# Patient Record
Sex: FEMALE | Race: WHITE | Employment: FULL TIME | URBAN - METROPOLITAN AREA
[De-identification: names, ages, dates, MRNs, and addresses within clinical notes are randomized per-mention and may not be internally consistent; named-entity substitution may affect disease eponyms.]

---

## 2017-01-03 ENCOUNTER — TELEPHONE (OUTPATIENT)
Dept: ORTHOPEDIC SURGERY | Age: 57
End: 2017-01-03

## 2017-01-03 DIAGNOSIS — M17.11 PRIMARY OSTEOARTHRITIS OF RIGHT KNEE: ICD-10-CM

## 2017-01-03 DIAGNOSIS — M48.061 LUMBAR SPINAL STENOSIS: ICD-10-CM

## 2017-01-03 RX ORDER — LISINOPRIL 20 MG/1
TABLET ORAL
Qty: 60 TABLET | Refills: 2 | Status: SHIPPED | OUTPATIENT
Start: 2017-01-03 | End: 2017-01-13 | Stop reason: SDUPTHER

## 2017-01-03 RX ORDER — MELOXICAM 15 MG/1
TABLET ORAL
Qty: 30 TABLET | Refills: 2 | Status: SHIPPED | OUTPATIENT
Start: 2017-01-03 | End: 2017-01-13 | Stop reason: SDUPTHER

## 2017-01-03 RX ORDER — ZOLPIDEM TARTRATE 10 MG/1
TABLET ORAL
Qty: 30 TABLET | Refills: 2 | Status: SHIPPED | OUTPATIENT
Start: 2017-01-03 | End: 2017-04-06 | Stop reason: SDUPTHER

## 2017-01-03 RX ORDER — ATENOLOL 50 MG/1
TABLET ORAL
Qty: 30 TABLET | Refills: 2 | Status: SHIPPED | OUTPATIENT
Start: 2017-01-03 | End: 2017-01-13

## 2017-01-03 RX ORDER — HYDROCODONE BITARTRATE AND ACETAMINOPHEN 7.5; 325 MG/1; MG/1
1 TABLET ORAL EVERY 6 HOURS PRN
Qty: 120 TABLET | Refills: 0 | OUTPATIENT
Start: 2017-01-03

## 2017-01-03 RX ORDER — HYDROXYZINE PAMOATE 25 MG/1
CAPSULE ORAL
Qty: 60 CAPSULE | Refills: 2 | Status: SHIPPED | OUTPATIENT
Start: 2017-01-03 | End: 2017-04-06 | Stop reason: SDUPTHER

## 2017-01-11 ENCOUNTER — TELEPHONE (OUTPATIENT)
Dept: FAMILY MEDICINE CLINIC | Age: 57
End: 2017-01-11

## 2017-01-13 ENCOUNTER — OFFICE VISIT (OUTPATIENT)
Dept: FAMILY MEDICINE CLINIC | Age: 57
End: 2017-01-13

## 2017-01-13 VITALS
HEIGHT: 63 IN | DIASTOLIC BLOOD PRESSURE: 88 MMHG | BODY MASS INDEX: 33.84 KG/M2 | WEIGHT: 191 LBS | SYSTOLIC BLOOD PRESSURE: 138 MMHG

## 2017-01-13 DIAGNOSIS — M48.061 LUMBAR SPINAL STENOSIS: ICD-10-CM

## 2017-01-13 DIAGNOSIS — M17.11 PRIMARY OSTEOARTHRITIS OF RIGHT KNEE: ICD-10-CM

## 2017-01-13 DIAGNOSIS — I10 ESSENTIAL HYPERTENSION: Primary | ICD-10-CM

## 2017-01-13 DIAGNOSIS — R60.9 EDEMA, UNSPECIFIED TYPE: ICD-10-CM

## 2017-01-13 DIAGNOSIS — N95.2 ATROPHIC VAGINITIS: ICD-10-CM

## 2017-01-13 DIAGNOSIS — D50.8 OTHER IRON DEFICIENCY ANEMIA: ICD-10-CM

## 2017-01-13 DIAGNOSIS — R53.83 FATIGUE, UNSPECIFIED TYPE: ICD-10-CM

## 2017-01-13 LAB
A/G RATIO: 1.6 (ref 1.1–2.2)
ALBUMIN SERPL-MCNC: 3.8 G/DL (ref 3.4–5)
ALP BLD-CCNC: 72 U/L (ref 40–129)
ALT SERPL-CCNC: 6 U/L (ref 10–40)
ANION GAP SERPL CALCULATED.3IONS-SCNC: 10 MMOL/L (ref 3–16)
AST SERPL-CCNC: 11 U/L (ref 15–37)
BILIRUB SERPL-MCNC: 0.4 MG/DL (ref 0–1)
BUN BLDV-MCNC: 19 MG/DL (ref 7–20)
CALCIUM SERPL-MCNC: 8.7 MG/DL (ref 8.3–10.6)
CHLORIDE BLD-SCNC: 104 MMOL/L (ref 99–110)
CO2: 27 MMOL/L (ref 21–32)
CREAT SERPL-MCNC: 1.2 MG/DL (ref 0.6–1.1)
GFR AFRICAN AMERICAN: 56
GFR NON-AFRICAN AMERICAN: 46
GLOBULIN: 2.4 G/DL
GLUCOSE BLD-MCNC: 93 MG/DL (ref 70–99)
HCT VFR BLD CALC: 35.8 % (ref 36–48)
HEMOGLOBIN: 11.5 G/DL (ref 12–16)
IRON: 54 UG/DL (ref 37–145)
MCH RBC QN AUTO: 28.2 PG (ref 26–34)
MCHC RBC AUTO-ENTMCNC: 32.2 G/DL (ref 31–36)
MCV RBC AUTO: 87.7 FL (ref 80–100)
PDW BLD-RTO: 17.7 % (ref 12.4–15.4)
PLATELET # BLD: 256 K/UL (ref 135–450)
PMV BLD AUTO: 8.6 FL (ref 5–10.5)
POTASSIUM SERPL-SCNC: 4.7 MMOL/L (ref 3.5–5.1)
RBC # BLD: 4.08 M/UL (ref 4–5.2)
SODIUM BLD-SCNC: 141 MMOL/L (ref 136–145)
TOTAL PROTEIN: 6.2 G/DL (ref 6.4–8.2)
TSH SERPL DL<=0.05 MIU/L-ACNC: 0.36 UIU/ML (ref 0.27–4.2)
WBC # BLD: 4.9 K/UL (ref 4–11)

## 2017-01-13 PROCEDURE — 96372 THER/PROPH/DIAG INJ SC/IM: CPT | Performed by: FAMILY MEDICINE

## 2017-01-13 PROCEDURE — 36415 COLL VENOUS BLD VENIPUNCTURE: CPT | Performed by: FAMILY MEDICINE

## 2017-01-13 PROCEDURE — 99214 OFFICE O/P EST MOD 30 MIN: CPT | Performed by: FAMILY MEDICINE

## 2017-01-13 RX ORDER — PROPRANOLOL HYDROCHLORIDE 120 MG/1
120 CAPSULE, EXTENDED RELEASE ORAL DAILY
Qty: 30 CAPSULE | Refills: 3 | Status: SHIPPED | OUTPATIENT
Start: 2017-01-13 | End: 2017-05-05 | Stop reason: SDUPTHER

## 2017-01-13 RX ORDER — HYDROCODONE BITARTRATE AND ACETAMINOPHEN 7.5; 325 MG/1; MG/1
1 TABLET ORAL EVERY 6 HOURS PRN
Qty: 120 TABLET | Refills: 0 | Status: SHIPPED | OUTPATIENT
Start: 2017-01-13 | End: 2017-04-12 | Stop reason: ALTCHOICE

## 2017-01-13 RX ORDER — HYDROCODONE BITARTRATE AND ACETAMINOPHEN 7.5; 325 MG/1; MG/1
1 TABLET ORAL EVERY 6 HOURS PRN
Qty: 120 TABLET | Refills: 0 | Status: SHIPPED | OUTPATIENT
Start: 2017-01-13 | End: 2017-01-13 | Stop reason: SDUPTHER

## 2017-01-13 RX ORDER — HYDROCHLOROTHIAZIDE 25 MG/1
25 TABLET ORAL DAILY
Qty: 30 TABLET | Refills: 3 | Status: SHIPPED | OUTPATIENT
Start: 2017-01-13 | End: 2017-03-10 | Stop reason: ALTCHOICE

## 2017-01-13 ASSESSMENT — ENCOUNTER SYMPTOMS
RESPIRATORY NEGATIVE: 1
GASTROINTESTINAL NEGATIVE: 1
BACK PAIN: 1

## 2017-01-23 ENCOUNTER — TELEPHONE (OUTPATIENT)
Dept: FAMILY MEDICINE CLINIC | Age: 57
End: 2017-01-23

## 2017-02-02 DIAGNOSIS — M54.16 LUMBAR RADICULOPATHY: ICD-10-CM

## 2017-02-02 RX ORDER — LORAZEPAM 1 MG/1
TABLET ORAL
Qty: 90 TABLET | Refills: 0 | Status: SHIPPED | OUTPATIENT
Start: 2017-02-02 | End: 2017-03-02 | Stop reason: SDUPTHER

## 2017-02-02 RX ORDER — CLONIDINE HYDROCHLORIDE 0.2 MG/1
TABLET ORAL
Qty: 60 TABLET | Refills: 0 | Status: SHIPPED | OUTPATIENT
Start: 2017-02-02 | End: 2017-03-02 | Stop reason: SDUPTHER

## 2017-02-02 RX ORDER — GABAPENTIN 300 MG/1
CAPSULE ORAL
Qty: 60 CAPSULE | Refills: 0 | Status: SHIPPED | OUTPATIENT
Start: 2017-02-02 | End: 2017-03-03 | Stop reason: SDUPTHER

## 2017-03-02 RX ORDER — CLONIDINE HYDROCHLORIDE 0.2 MG/1
TABLET ORAL
Qty: 60 TABLET | Refills: 0 | Status: SHIPPED | OUTPATIENT
Start: 2017-03-02 | End: 2017-03-03

## 2017-03-02 RX ORDER — LORAZEPAM 1 MG/1
TABLET ORAL
Qty: 90 TABLET | Refills: 0 | Status: SHIPPED | OUTPATIENT
Start: 2017-03-02 | End: 2017-03-03

## 2017-03-03 DIAGNOSIS — M54.16 LUMBAR RADICULOPATHY: ICD-10-CM

## 2017-03-03 RX ORDER — GABAPENTIN 300 MG/1
CAPSULE ORAL
Qty: 60 CAPSULE | Refills: 0 | Status: SHIPPED | OUTPATIENT
Start: 2017-03-03 | End: 2017-04-06 | Stop reason: SDUPTHER

## 2017-03-03 RX ORDER — CLONIDINE HYDROCHLORIDE 0.2 MG/1
TABLET ORAL
Qty: 60 TABLET | Refills: 0 | Status: SHIPPED | OUTPATIENT
Start: 2017-03-03 | End: 2017-10-20 | Stop reason: SDUPTHER

## 2017-03-03 RX ORDER — LORAZEPAM 1 MG/1
TABLET ORAL
Qty: 90 TABLET | Refills: 0 | Status: SHIPPED | OUTPATIENT
Start: 2017-03-03 | End: 2017-05-05 | Stop reason: SDUPTHER

## 2017-03-10 ENCOUNTER — OFFICE VISIT (OUTPATIENT)
Dept: FAMILY MEDICINE CLINIC | Age: 57
End: 2017-03-10

## 2017-03-10 VITALS
WEIGHT: 178 LBS | SYSTOLIC BLOOD PRESSURE: 152 MMHG | DIASTOLIC BLOOD PRESSURE: 100 MMHG | HEIGHT: 63 IN | BODY MASS INDEX: 31.54 KG/M2

## 2017-03-10 DIAGNOSIS — R06.09 DYSPNEA ON EXERTION: ICD-10-CM

## 2017-03-10 DIAGNOSIS — R07.9 CHEST PAIN, UNSPECIFIED TYPE: Primary | ICD-10-CM

## 2017-03-10 DIAGNOSIS — E16.2 HYPOGLYCEMIA: ICD-10-CM

## 2017-03-10 DIAGNOSIS — M51.36 DDD (DEGENERATIVE DISC DISEASE), LUMBAR: ICD-10-CM

## 2017-03-10 LAB
A/G RATIO: 1.7 (ref 1.1–2.2)
ALBUMIN SERPL-MCNC: 4 G/DL (ref 3.4–5)
ALP BLD-CCNC: 74 U/L (ref 40–129)
ALT SERPL-CCNC: 8 U/L (ref 10–40)
ANION GAP SERPL CALCULATED.3IONS-SCNC: 15 MMOL/L (ref 3–16)
AST SERPL-CCNC: 13 U/L (ref 15–37)
BILIRUB SERPL-MCNC: 0.4 MG/DL (ref 0–1)
BUN BLDV-MCNC: 21 MG/DL (ref 7–20)
CALCIUM SERPL-MCNC: 9.2 MG/DL (ref 8.3–10.6)
CHLORIDE BLD-SCNC: 103 MMOL/L (ref 99–110)
CO2: 23 MMOL/L (ref 21–32)
CREAT SERPL-MCNC: 1.5 MG/DL (ref 0.6–1.1)
GFR AFRICAN AMERICAN: 43
GFR NON-AFRICAN AMERICAN: 36
GLOBULIN: 2.3 G/DL
GLUCOSE BLD-MCNC: 91 MG/DL (ref 70–99)
HCT VFR BLD CALC: 38.8 % (ref 36–48)
HEMOGLOBIN: 12.5 G/DL (ref 12–16)
IRON: 55 UG/DL (ref 37–145)
MCH RBC QN AUTO: 29.6 PG (ref 26–34)
MCHC RBC AUTO-ENTMCNC: 32.2 G/DL (ref 31–36)
MCV RBC AUTO: 92 FL (ref 80–100)
PDW BLD-RTO: 16.8 % (ref 12.4–15.4)
PLATELET # BLD: 224 K/UL (ref 135–450)
PMV BLD AUTO: 10.1 FL (ref 5–10.5)
POTASSIUM SERPL-SCNC: 4.8 MMOL/L (ref 3.5–5.1)
RBC # BLD: 4.22 M/UL (ref 4–5.2)
SODIUM BLD-SCNC: 141 MMOL/L (ref 136–145)
TOTAL PROTEIN: 6.3 G/DL (ref 6.4–8.2)
WBC # BLD: 4.5 K/UL (ref 4–11)

## 2017-03-10 PROCEDURE — 36415 COLL VENOUS BLD VENIPUNCTURE: CPT | Performed by: FAMILY MEDICINE

## 2017-03-10 PROCEDURE — 99214 OFFICE O/P EST MOD 30 MIN: CPT | Performed by: FAMILY MEDICINE

## 2017-03-10 ASSESSMENT — ENCOUNTER SYMPTOMS
RESPIRATORY NEGATIVE: 1
GASTROINTESTINAL NEGATIVE: 1

## 2017-03-11 LAB
ESTIMATED AVERAGE GLUCOSE: 105.4 MG/DL
HBA1C MFR BLD: 5.3 %

## 2017-03-15 ENCOUNTER — TELEPHONE (OUTPATIENT)
Dept: FAMILY MEDICINE CLINIC | Age: 57
End: 2017-03-15

## 2017-03-16 LAB
6-ACETYLMORPHINE: NOT DETECTED
7-AMINOCLONAZEPAM: NOT DETECTED
ALPHA-OH-ALPRAZOLAM: NOT DETECTED
ALPRAZOLAM: NOT DETECTED
AMPHETAMINE: NOT DETECTED
BARBITURATES: NOT DETECTED
BENZOYLECGONINE: NOT DETECTED
BUPRENORPHINE: NOT DETECTED
CARISOPRODOL: NOT DETECTED
CLONAZEPAM: NOT DETECTED
CODEINE: NOT DETECTED
CREATININE URINE: 275.2 MG/DL (ref 20–400)
DIAZEPAM: NOT DETECTED
DRUGS EXPECTED: NORMAL
EER PAIN MGT DRUG PANEL, HIGH RES/EMIT U: NORMAL
ETHYL GLUCURONIDE: NOT DETECTED
FENTANYL: NOT DETECTED
HYDROCODONE: NOT DETECTED
HYDROMORPHONE: NOT DETECTED
LORAZEPAM: PRESENT
MARIJUANA METABOLITE: PRESENT
MDA: NOT DETECTED
MDEA: NOT DETECTED
MDMA URINE: NOT DETECTED
MEPERIDINE: NOT DETECTED
METHADONE: NOT DETECTED
METHAMPHETAMINE: NOT DETECTED
METHYLPHENIDATE: NOT DETECTED
MIDAZOLAM: NOT DETECTED
MORPHINE: NOT DETECTED
NORBUPRENORPHINE, FREE: NOT DETECTED
NORDIAZEPAM: NOT DETECTED
NORFENTANYL: NOT DETECTED
NORHYDROCODONE, URINE: NOT DETECTED
NOROXYCODONE: NOT DETECTED
NOROXYMORPHONE, URINE: NOT DETECTED
OXAZEPAM: NOT DETECTED
OXYCODONE: NOT DETECTED
OXYMORPHONE: NOT DETECTED
PAIN MANAGEMENT DRUG PANEL: NORMAL
PAIN MANAGEMENT DRUG PANEL: NORMAL
PCP: NOT DETECTED
PHENTERMINE: NOT DETECTED
PROPOXYPHENE: NOT DETECTED
TAPENTADOL, URINE: NOT DETECTED
TAPENTADOL-O-SULFATE, URINE: NOT DETECTED
TEMAZEPAM: NOT DETECTED
TRAMADOL: NOT DETECTED
ZOLPIDEM: NOT DETECTED

## 2017-03-17 ENCOUNTER — OFFICE VISIT (OUTPATIENT)
Dept: ORTHOPEDIC SURGERY | Age: 57
End: 2017-03-17

## 2017-03-17 VITALS
BODY MASS INDEX: 31.54 KG/M2 | HEIGHT: 63 IN | DIASTOLIC BLOOD PRESSURE: 74 MMHG | WEIGHT: 178 LBS | SYSTOLIC BLOOD PRESSURE: 135 MMHG

## 2017-03-17 DIAGNOSIS — M43.16 SPONDYLOLISTHESIS OF LUMBAR REGION: ICD-10-CM

## 2017-03-17 DIAGNOSIS — M48.061 LUMBAR STENOSIS: Primary | ICD-10-CM

## 2017-03-17 DIAGNOSIS — M47.816 SPONDYLOSIS OF LUMBAR REGION WITHOUT MYELOPATHY OR RADICULOPATHY: ICD-10-CM

## 2017-03-17 PROCEDURE — 99213 OFFICE O/P EST LOW 20 MIN: CPT | Performed by: PHYSICAL MEDICINE & REHABILITATION

## 2017-03-21 ENCOUNTER — TELEPHONE (OUTPATIENT)
Dept: ORTHOPEDIC SURGERY | Age: 57
End: 2017-03-21

## 2017-03-24 ENCOUNTER — HOSPITAL ENCOUNTER (OUTPATIENT)
Dept: NON INVASIVE DIAGNOSTICS | Age: 57
Discharge: HOME | End: 2017-03-24
Attending: FAMILY MEDICINE | Admitting: FAMILY MEDICINE

## 2017-03-24 DIAGNOSIS — R07.9 CHEST PAIN: ICD-10-CM

## 2017-03-24 LAB
LV EF: 72 %
LVEF MODALITY: NORMAL

## 2017-03-24 RX ORDER — AMINOPHYLLINE DIHYDRATE 25 MG/ML
100 INJECTION, SOLUTION INTRAVENOUS ONCE
Status: COMPLETED | OUTPATIENT
Start: 2017-03-24 | End: 2017-03-24

## 2017-03-24 RX ADMIN — AMINOPHYLLINE DIHYDRATE 100 MG: 25 INJECTION, SOLUTION INTRAVENOUS at 10:24

## 2017-04-06 DIAGNOSIS — M54.16 LUMBAR RADICULOPATHY: ICD-10-CM

## 2017-04-06 DIAGNOSIS — F41.9 ANXIETY: ICD-10-CM

## 2017-04-06 RX ORDER — MELOXICAM 15 MG/1
TABLET ORAL
Qty: 30 TABLET | Refills: 0 | Status: SHIPPED | OUTPATIENT
Start: 2017-04-06 | End: 2017-04-10 | Stop reason: SDUPTHER

## 2017-04-06 RX ORDER — LISINOPRIL 20 MG/1
TABLET ORAL
Qty: 60 TABLET | Refills: 1 | Status: SHIPPED | OUTPATIENT
Start: 2017-04-06 | End: 2017-04-10 | Stop reason: ALTCHOICE

## 2017-04-06 RX ORDER — ATENOLOL 50 MG/1
TABLET ORAL
Qty: 30 TABLET | Refills: 1 | Status: SHIPPED | OUTPATIENT
Start: 2017-04-06 | End: 2017-04-10

## 2017-04-06 RX ORDER — ZOLPIDEM TARTRATE 10 MG/1
TABLET ORAL
Qty: 30 TABLET | Refills: 1 | Status: SHIPPED | OUTPATIENT
Start: 2017-04-06 | End: 2017-06-05 | Stop reason: SDUPTHER

## 2017-04-06 RX ORDER — CITALOPRAM 20 MG/1
TABLET ORAL
Qty: 30 TABLET | Refills: 2 | Status: SHIPPED | OUTPATIENT
Start: 2017-04-06 | End: 2017-07-02 | Stop reason: SDUPTHER

## 2017-04-06 RX ORDER — HYDROXYZINE HYDROCHLORIDE 25 MG/1
TABLET, FILM COATED ORAL
Qty: 60 TABLET | Refills: 1 | Status: SHIPPED | OUTPATIENT
Start: 2017-04-06 | End: 2017-06-05 | Stop reason: SDUPTHER

## 2017-04-06 RX ORDER — GABAPENTIN 300 MG/1
CAPSULE ORAL
Qty: 60 CAPSULE | Refills: 0 | Status: SHIPPED | OUTPATIENT
Start: 2017-04-06 | End: 2017-05-05 | Stop reason: SDUPTHER

## 2017-04-10 ENCOUNTER — TELEPHONE (OUTPATIENT)
Dept: FAMILY MEDICINE CLINIC | Age: 57
End: 2017-04-10

## 2017-04-10 ENCOUNTER — NURSE ONLY (OUTPATIENT)
Dept: FAMILY MEDICINE CLINIC | Age: 57
End: 2017-04-10

## 2017-04-10 DIAGNOSIS — Z79.899 DRUG THERAPY: Primary | ICD-10-CM

## 2017-04-10 RX ORDER — NIFEDIPINE 30 MG/1
30 TABLET, FILM COATED, EXTENDED RELEASE ORAL DAILY
Qty: 30 TABLET | Refills: 3 | Status: SHIPPED | OUTPATIENT
Start: 2017-04-10 | End: 2017-08-01 | Stop reason: SDUPTHER

## 2017-04-12 ENCOUNTER — HOSPITAL ENCOUNTER (OUTPATIENT)
Dept: PAIN MANAGEMENT | Age: 57
Discharge: HOME | End: 2017-04-12
Admitting: PHYSICAL MEDICINE & REHABILITATION

## 2017-04-12 VITALS
RESPIRATION RATE: 16 BRPM | OXYGEN SATURATION: 99 % | HEIGHT: 64 IN | DIASTOLIC BLOOD PRESSURE: 98 MMHG | WEIGHT: 173 LBS | HEART RATE: 56 BPM | TEMPERATURE: 97.4 F | SYSTOLIC BLOOD PRESSURE: 161 MMHG | BODY MASS INDEX: 29.53 KG/M2

## 2017-04-12 ASSESSMENT — PAIN - FUNCTIONAL ASSESSMENT
PAIN_FUNCTIONAL_ASSESSMENT: 0-10
PAIN_FUNCTIONAL_ASSESSMENT: 0-10

## 2017-04-12 ASSESSMENT — PAIN DESCRIPTION - DESCRIPTORS: DESCRIPTORS: THROBBING

## 2017-04-14 LAB
6-ACETYLMORPHINE: NOT DETECTED
7-AMINOCLONAZEPAM: NOT DETECTED
ALPHA-OH-ALPRAZOLAM: NOT DETECTED
ALPRAZOLAM: NOT DETECTED
AMPHETAMINE: NOT DETECTED
BARBITURATES: NOT DETECTED
BENZOYLECGONINE: NOT DETECTED
BUPRENORPHINE: NOT DETECTED
CARISOPRODOL: NOT DETECTED
CLONAZEPAM: NOT DETECTED
CODEINE: NOT DETECTED
CREATININE URINE: 179.9 MG/DL (ref 20–400)
DIAZEPAM: NOT DETECTED
DRUGS EXPECTED: NORMAL
EER PAIN MGT DRUG PANEL, HIGH RES/EMIT U: NORMAL
ETHYL GLUCURONIDE: NOT DETECTED
FENTANYL: NOT DETECTED
HYDROCODONE: NOT DETECTED
HYDROMORPHONE: NOT DETECTED
LORAZEPAM: PRESENT
MARIJUANA METABOLITE: NOT DETECTED
MDA: NOT DETECTED
MDEA: NOT DETECTED
MDMA URINE: NOT DETECTED
MEPERIDINE: NOT DETECTED
METHADONE: NOT DETECTED
METHAMPHETAMINE: NOT DETECTED
METHYLPHENIDATE: NOT DETECTED
MIDAZOLAM: NOT DETECTED
MORPHINE: NOT DETECTED
NORBUPRENORPHINE, FREE: NOT DETECTED
NORDIAZEPAM: NOT DETECTED
NORFENTANYL: NOT DETECTED
NORHYDROCODONE, URINE: NOT DETECTED
NOROXYCODONE: NOT DETECTED
NOROXYMORPHONE, URINE: NOT DETECTED
OXAZEPAM: NOT DETECTED
OXYCODONE: NOT DETECTED
OXYMORPHONE: NOT DETECTED
PAIN MANAGEMENT DRUG PANEL: NORMAL
PAIN MANAGEMENT DRUG PANEL: NORMAL
PCP: NOT DETECTED
PHENTERMINE: NOT DETECTED
PROPOXYPHENE: NOT DETECTED
TAPENTADOL, URINE: NOT DETECTED
TAPENTADOL-O-SULFATE, URINE: NOT DETECTED
TEMAZEPAM: NOT DETECTED
TRAMADOL: NOT DETECTED
ZOLPIDEM: NOT DETECTED

## 2017-04-24 ENCOUNTER — TELEPHONE (OUTPATIENT)
Dept: FAMILY MEDICINE CLINIC | Age: 57
End: 2017-04-24

## 2017-04-24 DIAGNOSIS — M17.11 PRIMARY OSTEOARTHRITIS OF RIGHT KNEE: ICD-10-CM

## 2017-04-24 DIAGNOSIS — M48.061 LUMBAR SPINAL STENOSIS: ICD-10-CM

## 2017-04-24 RX ORDER — HYDROCODONE BITARTRATE AND ACETAMINOPHEN 7.5; 325 MG/1; MG/1
1 TABLET ORAL EVERY 6 HOURS PRN
Qty: 120 TABLET | Refills: 0 | Status: SHIPPED | OUTPATIENT
Start: 2017-04-24 | End: 2017-05-23 | Stop reason: SDUPTHER

## 2017-05-06 RX ORDER — LORAZEPAM 1 MG/1
TABLET ORAL
Qty: 90 TABLET | Refills: 0 | Status: SHIPPED | OUTPATIENT
Start: 2017-05-06 | End: 2017-06-05 | Stop reason: SDUPTHER

## 2017-05-17 ENCOUNTER — TELEPHONE (OUTPATIENT)
Dept: ORTHOPEDIC SURGERY | Age: 57
End: 2017-05-17

## 2017-05-23 DIAGNOSIS — M48.061 LUMBAR SPINAL STENOSIS: ICD-10-CM

## 2017-05-23 DIAGNOSIS — M17.11 PRIMARY OSTEOARTHRITIS OF RIGHT KNEE: ICD-10-CM

## 2017-05-23 RX ORDER — HYDROCODONE BITARTRATE AND ACETAMINOPHEN 7.5; 325 MG/1; MG/1
1 TABLET ORAL EVERY 6 HOURS PRN
Qty: 120 TABLET | Refills: 0 | Status: SHIPPED | OUTPATIENT
Start: 2017-05-23 | End: 2017-06-22 | Stop reason: SDUPTHER

## 2017-06-05 RX ORDER — LORAZEPAM 1 MG/1
TABLET ORAL
Qty: 90 TABLET | Refills: 0 | Status: SHIPPED | OUTPATIENT
Start: 2017-06-05 | End: 2017-07-02 | Stop reason: SDUPTHER

## 2017-06-05 RX ORDER — LISINOPRIL 20 MG/1
TABLET ORAL
Qty: 60 TABLET | Refills: 0 | Status: SHIPPED | OUTPATIENT
Start: 2017-06-05 | End: 2017-07-02 | Stop reason: SDUPTHER

## 2017-06-05 RX ORDER — ZOLPIDEM TARTRATE 10 MG/1
TABLET ORAL
Qty: 30 TABLET | Refills: 0 | Status: SHIPPED | OUTPATIENT
Start: 2017-06-05 | End: 2017-07-02 | Stop reason: SDUPTHER

## 2017-06-09 RX ORDER — ATENOLOL 50 MG/1
TABLET ORAL
Qty: 30 TABLET | Refills: 0 | Status: SHIPPED | OUTPATIENT
Start: 2017-06-09 | End: 2017-07-26 | Stop reason: SINTOL

## 2017-06-22 ENCOUNTER — TELEPHONE (OUTPATIENT)
Dept: FAMILY MEDICINE CLINIC | Age: 57
End: 2017-06-22

## 2017-06-22 DIAGNOSIS — M17.11 PRIMARY OSTEOARTHRITIS OF RIGHT KNEE: ICD-10-CM

## 2017-06-22 DIAGNOSIS — M48.061 LUMBAR SPINAL STENOSIS: ICD-10-CM

## 2017-06-22 RX ORDER — HYDROCODONE BITARTRATE AND ACETAMINOPHEN 7.5; 325 MG/1; MG/1
1 TABLET ORAL EVERY 6 HOURS PRN
Qty: 120 TABLET | Refills: 0 | Status: SHIPPED | OUTPATIENT
Start: 2017-06-22 | End: 2017-07-26 | Stop reason: SDUPTHER

## 2017-07-02 DIAGNOSIS — F41.9 ANXIETY: ICD-10-CM

## 2017-07-02 RX ORDER — ZOLPIDEM TARTRATE 10 MG/1
TABLET ORAL
Qty: 30 TABLET | Refills: 0 | Status: SHIPPED | OUTPATIENT
Start: 2017-07-02 | End: 2017-08-02 | Stop reason: SDUPTHER

## 2017-07-02 RX ORDER — CITALOPRAM 20 MG/1
TABLET ORAL
Qty: 30 TABLET | Refills: 1 | Status: SHIPPED | OUTPATIENT
Start: 2017-07-02 | End: 2017-08-29 | Stop reason: SDUPTHER

## 2017-07-02 RX ORDER — LORAZEPAM 1 MG/1
TABLET ORAL
Qty: 90 TABLET | Refills: 0 | Status: SHIPPED | OUTPATIENT
Start: 2017-07-02 | End: 2017-08-01 | Stop reason: SDUPTHER

## 2017-07-02 RX ORDER — LISINOPRIL 20 MG/1
TABLET ORAL
Qty: 60 TABLET | Refills: 0 | Status: SHIPPED | OUTPATIENT
Start: 2017-07-02 | End: 2017-07-26 | Stop reason: SDUPTHER

## 2017-07-05 ENCOUNTER — TELEPHONE (OUTPATIENT)
Dept: FAMILY MEDICINE CLINIC | Age: 57
End: 2017-07-05

## 2017-07-24 ENCOUNTER — TELEPHONE (OUTPATIENT)
Dept: FAMILY MEDICINE CLINIC | Age: 57
End: 2017-07-24

## 2017-07-26 ENCOUNTER — OFFICE VISIT (OUTPATIENT)
Dept: FAMILY MEDICINE CLINIC | Age: 57
End: 2017-07-26

## 2017-07-26 VITALS
BODY MASS INDEX: 29.53 KG/M2 | WEIGHT: 173 LBS | DIASTOLIC BLOOD PRESSURE: 90 MMHG | SYSTOLIC BLOOD PRESSURE: 152 MMHG | HEIGHT: 64 IN

## 2017-07-26 DIAGNOSIS — M48.061 LUMBAR SPINAL STENOSIS: ICD-10-CM

## 2017-07-26 DIAGNOSIS — M17.11 PRIMARY OSTEOARTHRITIS OF RIGHT KNEE: ICD-10-CM

## 2017-07-26 DIAGNOSIS — I10 ESSENTIAL HYPERTENSION: ICD-10-CM

## 2017-07-26 PROCEDURE — 99214 OFFICE O/P EST MOD 30 MIN: CPT | Performed by: FAMILY MEDICINE

## 2017-07-26 RX ORDER — PROPRANOLOL HYDROCHLORIDE 160 MG/1
CAPSULE, EXTENDED RELEASE ORAL
Qty: 30 CAPSULE | Refills: 5 | Status: SHIPPED | OUTPATIENT
Start: 2017-07-26 | End: 2017-10-20 | Stop reason: SDUPTHER

## 2017-07-26 RX ORDER — HYDROCODONE BITARTRATE AND ACETAMINOPHEN 7.5; 325 MG/1; MG/1
1 TABLET ORAL EVERY 6 HOURS PRN
Qty: 120 TABLET | Refills: 0 | Status: SHIPPED | OUTPATIENT
Start: 2017-07-26 | End: 2017-07-26 | Stop reason: SDUPTHER

## 2017-07-26 RX ORDER — HYDROCODONE BITARTRATE AND ACETAMINOPHEN 7.5; 325 MG/1; MG/1
1 TABLET ORAL EVERY 6 HOURS PRN
Qty: 120 TABLET | Refills: 0 | Status: SHIPPED | OUTPATIENT
Start: 2017-07-26 | End: 2017-10-20 | Stop reason: SDUPTHER

## 2017-07-26 ASSESSMENT — ENCOUNTER SYMPTOMS
BACK PAIN: 1
ABDOMINAL PAIN: 0
BOWEL INCONTINENCE: 0
GASTROINTESTINAL NEGATIVE: 1
SHORTNESS OF BREATH: 1
ORTHOPNEA: 0
BLURRED VISION: 0

## 2017-08-01 RX ORDER — LORAZEPAM 1 MG/1
TABLET ORAL
Qty: 90 TABLET | Refills: 2 | Status: SHIPPED | OUTPATIENT
Start: 2017-08-01 | End: 2017-10-20 | Stop reason: SDUPTHER

## 2017-08-01 RX ORDER — NIFEDIPINE 30 MG/1
TABLET, FILM COATED, EXTENDED RELEASE ORAL
Qty: 30 TABLET | Refills: 2 | Status: SHIPPED | OUTPATIENT
Start: 2017-08-01 | End: 2017-10-20 | Stop reason: SDUPTHER

## 2017-08-01 RX ORDER — LISINOPRIL 20 MG/1
TABLET ORAL
Qty: 60 TABLET | Refills: 0 | Status: SHIPPED | OUTPATIENT
Start: 2017-08-01 | End: 2017-08-29 | Stop reason: SDUPTHER

## 2017-08-02 RX ORDER — ZOLPIDEM TARTRATE 10 MG/1
TABLET ORAL
Qty: 30 TABLET | Refills: 1 | Status: SHIPPED | OUTPATIENT
Start: 2017-08-02 | End: 2017-09-29 | Stop reason: SDUPTHER

## 2017-08-18 ENCOUNTER — OFFICE VISIT (OUTPATIENT)
Dept: ORTHOPEDIC SURGERY | Age: 57
End: 2017-08-18

## 2017-08-18 VITALS
WEIGHT: 173 LBS | SYSTOLIC BLOOD PRESSURE: 163 MMHG | DIASTOLIC BLOOD PRESSURE: 75 MMHG | BODY MASS INDEX: 29.53 KG/M2 | HEIGHT: 64 IN | HEART RATE: 57 BPM

## 2017-08-18 DIAGNOSIS — M47.26 OSTEOARTHRITIS OF SPINE WITH RADICULOPATHY, LUMBAR REGION: ICD-10-CM

## 2017-08-18 DIAGNOSIS — M48.061 LUMBAR STENOSIS: Primary | ICD-10-CM

## 2017-08-18 DIAGNOSIS — M43.16 SPONDYLOLISTHESIS OF LUMBAR REGION: ICD-10-CM

## 2017-08-18 PROCEDURE — 99214 OFFICE O/P EST MOD 30 MIN: CPT | Performed by: PHYSICAL MEDICINE & REHABILITATION

## 2017-08-18 RX ORDER — CYCLOBENZAPRINE HCL 5 MG
5 TABLET ORAL 2 TIMES DAILY PRN
Qty: 60 TABLET | Refills: 0 | Status: SHIPPED | OUTPATIENT
Start: 2017-08-18 | End: 2017-09-17

## 2017-08-21 ENCOUNTER — TELEPHONE (OUTPATIENT)
Dept: ORTHOPEDIC SURGERY | Age: 57
End: 2017-08-21

## 2017-08-29 DIAGNOSIS — F41.9 ANXIETY: ICD-10-CM

## 2017-08-29 RX ORDER — HYDROXYZINE HYDROCHLORIDE 25 MG/1
TABLET, FILM COATED ORAL
Qty: 60 TABLET | Refills: 1 | Status: SHIPPED | OUTPATIENT
Start: 2017-08-29 | End: 2017-10-20 | Stop reason: SDUPTHER

## 2017-08-29 RX ORDER — CITALOPRAM 20 MG/1
TABLET ORAL
Qty: 30 TABLET | Refills: 0 | Status: SHIPPED | OUTPATIENT
Start: 2017-08-29 | End: 2017-09-28 | Stop reason: SDUPTHER

## 2017-08-29 RX ORDER — LISINOPRIL 20 MG/1
TABLET ORAL
Qty: 60 TABLET | Refills: 0 | Status: SHIPPED | OUTPATIENT
Start: 2017-08-29 | End: 2017-09-28 | Stop reason: SDUPTHER

## 2017-09-06 ENCOUNTER — HOSPITAL ENCOUNTER (OUTPATIENT)
Dept: PAIN MANAGEMENT | Age: 57
Discharge: HOME | End: 2017-09-06
Attending: PHYSICAL MEDICINE & REHABILITATION | Admitting: PHYSICAL MEDICINE & REHABILITATION

## 2017-09-06 VITALS
BODY MASS INDEX: 29.88 KG/M2 | HEIGHT: 64 IN | OXYGEN SATURATION: 98 % | DIASTOLIC BLOOD PRESSURE: 71 MMHG | HEART RATE: 68 BPM | SYSTOLIC BLOOD PRESSURE: 129 MMHG | RESPIRATION RATE: 16 BRPM | WEIGHT: 175 LBS | TEMPERATURE: 98 F

## 2017-09-06 ASSESSMENT — PAIN - FUNCTIONAL ASSESSMENT
PAIN_FUNCTIONAL_ASSESSMENT: 0-10
PAIN_FUNCTIONAL_ASSESSMENT: 0-10

## 2017-09-06 ASSESSMENT — PAIN DESCRIPTION - DESCRIPTORS: DESCRIPTORS: ACHING;SHOOTING

## 2017-09-29 RX ORDER — ZOLPIDEM TARTRATE 10 MG/1
TABLET ORAL
Qty: 30 TABLET | Refills: 0 | Status: SHIPPED | OUTPATIENT
Start: 2017-09-29 | End: 2017-10-20 | Stop reason: SDUPTHER

## 2017-10-16 ENCOUNTER — TELEPHONE (OUTPATIENT)
Dept: ORTHOPEDIC SURGERY | Age: 57
End: 2017-10-16

## 2017-10-20 ENCOUNTER — OFFICE VISIT (OUTPATIENT)
Dept: FAMILY MEDICINE CLINIC | Age: 57
End: 2017-10-20

## 2017-10-20 VITALS
BODY MASS INDEX: 30.56 KG/M2 | DIASTOLIC BLOOD PRESSURE: 86 MMHG | SYSTOLIC BLOOD PRESSURE: 132 MMHG | HEIGHT: 64 IN | WEIGHT: 179 LBS

## 2017-10-20 DIAGNOSIS — I10 ESSENTIAL HYPERTENSION: Primary | ICD-10-CM

## 2017-10-20 DIAGNOSIS — M54.16 LUMBAR RADICULOPATHY: ICD-10-CM

## 2017-10-20 DIAGNOSIS — M17.11 PRIMARY OSTEOARTHRITIS OF RIGHT KNEE: ICD-10-CM

## 2017-10-20 DIAGNOSIS — E61.1 IRON DEFICIENCY: ICD-10-CM

## 2017-10-20 DIAGNOSIS — N95.2 ATROPHIC VAGINITIS: ICD-10-CM

## 2017-10-20 DIAGNOSIS — F51.01 PRIMARY INSOMNIA: ICD-10-CM

## 2017-10-20 DIAGNOSIS — F41.9 ANXIETY: ICD-10-CM

## 2017-10-20 PROCEDURE — 99214 OFFICE O/P EST MOD 30 MIN: CPT | Performed by: FAMILY MEDICINE

## 2017-10-20 PROCEDURE — 96372 THER/PROPH/DIAG INJ SC/IM: CPT | Performed by: FAMILY MEDICINE

## 2017-10-20 RX ORDER — CLONIDINE HYDROCHLORIDE 0.2 MG/1
TABLET ORAL
Qty: 60 TABLET | Refills: 5 | Status: SHIPPED | OUTPATIENT
Start: 2017-10-20 | End: 2018-04-19 | Stop reason: SDUPTHER

## 2017-10-20 RX ORDER — HYDROCODONE BITARTRATE AND ACETAMINOPHEN 7.5; 325 MG/1; MG/1
1 TABLET ORAL EVERY 6 HOURS PRN
Qty: 120 TABLET | Refills: 0 | Status: SHIPPED | OUTPATIENT
Start: 2017-10-20 | End: 2017-10-20 | Stop reason: SDUPTHER

## 2017-10-20 RX ORDER — ZOLPIDEM TARTRATE 10 MG/1
TABLET ORAL
Qty: 30 TABLET | Refills: 2 | Status: SHIPPED | OUTPATIENT
Start: 2017-10-20 | End: 2018-01-22 | Stop reason: SDUPTHER

## 2017-10-20 RX ORDER — NIFEDIPINE 30 MG/1
TABLET, FILM COATED, EXTENDED RELEASE ORAL
Qty: 30 TABLET | Refills: 5 | Status: SHIPPED | OUTPATIENT
Start: 2017-10-20 | End: 2018-04-19 | Stop reason: SDUPTHER

## 2017-10-20 RX ORDER — CITALOPRAM 20 MG/1
TABLET ORAL
Qty: 30 TABLET | Refills: 5 | Status: SHIPPED | OUTPATIENT
Start: 2017-10-20 | End: 2018-04-23 | Stop reason: SDUPTHER

## 2017-10-20 RX ORDER — PROPRANOLOL HYDROCHLORIDE 160 MG/1
CAPSULE, EXTENDED RELEASE ORAL
Qty: 30 CAPSULE | Refills: 5 | Status: SHIPPED | OUTPATIENT
Start: 2017-10-20 | End: 2018-07-14 | Stop reason: SDUPTHER

## 2017-10-20 RX ORDER — HYDROCODONE BITARTRATE AND ACETAMINOPHEN 7.5; 325 MG/1; MG/1
1 TABLET ORAL EVERY 6 HOURS PRN
Qty: 120 TABLET | Refills: 0 | Status: SHIPPED | OUTPATIENT
Start: 2017-10-20 | End: 2018-01-26 | Stop reason: SDUPTHER

## 2017-10-20 RX ORDER — GABAPENTIN 300 MG/1
CAPSULE ORAL
Qty: 90 CAPSULE | Refills: 5 | Status: SHIPPED | OUTPATIENT
Start: 2017-10-20 | End: 2018-04-19 | Stop reason: SDUPTHER

## 2017-10-20 RX ORDER — LORAZEPAM 1 MG/1
TABLET ORAL
Qty: 90 TABLET | Refills: 2 | Status: SHIPPED | OUTPATIENT
Start: 2017-10-20 | End: 2018-01-22 | Stop reason: SDUPTHER

## 2017-10-20 RX ORDER — LISINOPRIL 20 MG/1
TABLET ORAL
Qty: 60 TABLET | Refills: 5 | Status: SHIPPED | OUTPATIENT
Start: 2017-10-20 | End: 2018-04-19 | Stop reason: SDUPTHER

## 2017-10-20 RX ORDER — TIZANIDINE 4 MG/1
4 TABLET ORAL EVERY 6 HOURS PRN
Qty: 40 TABLET | Refills: 1 | Status: SHIPPED | OUTPATIENT
Start: 2017-10-20 | End: 2017-12-22 | Stop reason: SDUPTHER

## 2017-10-20 RX ORDER — HYDROXYZINE HYDROCHLORIDE 25 MG/1
TABLET, FILM COATED ORAL
Qty: 60 TABLET | Refills: 1 | Status: SHIPPED | OUTPATIENT
Start: 2017-10-20 | End: 2017-12-22 | Stop reason: SDUPTHER

## 2017-10-20 ASSESSMENT — ENCOUNTER SYMPTOMS
BACK PAIN: 1
SHORTNESS OF BREATH: 0
ABDOMINAL PAIN: 0
BLURRED VISION: 0
ORTHOPNEA: 0
BOWEL INCONTINENCE: 0

## 2017-10-20 ASSESSMENT — PATIENT HEALTH QUESTIONNAIRE - PHQ9
1. LITTLE INTEREST OR PLEASURE IN DOING THINGS: 1
SUM OF ALL RESPONSES TO PHQ QUESTIONS 1-9: 2
2. FEELING DOWN, DEPRESSED OR HOPELESS: 1
SUM OF ALL RESPONSES TO PHQ9 QUESTIONS 1 & 2: 2

## 2017-10-21 NOTE — PROGRESS NOTES
Subjective:      Patient ID: Suzi Raya is a 64 y.o. female. Back Pain   This is a chronic problem. The current episode started more than 1 year ago. The problem occurs constantly. The problem has been gradually worsening since onset. The pain is present in the lumbar spine. The pain is severe. The symptoms are aggravated by position, standing and twisting. Pertinent negatives include no abdominal pain, bladder incontinence, bowel incontinence, chest pain, dysuria, fever, headaches, leg pain, numbness, paresis, paresthesias, pelvic pain, perianal numbness, tingling, weakness or weight loss. Risk factors include lack of exercise and sedentary lifestyle. She has tried analgesics, bed rest, heat, home exercises, muscle relaxant and NSAIDs for the symptoms. The treatment provided mild relief. Hypertension   This is a chronic problem. The current episode started more than 1 year ago. The problem has been gradually improving since onset. The problem is controlled. Associated symptoms include anxiety and malaise/fatigue. Pertinent negatives include no blurred vision, chest pain, headaches, neck pain, orthopnea, palpitations, peripheral edema, PND, shortness of breath or sweats. The current treatment provides moderate improvement. There are no compliance problems. Review of Systems   Constitutional: Positive for malaise/fatigue. Negative for fever and weight loss. Eyes: Negative for blurred vision. Respiratory: Negative for shortness of breath. Cardiovascular: Negative for chest pain, palpitations, orthopnea and PND. Gastrointestinal: Negative for abdominal pain and bowel incontinence. Genitourinary: Negative for bladder incontinence, dysuria and pelvic pain. Musculoskeletal: Positive for back pain. Negative for neck pain. Neurological: Negative for tingling, weakness, numbness, headaches and paresthesias.      YOB: 1960    Date of Visit:  10/20/2017    Allergies   Allergen Reactions    Penicillins        Outpatient Prescriptions Marked as Taking for the 10/20/17 encounter (Office Visit) with Gamal Manner, DO   Medication Sig Dispense Refill    zolpidem (AMBIEN) 10 MG tablet TAKE ONE TABLET BY MOUTH EVERY NIGHT AT BEDTIME 30 tablet 2    propranolol (INDERAL LA) 160 MG extended release capsule TAKE ONE CAPSULE BY MOUTH DAILY 30 capsule 5    NIFEdipine (ADALAT CC) 30 MG extended release tablet TAKE ONE TABLET BY MOUTH DAILY 30 tablet 5    LORazepam (ATIVAN) 1 MG tablet TAKE ONE TABLET BY MOUTH EVERY 8 HOURS 90 tablet 2    hydrOXYzine (ATARAX) 25 MG tablet TAKE TWO TABLETS BY MOUTH ONCE NIGHTLY AS NEEDED FOR ITCHING 60 tablet 1    lisinopril (PRINIVIL;ZESTRIL) 20 MG tablet TAKE ONE TABLET BY MOUTH TWICE A DAY 60 tablet 5    gabapentin (NEURONTIN) 300 MG capsule One q am and 2 qhs 90 capsule 5    conjugated estrogens (PREMARIN) 0.625 MG/GM vaginal cream Place 0.5 g vaginally daily Place vaginally daily. 1 Tube 3    cloNIDine (CATAPRES) 0.2 MG tablet TAKE ONE TABLET BY MOUTH TWICE A DAY 60 tablet 5    citalopram (CELEXA) 20 MG tablet TAKE ONE TABLET BY MOUTH DAILY 30 tablet 5    tiZANidine (ZANAFLEX) 4 MG tablet Take 1 tablet by mouth every 6 hours as needed (spasm) 40 tablet 1    HYDROcodone-acetaminophen (NORCO) 7.5-325 MG per tablet Take 1 tablet by mouth every 6 hours as needed for Pain . 120 tablet 0       Vitals:    10/20/17 1305 10/20/17 1325   BP: (!) 156/90 132/86   Weight: 179 lb (81.2 kg)    Height: 5' 4\" (1.626 m)      Body mass index is 30.73 kg/m². Wt Readings from Last 3 Encounters:   10/20/17 179 lb (81.2 kg)   09/06/17 175 lb (79.4 kg)   08/18/17 173 lb (78.5 kg)     BP Readings from Last 3 Encounters:   10/20/17 132/86   09/06/17 129/71   08/18/17 (!) 163/75       Objective:   Physical Exam   Constitutional: She is oriented to person, place, and time. She appears well-developed and well-nourished. HENT:   Head: Normocephalic.    Cardiovascular: Normal

## 2017-12-22 DIAGNOSIS — F51.01 PRIMARY INSOMNIA: ICD-10-CM

## 2017-12-22 RX ORDER — TIZANIDINE 4 MG/1
TABLET ORAL
Qty: 40 TABLET | Refills: 0 | Status: SHIPPED | OUTPATIENT
Start: 2017-12-22 | End: 2018-03-01

## 2017-12-22 RX ORDER — HYDROXYZINE HYDROCHLORIDE 25 MG/1
TABLET, FILM COATED ORAL
Qty: 60 TABLET | Refills: 0 | Status: SHIPPED | OUTPATIENT
Start: 2017-12-22 | End: 2018-01-22 | Stop reason: SDUPTHER

## 2018-01-22 DIAGNOSIS — F51.01 PRIMARY INSOMNIA: ICD-10-CM

## 2018-01-22 RX ORDER — HYDROXYZINE HYDROCHLORIDE 25 MG/1
TABLET, FILM COATED ORAL
Qty: 60 TABLET | Refills: 0 | Status: SHIPPED | OUTPATIENT
Start: 2018-01-22 | End: 2018-06-15

## 2018-01-23 DIAGNOSIS — F51.01 PRIMARY INSOMNIA: ICD-10-CM

## 2018-01-23 RX ORDER — ZOLPIDEM TARTRATE 10 MG/1
TABLET ORAL
Qty: 30 TABLET | Refills: 1 | Status: SHIPPED | OUTPATIENT
Start: 2018-01-23 | End: 2018-04-19 | Stop reason: SDUPTHER

## 2018-01-23 RX ORDER — TIZANIDINE 4 MG/1
TABLET ORAL
Qty: 40 TABLET | Refills: 0 | Status: SHIPPED | OUTPATIENT
Start: 2018-01-23 | End: 2018-01-26 | Stop reason: ALTCHOICE

## 2018-01-26 ENCOUNTER — OFFICE VISIT (OUTPATIENT)
Dept: FAMILY MEDICINE CLINIC | Age: 58
End: 2018-01-26

## 2018-01-26 VITALS
WEIGHT: 179 LBS | HEIGHT: 64 IN | BODY MASS INDEX: 30.56 KG/M2 | DIASTOLIC BLOOD PRESSURE: 66 MMHG | SYSTOLIC BLOOD PRESSURE: 100 MMHG

## 2018-01-26 DIAGNOSIS — R55 SYNCOPE, UNSPECIFIED SYNCOPE TYPE: Primary | ICD-10-CM

## 2018-01-26 DIAGNOSIS — M17.11 PRIMARY OSTEOARTHRITIS OF RIGHT KNEE: ICD-10-CM

## 2018-01-26 DIAGNOSIS — M48.061 SPINAL STENOSIS OF LUMBAR REGION WITHOUT NEUROGENIC CLAUDICATION: ICD-10-CM

## 2018-01-26 LAB
A/G RATIO: 1.6 (ref 1.1–2.2)
ALBUMIN SERPL-MCNC: 3.6 G/DL (ref 3.4–5)
ALP BLD-CCNC: 80 U/L (ref 40–129)
ALT SERPL-CCNC: 7 U/L (ref 10–40)
ANION GAP SERPL CALCULATED.3IONS-SCNC: 10 MMOL/L (ref 3–16)
AST SERPL-CCNC: 11 U/L (ref 15–37)
BILIRUB SERPL-MCNC: <0.2 MG/DL (ref 0–1)
BUN BLDV-MCNC: 18 MG/DL (ref 7–20)
CALCIUM SERPL-MCNC: 8.4 MG/DL (ref 8.3–10.6)
CHLORIDE BLD-SCNC: 107 MMOL/L (ref 99–110)
CO2: 25 MMOL/L (ref 21–32)
CREAT SERPL-MCNC: 1.2 MG/DL (ref 0.6–1.1)
GFR AFRICAN AMERICAN: 56
GFR NON-AFRICAN AMERICAN: 46
GLOBULIN: 2.3 G/DL
GLUCOSE BLD-MCNC: 89 MG/DL (ref 70–99)
HBA1C MFR BLD: 5.6 %
HCT VFR BLD CALC: 29.6 % (ref 36–48)
HEMOGLOBIN: 9.4 G/DL (ref 12–16)
MCH RBC QN AUTO: 29.3 PG (ref 26–34)
MCHC RBC AUTO-ENTMCNC: 31.6 G/DL (ref 31–36)
MCV RBC AUTO: 92.7 FL (ref 80–100)
PDW BLD-RTO: 18.8 % (ref 12.4–15.4)
PLATELET # BLD: 231 K/UL (ref 135–450)
PMV BLD AUTO: 9.5 FL (ref 5–10.5)
POTASSIUM SERPL-SCNC: 4.8 MMOL/L (ref 3.5–5.1)
RBC # BLD: 3.2 M/UL (ref 4–5.2)
SODIUM BLD-SCNC: 142 MMOL/L (ref 136–145)
TOTAL PROTEIN: 5.9 G/DL (ref 6.4–8.2)
TSH SERPL DL<=0.05 MIU/L-ACNC: 0.72 UIU/ML (ref 0.27–4.2)
WBC # BLD: 5.2 K/UL (ref 4–11)

## 2018-01-26 PROCEDURE — 36415 COLL VENOUS BLD VENIPUNCTURE: CPT | Performed by: FAMILY MEDICINE

## 2018-01-26 PROCEDURE — 99214 OFFICE O/P EST MOD 30 MIN: CPT | Performed by: FAMILY MEDICINE

## 2018-01-26 PROCEDURE — 83036 HEMOGLOBIN GLYCOSYLATED A1C: CPT | Performed by: FAMILY MEDICINE

## 2018-01-26 RX ORDER — HYDROCODONE BITARTRATE AND ACETAMINOPHEN 7.5; 325 MG/1; MG/1
1 TABLET ORAL EVERY 6 HOURS PRN
Qty: 120 TABLET | Refills: 0 | Status: SHIPPED | OUTPATIENT
Start: 2018-01-26 | End: 2018-02-22 | Stop reason: SDUPTHER

## 2018-01-26 NOTE — PROGRESS NOTES
for the 1/26/18 encounter (Office Visit) with Ana Burden, DO   Medication Sig Dispense Refill    zolpidem (AMBIEN) 10 MG tablet TAKE ONE TABLET BY MOUTH EVERY NIGHT AT BEDTIME 30 tablet 1    hydrOXYzine (ATARAX) 25 MG tablet TAKE TWO TABLETS BY MOUTH ONCE NIGHTLY AS NEEDED FOR ITCHING 60 tablet 0    LORazepam (ATIVAN) 1 MG tablet TAKE ONE TABLET BY MOUTH EVERY 8 HOURS 90 tablet 1    tiZANidine (ZANAFLEX) 4 MG tablet TAKE ONE TABLET BY MOUTH EVERY 6 HOURS AS NEEDED FOR SPASM 40 tablet 0    propranolol (INDERAL LA) 160 MG extended release capsule TAKE ONE CAPSULE BY MOUTH DAILY 30 capsule 5    NIFEdipine (ADALAT CC) 30 MG extended release tablet TAKE ONE TABLET BY MOUTH DAILY 30 tablet 5    lisinopril (PRINIVIL;ZESTRIL) 20 MG tablet TAKE ONE TABLET BY MOUTH TWICE A DAY 60 tablet 5    gabapentin (NEURONTIN) 300 MG capsule One q am and 2 qhs 90 capsule 5    cloNIDine (CATAPRES) 0.2 MG tablet TAKE ONE TABLET BY MOUTH TWICE A DAY 60 tablet 5    citalopram (CELEXA) 20 MG tablet TAKE ONE TABLET BY MOUTH DAILY 30 tablet 5    HYDROcodone-acetaminophen (NORCO) 7.5-325 MG per tablet Take 1 tablet by mouth every 6 hours as needed for Pain . 120 tablet 0       Vitals:    01/26/18 1313   BP: 100/66   Weight: 179 lb (81.2 kg)   Height: 5' 4\" (1.626 m)     Body mass index is 30.73 kg/m². Wt Readings from Last 3 Encounters:   01/26/18 179 lb (81.2 kg)   10/20/17 179 lb (81.2 kg)   09/06/17 175 lb (79.4 kg)     BP Readings from Last 3 Encounters:   01/26/18 100/66   10/20/17 132/86   09/06/17 129/71       Objective:   Physical Exam   Constitutional: She is oriented to person, place, and time. She appears well-developed and well-nourished. HENT:   Head: Normocephalic. Cardiovascular: Normal rate, regular rhythm and normal heart sounds. Pulmonary/Chest: Effort normal and breath sounds normal.   Abdominal: Soft. She exhibits no distension and no mass.    Musculoskeletal:   bilat lumbar spasm  Decrease rom in all fields    dtr 1/4 bilat    Neurological: She is alert and oriented to person, place, and time. She has normal reflexes. No cranial nerve deficit. Psychiatric: She has a normal mood and affect. Her behavior is normal. Judgment and thought content normal.   Nursing note and vitals reviewed. Assessment:        Plan:      Assessment/plan;  Mary Camejo was seen today for medication check, medication refill and loss of consciousness. Diagnoses and all orders for this visit:    Syncope, unspecified syncope type  -     POCT glycosylated hemoglobin (Hb A1C)  -     CBC  -     Comprehensive Metabolic Panel  -     TSH without Reflex  bp low  Will hold her bp med and see if better  Call next week with bp readings   Primary osteoarthritis of right knee  -     HYDROcodone-acetaminophen (NORCO) 7.5-325 MG per tablet; Take 1 tablet by mouth every 6 hours as needed for Pain for up to 30 days. Spinal stenosis of lumbar region without neurogenic claudication  -     HYDROcodone-acetaminophen (NORCO) 7.5-325 MG per tablet; Take 1 tablet by mouth every 6 hours as needed for Pain for up to 30 days. Controlled Substances Monitoring:     Attestation: The Prescription Monitoring Report for this patient was reviewed today. (Ag Farrar, DO)  Documentation: Possible medication side effects, risk of tolerance and/or dependence, and alternative treatments discussed. , Potential drug abuse or diversion identified, see note documentation., Obtaining appropriate analgesic effect of treatment., No signs of potential drug abuse or diversion identified. Jaye Saldaña DO)  Medication Contracts: Existing medication contract.  (Ag Farrar DO)

## 2018-01-29 ASSESSMENT — ENCOUNTER SYMPTOMS
BACK PAIN: 0
ABDOMINAL PAIN: 0
NAUSEA: 0
BOWEL INCONTINENCE: 0

## 2018-02-09 RX ORDER — TIZANIDINE 4 MG/1
TABLET ORAL
Qty: 40 TABLET | Refills: 0 | Status: SHIPPED | OUTPATIENT
Start: 2018-02-09 | End: 2018-03-01 | Stop reason: SDUPTHER

## 2018-02-13 ENCOUNTER — OFFICE VISIT (OUTPATIENT)
Dept: ORTHOPEDIC SURGERY | Age: 58
End: 2018-02-13

## 2018-02-13 DIAGNOSIS — M47.816 SPONDYLOSIS OF LUMBAR REGION WITHOUT MYELOPATHY OR RADICULOPATHY: ICD-10-CM

## 2018-02-13 DIAGNOSIS — M43.16 SPONDYLOLISTHESIS OF LUMBAR REGION: ICD-10-CM

## 2018-02-13 DIAGNOSIS — M25.551 RIGHT HIP PAIN: ICD-10-CM

## 2018-02-13 DIAGNOSIS — S30.0XXA CONTUSION OF LOWER BACK, INITIAL ENCOUNTER: ICD-10-CM

## 2018-02-13 DIAGNOSIS — S33.5XXA LUMBAR SPRAIN, INITIAL ENCOUNTER: Primary | ICD-10-CM

## 2018-02-13 PROCEDURE — 99214 OFFICE O/P EST MOD 30 MIN: CPT | Performed by: INTERNAL MEDICINE

## 2018-02-13 NOTE — PROGRESS NOTES
Chief Complaint:   Chief Complaint   Patient presents with    Hip Pain     New R hip pain          History of Present Illness:       Patient is a 62 y.o. female presents with the above complaint. The symptoms began 1 daysago and started with an injury. The patient describes a sharp, aching pain that does not radiate. The symptoms are intermittent  and are show no change since the onset. She was walking her daughter's dog and lost her footing and fell off to her right side impacting her right buttock region. She had acute pain associated with this and mild disability. She was able to rise on her own volition  Pain has persisted \"baseline\" low back pain levels have increased. She rates her current pain levels 8/10 severity      She has an established diagnosis of lumbar spondylolisthesis/degenerative spinal stenosis and is on chronic narcotic analgesics. Although she is prescribed Norco at every 6 hours dosing she typically will medicate more frequently than this even at \"baseline\". Pain localized to the lumbosacral and upper buttock region on the right. 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 She denies any new onset lower limb symptoms. She has no known history of osteopenia or osteoporosis or vertebral compression fractures/insufficiency fractures involving the cervical spine. She denies any new onset of progressive weakness of the lower extremities. She has no prior history of pelvic trauma.   Past history significant for bariatric surgery     Past Medical History:        Past Medical History:   Diagnosis Date    Anxiety     Depression     Hypertension     Insomnia     Lumbar disc herniation     Lumbar spinal stenosis          Past Surgical History:   Procedure Laterality Date    CHOLECYSTECTOMY      FRACTURE SURGERY      r wrist

## 2018-02-22 ENCOUNTER — TELEPHONE (OUTPATIENT)
Dept: FAMILY MEDICINE CLINIC | Age: 58
End: 2018-02-22

## 2018-02-22 DIAGNOSIS — M48.061 SPINAL STENOSIS OF LUMBAR REGION WITHOUT NEUROGENIC CLAUDICATION: ICD-10-CM

## 2018-02-22 DIAGNOSIS — M17.11 PRIMARY OSTEOARTHRITIS OF RIGHT KNEE: ICD-10-CM

## 2018-02-22 RX ORDER — HYDROCODONE BITARTRATE AND ACETAMINOPHEN 7.5; 325 MG/1; MG/1
1 TABLET ORAL EVERY 6 HOURS PRN
Qty: 120 TABLET | Refills: 0 | Status: SHIPPED | OUTPATIENT
Start: 2018-02-22 | End: 2018-03-22 | Stop reason: SDUPTHER

## 2018-03-01 RX ORDER — TIZANIDINE 4 MG/1
TABLET ORAL
Qty: 40 TABLET | Refills: 0 | Status: SHIPPED | OUTPATIENT
Start: 2018-03-01 | End: 2018-04-01 | Stop reason: SDUPTHER

## 2018-03-20 DIAGNOSIS — F51.01 PRIMARY INSOMNIA: ICD-10-CM

## 2018-03-20 RX ORDER — HYDROXYZINE HYDROCHLORIDE 25 MG/1
TABLET, FILM COATED ORAL
Qty: 60 TABLET | Refills: 0 | Status: SHIPPED | OUTPATIENT
Start: 2018-03-20 | End: 2018-04-18 | Stop reason: SDUPTHER

## 2018-03-22 DIAGNOSIS — M17.11 PRIMARY OSTEOARTHRITIS OF RIGHT KNEE: ICD-10-CM

## 2018-03-22 DIAGNOSIS — M48.061 SPINAL STENOSIS OF LUMBAR REGION WITHOUT NEUROGENIC CLAUDICATION: ICD-10-CM

## 2018-03-22 RX ORDER — HYDROCODONE BITARTRATE AND ACETAMINOPHEN 7.5; 325 MG/1; MG/1
1 TABLET ORAL EVERY 6 HOURS PRN
Qty: 120 TABLET | Refills: 0 | Status: SHIPPED | OUTPATIENT
Start: 2018-03-22 | End: 2018-04-20 | Stop reason: SDUPTHER

## 2018-04-01 RX ORDER — TIZANIDINE 4 MG/1
TABLET ORAL
Qty: 40 TABLET | Refills: 0 | Status: SHIPPED | OUTPATIENT
Start: 2018-04-01 | End: 2018-04-08 | Stop reason: SDUPTHER

## 2018-04-03 RX ORDER — LORAZEPAM 1 MG/1
TABLET ORAL
Qty: 90 TABLET | Refills: 1 | Status: SHIPPED | OUTPATIENT
Start: 2018-04-03 | End: 2018-05-30 | Stop reason: SDUPTHER

## 2018-04-08 RX ORDER — TIZANIDINE 4 MG/1
TABLET ORAL
Qty: 40 TABLET | Refills: 0 | Status: SHIPPED | OUTPATIENT
Start: 2018-04-08 | End: 2018-04-26 | Stop reason: SDUPTHER

## 2018-04-18 DIAGNOSIS — F51.01 PRIMARY INSOMNIA: ICD-10-CM

## 2018-04-18 RX ORDER — HYDROXYZINE HYDROCHLORIDE 25 MG/1
TABLET, FILM COATED ORAL
Qty: 60 TABLET | Refills: 0 | Status: SHIPPED | OUTPATIENT
Start: 2018-04-18 | End: 2018-05-16 | Stop reason: SDUPTHER

## 2018-04-19 DIAGNOSIS — M54.16 LUMBAR RADICULOPATHY: ICD-10-CM

## 2018-04-19 DIAGNOSIS — I10 ESSENTIAL HYPERTENSION: ICD-10-CM

## 2018-04-19 DIAGNOSIS — F51.01 PRIMARY INSOMNIA: ICD-10-CM

## 2018-04-19 RX ORDER — GABAPENTIN 300 MG/1
CAPSULE ORAL
Qty: 90 CAPSULE | Refills: 4 | Status: SHIPPED | OUTPATIENT
Start: 2018-04-19 | End: 2018-09-16 | Stop reason: SDUPTHER

## 2018-04-19 RX ORDER — LISINOPRIL 20 MG/1
TABLET ORAL
Qty: 60 TABLET | Refills: 4 | Status: ON HOLD | OUTPATIENT
Start: 2018-04-19 | End: 2019-08-01 | Stop reason: HOSPADM

## 2018-04-19 RX ORDER — ZOLPIDEM TARTRATE 10 MG/1
TABLET ORAL
Qty: 30 TABLET | Refills: 0 | Status: SHIPPED | OUTPATIENT
Start: 2018-04-19 | End: 2018-05-16 | Stop reason: SDUPTHER

## 2018-04-19 RX ORDER — CLONIDINE HYDROCHLORIDE 0.2 MG/1
TABLET ORAL
Qty: 60 TABLET | Refills: 4 | Status: SHIPPED | OUTPATIENT
Start: 2018-04-19 | End: 2018-09-05 | Stop reason: SDUPTHER

## 2018-04-19 RX ORDER — NIFEDIPINE 30 MG/1
TABLET, FILM COATED, EXTENDED RELEASE ORAL
Qty: 30 TABLET | Refills: 4 | Status: SHIPPED | OUTPATIENT
Start: 2018-04-19 | End: 2018-09-05 | Stop reason: SDUPTHER

## 2018-04-20 ENCOUNTER — TELEPHONE (OUTPATIENT)
Dept: FAMILY MEDICINE CLINIC | Age: 58
End: 2018-04-20

## 2018-04-20 DIAGNOSIS — M48.061 SPINAL STENOSIS OF LUMBAR REGION WITHOUT NEUROGENIC CLAUDICATION: ICD-10-CM

## 2018-04-20 DIAGNOSIS — M17.11 PRIMARY OSTEOARTHRITIS OF RIGHT KNEE: ICD-10-CM

## 2018-04-20 RX ORDER — HYDROCODONE BITARTRATE AND ACETAMINOPHEN 7.5; 325 MG/1; MG/1
1 TABLET ORAL EVERY 6 HOURS PRN
Qty: 120 TABLET | Refills: 0 | Status: CANCELLED | OUTPATIENT
Start: 2018-04-20 | End: 2018-05-20

## 2018-04-20 RX ORDER — HYDROCODONE BITARTRATE AND ACETAMINOPHEN 7.5; 325 MG/1; MG/1
1 TABLET ORAL EVERY 6 HOURS PRN
Qty: 120 TABLET | Refills: 0 | Status: SHIPPED | OUTPATIENT
Start: 2018-04-20 | End: 2018-05-30 | Stop reason: SDUPTHER

## 2018-04-23 DIAGNOSIS — F41.9 ANXIETY: ICD-10-CM

## 2018-04-23 RX ORDER — CITALOPRAM 20 MG/1
TABLET ORAL
Qty: 30 TABLET | Refills: 4 | Status: SHIPPED | OUTPATIENT
Start: 2018-04-23 | End: 2018-05-30 | Stop reason: SDUPTHER

## 2018-04-26 RX ORDER — TIZANIDINE 4 MG/1
TABLET ORAL
Qty: 40 TABLET | Refills: 0 | Status: SHIPPED | OUTPATIENT
Start: 2018-04-26 | End: 2018-05-21 | Stop reason: SDUPTHER

## 2018-05-16 ENCOUNTER — OFFICE VISIT (OUTPATIENT)
Dept: ORTHOPEDIC SURGERY | Age: 58
End: 2018-05-16

## 2018-05-16 VITALS — HEIGHT: 64 IN | BODY MASS INDEX: 30.56 KG/M2 | WEIGHT: 179.01 LBS

## 2018-05-16 DIAGNOSIS — F51.01 PRIMARY INSOMNIA: ICD-10-CM

## 2018-05-16 DIAGNOSIS — M17.11 PATELLOFEMORAL ARTHRITIS OF RIGHT KNEE: ICD-10-CM

## 2018-05-16 DIAGNOSIS — M25.561 RIGHT KNEE PAIN, UNSPECIFIED CHRONICITY: Primary | ICD-10-CM

## 2018-05-16 PROCEDURE — 99214 OFFICE O/P EST MOD 30 MIN: CPT | Performed by: ORTHOPAEDIC SURGERY

## 2018-05-16 PROCEDURE — 20610 DRAIN/INJ JOINT/BURSA W/O US: CPT | Performed by: ORTHOPAEDIC SURGERY

## 2018-05-16 RX ORDER — ZOLPIDEM TARTRATE 10 MG/1
TABLET ORAL
Qty: 30 TABLET | Refills: 0 | Status: SHIPPED | OUTPATIENT
Start: 2018-05-16 | End: 2018-06-13 | Stop reason: SDUPTHER

## 2018-05-16 RX ORDER — HYDROXYZINE HYDROCHLORIDE 25 MG/1
TABLET, FILM COATED ORAL
Qty: 60 TABLET | Refills: 0 | Status: SHIPPED | OUTPATIENT
Start: 2018-05-16 | End: 2018-06-15 | Stop reason: SDUPTHER

## 2018-05-18 DIAGNOSIS — F51.01 PRIMARY INSOMNIA: ICD-10-CM

## 2018-05-18 RX ORDER — HYDROXYZINE HYDROCHLORIDE 25 MG/1
TABLET, FILM COATED ORAL
Qty: 60 TABLET | Refills: 0 | Status: SHIPPED | OUTPATIENT
Start: 2018-05-18 | End: 2018-06-15

## 2018-05-18 RX ORDER — ZOLPIDEM TARTRATE 10 MG/1
TABLET ORAL
Qty: 30 TABLET | Refills: 0 | Status: SHIPPED | OUTPATIENT
Start: 2018-05-18 | End: 2018-06-15

## 2018-05-21 ENCOUNTER — TELEPHONE (OUTPATIENT)
Dept: FAMILY MEDICINE CLINIC | Age: 58
End: 2018-05-21

## 2018-05-21 RX ORDER — TIZANIDINE 4 MG/1
TABLET ORAL
Qty: 40 TABLET | Refills: 0 | Status: SHIPPED | OUTPATIENT
Start: 2018-05-21 | End: 2018-06-14 | Stop reason: SDUPTHER

## 2018-05-30 ENCOUNTER — OFFICE VISIT (OUTPATIENT)
Dept: FAMILY MEDICINE CLINIC | Age: 58
End: 2018-05-30

## 2018-05-30 VITALS
SYSTOLIC BLOOD PRESSURE: 138 MMHG | DIASTOLIC BLOOD PRESSURE: 84 MMHG | HEIGHT: 64 IN | BODY MASS INDEX: 29.71 KG/M2 | HEART RATE: 71 BPM | WEIGHT: 174 LBS | OXYGEN SATURATION: 99 %

## 2018-05-30 DIAGNOSIS — M48.061 SPINAL STENOSIS OF LUMBAR REGION WITHOUT NEUROGENIC CLAUDICATION: ICD-10-CM

## 2018-05-30 DIAGNOSIS — Z12.39 BREAST CANCER SCREENING: ICD-10-CM

## 2018-05-30 DIAGNOSIS — F51.01 PRIMARY INSOMNIA: ICD-10-CM

## 2018-05-30 DIAGNOSIS — F41.9 ANXIETY: ICD-10-CM

## 2018-05-30 DIAGNOSIS — M17.11 PRIMARY OSTEOARTHRITIS OF RIGHT KNEE: Primary | ICD-10-CM

## 2018-05-30 DIAGNOSIS — F32.9 REACTIVE DEPRESSION: ICD-10-CM

## 2018-05-30 DIAGNOSIS — Z23 NEED FOR TDAP VACCINATION: ICD-10-CM

## 2018-05-30 PROCEDURE — 99214 OFFICE O/P EST MOD 30 MIN: CPT | Performed by: FAMILY MEDICINE

## 2018-05-30 PROCEDURE — 90715 TDAP VACCINE 7 YRS/> IM: CPT | Performed by: FAMILY MEDICINE

## 2018-05-30 PROCEDURE — 90471 IMMUNIZATION ADMIN: CPT | Performed by: FAMILY MEDICINE

## 2018-05-30 RX ORDER — HYDROCODONE BITARTRATE AND ACETAMINOPHEN 7.5; 325 MG/1; MG/1
1 TABLET ORAL EVERY 6 HOURS PRN
Qty: 120 TABLET | Refills: 0 | Status: SHIPPED | OUTPATIENT
Start: 2018-06-29 | End: 2018-05-30 | Stop reason: SDUPTHER

## 2018-05-30 RX ORDER — HYDROCODONE BITARTRATE AND ACETAMINOPHEN 7.5; 325 MG/1; MG/1
1 TABLET ORAL EVERY 6 HOURS PRN
Qty: 120 TABLET | Refills: 0 | Status: SHIPPED | OUTPATIENT
Start: 2018-05-30 | End: 2018-05-30 | Stop reason: SDUPTHER

## 2018-05-30 RX ORDER — LORAZEPAM 1 MG/1
TABLET ORAL
Qty: 90 TABLET | Refills: 2 | Status: SHIPPED | OUTPATIENT
Start: 2018-05-30 | End: 2018-06-30

## 2018-05-30 RX ORDER — HYDROCODONE BITARTRATE AND ACETAMINOPHEN 7.5; 325 MG/1; MG/1
1 TABLET ORAL EVERY 6 HOURS PRN
Qty: 120 TABLET | Refills: 0 | Status: SHIPPED | OUTPATIENT
Start: 2018-07-29 | End: 2018-10-05 | Stop reason: SDUPTHER

## 2018-05-30 RX ORDER — CITALOPRAM 40 MG/1
TABLET ORAL
Qty: 30 TABLET | Refills: 2 | Status: SHIPPED | OUTPATIENT
Start: 2018-05-30 | End: 2018-08-24 | Stop reason: SDUPTHER

## 2018-05-30 RX ORDER — ZOLPIDEM TARTRATE 10 MG/1
TABLET ORAL
Qty: 30 TABLET | Refills: 0 | Status: CANCELLED | OUTPATIENT
Start: 2018-05-30 | End: 2018-06-29

## 2018-05-30 ASSESSMENT — ENCOUNTER SYMPTOMS: RESPIRATORY NEGATIVE: 1

## 2018-06-03 LAB
6-ACETYLMORPHINE: NOT DETECTED
7-AMINOCLONAZEPAM: NOT DETECTED
ALPHA-OH-ALPRAZOLAM: NOT DETECTED
ALPRAZOLAM: NOT DETECTED
AMPHETAMINE: NOT DETECTED
BARBITURATES: NOT DETECTED
BENZOYLECGONINE: NOT DETECTED
BUPRENORPHINE: NOT DETECTED
CARISOPRODOL: NOT DETECTED
CLONAZEPAM: NOT DETECTED
CODEINE: NOT DETECTED
CREATININE URINE: 195.1 MG/DL (ref 20–400)
DIAZEPAM: NOT DETECTED
DRUGS EXPECTED: NORMAL
EER PAIN MGT DRUG PANEL, HIGH RES/EMIT U: NORMAL
ETHYL GLUCURONIDE: NOT DETECTED
FENTANYL: NOT DETECTED
HYDROCODONE: NOT DETECTED
HYDROMORPHONE: NOT DETECTED
LORAZEPAM: NOT DETECTED
MARIJUANA METABOLITE: NOT DETECTED
MDA: NOT DETECTED
MDEA: NOT DETECTED
MDMA URINE: NOT DETECTED
MEPERIDINE: NOT DETECTED
METHADONE: NOT DETECTED
METHAMPHETAMINE: NOT DETECTED
METHYLPHENIDATE: NOT DETECTED
MIDAZOLAM: NOT DETECTED
MORPHINE: NOT DETECTED
NORBUPRENORPHINE, FREE: NOT DETECTED
NORDIAZEPAM: NOT DETECTED
NORFENTANYL: NOT DETECTED
NORHYDROCODONE, URINE: NOT DETECTED
NOROXYCODONE: NOT DETECTED
NOROXYMORPHONE, URINE: NOT DETECTED
OXAZEPAM: NOT DETECTED
OXYCODONE: NOT DETECTED
OXYMORPHONE: NOT DETECTED
PAIN MANAGEMENT DRUG PANEL: NORMAL
PAIN MANAGEMENT DRUG PANEL: NORMAL
PCP: NOT DETECTED
PHENTERMINE: NOT DETECTED
PROPOXYPHENE: NOT DETECTED
TAPENTADOL, URINE: NOT DETECTED
TAPENTADOL-O-SULFATE, URINE: NOT DETECTED
TEMAZEPAM: NOT DETECTED
TRAMADOL: NOT DETECTED
ZOLPIDEM: NOT DETECTED

## 2018-06-13 DIAGNOSIS — F51.01 PRIMARY INSOMNIA: ICD-10-CM

## 2018-06-13 RX ORDER — ZOLPIDEM TARTRATE 10 MG/1
TABLET ORAL
Qty: 30 TABLET | Refills: 0 | Status: SHIPPED | OUTPATIENT
Start: 2018-06-13 | End: 2018-06-15

## 2018-06-14 RX ORDER — TIZANIDINE 4 MG/1
TABLET ORAL
Qty: 40 TABLET | Refills: 0 | Status: SHIPPED | OUTPATIENT
Start: 2018-06-14 | End: 2018-07-05 | Stop reason: SDUPTHER

## 2018-06-15 DIAGNOSIS — F51.01 PRIMARY INSOMNIA: ICD-10-CM

## 2018-06-15 RX ORDER — HYDROXYZINE HYDROCHLORIDE 25 MG/1
TABLET, FILM COATED ORAL
Qty: 60 TABLET | Refills: 0 | Status: SHIPPED | OUTPATIENT
Start: 2018-06-15 | End: 2018-08-12 | Stop reason: SDUPTHER

## 2018-06-15 RX ORDER — ZOLPIDEM TARTRATE 10 MG/1
TABLET ORAL
Qty: 30 TABLET | Refills: 0 | Status: SHIPPED | OUTPATIENT
Start: 2018-06-15 | End: 2018-07-14

## 2018-07-05 RX ORDER — TIZANIDINE 4 MG/1
TABLET ORAL
Qty: 40 TABLET | Refills: 0 | Status: SHIPPED | OUTPATIENT
Start: 2018-07-05 | End: 2018-07-22 | Stop reason: SDUPTHER

## 2018-07-14 DIAGNOSIS — F51.01 PRIMARY INSOMNIA: ICD-10-CM

## 2018-07-14 DIAGNOSIS — I10 ESSENTIAL HYPERTENSION: ICD-10-CM

## 2018-07-14 RX ORDER — ZOLPIDEM TARTRATE 10 MG/1
TABLET ORAL
Qty: 30 TABLET | Refills: 0 | Status: SHIPPED | OUTPATIENT
Start: 2018-07-14 | End: 2018-08-13

## 2018-07-15 DIAGNOSIS — F51.01 PRIMARY INSOMNIA: ICD-10-CM

## 2018-07-15 RX ORDER — PROPRANOLOL HYDROCHLORIDE 160 MG/1
CAPSULE, EXTENDED RELEASE ORAL
Qty: 30 CAPSULE | Refills: 4 | Status: SHIPPED | OUTPATIENT
Start: 2018-07-15 | End: 2018-10-05 | Stop reason: SDUPTHER

## 2018-07-15 RX ORDER — ZOLPIDEM TARTRATE 10 MG/1
TABLET ORAL
Qty: 30 TABLET | Refills: 0 | Status: SHIPPED | OUTPATIENT
Start: 2018-07-15 | End: 2018-09-08 | Stop reason: SDUPTHER

## 2018-07-15 RX ORDER — HYDROXYZINE HYDROCHLORIDE 25 MG/1
TABLET, FILM COATED ORAL
Qty: 60 TABLET | Refills: 0 | Status: SHIPPED | OUTPATIENT
Start: 2018-07-15 | End: 2018-09-08 | Stop reason: SDUPTHER

## 2018-07-22 RX ORDER — TIZANIDINE 4 MG/1
TABLET ORAL
Qty: 40 TABLET | Refills: 0 | Status: SHIPPED | OUTPATIENT
Start: 2018-07-22 | End: 2018-07-29 | Stop reason: SDUPTHER

## 2018-07-30 RX ORDER — TIZANIDINE 4 MG/1
TABLET ORAL
Qty: 40 TABLET | Refills: 0 | Status: SHIPPED | OUTPATIENT
Start: 2018-07-30 | End: 2018-08-08 | Stop reason: SDUPTHER

## 2018-08-08 RX ORDER — TIZANIDINE 4 MG/1
TABLET ORAL
Qty: 40 TABLET | Refills: 0 | Status: SHIPPED | OUTPATIENT
Start: 2018-08-08 | End: 2018-08-19 | Stop reason: SDUPTHER

## 2018-08-12 DIAGNOSIS — F51.01 PRIMARY INSOMNIA: ICD-10-CM

## 2018-08-12 RX ORDER — HYDROXYZINE HYDROCHLORIDE 25 MG/1
TABLET, FILM COATED ORAL
Qty: 60 TABLET | Refills: 0 | Status: SHIPPED | OUTPATIENT
Start: 2018-08-12 | End: 2018-09-08

## 2018-08-19 RX ORDER — TIZANIDINE 4 MG/1
TABLET ORAL
Qty: 40 TABLET | Refills: 0 | Status: SHIPPED | OUTPATIENT
Start: 2018-08-19 | End: 2018-08-27 | Stop reason: SDUPTHER

## 2018-08-24 DIAGNOSIS — F32.9 REACTIVE DEPRESSION: ICD-10-CM

## 2018-08-24 DIAGNOSIS — F41.9 ANXIETY: ICD-10-CM

## 2018-08-24 RX ORDER — CITALOPRAM 40 MG/1
TABLET ORAL
Qty: 30 TABLET | Refills: 1 | Status: SHIPPED | OUTPATIENT
Start: 2018-08-24 | End: 2018-10-02 | Stop reason: SDUPTHER

## 2018-08-27 DIAGNOSIS — F41.9 ANXIETY: Primary | ICD-10-CM

## 2018-08-27 RX ORDER — LORAZEPAM 1 MG/1
TABLET ORAL
Qty: 90 TABLET | Refills: 1 | Status: SHIPPED | OUTPATIENT
Start: 2018-08-27 | End: 2018-10-15 | Stop reason: SDUPTHER

## 2018-08-27 RX ORDER — TIZANIDINE 4 MG/1
TABLET ORAL
Qty: 40 TABLET | Refills: 0 | Status: SHIPPED | OUTPATIENT
Start: 2018-08-27 | End: 2018-10-02 | Stop reason: SDUPTHER

## 2018-09-08 DIAGNOSIS — F51.01 PRIMARY INSOMNIA: ICD-10-CM

## 2018-09-08 RX ORDER — HYDROXYZINE HYDROCHLORIDE 25 MG/1
TABLET, FILM COATED ORAL
Qty: 60 TABLET | Refills: 0 | Status: SHIPPED | OUTPATIENT
Start: 2018-09-08 | End: 2018-10-05 | Stop reason: SDUPTHER

## 2018-09-08 RX ORDER — ZOLPIDEM TARTRATE 10 MG/1
TABLET ORAL
Qty: 30 TABLET | Refills: 0 | Status: SHIPPED | OUTPATIENT
Start: 2018-09-08 | End: 2018-10-05 | Stop reason: SDUPTHER

## 2018-09-13 DIAGNOSIS — I10 ESSENTIAL HYPERTENSION: ICD-10-CM

## 2018-09-13 RX ORDER — NIFEDIPINE 30 MG/1
TABLET, EXTENDED RELEASE ORAL
Qty: 30 TABLET | Refills: 3 | Status: SHIPPED | OUTPATIENT
Start: 2018-09-13 | End: 2018-10-05 | Stop reason: SDUPTHER

## 2018-09-13 RX ORDER — CLONIDINE HYDROCHLORIDE 0.2 MG/1
TABLET ORAL
Qty: 60 TABLET | Refills: 3 | Status: SHIPPED | OUTPATIENT
Start: 2018-09-13 | End: 2018-10-05 | Stop reason: SDUPTHER

## 2018-09-16 DIAGNOSIS — M54.16 LUMBAR RADICULOPATHY: ICD-10-CM

## 2018-09-16 RX ORDER — GABAPENTIN 300 MG/1
CAPSULE ORAL
Qty: 90 CAPSULE | Refills: 3 | Status: SHIPPED | OUTPATIENT
Start: 2018-09-16 | End: 2018-10-05 | Stop reason: SDUPTHER

## 2018-10-02 DIAGNOSIS — F41.9 ANXIETY: ICD-10-CM

## 2018-10-02 DIAGNOSIS — F32.9 REACTIVE DEPRESSION: ICD-10-CM

## 2018-10-02 RX ORDER — TIZANIDINE 4 MG/1
TABLET ORAL
Qty: 40 TABLET | Refills: 0 | Status: SHIPPED | OUTPATIENT
Start: 2018-10-02 | End: 2018-10-05 | Stop reason: SDUPTHER

## 2018-10-02 RX ORDER — CITALOPRAM 40 MG/1
TABLET ORAL
Qty: 30 TABLET | Refills: 0 | Status: SHIPPED | OUTPATIENT
Start: 2018-10-02 | End: 2018-10-05 | Stop reason: SDUPTHER

## 2018-10-05 ENCOUNTER — OFFICE VISIT (OUTPATIENT)
Dept: FAMILY MEDICINE CLINIC | Age: 58
End: 2018-10-05
Payer: COMMERCIAL

## 2018-10-05 VITALS
BODY MASS INDEX: 30.56 KG/M2 | SYSTOLIC BLOOD PRESSURE: 176 MMHG | HEIGHT: 64 IN | DIASTOLIC BLOOD PRESSURE: 100 MMHG | WEIGHT: 179 LBS

## 2018-10-05 DIAGNOSIS — M48.061 SPINAL STENOSIS OF LUMBAR REGION WITHOUT NEUROGENIC CLAUDICATION: ICD-10-CM

## 2018-10-05 DIAGNOSIS — M54.16 LUMBAR RADICULOPATHY: ICD-10-CM

## 2018-10-05 DIAGNOSIS — F51.01 PRIMARY INSOMNIA: ICD-10-CM

## 2018-10-05 DIAGNOSIS — I10 ESSENTIAL HYPERTENSION: ICD-10-CM

## 2018-10-05 DIAGNOSIS — Z12.11 COLON CANCER SCREENING: ICD-10-CM

## 2018-10-05 DIAGNOSIS — F32.9 REACTIVE DEPRESSION: ICD-10-CM

## 2018-10-05 DIAGNOSIS — F41.9 ANXIETY: ICD-10-CM

## 2018-10-05 DIAGNOSIS — R55 SYNCOPE, UNSPECIFIED SYNCOPE TYPE: Primary | ICD-10-CM

## 2018-10-05 DIAGNOSIS — M17.11 PRIMARY OSTEOARTHRITIS OF RIGHT KNEE: ICD-10-CM

## 2018-10-05 LAB
A/G RATIO: 1.8 (ref 1.1–2.2)
ALBUMIN SERPL-MCNC: 4.5 G/DL (ref 3.4–5)
ALP BLD-CCNC: 99 U/L (ref 40–129)
ALT SERPL-CCNC: 6 U/L (ref 10–40)
ANION GAP SERPL CALCULATED.3IONS-SCNC: 14 MMOL/L (ref 3–16)
AST SERPL-CCNC: 12 U/L (ref 15–37)
BILIRUB SERPL-MCNC: 0.4 MG/DL (ref 0–1)
BUN BLDV-MCNC: 19 MG/DL (ref 7–20)
CALCIUM SERPL-MCNC: 8.8 MG/DL (ref 8.3–10.6)
CHLORIDE BLD-SCNC: 106 MMOL/L (ref 99–110)
CO2: 22 MMOL/L (ref 21–32)
CONTROL: NORMAL
CREAT SERPL-MCNC: 1.1 MG/DL (ref 0.6–1.1)
GFR AFRICAN AMERICAN: >60
GFR NON-AFRICAN AMERICAN: 51
GLOBULIN: 2.5 G/DL
GLUCOSE BLD-MCNC: 104 MG/DL (ref 70–99)
HCT VFR BLD CALC: 34.3 % (ref 36–48)
HEMOCCULT STL QL: NEGATIVE
HEMOGLOBIN: 10.9 G/DL (ref 12–16)
MCH RBC QN AUTO: 28.1 PG (ref 26–34)
MCHC RBC AUTO-ENTMCNC: 31.6 G/DL (ref 31–36)
MCV RBC AUTO: 88.8 FL (ref 80–100)
PDW BLD-RTO: 17.5 % (ref 12.4–15.4)
PLATELET # BLD: 259 K/UL (ref 135–450)
PMV BLD AUTO: 9.1 FL (ref 5–10.5)
POTASSIUM SERPL-SCNC: 4.4 MMOL/L (ref 3.5–5.1)
RBC # BLD: 3.87 M/UL (ref 4–5.2)
SODIUM BLD-SCNC: 142 MMOL/L (ref 136–145)
TOTAL PROTEIN: 7 G/DL (ref 6.4–8.2)
WBC # BLD: 4.8 K/UL (ref 4–11)

## 2018-10-05 PROCEDURE — 99214 OFFICE O/P EST MOD 30 MIN: CPT | Performed by: FAMILY MEDICINE

## 2018-10-05 PROCEDURE — 82274 ASSAY TEST FOR BLOOD FECAL: CPT | Performed by: FAMILY MEDICINE

## 2018-10-05 RX ORDER — NIFEDIPINE 60 MG/1
60 TABLET, EXTENDED RELEASE ORAL DAILY
Qty: 30 TABLET | Refills: 3 | Status: SHIPPED | OUTPATIENT
Start: 2018-10-05 | End: 2019-01-29 | Stop reason: SDUPTHER

## 2018-10-05 RX ORDER — ZOLPIDEM TARTRATE 10 MG/1
TABLET ORAL
Qty: 30 TABLET | Refills: 2 | Status: SHIPPED | OUTPATIENT
Start: 2018-10-05 | End: 2019-02-06 | Stop reason: SDUPTHER

## 2018-10-05 RX ORDER — GABAPENTIN 300 MG/1
CAPSULE ORAL
Qty: 90 CAPSULE | Refills: 3 | Status: SHIPPED | OUTPATIENT
Start: 2018-10-05 | End: 2019-04-12 | Stop reason: SDUPTHER

## 2018-10-05 RX ORDER — NIFEDIPINE 30 MG/1
TABLET, EXTENDED RELEASE ORAL
Qty: 30 TABLET | Refills: 0 | Status: CANCELLED | OUTPATIENT
Start: 2018-10-05

## 2018-10-05 RX ORDER — CITALOPRAM 40 MG/1
TABLET ORAL
Qty: 30 TABLET | Refills: 5 | Status: SHIPPED | OUTPATIENT
Start: 2018-10-05 | End: 2019-07-10 | Stop reason: SDUPTHER

## 2018-10-05 RX ORDER — HYDROCODONE BITARTRATE AND ACETAMINOPHEN 7.5; 325 MG/1; MG/1
1 TABLET ORAL EVERY 6 HOURS PRN
Qty: 120 TABLET | Refills: 0 | Status: SHIPPED | OUTPATIENT
Start: 2018-10-05 | End: 2018-11-02 | Stop reason: SDUPTHER

## 2018-10-05 RX ORDER — TIZANIDINE 4 MG/1
TABLET ORAL
Qty: 40 TABLET | Refills: 2 | Status: SHIPPED | OUTPATIENT
Start: 2018-10-05 | End: 2019-01-10

## 2018-10-05 RX ORDER — PROPRANOLOL HYDROCHLORIDE 160 MG/1
CAPSULE, EXTENDED RELEASE ORAL
Qty: 30 CAPSULE | Refills: 4 | Status: SHIPPED | OUTPATIENT
Start: 2018-10-05 | End: 2019-05-03 | Stop reason: SDUPTHER

## 2018-10-05 RX ORDER — CLONIDINE HYDROCHLORIDE 0.2 MG/1
TABLET ORAL
Qty: 60 TABLET | Refills: 3 | Status: SHIPPED | OUTPATIENT
Start: 2018-10-05 | End: 2019-06-02 | Stop reason: SDUPTHER

## 2018-10-05 RX ORDER — HYDROXYZINE HYDROCHLORIDE 25 MG/1
TABLET, FILM COATED ORAL
Qty: 60 TABLET | Refills: 3 | Status: SHIPPED | OUTPATIENT
Start: 2018-10-05 | End: 2019-03-04 | Stop reason: SDUPTHER

## 2018-10-05 NOTE — PROGRESS NOTES
HYDROcodone-acetaminophen (NORCO) 7.5-325 MG per tablet; Take 1 tablet by mouth every 6 hours as needed for Pain for up to 30 days. .    Colon cancer screening  -     POCT Fecal Immunochemical Test (FIT)    Other orders  -     tiZANidine (ZANAFLEX) 4 MG tablet; TAKE ONE TABLET BY MOUTH EVERY 6 HOURS AS NEEDED FOR MUSCLE SPASMS  -     Cancel: NIFEdipine (PROCARDIA XL) 30 MG extended release tablet; No Follow-up on file. Controlled Substances Monitoring:     RX Monitoring 5/30/2018   Attestation The Prescription Monitoring Report for this patient was reviewed today. Documentation Possible medication side effects, risk of tolerance/dependence & alternative treatments discussed. ;Potential drug abuse or diversion identified, see note documentation.;Obtaining appropriate analgesic effect of treatment. ;No signs of potential drug abuse or diversion identified. Medication Contracts Existing medication contract.          Meredith Alfaro, DO

## 2018-10-06 LAB
ESTIMATED AVERAGE GLUCOSE: 108.3 MG/DL
HBA1C MFR BLD: 5.4 %

## 2018-10-06 ASSESSMENT — ENCOUNTER SYMPTOMS
BACK PAIN: 1
BOWEL INCONTINENCE: 0
ABDOMINAL PAIN: 0

## 2018-10-06 ASSESSMENT — PATIENT HEALTH QUESTIONNAIRE - PHQ9
SUM OF ALL RESPONSES TO PHQ9 QUESTIONS 1 & 2: 0
SUM OF ALL RESPONSES TO PHQ QUESTIONS 1-9: 0
SUM OF ALL RESPONSES TO PHQ QUESTIONS 1-9: 0
1. LITTLE INTEREST OR PLEASURE IN DOING THINGS: 0
2. FEELING DOWN, DEPRESSED OR HOPELESS: 0

## 2018-10-15 ENCOUNTER — TELEPHONE (OUTPATIENT)
Dept: FAMILY MEDICINE CLINIC | Age: 58
End: 2018-10-15

## 2018-10-15 DIAGNOSIS — F41.9 ANXIETY: ICD-10-CM

## 2018-10-15 RX ORDER — LORAZEPAM 1 MG/1
TABLET ORAL
Qty: 90 TABLET | Refills: 0 | Status: SHIPPED | OUTPATIENT
Start: 2018-10-15 | End: 2018-11-25 | Stop reason: SDUPTHER

## 2018-10-25 ENCOUNTER — HOSPITAL ENCOUNTER (OUTPATIENT)
Dept: NON INVASIVE DIAGNOSTICS | Age: 58
Discharge: HOME | End: 2018-10-25
Payer: COMMERCIAL

## 2018-10-25 DIAGNOSIS — R55 SYNCOPE, UNSPECIFIED SYNCOPE TYPE: ICD-10-CM

## 2018-10-25 LAB
LV EF: 60 %
LVEF MODALITY: NORMAL

## 2018-10-25 PROCEDURE — 93306 TTE W/DOPPLER COMPLETE: CPT

## 2018-10-29 ENCOUNTER — HOSPITAL ENCOUNTER (OUTPATIENT)
Dept: NON INVASIVE DIAGNOSTICS | Age: 58
Discharge: HOME | End: 2018-10-29
Payer: COMMERCIAL

## 2018-10-29 DIAGNOSIS — R55 SYNCOPE, UNSPECIFIED SYNCOPE TYPE: ICD-10-CM

## 2018-10-29 PROCEDURE — 93786 AMBL BP MNTR W/SW REC ONLY: CPT

## 2018-10-29 PROCEDURE — 93788 AMBL BP MNTR W/SW A/R: CPT

## 2018-11-02 ENCOUNTER — TELEPHONE (OUTPATIENT)
Dept: FAMILY MEDICINE CLINIC | Age: 58
End: 2018-11-02

## 2018-11-02 DIAGNOSIS — M48.061 SPINAL STENOSIS OF LUMBAR REGION WITHOUT NEUROGENIC CLAUDICATION: ICD-10-CM

## 2018-11-02 DIAGNOSIS — M17.11 PRIMARY OSTEOARTHRITIS OF RIGHT KNEE: ICD-10-CM

## 2018-11-02 RX ORDER — HYDROCODONE BITARTRATE AND ACETAMINOPHEN 7.5; 325 MG/1; MG/1
1 TABLET ORAL EVERY 6 HOURS PRN
Qty: 120 TABLET | Refills: 0 | Status: SHIPPED | OUTPATIENT
Start: 2018-11-04 | End: 2018-12-04 | Stop reason: SDUPTHER

## 2018-11-05 ENCOUNTER — TELEPHONE (OUTPATIENT)
Dept: FAMILY MEDICINE CLINIC | Age: 58
End: 2018-11-05

## 2018-11-05 NOTE — TELEPHONE ENCOUNTER
Transfer to heart inst. And was given holter department # in regards to this. lvm for department to return call.    Pt states she has it done 10/29 @ 3 and returned it on 10/30 @ 3

## 2018-11-25 DIAGNOSIS — F41.9 ANXIETY: ICD-10-CM

## 2018-11-25 RX ORDER — LORAZEPAM 1 MG/1
TABLET ORAL
Qty: 90 TABLET | Refills: 0 | Status: SHIPPED | OUTPATIENT
Start: 2018-11-25 | End: 2018-12-22 | Stop reason: SDUPTHER

## 2018-12-04 ENCOUNTER — TELEPHONE (OUTPATIENT)
Dept: FAMILY MEDICINE CLINIC | Age: 58
End: 2018-12-04

## 2018-12-04 DIAGNOSIS — M17.11 PRIMARY OSTEOARTHRITIS OF RIGHT KNEE: ICD-10-CM

## 2018-12-04 DIAGNOSIS — M48.061 SPINAL STENOSIS OF LUMBAR REGION WITHOUT NEUROGENIC CLAUDICATION: ICD-10-CM

## 2018-12-04 RX ORDER — HYDROCODONE BITARTRATE AND ACETAMINOPHEN 7.5; 325 MG/1; MG/1
1 TABLET ORAL EVERY 6 HOURS PRN
Qty: 120 TABLET | Refills: 0 | Status: SHIPPED | OUTPATIENT
Start: 2018-12-04 | End: 2018-12-21 | Stop reason: SDUPTHER

## 2018-12-17 RX ORDER — TIZANIDINE 4 MG/1
TABLET ORAL
Qty: 40 TABLET | Refills: 0 | Status: SHIPPED | OUTPATIENT
Start: 2018-12-17 | End: 2018-12-24 | Stop reason: SDUPTHER

## 2018-12-21 ENCOUNTER — TELEPHONE (OUTPATIENT)
Dept: FAMILY MEDICINE CLINIC | Age: 58
End: 2018-12-21

## 2018-12-21 DIAGNOSIS — M17.11 PRIMARY OSTEOARTHRITIS OF RIGHT KNEE: ICD-10-CM

## 2018-12-21 DIAGNOSIS — M48.061 SPINAL STENOSIS OF LUMBAR REGION WITHOUT NEUROGENIC CLAUDICATION: ICD-10-CM

## 2018-12-21 RX ORDER — HYDROCODONE BITARTRATE AND ACETAMINOPHEN 7.5; 325 MG/1; MG/1
1 TABLET ORAL EVERY 6 HOURS PRN
Qty: 120 TABLET | Refills: 0 | Status: SHIPPED | OUTPATIENT
Start: 2018-12-21 | End: 2019-01-25 | Stop reason: SDUPTHER

## 2018-12-22 DIAGNOSIS — F41.9 ANXIETY: ICD-10-CM

## 2018-12-22 RX ORDER — LORAZEPAM 1 MG/1
TABLET ORAL
Qty: 90 TABLET | Refills: 0 | Status: SHIPPED | OUTPATIENT
Start: 2018-12-26 | End: 2019-01-18 | Stop reason: SDUPTHER

## 2018-12-24 RX ORDER — TIZANIDINE 4 MG/1
TABLET ORAL
Qty: 40 TABLET | Refills: 0 | Status: SHIPPED | OUTPATIENT
Start: 2018-12-24 | End: 2019-01-10 | Stop reason: SDUPTHER

## 2019-01-10 RX ORDER — TIZANIDINE 4 MG/1
TABLET ORAL
Qty: 40 TABLET | Refills: 0 | Status: SHIPPED | OUTPATIENT
Start: 2019-01-10 | End: 2019-01-18 | Stop reason: SDUPTHER

## 2019-01-18 DIAGNOSIS — F41.9 ANXIETY: ICD-10-CM

## 2019-01-18 RX ORDER — LORAZEPAM 1 MG/1
TABLET ORAL
Qty: 90 TABLET | Refills: 0 | Status: SHIPPED | OUTPATIENT
Start: 2019-01-23 | End: 2019-01-25 | Stop reason: SDUPTHER

## 2019-01-18 RX ORDER — TIZANIDINE 4 MG/1
TABLET ORAL
Qty: 40 TABLET | Refills: 0 | Status: SHIPPED | OUTPATIENT
Start: 2019-01-18 | End: 2019-02-06 | Stop reason: SDUPTHER

## 2019-01-23 ENCOUNTER — OFFICE VISIT (OUTPATIENT)
Dept: ORTHOPEDIC SURGERY | Age: 59
End: 2019-01-23
Payer: COMMERCIAL

## 2019-01-23 VITALS — HEIGHT: 64 IN | BODY MASS INDEX: 30.56 KG/M2 | WEIGHT: 179.01 LBS

## 2019-01-23 DIAGNOSIS — M17.11 PATELLOFEMORAL ARTHRITIS OF RIGHT KNEE: Primary | ICD-10-CM

## 2019-01-23 DIAGNOSIS — M17.12 PRIMARY OSTEOARTHRITIS OF LEFT KNEE: ICD-10-CM

## 2019-01-23 PROCEDURE — 20610 DRAIN/INJ JOINT/BURSA W/O US: CPT | Performed by: ORTHOPAEDIC SURGERY

## 2019-01-23 PROCEDURE — 99213 OFFICE O/P EST LOW 20 MIN: CPT | Performed by: ORTHOPAEDIC SURGERY

## 2019-01-25 ENCOUNTER — OFFICE VISIT (OUTPATIENT)
Dept: FAMILY MEDICINE CLINIC | Age: 59
End: 2019-01-25
Payer: COMMERCIAL

## 2019-01-25 ENCOUNTER — OFFICE VISIT (OUTPATIENT)
Dept: ORTHOPEDIC SURGERY | Age: 59
End: 2019-01-25
Payer: COMMERCIAL

## 2019-01-25 VITALS
BODY MASS INDEX: 29.71 KG/M2 | WEIGHT: 174 LBS | HEIGHT: 64 IN | SYSTOLIC BLOOD PRESSURE: 124 MMHG | DIASTOLIC BLOOD PRESSURE: 74 MMHG

## 2019-01-25 VITALS — BODY MASS INDEX: 30.56 KG/M2 | HEIGHT: 64 IN | WEIGHT: 179.01 LBS

## 2019-01-25 DIAGNOSIS — M17.11 PRIMARY OSTEOARTHRITIS OF RIGHT KNEE: Primary | ICD-10-CM

## 2019-01-25 DIAGNOSIS — M48.061 SPINAL STENOSIS OF LUMBAR REGION WITHOUT NEUROGENIC CLAUDICATION: ICD-10-CM

## 2019-01-25 DIAGNOSIS — M17.11 PRIMARY OSTEOARTHRITIS OF RIGHT KNEE: ICD-10-CM

## 2019-01-25 DIAGNOSIS — F41.9 ANXIETY: ICD-10-CM

## 2019-01-25 DIAGNOSIS — I51.7 LEFT ATRIAL DILATION: Primary | ICD-10-CM

## 2019-01-25 PROCEDURE — 99214 OFFICE O/P EST MOD 30 MIN: CPT | Performed by: FAMILY MEDICINE

## 2019-01-25 PROCEDURE — 20610 DRAIN/INJ JOINT/BURSA W/O US: CPT | Performed by: ORTHOPAEDIC SURGERY

## 2019-01-25 PROCEDURE — 99212 OFFICE O/P EST SF 10 MIN: CPT | Performed by: ORTHOPAEDIC SURGERY

## 2019-01-25 RX ORDER — HYDROCODONE BITARTRATE AND ACETAMINOPHEN 7.5; 325 MG/1; MG/1
1 TABLET ORAL EVERY 6 HOURS PRN
Qty: 120 TABLET | Refills: 0 | Status: SHIPPED | OUTPATIENT
Start: 2019-01-30 | End: 2019-01-25 | Stop reason: SDUPTHER

## 2019-01-25 RX ORDER — HYDROCODONE BITARTRATE AND ACETAMINOPHEN 7.5; 325 MG/1; MG/1
1 TABLET ORAL EVERY 6 HOURS PRN
Qty: 120 TABLET | Refills: 0 | Status: SHIPPED | OUTPATIENT
Start: 2019-02-01 | End: 2019-01-25 | Stop reason: SDUPTHER

## 2019-01-25 RX ORDER — HYDROCODONE BITARTRATE AND ACETAMINOPHEN 7.5; 325 MG/1; MG/1
1 TABLET ORAL EVERY 6 HOURS PRN
Qty: 120 TABLET | Refills: 0 | Status: SHIPPED | OUTPATIENT
Start: 2019-03-03 | End: 2019-04-12 | Stop reason: SDUPTHER

## 2019-01-25 RX ORDER — LORAZEPAM 1 MG/1
TABLET ORAL
Qty: 90 TABLET | Refills: 0 | Status: SHIPPED | OUTPATIENT
Start: 2019-02-20 | End: 2019-03-17 | Stop reason: SDUPTHER

## 2019-01-25 ASSESSMENT — PATIENT HEALTH QUESTIONNAIRE - PHQ9
2. FEELING DOWN, DEPRESSED OR HOPELESS: 0
1. LITTLE INTEREST OR PLEASURE IN DOING THINGS: 0
SUM OF ALL RESPONSES TO PHQ QUESTIONS 1-9: 0
SUM OF ALL RESPONSES TO PHQ QUESTIONS 1-9: 0
SUM OF ALL RESPONSES TO PHQ9 QUESTIONS 1 & 2: 0

## 2019-01-25 ASSESSMENT — ENCOUNTER SYMPTOMS: BACK PAIN: 1

## 2019-01-29 DIAGNOSIS — I10 ESSENTIAL HYPERTENSION: ICD-10-CM

## 2019-01-29 RX ORDER — NIFEDIPINE 60 MG/1
TABLET, EXTENDED RELEASE ORAL
Qty: 30 TABLET | Refills: 2 | Status: SHIPPED | OUTPATIENT
Start: 2019-01-29 | End: 2019-05-03 | Stop reason: SDUPTHER

## 2019-02-06 DIAGNOSIS — F51.01 PRIMARY INSOMNIA: ICD-10-CM

## 2019-02-06 RX ORDER — ZOLPIDEM TARTRATE 10 MG/1
TABLET ORAL
Qty: 30 TABLET | Refills: 1 | Status: SHIPPED | OUTPATIENT
Start: 2019-02-06 | End: 2019-04-05 | Stop reason: SDUPTHER

## 2019-02-06 RX ORDER — TIZANIDINE 4 MG/1
TABLET ORAL
Qty: 40 TABLET | Refills: 0 | Status: SHIPPED | OUTPATIENT
Start: 2019-02-06 | End: 2019-02-19 | Stop reason: SDUPTHER

## 2019-02-16 DIAGNOSIS — M17.11 PATELLOFEMORAL ARTHRITIS OF RIGHT KNEE: Primary | ICD-10-CM

## 2019-02-16 RX ORDER — MELOXICAM 15 MG/1
15 TABLET ORAL DAILY
Qty: 30 TABLET | Refills: 3 | Status: SHIPPED | OUTPATIENT
Start: 2019-02-16 | End: 2019-05-22

## 2019-02-19 ENCOUNTER — OFFICE VISIT (OUTPATIENT)
Dept: ORTHOPEDIC SURGERY | Age: 59
End: 2019-02-19
Payer: COMMERCIAL

## 2019-02-19 VITALS — BODY MASS INDEX: 29.7 KG/M2 | HEIGHT: 64 IN | WEIGHT: 173.94 LBS

## 2019-02-19 DIAGNOSIS — M22.42 CHONDROMALACIA OF LEFT PATELLOFEMORAL JOINT: ICD-10-CM

## 2019-02-19 DIAGNOSIS — M17.12 PRIMARY OSTEOARTHRITIS OF LEFT KNEE: Primary | ICD-10-CM

## 2019-02-19 PROCEDURE — 99213 OFFICE O/P EST LOW 20 MIN: CPT | Performed by: ORTHOPAEDIC SURGERY

## 2019-03-04 DIAGNOSIS — F51.01 PRIMARY INSOMNIA: ICD-10-CM

## 2019-03-04 RX ORDER — HYDROXYZINE HYDROCHLORIDE 25 MG/1
TABLET, FILM COATED ORAL
Qty: 60 TABLET | Refills: 2 | Status: SHIPPED | OUTPATIENT
Start: 2019-03-04 | End: 2019-06-03 | Stop reason: SDUPTHER

## 2019-03-17 DIAGNOSIS — F41.9 ANXIETY: ICD-10-CM

## 2019-03-17 RX ORDER — LORAZEPAM 1 MG/1
TABLET ORAL
Qty: 90 TABLET | Refills: 0 | Status: SHIPPED | OUTPATIENT
Start: 2019-03-17 | End: 2019-04-12 | Stop reason: SDUPTHER

## 2019-04-05 DIAGNOSIS — F51.01 PRIMARY INSOMNIA: ICD-10-CM

## 2019-04-07 RX ORDER — ZOLPIDEM TARTRATE 10 MG/1
TABLET ORAL
Qty: 30 TABLET | Refills: 0 | Status: SHIPPED | OUTPATIENT
Start: 2019-04-07 | End: 2019-04-12 | Stop reason: SDUPTHER

## 2019-04-12 ENCOUNTER — OFFICE VISIT (OUTPATIENT)
Dept: FAMILY MEDICINE CLINIC | Age: 59
End: 2019-04-12
Payer: COMMERCIAL

## 2019-04-12 VITALS — DIASTOLIC BLOOD PRESSURE: 80 MMHG | HEIGHT: 64 IN | BODY MASS INDEX: 29.86 KG/M2 | SYSTOLIC BLOOD PRESSURE: 122 MMHG

## 2019-04-12 DIAGNOSIS — M54.16 LUMBAR RADICULOPATHY: ICD-10-CM

## 2019-04-12 DIAGNOSIS — M17.11 PRIMARY OSTEOARTHRITIS OF RIGHT KNEE: ICD-10-CM

## 2019-04-12 DIAGNOSIS — M48.061 SPINAL STENOSIS OF LUMBAR REGION WITHOUT NEUROGENIC CLAUDICATION: ICD-10-CM

## 2019-04-12 DIAGNOSIS — F41.9 ANXIETY: ICD-10-CM

## 2019-04-12 DIAGNOSIS — F51.01 PRIMARY INSOMNIA: ICD-10-CM

## 2019-04-12 PROCEDURE — 99213 OFFICE O/P EST LOW 20 MIN: CPT | Performed by: FAMILY MEDICINE

## 2019-04-12 RX ORDER — ZOLPIDEM TARTRATE 10 MG/1
TABLET ORAL
Qty: 30 TABLET | Refills: 2 | Status: SHIPPED | OUTPATIENT
Start: 2019-05-08 | End: 2019-06-08

## 2019-04-12 RX ORDER — HYDROCODONE BITARTRATE AND ACETAMINOPHEN 7.5; 325 MG/1; MG/1
1 TABLET ORAL EVERY 6 HOURS PRN
Qty: 120 TABLET | Refills: 0 | Status: SHIPPED | OUTPATIENT
Start: 2019-06-12 | End: 2020-01-22 | Stop reason: SDUPTHER

## 2019-04-12 RX ORDER — GABAPENTIN 300 MG/1
CAPSULE ORAL
Qty: 90 CAPSULE | Refills: 2 | Status: SHIPPED | OUTPATIENT
Start: 2019-04-18 | End: 2019-07-30 | Stop reason: ALTCHOICE

## 2019-04-12 RX ORDER — HYDROCODONE BITARTRATE AND ACETAMINOPHEN 7.5; 325 MG/1; MG/1
1 TABLET ORAL EVERY 6 HOURS PRN
Qty: 120 TABLET | Refills: 0 | Status: SHIPPED | OUTPATIENT
Start: 2019-04-12 | End: 2019-07-05 | Stop reason: SDUPTHER

## 2019-04-12 RX ORDER — HYDROCODONE BITARTRATE AND ACETAMINOPHEN 7.5; 325 MG/1; MG/1
1 TABLET ORAL EVERY 6 HOURS PRN
Qty: 120 TABLET | Refills: 0 | Status: SHIPPED | OUTPATIENT
Start: 2019-05-12 | End: 2019-06-11

## 2019-04-12 RX ORDER — LORAZEPAM 1 MG/1
TABLET ORAL
Qty: 90 TABLET | Refills: 2 | Status: SHIPPED | OUTPATIENT
Start: 2019-04-16 | End: 2019-07-05 | Stop reason: SDUPTHER

## 2019-04-12 ASSESSMENT — ENCOUNTER SYMPTOMS
RESPIRATORY NEGATIVE: 1
GASTROINTESTINAL NEGATIVE: 1

## 2019-04-12 NOTE — PROGRESS NOTES
Subjective:      Patient ID: Naren Sy is a 62 y.o. female. HPI  Med refills  She needs all her controlled drugs refilled  Doing well  Her pain is managed well with the halle and the hydrocodone  Her anxiety and sleep are both good   Review of Systems   Constitutional: Negative. Respiratory: Negative. Cardiovascular: Negative. Gastrointestinal: Negative. Musculoskeletal: Positive for arthralgias, gait problem and myalgias. Skin: Negative. Psychiatric/Behavioral: Positive for agitation, decreased concentration and sleep disturbance. The patient is nervous/anxious. YOB: 1960    Date of Visit:  4/12/2019    Allergies   Allergen Reactions    Penicillins        Outpatient Medications Marked as Taking for the 4/12/19 encounter (Office Visit) with Liza Davies, DO   Medication Sig Dispense Refill    zolpidem (AMBIEN) 10 MG tablet TAKE ONE TABLET BY MOUTH EVERY NIGHT AT BEDTIME 30 tablet 0    LORazepam (ATIVAN) 1 MG tablet TAKE ONE TABLET BY MOUTH EVERY 8 HOURS 90 tablet 0    hydrOXYzine (ATARAX) 25 MG tablet TAKE TWO TABLETS BY MOUTH EVERY NIGHT AT BEDTIME AS NEEDED FOR ITCHING 60 tablet 2    tiZANidine (ZANAFLEX) 4 MG tablet TAKE ONE TABLET BY MOUTH EVERY 6 HOURS AS NEEDED FOR MUSCLE SPASMS 40 tablet 2    meloxicam (MOBIC) 15 MG tablet Take 1 tablet by mouth daily 30 tablet 3    NIFEdipine (PROCARDIA XL) 60 MG extended release tablet TAKE ONE TABLET BY MOUTH DAILY 30 tablet 2    HYDROcodone-acetaminophen (NORCO) 7.5-325 MG per tablet Take 1 tablet by mouth every 6 hours as needed for Pain for up to 30 days. Angie Lino Date: 3/3/19 120 tablet 0    citalopram (CELEXA) 40 MG tablet TAKE ONE TABLET BY MOUTH DAILY 30 tablet 5    cloNIDine (CATAPRES) 0.2 MG tablet TAKE ONE TABLET BY MOUTH TWICE A DAY 60 tablet 3    propranolol (INDERAL LA) 160 MG extended release capsule TAKE ONE CAPSULE BY MOUTH DAILY 30 capsule 4    NIFEdipine (PROCARDIA XL) 30 MG extended release tablet TAKE ONE TABLET BY MOUTH DAILY 30 tablet 0    lisinopril (PRINIVIL;ZESTRIL) 20 MG tablet TAKE ONE TABLET BY MOUTH TWICE A DAY 60 tablet 4       Vitals:    04/12/19 0817   BP: 122/80   Height: 5' 4\" (1.626 m)     Body mass index is 29.86 kg/m². Wt Readings from Last 3 Encounters:   02/19/19 173 lb 15.1 oz (78.9 kg)   01/25/19 174 lb (78.9 kg)   01/25/19 179 lb 0.2 oz (81.2 kg)     BP Readings from Last 3 Encounters:   04/12/19 122/80   01/25/19 124/74   10/05/18 (!) 176/100       Objective:   Physical Exam   Constitutional: She is oriented to person, place, and time. She appears well-developed and well-nourished. HENT:   Head: Normocephalic. Neck: No thyromegaly present. Cardiovascular: Normal rate, regular rhythm and normal heart sounds. Pulmonary/Chest: Effort normal and breath sounds normal.   Lymphadenopathy:     She has no cervical adenopathy. Neurological: She is alert and oriented to person, place, and time. Psychiatric: She has a normal mood and affect. Her behavior is normal. Judgment and thought content normal.   Nursing note and vitals reviewed. Assessment:      Assessment/plan;  Lilly Aldana was seen today for 3 month follow-up, medication check, medication refill and knee pain. Diagnoses and all orders for this visit:    Primary osteoarthritis of right knee  -     HYDROcodone-acetaminophen (NORCO) 7.5-325 MG per tablet; Take 1 tablet by mouth every 6 hours as needed for Pain for up to 30 days.  -     HYDROcodone-acetaminophen (NORCO) 7.5-325 MG per tablet; Take 1 tablet by mouth every 6 hours as needed for Pain for up to 30 days.  -     HYDROcodone-acetaminophen (NORCO) 7.5-325 MG per tablet; Take 1 tablet by mouth every 6 hours as needed for Pain for up to 30 days. Spinal stenosis of lumbar region without neurogenic claudication  -     HYDROcodone-acetaminophen (NORCO) 7.5-325 MG per tablet; Take 1 tablet by mouth every 6 hours as needed for Pain for up to 30 days.   - HYDROcodone-acetaminophen (NORCO) 7.5-325 MG per tablet; Take 1 tablet by mouth every 6 hours as needed for Pain for up to 30 days.  -     HYDROcodone-acetaminophen (NORCO) 7.5-325 MG per tablet; Take 1 tablet by mouth every 6 hours as needed for Pain for up to 30 days. Primary insomnia  -     zolpidem (AMBIEN) 10 MG tablet; TAKE ONE TABLET BY MOUTH EVERY NIGHT AT BEDTIME    Anxiety  -     LORazepam (ATIVAN) 1 MG tablet; One tid prn    Lumbar radiculopathy  -     gabapentin (NEURONTIN) 300 MG capsule; TAKE ONE CAPSULE BY MOUTH EVERY MORNING AND TAKE TWO CAPSULES BY MOUTH EVERY NIGHT AT BEDTIME      Controlled Substances Monitoring:     RX Monitoring 4/12/2019   Attestation The Prescription Monitoring Report for this patient was reviewed today. Chronic Pain Routine Monitoring Possible medication side effects, risk of tolerance/dependence & alternative treatments discussed. ;Potential drug abuse or diversion identified, see note documentation.;Obtaining appropriate analgesic effect of treatment. ;No signs of potential drug abuse or diversion identified: otherwise, see note documentation       Tita Landrum DO

## 2019-04-27 RX ORDER — TIZANIDINE 4 MG/1
TABLET ORAL
Qty: 40 TABLET | Refills: 1 | Status: SHIPPED | OUTPATIENT
Start: 2019-04-27 | End: 2019-05-22

## 2019-05-03 DIAGNOSIS — F51.01 PRIMARY INSOMNIA: ICD-10-CM

## 2019-05-03 DIAGNOSIS — I10 ESSENTIAL HYPERTENSION: ICD-10-CM

## 2019-05-03 RX ORDER — NIFEDIPINE 60 MG/1
TABLET, EXTENDED RELEASE ORAL
Qty: 30 TABLET | Refills: 1 | Status: SHIPPED | OUTPATIENT
Start: 2019-05-03 | End: 2019-07-05 | Stop reason: SDUPTHER

## 2019-05-03 RX ORDER — ZOLPIDEM TARTRATE 10 MG/1
TABLET ORAL
Qty: 30 TABLET | Refills: 0 | Status: SHIPPED | OUTPATIENT
Start: 2019-05-03 | End: 2019-06-03 | Stop reason: SDUPTHER

## 2019-05-03 RX ORDER — NIFEDIPINE 60 MG/1
TABLET, EXTENDED RELEASE ORAL
Qty: 30 TABLET | Refills: 1 | Status: SHIPPED | OUTPATIENT
Start: 2019-05-03 | End: 2019-10-07

## 2019-05-03 RX ORDER — PROPRANOLOL HYDROCHLORIDE 160 MG/1
CAPSULE, EXTENDED RELEASE ORAL
Qty: 30 CAPSULE | Refills: 3 | Status: ON HOLD | OUTPATIENT
Start: 2019-05-03 | End: 2019-08-01 | Stop reason: HOSPADM

## 2019-05-22 ENCOUNTER — APPOINTMENT (OUTPATIENT)
Dept: CT IMAGING | Age: 59
End: 2019-05-22
Payer: OTHER MISCELLANEOUS

## 2019-05-22 ENCOUNTER — HOSPITAL ENCOUNTER (EMERGENCY)
Age: 59
Discharge: HOME | End: 2019-05-22
Attending: EMERGENCY MEDICINE
Payer: OTHER MISCELLANEOUS

## 2019-05-22 ENCOUNTER — APPOINTMENT (OUTPATIENT)
Dept: GENERAL RADIOLOGY | Age: 59
End: 2019-05-22
Payer: OTHER MISCELLANEOUS

## 2019-05-22 ENCOUNTER — TELEPHONE (OUTPATIENT)
Dept: ORTHOPEDIC SURGERY | Age: 59
End: 2019-05-22

## 2019-05-22 VITALS
OXYGEN SATURATION: 98 % | HEIGHT: 63 IN | RESPIRATION RATE: 14 BRPM | TEMPERATURE: 99 F | DIASTOLIC BLOOD PRESSURE: 93 MMHG | WEIGHT: 172 LBS | BODY MASS INDEX: 30.48 KG/M2 | SYSTOLIC BLOOD PRESSURE: 194 MMHG | HEART RATE: 107 BPM

## 2019-05-22 DIAGNOSIS — R03.0 TRANSIENT ELEVATED BLOOD PRESSURE: ICD-10-CM
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
## 2019-05-22 DIAGNOSIS — S80.01XA CONTUSION OF RIGHT KNEE, INITIAL ENCOUNTER: ICD-10-CM

## 2019-05-22 DIAGNOSIS — S90.01XA CONTUSION OF RIGHT ANKLE, INITIAL ENCOUNTER: ICD-10-CM

## 2019-05-22 DIAGNOSIS — S70.02XA CONTUSION OF LEFT HIP, INITIAL ENCOUNTER: ICD-10-CM

## 2019-05-22 DIAGNOSIS — S80.02XA CONTUSION OF LEFT KNEE, INITIAL ENCOUNTER: ICD-10-CM

## 2019-05-22 PROCEDURE — 73560 X-RAY EXAM OF KNEE 1 OR 2: CPT

## 2019-05-22 PROCEDURE — 6370000000 HC RX 637 (ALT 250 FOR IP): Performed by: PHYSICIAN ASSISTANT

## 2019-05-22 PROCEDURE — 73610 X-RAY EXAM OF ANKLE: CPT

## 2019-05-22 PROCEDURE — 73630 X-RAY EXAM OF FOOT: CPT

## 2019-05-22 PROCEDURE — 72125 CT NECK SPINE W/O DYE: CPT

## 2019-05-22 PROCEDURE — 99284 EMERGENCY DEPT VISIT MOD MDM: CPT

## 2019-05-22 PROCEDURE — 70450 CT HEAD/BRAIN W/O DYE: CPT

## 2019-05-22 RX ORDER — METHOCARBAMOL 750 MG/1
750 TABLET, FILM COATED ORAL 3 TIMES DAILY
Qty: 15 TABLET | Refills: 0 | Status: SHIPPED | OUTPATIENT
Start: 2019-05-22 | End: 2019-05-27

## 2019-05-22 RX ORDER — NAPROXEN 250 MG/1
250 TABLET ORAL 2 TIMES DAILY WITH MEALS
Qty: 30 TABLET | Refills: 3 | Status: SHIPPED | OUTPATIENT
Start: 2019-05-22 | End: 2019-07-05 | Stop reason: ALTCHOICE

## 2019-05-22 RX ORDER — HYDROCODONE BITARTRATE AND ACETAMINOPHEN 5; 325 MG/1; MG/1
1 TABLET ORAL ONCE
Status: COMPLETED | OUTPATIENT
Start: 2019-05-22 | End: 2019-05-22

## 2019-05-22 RX ADMIN — HYDROCODONE BITARTRATE AND ACETAMINOPHEN 1 TABLET: 5; 325 TABLET ORAL at 16:22

## 2019-05-22 ASSESSMENT — ENCOUNTER SYMPTOMS
COLOR CHANGE: 0
WHEEZING: 0
DIARRHEA: 0
NAUSEA: 0
SHORTNESS OF BREATH: 0
VOMITING: 0
ABDOMINAL PAIN: 0

## 2019-05-22 ASSESSMENT — PAIN SCALES - GENERAL: PAINLEVEL_OUTOF10: 9

## 2019-05-22 ASSESSMENT — PAIN DESCRIPTION - DESCRIPTORS: DESCRIPTORS: THROBBING
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
## 2019-05-22 NOTE — ED NOTES
Reported VS to [facility], Dr Terry Green to bedside. Reported pt also drowsy.       Jaci Vick, RN  05/22/19 9210
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
## 2019-05-28 ENCOUNTER — TELEPHONE (OUTPATIENT)
Dept: FAMILY MEDICINE CLINIC | Age: 59
End: 2019-05-28
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
## 2019-06-02 DIAGNOSIS — I10 ESSENTIAL HYPERTENSION: ICD-10-CM

## 2019-06-02 RX ORDER — CLONIDINE HYDROCHLORIDE 0.2 MG/1
TABLET ORAL
Qty: 60 TABLET | Refills: 2 | Status: SHIPPED | OUTPATIENT
Start: 2019-06-02 | End: 2019-10-07

## 2019-06-03 DIAGNOSIS — I10 ESSENTIAL HYPERTENSION: ICD-10-CM

## 2019-06-03 DIAGNOSIS — F51.01 PRIMARY INSOMNIA: ICD-10-CM

## 2019-06-03 RX ORDER — ZOLPIDEM TARTRATE 10 MG/1
TABLET ORAL
Qty: 30 TABLET | Refills: 0 | Status: SHIPPED | OUTPATIENT
Start: 2019-06-03 | End: 2019-07-01 | Stop reason: SDUPTHER

## 2019-06-03 RX ORDER — CLONIDINE HYDROCHLORIDE 0.2 MG/1
TABLET ORAL
Qty: 60 TABLET | Refills: 2 | Status: SHIPPED | OUTPATIENT
Start: 2019-06-03 | End: 2019-07-05 | Stop reason: SDUPTHER

## 2019-06-03 RX ORDER — HYDROXYZINE HYDROCHLORIDE 25 MG/1
TABLET, FILM COATED ORAL
Qty: 60 TABLET | Refills: 1 | Status: SHIPPED | OUTPATIENT
Start: 2019-06-03 | End: 2019-07-05 | Stop reason: ALTCHOICE

## 2019-06-06 ENCOUNTER — OFFICE VISIT (OUTPATIENT)
Dept: ORTHOPEDIC SURGERY | Age: 59
End: 2019-06-06
Payer: COMMERCIAL

## 2019-06-06 DIAGNOSIS — M54.16 LUMBAR RADICULOPATHY: Primary | ICD-10-CM

## 2019-06-06 DIAGNOSIS — M43.16 SPONDYLOLISTHESIS OF LUMBAR REGION: ICD-10-CM

## 2019-06-06 DIAGNOSIS — M48.061 SPINAL STENOSIS OF LUMBAR REGION, UNSPECIFIED WHETHER NEUROGENIC CLAUDICATION PRESENT: ICD-10-CM

## 2019-06-06 PROCEDURE — 99214 OFFICE O/P EST MOD 30 MIN: CPT | Performed by: PHYSICIAN ASSISTANT

## 2019-06-06 NOTE — LETTER
Please schedule the following with:     Date:   2019 @ 9:30     Account: [de-identified]  Patient: Debra Gitelman    : 1960  Address:  [place] [address]    Phone (H):  [phone] (home)      ----------------------------------------------------------------------------------------------  Diagnosis:     ICD-10-CM    1. Lumbar radiculopathy M54.16    2. Spinal stenosis of lumbar region, unspecified whether neurogenic claudication present M48.061    3. Spondylolisthesis of lumbar region M43.16          Levels:L4-L5  Procedure type INTRALAMINAR  Side midline  CPT Codes 97347    ----------------------------------------------------------------------------------------------  Injection #   [id number]    Attending Physician       Jorgito Garcia.  Arpita Lin MD.      ----------------------------------------------------------------------------------------------  Injection Scheduled For:    At:    1st Insurance [employer]    Pre-Cert#    2nd Insurance NONE    Pre-Cert#    Comments or Special instructions:    · Infection control  · Tested positive for MRSA in past 12 months:  no  · Tested positive for MSSA \"staph infection\" in past 12 months: no  · Tested positive for VRE (Vancomycin Resistant Enterococci) in past 12 months:   no  · Currently on any antibiotics for an infection: no  · Anticoagulants:  · On a blood thinner:  no   · Any history of bleeding disorder: no   · Advanced Liver disease: no   · Advanced Renal disease: no   · Glaucoma: no   · Diabetes: no     Sedation:  No  -----------------------------------------------------------------------------------------------  Allergies   Allergen Reactions    Penicillins

## 2019-06-06 NOTE — PROGRESS NOTES
cloNIDine (CATAPRES) 0.2 MG tablet, TAKE ONE TABLET BY MOUTH TWICE A DAY, Disp: 60 tablet, Rfl: 2    tiZANidine (ZANAFLEX) 4 MG tablet, TAKE ONE TABLET BY MOUTH EVERY 6 HOURS AS NEEDED FOR MUSCLE SPASMS, Disp: 40 tablet, Rfl: 2    naproxen (NAPROSYN) 250 MG tablet, Take 1 tablet by mouth 2 times daily (with meals), Disp: 30 tablet, Rfl: 3    NIFEdipine (PROCARDIA XL) 60 MG extended release tablet, TAKE ONE TABLET BY MOUTH DAILY, Disp: 30 tablet, Rfl: 1    NIFEdipine (PROCARDIA XL) 60 MG extended release tablet, TAKE ONE TABLET BY MOUTH DAILY, Disp: 30 tablet, Rfl: 1    propranolol (INDERAL LA) 160 MG extended release capsule, TAKE ONE CAPSULE BY MOUTH DAILY, Disp: 30 capsule, Rfl: 3    zolpidem (AMBIEN) 10 MG tablet, TAKE ONE TABLET BY MOUTH EVERY NIGHT AT BEDTIME, Disp: 30 tablet, Rfl: 2    HYDROcodone-acetaminophen (NORCO) 7.5-325 MG per tablet, Take 1 tablet by mouth every 6 hours as needed for Pain for up to 30 days. , Disp: 120 tablet, Rfl: 0    [START ON 6/12/2019] HYDROcodone-acetaminophen (NORCO) 7.5-325 MG per tablet, Take 1 tablet by mouth every 6 hours as needed for Pain for up to 30 days. , Disp: 120 tablet, Rfl: 0    citalopram (CELEXA) 40 MG tablet, TAKE ONE TABLET BY MOUTH DAILY, Disp: 30 tablet, Rfl: 5    NIFEdipine (PROCARDIA XL) 30 MG extended release tablet, TAKE ONE TABLET BY MOUTH DAILY, Disp: 30 tablet, Rfl: 0    lisinopril (PRINIVIL;ZESTRIL) 20 MG tablet, TAKE ONE TABLET BY MOUTH TWICE A DAY, Disp: 60 tablet, Rfl: 4    gabapentin (NEURONTIN) 300 MG capsule, TAKE ONE CAPSULE BY MOUTH EVERY MORNING AND TAKE TWO CAPSULES BY MOUTH EVERY NIGHT AT BEDTIME, Disp: 90 capsule, Rfl: 2    diclofenac sodium (VOLTAREN) 1 % GEL, Apply 4 g topically 4 times daily, Disp: 5 Tube, Rfl: 0  Allergies:  Penicillins  Social History:    reports that she has quit smoking. Her smoking use included cigarettes. She smoked 0.25 packs per day.  She has never used smokeless tobacco. She reports that she drinks alcohol. She reports that she does not use drugs. Family History:   No family history on file. REVIEW OF SYSTEMS:   CONSTITUTIONAL: Denies unexplained weight loss, fevers, chills or fatigue  NEUROLOGICAL: Denies unsteady gait or progressive weakness  MUSCULOSKELETAL: Denies joint swelling or redness  GI: Denies nausea, vomiting, diarrhea   : Denies bowel or bladder issues       PHYSICAL EXAM:    Vitals: There were no vitals taken for this visit. GENERAL EXAM:  · General Apparence: Patient is adequately groomed with no evidence of malnutrition. · Psychiatric: Orientation: The patient is oriented to time, place and person. The patient's mood and affect are appropriate   · Vascular: Examination reveals no swelling and palpation reveals no tenderness in upper or lower extremities. Good capillary refill. · The lymphatic examination of the neck, axillae and groin reveals all areas to be without enlargement or induration  · Sensation is intact without deficit in the upper and lower extremities to light touch and pinprick  · Coordination of the upper and lower extremities are normal.    CERVICAL EXAMINATION:  · Inspection: Local inspection shows no step-off or bruising. Cervical alignment is normal. No instability is noted. · Palpation and Percussion: No evidence of tenderness at the midline. Paraspinal tenderness is not present. There is no paraspinal spasm. Skin:There are no rashes, ulcerations or lesions  · Range of Motion:  {Blank single:65656::\"pain-free ROM\",\"limited by 25% in all planes due to pain\",\"limited by 50% in all planes due to pain\",\"very limited due to pain\"}   · Strength: 5/5 bilateral upper extremities  · Special Tests:   Spurling's and London's are negative bilaterally. Waterman and Impingement tests are negative bilaterally. · Skin:There are no rashes, ulcerations or lesions in right & left upper extremities. · Reflexes: Bilaterally triceps, biceps and brachioradialis are 2+. Clonus absent bilaterally at the feet. No pathological reflexes are noted. · Gait & station: {Blank single:83338::\"normal, patient ambulates without assistance\",\"uses a single point cane to ambulate\",\"ambulates with a walker\",\"antalgic on the right\",\"antalgic on the left\"}  · Additional Examinations:  · RIGHT UPPER EXTREMITY:  Inspection/examination of the right upper extremity does not show any tenderness, deformity or injury. Range of motion is unremarkable and pain-free. There is no gross instability. There are no rashes, ulcerations or lesions. Strength and tone are normal. No atrophy or abnormal movements are noted. · LEFT UPPER EXTREMITY: Inspection/examination of the left upper extremity does not show any tenderness, deformity or injury. Range of motion is unremarkable and pain-free. There is no gross instability. There are no rashes, ulcerations or lesions. Strength and tone are normal. No atrophy or abnormal movements are noted. LUMBAR/SACRAL EXAMINATION:  · Inspection: Local inspection shows no step-off or bruising. Lumbar alignment is normal. No instability is noted. · Palpation:   No evidence of tenderness at the midline. Lumbar paraspinal tenderness: {Blank single:64647::\"Mild L4/5 and L5/S1 tenderness\",\"No tenderness to palpation of the lumbar paraspinals\",\"Very limited\"}  Bursal tenderness {Blank single:63399::\"Right greater trochanteric tenderness\",\"Left greater trochanteric tenderness\",\"No tenderness bilaterally\"}  There is no paraspinal spasm. · Range of Motion: {Blank single:83318::\"pain-free ROM\",\"limited by 25% in all planes due to pain\",\"limited by 50% in all planes due to pain\",\"very limited due to pain\"}  · Strength:   Strength testing is 5/5 in all muscle groups tested. · Special Tests:   Straight leg raise and crossed SLR negative. Eleazar's testing is negative bilaterally. FADIR's testing is negative bilaterally. · Skin: There are no rashes, ulcerations or lesions.   · Reflexes: discussed, Cedric Ramirez elected to pursue a course of treatment that includes the followin. Medications:  {Blank single:56273::\"No further recommendations for new medications. \",\"Continue anti-inflammatories with appropriate GI Precautions including to stop if develop dark tarry stools or GI upset and to take with food. \",\"I will prescribe a medrol dose pack to help with the inflammatory component of pain. Risks, benefits and alternatives were discussed. Recommended to stop any NSAIDs to reduce risk of GI bleed during the course of the dose pack. \",\"I will prescribe *** to help with inflammation and reduce pain. CV, GI and Nephro risks were reviewed. \",\"I will add a *** to the current regimen. Counseled on risks, benefits and alternatives and recommended not to take the medicine and drive or operate heavy machinery. \"}    2. PT:  {Blank single:88675::\"I will start the patient on a trial of PT to work on a lumbar stabilization program to focus on core strengthening, core stabilizing, lumbar stretches, hamstring flexibility, modalities as indicated for 6-8 visits over the next 4-6 weeks. \",\"I will start the patient on a trial of PT to work on a cervical stabilization program to focus on stretching, strengthening, traction and modalities as indicated. \",\"Encouraged to continue with HEP. \"}    3. Further studies:  {Blank single:63438::\"No further studies. \",\"I will obtain a Cervical MR without contrast to evaluate for soft tissue pathology or stenosis contributing to the neck pain and paresthesias. \",\"I will obtain a Lumbar MR without contrast to evaluate for soft tissue pathology or stenosis contributing to the back pain and paresthesias. \",\"Obtain EMG to evaluate for paresthesias to rule out a nerve entrapment. \"}    4. Interventional:  {Blank single:45836::\"After further imaging is obtained, interventional options will be reviewed and recommended. \",\"We discussed pursuing a *** epidural steroid injection to address the pain.  Radiologic imaging and symptoms confirm the pain etiology. Risks, benefits and alternatives of interventional options were discussed. These include and are not limited to bleeding, infection, spinal headache, nerve injury and lack of pain relief. The patient verbalized understanding and would like to proceed. The patient will be scheduled accordingly. \",\"We discussed pursuing lumbar medial branch blocks for diagnostic purposes. Based on physical exam findings of increased pain with facet loading and radiologic imaging, we will pursue lumbar medial branch blocks at the *** levels. Risks, benefits and alternatives were discussed. These include but are not limited to bleeding, infection, increased pain, lack of pain relief and nerve injury. The patient verbalized understanding and would like to proceed. If there is significant pain relief and functional improvement, the patient may be a good candidate for Radiofrequency ablation. \",\"We discussed pursuing a *** Sacroiliac joint injection as SI joint pathology is suspected as the etiology of the chronic low back/hip pain. There has been failure of non-invasive and conservative treatment including physical therapy/home exercise program, rest, NSAIDs or acetaminophen. Risks include but are not limited to bleeding, infection, nerve injury, increase in pain and lack of pain relief. The patient verbalized understanding and would like to proceed. \"}    5. Follow up:  {Blank single:65719::\"1-2 weeks\",\"2-3 weeks\",\"4-6 weeks\",\"2-3 months\",\"4-6 months\"}      More Reeves was instructed to call the office if her symptoms worsen or if new symptoms appear prior to the next scheduled visit. She is specifically instructed to contact the office between now & her scheduled appointment if she has concerns related to her condition or if she needs assistance in scheduling the above tests.  She is welcome to call for an appointment sooner if she has any additional concerns or questions. Jorgito Garcia. Arpita Lin MD, JOSSELYN, [facility]  Board Certified in [address] [place] [address] Certified and Fellowship Trained in [facility] ([facility])             This dictation was performed with a verbal recognition program [facility] ) and it was checked for errors. It is possible that there are still dictated errors within this office note. If so, please bring any errors to my attention for an addendum. All efforts were made to ensure that this office note is accurate.
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
## 2019-06-07 DIAGNOSIS — M17.11 PATELLOFEMORAL ARTHRITIS OF RIGHT KNEE: Primary | ICD-10-CM

## 2019-06-10 ENCOUNTER — TELEPHONE (OUTPATIENT)
Dept: ORTHOPEDIC SURGERY | Age: 59
End: 2019-06-10

## 2019-06-10 NOTE — TELEPHONE ENCOUNTER
DOS   06/17/2019  CPT   74893  OP SX AUTH  NPR    LEVELS   L4 - L5   PROCEDURE   INTRALAMINAR LAZARA    [address]:   VERNA

## 2019-06-12 ENCOUNTER — TELEPHONE (OUTPATIENT)
Dept: ORTHOPEDIC SURGERY | Age: 59
End: 2019-06-12

## 2019-06-12 NOTE — TELEPHONE ENCOUNTER
L/m of time change for procedure with dr Anais Stanton on mond 6/17 at f to 2:15 pm. Instructed to call back to confirm

## 2019-06-13 ENCOUNTER — TELEPHONE (OUTPATIENT)
Dept: ORTHOPEDIC SURGERY | Age: 59
End: 2019-06-13

## 2019-06-17 ENCOUNTER — APPOINTMENT (OUTPATIENT)
Dept: GENERAL RADIOLOGY | Age: 59
End: 2019-06-17
Attending: PHYSICAL MEDICINE & REHABILITATION
Payer: COMMERCIAL

## 2019-06-17 ENCOUNTER — HOSPITAL ENCOUNTER (OUTPATIENT)
Age: 59
Setting detail: OUTPATIENT SURGERY
Discharge: HOME | End: 2019-06-17
Attending: PHYSICAL MEDICINE & REHABILITATION | Admitting: PHYSICAL MEDICINE & REHABILITATION
Payer: COMMERCIAL

## 2019-06-17 VITALS
SYSTOLIC BLOOD PRESSURE: 132 MMHG | HEART RATE: 57 BPM | DIASTOLIC BLOOD PRESSURE: 67 MMHG | RESPIRATION RATE: 16 BRPM | OXYGEN SATURATION: 100 % | TEMPERATURE: 96.9 F

## 2019-06-17 PROCEDURE — 2580000003 HC RX 258: Performed by: PHYSICAL MEDICINE & REHABILITATION

## 2019-06-17 PROCEDURE — 99152 MOD SED SAME PHYS/QHP 5/>YRS: CPT | Performed by: PHYSICAL MEDICINE & REHABILITATION

## 2019-06-17 PROCEDURE — 6360000002 HC RX W HCPCS: Performed by: PHYSICAL MEDICINE & REHABILITATION

## 2019-06-17 PROCEDURE — 3610000054 HC PAIN LEVEL 3 BASE (NON-OR): Performed by: PHYSICAL MEDICINE & REHABILITATION

## 2019-06-17 PROCEDURE — 2709999900 HC NON-CHARGEABLE SUPPLY: Performed by: PHYSICAL MEDICINE & REHABILITATION

## 2019-06-17 PROCEDURE — 77003 FLUOROGUIDE FOR SPINE INJECT: CPT

## 2019-06-17 PROCEDURE — 2500000003 HC RX 250 WO HCPCS: Performed by: PHYSICAL MEDICINE & REHABILITATION

## 2019-06-17 RX ORDER — METHYLPREDNISOLONE ACETATE 80 MG/ML
INJECTION, SUSPENSION INTRA-ARTICULAR; INTRALESIONAL; INTRAMUSCULAR; SOFT TISSUE
Status: COMPLETED | OUTPATIENT
Start: 2019-06-17 | End: 2019-06-17

## 2019-06-17 RX ORDER — MIDAZOLAM HYDROCHLORIDE 1 MG/ML
INJECTION INTRAMUSCULAR; INTRAVENOUS
Status: COMPLETED | OUTPATIENT
Start: 2019-06-17 | End: 2019-06-17

## 2019-06-17 RX ORDER — LIDOCAINE HYDROCHLORIDE 10 MG/ML
INJECTION, SOLUTION INFILTRATION; PERINEURAL
Status: COMPLETED | OUTPATIENT
Start: 2019-06-17 | End: 2019-06-17

## 2019-06-17 RX ORDER — 0.9 % SODIUM CHLORIDE 0.9 %
VIAL (ML) INJECTION
Status: COMPLETED | OUTPATIENT
Start: 2019-06-17 | End: 2019-06-17

## 2019-06-17 ASSESSMENT — PAIN SCALES - GENERAL: PAINLEVEL_OUTOF10: 0

## 2019-06-17 ASSESSMENT — PAIN - FUNCTIONAL ASSESSMENT: PAIN_FUNCTIONAL_ASSESSMENT: 0-10

## 2019-06-17 NOTE — H&P
MD   cloNIDine (CATAPRES) 0.2 MG tablet TAKE ONE TABLET BY MOUTH TWICE A DAY 6/3/19   Demetrice Saldaña DO   tiZANidine (ZANAFLEX) 4 MG tablet TAKE ONE TABLET BY MOUTH EVERY 6 HOURS AS NEEDED FOR MUSCLE SPASMS 5/26/19   Demetrice Saldaña DO   naproxen (NAPROSYN) 250 MG tablet Take 1 tablet by mouth 2 times daily (with meals) 5/22/19   Kathy NurseSHERMAN   NIFEdipine (PROCARDIA XL) 60 MG extended release tablet TAKE ONE TABLET BY MOUTH DAILY 5/3/19   Demetrice Saldaña DO   gabapentin (NEURONTIN) 300 MG capsule TAKE ONE CAPSULE BY MOUTH EVERY MORNING AND TAKE TWO CAPSULES BY MOUTH EVERY NIGHT AT BEDTIME 4/18/19 5/22/19  Demetrice Saldaña DO   HYDROcodone-acetaminophen (NORCO) 7.5-325 MG per tablet Take 1 tablet by mouth every 6 hours as needed for Pain for up to 30 days. 6/12/19 7/12/19  Demetrice Saldaña DO   NIFEdipine (PROCARDIA XL) 30 MG extended release tablet TAKE ONE TABLET BY MOUTH DAILY 9/6/18   Leonides Gunderson DO     Allergies:  Penicillins  Social History:    reports that she has quit smoking. Her smoking use included cigarettes. She smoked 0.25 packs per day. She has never used smokeless tobacco. She reports that she drinks alcohol. She reports that she does not use drugs. Family History:   History reviewed. No pertinent family history. Vitals: Blood pressure (!) 163/73, pulse (!) 49, temperature 96.9 °F (36.1 °C), resp. rate 16, SpO2 98 %. PHYSICAL EXAM:including affected areas  HENT: Airway patent and reviewed  Cardiovascular: Normal rate, regular rhythm, normal heart sounds. Pulmonary/Chest: No wheezes. No rhonchi. No rales. Abdominal: Soft. Bowel sounds are normal. No distension. Extremities: Moves all extremities equally  Cervical and Lumbar Spine: Painful range of motion, no midline tenderness       Diagnosis:Lumbar radiculopathy  M54.16  M48.061  M43.16    Plan: Proceed with planned procedure      ASA CLASS:         []   I.  Normal, healthy adult           [x]   II.  Mild systemic disease            []   III. Severe systemic disease      Mallampati: Mallampati Class II - (soft palate, fauces & uvula are visible)      Sedation plan:   [x]  Local              []  Minimal                  []  General anesthesia    Patient's condition acceptable for planned procedure/sedation. Post Procedure Plan   Return to same level of care   ______________________     The risks and benefits as well as alternatives to the procedure have been discussed with the patient and or family. The patient and or next of kin understands and agrees to proceed.     Milagros Curtis M.D.

## 2019-06-17 NOTE — OP NOTE
Patient:  Emerson Vitale  YOB: 1960  Medical Record #:  [id number]   Place:   [address] [place] [address]  Date:  6/17/2019   Physician:  Stiven Harvey MD, JOSSELYN    Procedure:  Lumbar Epidural Steroid Injection - Interlaminar approach  L5-S1    Pre-Procedure Diagnosis: Lumbar DDD    Post-Procedure Diagnosis: Same    Sedation: Local with 1% Lidocaine 3 ml and 2 mg of IV Versed    EBL: None    Complications: None    Procedure Summary:    The patient was brought to the procedure suite and placed in the prone position. The skin overlying the lumbar spine was prepped and draped in the usual sterile fashion. Using fluoroscopic guidance, the L5 - S1 interlaminar space was identified. Through anesthetized skin a 20 gauge Touhy needle was advanced into the epidural space using continuous loss of resistance to saline technique. Isovue M300 was instilled and an epidurogram was noted without evidence of intrathecal or vascular spread. 10 ml of a solution containing preservative free normal saline and 80 mg of Depomedrol was instilled. The needle was removed and a band-aid applied. The patient was transferred to the post-operative area in stable condition.

## 2019-06-17 NOTE — PROGRESS NOTES
IV discontinued, catheter intact, and dressing applied. Procedural dressing dry and intact. Bilateral extremities equal in strength. Discharge instructions reviewed with patient or responsible adult, signed and copy given. All home medications have been reviewed. All questions answered and patient or responsible adult verbalized understanding.   PAIN LEVEL AT DISCHARGE ___0__

## 2019-07-05 ENCOUNTER — OFFICE VISIT (OUTPATIENT)
Dept: FAMILY MEDICINE CLINIC | Age: 59
End: 2019-07-05
Payer: COMMERCIAL

## 2019-07-05 VITALS
DIASTOLIC BLOOD PRESSURE: 68 MMHG | HEIGHT: 63 IN | BODY MASS INDEX: 30.83 KG/M2 | SYSTOLIC BLOOD PRESSURE: 126 MMHG | WEIGHT: 174 LBS

## 2019-07-05 DIAGNOSIS — M54.16 LUMBAR RADICULOPATHY: ICD-10-CM

## 2019-07-05 DIAGNOSIS — F41.9 ANXIETY: ICD-10-CM

## 2019-07-05 DIAGNOSIS — M17.11 PRIMARY OSTEOARTHRITIS OF RIGHT KNEE: ICD-10-CM

## 2019-07-05 DIAGNOSIS — M48.061 SPINAL STENOSIS OF LUMBAR REGION WITHOUT NEUROGENIC CLAUDICATION: ICD-10-CM

## 2019-07-05 PROCEDURE — 99214 OFFICE O/P EST MOD 30 MIN: CPT | Performed by: FAMILY MEDICINE

## 2019-07-05 PROCEDURE — 20610 DRAIN/INJ JOINT/BURSA W/O US: CPT | Performed by: FAMILY MEDICINE

## 2019-07-05 RX ORDER — TIZANIDINE 4 MG/1
TABLET ORAL
Qty: 40 TABLET | Refills: 2 | Status: SHIPPED | OUTPATIENT
Start: 2019-07-05 | End: 2019-08-20 | Stop reason: SDUPTHER

## 2019-07-05 RX ORDER — METHYLPREDNISOLONE ACETATE 80 MG/ML
80 INJECTION, SUSPENSION INTRA-ARTICULAR; INTRALESIONAL; INTRAMUSCULAR; SOFT TISSUE ONCE
Status: COMPLETED | OUTPATIENT
Start: 2019-07-05 | End: 2019-07-05

## 2019-07-05 RX ORDER — HYDROCODONE BITARTRATE AND ACETAMINOPHEN 7.5; 325 MG/1; MG/1
1 TABLET ORAL EVERY 6 HOURS PRN
Qty: 120 TABLET | Refills: 0 | Status: SHIPPED | OUTPATIENT
Start: 2019-07-12 | End: 2019-07-05 | Stop reason: SDUPTHER

## 2019-07-05 RX ORDER — LORAZEPAM 1 MG/1
TABLET ORAL
Status: CANCELLED | OUTPATIENT
Start: 2019-07-05

## 2019-07-05 RX ORDER — LORAZEPAM 1 MG/1
TABLET ORAL
Qty: 90 TABLET | Refills: 2 | Status: SHIPPED | OUTPATIENT
Start: 2019-07-18 | End: 2019-08-01

## 2019-07-05 RX ORDER — GABAPENTIN 300 MG/1
CAPSULE ORAL
Qty: 90 CAPSULE | Refills: 2 | Status: CANCELLED | OUTPATIENT
Start: 2019-07-05 | End: 2019-08-08

## 2019-07-05 RX ORDER — LORAZEPAM 1 MG/1
TABLET ORAL
COMMUNITY
Start: 2019-06-17 | End: 2019-07-30 | Stop reason: SDUPTHER

## 2019-07-05 RX ORDER — HYDROCODONE BITARTRATE AND ACETAMINOPHEN 7.5; 325 MG/1; MG/1
1 TABLET ORAL EVERY 6 HOURS PRN
Qty: 120 TABLET | Refills: 0 | Status: ON HOLD | OUTPATIENT
Start: 2019-09-10 | End: 2019-10-15 | Stop reason: HOSPADM

## 2019-07-05 RX ORDER — HYDROCODONE BITARTRATE AND ACETAMINOPHEN 7.5; 325 MG/1; MG/1
1 TABLET ORAL EVERY 6 HOURS PRN
Qty: 120 TABLET | Refills: 0 | Status: SHIPPED | OUTPATIENT
Start: 2019-08-11 | End: 2019-07-05 | Stop reason: SDUPTHER

## 2019-07-05 RX ADMIN — METHYLPREDNISOLONE ACETATE 80 MG: 80 INJECTION, SUSPENSION INTRA-ARTICULAR; INTRALESIONAL; INTRAMUSCULAR; SOFT TISSUE at 09:17

## 2019-07-05 ASSESSMENT — ENCOUNTER SYMPTOMS
BACK PAIN: 1
RESPIRATORY NEGATIVE: 1

## 2019-07-05 NOTE — PROGRESS NOTES
Subjective:      Patient ID: Gini Solis is a 62 y.o. female. Knee Pain    The incident occurred more than 1 week ago. There was no injury mechanism. The pain is present in the right knee. The quality of the pain is described as aching and shooting. The pain is at a severity of 9/10. The pain is severe. The pain has been fluctuating since onset. Associated symptoms include an inability to bear weight. She reports no foreign bodies present. The symptoms are aggravated by movement and palpation. She has tried acetaminophen, non-weight bearing, NSAIDs and rest for the symptoms. The treatment provided no relief. Back Pain   This is a chronic problem. The current episode started more than 1 year ago. The problem occurs constantly. The problem has been gradually worsening since onset. The pain is present in the lumbar spine. The quality of the pain is described as aching and shooting. The pain radiates to the left thigh and right thigh. The pain is at a severity of 8/10. The pain is moderate. The symptoms are aggravated by standing and twisting. Associated symptoms include weakness. She has tried heat, home exercises, analgesics, muscle relaxant and NSAIDs for the symptoms. The treatment provided mild relief. Anxiety  She needs refill on her ativan she takes prn   meds help   No side effects  Takes prn  Review of Systems   Constitutional: Positive for activity change and fatigue. Respiratory: Negative. Musculoskeletal: Positive for arthralgias, back pain, joint swelling and myalgias. Neurological: Positive for weakness. Psychiatric/Behavioral: Positive for agitation, decreased concentration and sleep disturbance. The patient is nervous/anxious.       YOB: 1960    Date of Visit:  7/5/2019    Allergies   Allergen Reactions    Penicillins        Outpatient Medications Marked as Taking for the 7/5/19 encounter (Office Visit) with Rikki Mckeon, DO   Medication Sig Dispense Refill    person, place, and time. She has normal reflexes. No cranial nerve deficit. Psychiatric: She has a normal mood and affect. Her behavior is normal. Judgment and thought content normal.   Nursing note and vitals reviewed. Assessment:      Assessment/plan;  Denise Cristina was seen today for medication check, medication refill and injections. Diagnoses and all orders for this visit:    Lumbar radiculopathy    Primary osteoarthritis of right knee  -     Discontinue: HYDROcodone-acetaminophen (NORCO) 7.5-325 MG per tablet; Take 1 tablet by mouth every 6 hours as needed for Pain for up to 30 days. -     methylPREDNISolone acetate (DEPO-MEDROL) injection 80 mg  -     Discontinue: HYDROcodone-acetaminophen (NORCO) 7.5-325 MG per tablet; Take 1 tablet by mouth every 6 hours as needed for Pain for up to 30 days.  -     HYDROcodone-acetaminophen (NORCO) 7.5-325 MG per tablet; Take 1 tablet by mouth every 6 hours as needed for Pain for up to 30 days. -     tiZANidine (ZANAFLEX) 4 MG tablet; TAKE ONE TABLET BY MOUTH EVERY 6 HOURS AS NEEDED FOR MUSCLE SPASMS  -     20610 - FL DRAIN/INJECT LARGE JOINT/BURSA    Spinal stenosis of lumbar region without neurogenic claudication  -     Discontinue: HYDROcodone-acetaminophen (NORCO) 7.5-325 MG per tablet; Take 1 tablet by mouth every 6 hours as needed for Pain for up to 30 days. -     Discontinue: HYDROcodone-acetaminophen (NORCO) 7.5-325 MG per tablet; Take 1 tablet by mouth every 6 hours as needed for Pain for up to 30 days.  -     HYDROcodone-acetaminophen (NORCO) 7.5-325 MG per tablet; Take 1 tablet by mouth every 6 hours as needed for Pain for up to 30 days. Anxiety  -     LORazepam (ATIVAN) 1 MG tablet; One tid prn      No follow-ups on file.     Ana NapaDO david

## 2019-07-10 ENCOUNTER — TELEPHONE (OUTPATIENT)
Dept: FAMILY MEDICINE CLINIC | Age: 59
End: 2019-07-10

## 2019-07-10 DIAGNOSIS — F32.9 REACTIVE DEPRESSION: ICD-10-CM

## 2019-07-10 DIAGNOSIS — F41.9 ANXIETY: ICD-10-CM

## 2019-07-10 RX ORDER — CITALOPRAM 40 MG/1
TABLET ORAL
Qty: 30 TABLET | Refills: 5 | Status: SHIPPED | OUTPATIENT
Start: 2019-07-10 | End: 2020-01-11

## 2019-07-19 VITALS — HEIGHT: 63 IN | WEIGHT: 173.94 LBS | BODY MASS INDEX: 30.82 KG/M2 | HEART RATE: 76 BPM | RESPIRATION RATE: 16 BRPM

## 2019-07-30 ENCOUNTER — APPOINTMENT (OUTPATIENT)
Dept: CT IMAGING | Age: 59
DRG: 683 | End: 2019-07-30
Payer: COMMERCIAL

## 2019-07-30 ENCOUNTER — TELEPHONE (OUTPATIENT)
Dept: FAMILY MEDICINE CLINIC | Age: 59
End: 2019-07-30

## 2019-07-30 ENCOUNTER — HOSPITAL ENCOUNTER (INPATIENT)
Age: 59
LOS: 2 days | Discharge: HOME | DRG: 683 | End: 2019-08-01
Attending: EMERGENCY MEDICINE | Admitting: INTERNAL MEDICINE
Payer: COMMERCIAL

## 2019-07-30 ENCOUNTER — APPOINTMENT (OUTPATIENT)
Dept: GENERAL RADIOLOGY | Age: 59
DRG: 683 | End: 2019-07-30
Payer: COMMERCIAL

## 2019-07-30 DIAGNOSIS — R53.1 GENERAL WEAKNESS: ICD-10-CM

## 2019-07-30 DIAGNOSIS — R00.1 SINUS BRADYCARDIA: ICD-10-CM

## 2019-07-30 DIAGNOSIS — R53.83 FATIGUE, UNSPECIFIED TYPE: ICD-10-CM

## 2019-07-30 DIAGNOSIS — R55 SYNCOPE AND COLLAPSE: Primary | ICD-10-CM

## 2019-07-30 DIAGNOSIS — N28.9 ACUTE KIDNEY INSUFFICIENCY: ICD-10-CM

## 2019-07-30 DIAGNOSIS — F51.01 PRIMARY INSOMNIA: ICD-10-CM

## 2019-07-30 DIAGNOSIS — R42 DIZZINESS: ICD-10-CM

## 2019-07-30 DIAGNOSIS — I95.9 HYPOTENSION, UNSPECIFIED HYPOTENSION TYPE: ICD-10-CM

## 2019-07-30 DIAGNOSIS — F12.90 MARIJUANA USE: ICD-10-CM

## 2019-07-30 PROBLEM — N17.9 AKI (ACUTE KIDNEY INJURY) (HCC): Status: ACTIVE | Noted: 2019-07-30

## 2019-07-30 LAB
A/G RATIO: 1.4 (ref 1.1–2.2)
ACETAMINOPHEN LEVEL: <5 UG/ML (ref 10–30)
ALBUMIN SERPL-MCNC: 3.6 G/DL (ref 3.4–5)
ALP BLD-CCNC: 120 U/L (ref 40–129)
ALT SERPL-CCNC: 7 U/L (ref 10–40)
AMMONIA: 16 UMOL/L (ref 11–51)
AMPHETAMINE SCREEN, URINE: ABNORMAL
ANION GAP SERPL CALCULATED.3IONS-SCNC: 10 MMOL/L (ref 3–16)
AST SERPL-CCNC: 8 U/L (ref 15–37)
BACTERIA: ABNORMAL /HPF
BARBITURATE SCREEN URINE: ABNORMAL
BASOPHILS ABSOLUTE: 0.1 K/UL (ref 0–0.2)
BASOPHILS RELATIVE PERCENT: 0.8 %
BENZODIAZEPINE SCREEN, URINE: ABNORMAL
BILIRUB SERPL-MCNC: <0.2 MG/DL (ref 0–1)
BILIRUBIN URINE: ABNORMAL
BLOOD, URINE: NEGATIVE
BUN BLDV-MCNC: 27 MG/DL (ref 7–20)
CALCIUM SERPL-MCNC: 8 MG/DL (ref 8.3–10.6)
CANNABINOID SCREEN URINE: POSITIVE
CHLORIDE BLD-SCNC: 110 MMOL/L (ref 99–110)
CLARITY: ABNORMAL
CO2: 20 MMOL/L (ref 21–32)
COCAINE METABOLITE SCREEN URINE: ABNORMAL
COLOR: YELLOW
CREAT SERPL-MCNC: 1.9 MG/DL (ref 0.6–1.1)
EKG ATRIAL RATE: 53 BPM
EKG DIAGNOSIS: NORMAL
EKG P AXIS: 41 DEGREES
EKG P-R INTERVAL: 160 MS
EKG Q-T INTERVAL: 502 MS
EKG QRS DURATION: 86 MS
EKG QTC CALCULATION (BAZETT): 471 MS
EKG R AXIS: 12 DEGREES
EKG T AXIS: 58 DEGREES
EKG VENTRICULAR RATE: 53 BPM
EOSINOPHILS ABSOLUTE: 0.1 K/UL (ref 0–0.6)
EOSINOPHILS RELATIVE PERCENT: 1.9 %
EPITHELIAL CELLS, UA: 10 /HPF (ref 0–5)
ETHANOL: NORMAL MG/DL (ref 0–0.08)
GFR AFRICAN AMERICAN: 33
GFR NON-AFRICAN AMERICAN: 27
GLOBULIN: 2.6 G/DL
GLUCOSE BLD-MCNC: 78 MG/DL (ref 70–99)
GLUCOSE URINE: NEGATIVE MG/DL
HCT VFR BLD CALC: 29.5 % (ref 36–48)
HEMOGLOBIN: 9.2 G/DL (ref 12–16)
HYALINE CASTS: 4 /LPF (ref 0–8)
KETONES, URINE: NEGATIVE MG/DL
LEUKOCYTE ESTERASE, URINE: ABNORMAL
LYMPHOCYTES ABSOLUTE: 2.4 K/UL (ref 1–5.1)
LYMPHOCYTES RELATIVE PERCENT: 33.4 %
Lab: ABNORMAL
MAGNESIUM: 2.4 MG/DL (ref 1.8–2.4)
MCH RBC QN AUTO: 29.1 PG (ref 26–34)
MCHC RBC AUTO-ENTMCNC: 31.3 G/DL (ref 31–36)
MCV RBC AUTO: 93 FL (ref 80–100)
METHADONE SCREEN, URINE: ABNORMAL
MICROSCOPIC EXAMINATION: YES
MONOCYTES ABSOLUTE: 0.7 K/UL (ref 0–1.3)
MONOCYTES RELATIVE PERCENT: 9.8 %
NEUTROPHILS ABSOLUTE: 3.8 K/UL (ref 1.7–7.7)
NEUTROPHILS RELATIVE PERCENT: 54.1 %
NITRITE, URINE: NEGATIVE
OPIATE SCREEN URINE: ABNORMAL
OXYCODONE URINE: ABNORMAL
PDW BLD-RTO: 16.8 % (ref 12.4–15.4)
PH UA: 5
PH UA: 5 (ref 5–8)
PHENCYCLIDINE SCREEN URINE: ABNORMAL
PLATELET # BLD: 223 K/UL (ref 135–450)
PMV BLD AUTO: 8.8 FL (ref 5–10.5)
POTASSIUM SERPL-SCNC: 4.4 MMOL/L (ref 3.5–5.1)
PRO-BNP: 835 PG/ML (ref 0–124)
PROPOXYPHENE SCREEN: ABNORMAL
PROTEIN UA: NEGATIVE MG/DL
RBC # BLD: 3.18 M/UL (ref 4–5.2)
RBC UA: 4 /HPF (ref 0–4)
SALICYLATE, SERUM: <0.3 MG/DL (ref 15–30)
SODIUM BLD-SCNC: 140 MMOL/L (ref 136–145)
SPECIFIC GRAVITY UA: 1.02 (ref 1–1.03)
TOTAL PROTEIN: 6.2 G/DL (ref 6.4–8.2)
TROPONIN: <0.01 NG/ML
TSH REFLEX: 0.57 UIU/ML (ref 0.27–4.2)
URINE REFLEX TO CULTURE: YES
URINE TYPE: ABNORMAL
UROBILINOGEN, URINE: 0.2 E.U./DL
VITAMIN D 25-HYDROXY: 13.7 NG/ML
WBC # BLD: 7.1 K/UL (ref 4–11)
WBC UA: 7 /HPF (ref 0–5)

## 2019-07-30 PROCEDURE — 80307 DRUG TEST PRSMV CHEM ANLYZR: CPT

## 2019-07-30 PROCEDURE — 94760 N-INVAS EAR/PLS OXIMETRY 1: CPT

## 2019-07-30 PROCEDURE — 71045 X-RAY EXAM CHEST 1 VIEW: CPT

## 2019-07-30 PROCEDURE — 70450 CT HEAD/BRAIN W/O DYE: CPT

## 2019-07-30 PROCEDURE — 85025 COMPLETE CBC W/AUTO DIFF WBC: CPT

## 2019-07-30 PROCEDURE — 6370000000 HC RX 637 (ALT 250 FOR IP): Performed by: INTERNAL MEDICINE

## 2019-07-30 PROCEDURE — 2580000003 HC RX 258: Performed by: PHYSICIAN ASSISTANT

## 2019-07-30 PROCEDURE — 99291 CRITICAL CARE FIRST HOUR: CPT

## 2019-07-30 PROCEDURE — 87086 URINE CULTURE/COLONY COUNT: CPT

## 2019-07-30 PROCEDURE — 93010 ELECTROCARDIOGRAM REPORT: CPT | Performed by: INTERNAL MEDICINE

## 2019-07-30 PROCEDURE — 2580000003 HC RX 258: Performed by: INTERNAL MEDICINE

## 2019-07-30 PROCEDURE — G0480 DRUG TEST DEF 1-7 CLASSES: HCPCS

## 2019-07-30 PROCEDURE — 83735 ASSAY OF MAGNESIUM: CPT

## 2019-07-30 PROCEDURE — 83935 ASSAY OF URINE OSMOLALITY: CPT

## 2019-07-30 PROCEDURE — 96360 HYDRATION IV INFUSION INIT: CPT

## 2019-07-30 PROCEDURE — 82306 VITAMIN D 25 HYDROXY: CPT

## 2019-07-30 PROCEDURE — 82140 ASSAY OF AMMONIA: CPT

## 2019-07-30 PROCEDURE — 82570 ASSAY OF URINE CREATININE: CPT

## 2019-07-30 PROCEDURE — 2060000000 HC ICU INTERMEDIATE R&B

## 2019-07-30 PROCEDURE — 96361 HYDRATE IV INFUSION ADD-ON: CPT

## 2019-07-30 PROCEDURE — 81001 URINALYSIS AUTO W/SCOPE: CPT

## 2019-07-30 PROCEDURE — 93005 ELECTROCARDIOGRAM TRACING: CPT | Performed by: EMERGENCY MEDICINE

## 2019-07-30 PROCEDURE — 36415 COLL VENOUS BLD VENIPUNCTURE: CPT

## 2019-07-30 PROCEDURE — 80053 COMPREHEN METABOLIC PANEL: CPT

## 2019-07-30 PROCEDURE — 84300 ASSAY OF URINE SODIUM: CPT

## 2019-07-30 PROCEDURE — 83880 ASSAY OF NATRIURETIC PEPTIDE: CPT

## 2019-07-30 PROCEDURE — 84484 ASSAY OF TROPONIN QUANT: CPT

## 2019-07-30 PROCEDURE — 84443 ASSAY THYROID STIM HORMONE: CPT

## 2019-07-30 RX ORDER — SODIUM CHLORIDE 9 MG/ML
INJECTION, SOLUTION INTRAVENOUS CONTINUOUS
Status: DISCONTINUED | OUTPATIENT
Start: 2019-07-30 | End: 2019-07-31

## 2019-07-30 RX ORDER — ACETAMINOPHEN 325 MG/1
650 TABLET ORAL EVERY 4 HOURS PRN
Status: DISCONTINUED | OUTPATIENT
Start: 2019-07-30 | End: 2019-08-01 | Stop reason: HOSPADM

## 2019-07-30 RX ORDER — ONDANSETRON 2 MG/ML
4 INJECTION INTRAMUSCULAR; INTRAVENOUS EVERY 6 HOURS PRN
Status: DISCONTINUED | OUTPATIENT
Start: 2019-07-30 | End: 2019-08-01 | Stop reason: HOSPADM

## 2019-07-30 RX ORDER — 0.9 % SODIUM CHLORIDE 0.9 %
1000 INTRAVENOUS SOLUTION INTRAVENOUS ONCE
Status: COMPLETED | OUTPATIENT
Start: 2019-07-30 | End: 2019-07-30

## 2019-07-30 RX ORDER — SODIUM CHLORIDE 0.9 % (FLUSH) 0.9 %
10 SYRINGE (ML) INJECTION PRN
Status: DISCONTINUED | OUTPATIENT
Start: 2019-07-30 | End: 2019-08-01 | Stop reason: HOSPADM

## 2019-07-30 RX ORDER — SODIUM CHLORIDE 0.9 % (FLUSH) 0.9 %
10 SYRINGE (ML) INJECTION EVERY 12 HOURS SCHEDULED
Status: DISCONTINUED | OUTPATIENT
Start: 2019-07-30 | End: 2019-08-01 | Stop reason: HOSPADM

## 2019-07-30 RX ORDER — HYDROCODONE BITARTRATE AND ACETAMINOPHEN 7.5; 325 MG/1; MG/1
1 TABLET ORAL EVERY 6 HOURS PRN
Status: DISCONTINUED | OUTPATIENT
Start: 2019-07-30 | End: 2019-08-01 | Stop reason: HOSPADM

## 2019-07-30 RX ORDER — CITALOPRAM 20 MG/1
40 TABLET ORAL DAILY
Status: DISCONTINUED | OUTPATIENT
Start: 2019-07-30 | End: 2019-08-01 | Stop reason: HOSPADM

## 2019-07-30 RX ORDER — FAMOTIDINE 20 MG/1
20 TABLET, FILM COATED ORAL 2 TIMES DAILY
Status: DISCONTINUED | OUTPATIENT
Start: 2019-07-30 | End: 2019-08-01 | Stop reason: HOSPADM

## 2019-07-30 RX ORDER — LORAZEPAM 1 MG/1
1 TABLET ORAL 3 TIMES DAILY PRN
Status: DISCONTINUED | OUTPATIENT
Start: 2019-07-30 | End: 2019-08-01 | Stop reason: HOSPADM

## 2019-07-30 RX ORDER — 0.9 % SODIUM CHLORIDE 0.9 %
1500 INTRAVENOUS SOLUTION INTRAVENOUS ONCE
Status: COMPLETED | OUTPATIENT
Start: 2019-07-30 | End: 2019-07-30

## 2019-07-30 RX ADMIN — SODIUM CHLORIDE 1000 ML: 9 INJECTION, SOLUTION INTRAVENOUS at 12:30

## 2019-07-30 RX ADMIN — LORAZEPAM 1 MG: 1 TABLET ORAL at 22:19

## 2019-07-30 RX ADMIN — SODIUM CHLORIDE 1500 ML: 9 INJECTION, SOLUTION INTRAVENOUS at 14:35

## 2019-07-30 RX ADMIN — SODIUM CHLORIDE: 9 INJECTION, SOLUTION INTRAVENOUS at 16:00

## 2019-07-30 RX ADMIN — FAMOTIDINE 20 MG: 20 TABLET ORAL at 20:23

## 2019-07-30 ASSESSMENT — PAIN DESCRIPTION - LOCATION: LOCATION: KNEE

## 2019-07-30 ASSESSMENT — ENCOUNTER SYMPTOMS
WHEEZING: 0
BACK PAIN: 0
COLOR CHANGE: 0
STRIDOR: 0
NAUSEA: 0
CONSTIPATION: 0
VOMITING: 0
ABDOMINAL PAIN: 0
COUGH: 0
ABDOMINAL DISTENTION: 0
DIARRHEA: 0
SHORTNESS OF BREATH: 0

## 2019-07-30 ASSESSMENT — PAIN SCALES - GENERAL
PAINLEVEL_OUTOF10: 5
PAINLEVEL_OUTOF10: 0

## 2019-07-30 ASSESSMENT — PAIN DESCRIPTION - ORIENTATION: ORIENTATION: LEFT

## 2019-07-30 NOTE — H&P
MOUTH EVERY 6 HOURS AS NEEDED FOR MUSCLE SPASMS 7/5/19  Yes Demetrice Saldaña DO   zolpidem (AMBIEN) 10 MG tablet TAKE ONE TABLET BY MOUTH EVERY NIGHT AT BEDTIME 7/1/19 7/31/19 Yes Demetrice Saldaña DO   diclofenac sodium 1 % GEL APPLY FOUR GRAMS TO THE SKIN FOUR TIMES A DAY 6/7/19  Yes Arianna Goetz MD   cloNIDine (CATAPRES) 0.2 MG tablet TAKE ONE TABLET BY MOUTH TWICE A DAY 6/2/19  Yes Demetrice Saldaña DO   NIFEdipine (PROCARDIA XL) 60 MG extended release tablet TAKE ONE TABLET BY MOUTH DAILY 5/3/19  Yes Demetrice Saldaña DO   propranolol (INDERAL LA) 160 MG extended release capsule TAKE ONE CAPSULE BY MOUTH DAILY 5/3/19  Yes Demetrice Saldaña DO   lisinopril (PRINIVIL;ZESTRIL) 20 MG tablet TAKE ONE TABLET BY MOUTH TWICE A DAY 4/19/18  Yes Demetrice Saldaña DO       Allergies:  Penicillins    Social History:      The patient currently lives by herself and uses cane for walking. TOBACCO:   reports that she has quit smoking. Her smoking use included cigarettes. She smoked 0.25 packs per day. She has never used smokeless tobacco.  ETOH:   reports that she drank alcohol. Family History:       Reviewed in detail and negative for DM, CAD, Cancer, CVA. Positive as follows:    History reviewed. No pertinent family history. REVIEW OF SYSTEMS:   Pertinent positives as noted in the HPI. All other systems reviewed and negative. PHYSICAL EXAM PERFORMED:    BP (!) 99/53   Pulse (!) 49   Temp 97.3 °F (36.3 °C)   Resp 17   Ht 5' 3\" (1.6 m)   Wt 175 lb (79.4 kg)   SpO2 98%   BMI 31.00 kg/m²     General appearance:  No apparent distress, appears stated age and cooperative. HEENT:  Normal cephalic, atraumatic without obvious deformity. Pupils equal, round, and reactive to light. Extra ocular muscles intact. Conjunctivae/corneas clear. Neck: Supple, with full range of motion. No jugular venous distention. Trachea midline. Respiratory:  Normal respiratory effort.  Clear to auscultation, bilaterally without

## 2019-07-30 NOTE — ED PROVIDER NOTES
any injury with this. She is neurologically intact despite her drowsy state. NIH scale 0. CT head shows no acute intracranial abnormality. EKG is showing sinus bradycardia. Does have acute kidney insufficiency without any significant metabolic disturbance. She is hypotensive throughout stay here. Does appear to be responding to the fluids. The bradycardia and hypotension may be causing some of her fatigue and lightheadedness and syncopal episodes. We do feel admission is warranted for further evaluation. Patient and family understand and agree with plan. My suspicion is low for postconcussive syndrome, skull or facial fracture, pneumonia, pneumothorax, intentional drug overdose, posterior stroke, cerebellar compromise, carotid dissection, sinus abscess, acute fracture, acute CVA, ICH, SAH, TIA, meningitis, encephalitis, pseudotumor cerebri, temporal arteritis, sentinel bleed from ruptured aneurysm, hypertensive urgency or emergency, subdural hematoma, epidural hematoma, ACS, PE, myocarditis, pericarditis, endocarditis, acute pulmonary edema, pleural effusion, pericardial effusion, cardiac tamponade, cardiomyopathy, CHF exacerbation, thoracic aortic dissection, esophageal rupture, other life-threatening arrhythmia, hypertensive urgency or emergency, hemothorax, pulmonary contusion, subcutaneous emphysema, flail chest, pneumo mediastinum, rib fracture, hypoglycemia, dka, sepsis, or other concerning pathology. We have addressed concerns and expectations. FINAL IMPRESSION      1. Syncope and collapse    2. Dizziness    3. Hypotension, unspecified hypotension type    4. General weakness    5. Fatigue, unspecified type    6. Acute kidney insufficiency    7. Marijuana use    8. Sinus bradycardia          DISPOSITION/PLAN   DISPOSITION Decision To Admit 07/30/2019 02:22:07 PM      PATIENT REFERREDTO:  No follow-up provider specified.     DISCHARGE MEDICATIONS:  New Prescriptions    No medications on file

## 2019-07-30 NOTE — ED NOTES
[DISCONTINUED] gabapentin (NEURONTIN) 300 MG capsule TAKE ONE CAPSULE BY MOUTH EVERY MORNING AND TAKE TWO CAPSULES BY MOUTH EVERY NIGHT AT BEDTIME 90 capsule 2

## 2019-07-30 NOTE — CONSULTS
07/30/19  1153   TROPONINI <0.01     BNP: No results for input(s): BNP in the last 72 hours. Lipids: No results for input(s): CHOL, TRIG, HDL, LDLCALC, LABVLDL in the last 72 hours. ABGs: No results for input(s): PHART, PO2ART, TIB7WGQ in the last 72 hours. INR: No results for input(s): INR in the last 72 hours. UA:  Recent Labs     07/30/19  1153   COLORU YELLOW   CLARITYU CLOUDY*   GLUCOSEU Negative   BILIRUBINUR SMALL*   KETUA Negative   SPECGRAV 1.022   BLOODU Negative   PHUR 5.0   PROTEINU Negative   UROBILINOGEN 0.2   NITRU Negative   LEUKOCYTESUR SMALL*   LABMICR YES   URINETYPE Not Specified      Urine Microscopic: Recent Labs     07/30/19  1153   BACTERIA 1+*   HYALCAST 4   WBCUA 7*   RBCUA 4   EPIU 10*     Urine Culture: No results for input(s): LABURIN in the last 72 hours. Urine Chemistry: No results for input(s): Eula Copping, PROTEINUR, NAUR in the last 72 hours. IMAGING:  CT HEAD WO CONTRAST   Final Result   No acute intracranial abnormality. XR CHEST PORTABLE   Final Result   No acute process. No intake/output data recorded. Assessment/Plan :      1.  ANALY likely ATN from hemodynamic changes   Also on ACEi. Hold for now   Give gentle fluids for today. monitor volume status   Getting ECHO   Recommend to dose adjust all medications  based on renal functions  AVOID NSAIDs  Avoid Nephrotoxins  Monitor I/O      2. Hypotension. Hold BP meds     3. Anemia. Check iron studies     4. Metabolic acidosis. 2/2 ANALY. 5. Electrolytes.  Monitor closely       D/w Dr. Calvin Anderson            Thank you for allowing us to participate in care of Ryan Serrano         Electronically signed by: Ekaterina Rogel MD, 7/30/2019, 6:36 PM      [facility] [address]  Office : [phone]  Fax :[phone]

## 2019-07-31 LAB
ANION GAP SERPL CALCULATED.3IONS-SCNC: 9 MMOL/L (ref 3–16)
BUN BLDV-MCNC: 21 MG/DL (ref 7–20)
CALCIUM SERPL-MCNC: 8.4 MG/DL (ref 8.3–10.6)
CHLORIDE BLD-SCNC: 111 MMOL/L (ref 99–110)
CO2: 21 MMOL/L (ref 21–32)
CREAT SERPL-MCNC: 1.5 MG/DL (ref 0.6–1.1)
CREATININE URINE: 47.5 MG/DL (ref 28–259)
FERRITIN: 12.2 NG/ML (ref 15–150)
GFR AFRICAN AMERICAN: 43
GFR NON-AFRICAN AMERICAN: 36
GLUCOSE BLD-MCNC: 91 MG/DL (ref 70–99)
HCT VFR BLD CALC: 32.6 % (ref 36–48)
HEMOGLOBIN: 10.4 G/DL (ref 12–16)
IRON SATURATION: 12 % (ref 15–50)
IRON: 41 UG/DL (ref 37–145)
LEFT VENTRICULAR EJECTION FRACTION HIGH VALUE: 70 %
LEFT VENTRICULAR EJECTION FRACTION MODE: NORMAL
LV EF: 65 %
LV EF: 68 %
LVEF MODALITY: NORMAL
MCH RBC QN AUTO: 29.2 PG (ref 26–34)
MCHC RBC AUTO-ENTMCNC: 31.9 G/DL (ref 31–36)
MCV RBC AUTO: 91.6 FL (ref 80–100)
OSMOLALITY URINE: 299 MOSM/KG (ref 390–1070)
PDW BLD-RTO: 16.3 % (ref 12.4–15.4)
PLATELET # BLD: 204 K/UL (ref 135–450)
PMV BLD AUTO: 8.7 FL (ref 5–10.5)
POTASSIUM REFLEX MAGNESIUM: 4.9 MMOL/L (ref 3.5–5.1)
RBC # BLD: 3.55 M/UL (ref 4–5.2)
SODIUM BLD-SCNC: 141 MMOL/L (ref 136–145)
SODIUM URINE: 75 MMOL/L
TOTAL IRON BINDING CAPACITY: 348 UG/DL (ref 260–445)
URINE CULTURE, ROUTINE: NORMAL
WBC # BLD: 5.2 K/UL (ref 4–11)

## 2019-07-31 PROCEDURE — 97165 OT EVAL LOW COMPLEX 30 MIN: CPT

## 2019-07-31 PROCEDURE — 97116 GAIT TRAINING THERAPY: CPT

## 2019-07-31 PROCEDURE — 80048 BASIC METABOLIC PNL TOTAL CA: CPT

## 2019-07-31 PROCEDURE — 97161 PT EVAL LOW COMPLEX 20 MIN: CPT

## 2019-07-31 PROCEDURE — 6370000000 HC RX 637 (ALT 250 FOR IP): Performed by: INTERNAL MEDICINE

## 2019-07-31 PROCEDURE — 6360000002 HC RX W HCPCS: Performed by: INTERNAL MEDICINE

## 2019-07-31 PROCEDURE — 2060000000 HC ICU INTERMEDIATE R&B

## 2019-07-31 PROCEDURE — 83540 ASSAY OF IRON: CPT

## 2019-07-31 PROCEDURE — 97530 THERAPEUTIC ACTIVITIES: CPT

## 2019-07-31 PROCEDURE — 82728 ASSAY OF FERRITIN: CPT

## 2019-07-31 PROCEDURE — 2580000003 HC RX 258: Performed by: INTERNAL MEDICINE

## 2019-07-31 PROCEDURE — 93306 TTE W/DOPPLER COMPLETE: CPT

## 2019-07-31 PROCEDURE — 85027 COMPLETE CBC AUTOMATED: CPT

## 2019-07-31 PROCEDURE — 83550 IRON BINDING TEST: CPT

## 2019-07-31 PROCEDURE — 36415 COLL VENOUS BLD VENIPUNCTURE: CPT

## 2019-07-31 RX ORDER — ZOLPIDEM TARTRATE 5 MG/1
5 TABLET ORAL NIGHTLY PRN
Status: DISCONTINUED | OUTPATIENT
Start: 2019-07-31 | End: 2019-08-01 | Stop reason: HOSPADM

## 2019-07-31 RX ORDER — NIFEDIPINE 30 MG/1
60 TABLET, EXTENDED RELEASE ORAL DAILY
Status: DISCONTINUED | OUTPATIENT
Start: 2019-07-31 | End: 2019-08-01 | Stop reason: HOSPADM

## 2019-07-31 RX ORDER — CLONIDINE HYDROCHLORIDE 0.1 MG/1
0.2 TABLET ORAL 2 TIMES DAILY
Status: DISCONTINUED | OUTPATIENT
Start: 2019-07-31 | End: 2019-08-01 | Stop reason: HOSPADM

## 2019-07-31 RX ADMIN — FAMOTIDINE 20 MG: 20 TABLET ORAL at 09:29

## 2019-07-31 RX ADMIN — FAMOTIDINE 20 MG: 20 TABLET ORAL at 20:07

## 2019-07-31 RX ADMIN — ZOLPIDEM TARTRATE 5 MG: 5 TABLET ORAL at 20:08

## 2019-07-31 RX ADMIN — CLONIDINE HYDROCHLORIDE 0.2 MG: 0.1 TABLET ORAL at 10:23

## 2019-07-31 RX ADMIN — Medication 10 ML: at 20:08

## 2019-07-31 RX ADMIN — ACETAMINOPHEN 650 MG: 325 TABLET, FILM COATED ORAL at 10:23

## 2019-07-31 RX ADMIN — NIFEDIPINE 60 MG: 30 TABLET, FILM COATED, EXTENDED RELEASE ORAL at 10:23

## 2019-07-31 RX ADMIN — HYDROCODONE BITARTRATE AND ACETAMINOPHEN 1 TABLET: 7.5; 325 TABLET ORAL at 23:27

## 2019-07-31 RX ADMIN — ENOXAPARIN SODIUM 40 MG: 40 INJECTION SUBCUTANEOUS at 09:29

## 2019-07-31 RX ADMIN — CITALOPRAM HYDROBROMIDE 40 MG: 20 TABLET ORAL at 09:29

## 2019-07-31 RX ADMIN — CLONIDINE HYDROCHLORIDE 0.2 MG: 0.1 TABLET ORAL at 20:08

## 2019-07-31 RX ADMIN — SODIUM CHLORIDE: 9 INJECTION, SOLUTION INTRAVENOUS at 04:50

## 2019-07-31 ASSESSMENT — PAIN DESCRIPTION - LOCATION
LOCATION: KNEE
LOCATION: BACK;KNEE
LOCATION: KNEE
LOCATION: KNEE

## 2019-07-31 ASSESSMENT — PAIN DESCRIPTION - ORIENTATION
ORIENTATION: LEFT

## 2019-07-31 ASSESSMENT — PAIN DESCRIPTION - PAIN TYPE
TYPE: CHRONIC PAIN
TYPE: CHRONIC PAIN

## 2019-07-31 ASSESSMENT — PAIN SCALES - GENERAL
PAINLEVEL_OUTOF10: 8
PAINLEVEL_OUTOF10: 0
PAINLEVEL_OUTOF10: 0
PAINLEVEL_OUTOF10: 9
PAINLEVEL_OUTOF10: 10
PAINLEVEL_OUTOF10: 7
PAINLEVEL_OUTOF10: 5

## 2019-07-31 ASSESSMENT — PAIN DESCRIPTION - DESCRIPTORS
DESCRIPTORS: CONSTANT;STABBING
DESCRIPTORS: CONSTANT;STABBING

## 2019-07-31 NOTE — FLOWSHEET NOTE
07/31/19 1304   Encounter Summary   Services provided to: Patient   Referral/Consult From: Nurse   Support System Family members   Continue Visiting   (visit, AD doc discussion w/pt 7/31 CL)   Complexity of Encounter Moderate   Length of Encounter 15 minutes   Spiritual/Denominational   Type Spiritual support   Assessment Calm; Approachable; Hopeful;Coping   Intervention Active listening;Explored feelings, thoughts, concerns; Discussed illness/injury and it's impact   Outcome Engaged in conversation;Expressed feelings/needs/concerns; Hopeful   Advance Directives (For Healthcare)   Healthcare Directive No, patient does not have an advance directive for healthcare treatment   Advance Directives Documents given;Documents explained   Electronically signed by Dakota Prince on 7/31/2019 at 1:06 PM

## 2019-07-31 NOTE — PROGRESS NOTES
Assessment complete, see doc flowsheet. Patient resting in bed, call light in reach, bed in lowest position, brake set. Patient denies any needs at this time. Patient encouraged to call if needs arise.   Vance calzada RN
Physical Therapy    Facility/Department: [address]  Initial Assessment/Discharge Summary    NAME: Blaze Murphy  : 1960  MRN: [id number]    Date of Service: 2019    Discharge Recommendations: Blaze Murphy scored a 24/24 on the AM-PAC short mobility form. At this time, no further PT is recommended upon discharge due to functioning at baseline levels. Recommend patient returns to prior setting with prior services. PT Equipment Recommendations  Equipment Needed: No    Assessment   Assessment: Pt is functioning at baseline level. No further PT services required in acute care setting. Prognosis: Excellent  Decision Making: Low Complexity  PT Education: PT Role;Plan of Care  Patient Education: d/c recommendations  Barriers to Learning: None  REQUIRES PT FOLLOW UP: No  Activity Tolerance  Activity Tolerance: Patient Tolerated treatment well       Patient Diagnosis(es): The primary encounter diagnosis was Syncope and collapse. Diagnoses of Dizziness, Hypotension, unspecified hypotension type, General weakness, Fatigue, unspecified type, Acute kidney insufficiency, Marijuana use, and Sinus bradycardia were also pertinent to this visit. has a past medical history of Anxiety, Depression, Hypertension, Insomnia, Lumbar disc herniation, and Lumbar spinal stenosis. has a past surgical history that includes Hysterectomy; Gastric bypass surgery; Cholecystectomy; fracture surgery; and lumbar nerve block (N/A, 2019). Restrictions  Restrictions/Precautions  Restrictions/Precautions: Fall Risk(High)  Required Braces or Orthoses?: No  Position Activity Restriction  Other position/activity restrictions: 62 y.o. female with history of anxiety, depression, hypertension, insomnia, chronic back pain who presents to the emergency department complaining of feeling intermittently dizzy for 3 months. She states that since this morning, she has felt very lightheaded and dizzy.   When she stands up from
injection 4 mg Q6H PRN   enoxaparin (LOVENOX) injection 40 mg Daily   0.9 % sodium chloride infusion Continuous   acetaminophen (TYLENOL) tablet 650 mg Q4H PRN   perflutren lipid microspheres (DEFINITY) injection 1.65 mg ONCE PRN   famotidine (PEPCID) tablet 20 mg BID       Review of Systems:   14 point ROS obtained but were negative except mentioned in HPI      Physical exam:     Vitals:  BP (!) 199/100   Pulse 71   Temp 98.9 °F (37.2 °C) (Oral)   Resp 16   Ht 5' 3\" (1.6 m)   Wt 174 lb 4.8 oz (79.1 kg)   SpO2 96%   BMI 30.88 kg/m²   Constitutional:  OAA X3 NAD  Skin: no rash, turgor wnl  Heent:  eomi, mmm  Neck: no bruits or jvd noted  Cardiovascular:  S1, S2 without m/r/g  Respiratory: CTA B without w/r/r  Abdomen:  +bs, soft, nt, nd  Ext: no  lower extremity edema  Psychiatric: mood and affect appropriate  Musculoskeletal:  Rom, muscular strength intact    Labs:  CBC:   Recent Labs     07/30/19  1153 07/31/19  0428   WBC 7.1 5.2   HGB 9.2* 10.4*    204     BMP:    Recent Labs     07/30/19  1153 07/31/19  0428    141   K 4.4 4.9    111*   CO2 20* 21   BUN 27* 21*   CREATININE 1.9* 1.5*   GLUCOSE 78 91     Ca/Mg/Phos:   Recent Labs     07/30/19  1153 07/31/19  0428   CALCIUM 8.0* 8.4   MG 2.40  --      Hepatic:   Recent Labs     07/30/19  1153   AST 8*   ALT 7*   BILITOT <0.2   ALKPHOS 120     Troponin:   Recent Labs     07/30/19  1153   TROPONINI <0.01     BNP: No results for input(s): BNP in the last 72 hours. Lipids: No results for input(s): CHOL, TRIG, HDL, LDLCALC, LABVLDL in the last 72 hours. ABGs: No results for input(s): PHART, PO2ART, DMK2XUV in the last 72 hours. INR: No results for input(s): INR in the last 72 hours.   UA:  Recent Labs     07/30/19  1153   COLORU YELLOW   CLARITYU CLOUDY*   GLUCOSEU Negative   BILIRUBINUR SMALL*   KETUA Negative   SPECGRAV 1.022   BLOODU Negative   PHUR 5.0   PROTEINU Negative   UROBILINOGEN 0.2   NITRU Negative   LEUKOCYTESUR SMALL*
: Yes  Mode of Transportation: Car  Occupation: Full time employment  Type of occupation: [employer]  Leisure & Hobbies: watch tv, go shopping, work  Additional Comments: 7 falls in last 6 months       Objective   Vision: Impaired  Vision Exceptions: Wears glasses for reading(contacts for distance)  Hearing: Within functional limits    Orientation  Overall Orientation Status: Within Normal Limits     Balance  Sitting Balance: Independent  Standing Balance: Independent  Standing Balance  Time: ~5-7 min  Activity: ambulating from EOB, down mckeon, up/down 3 stairs, back to room to chair  Comment: no device  Functional Mobility  Functional - Mobility Device: No device  Activity: Other(down mckeon and back to chair; 3 stairs)  Assist Level: Independent  Functional Mobility Comments: No LOB or report of dizzines. Pt steady gait  ADL  LE Dressing: Independent(socks)  Additional Comments: Pt declining need for further ADLs stating she had already completed them this morning  Tone RUE  RUE Tone: Normotonic  Tone LUE  LUE Tone: Normotonic  Coordination  Movements Are Fluid And Coordinated: Yes     Bed mobility  Supine to Sit: Modified independent  Scooting: Independent  Comment: HOB elevated slightly  Transfers  Sit to stand: Independent(from EOB)  Stand to sit: Independent(to chair)  Vision - Basic Assessment  Prior Vision: Wears contacts(and glasses for reading)  Visual History: No significant visual history  Patient Visual Report: No visual complaint reported.   Visual Field Cut: Not tested  Cognition  Overall Cognitive Status: WNL  Perception  Overall Perceptual Status: WFL     Sensation  Overall Sensation Status: WNL        LUE AROM (degrees)  LUE AROM : WFL  Left Hand AROM (degrees)  Left Hand AROM: WFL  RUE AROM (degrees)  RUE AROM : WFL  Right Hand AROM (degrees)  Right Hand AROM: WFL  LUE Strength  Gross LUE Strength: WFL  RUE Strength  Gross RUE Strength: WFL                   Plan   Plan  Times per week: None: Pt is at

## 2019-08-01 VITALS
RESPIRATION RATE: 17 BRPM | DIASTOLIC BLOOD PRESSURE: 27 MMHG | HEIGHT: 63 IN | HEART RATE: 76 BPM | BODY MASS INDEX: 30.09 KG/M2 | WEIGHT: 169.8 LBS | OXYGEN SATURATION: 95 % | TEMPERATURE: 99.3 F | SYSTOLIC BLOOD PRESSURE: 146 MMHG

## 2019-08-01 PROBLEM — I95.9 HYPOTENSION: Status: ACTIVE | Noted: 2019-08-01

## 2019-08-01 LAB
ALBUMIN SERPL-MCNC: 3.5 G/DL (ref 3.4–5)
ANION GAP SERPL CALCULATED.3IONS-SCNC: 11 MMOL/L (ref 3–16)
BASOPHILS ABSOLUTE: 0 K/UL (ref 0–0.2)
BASOPHILS RELATIVE PERCENT: 1.2 %
BUN BLDV-MCNC: 17 MG/DL (ref 7–20)
CALCIUM SERPL-MCNC: 8.9 MG/DL (ref 8.3–10.6)
CHLORIDE BLD-SCNC: 107 MMOL/L (ref 99–110)
CO2: 22 MMOL/L (ref 21–32)
CREAT SERPL-MCNC: 1.4 MG/DL (ref 0.6–1.1)
EOSINOPHILS ABSOLUTE: 0.1 K/UL (ref 0–0.6)
EOSINOPHILS RELATIVE PERCENT: 3.3 %
GFR AFRICAN AMERICAN: 47
GFR NON-AFRICAN AMERICAN: 39
GLUCOSE BLD-MCNC: 97 MG/DL (ref 70–99)
HCT VFR BLD CALC: 31.3 % (ref 36–48)
HEMOGLOBIN: 10.2 G/DL (ref 12–16)
LYMPHOCYTES ABSOLUTE: 1.2 K/UL (ref 1–5.1)
LYMPHOCYTES RELATIVE PERCENT: 32.4 %
MCH RBC QN AUTO: 29.5 PG (ref 26–34)
MCHC RBC AUTO-ENTMCNC: 32.7 G/DL (ref 31–36)
MCV RBC AUTO: 90 FL (ref 80–100)
MONOCYTES ABSOLUTE: 0.4 K/UL (ref 0–1.3)
MONOCYTES RELATIVE PERCENT: 10.7 %
NEUTROPHILS ABSOLUTE: 1.9 K/UL (ref 1.7–7.7)
NEUTROPHILS RELATIVE PERCENT: 52.4 %
PDW BLD-RTO: 16 % (ref 12.4–15.4)
PHOSPHORUS: 4.1 MG/DL (ref 2.5–4.9)
PLATELET # BLD: 199 K/UL (ref 135–450)
PMV BLD AUTO: 8.4 FL (ref 5–10.5)
POTASSIUM SERPL-SCNC: 4.4 MMOL/L (ref 3.5–5.1)
RBC # BLD: 3.48 M/UL (ref 4–5.2)
SODIUM BLD-SCNC: 140 MMOL/L (ref 136–145)
WBC # BLD: 3.6 K/UL (ref 4–11)

## 2019-08-01 PROCEDURE — 80069 RENAL FUNCTION PANEL: CPT

## 2019-08-01 PROCEDURE — 2580000003 HC RX 258: Performed by: INTERNAL MEDICINE

## 2019-08-01 PROCEDURE — 6370000000 HC RX 637 (ALT 250 FOR IP): Performed by: INTERNAL MEDICINE

## 2019-08-01 PROCEDURE — 85025 COMPLETE CBC W/AUTO DIFF WBC: CPT

## 2019-08-01 PROCEDURE — 36415 COLL VENOUS BLD VENIPUNCTURE: CPT

## 2019-08-01 PROCEDURE — 94760 N-INVAS EAR/PLS OXIMETRY 1: CPT

## 2019-08-01 RX ORDER — FERROUS SULFATE 325(65) MG
324 TABLET ORAL
Status: DISCONTINUED | OUTPATIENT
Start: 2019-08-02 | End: 2019-08-01 | Stop reason: HOSPADM

## 2019-08-01 RX ADMIN — Medication 10 ML: at 08:51

## 2019-08-01 RX ADMIN — CITALOPRAM HYDROBROMIDE 40 MG: 20 TABLET ORAL at 08:50

## 2019-08-01 RX ADMIN — NIFEDIPINE 60 MG: 30 TABLET, FILM COATED, EXTENDED RELEASE ORAL at 08:51

## 2019-08-01 RX ADMIN — HYDROCODONE BITARTRATE AND ACETAMINOPHEN 1 TABLET: 7.5; 325 TABLET ORAL at 08:51

## 2019-08-01 RX ADMIN — CLONIDINE HYDROCHLORIDE 0.2 MG: 0.1 TABLET ORAL at 08:50

## 2019-08-01 RX ADMIN — FAMOTIDINE 20 MG: 20 TABLET ORAL at 08:50

## 2019-08-01 ASSESSMENT — PAIN DESCRIPTION - ORIENTATION: ORIENTATION: LOWER

## 2019-08-01 ASSESSMENT — PAIN DESCRIPTION - DESCRIPTORS: DESCRIPTORS: ACHING

## 2019-08-01 ASSESSMENT — PAIN SCALES - GENERAL
PAINLEVEL_OUTOF10: 8
PAINLEVEL_OUTOF10: 0

## 2019-08-01 ASSESSMENT — PAIN DESCRIPTION - PAIN TYPE: TYPE: CHRONIC PAIN

## 2019-08-01 ASSESSMENT — PAIN DESCRIPTION - LOCATION: LOCATION: BACK

## 2019-08-01 NOTE — DISCHARGE SUMMARY
hemorrhage, mass effect or midline shift. No abnormal extra-axial fluid collection. The gray-white differentiation is maintained without evidence of an acute infarct. There is no evidence of hydrocephalus. ORBITS: The visualized portion of the orbits demonstrate no acute abnormality. SINUSES: The visualized paranasal sinuses and mastoid air cells demonstrate no acute abnormality. SOFT TISSUES/SKULL:  No acute abnormality of the visualized skull or soft tissues. No acute intracranial abnormality. Xr Chest Portable    Result Date: 7/30/2019  EXAMINATION: ONE XRAY VIEW OF THE CHEST 7/30/2019 11:47 am COMPARISON: 02/04/2019 HISTORY: ORDERING SYSTEM PROVIDED HISTORY: syncope TECHNOLOGIST PROVIDED HISTORY: Reason for exam:->syncope Reason for Exam: Loss of Consciousness (patient reports syncopal episode x3 today and repeatedly over the last month. feels dizzy before hand. can usually sit down before she passes out but unable today. reports fuzziness in her head) Acuity: Acute Type of Exam: Initial FINDINGS: The lungs are without acute focal process. There is no effusion or pneumothorax. The cardiomediastinal silhouette is stable. The osseous structures are stable. No acute process. Discharge Medications: [facility]   Home Medication Instructions WPF:[id number]    Printed on:08/01/19 0884   Medication Information                      citalopram (CELEXA) 40 MG tablet  TAKE ONE TABLET BY MOUTH DAILY             cloNIDine (CATAPRES) 0.2 MG tablet  TAKE ONE TABLET BY MOUTH TWICE A DAY             diclofenac sodium 1 % GEL  APPLY FOUR GRAMS TO THE SKIN FOUR TIMES A DAY             HYDROcodone-acetaminophen (NORCO) 7.5-325 MG per tablet  Take 1 tablet by mouth every 6 hours as needed for Pain for up to 30 days.              LORazepam (ATIVAN) 1 MG tablet  One tid prn             NIFEdipine (PROCARDIA XL) 60 MG extended release tablet  TAKE ONE TABLET BY MOUTH DAILY             tiZANidine (Chito Able)

## 2019-08-02 ENCOUNTER — CARE COORDINATION (OUTPATIENT)
Dept: CASE MANAGEMENT | Age: 59
End: 2019-08-02

## 2019-08-02 DIAGNOSIS — I95.9 HYPOTENSION, UNSPECIFIED HYPOTENSION TYPE: Primary | ICD-10-CM

## 2019-08-02 DIAGNOSIS — F51.01 PRIMARY INSOMNIA: ICD-10-CM

## 2019-08-03 RX ORDER — HYDROXYZINE HYDROCHLORIDE 25 MG/1
TABLET, FILM COATED ORAL
Qty: 60 TABLET | Refills: 0 | Status: SHIPPED | OUTPATIENT
Start: 2019-08-03 | End: 2019-09-02 | Stop reason: SDUPTHER

## 2019-08-03 RX ORDER — ZOLPIDEM TARTRATE 10 MG/1
TABLET ORAL
Qty: 30 TABLET | Refills: 0 | Status: SHIPPED | OUTPATIENT
Start: 2019-08-03 | End: 2019-09-02

## 2019-08-05 ENCOUNTER — TELEPHONE (OUTPATIENT)
Dept: PAIN MANAGEMENT | Age: 59
End: 2019-08-05

## 2019-08-09 ENCOUNTER — OFFICE VISIT (OUTPATIENT)
Dept: FAMILY MEDICINE CLINIC | Age: 59
End: 2019-08-09
Payer: COMMERCIAL

## 2019-08-09 VITALS
SYSTOLIC BLOOD PRESSURE: 132 MMHG | WEIGHT: 168 LBS | BODY MASS INDEX: 29.77 KG/M2 | DIASTOLIC BLOOD PRESSURE: 84 MMHG | HEIGHT: 63 IN

## 2019-08-09 DIAGNOSIS — R55 SYNCOPE, UNSPECIFIED SYNCOPE TYPE: Primary | ICD-10-CM

## 2019-08-09 DIAGNOSIS — N28.9 ACUTE RENAL INSUFFICIENCY: ICD-10-CM

## 2019-08-09 PROCEDURE — 99495 TRANSJ CARE MGMT MOD F2F 14D: CPT | Performed by: FAMILY MEDICINE

## 2019-08-09 RX ORDER — CLONIDINE HYDROCHLORIDE 0.1 MG/1
0.1 TABLET ORAL 2 TIMES DAILY
Qty: 60 TABLET | Refills: 3 | Status: SHIPPED | OUTPATIENT
Start: 2019-08-09 | End: 2019-10-07

## 2019-08-09 RX ORDER — CLONIDINE HYDROCHLORIDE 0.1 MG/1
0.1 TABLET ORAL 2 TIMES DAILY
Qty: 60 TABLET | Refills: 3 | Status: SHIPPED | OUTPATIENT
Start: 2019-08-09 | End: 2019-09-30 | Stop reason: SDUPTHER

## 2019-08-11 ENCOUNTER — CARE COORDINATION (OUTPATIENT)
Dept: CASE MANAGEMENT | Age: 59
End: 2019-08-11

## 2019-08-15 ENCOUNTER — CARE COORDINATION (OUTPATIENT)
Dept: CASE MANAGEMENT | Age: 59
End: 2019-08-15

## 2019-08-16 RX ORDER — GABAPENTIN 300 MG/1
CAPSULE ORAL
Qty: 90 CAPSULE | Refills: 1 | Status: SHIPPED | OUTPATIENT
Start: 2019-08-16 | End: 2021-03-01

## 2019-08-20 DIAGNOSIS — M17.11 PRIMARY OSTEOARTHRITIS OF RIGHT KNEE: ICD-10-CM

## 2019-08-20 RX ORDER — TIZANIDINE 4 MG/1
TABLET ORAL
Qty: 40 TABLET | Refills: 1 | Status: SHIPPED | OUTPATIENT
Start: 2019-08-20 | End: 2019-09-23 | Stop reason: SDUPTHER

## 2019-08-30 DIAGNOSIS — I10 ESSENTIAL HYPERTENSION: ICD-10-CM

## 2019-08-30 RX ORDER — PROPRANOLOL HYDROCHLORIDE 160 MG/1
CAPSULE, EXTENDED RELEASE ORAL
Qty: 30 CAPSULE | Refills: 2 | Status: SHIPPED | OUTPATIENT
Start: 2019-08-30 | End: 2019-10-07

## 2019-09-02 DIAGNOSIS — F51.01 PRIMARY INSOMNIA: ICD-10-CM

## 2019-09-02 DIAGNOSIS — I10 ESSENTIAL HYPERTENSION: ICD-10-CM

## 2019-09-02 RX ORDER — ZOLPIDEM TARTRATE 10 MG/1
TABLET ORAL
Qty: 30 TABLET | Refills: 0 | Status: SHIPPED | OUTPATIENT
Start: 2019-09-02 | End: 2019-09-30 | Stop reason: SDUPTHER

## 2019-09-02 RX ORDER — HYDROXYZINE HYDROCHLORIDE 25 MG/1
TABLET, FILM COATED ORAL
Qty: 60 TABLET | Refills: 0 | Status: SHIPPED | OUTPATIENT
Start: 2019-09-02 | End: 2019-10-07

## 2019-09-02 RX ORDER — PROPRANOLOL HYDROCHLORIDE 160 MG/1
CAPSULE, EXTENDED RELEASE ORAL
Qty: 30 CAPSULE | Refills: 2 | Status: SHIPPED | OUTPATIENT
Start: 2019-09-02 | End: 2019-09-30 | Stop reason: SDUPTHER

## 2019-09-02 RX ORDER — HYDROXYZINE HYDROCHLORIDE 25 MG/1
TABLET, FILM COATED ORAL
Qty: 60 TABLET | Refills: 0 | Status: SHIPPED | OUTPATIENT
Start: 2019-09-02 | End: 2019-09-30 | Stop reason: SDUPTHER

## 2019-09-23 ENCOUNTER — OFFICE VISIT (OUTPATIENT)
Dept: ORTHOPEDIC SURGERY | Age: 59
End: 2019-09-23
Payer: COMMERCIAL

## 2019-09-23 ENCOUNTER — TELEPHONE (OUTPATIENT)
Dept: ORTHOPEDIC SURGERY | Age: 59
End: 2019-09-23

## 2019-09-23 VITALS
HEART RATE: 77 BPM | DIASTOLIC BLOOD PRESSURE: 99 MMHG | HEIGHT: 63 IN | WEIGHT: 167.99 LBS | BODY MASS INDEX: 29.77 KG/M2 | SYSTOLIC BLOOD PRESSURE: 161 MMHG

## 2019-09-23 DIAGNOSIS — M54.16 LUMBAR RADICULOPATHY: Primary | ICD-10-CM

## 2019-09-23 DIAGNOSIS — M48.061 SPINAL STENOSIS OF LUMBAR REGION, UNSPECIFIED WHETHER NEUROGENIC CLAUDICATION PRESENT: ICD-10-CM

## 2019-09-23 DIAGNOSIS — M43.16 SPONDYLOLISTHESIS OF LUMBAR REGION: ICD-10-CM

## 2019-09-23 DIAGNOSIS — M17.11 PRIMARY OSTEOARTHRITIS OF RIGHT KNEE: ICD-10-CM

## 2019-09-23 PROCEDURE — 99213 OFFICE O/P EST LOW 20 MIN: CPT | Performed by: PHYSICIAN ASSISTANT

## 2019-09-23 RX ORDER — TIZANIDINE 4 MG/1
TABLET ORAL
Qty: 40 TABLET | Refills: 0 | Status: SHIPPED | OUTPATIENT
Start: 2019-09-23 | End: 2019-10-15 | Stop reason: SDUPTHER

## 2019-09-26 ENCOUNTER — TELEPHONE (OUTPATIENT)
Dept: FAMILY MEDICINE CLINIC | Age: 59
End: 2019-09-26

## 2019-09-30 DIAGNOSIS — I10 ESSENTIAL HYPERTENSION: ICD-10-CM

## 2019-09-30 DIAGNOSIS — F51.01 PRIMARY INSOMNIA: ICD-10-CM

## 2019-09-30 DIAGNOSIS — R55 SYNCOPE, UNSPECIFIED SYNCOPE TYPE: Primary | ICD-10-CM

## 2019-09-30 RX ORDER — CLONIDINE HYDROCHLORIDE 0.1 MG/1
0.1 TABLET ORAL 2 TIMES DAILY
Qty: 60 TABLET | Refills: 3 | Status: SHIPPED | OUTPATIENT
Start: 2019-09-30 | End: 2020-08-31

## 2019-09-30 RX ORDER — NIFEDIPINE 60 MG/1
60 TABLET, EXTENDED RELEASE ORAL DAILY
Qty: 90 TABLET | Refills: 1 | Status: SHIPPED | OUTPATIENT
Start: 2019-09-30 | End: 2020-05-01

## 2019-09-30 RX ORDER — AZITHROMYCIN 250 MG/1
250 TABLET, FILM COATED ORAL SEE ADMIN INSTRUCTIONS
Qty: 6 TABLET | Refills: 0 | Status: SHIPPED | OUTPATIENT
Start: 2019-09-30 | End: 2019-10-05

## 2019-09-30 RX ORDER — PROPRANOLOL HYDROCHLORIDE 160 MG/1
CAPSULE, EXTENDED RELEASE ORAL
Qty: 30 CAPSULE | Refills: 3 | Status: SHIPPED | OUTPATIENT
Start: 2019-09-30 | End: 2020-03-31

## 2019-09-30 RX ORDER — ZOLPIDEM TARTRATE 10 MG/1
10 TABLET ORAL NIGHTLY PRN
Qty: 30 TABLET | Refills: 0 | Status: SHIPPED | OUTPATIENT
Start: 2019-09-30 | End: 2019-11-01 | Stop reason: SDUPTHER

## 2019-09-30 RX ORDER — HYDROXYZINE HYDROCHLORIDE 25 MG/1
TABLET, FILM COATED ORAL
Qty: 60 TABLET | Refills: 3 | Status: SHIPPED | OUTPATIENT
Start: 2019-09-30 | End: 2019-12-27

## 2019-10-01 ENCOUNTER — PATIENT MESSAGE (OUTPATIENT)
Dept: FAMILY MEDICINE CLINIC | Age: 59
End: 2019-10-01

## 2019-10-01 RX ORDER — DOXYCYCLINE HYCLATE 100 MG
100 TABLET ORAL 2 TIMES DAILY
Qty: 20 TABLET | Refills: 0 | Status: ON HOLD | OUTPATIENT
Start: 2019-10-01 | End: 2019-10-15 | Stop reason: HOSPADM

## 2019-10-04 ENCOUNTER — OFFICE VISIT (OUTPATIENT)
Dept: ORTHOPEDIC SURGERY | Age: 59
End: 2019-10-04
Payer: COMMERCIAL

## 2019-10-04 VITALS
DIASTOLIC BLOOD PRESSURE: 80 MMHG | HEIGHT: 63 IN | SYSTOLIC BLOOD PRESSURE: 123 MMHG | BODY MASS INDEX: 29.77 KG/M2 | WEIGHT: 167.99 LBS | HEART RATE: 66 BPM

## 2019-10-04 DIAGNOSIS — M48.061 SPINAL STENOSIS OF LUMBAR REGION, UNSPECIFIED WHETHER NEUROGENIC CLAUDICATION PRESENT: ICD-10-CM

## 2019-10-04 DIAGNOSIS — M54.16 LUMBAR RADICULOPATHY: Primary | ICD-10-CM

## 2019-10-04 DIAGNOSIS — M43.16 SPONDYLOLISTHESIS OF LUMBAR REGION: ICD-10-CM

## 2019-10-04 PROCEDURE — 99213 OFFICE O/P EST LOW 20 MIN: CPT | Performed by: PHYSICIAN ASSISTANT

## 2019-10-07 ENCOUNTER — APPOINTMENT (OUTPATIENT)
Dept: CT IMAGING | Age: 59
DRG: 195 | End: 2019-10-07
Payer: COMMERCIAL

## 2019-10-07 ENCOUNTER — HOSPITAL ENCOUNTER (INPATIENT)
Age: 59
LOS: 8 days | Discharge: HOME | DRG: 195 | End: 2019-10-15
Attending: EMERGENCY MEDICINE | Admitting: HOSPITALIST
Payer: COMMERCIAL

## 2019-10-07 ENCOUNTER — OFFICE VISIT (OUTPATIENT)
Dept: FAMILY MEDICINE CLINIC | Age: 59
End: 2019-10-07
Payer: COMMERCIAL

## 2019-10-07 ENCOUNTER — APPOINTMENT (OUTPATIENT)
Dept: GENERAL RADIOLOGY | Age: 59
DRG: 195 | End: 2019-10-07
Payer: COMMERCIAL

## 2019-10-07 VITALS
DIASTOLIC BLOOD PRESSURE: 60 MMHG | TEMPERATURE: 97.6 F | SYSTOLIC BLOOD PRESSURE: 92 MMHG | HEART RATE: 152 BPM | RESPIRATION RATE: 16 BRPM | HEIGHT: 62 IN | BODY MASS INDEX: 30.73 KG/M2 | WEIGHT: 167 LBS | OXYGEN SATURATION: 92 %

## 2019-10-07 DIAGNOSIS — J18.9 PNEUMONIA OF BOTH LOWER LOBES DUE TO INFECTIOUS ORGANISM: Primary | ICD-10-CM

## 2019-10-07 DIAGNOSIS — J18.9 PNEUMONIA OF BOTH LUNGS DUE TO INFECTIOUS ORGANISM, UNSPECIFIED PART OF LUNG: Primary | ICD-10-CM

## 2019-10-07 LAB
A/G RATIO: 0.9 (ref 1.1–2.2)
ALBUMIN SERPL-MCNC: 3.4 G/DL (ref 3.4–5)
ALP BLD-CCNC: 106 U/L (ref 40–129)
ALT SERPL-CCNC: 9 U/L (ref 10–40)
AMPHETAMINE SCREEN, URINE: ABNORMAL
ANION GAP SERPL CALCULATED.3IONS-SCNC: 14 MMOL/L (ref 3–16)
AST SERPL-CCNC: 10 U/L (ref 15–37)
BARBITURATE SCREEN URINE: ABNORMAL
BASE EXCESS VENOUS: -3.1 MMOL/L
BASOPHILS ABSOLUTE: 0.1 K/UL (ref 0–0.2)
BASOPHILS RELATIVE PERCENT: 0.8 %
BENZODIAZEPINE SCREEN, URINE: ABNORMAL
BILIRUB SERPL-MCNC: <0.2 MG/DL (ref 0–1)
BILIRUBIN URINE: NEGATIVE
BLOOD, URINE: NEGATIVE
BUN BLDV-MCNC: 12 MG/DL (ref 7–20)
CALCIUM SERPL-MCNC: 9.3 MG/DL (ref 8.3–10.6)
CANNABINOID SCREEN URINE: POSITIVE
CARBOXYHEMOGLOBIN: 2.1 %
CHLORIDE BLD-SCNC: 105 MMOL/L (ref 99–110)
CLARITY: ABNORMAL
CO2: 22 MMOL/L (ref 21–32)
COCAINE METABOLITE SCREEN URINE: ABNORMAL
COLOR: YELLOW
CREAT SERPL-MCNC: 1.2 MG/DL (ref 0.6–1.1)
EOSINOPHILS ABSOLUTE: 0.1 K/UL (ref 0–0.6)
EOSINOPHILS RELATIVE PERCENT: 1.2 %
EPITHELIAL CELLS, UA: 1 /HPF (ref 0–5)
GFR AFRICAN AMERICAN: 56
GFR NON-AFRICAN AMERICAN: 46
GLOBULIN: 3.9 G/DL
GLUCOSE BLD-MCNC: 117 MG/DL (ref 70–99)
GLUCOSE URINE: NEGATIVE MG/DL
HCO3 VENOUS: 22 MMOL/L (ref 23–29)
HCT VFR BLD CALC: 29.1 % (ref 36–48)
HEMOGLOBIN: 9.2 G/DL (ref 12–16)
HYALINE CASTS: 5 /LPF (ref 0–8)
KETONES, URINE: NEGATIVE MG/DL
LACTIC ACID: 1.1 MMOL/L (ref 0.4–2)
LEUKOCYTE ESTERASE, URINE: NEGATIVE
LYMPHOCYTES ABSOLUTE: 1.7 K/UL (ref 1–5.1)
LYMPHOCYTES RELATIVE PERCENT: 16.6 %
Lab: ABNORMAL
MCH RBC QN AUTO: 27.5 PG (ref 26–34)
MCHC RBC AUTO-ENTMCNC: 31.7 G/DL (ref 31–36)
MCV RBC AUTO: 87 FL (ref 80–100)
METHADONE SCREEN, URINE: ABNORMAL
METHEMOGLOBIN VENOUS: 1 %
MICROSCOPIC EXAMINATION: YES
MONOCYTES ABSOLUTE: 0.9 K/UL (ref 0–1.3)
MONOCYTES RELATIVE PERCENT: 8.6 %
NEUTROPHILS ABSOLUTE: 7.3 K/UL (ref 1.7–7.7)
NEUTROPHILS RELATIVE PERCENT: 72.8 %
NITRITE, URINE: NEGATIVE
O2 CONTENT, VEN: 11 ML/DL
O2 SAT, VEN: 96 %
O2 THERAPY: ABNORMAL
OPIATE SCREEN URINE: ABNORMAL
OXYCODONE URINE: ABNORMAL
PCO2, VEN: 42.4 MMHG (ref 40–50)
PDW BLD-RTO: 15.7 % (ref 12.4–15.4)
PH UA: 5.5
PH UA: 5.5 (ref 5–8)
PH VENOUS: 7.33 (ref 7.35–7.45)
PHENCYCLIDINE SCREEN URINE: ABNORMAL
PLATELET # BLD: 507 K/UL (ref 135–450)
PMV BLD AUTO: 7.6 FL (ref 5–10.5)
PO2, VEN: 80 MMHG
POTASSIUM SERPL-SCNC: 4.1 MMOL/L (ref 3.5–5.1)
PROCALCITONIN: 0.09 NG/ML (ref 0–0.15)
PROPOXYPHENE SCREEN: ABNORMAL
PROTEIN UA: NEGATIVE MG/DL
RAPID INFLUENZA  B AGN: NEGATIVE
RAPID INFLUENZA A AGN: NEGATIVE
RBC # BLD: 3.35 M/UL (ref 4–5.2)
RBC UA: 1 /HPF (ref 0–4)
SODIUM BLD-SCNC: 141 MMOL/L (ref 136–145)
SPECIFIC GRAVITY UA: 1.01 (ref 1–1.03)
TCO2 CALC VENOUS: 23 MMOL/L
TOTAL PROTEIN: 7.3 G/DL (ref 6.4–8.2)
URINE REFLEX TO CULTURE: ABNORMAL
URINE TYPE: ABNORMAL
UROBILINOGEN, URINE: 0.2 E.U./DL
WBC # BLD: 10.1 K/UL (ref 4–11)
WBC UA: 1 /HPF (ref 0–5)

## 2019-10-07 PROCEDURE — 6360000002 HC RX W HCPCS: Performed by: HOSPITALIST

## 2019-10-07 PROCEDURE — 87804 INFLUENZA ASSAY W/OPTIC: CPT

## 2019-10-07 PROCEDURE — 2580000003 HC RX 258: Performed by: PHYSICIAN ASSISTANT

## 2019-10-07 PROCEDURE — 71046 X-RAY EXAM CHEST 2 VIEWS: CPT

## 2019-10-07 PROCEDURE — 80307 DRUG TEST PRSMV CHEM ANLYZR: CPT

## 2019-10-07 PROCEDURE — 87040 BLOOD CULTURE FOR BACTERIA: CPT

## 2019-10-07 PROCEDURE — 87641 MR-STAPH DNA AMP PROBE: CPT

## 2019-10-07 PROCEDURE — 2500000003 HC RX 250 WO HCPCS: Performed by: HOSPITALIST

## 2019-10-07 PROCEDURE — 85025 COMPLETE CBC W/AUTO DIFF WBC: CPT

## 2019-10-07 PROCEDURE — 94150 VITAL CAPACITY TEST: CPT

## 2019-10-07 PROCEDURE — 6370000000 HC RX 637 (ALT 250 FOR IP): Performed by: PHYSICIAN ASSISTANT

## 2019-10-07 PROCEDURE — 99214 OFFICE O/P EST MOD 30 MIN: CPT | Performed by: FAMILY MEDICINE

## 2019-10-07 PROCEDURE — 96365 THER/PROPH/DIAG IV INF INIT: CPT

## 2019-10-07 PROCEDURE — 94640 AIRWAY INHALATION TREATMENT: CPT

## 2019-10-07 PROCEDURE — 81001 URINALYSIS AUTO W/SCOPE: CPT

## 2019-10-07 PROCEDURE — 6370000000 HC RX 637 (ALT 250 FOR IP): Performed by: HOSPITALIST

## 2019-10-07 PROCEDURE — 87070 CULTURE OTHR SPECIMN AEROBIC: CPT

## 2019-10-07 PROCEDURE — 82803 BLOOD GASES ANY COMBINATION: CPT

## 2019-10-07 PROCEDURE — 99285 EMERGENCY DEPT VISIT HI MDM: CPT

## 2019-10-07 PROCEDURE — 36415 COLL VENOUS BLD VENIPUNCTURE: CPT

## 2019-10-07 PROCEDURE — 96361 HYDRATE IV INFUSION ADD-ON: CPT

## 2019-10-07 PROCEDURE — 83605 ASSAY OF LACTIC ACID: CPT

## 2019-10-07 PROCEDURE — 1200000000 HC SEMI PRIVATE

## 2019-10-07 PROCEDURE — 71250 CT THORAX DX C-: CPT

## 2019-10-07 PROCEDURE — 94760 N-INVAS EAR/PLS OXIMETRY 1: CPT

## 2019-10-07 PROCEDURE — 84145 PROCALCITONIN (PCT): CPT

## 2019-10-07 PROCEDURE — 96367 TX/PROPH/DG ADDL SEQ IV INF: CPT

## 2019-10-07 PROCEDURE — 6360000002 HC RX W HCPCS: Performed by: PHYSICIAN ASSISTANT

## 2019-10-07 PROCEDURE — 87205 SMEAR GRAM STAIN: CPT

## 2019-10-07 PROCEDURE — 2580000003 HC RX 258: Performed by: HOSPITALIST

## 2019-10-07 PROCEDURE — 80053 COMPREHEN METABOLIC PANEL: CPT

## 2019-10-07 RX ORDER — ERGOCALCIFEROL 1.25 MG/1
50000 CAPSULE ORAL
COMMUNITY
End: 2020-12-11

## 2019-10-07 RX ORDER — ONDANSETRON 2 MG/ML
4 INJECTION INTRAMUSCULAR; INTRAVENOUS EVERY 6 HOURS PRN
Status: DISCONTINUED | OUTPATIENT
Start: 2019-10-07 | End: 2019-10-15 | Stop reason: HOSPADM

## 2019-10-07 RX ORDER — SODIUM CHLORIDE 0.9 % (FLUSH) 0.9 %
10 SYRINGE (ML) INJECTION PRN
Status: DISCONTINUED | OUTPATIENT
Start: 2019-10-07 | End: 2019-10-15 | Stop reason: HOSPADM

## 2019-10-07 RX ORDER — PROPRANOLOL HYDROCHLORIDE 80 MG/1
160 CAPSULE, EXTENDED RELEASE ORAL DAILY
Status: DISCONTINUED | OUTPATIENT
Start: 2019-10-08 | End: 2019-10-15 | Stop reason: HOSPADM

## 2019-10-07 RX ORDER — IPRATROPIUM BROMIDE AND ALBUTEROL SULFATE 2.5; .5 MG/3ML; MG/3ML
1 SOLUTION RESPIRATORY (INHALATION) ONCE
Status: COMPLETED | OUTPATIENT
Start: 2019-10-07 | End: 2019-10-07

## 2019-10-07 RX ORDER — LEVOFLOXACIN 5 MG/ML
750 INJECTION, SOLUTION INTRAVENOUS ONCE
Status: COMPLETED | OUTPATIENT
Start: 2019-10-07 | End: 2019-10-07

## 2019-10-07 RX ORDER — SODIUM CHLORIDE 9 MG/ML
INJECTION, SOLUTION INTRAVENOUS CONTINUOUS
Status: DISCONTINUED | OUTPATIENT
Start: 2019-10-07 | End: 2019-10-11

## 2019-10-07 RX ORDER — HYDROXYZINE HYDROCHLORIDE 10 MG/1
25 TABLET, FILM COATED ORAL 3 TIMES DAILY PRN
Status: DISCONTINUED | OUTPATIENT
Start: 2019-10-07 | End: 2019-10-15 | Stop reason: HOSPADM

## 2019-10-07 RX ORDER — LEVOFLOXACIN 5 MG/ML
500 INJECTION, SOLUTION INTRAVENOUS EVERY 24 HOURS
Status: DISCONTINUED | OUTPATIENT
Start: 2019-10-08 | End: 2019-10-08

## 2019-10-07 RX ORDER — NIFEDIPINE 60 MG/1
60 TABLET, EXTENDED RELEASE ORAL DAILY
Status: DISCONTINUED | OUTPATIENT
Start: 2019-10-08 | End: 2019-10-15 | Stop reason: HOSPADM

## 2019-10-07 RX ORDER — FAMOTIDINE 20 MG/1
20 TABLET, FILM COATED ORAL 2 TIMES DAILY
Status: DISCONTINUED | OUTPATIENT
Start: 2019-10-07 | End: 2019-10-15 | Stop reason: HOSPADM

## 2019-10-07 RX ORDER — ALBUTEROL SULFATE 2.5 MG/3ML
2.5 SOLUTION RESPIRATORY (INHALATION)
Status: DISCONTINUED | OUTPATIENT
Start: 2019-10-07 | End: 2019-10-15 | Stop reason: HOSPADM

## 2019-10-07 RX ORDER — CITALOPRAM 20 MG/1
40 TABLET ORAL DAILY
Status: DISCONTINUED | OUTPATIENT
Start: 2019-10-08 | End: 2019-10-15 | Stop reason: HOSPADM

## 2019-10-07 RX ORDER — CLONIDINE HYDROCHLORIDE 0.1 MG/1
0.1 TABLET ORAL 2 TIMES DAILY
Status: DISCONTINUED | OUTPATIENT
Start: 2019-10-07 | End: 2019-10-15 | Stop reason: HOSPADM

## 2019-10-07 RX ORDER — ERGOCALCIFEROL 1.25 MG/1
50000 CAPSULE ORAL
Status: DISCONTINUED | OUTPATIENT
Start: 2019-11-01 | End: 2019-10-15 | Stop reason: HOSPADM

## 2019-10-07 RX ORDER — 0.9 % SODIUM CHLORIDE 0.9 %
1000 INTRAVENOUS SOLUTION INTRAVENOUS ONCE
Status: COMPLETED | OUTPATIENT
Start: 2019-10-07 | End: 2019-10-07

## 2019-10-07 RX ORDER — SODIUM CHLORIDE 0.9 % (FLUSH) 0.9 %
10 SYRINGE (ML) INJECTION EVERY 12 HOURS SCHEDULED
Status: DISCONTINUED | OUTPATIENT
Start: 2019-10-07 | End: 2019-10-15 | Stop reason: HOSPADM

## 2019-10-07 RX ORDER — HYDROCODONE BITARTRATE AND ACETAMINOPHEN 7.5; 325 MG/1; MG/1
1 TABLET ORAL EVERY 6 HOURS PRN
Status: DISCONTINUED | OUTPATIENT
Start: 2019-10-07 | End: 2019-10-15 | Stop reason: HOSPADM

## 2019-10-07 RX ORDER — ZOLPIDEM TARTRATE 5 MG/1
10 TABLET ORAL NIGHTLY PRN
Status: DISCONTINUED | OUTPATIENT
Start: 2019-10-07 | End: 2019-10-15 | Stop reason: HOSPADM

## 2019-10-07 RX ORDER — LISINOPRIL 20 MG/1
20 TABLET ORAL 2 TIMES DAILY
Status: DISCONTINUED | OUTPATIENT
Start: 2019-10-07 | End: 2019-10-07

## 2019-10-07 RX ADMIN — ZOLPIDEM TARTRATE 10 MG: 5 TABLET ORAL at 22:12

## 2019-10-07 RX ADMIN — METRONIDAZOLE 500 MG: 500 INJECTION, SOLUTION INTRAVENOUS at 18:36

## 2019-10-07 RX ADMIN — SODIUM CHLORIDE 1000 ML: 9 INJECTION, SOLUTION INTRAVENOUS at 13:22

## 2019-10-07 RX ADMIN — FAMOTIDINE 20 MG: 20 TABLET ORAL at 20:04

## 2019-10-07 RX ADMIN — SODIUM CHLORIDE: 9 INJECTION, SOLUTION INTRAVENOUS at 18:33

## 2019-10-07 RX ADMIN — SODIUM CHLORIDE 1000 ML: 9 INJECTION, SOLUTION INTRAVENOUS at 14:40

## 2019-10-07 RX ADMIN — VANCOMYCIN HYDROCHLORIDE 1000 MG: 1 INJECTION, POWDER, LYOPHILIZED, FOR SOLUTION INTRAVENOUS at 20:04

## 2019-10-07 RX ADMIN — CEFEPIME HYDROCHLORIDE 2 G: 2 INJECTION, POWDER, FOR SOLUTION INTRAVENOUS at 14:04

## 2019-10-07 RX ADMIN — CLONIDINE HYDROCHLORIDE 0.1 MG: 0.1 TABLET ORAL at 20:04

## 2019-10-07 RX ADMIN — IPRATROPIUM BROMIDE AND ALBUTEROL SULFATE 1 AMPULE: .5; 3 SOLUTION RESPIRATORY (INHALATION) at 13:22

## 2019-10-07 RX ADMIN — LEVOFLOXACIN 750 MG: 5 INJECTION, SOLUTION INTRAVENOUS at 14:41

## 2019-10-07 RX ADMIN — ALBUTEROL SULFATE 2.5 MG: 2.5 SOLUTION RESPIRATORY (INHALATION) at 18:39

## 2019-10-07 RX ADMIN — METRONIDAZOLE 500 MG: 500 INJECTION, SOLUTION INTRAVENOUS at 23:53

## 2019-10-07 ASSESSMENT — PAIN SCALES - GENERAL
PAINLEVEL_OUTOF10: 7
PAINLEVEL_OUTOF10: 9
PAINLEVEL_OUTOF10: 8

## 2019-10-07 ASSESSMENT — PAIN DESCRIPTION - LOCATION
LOCATION: BACK
LOCATION: BACK

## 2019-10-07 ASSESSMENT — PAIN DESCRIPTION - PAIN TYPE
TYPE: CHRONIC PAIN
TYPE: CHRONIC PAIN

## 2019-10-07 ASSESSMENT — PAIN DESCRIPTION - FREQUENCY
FREQUENCY: CONTINUOUS
FREQUENCY: CONTINUOUS

## 2019-10-07 ASSESSMENT — ENCOUNTER SYMPTOMS
NAUSEA: 1
WHEEZING: 1
COUGH: 1
SHORTNESS OF BREATH: 1

## 2019-10-07 ASSESSMENT — PAIN DESCRIPTION - ORIENTATION
ORIENTATION: MID
ORIENTATION: LOWER

## 2019-10-07 ASSESSMENT — PAIN DESCRIPTION - DESCRIPTORS
DESCRIPTORS: ACHING;CONSTANT
DESCRIPTORS: ACHING;CONSTANT

## 2019-10-07 ASSESSMENT — PAIN SCALES - WONG BAKER: WONGBAKER_NUMERICALRESPONSE: 10

## 2019-10-07 ASSESSMENT — PAIN DESCRIPTION - PROGRESSION: CLINICAL_PROGRESSION: NOT CHANGED

## 2019-10-07 ASSESSMENT — PAIN - FUNCTIONAL ASSESSMENT: PAIN_FUNCTIONAL_ASSESSMENT: PREVENTS OR INTERFERES SOME ACTIVE ACTIVITIES AND ADLS

## 2019-10-07 ASSESSMENT — PAIN DESCRIPTION - ONSET: ONSET: ON-GOING

## 2019-10-08 LAB
ANION GAP SERPL CALCULATED.3IONS-SCNC: 15 MMOL/L (ref 3–16)
BUN BLDV-MCNC: 9 MG/DL (ref 7–20)
CALCIUM SERPL-MCNC: 8.5 MG/DL (ref 8.3–10.6)
CHLORIDE BLD-SCNC: 110 MMOL/L (ref 99–110)
CO2: 19 MMOL/L (ref 21–32)
CREAT SERPL-MCNC: 1.3 MG/DL (ref 0.6–1.1)
GFR AFRICAN AMERICAN: 51
GFR NON-AFRICAN AMERICAN: 42
GLUCOSE BLD-MCNC: 102 MG/DL (ref 70–99)
HCT VFR BLD CALC: 25.8 % (ref 36–48)
HEMOGLOBIN: 8.2 G/DL (ref 12–16)
MAGNESIUM: 1.9 MG/DL (ref 1.8–2.4)
MCH RBC QN AUTO: 27.7 PG (ref 26–34)
MCHC RBC AUTO-ENTMCNC: 31.8 G/DL (ref 31–36)
MCV RBC AUTO: 86.9 FL (ref 80–100)
MRSA SCREEN RT-PCR: NORMAL
PDW BLD-RTO: 15.7 % (ref 12.4–15.4)
PLATELET # BLD: 326 K/UL (ref 135–450)
PMV BLD AUTO: 7.4 FL (ref 5–10.5)
POTASSIUM REFLEX MAGNESIUM: 3.5 MMOL/L (ref 3.5–5.1)
PROCALCITONIN: 0.07 NG/ML (ref 0–0.15)
RBC # BLD: 2.97 M/UL (ref 4–5.2)
SODIUM BLD-SCNC: 144 MMOL/L (ref 136–145)
WBC # BLD: 14.6 K/UL (ref 4–11)

## 2019-10-08 PROCEDURE — 6360000002 HC RX W HCPCS: Performed by: HOSPITALIST

## 2019-10-08 PROCEDURE — 6370000000 HC RX 637 (ALT 250 FOR IP): Performed by: HOSPITALIST

## 2019-10-08 PROCEDURE — 1200000000 HC SEMI PRIVATE

## 2019-10-08 PROCEDURE — 2580000003 HC RX 258: Performed by: INTERNAL MEDICINE

## 2019-10-08 PROCEDURE — 2580000003 HC RX 258: Performed by: HOSPITALIST

## 2019-10-08 PROCEDURE — 83735 ASSAY OF MAGNESIUM: CPT

## 2019-10-08 PROCEDURE — 99254 IP/OBS CNSLTJ NEW/EST MOD 60: CPT | Performed by: INTERNAL MEDICINE

## 2019-10-08 PROCEDURE — 94669 MECHANICAL CHEST WALL OSCILL: CPT

## 2019-10-08 PROCEDURE — 80048 BASIC METABOLIC PNL TOTAL CA: CPT

## 2019-10-08 PROCEDURE — 94761 N-INVAS EAR/PLS OXIMETRY MLT: CPT

## 2019-10-08 PROCEDURE — 36415 COLL VENOUS BLD VENIPUNCTURE: CPT

## 2019-10-08 PROCEDURE — 6370000000 HC RX 637 (ALT 250 FOR IP): Performed by: INTERNAL MEDICINE

## 2019-10-08 PROCEDURE — 99255 IP/OBS CONSLTJ NEW/EST HI 80: CPT | Performed by: INTERNAL MEDICINE

## 2019-10-08 PROCEDURE — 6360000002 HC RX W HCPCS: Performed by: INTERNAL MEDICINE

## 2019-10-08 PROCEDURE — 2500000003 HC RX 250 WO HCPCS: Performed by: HOSPITALIST

## 2019-10-08 PROCEDURE — 85027 COMPLETE CBC AUTOMATED: CPT

## 2019-10-08 PROCEDURE — 84145 PROCALCITONIN (PCT): CPT

## 2019-10-08 PROCEDURE — 94640 AIRWAY INHALATION TREATMENT: CPT

## 2019-10-08 RX ORDER — LACTOBACILLUS RHAMNOSUS GG 10B CELL
2 CAPSULE ORAL 2 TIMES DAILY WITH MEALS
Status: DISCONTINUED | OUTPATIENT
Start: 2019-10-08 | End: 2019-10-15 | Stop reason: HOSPADM

## 2019-10-08 RX ORDER — BENZONATATE 100 MG/1
100 CAPSULE ORAL 3 TIMES DAILY PRN
Status: DISCONTINUED | OUTPATIENT
Start: 2019-10-08 | End: 2019-10-15 | Stop reason: HOSPADM

## 2019-10-08 RX ORDER — GUAIFENESIN/DEXTROMETHORPHAN 100-10MG/5
10 SYRUP ORAL EVERY 4 HOURS PRN
Status: DISCONTINUED | OUTPATIENT
Start: 2019-10-08 | End: 2019-10-15 | Stop reason: HOSPADM

## 2019-10-08 RX ORDER — PREDNISONE 20 MG/1
40 TABLET ORAL DAILY
Status: COMPLETED | OUTPATIENT
Start: 2019-10-08 | End: 2019-10-12

## 2019-10-08 RX ADMIN — METRONIDAZOLE 500 MG: 500 INJECTION, SOLUTION INTRAVENOUS at 16:10

## 2019-10-08 RX ADMIN — HYDROCODONE BITARTRATE AND ACETAMINOPHEN 1 TABLET: 7.5; 325 TABLET ORAL at 09:32

## 2019-10-08 RX ADMIN — FAMOTIDINE 20 MG: 20 TABLET ORAL at 09:26

## 2019-10-08 RX ADMIN — FAMOTIDINE 20 MG: 20 TABLET ORAL at 20:31

## 2019-10-08 RX ADMIN — METRONIDAZOLE 500 MG: 500 INJECTION, SOLUTION INTRAVENOUS at 07:09

## 2019-10-08 RX ADMIN — BENZONATATE 100 MG: 100 CAPSULE ORAL at 20:31

## 2019-10-08 RX ADMIN — PROPRANOLOL HYDROCHLORIDE 160 MG: 80 CAPSULE, EXTENDED RELEASE ORAL at 09:32

## 2019-10-08 RX ADMIN — CITALOPRAM HYDROBROMIDE 40 MG: 20 TABLET ORAL at 09:26

## 2019-10-08 RX ADMIN — ZOLPIDEM TARTRATE 10 MG: 5 TABLET ORAL at 23:07

## 2019-10-08 RX ADMIN — Medication 2 CAPSULE: at 17:45

## 2019-10-08 RX ADMIN — ALBUTEROL SULFATE 2.5 MG: 2.5 SOLUTION RESPIRATORY (INHALATION) at 12:29

## 2019-10-08 RX ADMIN — GUAIFENESIN AND DEXTROMETHORPHAN 10 ML: 100; 10 SYRUP ORAL at 03:55

## 2019-10-08 RX ADMIN — ALBUTEROL SULFATE 2.5 MG: 2.5 SOLUTION RESPIRATORY (INHALATION) at 19:40

## 2019-10-08 RX ADMIN — PREDNISONE 40 MG: 20 TABLET ORAL at 14:03

## 2019-10-08 RX ADMIN — ENOXAPARIN SODIUM 40 MG: 40 INJECTION SUBCUTANEOUS at 09:26

## 2019-10-08 RX ADMIN — CEFEPIME HYDROCHLORIDE 2 G: 2 INJECTION, POWDER, FOR SOLUTION INTRAVENOUS at 15:19

## 2019-10-08 RX ADMIN — CLONIDINE HYDROCHLORIDE 0.1 MG: 0.1 TABLET ORAL at 20:31

## 2019-10-08 RX ADMIN — CLONIDINE HYDROCHLORIDE 0.1 MG: 0.1 TABLET ORAL at 09:26

## 2019-10-08 RX ADMIN — METRONIDAZOLE 500 MG: 500 INJECTION, SOLUTION INTRAVENOUS at 23:07

## 2019-10-08 RX ADMIN — ALBUTEROL SULFATE 2.5 MG: 2.5 SOLUTION RESPIRATORY (INHALATION) at 16:01

## 2019-10-08 RX ADMIN — NIFEDIPINE 60 MG: 60 TABLET, FILM COATED, EXTENDED RELEASE ORAL at 09:26

## 2019-10-08 RX ADMIN — SODIUM CHLORIDE: 9 INJECTION, SOLUTION INTRAVENOUS at 14:03

## 2019-10-08 RX ADMIN — HYDROCODONE BITARTRATE AND ACETAMINOPHEN 1 TABLET: 7.5; 325 TABLET ORAL at 17:47

## 2019-10-08 RX ADMIN — ALBUTEROL SULFATE 2.5 MG: 2.5 SOLUTION RESPIRATORY (INHALATION) at 07:42

## 2019-10-08 RX ADMIN — VANCOMYCIN HYDROCHLORIDE 1000 MG: 1 INJECTION, POWDER, LYOPHILIZED, FOR SOLUTION INTRAVENOUS at 14:00

## 2019-10-08 ASSESSMENT — PAIN DESCRIPTION - PAIN TYPE: TYPE: CHRONIC PAIN

## 2019-10-08 ASSESSMENT — PAIN SCALES - GENERAL
PAINLEVEL_OUTOF10: 6
PAINLEVEL_OUTOF10: 9
PAINLEVEL_OUTOF10: 5
PAINLEVEL_OUTOF10: 9

## 2019-10-08 ASSESSMENT — PAIN DESCRIPTION - DESCRIPTORS: DESCRIPTORS: ACHING

## 2019-10-08 ASSESSMENT — PAIN DESCRIPTION - PROGRESSION: CLINICAL_PROGRESSION: NOT CHANGED

## 2019-10-08 ASSESSMENT — PAIN - FUNCTIONAL ASSESSMENT: PAIN_FUNCTIONAL_ASSESSMENT: ACTIVITIES ARE NOT PREVENTED

## 2019-10-08 ASSESSMENT — PAIN DESCRIPTION - LOCATION: LOCATION: BACK

## 2019-10-08 ASSESSMENT — PAIN DESCRIPTION - FREQUENCY: FREQUENCY: CONTINUOUS

## 2019-10-08 ASSESSMENT — PAIN DESCRIPTION - ONSET: ONSET: ON-GOING

## 2019-10-08 ASSESSMENT — PAIN DESCRIPTION - ORIENTATION: ORIENTATION: LOWER

## 2019-10-09 PROBLEM — J98.4 CAVITARY PNEUMONIA: Status: ACTIVE | Noted: 2019-10-07

## 2019-10-09 LAB
ANION GAP SERPL CALCULATED.3IONS-SCNC: 16 MMOL/L (ref 3–16)
BUN BLDV-MCNC: 8 MG/DL (ref 7–20)
CALCIUM SERPL-MCNC: 8.9 MG/DL (ref 8.3–10.6)
CHLORIDE BLD-SCNC: 109 MMOL/L (ref 99–110)
CO2: 18 MMOL/L (ref 21–32)
CREAT SERPL-MCNC: 1 MG/DL (ref 0.6–1.1)
CULTURE, RESPIRATORY: NORMAL
GFR AFRICAN AMERICAN: >60
GFR NON-AFRICAN AMERICAN: 57
GLUCOSE BLD-MCNC: 105 MG/DL (ref 70–99)
GRAM STAIN RESULT: NORMAL
HCT VFR BLD CALC: 26.9 % (ref 36–48)
HEMOGLOBIN: 8.7 G/DL (ref 12–16)
MCH RBC QN AUTO: 27.8 PG (ref 26–34)
MCHC RBC AUTO-ENTMCNC: 32.1 G/DL (ref 31–36)
MCV RBC AUTO: 86.4 FL (ref 80–100)
PDW BLD-RTO: 15.5 % (ref 12.4–15.4)
PLATELET # BLD: 392 K/UL (ref 135–450)
PMV BLD AUTO: 7.6 FL (ref 5–10.5)
POTASSIUM SERPL-SCNC: 3.5 MMOL/L (ref 3.5–5.1)
RBC # BLD: 3.12 M/UL (ref 4–5.2)
SODIUM BLD-SCNC: 143 MMOL/L (ref 136–145)
VANCOMYCIN TROUGH: 12.5 UG/ML (ref 10–20)
WBC # BLD: 10.3 K/UL (ref 4–11)

## 2019-10-09 PROCEDURE — 2580000003 HC RX 258: Performed by: HOSPITALIST

## 2019-10-09 PROCEDURE — 85027 COMPLETE CBC AUTOMATED: CPT

## 2019-10-09 PROCEDURE — 2500000003 HC RX 250 WO HCPCS: Performed by: HOSPITALIST

## 2019-10-09 PROCEDURE — 99232 SBSQ HOSP IP/OBS MODERATE 35: CPT | Performed by: INTERNAL MEDICINE

## 2019-10-09 PROCEDURE — 99233 SBSQ HOSP IP/OBS HIGH 50: CPT | Performed by: INTERNAL MEDICINE

## 2019-10-09 PROCEDURE — 6370000000 HC RX 637 (ALT 250 FOR IP): Performed by: INTERNAL MEDICINE

## 2019-10-09 PROCEDURE — 94669 MECHANICAL CHEST WALL OSCILL: CPT

## 2019-10-09 PROCEDURE — 80048 BASIC METABOLIC PNL TOTAL CA: CPT

## 2019-10-09 PROCEDURE — 6370000000 HC RX 637 (ALT 250 FOR IP): Performed by: HOSPITALIST

## 2019-10-09 PROCEDURE — 6360000002 HC RX W HCPCS: Performed by: INTERNAL MEDICINE

## 2019-10-09 PROCEDURE — 1200000000 HC SEMI PRIVATE

## 2019-10-09 PROCEDURE — 94760 N-INVAS EAR/PLS OXIMETRY 1: CPT

## 2019-10-09 PROCEDURE — 94640 AIRWAY INHALATION TREATMENT: CPT

## 2019-10-09 PROCEDURE — 80202 ASSAY OF VANCOMYCIN: CPT

## 2019-10-09 PROCEDURE — 2580000003 HC RX 258: Performed by: INTERNAL MEDICINE

## 2019-10-09 PROCEDURE — 6360000002 HC RX W HCPCS: Performed by: HOSPITALIST

## 2019-10-09 PROCEDURE — 36415 COLL VENOUS BLD VENIPUNCTURE: CPT

## 2019-10-09 RX ADMIN — CEFEPIME HYDROCHLORIDE 2 G: 2 INJECTION, POWDER, FOR SOLUTION INTRAVENOUS at 12:17

## 2019-10-09 RX ADMIN — NIFEDIPINE 60 MG: 60 TABLET, FILM COATED, EXTENDED RELEASE ORAL at 09:16

## 2019-10-09 RX ADMIN — CITALOPRAM HYDROBROMIDE 40 MG: 20 TABLET ORAL at 09:17

## 2019-10-09 RX ADMIN — PROPRANOLOL HYDROCHLORIDE 160 MG: 80 CAPSULE, EXTENDED RELEASE ORAL at 09:20

## 2019-10-09 RX ADMIN — ALBUTEROL SULFATE 2.5 MG: 2.5 SOLUTION RESPIRATORY (INHALATION) at 16:22

## 2019-10-09 RX ADMIN — FAMOTIDINE 20 MG: 20 TABLET ORAL at 20:28

## 2019-10-09 RX ADMIN — ALBUTEROL SULFATE 2.5 MG: 2.5 SOLUTION RESPIRATORY (INHALATION) at 08:30

## 2019-10-09 RX ADMIN — METRONIDAZOLE 500 MG: 500 INJECTION, SOLUTION INTRAVENOUS at 06:42

## 2019-10-09 RX ADMIN — ALBUTEROL SULFATE 2.5 MG: 2.5 SOLUTION RESPIRATORY (INHALATION) at 19:24

## 2019-10-09 RX ADMIN — Medication 2 CAPSULE: at 09:17

## 2019-10-09 RX ADMIN — CLONIDINE HYDROCHLORIDE 0.1 MG: 0.1 TABLET ORAL at 09:16

## 2019-10-09 RX ADMIN — VANCOMYCIN HYDROCHLORIDE 1000 MG: 1 INJECTION, POWDER, LYOPHILIZED, FOR SOLUTION INTRAVENOUS at 09:16

## 2019-10-09 RX ADMIN — HYDROCODONE BITARTRATE AND ACETAMINOPHEN 1 TABLET: 7.5; 325 TABLET ORAL at 09:24

## 2019-10-09 RX ADMIN — Medication 10 ML: at 21:15

## 2019-10-09 RX ADMIN — ZOLPIDEM TARTRATE 10 MG: 5 TABLET ORAL at 21:13

## 2019-10-09 RX ADMIN — Medication 2 CAPSULE: at 18:57

## 2019-10-09 RX ADMIN — SODIUM CHLORIDE: 9 INJECTION, SOLUTION INTRAVENOUS at 12:17

## 2019-10-09 RX ADMIN — HYDROCODONE BITARTRATE AND ACETAMINOPHEN 1 TABLET: 7.5; 325 TABLET ORAL at 21:13

## 2019-10-09 RX ADMIN — PREDNISONE 40 MG: 20 TABLET ORAL at 09:17

## 2019-10-09 RX ADMIN — ALBUTEROL SULFATE 2.5 MG: 2.5 SOLUTION RESPIRATORY (INHALATION) at 12:17

## 2019-10-09 RX ADMIN — CEFEPIME HYDROCHLORIDE 2 G: 2 INJECTION, POWDER, FOR SOLUTION INTRAVENOUS at 00:49

## 2019-10-09 RX ADMIN — HYDROCODONE BITARTRATE AND ACETAMINOPHEN 1 TABLET: 7.5; 325 TABLET ORAL at 15:27

## 2019-10-09 RX ADMIN — METRONIDAZOLE 500 MG: 500 INJECTION, SOLUTION INTRAVENOUS at 15:24

## 2019-10-09 RX ADMIN — CLONIDINE HYDROCHLORIDE 0.1 MG: 0.1 TABLET ORAL at 20:28

## 2019-10-09 RX ADMIN — HYDROXYZINE HYDROCHLORIDE 25 MG: 10 TABLET, FILM COATED ORAL at 21:13

## 2019-10-09 RX ADMIN — FAMOTIDINE 20 MG: 20 TABLET ORAL at 09:17

## 2019-10-09 RX ADMIN — METRONIDAZOLE 500 MG: 500 INJECTION, SOLUTION INTRAVENOUS at 22:57

## 2019-10-09 ASSESSMENT — PAIN DESCRIPTION - LOCATION
LOCATION: BACK;LEG
LOCATION: BACK;FLANK;LEG
LOCATION: BACK
LOCATION: LEG
LOCATION: BACK;LEG

## 2019-10-09 ASSESSMENT — PAIN DESCRIPTION - DESCRIPTORS
DESCRIPTORS: SHARP;SHOOTING
DESCRIPTORS: SHOOTING;SHARP
DESCRIPTORS: ACHING;SHOOTING
DESCRIPTORS: ACHING;DISCOMFORT

## 2019-10-09 ASSESSMENT — PAIN DESCRIPTION - ORIENTATION
ORIENTATION: LEFT
ORIENTATION: LEFT
ORIENTATION: MID;LOWER;UPPER
ORIENTATION: LEFT
ORIENTATION: LEFT

## 2019-10-09 ASSESSMENT — PAIN DESCRIPTION - FREQUENCY
FREQUENCY: CONTINUOUS

## 2019-10-09 ASSESSMENT — PAIN DESCRIPTION - ONSET
ONSET: GRADUAL
ONSET: ON-GOING
ONSET: GRADUAL
ONSET: ON-GOING

## 2019-10-09 ASSESSMENT — PAIN DESCRIPTION - PROGRESSION
CLINICAL_PROGRESSION: NOT CHANGED
CLINICAL_PROGRESSION: NOT CHANGED
CLINICAL_PROGRESSION: GRADUALLY WORSENING
CLINICAL_PROGRESSION: NOT CHANGED

## 2019-10-09 ASSESSMENT — PAIN SCALES - GENERAL
PAINLEVEL_OUTOF10: 5
PAINLEVEL_OUTOF10: 10
PAINLEVEL_OUTOF10: 5
PAINLEVEL_OUTOF10: 10
PAINLEVEL_OUTOF10: 0
PAINLEVEL_OUTOF10: 7

## 2019-10-09 ASSESSMENT — PAIN DESCRIPTION - PAIN TYPE
TYPE: CHRONIC PAIN

## 2019-10-09 ASSESSMENT — PAIN - FUNCTIONAL ASSESSMENT
PAIN_FUNCTIONAL_ASSESSMENT: ACTIVITIES ARE NOT PREVENTED

## 2019-10-10 LAB
APPEARANCE BAL (LAVAGE): ABNORMAL
CLOT EVALUATION BAL: ABNORMAL
COLOR LAVAGE: ABNORMAL
CREAT SERPL-MCNC: 1.1 MG/DL (ref 0.6–1.1)
GFR AFRICAN AMERICAN: >60
GFR NON-AFRICAN AMERICAN: 51
LYMPHOCYTES, BAL: 1 % (ref 5–10)
MACROPHAGES, BAL: 9 % (ref 90–95)
NUMBER OF CELLS COUNTED BAL (LAVAGE): 200
RBC, BAL: 1765 /CUMM
SEGMENTED NEUTROPHILS, BAL: 90 % (ref 5–10)
VANCOMYCIN TROUGH: 23.7 UG/ML (ref 10–20)
VOLUME LAVAGE: 30 ML
WBC/EPI CELLS BAL: 3932 /CUMM

## 2019-10-10 PROCEDURE — 99152 MOD SED SAME PHYS/QHP 5/>YRS: CPT | Performed by: INTERNAL MEDICINE

## 2019-10-10 PROCEDURE — 87206 SMEAR FLUORESCENT/ACID STAI: CPT

## 2019-10-10 PROCEDURE — 7100000010 HC PHASE II RECOVERY - FIRST 15 MIN: Performed by: INTERNAL MEDICINE

## 2019-10-10 PROCEDURE — 2580000003 HC RX 258: Performed by: HOSPITALIST

## 2019-10-10 PROCEDURE — 87015 SPECIMEN INFECT AGNT CONCNTJ: CPT

## 2019-10-10 PROCEDURE — 6360000002 HC RX W HCPCS: Performed by: INTERNAL MEDICINE

## 2019-10-10 PROCEDURE — 94760 N-INVAS EAR/PLS OXIMETRY 1: CPT

## 2019-10-10 PROCEDURE — 88312 SPECIAL STAINS GROUP 1: CPT

## 2019-10-10 PROCEDURE — 94640 AIRWAY INHALATION TREATMENT: CPT

## 2019-10-10 PROCEDURE — 94669 MECHANICAL CHEST WALL OSCILL: CPT

## 2019-10-10 PROCEDURE — 99233 SBSQ HOSP IP/OBS HIGH 50: CPT | Performed by: INTERNAL MEDICINE

## 2019-10-10 PROCEDURE — 6370000000 HC RX 637 (ALT 250 FOR IP): Performed by: INTERNAL MEDICINE

## 2019-10-10 PROCEDURE — 2709999900 HC NON-CHARGEABLE SUPPLY: Performed by: INTERNAL MEDICINE

## 2019-10-10 PROCEDURE — 2500000003 HC RX 250 WO HCPCS: Performed by: INTERNAL MEDICINE

## 2019-10-10 PROCEDURE — 87102 FUNGUS ISOLATION CULTURE: CPT

## 2019-10-10 PROCEDURE — 2500000003 HC RX 250 WO HCPCS: Performed by: HOSPITALIST

## 2019-10-10 PROCEDURE — 1200000000 HC SEMI PRIVATE

## 2019-10-10 PROCEDURE — 87205 SMEAR GRAM STAIN: CPT

## 2019-10-10 PROCEDURE — 80202 ASSAY OF VANCOMYCIN: CPT

## 2019-10-10 PROCEDURE — 7100000011 HC PHASE II RECOVERY - ADDTL 15 MIN: Performed by: INTERNAL MEDICINE

## 2019-10-10 PROCEDURE — 87070 CULTURE OTHR SPECIMN AEROBIC: CPT

## 2019-10-10 PROCEDURE — 6370000000 HC RX 637 (ALT 250 FOR IP): Performed by: HOSPITALIST

## 2019-10-10 PROCEDURE — 31624 DX BRONCHOSCOPE/LAVAGE: CPT | Performed by: INTERNAL MEDICINE

## 2019-10-10 PROCEDURE — 6360000002 HC RX W HCPCS: Performed by: HOSPITALIST

## 2019-10-10 PROCEDURE — 2580000003 HC RX 258: Performed by: INTERNAL MEDICINE

## 2019-10-10 PROCEDURE — 0B9J8ZX DRAINAGE OF LEFT LOWER LUNG LOBE, VIA NATURAL OR ARTIFICIAL OPENING ENDOSCOPIC, DIAGNOSTIC: ICD-10-PCS | Performed by: INTERNAL MEDICINE

## 2019-10-10 PROCEDURE — 88112 CYTOPATH CELL ENHANCE TECH: CPT

## 2019-10-10 PROCEDURE — 3609010800 HC BRONCHOSCOPY ALVEOLAR LAVAGE: Performed by: INTERNAL MEDICINE

## 2019-10-10 PROCEDURE — 99232 SBSQ HOSP IP/OBS MODERATE 35: CPT | Performed by: INTERNAL MEDICINE

## 2019-10-10 PROCEDURE — 3609011500 HC BRONCHOSCOPY DIAGNOSTIC OR CELL WASH ONLY: Performed by: INTERNAL MEDICINE

## 2019-10-10 PROCEDURE — 87116 MYCOBACTERIA CULTURE: CPT

## 2019-10-10 PROCEDURE — 99153 MOD SED SAME PHYS/QHP EA: CPT | Performed by: INTERNAL MEDICINE

## 2019-10-10 PROCEDURE — 82565 ASSAY OF CREATININE: CPT

## 2019-10-10 PROCEDURE — 0B9M8ZZ DRAINAGE OF BILATERAL LUNGS, VIA NATURAL OR ARTIFICIAL OPENING ENDOSCOPIC: ICD-10-PCS | Performed by: INTERNAL MEDICINE

## 2019-10-10 PROCEDURE — 88305 TISSUE EXAM BY PATHOLOGIST: CPT

## 2019-10-10 PROCEDURE — 89051 BODY FLUID CELL COUNT: CPT

## 2019-10-10 RX ORDER — LIDOCAINE HYDROCHLORIDE 40 MG/ML
INJECTION, SOLUTION RETROBULBAR; TOPICAL
Status: COMPLETED | OUTPATIENT
Start: 2019-10-10 | End: 2019-10-10

## 2019-10-10 RX ORDER — LIDOCAINE HYDROCHLORIDE 20 MG/ML
INJECTION, SOLUTION EPIDURAL; INFILTRATION; INTRACAUDAL; PERINEURAL
Status: COMPLETED | OUTPATIENT
Start: 2019-10-10 | End: 2019-10-10

## 2019-10-10 RX ORDER — FENTANYL CITRATE 50 UG/ML
INJECTION, SOLUTION INTRAMUSCULAR; INTRAVENOUS
Status: COMPLETED | OUTPATIENT
Start: 2019-10-10 | End: 2019-10-10

## 2019-10-10 RX ORDER — MIDAZOLAM HYDROCHLORIDE 1 MG/ML
INJECTION INTRAMUSCULAR; INTRAVENOUS
Status: COMPLETED | OUTPATIENT
Start: 2019-10-10 | End: 2019-10-10

## 2019-10-10 RX ADMIN — CEFEPIME HYDROCHLORIDE 2 G: 2 INJECTION, POWDER, FOR SOLUTION INTRAVENOUS at 00:46

## 2019-10-10 RX ADMIN — BENZONATATE 100 MG: 100 CAPSULE ORAL at 20:49

## 2019-10-10 RX ADMIN — METRONIDAZOLE 500 MG: 500 INJECTION, SOLUTION INTRAVENOUS at 21:56

## 2019-10-10 RX ADMIN — SODIUM CHLORIDE: 9 INJECTION, SOLUTION INTRAVENOUS at 07:27

## 2019-10-10 RX ADMIN — CLONIDINE HYDROCHLORIDE 0.1 MG: 0.1 TABLET ORAL at 10:41

## 2019-10-10 RX ADMIN — HYDROCODONE BITARTRATE AND ACETAMINOPHEN 1 TABLET: 7.5; 325 TABLET ORAL at 20:45

## 2019-10-10 RX ADMIN — Medication 2 CAPSULE: at 10:41

## 2019-10-10 RX ADMIN — SODIUM CHLORIDE: 9 INJECTION, SOLUTION INTRAVENOUS at 05:50

## 2019-10-10 RX ADMIN — FAMOTIDINE 20 MG: 20 TABLET ORAL at 20:45

## 2019-10-10 RX ADMIN — ALBUTEROL SULFATE 2.5 MG: 2.5 SOLUTION RESPIRATORY (INHALATION) at 11:37

## 2019-10-10 RX ADMIN — ALBUTEROL SULFATE 2.5 MG: 2.5 SOLUTION RESPIRATORY (INHALATION) at 19:35

## 2019-10-10 RX ADMIN — ZOLPIDEM TARTRATE 10 MG: 5 TABLET ORAL at 20:45

## 2019-10-10 RX ADMIN — PREDNISONE 40 MG: 20 TABLET ORAL at 10:41

## 2019-10-10 RX ADMIN — VANCOMYCIN HYDROCHLORIDE 1000 MG: 1 INJECTION, POWDER, LYOPHILIZED, FOR SOLUTION INTRAVENOUS at 01:35

## 2019-10-10 RX ADMIN — HYDROCODONE BITARTRATE AND ACETAMINOPHEN 1 TABLET: 7.5; 325 TABLET ORAL at 12:15

## 2019-10-10 RX ADMIN — CITALOPRAM HYDROBROMIDE 40 MG: 20 TABLET ORAL at 10:41

## 2019-10-10 RX ADMIN — ALBUTEROL SULFATE 2.5 MG: 2.5 SOLUTION RESPIRATORY (INHALATION) at 15:51

## 2019-10-10 RX ADMIN — BENZONATATE 100 MG: 100 CAPSULE ORAL at 10:41

## 2019-10-10 RX ADMIN — PROPRANOLOL HYDROCHLORIDE 160 MG: 80 CAPSULE, EXTENDED RELEASE ORAL at 10:41

## 2019-10-10 RX ADMIN — METRONIDAZOLE 500 MG: 500 INJECTION, SOLUTION INTRAVENOUS at 06:21

## 2019-10-10 RX ADMIN — CLONIDINE HYDROCHLORIDE 0.1 MG: 0.1 TABLET ORAL at 20:45

## 2019-10-10 RX ADMIN — FAMOTIDINE 20 MG: 20 TABLET ORAL at 10:41

## 2019-10-10 RX ADMIN — NIFEDIPINE 60 MG: 60 TABLET, FILM COATED, EXTENDED RELEASE ORAL at 10:41

## 2019-10-10 RX ADMIN — METRONIDAZOLE 500 MG: 500 INJECTION, SOLUTION INTRAVENOUS at 15:03

## 2019-10-10 RX ADMIN — CEFEPIME HYDROCHLORIDE 2 G: 2 INJECTION, POWDER, FOR SOLUTION INTRAVENOUS at 12:15

## 2019-10-10 RX ADMIN — Medication 2 CAPSULE: at 16:38

## 2019-10-10 RX ADMIN — GUAIFENESIN AND DEXTROMETHORPHAN 10 ML: 100; 10 SYRUP ORAL at 20:45

## 2019-10-10 ASSESSMENT — PAIN DESCRIPTION - PROGRESSION
CLINICAL_PROGRESSION: NOT CHANGED

## 2019-10-10 ASSESSMENT — PAIN DESCRIPTION - PAIN TYPE
TYPE: CHRONIC PAIN

## 2019-10-10 ASSESSMENT — PAIN DESCRIPTION - FREQUENCY
FREQUENCY: CONTINUOUS

## 2019-10-10 ASSESSMENT — PAIN DESCRIPTION - LOCATION
LOCATION: BACK
LOCATION: HIP;LEG
LOCATION: BACK

## 2019-10-10 ASSESSMENT — PAIN DESCRIPTION - ORIENTATION
ORIENTATION: LEFT

## 2019-10-10 ASSESSMENT — PAIN DESCRIPTION - ONSET
ONSET: ON-GOING
ONSET: ON-GOING

## 2019-10-10 ASSESSMENT — PAIN DESCRIPTION - DESCRIPTORS
DESCRIPTORS: SHARP;DISCOMFORT
DESCRIPTORS: SHOOTING
DESCRIPTORS: STABBING
DESCRIPTORS: SHOOTING

## 2019-10-10 ASSESSMENT — PAIN SCALES - GENERAL
PAINLEVEL_OUTOF10: 10
PAINLEVEL_OUTOF10: 0
PAINLEVEL_OUTOF10: 7

## 2019-10-10 ASSESSMENT — PAIN - FUNCTIONAL ASSESSMENT
PAIN_FUNCTIONAL_ASSESSMENT: ACTIVITIES ARE NOT PREVENTED
PAIN_FUNCTIONAL_ASSESSMENT: 0-10

## 2019-10-11 PROCEDURE — 94760 N-INVAS EAR/PLS OXIMETRY 1: CPT

## 2019-10-11 PROCEDURE — 86612 BLASTOMYCES ANTIBODY: CPT

## 2019-10-11 PROCEDURE — 6360000002 HC RX W HCPCS: Performed by: INTERNAL MEDICINE

## 2019-10-11 PROCEDURE — 86635 COCCIDIOIDES ANTIBODY: CPT

## 2019-10-11 PROCEDURE — 2500000003 HC RX 250 WO HCPCS: Performed by: HOSPITALIST

## 2019-10-11 PROCEDURE — 94669 MECHANICAL CHEST WALL OSCILL: CPT

## 2019-10-11 PROCEDURE — 6370000000 HC RX 637 (ALT 250 FOR IP): Performed by: HOSPITALIST

## 2019-10-11 PROCEDURE — 2580000003 HC RX 258: Performed by: HOSPITALIST

## 2019-10-11 PROCEDURE — 86606 ASPERGILLUS ANTIBODY: CPT

## 2019-10-11 PROCEDURE — 6370000000 HC RX 637 (ALT 250 FOR IP): Performed by: INTERNAL MEDICINE

## 2019-10-11 PROCEDURE — 87449 NOS EACH ORGANISM AG IA: CPT

## 2019-10-11 PROCEDURE — 2580000003 HC RX 258: Performed by: INTERNAL MEDICINE

## 2019-10-11 PROCEDURE — 6360000002 HC RX W HCPCS: Performed by: HOSPITALIST

## 2019-10-11 PROCEDURE — 99232 SBSQ HOSP IP/OBS MODERATE 35: CPT | Performed by: INTERNAL MEDICINE

## 2019-10-11 PROCEDURE — 94640 AIRWAY INHALATION TREATMENT: CPT

## 2019-10-11 PROCEDURE — 87385 HISTOPLASMA CAPSUL AG IA: CPT

## 2019-10-11 PROCEDURE — 86698 HISTOPLASMA ANTIBODY: CPT

## 2019-10-11 PROCEDURE — 1200000000 HC SEMI PRIVATE

## 2019-10-11 RX ADMIN — Medication 2 CAPSULE: at 07:49

## 2019-10-11 RX ADMIN — METRONIDAZOLE 500 MG: 500 INJECTION, SOLUTION INTRAVENOUS at 05:11

## 2019-10-11 RX ADMIN — CITALOPRAM HYDROBROMIDE 40 MG: 20 TABLET ORAL at 07:50

## 2019-10-11 RX ADMIN — CLONIDINE HYDROCHLORIDE 0.1 MG: 0.1 TABLET ORAL at 20:08

## 2019-10-11 RX ADMIN — CEFEPIME HYDROCHLORIDE 2 G: 2 INJECTION, POWDER, FOR SOLUTION INTRAVENOUS at 13:16

## 2019-10-11 RX ADMIN — HYDROCODONE BITARTRATE AND ACETAMINOPHEN 1 TABLET: 7.5; 325 TABLET ORAL at 04:41

## 2019-10-11 RX ADMIN — FAMOTIDINE 20 MG: 20 TABLET ORAL at 07:50

## 2019-10-11 RX ADMIN — BENZONATATE 100 MG: 100 CAPSULE ORAL at 04:42

## 2019-10-11 RX ADMIN — HYDROXYZINE HYDROCHLORIDE 25 MG: 10 TABLET, FILM COATED ORAL at 04:42

## 2019-10-11 RX ADMIN — ALBUTEROL SULFATE 2.5 MG: 2.5 SOLUTION RESPIRATORY (INHALATION) at 11:36

## 2019-10-11 RX ADMIN — PROPRANOLOL HYDROCHLORIDE 160 MG: 80 CAPSULE, EXTENDED RELEASE ORAL at 07:53

## 2019-10-11 RX ADMIN — CEFEPIME HYDROCHLORIDE 2 G: 2 INJECTION, POWDER, FOR SOLUTION INTRAVENOUS at 00:12

## 2019-10-11 RX ADMIN — SODIUM CHLORIDE: 9 INJECTION, SOLUTION INTRAVENOUS at 01:02

## 2019-10-11 RX ADMIN — VANCOMYCIN HYDROCHLORIDE 1000 MG: 1 INJECTION, POWDER, LYOPHILIZED, FOR SOLUTION INTRAVENOUS at 01:23

## 2019-10-11 RX ADMIN — ALBUTEROL SULFATE 2.5 MG: 2.5 SOLUTION RESPIRATORY (INHALATION) at 16:13

## 2019-10-11 RX ADMIN — HYDROCODONE BITARTRATE AND ACETAMINOPHEN 1 TABLET: 7.5; 325 TABLET ORAL at 13:23

## 2019-10-11 RX ADMIN — HYDROCODONE BITARTRATE AND ACETAMINOPHEN 1 TABLET: 7.5; 325 TABLET ORAL at 20:08

## 2019-10-11 RX ADMIN — ALBUTEROL SULFATE 2.5 MG: 2.5 SOLUTION RESPIRATORY (INHALATION) at 19:53

## 2019-10-11 RX ADMIN — METRONIDAZOLE 500 MG: 500 INJECTION, SOLUTION INTRAVENOUS at 14:35

## 2019-10-11 RX ADMIN — Medication 2 CAPSULE: at 17:00

## 2019-10-11 RX ADMIN — FAMOTIDINE 20 MG: 20 TABLET ORAL at 20:08

## 2019-10-11 RX ADMIN — BENZONATATE 100 MG: 100 CAPSULE ORAL at 20:08

## 2019-10-11 RX ADMIN — METRONIDAZOLE 500 MG: 500 INJECTION, SOLUTION INTRAVENOUS at 21:56

## 2019-10-11 RX ADMIN — NIFEDIPINE 60 MG: 60 TABLET, FILM COATED, EXTENDED RELEASE ORAL at 07:50

## 2019-10-11 RX ADMIN — PREDNISONE 40 MG: 20 TABLET ORAL at 07:50

## 2019-10-11 RX ADMIN — ALBUTEROL SULFATE 2.5 MG: 2.5 SOLUTION RESPIRATORY (INHALATION) at 07:55

## 2019-10-11 RX ADMIN — ZOLPIDEM TARTRATE 10 MG: 5 TABLET ORAL at 20:08

## 2019-10-11 RX ADMIN — CLONIDINE HYDROCHLORIDE 0.1 MG: 0.1 TABLET ORAL at 07:49

## 2019-10-11 ASSESSMENT — PAIN SCALES - GENERAL
PAINLEVEL_OUTOF10: 10
PAINLEVEL_OUTOF10: 3
PAINLEVEL_OUTOF10: 6
PAINLEVEL_OUTOF10: 8
PAINLEVEL_OUTOF10: 10
PAINLEVEL_OUTOF10: 10
PAINLEVEL_OUTOF10: 8

## 2019-10-11 ASSESSMENT — PAIN - FUNCTIONAL ASSESSMENT: PAIN_FUNCTIONAL_ASSESSMENT: ACTIVITIES ARE NOT PREVENTED

## 2019-10-11 ASSESSMENT — PAIN DESCRIPTION - PAIN TYPE
TYPE: CHRONIC PAIN

## 2019-10-11 ASSESSMENT — PAIN DESCRIPTION - DESCRIPTORS
DESCRIPTORS: STABBING
DESCRIPTORS: SHARP;DISCOMFORT
DESCRIPTORS: THROBBING
DESCRIPTORS: DISCOMFORT

## 2019-10-11 ASSESSMENT — PAIN DESCRIPTION - DIRECTION: RADIATING_TOWARDS: SPINE

## 2019-10-11 ASSESSMENT — PAIN DESCRIPTION - LOCATION
LOCATION: LEG;HIP
LOCATION: BACK
LOCATION: HIP;LEG
LOCATION: BACK;HIP;LEG

## 2019-10-11 ASSESSMENT — PAIN DESCRIPTION - ONSET: ONSET: ON-GOING

## 2019-10-11 ASSESSMENT — PAIN DESCRIPTION - FREQUENCY
FREQUENCY: CONTINUOUS
FREQUENCY: INTERMITTENT
FREQUENCY: CONTINUOUS
FREQUENCY: INTERMITTENT

## 2019-10-11 ASSESSMENT — PAIN DESCRIPTION - PROGRESSION: CLINICAL_PROGRESSION: NOT CHANGED

## 2019-10-11 ASSESSMENT — PAIN DESCRIPTION - ORIENTATION
ORIENTATION: LEFT
ORIENTATION: LEFT

## 2019-10-12 LAB
ALBUMIN SERPL-MCNC: 3.6 G/DL (ref 3.4–5)
ANION GAP SERPL CALCULATED.3IONS-SCNC: 15 MMOL/L (ref 3–16)
BASOPHILS ABSOLUTE: 0.1 K/UL (ref 0–0.2)
BASOPHILS RELATIVE PERCENT: 0.4 %
BLOOD CULTURE, ROUTINE: NORMAL
BUN BLDV-MCNC: 15 MG/DL (ref 7–20)
CALCIUM SERPL-MCNC: 9 MG/DL (ref 8.3–10.6)
CHLORIDE BLD-SCNC: 105 MMOL/L (ref 99–110)
CO2: 23 MMOL/L (ref 21–32)
CREAT SERPL-MCNC: 1 MG/DL (ref 0.6–1.1)
CULTURE, BLOOD 2: NORMAL
CULTURE, RESPIRATORY: NORMAL
CULTURE, RESPIRATORY: NORMAL
EOSINOPHILS ABSOLUTE: 0 K/UL (ref 0–0.6)
EOSINOPHILS RELATIVE PERCENT: 0.3 %
GFR AFRICAN AMERICAN: >60
GFR NON-AFRICAN AMERICAN: 57
GLUCOSE BLD-MCNC: 90 MG/DL (ref 70–99)
GRAM STAIN RESULT: NORMAL
GRAM STAIN RESULT: NORMAL
HCT VFR BLD CALC: 32.3 % (ref 36–48)
HEMOGLOBIN: 10.1 G/DL (ref 12–16)
LYMPHOCYTES ABSOLUTE: 3.1 K/UL (ref 1–5.1)
LYMPHOCYTES RELATIVE PERCENT: 24.4 %
MAGNESIUM: 2.2 MG/DL (ref 1.8–2.4)
MCH RBC QN AUTO: 27.2 PG (ref 26–34)
MCHC RBC AUTO-ENTMCNC: 31.3 G/DL (ref 31–36)
MCV RBC AUTO: 86.7 FL (ref 80–100)
MONOCYTES ABSOLUTE: 1.4 K/UL (ref 0–1.3)
MONOCYTES RELATIVE PERCENT: 11.1 %
NEUTROPHILS ABSOLUTE: 8.2 K/UL (ref 1.7–7.7)
NEUTROPHILS RELATIVE PERCENT: 63.8 %
PDW BLD-RTO: 16.4 % (ref 12.4–15.4)
PHOSPHORUS: 3 MG/DL (ref 2.5–4.9)
PLATELET # BLD: 533 K/UL (ref 135–450)
PMV BLD AUTO: 7.7 FL (ref 5–10.5)
POTASSIUM SERPL-SCNC: 3.8 MMOL/L (ref 3.5–5.1)
RBC # BLD: 3.72 M/UL (ref 4–5.2)
SODIUM BLD-SCNC: 143 MMOL/L (ref 136–145)
WBC # BLD: 12.8 K/UL (ref 4–11)

## 2019-10-12 PROCEDURE — 2580000003 HC RX 258: Performed by: HOSPITALIST

## 2019-10-12 PROCEDURE — 6370000000 HC RX 637 (ALT 250 FOR IP): Performed by: INTERNAL MEDICINE

## 2019-10-12 PROCEDURE — 83735 ASSAY OF MAGNESIUM: CPT

## 2019-10-12 PROCEDURE — 6360000002 HC RX W HCPCS: Performed by: INTERNAL MEDICINE

## 2019-10-12 PROCEDURE — 2500000003 HC RX 250 WO HCPCS: Performed by: HOSPITALIST

## 2019-10-12 PROCEDURE — 80069 RENAL FUNCTION PANEL: CPT

## 2019-10-12 PROCEDURE — 6360000002 HC RX W HCPCS: Performed by: HOSPITALIST

## 2019-10-12 PROCEDURE — 2580000003 HC RX 258: Performed by: INTERNAL MEDICINE

## 2019-10-12 PROCEDURE — 94640 AIRWAY INHALATION TREATMENT: CPT

## 2019-10-12 PROCEDURE — 6370000000 HC RX 637 (ALT 250 FOR IP): Performed by: HOSPITALIST

## 2019-10-12 PROCEDURE — 1200000000 HC SEMI PRIVATE

## 2019-10-12 PROCEDURE — 36415 COLL VENOUS BLD VENIPUNCTURE: CPT

## 2019-10-12 PROCEDURE — 94760 N-INVAS EAR/PLS OXIMETRY 1: CPT

## 2019-10-12 PROCEDURE — 85025 COMPLETE CBC W/AUTO DIFF WBC: CPT

## 2019-10-12 PROCEDURE — 94669 MECHANICAL CHEST WALL OSCILL: CPT

## 2019-10-12 RX ADMIN — FAMOTIDINE 20 MG: 20 TABLET ORAL at 10:37

## 2019-10-12 RX ADMIN — CEFEPIME HYDROCHLORIDE 2 G: 2 INJECTION, POWDER, FOR SOLUTION INTRAVENOUS at 00:30

## 2019-10-12 RX ADMIN — ALBUTEROL SULFATE 2.5 MG: 2.5 SOLUTION RESPIRATORY (INHALATION) at 15:57

## 2019-10-12 RX ADMIN — ZOLPIDEM TARTRATE 10 MG: 5 TABLET ORAL at 22:23

## 2019-10-12 RX ADMIN — HYDROCODONE BITARTRATE AND ACETAMINOPHEN 1 TABLET: 7.5; 325 TABLET ORAL at 22:23

## 2019-10-12 RX ADMIN — BENZONATATE 100 MG: 100 CAPSULE ORAL at 22:23

## 2019-10-12 RX ADMIN — Medication 10 ML: at 20:27

## 2019-10-12 RX ADMIN — METRONIDAZOLE 500 MG: 500 INJECTION, SOLUTION INTRAVENOUS at 15:48

## 2019-10-12 RX ADMIN — HYDROCODONE BITARTRATE AND ACETAMINOPHEN 1 TABLET: 7.5; 325 TABLET ORAL at 05:22

## 2019-10-12 RX ADMIN — BENZONATATE 100 MG: 100 CAPSULE ORAL at 10:37

## 2019-10-12 RX ADMIN — ALBUTEROL SULFATE 2.5 MG: 2.5 SOLUTION RESPIRATORY (INHALATION) at 11:33

## 2019-10-12 RX ADMIN — METRONIDAZOLE 500 MG: 500 INJECTION, SOLUTION INTRAVENOUS at 05:19

## 2019-10-12 RX ADMIN — CEFEPIME HYDROCHLORIDE 2 G: 2 INJECTION, POWDER, FOR SOLUTION INTRAVENOUS at 12:40

## 2019-10-12 RX ADMIN — Medication 2 CAPSULE: at 10:49

## 2019-10-12 RX ADMIN — NIFEDIPINE 60 MG: 60 TABLET, FILM COATED, EXTENDED RELEASE ORAL at 10:36

## 2019-10-12 RX ADMIN — FAMOTIDINE 20 MG: 20 TABLET ORAL at 20:17

## 2019-10-12 RX ADMIN — ALBUTEROL SULFATE 2.5 MG: 2.5 SOLUTION RESPIRATORY (INHALATION) at 20:10

## 2019-10-12 RX ADMIN — ALBUTEROL SULFATE 2.5 MG: 2.5 SOLUTION RESPIRATORY (INHALATION) at 07:35

## 2019-10-12 RX ADMIN — CLONIDINE HYDROCHLORIDE 0.1 MG: 0.1 TABLET ORAL at 20:17

## 2019-10-12 RX ADMIN — HYDROCODONE BITARTRATE AND ACETAMINOPHEN 1 TABLET: 7.5; 325 TABLET ORAL at 13:30

## 2019-10-12 RX ADMIN — ENOXAPARIN SODIUM 40 MG: 40 INJECTION SUBCUTANEOUS at 10:38

## 2019-10-12 RX ADMIN — CLONIDINE HYDROCHLORIDE 0.1 MG: 0.1 TABLET ORAL at 10:37

## 2019-10-12 RX ADMIN — PREDNISONE 40 MG: 20 TABLET ORAL at 10:37

## 2019-10-12 RX ADMIN — CITALOPRAM HYDROBROMIDE 40 MG: 20 TABLET ORAL at 10:37

## 2019-10-12 RX ADMIN — METRONIDAZOLE 500 MG: 500 INJECTION, SOLUTION INTRAVENOUS at 22:15

## 2019-10-12 RX ADMIN — PROPRANOLOL HYDROCHLORIDE 160 MG: 80 CAPSULE, EXTENDED RELEASE ORAL at 10:36

## 2019-10-12 ASSESSMENT — PAIN DESCRIPTION - ORIENTATION: ORIENTATION: LEFT

## 2019-10-12 ASSESSMENT — PAIN DESCRIPTION - ONSET: ONSET: ON-GOING

## 2019-10-12 ASSESSMENT — PAIN SCALES - GENERAL
PAINLEVEL_OUTOF10: 9
PAINLEVEL_OUTOF10: 10
PAINLEVEL_OUTOF10: 10
PAINLEVEL_OUTOF10: 8
PAINLEVEL_OUTOF10: 10
PAINLEVEL_OUTOF10: 0
PAINLEVEL_OUTOF10: 6

## 2019-10-12 ASSESSMENT — PAIN DESCRIPTION - FREQUENCY: FREQUENCY: CONTINUOUS

## 2019-10-12 ASSESSMENT — PAIN - FUNCTIONAL ASSESSMENT: PAIN_FUNCTIONAL_ASSESSMENT: ACTIVITIES ARE NOT PREVENTED

## 2019-10-12 ASSESSMENT — PAIN DESCRIPTION - PAIN TYPE: TYPE: CHRONIC PAIN

## 2019-10-12 ASSESSMENT — PAIN DESCRIPTION - LOCATION: LOCATION: HIP;BACK;LEG

## 2019-10-12 ASSESSMENT — PAIN DESCRIPTION - DESCRIPTORS: DESCRIPTORS: ACHING;DISCOMFORT

## 2019-10-13 LAB
(1,3)-BETA-D-GLUCAN (FUNGITELL) INTERPRETATION: NEGATIVE
(1,3)-BETA-D-GLUCAN (FUNGITELL): <31 PG/ML
HISTOPLASMA ANTIGEN URINE INTERP: NOT DETECTED
HISTOPLASMA ANTIGEN URINE: NOT DETECTED NG/ML

## 2019-10-13 PROCEDURE — 94760 N-INVAS EAR/PLS OXIMETRY 1: CPT

## 2019-10-13 PROCEDURE — 6370000000 HC RX 637 (ALT 250 FOR IP): Performed by: HOSPITALIST

## 2019-10-13 PROCEDURE — 94669 MECHANICAL CHEST WALL OSCILL: CPT

## 2019-10-13 PROCEDURE — 2580000003 HC RX 258: Performed by: HOSPITALIST

## 2019-10-13 PROCEDURE — 2500000003 HC RX 250 WO HCPCS: Performed by: HOSPITALIST

## 2019-10-13 PROCEDURE — 6360000002 HC RX W HCPCS: Performed by: HOSPITALIST

## 2019-10-13 PROCEDURE — 94640 AIRWAY INHALATION TREATMENT: CPT

## 2019-10-13 PROCEDURE — 1200000000 HC SEMI PRIVATE

## 2019-10-13 PROCEDURE — 6360000002 HC RX W HCPCS: Performed by: INTERNAL MEDICINE

## 2019-10-13 PROCEDURE — 2580000003 HC RX 258: Performed by: INTERNAL MEDICINE

## 2019-10-13 PROCEDURE — 6370000000 HC RX 637 (ALT 250 FOR IP): Performed by: INTERNAL MEDICINE

## 2019-10-13 RX ADMIN — ALBUTEROL SULFATE 2.5 MG: 2.5 SOLUTION RESPIRATORY (INHALATION) at 20:27

## 2019-10-13 RX ADMIN — BENZONATATE 100 MG: 100 CAPSULE ORAL at 22:03

## 2019-10-13 RX ADMIN — HYDROCODONE BITARTRATE AND ACETAMINOPHEN 1 TABLET: 7.5; 325 TABLET ORAL at 15:38

## 2019-10-13 RX ADMIN — Medication 10 ML: at 22:04

## 2019-10-13 RX ADMIN — CLONIDINE HYDROCHLORIDE 0.1 MG: 0.1 TABLET ORAL at 20:43

## 2019-10-13 RX ADMIN — CEFEPIME HYDROCHLORIDE 2 G: 2 INJECTION, POWDER, FOR SOLUTION INTRAVENOUS at 01:16

## 2019-10-13 RX ADMIN — ALBUTEROL SULFATE 2.5 MG: 2.5 SOLUTION RESPIRATORY (INHALATION) at 11:58

## 2019-10-13 RX ADMIN — HYDROCODONE BITARTRATE AND ACETAMINOPHEN 1 TABLET: 7.5; 325 TABLET ORAL at 22:03

## 2019-10-13 RX ADMIN — CEFEPIME HYDROCHLORIDE 2 G: 2 INJECTION, POWDER, FOR SOLUTION INTRAVENOUS at 13:16

## 2019-10-13 RX ADMIN — ZOLPIDEM TARTRATE 10 MG: 5 TABLET ORAL at 22:03

## 2019-10-13 RX ADMIN — FAMOTIDINE 20 MG: 20 TABLET ORAL at 08:43

## 2019-10-13 RX ADMIN — METRONIDAZOLE 500 MG: 500 INJECTION, SOLUTION INTRAVENOUS at 06:07

## 2019-10-13 RX ADMIN — FAMOTIDINE 20 MG: 20 TABLET ORAL at 20:43

## 2019-10-13 RX ADMIN — Medication 2 CAPSULE: at 08:43

## 2019-10-13 RX ADMIN — CITALOPRAM HYDROBROMIDE 40 MG: 20 TABLET ORAL at 08:43

## 2019-10-13 RX ADMIN — Medication 2 CAPSULE: at 17:24

## 2019-10-13 RX ADMIN — HYDROCODONE BITARTRATE AND ACETAMINOPHEN 1 TABLET: 7.5; 325 TABLET ORAL at 06:12

## 2019-10-13 RX ADMIN — METRONIDAZOLE 500 MG: 500 INJECTION, SOLUTION INTRAVENOUS at 22:04

## 2019-10-13 RX ADMIN — CLONIDINE HYDROCHLORIDE 0.1 MG: 0.1 TABLET ORAL at 08:43

## 2019-10-13 RX ADMIN — ENOXAPARIN SODIUM 40 MG: 40 INJECTION SUBCUTANEOUS at 08:43

## 2019-10-13 RX ADMIN — METRONIDAZOLE 500 MG: 500 INJECTION, SOLUTION INTRAVENOUS at 15:31

## 2019-10-13 RX ADMIN — ALBUTEROL SULFATE 2.5 MG: 2.5 SOLUTION RESPIRATORY (INHALATION) at 07:53

## 2019-10-13 RX ADMIN — PROPRANOLOL HYDROCHLORIDE 160 MG: 80 CAPSULE, EXTENDED RELEASE ORAL at 08:49

## 2019-10-13 RX ADMIN — NIFEDIPINE 60 MG: 60 TABLET, FILM COATED, EXTENDED RELEASE ORAL at 08:43

## 2019-10-13 RX ADMIN — ALBUTEROL SULFATE 2.5 MG: 2.5 SOLUTION RESPIRATORY (INHALATION) at 16:58

## 2019-10-13 ASSESSMENT — PAIN SCALES - GENERAL
PAINLEVEL_OUTOF10: 9
PAINLEVEL_OUTOF10: 6
PAINLEVEL_OUTOF10: 4
PAINLEVEL_OUTOF10: 8
PAINLEVEL_OUTOF10: 10
PAINLEVEL_OUTOF10: 10
PAINLEVEL_OUTOF10: 0

## 2019-10-13 ASSESSMENT — PAIN DESCRIPTION - PROGRESSION: CLINICAL_PROGRESSION: NOT CHANGED

## 2019-10-13 ASSESSMENT — PAIN DESCRIPTION - LOCATION
LOCATION: BACK
LOCATION: BACK

## 2019-10-13 ASSESSMENT — PAIN DESCRIPTION - DESCRIPTORS
DESCRIPTORS: ACHING;DISCOMFORT
DESCRIPTORS: ACHING;DISCOMFORT

## 2019-10-13 ASSESSMENT — PAIN DESCRIPTION - FREQUENCY
FREQUENCY: CONTINUOUS
FREQUENCY: CONTINUOUS

## 2019-10-13 ASSESSMENT — PAIN DESCRIPTION - ONSET
ONSET: ON-GOING
ONSET: ON-GOING

## 2019-10-13 ASSESSMENT — PAIN - FUNCTIONAL ASSESSMENT: PAIN_FUNCTIONAL_ASSESSMENT: ACTIVITIES ARE NOT PREVENTED

## 2019-10-13 ASSESSMENT — PAIN DESCRIPTION - ORIENTATION: ORIENTATION: POSTERIOR;MID

## 2019-10-13 ASSESSMENT — PAIN DESCRIPTION - PAIN TYPE
TYPE: CHRONIC PAIN
TYPE: CHRONIC PAIN

## 2019-10-14 ENCOUNTER — APPOINTMENT (OUTPATIENT)
Dept: GENERAL RADIOLOGY | Age: 59
DRG: 195 | End: 2019-10-14
Payer: COMMERCIAL

## 2019-10-14 LAB
ALBUMIN SERPL-MCNC: 3.5 G/DL (ref 3.4–5)
ANION GAP SERPL CALCULATED.3IONS-SCNC: 13 MMOL/L (ref 3–16)
BUN BLDV-MCNC: 17 MG/DL (ref 7–20)
CALCIUM SERPL-MCNC: 8.5 MG/DL (ref 8.3–10.6)
CHLORIDE BLD-SCNC: 105 MMOL/L (ref 99–110)
CO2: 22 MMOL/L (ref 21–32)
CREAT SERPL-MCNC: 1 MG/DL (ref 0.6–1.1)
GFR AFRICAN AMERICAN: >60
GFR NON-AFRICAN AMERICAN: 57
GLUCOSE BLD-MCNC: 116 MG/DL (ref 70–99)
HCT VFR BLD CALC: 30.4 % (ref 36–48)
HEMOGLOBIN: 9.8 G/DL (ref 12–16)
MCH RBC QN AUTO: 27.7 PG (ref 26–34)
MCHC RBC AUTO-ENTMCNC: 32.2 G/DL (ref 31–36)
MCV RBC AUTO: 86.2 FL (ref 80–100)
PDW BLD-RTO: 16.1 % (ref 12.4–15.4)
PHOSPHORUS: 3.1 MG/DL (ref 2.5–4.9)
PLATELET # BLD: 398 K/UL (ref 135–450)
PMV BLD AUTO: 7.7 FL (ref 5–10.5)
POTASSIUM SERPL-SCNC: 3.9 MMOL/L (ref 3.5–5.1)
RBC # BLD: 3.53 M/UL (ref 4–5.2)
SODIUM BLD-SCNC: 140 MMOL/L (ref 136–145)
WBC # BLD: 8.3 K/UL (ref 4–11)

## 2019-10-14 PROCEDURE — 6370000000 HC RX 637 (ALT 250 FOR IP): Performed by: HOSPITALIST

## 2019-10-14 PROCEDURE — 6370000000 HC RX 637 (ALT 250 FOR IP): Performed by: INTERNAL MEDICINE

## 2019-10-14 PROCEDURE — 2500000003 HC RX 250 WO HCPCS: Performed by: HOSPITALIST

## 2019-10-14 PROCEDURE — 6360000002 HC RX W HCPCS: Performed by: HOSPITALIST

## 2019-10-14 PROCEDURE — 36415 COLL VENOUS BLD VENIPUNCTURE: CPT

## 2019-10-14 PROCEDURE — 6360000002 HC RX W HCPCS: Performed by: INTERNAL MEDICINE

## 2019-10-14 PROCEDURE — 85027 COMPLETE CBC AUTOMATED: CPT

## 2019-10-14 PROCEDURE — 94760 N-INVAS EAR/PLS OXIMETRY 1: CPT

## 2019-10-14 PROCEDURE — 80069 RENAL FUNCTION PANEL: CPT

## 2019-10-14 PROCEDURE — 94640 AIRWAY INHALATION TREATMENT: CPT

## 2019-10-14 PROCEDURE — 71046 X-RAY EXAM CHEST 2 VIEWS: CPT

## 2019-10-14 PROCEDURE — 2580000003 HC RX 258: Performed by: HOSPITALIST

## 2019-10-14 PROCEDURE — 2580000003 HC RX 258: Performed by: INTERNAL MEDICINE

## 2019-10-14 PROCEDURE — 99232 SBSQ HOSP IP/OBS MODERATE 35: CPT | Performed by: INTERNAL MEDICINE

## 2019-10-14 PROCEDURE — 1200000000 HC SEMI PRIVATE

## 2019-10-14 RX ORDER — METRONIDAZOLE 500 MG/1
500 TABLET ORAL EVERY 8 HOURS SCHEDULED
Status: DISCONTINUED | OUTPATIENT
Start: 2019-10-14 | End: 2019-10-15 | Stop reason: HOSPADM

## 2019-10-14 RX ORDER — LEVOFLOXACIN 750 MG/1
750 TABLET ORAL DAILY
Qty: 7 TABLET | Refills: 0 | Status: SHIPPED | OUTPATIENT
Start: 2019-10-14 | End: 2019-10-21

## 2019-10-14 RX ORDER — METRONIDAZOLE 500 MG/1
500 TABLET ORAL 3 TIMES DAILY
Qty: 21 TABLET | Refills: 0 | Status: SHIPPED | OUTPATIENT
Start: 2019-10-14 | End: 2019-10-21

## 2019-10-14 RX ADMIN — NIFEDIPINE 60 MG: 60 TABLET, FILM COATED, EXTENDED RELEASE ORAL at 09:30

## 2019-10-14 RX ADMIN — CLONIDINE HYDROCHLORIDE 0.1 MG: 0.1 TABLET ORAL at 09:30

## 2019-10-14 RX ADMIN — CEFEPIME HYDROCHLORIDE 2 G: 2 INJECTION, POWDER, FOR SOLUTION INTRAVENOUS at 02:15

## 2019-10-14 RX ADMIN — ONDANSETRON 4 MG: 2 INJECTION INTRAMUSCULAR; INTRAVENOUS at 06:07

## 2019-10-14 RX ADMIN — FAMOTIDINE 20 MG: 20 TABLET ORAL at 09:31

## 2019-10-14 RX ADMIN — ZOLPIDEM TARTRATE 10 MG: 5 TABLET ORAL at 20:29

## 2019-10-14 RX ADMIN — HYDROCODONE BITARTRATE AND ACETAMINOPHEN 1 TABLET: 7.5; 325 TABLET ORAL at 20:29

## 2019-10-14 RX ADMIN — ENOXAPARIN SODIUM 40 MG: 40 INJECTION SUBCUTANEOUS at 09:31

## 2019-10-14 RX ADMIN — ALBUTEROL SULFATE 2.5 MG: 2.5 SOLUTION RESPIRATORY (INHALATION) at 08:18

## 2019-10-14 RX ADMIN — Medication 2 CAPSULE: at 09:31

## 2019-10-14 RX ADMIN — Medication 10 ML: at 20:31

## 2019-10-14 RX ADMIN — CLONIDINE HYDROCHLORIDE 0.1 MG: 0.1 TABLET ORAL at 20:23

## 2019-10-14 RX ADMIN — METRONIDAZOLE 500 MG: 500 TABLET ORAL at 20:23

## 2019-10-14 RX ADMIN — METRONIDAZOLE 500 MG: 500 TABLET ORAL at 18:18

## 2019-10-14 RX ADMIN — HYDROCODONE BITARTRATE AND ACETAMINOPHEN 1 TABLET: 7.5; 325 TABLET ORAL at 10:56

## 2019-10-14 RX ADMIN — Medication 2 CAPSULE: at 18:17

## 2019-10-14 RX ADMIN — FAMOTIDINE 20 MG: 20 TABLET ORAL at 20:23

## 2019-10-14 RX ADMIN — PROPRANOLOL HYDROCHLORIDE 160 MG: 80 CAPSULE, EXTENDED RELEASE ORAL at 09:31

## 2019-10-14 RX ADMIN — BENZONATATE 100 MG: 100 CAPSULE ORAL at 20:30

## 2019-10-14 RX ADMIN — CITALOPRAM HYDROBROMIDE 40 MG: 20 TABLET ORAL at 09:30

## 2019-10-14 RX ADMIN — CEFEPIME HYDROCHLORIDE 2 G: 2 INJECTION, POWDER, FOR SOLUTION INTRAVENOUS at 15:04

## 2019-10-14 ASSESSMENT — PAIN DESCRIPTION - ORIENTATION
ORIENTATION: POSTERIOR
ORIENTATION: POSTERIOR
ORIENTATION: RIGHT;POSTERIOR

## 2019-10-14 ASSESSMENT — PAIN DESCRIPTION - FREQUENCY
FREQUENCY: CONTINUOUS

## 2019-10-14 ASSESSMENT — PAIN SCALES - GENERAL
PAINLEVEL_OUTOF10: 0
PAINLEVEL_OUTOF10: 6
PAINLEVEL_OUTOF10: 0
PAINLEVEL_OUTOF10: 7
PAINLEVEL_OUTOF10: 0
PAINLEVEL_OUTOF10: 10
PAINLEVEL_OUTOF10: 7
PAINLEVEL_OUTOF10: 5
PAINLEVEL_OUTOF10: 0

## 2019-10-14 ASSESSMENT — PAIN DESCRIPTION - LOCATION
LOCATION: BACK;LEG
LOCATION: BACK
LOCATION: BACK;LEG
LOCATION: BACK;LEG

## 2019-10-14 ASSESSMENT — PAIN DESCRIPTION - PAIN TYPE
TYPE: CHRONIC PAIN
TYPE: SURGICAL PAIN
TYPE: CHRONIC PAIN
TYPE: CHRONIC PAIN

## 2019-10-14 ASSESSMENT — PAIN DESCRIPTION - PROGRESSION
CLINICAL_PROGRESSION: NOT CHANGED
CLINICAL_PROGRESSION: NOT CHANGED

## 2019-10-14 ASSESSMENT — PAIN DESCRIPTION - ONSET
ONSET: ON-GOING

## 2019-10-14 ASSESSMENT — PAIN - FUNCTIONAL ASSESSMENT
PAIN_FUNCTIONAL_ASSESSMENT: ACTIVITIES ARE NOT PREVENTED
PAIN_FUNCTIONAL_ASSESSMENT: ACTIVITIES ARE NOT PREVENTED

## 2019-10-14 ASSESSMENT — PAIN DESCRIPTION - DIRECTION: RADIATING_TOWARDS: LEFT LEG

## 2019-10-14 ASSESSMENT — PAIN DESCRIPTION - DESCRIPTORS
DESCRIPTORS: ACHING
DESCRIPTORS: SHOOTING;SHARP
DESCRIPTORS: SHOOTING;SHARP
DESCRIPTORS: SHOOTING

## 2019-10-15 VITALS
SYSTOLIC BLOOD PRESSURE: 134 MMHG | DIASTOLIC BLOOD PRESSURE: 75 MMHG | BODY MASS INDEX: 30.2 KG/M2 | OXYGEN SATURATION: 97 % | TEMPERATURE: 98.3 F | HEART RATE: 60 BPM | HEIGHT: 63 IN | RESPIRATION RATE: 16 BRPM | WEIGHT: 170.42 LBS

## 2019-10-15 DIAGNOSIS — M17.11 PRIMARY OSTEOARTHRITIS OF RIGHT KNEE: ICD-10-CM

## 2019-10-15 LAB
ALBUMIN SERPL-MCNC: 3.1 G/DL (ref 3.4–5)
ANION GAP SERPL CALCULATED.3IONS-SCNC: 12 MMOL/L (ref 3–16)
ASPERGILLUS ANTIBODY ID: NORMAL
BLASTOMYCES ANTIBODY ID: NORMAL
BUN BLDV-MCNC: 15 MG/DL (ref 7–20)
CALCIUM SERPL-MCNC: 8.7 MG/DL (ref 8.3–10.6)
CHLORIDE BLD-SCNC: 101 MMOL/L (ref 99–110)
CO2: 26 MMOL/L (ref 21–32)
COCCIDIOIDES ANTIBODY ID: NORMAL
CREAT SERPL-MCNC: 0.9 MG/DL (ref 0.6–1.1)
GFR AFRICAN AMERICAN: >60
GFR NON-AFRICAN AMERICAN: >60
GLUCOSE BLD-MCNC: 102 MG/DL (ref 70–99)
HCT VFR BLD CALC: 29.5 % (ref 36–48)
HEMOGLOBIN: 9.4 G/DL (ref 12–16)
HISTOPLASMA ABS, ID: NORMAL
MAGNESIUM: 2.1 MG/DL (ref 1.8–2.4)
MCH RBC QN AUTO: 27.6 PG (ref 26–34)
MCHC RBC AUTO-ENTMCNC: 31.9 G/DL (ref 31–36)
MCV RBC AUTO: 86.7 FL (ref 80–100)
PDW BLD-RTO: 16.6 % (ref 12.4–15.4)
PHOSPHORUS: 3 MG/DL (ref 2.5–4.9)
PLATELET # BLD: 347 K/UL (ref 135–450)
PMV BLD AUTO: 7.8 FL (ref 5–10.5)
POTASSIUM SERPL-SCNC: 4.1 MMOL/L (ref 3.5–5.1)
RBC # BLD: 3.4 M/UL (ref 4–5.2)
SODIUM BLD-SCNC: 139 MMOL/L (ref 136–145)
WBC # BLD: 6.4 K/UL (ref 4–11)

## 2019-10-15 PROCEDURE — 80069 RENAL FUNCTION PANEL: CPT

## 2019-10-15 PROCEDURE — 6370000000 HC RX 637 (ALT 250 FOR IP): Performed by: INTERNAL MEDICINE

## 2019-10-15 PROCEDURE — 94669 MECHANICAL CHEST WALL OSCILL: CPT

## 2019-10-15 PROCEDURE — 36415 COLL VENOUS BLD VENIPUNCTURE: CPT

## 2019-10-15 PROCEDURE — 6360000002 HC RX W HCPCS: Performed by: INTERNAL MEDICINE

## 2019-10-15 PROCEDURE — 6360000002 HC RX W HCPCS: Performed by: HOSPITALIST

## 2019-10-15 PROCEDURE — 94760 N-INVAS EAR/PLS OXIMETRY 1: CPT

## 2019-10-15 PROCEDURE — 2580000003 HC RX 258: Performed by: HOSPITALIST

## 2019-10-15 PROCEDURE — 94640 AIRWAY INHALATION TREATMENT: CPT

## 2019-10-15 PROCEDURE — 83735 ASSAY OF MAGNESIUM: CPT

## 2019-10-15 PROCEDURE — 2580000003 HC RX 258: Performed by: INTERNAL MEDICINE

## 2019-10-15 PROCEDURE — 6370000000 HC RX 637 (ALT 250 FOR IP): Performed by: HOSPITALIST

## 2019-10-15 PROCEDURE — 85027 COMPLETE CBC AUTOMATED: CPT

## 2019-10-15 RX ORDER — LACTOBACILLUS RHAMNOSUS GG 10B CELL
2 CAPSULE ORAL 2 TIMES DAILY WITH MEALS
Qty: 28 CAPSULE | Refills: 0 | Status: SHIPPED | OUTPATIENT
Start: 2019-10-15 | End: 2019-10-22

## 2019-10-15 RX ORDER — TIZANIDINE 4 MG/1
TABLET ORAL
Qty: 40 TABLET | Refills: 0 | Status: SHIPPED | OUTPATIENT
Start: 2019-10-15 | End: 2019-11-07 | Stop reason: SDUPTHER

## 2019-10-15 RX ORDER — BENZONATATE 100 MG/1
100 CAPSULE ORAL 3 TIMES DAILY PRN
Qty: 21 CAPSULE | Refills: 0 | Status: SHIPPED | OUTPATIENT
Start: 2019-10-15 | End: 2019-10-22

## 2019-10-15 RX ADMIN — Medication 10 ML: at 08:44

## 2019-10-15 RX ADMIN — CITALOPRAM HYDROBROMIDE 40 MG: 20 TABLET ORAL at 08:42

## 2019-10-15 RX ADMIN — CEFEPIME HYDROCHLORIDE 2 G: 2 INJECTION, POWDER, FOR SOLUTION INTRAVENOUS at 01:07

## 2019-10-15 RX ADMIN — HYDROCODONE BITARTRATE AND ACETAMINOPHEN 1 TABLET: 7.5; 325 TABLET ORAL at 05:53

## 2019-10-15 RX ADMIN — PROPRANOLOL HYDROCHLORIDE 160 MG: 80 CAPSULE, EXTENDED RELEASE ORAL at 09:06

## 2019-10-15 RX ADMIN — NIFEDIPINE 60 MG: 60 TABLET, FILM COATED, EXTENDED RELEASE ORAL at 08:42

## 2019-10-15 RX ADMIN — CLONIDINE HYDROCHLORIDE 0.1 MG: 0.1 TABLET ORAL at 08:42

## 2019-10-15 RX ADMIN — Medication 2 CAPSULE: at 11:15

## 2019-10-15 RX ADMIN — METRONIDAZOLE 500 MG: 500 TABLET ORAL at 05:49

## 2019-10-15 RX ADMIN — FAMOTIDINE 20 MG: 20 TABLET ORAL at 08:42

## 2019-10-15 RX ADMIN — ENOXAPARIN SODIUM 40 MG: 40 INJECTION SUBCUTANEOUS at 08:42

## 2019-10-15 RX ADMIN — ALBUTEROL SULFATE 2.5 MG: 2.5 SOLUTION RESPIRATORY (INHALATION) at 08:04

## 2019-10-15 RX ADMIN — BENZONATATE 100 MG: 100 CAPSULE ORAL at 11:16

## 2019-10-15 ASSESSMENT — PAIN SCALES - GENERAL: PAINLEVEL_OUTOF10: 6

## 2019-10-16 ENCOUNTER — CARE COORDINATION (OUTPATIENT)
Dept: CASE MANAGEMENT | Age: 59
End: 2019-10-16

## 2019-10-17 ENCOUNTER — CARE COORDINATION (OUTPATIENT)
Dept: CASE MANAGEMENT | Age: 59
End: 2019-10-17

## 2019-10-17 ENCOUNTER — TELEPHONE (OUTPATIENT)
Dept: ORTHOPEDIC SURGERY | Age: 59
End: 2019-10-17

## 2019-10-17 DIAGNOSIS — J18.9 COMMUNITY ACQUIRED PNEUMONIA, UNSPECIFIED LATERALITY: Primary | ICD-10-CM

## 2019-10-17 DIAGNOSIS — M54.16 LUMBAR RADICULOPATHY: Primary | ICD-10-CM

## 2019-10-17 DIAGNOSIS — M43.16 SPONDYLOLISTHESIS OF LUMBAR REGION: ICD-10-CM

## 2019-10-18 ENCOUNTER — CARE COORDINATION (OUTPATIENT)
Dept: CASE MANAGEMENT | Age: 59
End: 2019-10-18

## 2019-10-21 ENCOUNTER — CARE COORDINATION (OUTPATIENT)
Dept: CASE MANAGEMENT | Age: 59
End: 2019-10-21

## 2019-10-21 ENCOUNTER — TELEPHONE (OUTPATIENT)
Dept: FAMILY MEDICINE CLINIC | Age: 59
End: 2019-10-21

## 2019-10-22 ENCOUNTER — PATIENT MESSAGE (OUTPATIENT)
Dept: FAMILY MEDICINE CLINIC | Age: 59
End: 2019-10-22

## 2019-10-22 ENCOUNTER — TELEPHONE (OUTPATIENT)
Dept: ORTHOPEDIC SURGERY | Age: 59
End: 2019-10-22

## 2019-10-22 DIAGNOSIS — M51.9 LUMBAR DISC DISEASE: Primary | ICD-10-CM

## 2019-10-22 DIAGNOSIS — F41.1 GAD (GENERALIZED ANXIETY DISORDER): Primary | ICD-10-CM

## 2019-10-22 RX ORDER — HYDROCODONE BITARTRATE AND ACETAMINOPHEN 7.5; 325 MG/1; MG/1
1 TABLET ORAL EVERY 6 HOURS PRN
Qty: 120 TABLET | Refills: 0 | Status: SHIPPED | OUTPATIENT
Start: 2019-10-22 | End: 2019-11-15 | Stop reason: SDUPTHER

## 2019-10-22 RX ORDER — LORAZEPAM 1 MG/1
TABLET ORAL
Qty: 90 TABLET | Refills: 1 | Status: SHIPPED | OUTPATIENT
Start: 2019-10-22 | End: 2019-11-15 | Stop reason: SDUPTHER

## 2019-10-25 ENCOUNTER — CARE COORDINATION (OUTPATIENT)
Dept: CASE MANAGEMENT | Age: 59
End: 2019-10-25

## 2019-10-29 ENCOUNTER — CARE COORDINATION (OUTPATIENT)
Dept: CASE MANAGEMENT | Age: 59
End: 2019-10-29

## 2019-10-30 ENCOUNTER — OFFICE VISIT (OUTPATIENT)
Dept: FAMILY MEDICINE CLINIC | Age: 59
End: 2019-10-30
Payer: COMMERCIAL

## 2019-10-30 VITALS
DIASTOLIC BLOOD PRESSURE: 78 MMHG | TEMPERATURE: 99 F | HEIGHT: 63 IN | BODY MASS INDEX: 30.11 KG/M2 | SYSTOLIC BLOOD PRESSURE: 130 MMHG | HEART RATE: 73 BPM | OXYGEN SATURATION: 95 %

## 2019-10-30 DIAGNOSIS — J18.9 PNEUMONIA OF BOTH LOWER LOBES DUE TO INFECTIOUS ORGANISM: Primary | ICD-10-CM

## 2019-10-30 PROCEDURE — 99214 OFFICE O/P EST MOD 30 MIN: CPT | Performed by: FAMILY MEDICINE

## 2019-10-30 RX ORDER — LEVOFLOXACIN 500 MG/1
500 TABLET, FILM COATED ORAL DAILY
Qty: 10 TABLET | Refills: 0 | Status: SHIPPED | OUTPATIENT
Start: 2019-10-30 | End: 2019-11-09

## 2019-10-30 RX ORDER — FLUCONAZOLE 150 MG/1
150 TABLET ORAL
Qty: 2 TABLET | Refills: 0 | Status: SHIPPED | OUTPATIENT
Start: 2019-10-30 | End: 2019-11-05

## 2019-10-30 ASSESSMENT — ENCOUNTER SYMPTOMS
CHEST TIGHTNESS: 1
DIFFICULTY BREATHING: 1
RHINORRHEA: 1
SHORTNESS OF BREATH: 1
COUGH: 1
WHEEZING: 1
HOARSE VOICE: 1
SPUTUM PRODUCTION: 1
GASTROINTESTINAL NEGATIVE: 1

## 2019-11-01 DIAGNOSIS — F51.01 PRIMARY INSOMNIA: ICD-10-CM

## 2019-11-01 RX ORDER — ZOLPIDEM TARTRATE 10 MG/1
TABLET ORAL
Qty: 30 TABLET | Refills: 0 | Status: SHIPPED | OUTPATIENT
Start: 2019-11-01 | End: 2019-11-29 | Stop reason: SDUPTHER

## 2019-11-04 ENCOUNTER — TELEPHONE (OUTPATIENT)
Dept: ORTHOPEDIC SURGERY | Age: 59
End: 2019-11-04

## 2019-11-04 ENCOUNTER — HOSPITAL ENCOUNTER (OUTPATIENT)
Age: 59
Discharge: HOME | End: 2019-11-04
Payer: COMMERCIAL

## 2019-11-04 ENCOUNTER — HOSPITAL ENCOUNTER (OUTPATIENT)
Dept: GENERAL RADIOLOGY | Age: 59
Discharge: HOME | End: 2019-11-04
Payer: COMMERCIAL

## 2019-11-04 DIAGNOSIS — J18.9 PNEUMONIA OF BOTH LOWER LOBES DUE TO INFECTIOUS ORGANISM: ICD-10-CM

## 2019-11-04 PROCEDURE — 71046 X-RAY EXAM CHEST 2 VIEWS: CPT

## 2019-11-05 ENCOUNTER — TELEPHONE (OUTPATIENT)
Dept: ORTHOPEDIC SURGERY | Age: 59
End: 2019-11-05

## 2019-11-06 ENCOUNTER — TELEPHONE (OUTPATIENT)
Dept: ORTHOPEDIC SURGERY | Age: 59
End: 2019-11-06

## 2019-11-07 DIAGNOSIS — M17.11 PRIMARY OSTEOARTHRITIS OF RIGHT KNEE: ICD-10-CM

## 2019-11-07 RX ORDER — TIZANIDINE 4 MG/1
TABLET ORAL
Qty: 40 TABLET | Refills: 0 | Status: SHIPPED | OUTPATIENT
Start: 2019-11-07 | End: 2019-11-13 | Stop reason: SDUPTHER

## 2019-11-08 ENCOUNTER — TELEPHONE (OUTPATIENT)
Dept: FAMILY MEDICINE CLINIC | Age: 59
End: 2019-11-08

## 2019-11-11 LAB
FUNGUS (MYCOLOGY) CULTURE: NORMAL
FUNGUS STAIN: NORMAL

## 2019-11-13 DIAGNOSIS — M17.11 PRIMARY OSTEOARTHRITIS OF RIGHT KNEE: ICD-10-CM

## 2019-11-13 RX ORDER — TIZANIDINE 4 MG/1
TABLET ORAL
Qty: 40 TABLET | Refills: 0 | Status: SHIPPED | OUTPATIENT
Start: 2019-11-13 | End: 2019-12-04 | Stop reason: SDUPTHER

## 2019-11-15 ENCOUNTER — OFFICE VISIT (OUTPATIENT)
Dept: FAMILY MEDICINE CLINIC | Age: 59
End: 2019-11-15
Payer: COMMERCIAL

## 2019-11-15 VITALS
WEIGHT: 170 LBS | OXYGEN SATURATION: 98 % | SYSTOLIC BLOOD PRESSURE: 134 MMHG | HEART RATE: 90 BPM | DIASTOLIC BLOOD PRESSURE: 68 MMHG | BODY MASS INDEX: 30.11 KG/M2

## 2019-11-15 DIAGNOSIS — F41.1 GAD (GENERALIZED ANXIETY DISORDER): ICD-10-CM

## 2019-11-15 DIAGNOSIS — M51.9 LUMBAR DISC DISEASE: Primary | ICD-10-CM

## 2019-11-15 PROCEDURE — 99213 OFFICE O/P EST LOW 20 MIN: CPT | Performed by: FAMILY MEDICINE

## 2019-11-16 RX ORDER — HYDROCHLOROTHIAZIDE 25 MG/1
25 TABLET ORAL EVERY MORNING
Qty: 30 TABLET | Refills: 5 | Status: SHIPPED | OUTPATIENT
Start: 2019-11-16 | End: 2020-05-15

## 2019-11-16 RX ORDER — LORAZEPAM 1 MG/1
TABLET ORAL
Qty: 90 TABLET | Refills: 2 | Status: SHIPPED | OUTPATIENT
Start: 2019-11-21 | End: 2019-12-21

## 2019-11-16 RX ORDER — HYDROCODONE BITARTRATE AND ACETAMINOPHEN 7.5; 325 MG/1; MG/1
1 TABLET ORAL EVERY 6 HOURS PRN
Qty: 120 TABLET | Refills: 0 | Status: SHIPPED | OUTPATIENT
Start: 2019-11-21 | End: 2019-12-26 | Stop reason: SDUPTHER

## 2019-11-17 ASSESSMENT — ENCOUNTER SYMPTOMS
RESPIRATORY NEGATIVE: 1
GASTROINTESTINAL NEGATIVE: 1
BACK PAIN: 1

## 2019-11-21 ENCOUNTER — OFFICE VISIT (OUTPATIENT)
Dept: PULMONOLOGY | Age: 59
End: 2019-11-21
Payer: COMMERCIAL

## 2019-11-21 VITALS
SYSTOLIC BLOOD PRESSURE: 126 MMHG | RESPIRATION RATE: 15 BRPM | OXYGEN SATURATION: 98 % | HEART RATE: 55 BPM | WEIGHT: 166 LBS | BODY MASS INDEX: 29.41 KG/M2 | HEIGHT: 63 IN | DIASTOLIC BLOOD PRESSURE: 72 MMHG | TEMPERATURE: 97.9 F

## 2019-11-21 DIAGNOSIS — R53.83 OTHER FATIGUE: ICD-10-CM

## 2019-11-21 DIAGNOSIS — J18.9 PNEUMONIA OF BOTH LUNGS DUE TO INFECTIOUS ORGANISM, UNSPECIFIED PART OF LUNG: Primary | ICD-10-CM

## 2019-11-21 PROCEDURE — 99213 OFFICE O/P EST LOW 20 MIN: CPT | Performed by: INTERNAL MEDICINE

## 2019-11-26 LAB
AFB CULTURE (MYCOBACTERIA): NORMAL
AFB SMEAR: NORMAL

## 2019-11-29 DIAGNOSIS — F51.01 PRIMARY INSOMNIA: ICD-10-CM

## 2019-11-29 RX ORDER — ZOLPIDEM TARTRATE 10 MG/1
TABLET ORAL
Qty: 30 TABLET | Refills: 0 | Status: SHIPPED | OUTPATIENT
Start: 2019-11-29 | End: 2020-01-06

## 2019-12-04 DIAGNOSIS — M17.11 PRIMARY OSTEOARTHRITIS OF RIGHT KNEE: ICD-10-CM

## 2019-12-04 RX ORDER — TIZANIDINE 4 MG/1
TABLET ORAL
Qty: 40 TABLET | Refills: 0 | Status: SHIPPED | OUTPATIENT
Start: 2019-12-04 | End: 2020-01-06

## 2019-12-13 DIAGNOSIS — M17.11 PRIMARY OSTEOARTHRITIS OF RIGHT KNEE: ICD-10-CM

## 2019-12-13 RX ORDER — TIZANIDINE 4 MG/1
TABLET ORAL
Qty: 40 TABLET | Refills: 0 | Status: SHIPPED | OUTPATIENT
Start: 2019-12-13 | End: 2019-12-27

## 2019-12-20 ENCOUNTER — TELEPHONE (OUTPATIENT)
Dept: ORTHOPEDIC SURGERY | Age: 59
End: 2019-12-20

## 2019-12-26 DIAGNOSIS — M51.9 LUMBAR DISC DISEASE: ICD-10-CM

## 2019-12-26 RX ORDER — HYDROCODONE BITARTRATE AND ACETAMINOPHEN 7.5; 325 MG/1; MG/1
1 TABLET ORAL EVERY 6 HOURS PRN
Qty: 120 TABLET | Refills: 0 | Status: SHIPPED | OUTPATIENT
Start: 2019-12-26 | End: 2019-12-27

## 2019-12-27 ENCOUNTER — OFFICE VISIT (OUTPATIENT)
Dept: ORTHOPEDIC SURGERY | Age: 59
End: 2019-12-27
Payer: COMMERCIAL

## 2019-12-27 VITALS
BODY MASS INDEX: 29.41 KG/M2 | DIASTOLIC BLOOD PRESSURE: 78 MMHG | HEART RATE: 82 BPM | WEIGHT: 166.01 LBS | SYSTOLIC BLOOD PRESSURE: 130 MMHG | HEIGHT: 63 IN

## 2019-12-27 DIAGNOSIS — M43.16 SPONDYLOLISTHESIS OF LUMBAR REGION: ICD-10-CM

## 2019-12-27 DIAGNOSIS — M48.061 SPINAL STENOSIS OF LUMBAR REGION, UNSPECIFIED WHETHER NEUROGENIC CLAUDICATION PRESENT: ICD-10-CM

## 2019-12-27 DIAGNOSIS — M54.16 LUMBAR RADICULOPATHY: Primary | ICD-10-CM

## 2019-12-27 PROCEDURE — 99214 OFFICE O/P EST MOD 30 MIN: CPT | Performed by: PHYSICAL MEDICINE & REHABILITATION

## 2020-01-06 RX ORDER — ZOLPIDEM TARTRATE 10 MG/1
TABLET ORAL
Qty: 30 TABLET | Refills: 0 | Status: SHIPPED | OUTPATIENT
Start: 2020-01-06 | End: 2020-01-22 | Stop reason: SDUPTHER

## 2020-01-06 RX ORDER — TIZANIDINE 4 MG/1
TABLET ORAL
Qty: 40 TABLET | Refills: 0 | Status: SHIPPED | OUTPATIENT
Start: 2020-01-06 | End: 2020-01-15

## 2020-01-09 ENCOUNTER — TELEPHONE (OUTPATIENT)
Dept: ORTHOPEDIC SURGERY | Age: 60
End: 2020-01-09

## 2020-01-09 NOTE — TELEPHONE ENCOUNTER
DOS   01/20/2020  CPT   10195  68883   03850.26  81230  DX   M54.16    M43.16   M48.061  OP SX AUTH  NPR    LEFT  LEVELS   L4  L5   PROCEDURE   TRANS FORAMINAL LAZARA    [facility],[address]:   [address]

## 2020-01-11 RX ORDER — CITALOPRAM 40 MG/1
TABLET ORAL
Qty: 30 TABLET | Refills: 4 | Status: SHIPPED | OUTPATIENT
Start: 2020-01-11 | End: 2020-06-11

## 2020-01-15 RX ORDER — TIZANIDINE 4 MG/1
TABLET ORAL
Qty: 40 TABLET | Refills: 0 | Status: SHIPPED | OUTPATIENT
Start: 2020-01-15 | End: 2020-01-16

## 2020-01-16 RX ORDER — TIZANIDINE 4 MG/1
TABLET ORAL
Qty: 40 TABLET | Refills: 0 | Status: SHIPPED | OUTPATIENT
Start: 2020-01-16 | End: 2020-02-03

## 2020-01-20 ENCOUNTER — APPOINTMENT (OUTPATIENT)
Dept: GENERAL RADIOLOGY | Age: 60
End: 2020-01-20
Attending: PHYSICAL MEDICINE & REHABILITATION
Payer: COMMERCIAL

## 2020-01-20 ENCOUNTER — HOSPITAL ENCOUNTER (OUTPATIENT)
Age: 60
Setting detail: OUTPATIENT SURGERY
Discharge: HOME | End: 2020-01-20
Attending: PHYSICAL MEDICINE & REHABILITATION | Admitting: PHYSICAL MEDICINE & REHABILITATION
Payer: COMMERCIAL

## 2020-01-20 VITALS
HEIGHT: 62 IN | RESPIRATION RATE: 18 BRPM | OXYGEN SATURATION: 100 % | TEMPERATURE: 97.7 F | BODY MASS INDEX: 31.28 KG/M2 | WEIGHT: 170 LBS | HEART RATE: 61 BPM | SYSTOLIC BLOOD PRESSURE: 147 MMHG | DIASTOLIC BLOOD PRESSURE: 69 MMHG

## 2020-01-20 PROCEDURE — 99152 MOD SED SAME PHYS/QHP 5/>YRS: CPT | Performed by: PHYSICAL MEDICINE & REHABILITATION

## 2020-01-20 PROCEDURE — 3610000056 HC PAIN LEVEL 4 BASE (NON-OR): Performed by: PHYSICAL MEDICINE & REHABILITATION

## 2020-01-20 PROCEDURE — 2500000003 HC RX 250 WO HCPCS: Performed by: PHYSICAL MEDICINE & REHABILITATION

## 2020-01-20 PROCEDURE — 6360000002 HC RX W HCPCS: Performed by: PHYSICAL MEDICINE & REHABILITATION

## 2020-01-20 PROCEDURE — 2709999900 HC NON-CHARGEABLE SUPPLY: Performed by: PHYSICAL MEDICINE & REHABILITATION

## 2020-01-20 PROCEDURE — 3209999900 FLUORO FOR SURGICAL PROCEDURES

## 2020-01-20 RX ORDER — BUPIVACAINE HYDROCHLORIDE 5 MG/ML
INJECTION, SOLUTION EPIDURAL; INTRACAUDAL
Status: COMPLETED | OUTPATIENT
Start: 2020-01-20 | End: 2020-01-20

## 2020-01-20 RX ORDER — BETAMETHASONE SODIUM PHOSPHATE AND BETAMETHASONE ACETATE 3; 3 MG/ML; MG/ML
INJECTION, SUSPENSION INTRA-ARTICULAR; INTRALESIONAL; INTRAMUSCULAR; SOFT TISSUE
Status: COMPLETED | OUTPATIENT
Start: 2020-01-20 | End: 2020-01-20

## 2020-01-20 RX ORDER — MIDAZOLAM HYDROCHLORIDE 1 MG/ML
INJECTION INTRAMUSCULAR; INTRAVENOUS
Status: COMPLETED | OUTPATIENT
Start: 2020-01-20 | End: 2020-01-20

## 2020-01-20 RX ORDER — LIDOCAINE HYDROCHLORIDE 10 MG/ML
INJECTION, SOLUTION INFILTRATION; PERINEURAL
Status: COMPLETED | OUTPATIENT
Start: 2020-01-20 | End: 2020-01-20

## 2020-01-20 ASSESSMENT — PAIN DESCRIPTION - DESCRIPTORS: DESCRIPTORS: STABBING

## 2020-01-20 ASSESSMENT — PAIN SCALES - GENERAL
PAINLEVEL_OUTOF10: 0
PAINLEVEL_OUTOF10: 0

## 2020-01-20 ASSESSMENT — PAIN - FUNCTIONAL ASSESSMENT: PAIN_FUNCTIONAL_ASSESSMENT: 0-10

## 2020-01-20 NOTE — H&P
DO Piotr   vitamin D (ERGOCALCIFEROL) 14577 units CAPS capsule Take 50,000 Units by mouth every 30 days   Yes Historical Provider, MD   cloNIDine (CATAPRES) 0.1 MG tablet Take 1 tablet by mouth 2 times daily 9/30/19  Yes Demetrice Saldaña DO   NIFEdipine (PROCARDIA XL) 60 MG extended release tablet Take 1 tablet by mouth daily 9/30/19  Yes Demetrice Saldaña DO   propranolol (INDERAL LA) 160 MG extended release capsule One daily 9/30/19  Yes Demetrice Saldaña DO     Allergies:  Penicillins  Social History:    reports that she has quit smoking. Her smoking use included cigarettes. She has a 3.75 pack-year smoking history. She has never used smokeless tobacco. She reports previous alcohol use. She reports previous drug use. Drug: Marijuana. Family History:   History reviewed. No pertinent family history. Vitals: Blood pressure (!) 143/70, pulse 63, temperature 97.7 °F (36.5 °C), temperature source Temporal, resp. rate 16, height 5' 2\" (1.575 m), weight 170 lb (77.1 kg), SpO2 100 %, not currently breastfeeding. PHYSICAL EXAM:including affected areas  HENT: Airway patent and reviewed  Cardiovascular: Normal rate, regular rhythm, normal heart sounds. Pulmonary/Chest: No wheezes. No rhonchi. No rales. Abdominal: Soft. Bowel sounds are normal. No distension. Extremities: Moves all extremities equally  Cervical and Lumbar Spine: Painful range of motion, no midline tenderness       Diagnosis:Lumbar radiculopathy  M54.16  M43.16  M48.061    Plan: Proceed with planned procedure      ASA CLASS:         []   I. Normal, healthy adult           [x]   II.  Mild systemic disease            []   III. Severe systemic disease      Mallampati: Mallampati Class II - (soft palate, fauces & uvula are visible)      Sedation plan:   [x]  Local              []  Minimal                  []  General anesthesia    Patient's condition acceptable for planned procedure/sedation.    Post Procedure Plan   Return to same level of care   ______________________     The risks and benefits as well as alternatives to the procedure have been discussed with the patient and or family. The patient and or next of kin understands and agrees to proceed.     Thierry Robertson M.D.

## 2020-01-22 ENCOUNTER — OFFICE VISIT (OUTPATIENT)
Dept: FAMILY MEDICINE CLINIC | Age: 60
End: 2020-01-22
Payer: COMMERCIAL

## 2020-01-22 VITALS
WEIGHT: 160 LBS | HEART RATE: 63 BPM | SYSTOLIC BLOOD PRESSURE: 130 MMHG | DIASTOLIC BLOOD PRESSURE: 82 MMHG | BODY MASS INDEX: 29.44 KG/M2 | OXYGEN SATURATION: 97 % | HEIGHT: 62 IN | TEMPERATURE: 98 F

## 2020-01-22 PROCEDURE — 99214 OFFICE O/P EST MOD 30 MIN: CPT | Performed by: FAMILY MEDICINE

## 2020-01-22 RX ORDER — ZOLPIDEM TARTRATE 10 MG/1
TABLET ORAL
Qty: 30 TABLET | Refills: 2 | Status: SHIPPED | OUTPATIENT
Start: 2020-02-05 | End: 2020-05-03

## 2020-01-22 RX ORDER — CLOTRIMAZOLE AND BETAMETHASONE DIPROPIONATE 10; .64 MG/G; MG/G
CREAM TOPICAL
Qty: 15 G | Refills: 1 | Status: SHIPPED | OUTPATIENT
Start: 2020-01-22 | End: 2020-12-05

## 2020-01-22 RX ORDER — HYDROCODONE BITARTRATE AND ACETAMINOPHEN 7.5; 325 MG/1; MG/1
1 TABLET ORAL EVERY 6 HOURS PRN
Qty: 120 TABLET | Refills: 0 | Status: SHIPPED | OUTPATIENT
Start: 2020-01-26 | End: 2020-02-19 | Stop reason: SDUPTHER

## 2020-01-23 ASSESSMENT — PATIENT HEALTH QUESTIONNAIRE - PHQ9
2. FEELING DOWN, DEPRESSED OR HOPELESS: 1
SUM OF ALL RESPONSES TO PHQ QUESTIONS 1-9: 2
1. LITTLE INTEREST OR PLEASURE IN DOING THINGS: 1
SUM OF ALL RESPONSES TO PHQ QUESTIONS 1-9: 2
SUM OF ALL RESPONSES TO PHQ9 QUESTIONS 1 & 2: 2

## 2020-01-23 ASSESSMENT — ENCOUNTER SYMPTOMS
BACK PAIN: 1
GASTROINTESTINAL NEGATIVE: 1
RESPIRATORY NEGATIVE: 1

## 2020-01-27 ENCOUNTER — TELEPHONE (OUTPATIENT)
Dept: ORTHOPEDIC SURGERY | Age: 60
End: 2020-01-27

## 2020-01-27 NOTE — TELEPHONE ENCOUNTER
L/M FOR PATIENT returned call and left info regarding symptoms she left on the vm. Requested she contact the scheduling department to reschedule her follow up appointment that was cancelled.

## 2020-02-03 RX ORDER — TIZANIDINE 4 MG/1
TABLET ORAL
Qty: 40 TABLET | Refills: 0 | Status: SHIPPED | OUTPATIENT
Start: 2020-02-03 | End: 2020-02-11

## 2020-02-11 RX ORDER — TIZANIDINE 4 MG/1
TABLET ORAL
Qty: 40 TABLET | Refills: 0 | Status: SHIPPED | OUTPATIENT
Start: 2020-02-11 | End: 2020-03-04

## 2020-02-19 ENCOUNTER — TELEPHONE (OUTPATIENT)
Dept: FAMILY MEDICINE CLINIC | Age: 60
End: 2020-02-19

## 2020-02-19 RX ORDER — HYDROCODONE BITARTRATE AND ACETAMINOPHEN 7.5; 325 MG/1; MG/1
1 TABLET ORAL EVERY 6 HOURS PRN
Qty: 120 TABLET | Refills: 0 | Status: SHIPPED | OUTPATIENT
Start: 2020-02-19 | End: 2020-03-23 | Stop reason: SDUPTHER

## 2020-03-04 RX ORDER — TIZANIDINE 4 MG/1
TABLET ORAL
Qty: 40 TABLET | Refills: 0 | Status: SHIPPED | OUTPATIENT
Start: 2020-03-04 | End: 2020-03-13

## 2020-03-13 RX ORDER — TIZANIDINE 4 MG/1
TABLET ORAL
Qty: 40 TABLET | Refills: 0 | Status: SHIPPED | OUTPATIENT
Start: 2020-03-13 | End: 2020-04-05

## 2020-03-16 RX ORDER — HYDROXYZINE HYDROCHLORIDE 25 MG/1
TABLET, FILM COATED ORAL
Qty: 60 TABLET | Refills: 0 | Status: SHIPPED | OUTPATIENT
Start: 2020-03-16 | End: 2020-04-14

## 2020-03-18 RX ORDER — LORAZEPAM 1 MG/1
TABLET ORAL
Qty: 90 TABLET | Refills: 0 | Status: SHIPPED | OUTPATIENT
Start: 2020-03-18 | End: 2020-04-15

## 2020-03-23 ENCOUNTER — PATIENT MESSAGE (OUTPATIENT)
Dept: FAMILY MEDICINE CLINIC | Age: 60
End: 2020-03-23

## 2020-03-23 ENCOUNTER — TELEPHONE (OUTPATIENT)
Dept: FAMILY MEDICINE CLINIC | Age: 60
End: 2020-03-23

## 2020-03-23 RX ORDER — HYDROCODONE BITARTRATE AND ACETAMINOPHEN 7.5; 325 MG/1; MG/1
1 TABLET ORAL EVERY 6 HOURS PRN
Qty: 120 TABLET | Refills: 0 | Status: SHIPPED | OUTPATIENT
Start: 2020-03-23 | End: 2020-04-22

## 2020-03-23 RX ORDER — HYDROCODONE BITARTRATE AND ACETAMINOPHEN 7.5; 325 MG/1; MG/1
1 TABLET ORAL EVERY 6 HOURS PRN
Qty: 120 TABLET | Refills: 0 | Status: SHIPPED | OUTPATIENT
Start: 2020-03-23 | End: 2020-04-22 | Stop reason: SDUPTHER

## 2020-03-23 NOTE — TELEPHONE ENCOUNTER
Patient requesting med refill on [place] 7.5-325 mg to go to [facility]. Last office visit 1/22/20.

## 2020-03-24 NOTE — TELEPHONE ENCOUNTER
From: Neno Barrios  To: Eddie Villasenor DO  Sent: 3/23/2020 5:49 PM EDT  Subject: Prescription Question    I was seeing if you would fill my hydrocodine for this month . Lida jones [phone].  Thanks

## 2020-03-31 RX ORDER — PROPRANOLOL HYDROCHLORIDE 160 MG/1
CAPSULE, EXTENDED RELEASE ORAL
Qty: 30 CAPSULE | Refills: 2 | Status: SHIPPED | OUTPATIENT
Start: 2020-03-31 | End: 2020-04-05

## 2020-04-05 RX ORDER — PROPRANOLOL HYDROCHLORIDE 160 MG/1
CAPSULE, EXTENDED RELEASE ORAL
Qty: 30 CAPSULE | Refills: 2 | Status: SHIPPED | OUTPATIENT
Start: 2020-04-05 | End: 2020-07-02

## 2020-04-05 RX ORDER — TIZANIDINE 4 MG/1
TABLET ORAL
Qty: 40 TABLET | Refills: 0 | Status: SHIPPED | OUTPATIENT
Start: 2020-04-05 | End: 2020-05-03

## 2020-04-15 RX ORDER — LORAZEPAM 1 MG/1
TABLET ORAL
Qty: 90 TABLET | Refills: 0 | Status: SHIPPED | OUTPATIENT
Start: 2020-04-15 | End: 2020-05-15

## 2020-04-22 ENCOUNTER — TELEMEDICINE (OUTPATIENT)
Dept: FAMILY MEDICINE CLINIC | Age: 60
End: 2020-04-22
Payer: COMMERCIAL

## 2020-04-22 PROCEDURE — 99213 OFFICE O/P EST LOW 20 MIN: CPT | Performed by: FAMILY MEDICINE

## 2020-04-22 RX ORDER — HYDROCODONE BITARTRATE AND ACETAMINOPHEN 7.5; 325 MG/1; MG/1
1 TABLET ORAL EVERY 6 HOURS PRN
Qty: 120 TABLET | Refills: 0 | Status: SHIPPED | OUTPATIENT
Start: 2020-04-22 | End: 2020-05-21 | Stop reason: SDUPTHER

## 2020-04-22 NOTE — PROGRESS NOTES
Subjective:      Patient ID: Betty Villafuerte is a 61 y.o. female. 2020    TELEHEALTH EVALUATION -- Audio/Visual (During YRAHK-31 public health emergency)    HPI:    Betty Villafuerte (:  1960) has requested an audio/video evaluation for the following concern(s):    Med refills  She is  Here for a refill on her pain medicine  Doing ok on this  Still has pain but does help  She is also doing home exercises and using heat  No problems with the medicine    Not having any side effects     Review of Systems   Constitutional: Negative. HENT: Negative. Respiratory: Negative. Cardiovascular: Negative. Gastrointestinal: Negative. Musculoskeletal: Positive for arthralgias, back pain, gait problem and myalgias. Psychiatric/Behavioral: Positive for decreased concentration. Prior to Visit Medications    Medication Sig Taking? Authorizing Provider   HYDROcodone-acetaminophen (NORCO) 7.5-325 MG per tablet Take 1 tablet by mouth every 6 hours as needed for Pain for up to 30 days. Yes Demetrice Saldaña DO   LORazepam (ATIVAN) 1 MG tablet TAKE ONE TABLET BY MOUTH THREE TIMES A DAY AS NEEDED  Demetrice Saldaña DO   hydrOXYzine (ATARAX) 25 MG tablet TAKE TWO TABLETS BY MOUTH EVERY NIGHT AT BEDTIME AS NEEDED FOR ITCHING  Demetrice Saldaña DO   tiZANidine (ZANAFLEX) 4 MG tablet TAKE ONE TABLET BY MOUTH EVERY 6 HOURS AS NEEDED FOR MUSCLE SPASMS  Demetrice Saldaña DO   propranolol (INDERAL LA) 160 MG extended release capsule TAKE ONE CAPSULE BY MOUTH DAILY  Demetrice Saldaña DO   clotrimazole-betamethasone (LOTRISONE) 1-0.05 % cream Apply topically 2 times daily.   Demetrice Saldaña DO   citalopram (CELEXA) 40 MG tablet TAKE ONE TABLET BY MOUTH DAILY  Demetrice Saldaña DO   hydrochlorothiazide (HYDRODIURIL) 25 MG tablet Take 1 tablet by mouth every morning  Demetrice Saldaña DO   vitamin D (ERGOCALCIFEROL) 86378 units CAPS capsule Take 50,000 Units by mouth every 30 days  Historical Provider, MD   cloNIDine (CATAPRES) 0.1 MG tablet Take 1 tablet by mouth 2 times daily  Demetrice Saldaña DO   NIFEdipine (PROCARDIA XL) 60 MG extended release tablet Take 1 tablet by mouth daily  Demetrice Saldaña DO       Social History     Tobacco Use    Smoking status: Former Smoker     Packs/day: 0.25     Years: 15.00     Pack years: 3.75     Types: Cigarettes    Smokeless tobacco: Never Used    Tobacco comment: encouraged to quit smoking    Substance Use Topics    Alcohol use: Not Currently     Alcohol/week: 0.0 standard drinks    Drug use: Not Currently     Types: Marijuana            PHYSICAL EXAMINATION:  [ INSTRUCTIONS:  \"[x]\" Indicates a positive item  \"[]\" Indicates a negative item  -- DELETE ALL ITEMS NOT EXAMINED]  Vital Signs: (As obtained by patient/caregiver or practitioner observation)    Blood pressure-  Heart rate-    Respiratory rate-    Temperature-  Pulse oximetry-     Constitutional: [x] Appears well-developed and well-nourished [x] No apparent distress      [] Abnormal-   Mental status  [x] Alert and awake  [x] Oriented to person/place/time [x]Able to follow commands      Eyes:  EOM    []  Normal  [] Abnormal-  Sclera  []  Normal  [] Abnormal -         Discharge []  None visible  [] Abnormal -    HENT:   [x] Normocephalic, atraumatic.   [] Abnormal   [] Mouth/Throat: Mucous membranes are moist.     External Ears [] Normal  [] Abnormal-     Neck: [] No visualized mass     Pulmonary/Chest: [x] Respiratory effort normal.  [x] No visualized signs of difficulty breathing or respiratory distress        [] Abnormal-      Musculoskeletal:   [] Normal gait with no signs of ataxia         [] Normal range of motion of neck        [] Abnormal-       Neurological:        [x] No Facial Asymmetry (Cranial nerve 7 motor function) (limited exam to video visit)          [] No gaze palsy        [] Abnormal-         Skin:        [x] No significant exanthematous lesions or discoloration noted on facial skin         [] Abnormal-            Psychiatric: [x] Normal Affect [x] No Hallucinations        [] Abnormal-     Other pertinent observable physical exam findings-     ASSESSMENT/PLAN:    Assessment/plan;  Diagnoses and all orders for this visit:    Primary osteoarthritis of right knee  -     HYDROcodone-acetaminophen (NORCO) 7.5-325 MG per tablet; Take 1 tablet by mouth every 6 hours as needed for Pain for up to 30 days. Spinal stenosis of lumbar region without neurogenic claudication  -     HYDROcodone-acetaminophen (NORCO) 7.5-325 MG per tablet; Take 1 tablet by mouth every 6 hours as needed for Pain for up to 30 days. Controlled Substance Monitoring:    Acute and Chronic Pain Monitoring:   RX Monitoring 1/23/2020   Attestation -   Periodic Controlled Substance Monitoring Possible medication side effects, risk of tolerance/dependence & alternative treatments discussed. ;No signs of potential drug abuse or diversion identified. ;Assessed functional status. Chronic Pain > 50 MEDD Obtained or confirmed \"Consent for Opioid Use\" on file. ;Re-evaluated the status of the patient's underlying condition causing pain. Chloe Benavides is a 61 y.o. female being evaluated by a Virtual Visit (video visit) encounter to address concerns as mentioned above. A caregiver was present when appropriate. Due to this being a TeleHealth encounter (During [facility]- public health emergency), evaluation of the following organ systems was limited: Vitals/Constitutional/EENT/Resp/CV/GI//MS/Neuro/Skin/Heme-Lymph-Imm. Pursuant to the emergency declaration under the [facility], [address] [place] [address] authority and the [employer] and [employer] Act, this Virtual Visit was conducted with patient's (and/or legal guardian's) consent, to reduce the patient's risk of exposure to COVID-19 and provide necessary medical care.   The patient (and/or legal guardian) has also been advised to contact this office for worsening

## 2020-04-30 ASSESSMENT — ENCOUNTER SYMPTOMS
GASTROINTESTINAL NEGATIVE: 1
BACK PAIN: 1
RESPIRATORY NEGATIVE: 1

## 2020-05-01 RX ORDER — NIFEDIPINE 60 MG/1
TABLET, EXTENDED RELEASE ORAL
Qty: 90 TABLET | Refills: 0 | Status: SHIPPED | OUTPATIENT
Start: 2020-05-01 | End: 2020-09-01 | Stop reason: SDUPTHER

## 2020-05-03 RX ORDER — ZOLPIDEM TARTRATE 10 MG/1
TABLET ORAL
Qty: 30 TABLET | Refills: 1 | Status: SHIPPED | OUTPATIENT
Start: 2020-05-03 | End: 2020-07-02

## 2020-05-03 RX ORDER — TIZANIDINE 4 MG/1
TABLET ORAL
Qty: 40 TABLET | Refills: 0 | Status: SHIPPED | OUTPATIENT
Start: 2020-05-03 | End: 2020-05-15

## 2020-05-15 RX ORDER — LORAZEPAM 1 MG/1
TABLET ORAL
Qty: 90 TABLET | Refills: 0 | Status: SHIPPED | OUTPATIENT
Start: 2020-05-15 | End: 2020-06-16

## 2020-05-15 RX ORDER — TIZANIDINE 4 MG/1
TABLET ORAL
Qty: 40 TABLET | Refills: 0 | Status: SHIPPED | OUTPATIENT
Start: 2020-05-15 | End: 2020-06-02

## 2020-05-15 RX ORDER — HYDROCHLOROTHIAZIDE 25 MG/1
TABLET ORAL
Qty: 30 TABLET | Refills: 4 | Status: SHIPPED | OUTPATIENT
Start: 2020-05-15 | End: 2020-10-12

## 2020-05-20 ENCOUNTER — PATIENT MESSAGE (OUTPATIENT)
Dept: FAMILY MEDICINE CLINIC | Age: 60
End: 2020-05-20

## 2020-05-21 RX ORDER — HYDROCODONE BITARTRATE AND ACETAMINOPHEN 7.5; 325 MG/1; MG/1
1 TABLET ORAL EVERY 6 HOURS PRN
Qty: 120 TABLET | Refills: 0 | Status: SHIPPED | OUTPATIENT
Start: 2020-05-21 | End: 2020-06-22 | Stop reason: SDUPTHER

## 2020-06-02 RX ORDER — TIZANIDINE 4 MG/1
TABLET ORAL
Qty: 40 TABLET | Refills: 0 | Status: SHIPPED | OUTPATIENT
Start: 2020-06-02 | End: 2020-06-16

## 2020-06-11 RX ORDER — CITALOPRAM 40 MG/1
TABLET ORAL
Qty: 30 TABLET | Refills: 3 | Status: SHIPPED | OUTPATIENT
Start: 2020-06-11 | End: 2020-10-11

## 2020-06-16 RX ORDER — TIZANIDINE 4 MG/1
TABLET ORAL
Qty: 40 TABLET | Refills: 0 | Status: SHIPPED | OUTPATIENT
Start: 2020-06-16 | End: 2020-07-02

## 2020-06-16 RX ORDER — LORAZEPAM 1 MG/1
TABLET ORAL
Qty: 90 TABLET | Refills: 0 | Status: SHIPPED | OUTPATIENT
Start: 2020-06-16 | End: 2020-07-14

## 2020-06-20 ENCOUNTER — PATIENT MESSAGE (OUTPATIENT)
Dept: FAMILY MEDICINE CLINIC | Age: 60
End: 2020-06-20

## 2020-06-22 RX ORDER — HYDROCODONE BITARTRATE AND ACETAMINOPHEN 7.5; 325 MG/1; MG/1
1 TABLET ORAL EVERY 6 HOURS PRN
Qty: 120 TABLET | Refills: 0 | Status: SHIPPED | OUTPATIENT
Start: 2020-06-22 | End: 2020-07-27 | Stop reason: SDUPTHER

## 2020-06-22 NOTE — TELEPHONE ENCOUNTER
From: Agustín Cabrera  To: Leonor Calderon DO  Sent: 6/20/2020 11:32 PM EDT  Subject: Prescription Question    Need a refill for Hydrocodone 7.5

## 2020-07-02 RX ORDER — ZOLPIDEM TARTRATE 10 MG/1
TABLET ORAL
Qty: 30 TABLET | Refills: 0 | Status: SHIPPED | OUTPATIENT
Start: 2020-07-02 | End: 2020-07-30

## 2020-07-02 RX ORDER — PROPRANOLOL HYDROCHLORIDE 160 MG/1
CAPSULE, EXTENDED RELEASE ORAL
Qty: 30 CAPSULE | Refills: 1 | Status: SHIPPED | OUTPATIENT
Start: 2020-07-02 | End: 2020-11-24

## 2020-07-02 RX ORDER — TIZANIDINE 4 MG/1
TABLET ORAL
Qty: 40 TABLET | Refills: 0 | Status: SHIPPED | OUTPATIENT
Start: 2020-07-02 | End: 2020-07-14

## 2020-07-27 ENCOUNTER — OFFICE VISIT (OUTPATIENT)
Dept: FAMILY MEDICINE CLINIC | Age: 60
End: 2020-07-27
Payer: COMMERCIAL

## 2020-07-27 VITALS
DIASTOLIC BLOOD PRESSURE: 88 MMHG | BODY MASS INDEX: 33.86 KG/M2 | WEIGHT: 184 LBS | SYSTOLIC BLOOD PRESSURE: 134 MMHG | HEIGHT: 62 IN

## 2020-07-27 PROCEDURE — 99214 OFFICE O/P EST MOD 30 MIN: CPT | Performed by: FAMILY MEDICINE

## 2020-07-27 PROCEDURE — 20610 DRAIN/INJ JOINT/BURSA W/O US: CPT | Performed by: FAMILY MEDICINE

## 2020-07-27 RX ORDER — HYDROCODONE BITARTRATE AND ACETAMINOPHEN 7.5; 325 MG/1; MG/1
1 TABLET ORAL EVERY 6 HOURS PRN
Qty: 120 TABLET | Refills: 0 | Status: SHIPPED | OUTPATIENT
Start: 2020-07-27 | End: 2020-08-26 | Stop reason: SDUPTHER

## 2020-07-27 RX ORDER — METHYLPREDNISOLONE ACETATE 80 MG/ML
80 INJECTION, SUSPENSION INTRA-ARTICULAR; INTRALESIONAL; INTRAMUSCULAR; SOFT TISSUE ONCE
Status: COMPLETED | OUTPATIENT
Start: 2020-07-27 | End: 2020-07-29

## 2020-07-27 ASSESSMENT — ENCOUNTER SYMPTOMS
BACK PAIN: 1
RESPIRATORY NEGATIVE: 1

## 2020-07-27 NOTE — PROGRESS NOTES
Subjective:      Patient ID: Vickie Atkins is a 61 y.o. female. Shoulder Pain    The pain is present in the right shoulder. This is a chronic problem. The current episode started more than 1 month ago. The problem occurs constantly. The problem has been gradually worsening. The pain is at a severity of 6/10. The pain is moderate. Associated symptoms include a limited range of motion and stiffness. She has tried acetaminophen, heat and rest for the symptoms. The treatment provided no relief. Back Pain   This is a chronic problem. The current episode started more than 1 year ago. The problem occurs constantly. The problem has been gradually worsening since onset. The pain is present in the lumbar spine. The quality of the pain is described as aching and shooting. The pain radiates to the right thigh. The pain is at a severity of 8/10. The pain is moderate. Associated symptoms include headaches and weakness. Skin near her eye which will not heal up   Has been present for a few months  Also has a spot in her ear which will not heal   No history of skin cancer      Review of Systems   Constitutional: Positive for activity change and fatigue. Respiratory: Negative. Cardiovascular: Negative. Musculoskeletal: Positive for arthralgias, back pain, myalgias and stiffness. Skin: Positive for wound. Neurological: Positive for weakness and headaches. YOB: 1960    Date of Visit:  7/27/2020    Allergies   Allergen Reactions    Penicillins        Outpatient Medications Marked as Taking for the 7/27/20 encounter (Office Visit) with Yen Amato, DO   Medication Sig Dispense Refill    HYDROcodone-acetaminophen (NORCO) 7.5-325 MG per tablet Take 1 tablet by mouth every 6 hours as needed for Pain for up to 30 days.  120 tablet 0    diclofenac sodium (VOLTAREN) 1 % GEL Apply 4 g topically 4 times daily 500 g 1    LORazepam (ATIVAN) 1 MG tablet TAKE ONE TABLET BY MOUTH THREE TIMES A DAY AS NEEDED 90 tablet 1    hydrOXYzine (ATARAX) 25 MG tablet TAKE TWO TABLETS BY MOUTH EVERY NIGHT AT BEDTIME AS NEEDED FOR ITCHING 60 tablet 2    tiZANidine (ZANAFLEX) 4 MG tablet TAKE ONE TABLET BY MOUTH EVERY 6 HOURS AS NEEDED FOR MUSCLE SPASMS 40 tablet 2    propranolol (INDERAL LA) 160 MG extended release capsule TAKE ONE CAPSULE BY MOUTH DAILY 30 capsule 1    zolpidem (AMBIEN) 10 MG tablet TAKE ONE TABLET BY MOUTH ONCE NIGHTLY AS NEEDED FOR SLEEP 30 tablet 0    citalopram (CELEXA) 40 MG tablet TAKE ONE TABLET BY MOUTH DAILY 30 tablet 3    hydroCHLOROthiazide (HYDRODIURIL) 25 MG tablet TAKE ONE TABLET BY MOUTH EVERY MORNING 30 tablet 4    NIFEdipine (PROCARDIA XL) 60 MG extended release tablet TAKE ONE TABLET BY MOUTH DAILY 90 tablet 0    clotrimazole-betamethasone (LOTRISONE) 1-0.05 % cream Apply topically 2 times daily. 15 g 1    vitamin D (ERGOCALCIFEROL) 61386 units CAPS capsule Take 50,000 Units by mouth every 30 days      cloNIDine (CATAPRES) 0.1 MG tablet Take 1 tablet by mouth 2 times daily 60 tablet 3       Vitals:    07/27/20 1450   BP: 134/88   Weight: 184 lb (83.5 kg)   Height: 5' 2\" (1.575 m)     Body mass index is 33.65 kg/m². Wt Readings from Last 3 Encounters:   07/27/20 184 lb (83.5 kg)   01/22/20 160 lb (72.6 kg)   01/06/20 170 lb (77.1 kg)     BP Readings from Last 3 Encounters:   07/27/20 134/88   01/22/20 130/82   01/20/20 (!) 147/69     '  Objective:   Physical Exam  Vitals signs and nursing note reviewed. Constitutional:       Appearance: She is well-developed. HENT:      Head: Normocephalic. Neck:      Thyroid: No thyromegaly. Cardiovascular:      Rate and Rhythm: Normal rate and regular rhythm. Heart sounds: Normal heart sounds. Pulmonary:      Effort: Pulmonary effort is normal.      Breath sounds: Normal breath sounds.    Musculoskeletal:      Comments: Left knee with crepitus on ext  Stable     Right shoulder with decreased rom in all fields  Under sterile conditions injected 1 cc depomedrol and 1/4 cc marcaine  Tolerated well   Lymphadenopathy:      Cervical: No cervical adenopathy. Skin:     Comments: Right side of upper bridge of nose with red area with no scabbing or vesicle      Neurological:      Mental Status: She is alert and oriented to person, place, and time. Psychiatric:         Behavior: Behavior normal.         Thought Content: Thought content normal.         Judgment: Judgment normal.         Assessment:      Assessment/plan;  Gaby Haji was seen today for 3 month follow-up, medication refill, medication check, shoulder pain and ear problem. Diagnoses and all orders for this visit:    Primary osteoarthritis of left knee  -     [facility] Olamide Hawthorne MD, Orthopedic Surgery (Sports), [facility]    Primary osteoarthritis of right knee  -     HYDROcodone-acetaminophen (NORCO) 7.5-325 MG per tablet; Take 1 tablet by mouth every 6 hours as needed for Pain for up to 30 days. Spinal stenosis of lumbar region without neurogenic claudication  -     HYDROcodone-acetaminophen (NORCO) 7.5-325 MG per tablet; Take 1 tablet by mouth every 6 hours as needed for Pain for up to 30 days. Chronic right shoulder pain  -     Corinne Oropeza MD, Orthopedic Surgery (Sports), [facility]  -     methylPREDNISolone acetate (DEPO-MEDROL) injection 80 mg  -     [id number] - FL DRAIN/INJECT LARGE JOINT/BURSA    Sun-damaged skin  -     External Referral To Dermatology    Other orders  -     diclofenac sodium (VOLTAREN) 1 % GEL; Apply 4 g topically 4 times daily      No follow-ups on file.     Guy Desai,

## 2020-07-29 RX ADMIN — METHYLPREDNISOLONE ACETATE 80 MG: 80 INJECTION, SUSPENSION INTRA-ARTICULAR; INTRALESIONAL; INTRAMUSCULAR; SOFT TISSUE at 10:23

## 2020-07-30 RX ORDER — ZOLPIDEM TARTRATE 10 MG/1
TABLET ORAL
Qty: 30 TABLET | Refills: 0 | Status: SHIPPED | OUTPATIENT
Start: 2020-08-02 | End: 2020-08-31

## 2020-08-26 ENCOUNTER — TELEPHONE (OUTPATIENT)
Dept: FAMILY MEDICINE CLINIC | Age: 60
End: 2020-08-26

## 2020-08-26 RX ORDER — HYDROCODONE BITARTRATE AND ACETAMINOPHEN 7.5; 325 MG/1; MG/1
1 TABLET ORAL EVERY 6 HOURS PRN
Qty: 120 TABLET | Refills: 0 | Status: SHIPPED | OUTPATIENT
Start: 2020-08-26 | End: 2020-09-28 | Stop reason: SDUPTHER

## 2020-08-26 NOTE — TELEPHONE ENCOUNTER
Patient requesting med refill on [place] 7.5-325 mg to go to [facility]. Last office visit 7/27/20.

## 2020-08-31 RX ORDER — ZOLPIDEM TARTRATE 10 MG/1
TABLET ORAL
Qty: 30 TABLET | Refills: 0 | Status: SHIPPED | OUTPATIENT
Start: 2020-08-31 | End: 2020-10-01

## 2020-08-31 RX ORDER — CLONIDINE HYDROCHLORIDE 0.1 MG/1
TABLET ORAL
Qty: 180 TABLET | Refills: 0 | Status: SHIPPED | OUTPATIENT
Start: 2020-08-31 | End: 2020-11-30

## 2020-09-01 RX ORDER — NIFEDIPINE 60 MG/1
TABLET, EXTENDED RELEASE ORAL
Qty: 90 TABLET | Refills: 0 | Status: SHIPPED | OUTPATIENT
Start: 2020-09-01 | End: 2020-12-21

## 2020-09-02 ENCOUNTER — TELEPHONE (OUTPATIENT)
Dept: ORTHOPEDIC SURGERY | Age: 60
End: 2020-09-02

## 2020-09-11 ENCOUNTER — TELEPHONE (OUTPATIENT)
Dept: FAMILY MEDICINE CLINIC | Age: 60
End: 2020-09-11

## 2020-09-11 RX ORDER — TIZANIDINE 4 MG/1
TABLET ORAL
Qty: 40 TABLET | Refills: 2 | Status: SHIPPED | OUTPATIENT
Start: 2020-09-11 | End: 2020-10-26

## 2020-09-11 RX ORDER — LORAZEPAM 1 MG/1
1 TABLET ORAL EVERY 8 HOURS PRN
Qty: 90 TABLET | Refills: 0 | Status: SHIPPED | OUTPATIENT
Start: 2020-09-11 | End: 2020-10-12

## 2020-09-11 NOTE — TELEPHONE ENCOUNTER
----- Message from Brandy Russell sent at 9/11/2020  3:23 PM EDT -----  Subject: Message to Provider    QUESTIONS  Information for Provider? Please refill the folowing prescriptions:   tiZANidine (ZANAFLEX) 4 MG tablet Lorazpram Please send to [facility] [address] [phone] - F [phone]  ---------------------------------------------------------------------------  --------------  Dmitry Blackduck INFO  What is the best way for the office to contact you? OK to leave message on   voicemail  Preferred Call Back Phone Number? [phone]  ---------------------------------------------------------------------------  --------------  SCRIPT ANSWERS  Relationship to Patient?  Self

## 2020-09-28 ENCOUNTER — TELEPHONE (OUTPATIENT)
Dept: FAMILY MEDICINE CLINIC | Age: 60
End: 2020-09-28

## 2020-09-28 RX ORDER — HYDROCODONE BITARTRATE AND ACETAMINOPHEN 7.5; 325 MG/1; MG/1
1 TABLET ORAL EVERY 6 HOURS PRN
Qty: 120 TABLET | Refills: 0 | Status: SHIPPED | OUTPATIENT
Start: 2020-09-28 | End: 2020-10-30 | Stop reason: SDUPTHER

## 2020-10-01 RX ORDER — ZOLPIDEM TARTRATE 10 MG/1
TABLET ORAL
Qty: 30 TABLET | Refills: 0 | Status: SHIPPED | OUTPATIENT
Start: 2020-10-01 | End: 2020-11-02

## 2020-10-11 RX ORDER — CITALOPRAM 40 MG/1
TABLET ORAL
Qty: 30 TABLET | Refills: 2 | Status: SHIPPED | OUTPATIENT
Start: 2020-10-11 | End: 2020-10-12

## 2020-10-12 RX ORDER — HYDROXYZINE HYDROCHLORIDE 25 MG/1
TABLET, FILM COATED ORAL
Qty: 60 TABLET | Refills: 1 | Status: SHIPPED | OUTPATIENT
Start: 2020-10-12 | End: 2020-12-23

## 2020-10-12 RX ORDER — HYDROCHLOROTHIAZIDE 25 MG/1
TABLET ORAL
Qty: 30 TABLET | Refills: 3 | Status: SHIPPED | OUTPATIENT
Start: 2020-10-12 | End: 2021-02-21

## 2020-10-12 RX ORDER — LORAZEPAM 1 MG/1
TABLET ORAL
Qty: 90 TABLET | Refills: 0 | Status: SHIPPED | OUTPATIENT
Start: 2020-10-12 | End: 2020-11-10

## 2020-10-12 RX ORDER — CITALOPRAM 40 MG/1
TABLET ORAL
Qty: 30 TABLET | Refills: 2 | Status: SHIPPED | OUTPATIENT
Start: 2020-10-12 | End: 2021-04-04

## 2020-10-26 RX ORDER — TIZANIDINE 4 MG/1
TABLET ORAL
Qty: 40 TABLET | Refills: 0 | Status: SHIPPED | OUTPATIENT
Start: 2020-10-26 | End: 2020-11-10

## 2020-10-27 ENCOUNTER — TELEPHONE (OUTPATIENT)
Dept: FAMILY MEDICINE CLINIC | Age: 60
End: 2020-10-27

## 2020-10-30 ENCOUNTER — TELEPHONE (OUTPATIENT)
Dept: FAMILY MEDICINE CLINIC | Age: 60
End: 2020-10-30

## 2020-10-30 RX ORDER — HYDROCODONE BITARTRATE AND ACETAMINOPHEN 7.5; 325 MG/1; MG/1
1 TABLET ORAL EVERY 6 HOURS PRN
Qty: 120 TABLET | Refills: 0 | Status: SHIPPED | OUTPATIENT
Start: 2020-10-30 | End: 2020-12-02 | Stop reason: SDUPTHER

## 2020-10-30 NOTE — TELEPHONE ENCOUNTER
Patient is calling back again for a refill on the hydrocodone 7.5-325 mg one tablet every 6 hours     Pharmacy [address]

## 2020-11-02 RX ORDER — ZOLPIDEM TARTRATE 10 MG/1
TABLET ORAL
Qty: 30 TABLET | Refills: 0 | Status: SHIPPED | OUTPATIENT
Start: 2020-11-02 | End: 2020-11-30

## 2020-11-10 RX ORDER — LORAZEPAM 1 MG/1
TABLET ORAL
Qty: 90 TABLET | Refills: 0 | Status: SHIPPED | OUTPATIENT
Start: 2020-11-10 | End: 2020-12-10

## 2020-11-24 RX ORDER — PROPRANOLOL HYDROCHLORIDE 160 MG/1
CAPSULE, EXTENDED RELEASE ORAL
Qty: 30 CAPSULE | Refills: 0 | Status: SHIPPED | OUTPATIENT
Start: 2020-11-24 | End: 2020-11-30

## 2020-11-30 RX ORDER — ZOLPIDEM TARTRATE 10 MG/1
TABLET ORAL
Qty: 30 TABLET | Refills: 0 | Status: SHIPPED | OUTPATIENT
Start: 2020-11-30 | End: 2020-12-29

## 2020-11-30 RX ORDER — PROPRANOLOL HYDROCHLORIDE 160 MG/1
CAPSULE, EXTENDED RELEASE ORAL
Qty: 30 CAPSULE | Refills: 0 | Status: SHIPPED | OUTPATIENT
Start: 2020-11-30 | End: 2021-01-22

## 2020-11-30 RX ORDER — CLONIDINE HYDROCHLORIDE 0.1 MG/1
TABLET ORAL
Qty: 180 TABLET | Refills: 0 | Status: SHIPPED | OUTPATIENT
Start: 2020-11-30 | End: 2021-02-21

## 2020-12-01 ENCOUNTER — TELEPHONE (OUTPATIENT)
Dept: FAMILY MEDICINE CLINIC | Age: 60
End: 2020-12-01

## 2020-12-01 RX ORDER — HYDROCODONE BITARTRATE AND ACETAMINOPHEN 7.5; 325 MG/1; MG/1
1 TABLET ORAL EVERY 6 HOURS PRN
Qty: 120 TABLET | Refills: 0 | Status: CANCELLED | OUTPATIENT
Start: 2020-12-01 | End: 2020-12-31

## 2020-12-02 ENCOUNTER — OFFICE VISIT (OUTPATIENT)
Dept: FAMILY MEDICINE CLINIC | Age: 60
End: 2020-12-02
Payer: COMMERCIAL

## 2020-12-02 VITALS
SYSTOLIC BLOOD PRESSURE: 134 MMHG | DIASTOLIC BLOOD PRESSURE: 84 MMHG | TEMPERATURE: 97.8 F | WEIGHT: 191 LBS | BODY MASS INDEX: 35.15 KG/M2 | HEIGHT: 62 IN

## 2020-12-02 PROCEDURE — 90686 IIV4 VACC NO PRSV 0.5 ML IM: CPT | Performed by: FAMILY MEDICINE

## 2020-12-02 PROCEDURE — 99214 OFFICE O/P EST MOD 30 MIN: CPT | Performed by: FAMILY MEDICINE

## 2020-12-02 PROCEDURE — 20610 DRAIN/INJ JOINT/BURSA W/O US: CPT | Performed by: FAMILY MEDICINE

## 2020-12-02 PROCEDURE — 90471 IMMUNIZATION ADMIN: CPT | Performed by: FAMILY MEDICINE

## 2020-12-02 RX ORDER — HYDROCODONE BITARTRATE AND ACETAMINOPHEN 7.5; 325 MG/1; MG/1
1 TABLET ORAL EVERY 6 HOURS PRN
Qty: 120 TABLET | Refills: 0 | Status: SHIPPED | OUTPATIENT
Start: 2021-01-31 | End: 2021-03-01 | Stop reason: SDUPTHER

## 2020-12-02 RX ORDER — METHYLPREDNISOLONE ACETATE 80 MG/ML
80 INJECTION, SUSPENSION INTRA-ARTICULAR; INTRALESIONAL; INTRAMUSCULAR; SOFT TISSUE ONCE
Status: COMPLETED | OUTPATIENT
Start: 2020-12-02 | End: 2020-12-02

## 2020-12-02 RX ORDER — HYDROCODONE BITARTRATE AND ACETAMINOPHEN 7.5; 325 MG/1; MG/1
1 TABLET ORAL EVERY 6 HOURS PRN
Qty: 120 TABLET | Refills: 0 | Status: SHIPPED | OUTPATIENT
Start: 2020-12-02 | End: 2020-12-02 | Stop reason: SDUPTHER

## 2020-12-02 RX ORDER — HYDROCODONE BITARTRATE AND ACETAMINOPHEN 7.5; 325 MG/1; MG/1
1 TABLET ORAL EVERY 6 HOURS PRN
Qty: 120 TABLET | Refills: 0 | Status: SHIPPED | OUTPATIENT
Start: 2021-01-01 | End: 2020-12-02 | Stop reason: SDUPTHER

## 2020-12-02 RX ADMIN — METHYLPREDNISOLONE ACETATE 80 MG: 80 INJECTION, SUSPENSION INTRA-ARTICULAR; INTRALESIONAL; INTRAMUSCULAR; SOFT TISSUE at 14:16

## 2020-12-02 ASSESSMENT — ENCOUNTER SYMPTOMS: RESPIRATORY NEGATIVE: 1

## 2020-12-02 ASSESSMENT — PATIENT HEALTH QUESTIONNAIRE - PHQ9
SUM OF ALL RESPONSES TO PHQ QUESTIONS 1-9: 0
2. FEELING DOWN, DEPRESSED OR HOPELESS: 0
SUM OF ALL RESPONSES TO PHQ QUESTIONS 1-9: 0
SUM OF ALL RESPONSES TO PHQ QUESTIONS 1-9: 0
SUM OF ALL RESPONSES TO PHQ9 QUESTIONS 1 & 2: 0
1. LITTLE INTEREST OR PLEASURE IN DOING THINGS: 0

## 2020-12-02 NOTE — PROGRESS NOTES
Subjective:      Patient ID: Morena Mckenna is a 61 y.o. female. Shoulder Pain    The pain is present in the right shoulder. This is a recurrent problem. The current episode started more than 1 year ago. The problem occurs constantly. The problem has been waxing and waning. The quality of the pain is described as aching and burning. The pain is at a severity of 7/10. The pain is moderate. Associated symptoms include a limited range of motion and stiffness. The symptoms are aggravated by activity. She has tried acetaminophen for the symptoms. The treatment provided moderate relief. Osteoarthritis of knee  Needs a refill on her pain meds   They are helping    Takes prn  She can walk and move around more when taking     She has gained 30 pounds this year  Feeling more tired and sob  She is working from home now so not moving around as much    Review of Systems   Constitutional: Positive for activity change and unexpected weight change. Respiratory: Negative. Cardiovascular: Negative. Musculoskeletal: Positive for arthralgias, joint swelling, myalgias, neck pain, neck stiffness and stiffness. Neurological: Negative. Psychiatric/Behavioral: Negative.       YOB: 1960    Date of Visit:  12/2/2020    Allergies   Allergen Reactions    Penicillins        Outpatient Medications Marked as Taking for the 12/2/20 encounter (Office Visit) with Valentin Hackett, DO   Medication Sig Dispense Refill    propranolol (INDERAL LA) 160 MG extended release capsule TAKE ONE CAPSULE BY MOUTH DAILY 30 capsule 0    zolpidem (AMBIEN) 10 MG tablet TAKE ONE TABLET BY MOUTH ONCE NIGHTLY AS NEEDED FOR SLEEP 30 tablet 0    cloNIDine (CATAPRES) 0.1 MG tablet TAKE ONE TABLET BY MOUTH TWICE A  tablet 0    tiZANidine (ZANAFLEX) 4 MG tablet TAKE ONE TABLET BY MOUTH EVERY 8 HOURS AS NEEDED 40 tablet 2    LORazepam (ATIVAN) 1 MG tablet TAKE ONE TABLET BY MOUTH EVERY 8 HOURS AS NEEDED FOR ANXIETY 90 tablet 0    HYDROcodone-acetaminophen (NORCO) 7.5-325 MG per tablet Take 1 tablet by mouth every 6 hours as needed for Pain for up to 30 days. 120 tablet 0    hydroCHLOROthiazide (HYDRODIURIL) 25 MG tablet TAKE ONE TABLET BY MOUTH EVERY MORNING 30 tablet 3    citalopram (CELEXA) 40 MG tablet TAKE ONE TABLET BY MOUTH DAILY 30 tablet 2    hydrOXYzine (ATARAX) 25 MG tablet TAKE TWO TABLETS BY MOUTH EVERY NIGHT AT BEDTIME AS NEEDED FOR ITCHING 60 tablet 1    NIFEdipine (PROCARDIA XL) 60 MG extended release tablet TAKE ONE TABLET BY MOUTH DAILY 90 tablet 0    diclofenac sodium (VOLTAREN) 1 % GEL Apply 4 g topically 4 times daily 500 g 1    clotrimazole-betamethasone (LOTRISONE) 1-0.05 % cream Apply topically 2 times daily. 15 g 1    vitamin D (ERGOCALCIFEROL) 91766 units CAPS capsule Take 50,000 Units by mouth every 30 days         Vitals:    12/02/20 1320   BP: 134/84   Temp: 97.8 °F (36.6 °C)   Weight: 191 lb (86.6 kg)   Height: 5' 2\" (1.575 m)     Body mass index is 34.93 kg/m². Wt Readings from Last 3 Encounters:   12/02/20 191 lb (86.6 kg)   07/27/20 184 lb (83.5 kg)   01/22/20 160 lb (72.6 kg)     BP Readings from Last 3 Encounters:   12/02/20 134/84   07/27/20 134/88   01/22/20 130/82       Objective:   Physical Exam  Vitals signs and nursing note reviewed. Constitutional:       Appearance: She is well-developed. HENT:      Head: Normocephalic. Neck:      Thyroid: No thyromegaly. Cardiovascular:      Rate and Rhythm: Normal rate and regular rhythm. Heart sounds: Normal heart sounds. Pulmonary:      Effort: Pulmonary effort is normal.      Breath sounds: Normal breath sounds. Musculoskeletal:      Comments: Right shoulder ant aspect under sterile conditions injected 1 cc depomedrol   Tolerated well    Lymphadenopathy:      Cervical: No cervical adenopathy. Neurological:      Mental Status: She is alert and oriented to person, place, and time.    Psychiatric:         Behavior: Behavior normal. Thought Content: Thought content normal.         Judgment: Judgment normal.         Assessment:      Assessment/plan;  Carmen Gray was seen today for 3 month follow-up, medication refill, medication check, shoulder pain and flu vaccine. Diagnoses and all orders for this visit:    Rotator cuff tendonitis, right  -     20610 - [place] DRAIN/INJECT LARGE JOINT/BURSA  -     methylPREDNISolone acetate (DEPO-MEDROL) injection 80 mg    Primary osteoarthritis of right knee  -     Discontinue: HYDROcodone-acetaminophen (NORCO) 7.5-325 MG per tablet; Take 1 tablet by mouth every 6 hours as needed for Pain for up to 30 days. -     Discontinue: HYDROcodone-acetaminophen (NORCO) 7.5-325 MG per tablet; Take 1 tablet by mouth every 6 hours as needed for Pain for up to 30 days.  -     HYDROcodone-acetaminophen (NORCO) 7.5-325 MG per tablet; Take 1 tablet by mouth every 6 hours as needed for Pain for up to 30 days. Spinal stenosis of lumbar region without neurogenic claudication  -     Discontinue: HYDROcodone-acetaminophen (NORCO) 7.5-325 MG per tablet; Take 1 tablet by mouth every 6 hours as needed for Pain for up to 30 days. -     Discontinue: HYDROcodone-acetaminophen (NORCO) 7.5-325 MG per tablet; Take 1 tablet by mouth every 6 hours as needed for Pain for up to 30 days.  -     HYDROcodone-acetaminophen (NORCO) 7.5-325 MG per tablet; Take 1 tablet by mouth every 6 hours as needed for Pain for up to 30 days. KEYLA (generalized anxiety disorder)      No follow-ups on file.     Rodrigo Tinsley DO

## 2020-12-05 ENCOUNTER — APPOINTMENT (OUTPATIENT)
Dept: GENERAL RADIOLOGY | Age: 60
End: 2020-12-05
Payer: COMMERCIAL

## 2020-12-05 ENCOUNTER — HOSPITAL ENCOUNTER (EMERGENCY)
Age: 60
Discharge: HOME | End: 2020-12-05
Payer: COMMERCIAL

## 2020-12-05 VITALS
OXYGEN SATURATION: 96 % | WEIGHT: 191 LBS | BODY MASS INDEX: 34.93 KG/M2 | HEART RATE: 56 BPM | SYSTOLIC BLOOD PRESSURE: 107 MMHG | RESPIRATION RATE: 16 BRPM | TEMPERATURE: 98 F | DIASTOLIC BLOOD PRESSURE: 57 MMHG

## 2020-12-05 PROCEDURE — 73110 X-RAY EXAM OF WRIST: CPT

## 2020-12-05 PROCEDURE — 99283 EMERGENCY DEPT VISIT LOW MDM: CPT

## 2020-12-05 PROCEDURE — 6370000000 HC RX 637 (ALT 250 FOR IP): Performed by: PHYSICIAN ASSISTANT

## 2020-12-05 PROCEDURE — 29125 APPL SHORT ARM SPLINT STATIC: CPT

## 2020-12-05 RX ORDER — IBUPROFEN 600 MG/1
600 TABLET ORAL ONCE
Status: COMPLETED | OUTPATIENT
Start: 2020-12-05 | End: 2020-12-05

## 2020-12-05 RX ADMIN — IBUPROFEN 600 MG: 600 TABLET, FILM COATED ORAL at 09:02

## 2020-12-05 ASSESSMENT — PAIN SCALES - GENERAL
PAINLEVEL_OUTOF10: 7
PAINLEVEL_OUTOF10: 7

## 2020-12-05 ASSESSMENT — PAIN DESCRIPTION - LOCATION: LOCATION: WRIST

## 2020-12-05 ASSESSMENT — PAIN DESCRIPTION - ORIENTATION: ORIENTATION: LEFT

## 2020-12-05 NOTE — ED NOTES
Bed: 06  Expected date:   Expected time:   Means of arrival: Walk In  Comments:     Cecelia Porras RN  12/05/20 7514

## 2020-12-05 NOTE — ED PROVIDER NOTES
**ADVANCED PRACTICE PROVIDER, I HAVE EVALUATED THIS PATIENT**        [address] ENCOUNTER      Pt Name: Liang Stone  FET:[id number]  [place] 1960  Date of evaluation: 12/5/2020  Provider: Clark Anderson PA-C      Chief Complaint:    Chief Complaint   Patient presents with    Wrist Injury     Slipped on ice cube on her kistchen tile. Pain and swelling to left wrist       Nursing Notes, Past Medical Hx, Past Surgical Hx, Social Hx, Allergies, and Family Hx were all reviewed and agreed with or any disagreements were addressed in the HPI.    HPI:  (Location, Duration, Timing, Severity, Quality, Assoc Sx, Context, Modifying factors)  This is a  61 y.o. female who presents here to the emergency department, the patient states that she dropped some ice on the floor, and slipped, falling back onto her butt and to her left wrist.  She admits to left wrist pain, pain level 7/10. This happened last night. Pain is worse with range of motion and movement better with rest.  She has pain and swelling, it is it is a deep aching sensation. She denies any loss of consciousness, she denies any headache she denies any severe back pain.     PastMedical/Surgical History:      Diagnosis Date    Anxiety     Depression     Hypertension     Insomnia     Lumbar disc herniation     Lumbar spinal stenosis          Procedure Laterality Date    BRONCHOSCOPY N/A 10/10/2019    BRONCHOSCOPY WITH BRONCHOALVEOLAR LAVAGE performed by Maria M Bliss DO at [address]  10/10/2019    BRONCHOSCOPY DIAGNOSTIC OR CELL [address] ONLY performed by Maria M Bliss DO at [address] [place] [address]      r wrist plate    GASTRIC BYPASS SURGERY      HYSTERECTOMY      LUMBAR NERVE BLOCK N/A 6/17/2019    MIDLINE L5-S1  INTERLAMINAR EPIDURAL STEROID INJECTION WITH FLUOROSCOPY performed by Paula Paris MD at [facility]  PAIN MANAGEMENT PROCEDURE Left 1/20/2020    LEFT L4 AND L5 TRANSFORAMINAL EPIDURAL STEROID INJECTION WITH FLUOROSCOPY (50867, 57455) performed by North Jimenez MD at [address] [facility]       Medications:  Discharge Medication List as of 12/5/2020  9:43 AM      CONTINUE these medications which have NOT CHANGED    Details   HYDROcodone-acetaminophen (NORCO) 7.5-325 MG per tablet Take 1 tablet by mouth every 6 hours as needed for Pain for up to 30 days. , Disp-120 tablet,R-0Print      propranolol (INDERAL LA) 160 MG extended release capsule TAKE ONE CAPSULE BY MOUTH DAILY, Disp-30 capsule,R-0Normal      zolpidem (AMBIEN) 10 MG tablet TAKE ONE TABLET BY MOUTH ONCE NIGHTLY AS NEEDED FOR SLEEP, Disp-30 tablet,R-0Normal      cloNIDine (CATAPRES) 0.1 MG tablet TAKE ONE TABLET BY MOUTH TWICE A DAY, Disp-180 tablet,R-0Normal      tiZANidine (ZANAFLEX) 4 MG tablet TAKE ONE TABLET BY MOUTH EVERY 8 HOURS AS NEEDED, Disp-40 tablet,R-2Normal      LORazepam (ATIVAN) 1 MG tablet TAKE ONE TABLET BY MOUTH EVERY 8 HOURS AS NEEDED FOR ANXIETY, Disp-90 tablet,R-0Normal      hydroCHLOROthiazide (HYDRODIURIL) 25 MG tablet TAKE ONE TABLET BY MOUTH EVERY MORNING, Disp-30 tablet,R-3Normal      citalopram (CELEXA) 40 MG tablet TAKE ONE TABLET BY MOUTH DAILY, Disp-30 tablet,R-2Normal      hydrOXYzine (ATARAX) 25 MG tablet TAKE TWO TABLETS BY MOUTH EVERY NIGHT AT BEDTIME AS NEEDED FOR ITCHING, Disp-60 tablet,R-1Normal      NIFEdipine (PROCARDIA XL) 60 MG extended release tablet TAKE ONE TABLET BY MOUTH DAILY, Disp-90 tablet,R-0Normal      diclofenac sodium (VOLTAREN) 1 % GEL Apply 4 g topically 4 times daily, Topical, 4 TIMES DAILY Starting Mon 7/27/2020, Disp-500 g,R-1, Normal      vitamin D (ERGOCALCIFEROL) 83020 units CAPS capsule Take 50,000 Units by mouth every 30 daysHistorical Med               Review of Systems:  Review of Systems  Positives and Pertinent negatives as per HPI.   Except as noted above in the ROS, problem specific ROS was completed and is negative. Physical Exam:  Physical Exam  Vitals signs and nursing note reviewed. Constitutional:       Appearance: She is well-developed. She is not diaphoretic. HENT:      Head: Normocephalic and atraumatic. Right Ear: External ear normal.      Left Ear: External ear normal.      Nose: Nose normal.   Eyes:      General:         Right eye: No discharge. Left eye: No discharge. Neck:      Musculoskeletal: Normal range of motion and neck supple. Cardiovascular:      Rate and Rhythm: Normal rate and regular rhythm. Heart sounds: Normal heart sounds. No murmur. No friction rub. No gallop. Pulmonary:      Effort: Pulmonary effort is normal. No respiratory distress. Breath sounds: Normal breath sounds. No stridor. No wheezing or rales. Chest:      Chest wall: No tenderness. Musculoskeletal:      Left wrist: She exhibits decreased range of motion, tenderness, bony tenderness and swelling. Arms:       Comments: Distal radial pulse intact, neurovascular exam unremarkable   Skin:     General: Skin is warm and dry. Coloration: Skin is not pale. Neurological:      Mental Status: She is alert and oriented to person, place, and time. Psychiatric:         Behavior: Behavior normal.         MEDICAL DECISION MAKING    Vitals:    Vitals:    12/05/20 0559 12/05/20 0600   BP:  (!) 107/57   Pulse: 56    Resp: 16    Temp: 98 °F (36.7 °C)    TempSrc: Temporal    SpO2: 96%    Weight: 191 lb (86.6 kg)        LABS:Labs Reviewed - No data to display     Remainder of labs reviewed and werenegative at this time or not returned at the time of this note.     RADIOLOGY:   Non-plain film images such as CT, Ultrasound and MRI are read by the radiologist. Jae Ulloa PA-C have directly visualized the radiologic plain film image(s) with the below findings:        Interpretation per the Radiologist below, if available at the time of this note:    XR WRIST LEFT (MIN 3 VIEWS) Final Result   Impacted fracture of the distal radius. Xr Wrist Left (min 3 Views)    Result Date: 12/5/2020  EXAMINATION: XRAY VIEWS OF THE LEFT WRIST 12/5/2020 6:04 am COMPARISON: None. HISTORY: ORDERING SYSTEM PROVIDED HISTORY: pain TECHNOLOGIST PROVIDED HISTORY: Reason for exam:->pain FINDINGS: Impacted fracture of the distal radius. There is no gross soft tissue swelling. Impacted fracture of the distal radius. MEDICAL DECISION MAKING / ED COURSE:      PROCEDURES:   Procedures    The patient had a fracture of distal radius. A volar OCL splint was placed by the emergency department technician, it was applied appropriately and the patient was neurovascularly intact as observed by myself. Patient was given:  Medications   ibuprofen (ADVIL;MOTRIN) tablet 600 mg (600 mg Oral Given 12/5/20 0902)       No other need or suggestion for advanced imaging. The patient tolerated their visit well. I evaluated the patient. The physician was available for consultation as needed. The patient and / or the family were informed of the results of any tests, a time was given to answer questions, a plan was proposed and they agreed with plan. CLINICAL IMPRESSION:  1.  Closed fracture of left wrist, initial encounter        DISPOSITION Decision To Discharge 12/05/2020 08:36:38 AM      PATIENT REFERRED TO:  Bertie Dakins, [address], [address]  [address]  [phone]    Call in 2 days  For a recheck in 2-7 days      DISCHARGE MEDICATIONS:  Discharge Medication List as of 12/5/2020  9:43 AM          DISCONTINUED MEDICATIONS:  Discharge Medication List as of 12/5/2020  9:43 AM      STOP taking these medications       clotrimazole-betamethasone (LOTRISONE) 1-0.05 % cream Comments:   Reason for Stopping:                      (Please note the MDM and HPI sections of this note were completed with a voice recognition program.  Efforts were made to edit the dictations but

## 2020-12-05 NOTE — ED NOTES
Pt verbalizes understanding of discharge instructions, denies need for wheelchair at this time, ambulated to exit with steady gait and belongings in tow.       Omari Barillas RN  25/94/84 [id number]

## 2020-12-09 ENCOUNTER — OFFICE VISIT (OUTPATIENT)
Dept: ORTHOPEDIC SURGERY | Age: 60
End: 2020-12-09
Payer: COMMERCIAL

## 2020-12-09 VITALS — WEIGHT: 191 LBS | HEIGHT: 62 IN | BODY MASS INDEX: 35.15 KG/M2 | TEMPERATURE: 97.5 F

## 2020-12-09 PROBLEM — S52.502A CLOSED FRACTURE OF LEFT DISTAL RADIUS: Status: ACTIVE | Noted: 2020-12-09

## 2020-12-09 PROCEDURE — 99214 OFFICE O/P EST MOD 30 MIN: CPT | Performed by: ORTHOPAEDIC SURGERY

## 2020-12-09 NOTE — PROGRESS NOTES
Otilia Batista MD  [address]. [facility], [address]  [address] [place] [address]    History of Present Illness:  Chief Complaint   Patient presents with    Wrist Injury     Injured Left wrist when she slipped on some ice on her kitchen floor, and fell; doi 12/4/20. Bethany Blue is a 61 y.o. female here for evaluation of left wrist pain. She was seen at [facility] ER on 12/5/2020 with x-rays. She injured this wrist when she slipped on an ice cube on her tile kitchen floor. This injury occurred on 12/4/2020. She denies any pain in her left elbow or shoulder. She is right-hand dominant. Current symptoms are as described below and I reviewed these findings with her today. She was seen in the [facility] emergency department and placed in a short arm volar splint. She is on long-term narcotic management from her PCP. Pain Assessment  Location of Pain: Wrist  Location Modifiers: Left  Severity of Pain: 9  Quality of Pain: Throbbing, Other (Comment)(Stabbing)  Duration of Pain: Persistent  Frequency of Pain: Constant  Date Pain First Started: 12/04/20  Aggravating Factors: Bending, Stretching, Straightening  Relieving Factors: Other (Comment)(Nothing)  Result of Injury: Yes  Work-Related Injury: No  Are there other pain locations you wish to document?: No      Review of Systems:  Relevant review of systems reviewed and can be found in the Media section of patient's chart.        Medical History:  Past Medical History:   Diagnosis Date    Anxiety     Depression     Hypertension     Insomnia     Lumbar disc herniation     Lumbar spinal stenosis         Past Surgical History:   Procedure Laterality Date    BRONCHOSCOPY N/A 10/10/2019    BRONCHOSCOPY WITH BRONCHOALVEOLAR LAVAGE performed by Silvia Boyle DO at [address]  10/10/2019    BRONCHOSCOPY DIAGNOSTIC OR CELL [address] ONLY performed by Citlaly Moss DO at [address] [place] [address] Junior      r wrist plate    GASTRIC BYPASS SURGERY      HYSTERECTOMY      LUMBAR NERVE BLOCK N/A 6/17/2019    MIDLINE L5-S1  INTERLAMINAR EPIDURAL STEROID INJECTION WITH FLUOROSCOPY performed by Sterling Childs MD at [address] Left 1/20/2020    LEFT L4 AND L5 TRANSFORAMINAL EPIDURAL STEROID INJECTION WITH FLUOROSCOPY (37746, 16560) performed by Sterling Childs MD at [address]        Medication Review:  Current Outpatient Medications   Medication Sig Dispense Refill    [START ON 1/31/2021] HYDROcodone-acetaminophen (NORCO) 7.5-325 MG per tablet Take 1 tablet by mouth every 6 hours as needed for Pain for up to 30 days. 120 tablet 0    propranolol (INDERAL LA) 160 MG extended release capsule TAKE ONE CAPSULE BY MOUTH DAILY 30 capsule 0    zolpidem (AMBIEN) 10 MG tablet TAKE ONE TABLET BY MOUTH ONCE NIGHTLY AS NEEDED FOR SLEEP 30 tablet 0    cloNIDine (CATAPRES) 0.1 MG tablet TAKE ONE TABLET BY MOUTH TWICE A  tablet 0    tiZANidine (ZANAFLEX) 4 MG tablet TAKE ONE TABLET BY MOUTH EVERY 8 HOURS AS NEEDED 40 tablet 2    hydroCHLOROthiazide (HYDRODIURIL) 25 MG tablet TAKE ONE TABLET BY MOUTH EVERY MORNING 30 tablet 3    citalopram (CELEXA) 40 MG tablet TAKE ONE TABLET BY MOUTH DAILY 30 tablet 2    hydrOXYzine (ATARAX) 25 MG tablet TAKE TWO TABLETS BY MOUTH EVERY NIGHT AT BEDTIME AS NEEDED FOR ITCHING 60 tablet 1    NIFEdipine (PROCARDIA XL) 60 MG extended release tablet TAKE ONE TABLET BY MOUTH DAILY 90 tablet 0    diclofenac sodium (VOLTAREN) 1 % GEL Apply 4 g topically 4 times daily 500 g 1    LORazepam (ATIVAN) 1 MG tablet TAKE ONE TABLET BY MOUTH EVERY 8 HOURS AS NEEDED FOR ANXIETY 90 tablet 0     No current facility-administered medications for this visit.       Allergies, social and family histories, and medications were reviewed and updated as appropriate. General Exam:  Vital Signs:  Temp 97.5 °F (36.4 °C) (Infrared)   Ht 5' 2\" (1.575 m)   Wt 191 lb (86.6 kg)   BMI 34.93 kg/m²    Constitutional: Patient is adequately groomed with no evidence of malnutrition. Obesity noted. Mental Status: The patient is oriented to time, place and person. The patient's mood and affect are appropriate. Lymphatic: The lymphatic examination bilaterally reveals all areas to be without enlargement or induration. Vascular: Examination reveals no swelling or calf tenderness. 2+ palpable pulses distally. Neurological: The patient has good coordination. There is no focal weakness or sensory deficit. Left Wrist Examination:  Inspection / Skin: Mild swelling in the wrist noted through the splint. Forearm without any gross muscle atrophy. Palpation:    moderate tenderness along the radial aspect of the wrist   mild tenderness along the ulnar aspect of the wrist   no tenderness over the forearm, including the extensor compartment   No tenderness at the epicondyles of the elbow        Range of Motion:   Not tested due to fracture of the wrist.  She does tolerate full flexion and extension of the elbow. Strength: not tested due to fracture at the wrist.  She has functional elbow flexion and extension strength at least against gravity. IMAGES    X-rays were obtained on 12/5/2020 and reviewed in office  Views: 3V; left wrist  Impression: No acute fracture, dislocation, or subluxation. Impression    Impacted fracture of the distal radius. Fracture shows dorsal angulation of approximately 20 degrees with an extra-articular distal radius fracture. Mild shortening. Otherwise normal age-related degenerative change within the wrist and hand. No lytic or blastic lesions. Impression:   Diagnosis Orders   1.  Closed fracture of distal end of left radius, unspecified fracture morphology, initial encounter          PLAN  :  We discussed treatment options today. Patent denied any elbow / shoulder pain or other injury from the fall and stated it is in the wrist only. I reviewed her x-ray findings with her as well as the indications for operative and nonoperative intervention for distal radius fracture. She understands that there is a dorsal angulation while she would heal her current fracture the malunion would likely result in continued dysfunction which could be painful. We discussed treatment options including open reduction and internal fixation of the distal radius. She is already undergone this on her contralateral side a few years ago. I reviewed with her the nature of the surgery as well as the risks, benefits, potential complications. She understands risk of surgery include but are not limited to: Infection, risk to neurovascular structures, persistent stiffness and pain, hardware complications that could require further surgery, anesthetic misadventure and other medical surgical complications it could threaten her life or limb. She will undergo her preoperative COVID-19 testing and we will plan for outpatient surgery next Monday. By signing my name below, Phil Sudhakar, attest that this documentation has been prepared under the direction and in the presence of Toshia Link MD.   Electronically Signed: Leroy Anne, 12/9/20, 1:21 PM EST. Eric Goyal MD, personally performed the services described in this documentation. All medical record entries made by the rubinaibenrique were at my direction and in my presence. I have reviewed the chart and discharge instructions (if applicable) and agree that the record reflects my personal performance and is accurate and complete. Toshia Link MD, 12/11/20, 2:29 PM EST. Documentation was done using voice recognition dragon software. Every effort was made to ensure accuracy; however, inadvertent  Unintentional computerized transcription errors may be present.

## 2020-12-09 NOTE — LETTER
[facility]  Surgery Scheduling Form:  [facility]    DEMOGRAPHICS:                                                                                                              .    Patient Name:  Guillaume Bustamante  Patient :  1960   Patient SS#:      Patient Phone:  [phone] (home)  Alt. Patient Phone:    Patient Address:  [address] [place] [address]    PCP:  Bhumi Kim,   Payor/Plan Subscr  Sex Relation Sub. Ins. ID Effective Group Num   1. 935 Roman Brown A 1960 Female  [id number] 1/1/15 104                                   [address]     DIAGNOSIS & PROCEDURE:                                                                                            .    Diagnosis:   Left distal radius [id number]  Operation:  Open reduction internal fixation Left distal radius [id number]  Location: [facility]  Provider:  Hattie Griffin.  Anthony Lees M.D.    Eva Living:                                                                                         .    Requested Date:    20  OR Time:  12:00                      Patient Arrival Time:  10:00  OR Time Required:  60 Minutes  Anesthesia:  General  Equipment:  Milk Mantra  Mini C-Arm:  Yes   Standard C-Arm:  No  Status:  Outpatient  PAT Required:  No  Comments:Allergies: Penicillins     20   BILLING INFORMATION:                                                                                                    .    Procedure:       CPT Code Modifier  Open reduction internal fixation Left distal radius                      ORTHOPAEDIC SURGERY PRE-SURGICAL [address]  1960  Date of Surgery: 20 Open reduction internal fixation Left distal radius    Penicillins  History & Physical:    [ ] By Surgeon   [X] By PCP   Referrals:  [ ] Medical/Cardiac Clearance by _____________________________  [ ] Joint Replacement Class  [ ] Physical Therapy [ ] Crutch Walking Instructions   Weight Bearing Status _____________  [ ] Occupational Therapy  Shakira ] Smoking Cessation Instructions  [ ] Other ________________________________________________    Pre Admission Testing:  [ ] Hemoglobin & Hematocrit   [ ] Comprehensive Metabolic Panel  [ ] CBC with differential            [ ] Type & Screen  [ ] CBC without differential       [ ] Type & Crossmatch _____ units  [ ] PT/INR                                   [ ] Autologous Blood _____ units  [ ] PTT                                        [ ]  EKG  [ ] Urinalysis                               Shakira ]  Other Anesthesia guidelines  [ ] Urinalysis with C & S  [ ] Basic Metabolic Panel         [ ] MRSA-nasal    Orders to be initiated upon admission to same day surgery:  Shakira ] Fasting blood sugar by fingerstick [ ] Nel Novak  [ ] PT/INR (pt takes Warafin/Coumadin)     [ ] Interscalene Right or Left  [email or web address] ] HCG _X___ Urine _____ Serum              [ ] Femoral Right or Left  [ ] Other ______________________________________________    IV Fluids and Medications:  1. Place IV catherer for IV access. The RN may use 0.1ml of 1% Lidocaine SQ      Per site for IV starts up to maximum of 0.5ml.  2. Infuse Lactated Ringers IV fluid at 50ml per hour. For diabetic or has renal             impaired patient, infuse 0.9% Normal Saline at 50ml/hr. 3. Cefazolin 1g IV if <80kg OR 2g if 80-120kg OR 3g if >120kg, given within one     hour of incision time. For those allergic to Cephalosporins, give Clindamycin     600mg IV within 1 hour of incision time.    4. Other medications: _________________________________________    Additional Orders:  [email or web address] ] Knee high anti-embolism stockings and antithrombic compression pumps (apply in Pre-op)      Physican Signature:                                                           Date: 12/9/2020 Time: 2:30 PM

## 2020-12-10 ENCOUNTER — TELEPHONE (OUTPATIENT)
Dept: ORTHOPEDIC SURGERY | Age: 60
End: 2020-12-10

## 2020-12-10 ENCOUNTER — OFFICE VISIT (OUTPATIENT)
Dept: PRIMARY CARE CLINIC | Age: 60
End: 2020-12-10
Payer: COMMERCIAL

## 2020-12-10 PROCEDURE — 99211 OFF/OP EST MAY X REQ PHY/QHP: CPT | Performed by: NURSE PRACTITIONER

## 2020-12-10 RX ORDER — LORAZEPAM 1 MG/1
TABLET ORAL
Qty: 90 TABLET | Refills: 0 | Status: SHIPPED | OUTPATIENT
Start: 2020-12-10 | End: 2021-01-12

## 2020-12-10 NOTE — PATIENT INSTRUCTIONS

## 2020-12-10 NOTE — PROGRESS NOTES
Kristin Gonzalez received a viral test for COVID-19. They were educated on isolation and quarantine as appropriate. For any symptoms, they were directed to seek care from their PCP, given contact information to establish with a doctor, directed to an urgent care or the emergency room.

## 2020-12-11 ENCOUNTER — TELEPHONE (OUTPATIENT)
Dept: ORTHOPEDIC SURGERY | Age: 60
End: 2020-12-11

## 2020-12-11 LAB — SARS-COV-2, NAA: NOT DETECTED

## 2020-12-11 NOTE — PROGRESS NOTES
Name_______________________________________Printed:____________________  Date and time of surgery___12/14/2020 @ 1200_____________________Arrival Time:___1030 [facility]_____________   1. The instructions given regarding when and if a patient needs to stop oral intake prior to surgery varies. Follow the specific instructions you were given                  ___Nothing to eat or to drink after Midnight the night before.                             ____Endoscopy patient follow your DRS instructions-generally you will be doing a part of the prep after Midnight                   __x__Carbo loading or ERAS instructions will be given to select patients-if you have been given those instructions -please do the following                           The evening before your surgery after dinner before midnight drink 40 ounces of gatorade. If you are diabetic use sugar free. The morning of surgery drink 40 ounces of water. This needs to be finished 3 hours prior to your surgery start time. 2. Take the following pills with a small sip of water on the morning of surgery____clonidine, nifedipine,propanolol, pain med prn  _______________________________________________                  Do not take blood pressure medications ending in pril or sartan the natanael prior to surgery or the morning of surgery_   3. Aspirin, Ibuprofen, Advil, Naproxen, Vitamin E and other Anti-inflammatory products and supplements should be stopped for 5 -7days before surgery or as directed by your physician. 4. Check with your Doctor regarding stopping Plavix, Coumadin,Eliquis, Lovenox,Effient,Pradaxa,Xarelto, Fragmin or other blood thinners and follow their instructions. 5. Do not smoke, and do not drink any alcoholic beverages 24 hours prior to surgery. This includes NA Beer. Refrain from the usage of any recreational drugs. 6. You may brush your teeth and gargle the morning of surgery. DO NOT SWALLOW WATER   7.  You MUST make arrangements for a responsible adult to stay on site while you are here and take you home after your surgery. You will not be allowed to leave alone or drive yourself home. It is strongly suggested someone stay with you the first 24 hrs. Your surgery will be cancelled if you do not have a ride home. 8. A parent/legal guardian must accompany a child scheduled for surgery and plan to stay at the hospital until the child is discharged. Please do not bring other children with you. 9. Please wear simple, loose fitting clothing to the hospital.  Birdie Vemra not bring valuables (money, credit cards, checkbooks, etc.) Do not wear any makeup (including no eye makeup) or nail polish on your fingers or toes. 10. DO NOT wear any jewelry or piercings on day of surgery. All body piercing jewelry must be removed. 11. If you have __x_dentures, they will be removed before going to the OR; we will provide you a container. If you wear ___contact lenses or _x_glasses, they will be removed; please bring a case for them. 12. Please see your family doctor/pediatrician for a history & physical and/or concerning medications. Bring any test results/reports from your physician's office. PCP____abbott_____________Phone___________H&P Appt. Date________             13 If you  have a Living Will and Durable Power of  for Healthcare, please bring in a copy. 15. Notify your Surgeon if you develop any illness between now and surgery  time, cough, cold, fever, sore throat, nausea, vomiting, etc.  Please notify your surgeon if you experience dizziness, shortness of breath or blurred vision between now & the time of your surgery             15. DO NOT shave your operative site 96 hours prior to surgery. For face & neck surgery, men may use an electric razor 48 hours prior to surgery. 16. Shower the night before or morning of surgery using an antibacterial soap or as you have been instructed.              17. To provide excellent care visitors will be limited to one in the room at any given time. 18.  Please bring picture ID and insurance card. 19.  Visit our web site for additional information:  [email or web address]              20.During flu season no children under the age of 15 are permitted in the hospital for the safety of all patients. 21. If you take a long acting insulin in the evening only  take half of your usual  dose the night  before your procedure              22. If you use a c-pap please bring DOS if staying overnight,             23.For your convenience [address] [facility] has a pharmacy on site to fill your prescriptions. 24. If you use oxygen and have a portable tank please bring it  with you the DOS             25. Bring a complete list of all your medications with name and dose include any supplements. 32. Other_______There is a one visitor policy at [facility] for all surgeries and endoscopies. Whether the visitor can stay or will be asked to wait in the car will depend on the current policy and if social distancing can be maintained. The policy is subject to change at any time. Please make sure the visitor has a cell phone that is on,charged and able to accept calls, as this may be the way that the staff communicates with them. Pain management is NO VISITOR policyThe patients ride is expected to remain in the car with a cell phone for communication. If the ride is leaving the hospital grounds please make sure they are back in time for pickup. Have the patient inform the staff on arrival what their rides plans are while the patient is in the facility. At the MAIN there is one visitor allowed. Please note that the visitor policy is subject to change. Patient instructed to get their COVID-19 test done as directed by their doctor (5-7 days prior to procedure)  or patient states will get on __12/10________. Patient was notified that they need to have an appointment,number to call provided. The day the COVID test is done is considered day one. Instructed to self quarantine after test until DOS. ___________________________________   *Please call pre admission testing if you any further questions   Samira Thomas [address]    [facility] [address]  [phone]   [address] [place] [address]       All above information reviewed with patient in person or by phone. Patient verbalizes understanding. All questions and concerns addressed.                                                                                                  Patient/Rep______pt______________                                                                                                                                    PRE OP INSTRUCTIONS

## 2020-12-11 NOTE — TELEPHONE ENCOUNTER
Other ABHAY FROM  PRE-ADMISSION TESTING NEED A CALLBACK [phone], HAS MANY QUESTIONS CONCERNING PATIENT.

## 2020-12-14 ENCOUNTER — ANESTHESIA (OUTPATIENT)
Dept: OPERATING ROOM | Age: 60
End: 2020-12-14
Payer: COMMERCIAL

## 2020-12-14 ENCOUNTER — HOSPITAL ENCOUNTER (OUTPATIENT)
Age: 60
Setting detail: OUTPATIENT SURGERY
Discharge: HOME | End: 2020-12-14
Attending: ORTHOPAEDIC SURGERY | Admitting: ORTHOPAEDIC SURGERY
Payer: COMMERCIAL

## 2020-12-14 ENCOUNTER — TELEPHONE (OUTPATIENT)
Dept: ORTHOPEDIC SURGERY | Age: 60
End: 2020-12-14

## 2020-12-14 ENCOUNTER — ANESTHESIA EVENT (OUTPATIENT)
Dept: OPERATING ROOM | Age: 60
End: 2020-12-14
Payer: COMMERCIAL

## 2020-12-14 VITALS
HEART RATE: 65 BPM | WEIGHT: 188 LBS | HEIGHT: 62 IN | SYSTOLIC BLOOD PRESSURE: 142 MMHG | BODY MASS INDEX: 34.6 KG/M2 | RESPIRATION RATE: 14 BRPM | TEMPERATURE: 98.3 F | DIASTOLIC BLOOD PRESSURE: 84 MMHG | OXYGEN SATURATION: 96 %

## 2020-12-14 VITALS
SYSTOLIC BLOOD PRESSURE: 174 MMHG | OXYGEN SATURATION: 100 % | DIASTOLIC BLOOD PRESSURE: 104 MMHG | RESPIRATION RATE: 13 BRPM

## 2020-12-14 LAB
ANION GAP SERPL CALCULATED.3IONS-SCNC: 10 MMOL/L (ref 3–16)
BUN BLDV-MCNC: 19 MG/DL (ref 7–20)
CALCIUM SERPL-MCNC: 9 MG/DL (ref 8.3–10.6)
CHLORIDE BLD-SCNC: 100 MMOL/L (ref 99–110)
CO2: 25 MMOL/L (ref 21–32)
CREAT SERPL-MCNC: 1.3 MG/DL (ref 0.6–1.1)
GFR AFRICAN AMERICAN: 51
GFR NON-AFRICAN AMERICAN: 42
GLUCOSE BLD-MCNC: 137 MG/DL (ref 70–99)
POTASSIUM SERPL-SCNC: 4.4 MMOL/L (ref 3.5–5.1)
SODIUM BLD-SCNC: 135 MMOL/L (ref 136–145)

## 2020-12-14 PROCEDURE — 6360000002 HC RX W HCPCS: Performed by: ANESTHESIOLOGY

## 2020-12-14 PROCEDURE — C1713 ANCHOR/SCREW BN/BN,TIS/BN: HCPCS | Performed by: ORTHOPAEDIC SURGERY

## 2020-12-14 PROCEDURE — 2709999900 HC NON-CHARGEABLE SUPPLY: Performed by: ORTHOPAEDIC SURGERY

## 2020-12-14 PROCEDURE — 2500000003 HC RX 250 WO HCPCS: Performed by: ORTHOPAEDIC SURGERY

## 2020-12-14 PROCEDURE — 7100000001 HC PACU RECOVERY - ADDTL 15 MIN: Performed by: ORTHOPAEDIC SURGERY

## 2020-12-14 PROCEDURE — 3600000014 HC SURGERY LEVEL 4 ADDTL 15MIN: Performed by: ORTHOPAEDIC SURGERY

## 2020-12-14 PROCEDURE — 2580000003 HC RX 258: Performed by: ORTHOPAEDIC SURGERY

## 2020-12-14 PROCEDURE — 80048 BASIC METABOLIC PNL TOTAL CA: CPT

## 2020-12-14 PROCEDURE — 3700000001 HC ADD 15 MINUTES (ANESTHESIA): Performed by: ORTHOPAEDIC SURGERY

## 2020-12-14 PROCEDURE — 3600000004 HC SURGERY LEVEL 4 BASE: Performed by: ORTHOPAEDIC SURGERY

## 2020-12-14 PROCEDURE — 6370000000 HC RX 637 (ALT 250 FOR IP): Performed by: ANESTHESIOLOGY

## 2020-12-14 PROCEDURE — 7100000010 HC PHASE II RECOVERY - FIRST 15 MIN: Performed by: ORTHOPAEDIC SURGERY

## 2020-12-14 PROCEDURE — 6360000002 HC RX W HCPCS: Performed by: NURSE ANESTHETIST, CERTIFIED REGISTERED

## 2020-12-14 PROCEDURE — 7100000000 HC PACU RECOVERY - FIRST 15 MIN: Performed by: ORTHOPAEDIC SURGERY

## 2020-12-14 PROCEDURE — 3700000000 HC ANESTHESIA ATTENDED CARE: Performed by: ORTHOPAEDIC SURGERY

## 2020-12-14 PROCEDURE — 25608 OPTX DST RD XART FX/EPI SEP2: CPT | Performed by: ORTHOPAEDIC SURGERY

## 2020-12-14 PROCEDURE — 36415 COLL VENOUS BLD VENIPUNCTURE: CPT

## 2020-12-14 PROCEDURE — 6360000002 HC RX W HCPCS: Performed by: ORTHOPAEDIC SURGERY

## 2020-12-14 PROCEDURE — 7100000011 HC PHASE II RECOVERY - ADDTL 15 MIN: Performed by: ORTHOPAEDIC SURGERY

## 2020-12-14 PROCEDURE — 2720000010 HC SURG SUPPLY STERILE: Performed by: ORTHOPAEDIC SURGERY

## 2020-12-14 PROCEDURE — 2500000003 HC RX 250 WO HCPCS: Performed by: NURSE ANESTHETIST, CERTIFIED REGISTERED

## 2020-12-14 DEVICE — SCREW BNE L16MM DIA2.4MM DST RAD VOLAR S STL ST VAR ANG LOK: Type: IMPLANTABLE DEVICE | Site: RADIUS | Status: FUNCTIONAL

## 2020-12-14 DEVICE — SCREW BNE L20MM DIA2.4MM DST RAD VOLAR S STL ST VAR ANG LOK: Type: IMPLANTABLE DEVICE | Site: RADIUS | Status: FUNCTIONAL

## 2020-12-14 DEVICE — SCREW BNE L14MM DIA2.4MM CORT S STL ST T8 STARDRV RECESS: Type: IMPLANTABLE DEVICE | Site: RADIUS | Status: FUNCTIONAL

## 2020-12-14 DEVICE — SCREW BNE L18MM DIA2.4MM DST RAD VOLAR S STL ST VAR ANG LOK: Type: IMPLANTABLE DEVICE | Site: RADIUS | Status: FUNCTIONAL

## 2020-12-14 DEVICE — PLATE DISTL RAD VAR LT 2.4X54MM: Type: IMPLANTABLE DEVICE | Site: RADIUS | Status: FUNCTIONAL

## 2020-12-14 RX ORDER — PROPOFOL 10 MG/ML
INJECTION, EMULSION INTRAVENOUS PRN
Status: DISCONTINUED | OUTPATIENT
Start: 2020-12-14 | End: 2020-12-14 | Stop reason: SDUPTHER

## 2020-12-14 RX ORDER — LIDOCAINE HYDROCHLORIDE 10 MG/ML
0.5 INJECTION, SOLUTION EPIDURAL; INFILTRATION; INTRACAUDAL; PERINEURAL ONCE
Status: DISCONTINUED | OUTPATIENT
Start: 2020-12-14 | End: 2020-12-14 | Stop reason: HOSPADM

## 2020-12-14 RX ORDER — ONDANSETRON 2 MG/ML
4 INJECTION INTRAMUSCULAR; INTRAVENOUS
Status: DISCONTINUED | OUTPATIENT
Start: 2020-12-14 | End: 2020-12-14 | Stop reason: HOSPADM

## 2020-12-14 RX ORDER — ONDANSETRON 2 MG/ML
INJECTION INTRAMUSCULAR; INTRAVENOUS PRN
Status: DISCONTINUED | OUTPATIENT
Start: 2020-12-14 | End: 2020-12-14 | Stop reason: SDUPTHER

## 2020-12-14 RX ORDER — HYDRALAZINE HYDROCHLORIDE 20 MG/ML
5 INJECTION INTRAMUSCULAR; INTRAVENOUS EVERY 10 MIN PRN
Status: DISCONTINUED | OUTPATIENT
Start: 2020-12-14 | End: 2020-12-14 | Stop reason: HOSPADM

## 2020-12-14 RX ORDER — LABETALOL HYDROCHLORIDE 5 MG/ML
5 INJECTION, SOLUTION INTRAVENOUS EVERY 10 MIN PRN
Status: DISCONTINUED | OUTPATIENT
Start: 2020-12-14 | End: 2020-12-14 | Stop reason: HOSPADM

## 2020-12-14 RX ORDER — SODIUM CHLORIDE, SODIUM LACTATE, POTASSIUM CHLORIDE, CALCIUM CHLORIDE 600; 310; 30; 20 MG/100ML; MG/100ML; MG/100ML; MG/100ML
INJECTION, SOLUTION INTRAVENOUS CONTINUOUS
Status: DISCONTINUED | OUTPATIENT
Start: 2020-12-14 | End: 2020-12-14 | Stop reason: HOSPADM

## 2020-12-14 RX ORDER — LIDOCAINE HYDROCHLORIDE 20 MG/ML
INJECTION, SOLUTION INFILTRATION; PERINEURAL PRN
Status: DISCONTINUED | OUTPATIENT
Start: 2020-12-14 | End: 2020-12-14 | Stop reason: SDUPTHER

## 2020-12-14 RX ORDER — OXYCODONE HYDROCHLORIDE 5 MG/1
5 TABLET ORAL EVERY 12 HOURS PRN
Qty: 10 TABLET | Refills: 0 | Status: SHIPPED | OUTPATIENT
Start: 2020-12-14 | End: 2020-12-19

## 2020-12-14 RX ORDER — OXYCODONE HYDROCHLORIDE AND ACETAMINOPHEN 5; 325 MG/1; MG/1
1 TABLET ORAL
Status: COMPLETED | OUTPATIENT
Start: 2020-12-14 | End: 2020-12-14

## 2020-12-14 RX ORDER — DEXAMETHASONE SODIUM PHOSPHATE 4 MG/ML
INJECTION, SOLUTION INTRA-ARTICULAR; INTRALESIONAL; INTRAMUSCULAR; INTRAVENOUS; SOFT TISSUE PRN
Status: DISCONTINUED | OUTPATIENT
Start: 2020-12-14 | End: 2020-12-14 | Stop reason: SDUPTHER

## 2020-12-14 RX ORDER — FENTANYL CITRATE 50 UG/ML
INJECTION, SOLUTION INTRAMUSCULAR; INTRAVENOUS PRN
Status: DISCONTINUED | OUTPATIENT
Start: 2020-12-14 | End: 2020-12-14 | Stop reason: SDUPTHER

## 2020-12-14 RX ORDER — HYDROMORPHONE HCL 110MG/55ML
0.5 PATIENT CONTROLLED ANALGESIA SYRINGE INTRAVENOUS EVERY 5 MIN PRN
Status: DISCONTINUED | OUTPATIENT
Start: 2020-12-14 | End: 2020-12-14 | Stop reason: HOSPADM

## 2020-12-14 RX ORDER — GLYCOPYRROLATE 0.2 MG/ML
INJECTION INTRAMUSCULAR; INTRAVENOUS PRN
Status: DISCONTINUED | OUTPATIENT
Start: 2020-12-14 | End: 2020-12-14 | Stop reason: SDUPTHER

## 2020-12-14 RX ORDER — ASCORBIC ACID 500 MG
500 TABLET ORAL 2 TIMES DAILY
Qty: 28 TABLET | Refills: 0 | Status: SHIPPED | OUTPATIENT
Start: 2020-12-14 | End: 2021-09-23

## 2020-12-14 RX ORDER — MEPERIDINE HYDROCHLORIDE 25 MG/ML
12.5 INJECTION INTRAMUSCULAR; INTRAVENOUS; SUBCUTANEOUS EVERY 5 MIN PRN
Status: DISCONTINUED | OUTPATIENT
Start: 2020-12-14 | End: 2020-12-14 | Stop reason: HOSPADM

## 2020-12-14 RX ORDER — ATROPINE SULFATE 0.4 MG/ML
AMPUL (ML) INJECTION PRN
Status: DISCONTINUED | OUTPATIENT
Start: 2020-12-14 | End: 2020-12-14 | Stop reason: SDUPTHER

## 2020-12-14 RX ORDER — BUPIVACAINE HYDROCHLORIDE 2.5 MG/ML
INJECTION, SOLUTION EPIDURAL; INFILTRATION; INTRACAUDAL
Status: COMPLETED | OUTPATIENT
Start: 2020-12-14 | End: 2020-12-14

## 2020-12-14 RX ADMIN — LIDOCAINE HYDROCHLORIDE 100 MG: 20 INJECTION, SOLUTION INFILTRATION; PERINEURAL at 12:48

## 2020-12-14 RX ADMIN — GLYCOPYRROLATE 0.4 MG: 0.2 INJECTION, SOLUTION INTRAMUSCULAR; INTRAVENOUS at 12:54

## 2020-12-14 RX ADMIN — DEXAMETHASONE SODIUM PHOSPHATE 8 MG: 4 INJECTION, SOLUTION INTRAMUSCULAR; INTRAVENOUS at 13:19

## 2020-12-14 RX ADMIN — HYDROMORPHONE HYDROCHLORIDE 0.5 MG: 2 INJECTION, SOLUTION INTRAMUSCULAR; INTRAVENOUS; SUBCUTANEOUS at 14:15

## 2020-12-14 RX ADMIN — PROPOFOL 200 MG: 10 INJECTION, EMULSION INTRAVENOUS at 12:48

## 2020-12-14 RX ADMIN — ATROPINE SULFATE 0.4 MG: 0.4 INJECTION, SOLUTION INTRAMUSCULAR; INTRAVENOUS; SUBCUTANEOUS at 13:05

## 2020-12-14 RX ADMIN — OXYCODONE HYDROCHLORIDE AND ACETAMINOPHEN 1 TABLET: 5; 325 TABLET ORAL at 14:30

## 2020-12-14 RX ADMIN — SODIUM CHLORIDE, POTASSIUM CHLORIDE, SODIUM LACTATE AND CALCIUM CHLORIDE: 600; 310; 30; 20 INJECTION, SOLUTION INTRAVENOUS at 11:22

## 2020-12-14 RX ADMIN — CEFAZOLIN SODIUM 2 G: 10 INJECTION, POWDER, FOR SOLUTION INTRAVENOUS at 12:41

## 2020-12-14 RX ADMIN — FENTANYL CITRATE 50 MCG: 50 INJECTION, SOLUTION INTRAMUSCULAR; INTRAVENOUS at 13:15

## 2020-12-14 RX ADMIN — FENTANYL CITRATE 50 MCG: 50 INJECTION, SOLUTION INTRAMUSCULAR; INTRAVENOUS at 12:48

## 2020-12-14 RX ADMIN — ONDANSETRON 4 MG: 2 INJECTION INTRAMUSCULAR; INTRAVENOUS at 13:19

## 2020-12-14 RX ADMIN — SODIUM CHLORIDE, POTASSIUM CHLORIDE, SODIUM LACTATE AND CALCIUM CHLORIDE: 600; 310; 30; 20 INJECTION, SOLUTION INTRAVENOUS at 12:47

## 2020-12-14 ASSESSMENT — PULMONARY FUNCTION TESTS
PIF_VALUE: 15
PIF_VALUE: 8
PIF_VALUE: 14
PIF_VALUE: 13
PIF_VALUE: 9
PIF_VALUE: 12
PIF_VALUE: 10
PIF_VALUE: 11
PIF_VALUE: 3
PIF_VALUE: 20
PIF_VALUE: 3
PIF_VALUE: 17
PIF_VALUE: 12
PIF_VALUE: 5
PIF_VALUE: 20
PIF_VALUE: 15
PIF_VALUE: 1
PIF_VALUE: 11
PIF_VALUE: 9
PIF_VALUE: 19
PIF_VALUE: 17
PIF_VALUE: 20
PIF_VALUE: 11
PIF_VALUE: 15
PIF_VALUE: 12
PIF_VALUE: 4
PIF_VALUE: 18
PIF_VALUE: 3
PIF_VALUE: 17
PIF_VALUE: 14
PIF_VALUE: 12
PIF_VALUE: 1
PIF_VALUE: 17
PIF_VALUE: 15
PIF_VALUE: 17
PIF_VALUE: 12
PIF_VALUE: 18
PIF_VALUE: 10
PIF_VALUE: 8
PIF_VALUE: 17
PIF_VALUE: 13
PIF_VALUE: 11
PIF_VALUE: 18
PIF_VALUE: 19
PIF_VALUE: 19
PIF_VALUE: 8
PIF_VALUE: 4
PIF_VALUE: 19
PIF_VALUE: 5
PIF_VALUE: 12
PIF_VALUE: 21
PIF_VALUE: 12
PIF_VALUE: 10
PIF_VALUE: 18
PIF_VALUE: 7
PIF_VALUE: 3
PIF_VALUE: 8
PIF_VALUE: 21
PIF_VALUE: 11
PIF_VALUE: 9
PIF_VALUE: 9
PIF_VALUE: 18
PIF_VALUE: 18
PIF_VALUE: 11
PIF_VALUE: 14
PIF_VALUE: 18
PIF_VALUE: 7
PIF_VALUE: 15
PIF_VALUE: 20
PIF_VALUE: 11
PIF_VALUE: 19
PIF_VALUE: 16
PIF_VALUE: 2
PIF_VALUE: 15
PIF_VALUE: 17

## 2020-12-14 ASSESSMENT — PAIN DESCRIPTION - ORIENTATION: ORIENTATION: LEFT

## 2020-12-14 ASSESSMENT — PAIN DESCRIPTION - DESCRIPTORS
DESCRIPTORS: DISCOMFORT;SHARP
DESCRIPTORS: ACHING;CONSTANT

## 2020-12-14 ASSESSMENT — PAIN SCALES - GENERAL
PAINLEVEL_OUTOF10: 10
PAINLEVEL_OUTOF10: 10

## 2020-12-14 ASSESSMENT — PAIN DESCRIPTION - PAIN TYPE: TYPE: SURGICAL PAIN

## 2020-12-14 ASSESSMENT — ENCOUNTER SYMPTOMS: SHORTNESS OF BREATH: 1

## 2020-12-14 ASSESSMENT — PAIN DESCRIPTION - ONSET: ONSET: ON-GOING

## 2020-12-14 ASSESSMENT — PAIN - FUNCTIONAL ASSESSMENT: PAIN_FUNCTIONAL_ASSESSMENT: 0-10

## 2020-12-14 ASSESSMENT — PAIN DESCRIPTION - FREQUENCY: FREQUENCY: CONTINUOUS

## 2020-12-14 ASSESSMENT — PAIN DESCRIPTION - LOCATION: LOCATION: ARM

## 2020-12-14 ASSESSMENT — PAIN DESCRIPTION - PROGRESSION: CLINICAL_PROGRESSION: NOT CHANGED

## 2020-12-14 NOTE — PROGRESS NOTES
Pt awake and alert. Pt on RA, VSS. Sister in law waiting for this RN to call her to pick the pt up. Pt with c/o pain, denies nausea, tolerating PO. Skin warm RUE, palpable pulses and able to wiggle fingers. Pt meets criteria to be discharged from phase 1.

## 2020-12-14 NOTE — ANESTHESIA POSTPROCEDURE EVALUATION
Department of Anesthesiology  Postprocedure Note    Patient: Michelle Trujillo  MRN: [id number]  YOB: 1960  Date of evaluation: 12/14/2020  Time:  2:05 PM     Procedure Summary     Date: 12/14/20 Room / Location: [address]    Anesthesia Start: 1247 Anesthesia Stop: 0698    Procedure: OPEN REDUCTION INTERNAL FIXATION LEFT DISTAL RADIUS - SYNTHES (Left Arm Lower) Diagnosis: (S52.502A  LEFT DISTAL RADIUS FRACTURE)    Surgeons: Inessa Leigh MD Responsible Provider: Annmarie Landers MD    Anesthesia Type: general ASA Status: 3          Anesthesia Type: general    Gage Phase I:      Gage Phase II:      Last vitals: Reviewed and per EMR flowsheets.        Anesthesia Post Evaluation    Patient location during evaluation: PACU  Patient participation: complete - patient participated  Level of consciousness: awake  Airway patency: patent  Nausea & Vomiting: no nausea and no vomiting  Complications: no  Cardiovascular status: blood pressure returned to baseline  Respiratory status: acceptable  Hydration status: euvolemic

## 2020-12-14 NOTE — OP NOTE
[facility]                     [address] [facility], [address] [facility]                                OPERATIVE REPORT    PATIENT NAME: BLAS LARSEN                       :        1960  MED REC NO:   [id number]                          ROOM:  ACCOUNT NO:   [de-identified]                           ADMIT DATE: 2020  PROVIDER:     Garrett Gilmore MD    DATE OF PROCEDURE:  2020    PREOPERATIVE DIAGNOSES:  1. Left intraarticular distal radius fracture, two main intraarticular  fragments. 2.  Chronic continuous opioid usage. POSTOPERATIVE DIAGNOSES:  1. Left intraarticular distal radius fracture, two main intraarticular  fragments. 2.  Chronic continuous opioid usage. OPERATION PERFORMED:  Open treatment of the left intraarticular distal  radius fracture with internal fixation of two main intraarticular  fragments. OPERATING SURGEON:  Garrett Gilmore MD    ANESTHESIA:  General laryngeal mask airway. TOURNIQUET TIME:  46 minutes at 250 mmHg. BLOOD LOSS:  Minimal.    IMPLANTS:  Synthes left variable angle distal radius locking plate,   6-hole head, 3-hole shaft. INDICATION FOR PROCEDURE:  The patient is a 59-year-old female for whom  I was asked by my partner to help take over her care. She sustained a   wrist fracture approximately a week and a half to two weeks ago. She  slipped on ice. She has history of right wrist fracture that was also  fixed many years ago. I evaluated the patient in the preoperative  holding area and discussed with her details of her injury and details  of surgery. I also discussed with her the postoperative course. The patient is on chronic narcotics and benzodiazepine usage due to her  medical history. Therefore, pain control postoperatively may be  challenging. DETAILS OF PROCEDURE:  The patient was brought back to the OR and  general anesthesia was administered by way of laryngeal mask airway.   A hand table was attached to the hospital stretcher and a non-sterile  axillary tourniquet was applied. The entire left upper extremity was  subsequently prepped and draped in the usual sterile fashion. A full  time-out was performed with all parties in agreement. The patient did  receive Ancef for preoperative IV antibiotics. The left upper extremity was exsanguinated with an Esmarch and the  tourniquet was then inflated. An extended FCR approach to the distal  radius was utilized. The incision was carried down onto the FCR sheath  which was incised and the FCR tendon was then retracted ulnarly. The  flexor pollicis longus muscle and tendon were then retracted ulnarly as  well and the pronator quadratus was elevated in a L shaped fashion and  also retracted ulnarly to protect the median nerve. There was what  appeared to be a subacute appearing fracture with scarring and early  healing already evident. The fracture was not mobile. The main  fracture line was relatively distal and there was a sagittal split with  separate radial styloid and lunate facet fragments. The fractures   had to be re-mobilized using 15 blade scalpel as well as using a freer  to mobilize the fracture fragments given the early healing already  present. Once this was accomplished, the wrist apex volar fracture  angulation was reduced with longitudinal traction and wrist flexion. The preoperative dorsal tilt measured approximately 15 degrees. Once  the reduction was obtained, the fracture was provisionally held reduced  with a single K-wire introduced thru the radial styloid and then down to  the metaphyseal region. I then selected the appropriate size variable  angle distal radius locking plate and positioned it and then  provisionally secured it with K-wires. Fluoroscopy was then utilized to  confirm adequate reduction and plate size and positioning.   Once I was happy with all the above, the screw holes were then sequentially filled. 2.4 mm cortical screws were used proximally in the shaft and 2.4 mm  locking screws were utilized in the intraarticular fracture fragments. Final fluoroscopic images were obtained and then saved. The surgical  wound was irrigated thoroughly and then infiltrated with Marcaine. The  pronator quadratus was reapproximated to its radial insertion distally  to provide plate coverage. The tourniquet was let down. Hemostasis was  obtained with direct pressure. The incision was then closed with  inverted Vicryls and 3-0 nylon. It was cleaned with wet and dries and  then dressed in the usual sterile fashion. The drapes were removed and  a well-padded volar splint was applied. The patient was extubated and  transferred back to the PACU.         Mariana Berg MD    D: 12/14/2020 14:32:47       T: 12/14/2020 15:15:53     [id number]  Job#: [id number]     Doc#: [id number]    CC:

## 2020-12-14 NOTE — PROGRESS NOTES
Discharge instructions reviewed with patient/sister in law. All home medications have been reviewed, questions answered and patient verbalized understanding. Discharge instructions signed. Pt dc'd per wheelchair. Patient discharged home with sling, two prescriptions and other belongings. Sister in law taking stable pt home.

## 2020-12-14 NOTE — PROGRESS NOTES
Pt arrived from OR to PACU bay 4. Report received from OR staff. Pt arouses easily to voice. Surgical dressing in place to left arm/wrist.  Pt on 2 L NC, NSR, VSS. Will continue to monitor.

## 2020-12-14 NOTE — H&P
cloNIDine (CATAPRES) 0.1 MG tablet, TAKE ONE TABLET BY MOUTH TWICE A DAY  tiZANidine (ZANAFLEX) 4 MG tablet, TAKE ONE TABLET BY MOUTH EVERY 8 HOURS AS NEEDED  hydroCHLOROthiazide (HYDRODIURIL) 25 MG tablet, TAKE ONE TABLET BY MOUTH EVERY MORNING  citalopram (CELEXA) 40 MG tablet, TAKE ONE TABLET BY MOUTH DAILY  hydrOXYzine (ATARAX) 25 MG tablet, TAKE TWO TABLETS BY MOUTH EVERY NIGHT AT BEDTIME AS NEEDED FOR ITCHING  NIFEdipine (PROCARDIA XL) 60 MG extended release tablet, TAKE ONE TABLET BY MOUTH DAILY  diclofenac sodium (VOLTAREN) 1 % GEL, Apply 4 g topically 4 times daily  LORazepam (ATIVAN) 1 MG tablet, TAKE ONE TABLET BY MOUTH EVERY 8 HOURS AS NEEDED FOR ANXIETY      Current Facility-Administered Medications:     lactated ringers infusion, , Intravenous, Continuous, Niles Maddox MD, Last Rate: 50 mL/hr at 12/14/20 1122, New Bag at 12/14/20 1122    lidocaine PF 1 % injection 0.5 mL, 0.5 mL, Intradermal, Once, Niles Maddox MD    ceFAZolin (ANCEF) 2 g in dextrose 5 % 100 mL IVPB, 2 g, Intravenous, On Call to OR, Niles Maddox MD    meperidine (DEMEROL) injection 12.5 mg, 12.5 mg, Intravenous, Q5 Min PRN, Annmarie Landers MD    HYDROmorphone (DILAUDID) injection 0.5 mg, 0.5 mg, Intravenous, Q5 Min PRN, Annmarie Landers MD    oxyCODONE-acetaminophen (PERCOCET) 5-325 MG per tablet 1 tablet, 1 tablet, Oral, Once PRN, Annmarie Landers MD    ondansetron [facility]) injection 4 mg, 4 mg, Intravenous, Once PRN, Annmarie Landers MD    labetalol (NORMODYNE;TRANDATE) injection 5 mg, 5 mg, Intravenous, Q10 Min PRN, Annmarie Landers MD    hydrALAZINE (APRESOLINE) injection 5 mg, 5 mg, Intravenous, Q10 Min PRN, Annmarie Landers MD    Vitals:    12/11/20 0939 12/14/20 1045   BP:  (!) 146/81   Pulse:  (!) 48   Temp:  98.1 °F (36.7 °C)   TempSrc:  Temporal   SpO2:  96%   Weight: 190 lb (86.2 kg) 188 lb (85.3 kg)   Height: 5' 2\" (1.575 m) 5' 2\" (1.575 m)       Body mass index is 34.39 kg/m².   Heart - RRR, no murmurs Lungs - CTAB, unlabored, face mask on  Left UE - splint c/d/i   SILT M/U/R/A nerve distributions; AIN/PIN/IO intact   Cap refill WNL    I have again reviewed with the patient and/or family details of the procedure, risks, benefits, treatment alternatives, and expected outcomes. An opportunity for questions was again given, and all questions were answered. The patient would like to proceed.     Cortney Perez MD  12/14/2020

## 2020-12-14 NOTE — ANESTHESIA PRE PROCEDURE
Department of Anesthesiology  Preprocedure Note       Name:  Franne Boeck   Age:  61 y.o.  :  1960                                          MRN:  [id number]         Date:  2020      Surgeon: Karen Marcum):  Ginette Delvalle MD    Procedure: Procedure(s):  OPEN REDUCTION INTERNAL FIXATION LEFT DISTAL RADIUS - SYNTHES    Medications prior to admission:   Prior to Admission medications    Medication Sig Start Date End Date Taking? Authorizing Provider   LORazepam (ATIVAN) 1 MG tablet TAKE ONE TABLET BY MOUTH EVERY 8 HOURS AS NEEDED FOR ANXIETY 12/10/20 1/9/21  Demetrice Saldaña, DO   HYDROcodone-acetaminophen (NORCO) 7.5-325 MG per tablet Take 1 tablet by mouth every 6 hours as needed for Pain for up to 30 days.  1/31/21 3/2/21  Demetrice Saldaña, DO   propranolol (INDERAL LA) 160 MG extended release capsule TAKE ONE CAPSULE BY MOUTH DAILY 20   Demetrice Saldaña, DO   zolpidem (AMBIEN) 10 MG tablet TAKE ONE TABLET BY MOUTH ONCE NIGHTLY AS NEEDED FOR SLEEP 20  Demetrice Saldaña, DO   cloNIDine (CATAPRES) 0.1 MG tablet TAKE ONE TABLET BY MOUTH TWICE A DAY 20   Demetrice Saldaña, DO   tiZANidine (ZANAFLEX) 4 MG tablet TAKE ONE TABLET BY MOUTH EVERY 8 HOURS AS NEEDED 11/10/20   Demetrice Saldaña, DO   hydroCHLOROthiazide (HYDRODIURIL) 25 MG tablet TAKE ONE TABLET BY MOUTH EVERY MORNING 10/12/20   Demetrice Saldaña, DO   citalopram (CELEXA) 40 MG tablet TAKE ONE TABLET BY MOUTH DAILY 10/12/20   Demetrice Saldaña, DO   hydrOXYzine (ATARAX) 25 MG tablet TAKE TWO TABLETS BY MOUTH EVERY NIGHT AT BEDTIME AS NEEDED FOR ITCHING 10/12/20   Demetrice Saldaña, DO   NIFEdipine (PROCARDIA XL) 60 MG extended release tablet TAKE ONE TABLET BY MOUTH DAILY 20   Demetrice Saldaña, DO   diclofenac sodium (VOLTAREN) 1 % GEL Apply 4 g topically 4 times daily 20   Demetrice Saldaña, DO       Current medications:    Current Facility-Administered Medications   Medication Dose Route Frequency Provider Last Rate Last Admin  lactated ringers infusion   Intravenous Continuous Andrew Spicer MD        lidocaine PF 1 % injection 0.5 mL  0.5 mL Intradermal Once Andrew Spicer MD        ceFAZolin (ANCEF) 2 g in dextrose 5 % 100 mL IVPB  2 g Intravenous On Call to [facility], MD           Allergies:     Allergies   Allergen Reactions    Penicillins Rash       Problem List:    Patient Active Problem List   Diagnosis Code    Hypertension I10    Lumbar disc herniation M51.26    DDD (degenerative disc disease), lumbar M51.36    Lumbar stenosis M48.061    Lumbar spinal stenosis M48.061    Patellofemoral arthritis of right knee M17.11    ANALY (acute kidney injury) ([facility] [address] N17.9    Hypotension I95.9    Multifocal pneumonia J18.9    Abnormal CT of the chest R93.89    Shortness of breath R06.02    Absolute anemia D64.9    CKD (chronic kidney disease) stage 3, GFR 30-59 ml/min N18.30    Community acquired pneumonia J18.9    Closed fracture of left distal radius S52.502A       Past Medical History:        Diagnosis Date    Anxiety     Depression     Hypertension     Insomnia     Lumbar disc herniation     Lumbar spinal stenosis        Past Surgical History:        Procedure Laterality Date    BRONCHOSCOPY N/A 10/10/2019    BRONCHOSCOPY WITH BRONCHOALVEOLAR LAVAGE performed by Jocelin Nagel DO at [address] [facility]  10/10/2019    BRONCHOSCOPY DIAGNOSTIC OR CELL [address] ONLY performed by Jocelin Nagel DO at [address] [place] [address] wrist plate    GASTRIC BYPASS SURGERY      HYSTERECTOMY      LUMBAR NERVE BLOCK N/A 6/17/2019    MIDLINE L5-S1  INTERLAMINAR EPIDURAL STEROID INJECTION WITH FLUOROSCOPY performed by Angeline Foss MD at [address] Left 1/20/2020    LEFT L4 AND L5 TRANSFORAMINAL EPIDURAL STEROID INJECTION WITH FLUOROSCOPY (96502, 97297) performed by Angeline Foss MD at [address] Social History:    Social History     Tobacco Use    Smoking status: Former Smoker     Packs/day: 0.25     Years: 15.00     Pack years: 3.75     Types: Cigarettes    Smokeless tobacco: Never Used    Tobacco comment: encouraged to quit smoking    Substance Use Topics    Alcohol use: Not Currently     Alcohol/week: 0.0 standard drinks                                Counseling given: Not Answered  Comment: encouraged to quit smoking       Vital Signs (Current):   Vitals:    12/11/20 0939   Weight: 190 lb (86.2 kg)   Height: 5' 2\" (1.575 m)                                              BP Readings from Last 3 Encounters:   12/05/20 (!) 107/57   12/02/20 134/84   07/27/20 134/88       NPO Status:                                                                                 BMI:   Wt Readings from Last 3 Encounters:   12/11/20 190 lb (86.2 kg)   12/09/20 191 lb (86.6 kg)   12/05/20 191 lb (86.6 kg)     Body mass index is 34.75 kg/m². CBC:   Lab Results   Component Value Date    WBC 6.4 10/15/2019    RBC 3.40 10/15/2019    HGB 9.4 10/15/2019    HCT 29.5 10/15/2019    MCV 86.7 10/15/2019    RDW 16.6 10/15/2019     10/15/2019       CMP:   Lab Results   Component Value Date     10/15/2019    K 4.1 10/15/2019    K 3.5 10/08/2019     10/15/2019    CO2 26 10/15/2019    BUN 15 10/15/2019    CREATININE 0.9 10/15/2019    GFRAA >60 10/15/2019    GFRAA >60 09/02/2011    AGRATIO 0.9 10/07/2019    LABGLOM >60 10/15/2019    GLUCOSE 102 10/15/2019    PROT 7.3 10/07/2019    PROT 7.2 09/02/2011    CALCIUM 8.7 10/15/2019    BILITOT <0.2 10/07/2019    ALKPHOS 106 10/07/2019    AST 10 10/07/2019    ALT 9 10/07/2019       POC Tests: No results for input(s): POCGLU, POCNA, POCK, POCCL, POCBUN, POCHEMO, POCHCT in the last 72 hours.     Coags: No results found for: PROTIME, INR, APTT    HCG (If Applicable): No results found for: PREGTESTUR, PREGSERUM, HCG, HCGQUANT ABGs: No results found for: PHART, PO2ART, TKT9VAQ, TWS1HZK, BEART, V2BJSYNL     Type & Screen (If Applicable):  No results found for: LABABO, LABRH    Drug/Infectious Status (If Applicable):  No results found for: HIV, HEPCAB    COVID-19 Screening (If Applicable):   Lab Results   Component Value Date    COVID19 NOT DETECTED 12/10/2020         Anesthesia Evaluation  Patient summary reviewed and Nursing notes reviewed  Airway: Mallampati: III        Dental:          Pulmonary:   (+) pneumonia:  shortness of breath:                             Cardiovascular:    (+) hypertension:,                   Neuro/Psych:   (+) psychiatric history:            GI/Hepatic/Renal:   (+) renal disease:,           Endo/Other:                     Abdominal:           Vascular:                                        Anesthesia Plan      general     ASA 3       Induction: intravenous.                           Jin Padilla MD   12/14/2020

## 2020-12-21 RX ORDER — NIFEDIPINE 60 MG/1
TABLET, EXTENDED RELEASE ORAL
Qty: 90 TABLET | Refills: 0 | Status: SHIPPED | OUTPATIENT
Start: 2020-12-21 | End: 2020-12-24

## 2020-12-22 RX ORDER — TIZANIDINE 4 MG/1
TABLET ORAL
Qty: 40 TABLET | Refills: 2 | Status: SHIPPED | OUTPATIENT
Start: 2020-12-22 | End: 2021-02-08

## 2020-12-23 RX ORDER — HYDROXYZINE HYDROCHLORIDE 25 MG/1
TABLET, FILM COATED ORAL
Qty: 60 TABLET | Refills: 0 | Status: SHIPPED | OUTPATIENT
Start: 2020-12-23 | End: 2021-01-22

## 2020-12-24 RX ORDER — NIFEDIPINE 60 MG/1
TABLET, EXTENDED RELEASE ORAL
Qty: 90 TABLET | Refills: 0 | Status: SHIPPED | OUTPATIENT
Start: 2020-12-24 | End: 2021-06-16

## 2020-12-29 ENCOUNTER — OFFICE VISIT (OUTPATIENT)
Dept: ORTHOPEDIC SURGERY | Age: 60
End: 2020-12-29
Payer: COMMERCIAL

## 2020-12-29 VITALS — BODY MASS INDEX: 34.6 KG/M2 | TEMPERATURE: 97.9 F | HEIGHT: 62 IN | WEIGHT: 188 LBS

## 2020-12-29 PROCEDURE — L3908 WHO COCK-UP NONMOLDE PRE OTS: HCPCS | Performed by: ORTHOPAEDIC SURGERY

## 2020-12-29 PROCEDURE — 99024 POSTOP FOLLOW-UP VISIT: CPT | Performed by: ORTHOPAEDIC SURGERY

## 2020-12-29 RX ORDER — ZOLPIDEM TARTRATE 10 MG/1
TABLET ORAL
Qty: 30 TABLET | Refills: 0 | Status: SHIPPED | OUTPATIENT
Start: 2020-12-29 | End: 2021-02-01

## 2020-12-29 NOTE — PROGRESS NOTES
ORTHOPAEDIC PROGRESS NOTE    Chief Complaint   Patient presents with    Post-Op Check     left wrist       HPI  12/29/2020  First postop  She is doing well  Denies complaints  No incisional issues  Denies N/T      12/14/2020  OPERATION PERFORMED:  Open treatment of the left intraarticular distal radius fracture with internal fixation of two main intraarticular fragments.       Past Medical History:   Diagnosis Date    Anxiety     Depression     Hypertension     Insomnia     Lumbar disc herniation     Lumbar spinal stenosis        Past Surgical History:   Procedure Laterality Date    BRONCHOSCOPY N/A 10/10/2019    BRONCHOSCOPY WITH BRONCHOALVEOLAR LAVAGE performed by Radha Borrego DO at [address] [facility]  10/10/2019    BRONCHOSCOPY DIAGNOSTIC OR CELL [address] ONLY [facility] Left 12/14/2020    OPEN REDUCTION INTERNAL FIXATION LEFT DISTAL RADIUS - SYNTHES performed by Garrett Gilmore MD at [facility] [address]      r wrist plate    GASTRIC BYPASS SURGERY      HYSTERECTOMY      LUMBAR NERVE BLOCK N/A 6/17/2019    MIDLINE L5-S1  INTERLAMINAR EPIDURAL STEROID INJECTION WITH FLUOROSCOPY performed by Iram Ovalles MD at [address] Left 1/20/2020    LEFT L4 AND L5 TRANSFORAMINAL EPIDURAL STEROID INJECTION WITH FLUOROSCOPY (64191, 37417) performed by Iram Ovalles MD at [facility] History     Socioeconomic History    Marital status: Single     Spouse name: Not on file    Number of children: Not on file    Years of education: Not on file    Highest education level: Not on file   Occupational History    Occupation:    Social Needs    Financial resource strain: Not on file    Food insecurity     Worry: Not on file     Inability: Not on file    Transportation needs     Medical: Not on file     Non-medical: Not on file   Tobacco Use  Smoking status: Former Smoker     Packs/day: 0.25     Years: 15.00     Pack years: 3.75     Types: Cigarettes    Smokeless tobacco: Never Used    Tobacco comment: encouraged to quit smoking    Substance and Sexual Activity    Alcohol use: Not Currently     Alcohol/week: 0.0 standard drinks    Drug use: Not Currently     Types: Marijuana    Sexual activity: Not on file   Lifestyle    Physical activity     Days per week: Not on file     Minutes per session: Not on file    Stress: Not on file   Relationships    Social connections     Talks on phone: Not on file     Gets together: Not on file     Attends Latter day service: Not on file     Active member of club or organization: Not on file     Attends meetings of clubs or organizations: Not on file     Relationship status: Not on file    Intimate partner violence     Fear of current or ex partner: Not on file     Emotionally abused: Not on file     Physically abused: Not on file     Forced sexual activity: Not on file   Other Topics Concern    Not on file   Social History Narrative    Not on file       Current Outpatient Medications   Medication Sig Dispense Refill    zolpidem (AMBIEN) 10 MG tablet TAKE ONE TABLET BY MOUTH ONCE NIGHTLY AS NEEDED FOR SLEEP 30 tablet 0    NIFEdipine (PROCARDIA XL) 60 MG extended release tablet TAKE ONE TABLET BY MOUTH DAILY 90 tablet 0    hydrOXYzine (ATARAX) 25 MG tablet TAKE TWO TABLETS BY MOUTH EVERY NIGHT AT BEDTIME AS NEEDED FOR ITCHING 60 tablet 0    tiZANidine (ZANAFLEX) 4 MG tablet One q 8 hours prn 40 tablet 2    LORazepam (ATIVAN) 1 MG tablet TAKE ONE TABLET BY MOUTH EVERY 8 HOURS AS NEEDED FOR ANXIETY 90 tablet 0    [START ON 1/31/2021] HYDROcodone-acetaminophen (NORCO) 7.5-325 MG per tablet Take 1 tablet by mouth every 6 hours as needed for Pain for up to 30 days.  120 tablet 0    propranolol (INDERAL LA) 160 MG extended release capsule TAKE ONE CAPSULE BY MOUTH DAILY 30 capsule 0    cloNIDine (CATAPRES) 0.1 MG tablet TAKE ONE TABLET BY MOUTH TWICE A  tablet 0    hydroCHLOROthiazide (HYDRODIURIL) 25 MG tablet TAKE ONE TABLET BY MOUTH EVERY MORNING 30 tablet 3    citalopram (CELEXA) 40 MG tablet TAKE ONE TABLET BY MOUTH DAILY 30 tablet 2    diclofenac sodium (VOLTAREN) 1 % GEL Apply 4 g topically 4 times daily 500 g 1    vitamin C (ASCORBIC ACID) 500 MG tablet Take 1 tablet by mouth 2 times daily for 14 days 28 tablet 0     No current facility-administered medications for this visit. Vitals:    12/29/20 1324   Temp: 97.9 °F (36.6 °C)   TempSrc: Infrared   Weight: 188 lb (85.3 kg)   Height: 5' 2\" (1.575 m)       Physical Exam:  Body mass index is 34.39 kg/m². Left wrist - surgical incision well healed, c/d/i   No erythema or drainage   Sutures DC'ed and steristrips applied  SILT M/U/R/A nerve distributions; AIN/PIN/IO intact  Radial pulse intact, RRR      Imaging:  Images were personally reviewed by myself and discussed with the patient  Left wrist 3 views performed today in clinic - s/p ORIF of the distal radius fracture with maintained reduction/alignment. No obvious hardware complications. Assessment & Plan:  61 y.o. female following up for   Diagnosis Orders   1. Other closed intra-articular fracture of distal end of left radius with routine healing, subsequent encounter  XR WRIST LEFT (MIN 3 VIEWS)    [employer]ays Titan Wrist Long Forearm Brace           Procedures    Val [employer]ays Titan Wrist Long Forearm Brace     Patient was prescribed a [employer]ays Titan Wrist and Forearm Brace. The left wrist will require stabilization / immobilization from this semi-rigid / rigid orthosis to improve their function. The orthosis will assist in protecting the affected area, provide functional support and facilitate healing.     The patient was educated and fit by a healthcare professional with expert knowledge and specialization in brace application while under the direct supervision of the treating physician. Verbal and written instructions for the use of and application of this item were provided. They were instructed to contact the office immediately should the brace result in increased pain, decreased sensation, increased swelling or worsening of the condition.        Left hand/wrist NWB  Brace for immobilization  Remove brace minimum TID for range of motion exercises (fingers, wrist, forearm)    Ice/elevation PRN  Continue vit C    Okay to get incision wet    FU in 1 month with repeat XRs    Alejandra Rogers

## 2021-01-12 DIAGNOSIS — F41.1 GAD (GENERALIZED ANXIETY DISORDER): ICD-10-CM

## 2021-01-12 RX ORDER — LORAZEPAM 1 MG/1
TABLET ORAL
Qty: 90 TABLET | Refills: 0 | Status: SHIPPED | OUTPATIENT
Start: 2021-01-12 | End: 2021-02-14

## 2021-01-25 DIAGNOSIS — I10 ESSENTIAL HYPERTENSION: ICD-10-CM

## 2021-01-25 RX ORDER — PROPRANOLOL HYDROCHLORIDE 160 MG/1
CAPSULE, EXTENDED RELEASE ORAL
Qty: 30 CAPSULE | Refills: 0 | Status: SHIPPED | OUTPATIENT
Start: 2021-01-25 | End: 2021-03-01 | Stop reason: SDUPTHER

## 2021-02-01 DIAGNOSIS — F51.01 PRIMARY INSOMNIA: ICD-10-CM

## 2021-02-01 RX ORDER — ZOLPIDEM TARTRATE 10 MG/1
TABLET ORAL
Qty: 30 TABLET | Refills: 0 | Status: SHIPPED | OUTPATIENT
Start: 2021-02-01 | End: 2021-03-01 | Stop reason: SDUPTHER

## 2021-02-08 DIAGNOSIS — M17.11 PRIMARY OSTEOARTHRITIS OF RIGHT KNEE: ICD-10-CM

## 2021-02-08 RX ORDER — TIZANIDINE 4 MG/1
TABLET ORAL
Qty: 40 TABLET | Refills: 1 | Status: SHIPPED | OUTPATIENT
Start: 2021-02-08 | End: 2021-03-14

## 2021-02-14 DIAGNOSIS — F41.1 GAD (GENERALIZED ANXIETY DISORDER): ICD-10-CM

## 2021-02-14 RX ORDER — LORAZEPAM 1 MG/1
TABLET ORAL
Qty: 90 TABLET | Refills: 0 | Status: SHIPPED | OUTPATIENT
Start: 2021-02-14 | End: 2021-03-17

## 2021-02-21 RX ORDER — HYDROCHLOROTHIAZIDE 25 MG/1
TABLET ORAL
Qty: 30 TABLET | Refills: 2 | Status: SHIPPED | OUTPATIENT
Start: 2021-02-21 | End: 2021-06-01

## 2021-02-21 RX ORDER — CLONIDINE HYDROCHLORIDE 0.1 MG/1
TABLET ORAL
Qty: 180 TABLET | Refills: 0 | Status: SHIPPED | OUTPATIENT
Start: 2021-02-21 | End: 2021-06-01

## 2021-03-01 ENCOUNTER — OFFICE VISIT (OUTPATIENT)
Dept: FAMILY MEDICINE CLINIC | Age: 61
End: 2021-03-01
Payer: COMMERCIAL

## 2021-03-01 VITALS
BODY MASS INDEX: 34.04 KG/M2 | SYSTOLIC BLOOD PRESSURE: 114 MMHG | TEMPERATURE: 98 F | HEIGHT: 62 IN | DIASTOLIC BLOOD PRESSURE: 72 MMHG | WEIGHT: 185 LBS

## 2021-03-01 DIAGNOSIS — I10 ESSENTIAL HYPERTENSION: ICD-10-CM

## 2021-03-01 DIAGNOSIS — F51.01 PRIMARY INSOMNIA: ICD-10-CM

## 2021-03-01 DIAGNOSIS — Z79.899 MEDICATION MANAGEMENT: ICD-10-CM

## 2021-03-01 DIAGNOSIS — M17.11 PRIMARY OSTEOARTHRITIS OF RIGHT KNEE: ICD-10-CM

## 2021-03-01 DIAGNOSIS — M48.061 SPINAL STENOSIS OF LUMBAR REGION WITHOUT NEUROGENIC CLAUDICATION: Primary | ICD-10-CM

## 2021-03-01 PROCEDURE — 99214 OFFICE O/P EST MOD 30 MIN: CPT | Performed by: FAMILY MEDICINE

## 2021-03-01 RX ORDER — GABAPENTIN 300 MG/1
CAPSULE ORAL
Qty: 90 CAPSULE | Refills: 3 | Status: SHIPPED | OUTPATIENT
Start: 2021-03-01 | End: 2021-06-03 | Stop reason: SDUPTHER

## 2021-03-01 RX ORDER — HYDROCODONE BITARTRATE AND ACETAMINOPHEN 7.5; 325 MG/1; MG/1
1 TABLET ORAL EVERY 6 HOURS PRN
Qty: 120 TABLET | Refills: 0 | Status: SHIPPED | OUTPATIENT
Start: 2021-03-01 | End: 2021-03-01 | Stop reason: SDUPTHER

## 2021-03-01 RX ORDER — HYDROCODONE BITARTRATE AND ACETAMINOPHEN 7.5; 325 MG/1; MG/1
1 TABLET ORAL EVERY 6 HOURS PRN
Qty: 120 TABLET | Refills: 0 | Status: SHIPPED | OUTPATIENT
Start: 2021-03-31 | End: 2021-03-01 | Stop reason: SDUPTHER

## 2021-03-01 RX ORDER — ZOLPIDEM TARTRATE 10 MG/1
TABLET ORAL
Qty: 30 TABLET | Refills: 2 | Status: SHIPPED | OUTPATIENT
Start: 2021-03-01 | End: 2021-03-30

## 2021-03-01 RX ORDER — PROPRANOLOL HYDROCHLORIDE 160 MG/1
CAPSULE, EXTENDED RELEASE ORAL
Qty: 30 CAPSULE | Refills: 5 | Status: ON HOLD
Start: 2021-03-01 | End: 2021-09-27 | Stop reason: HOSPADM

## 2021-03-01 RX ORDER — HYDROCODONE BITARTRATE AND ACETAMINOPHEN 7.5; 325 MG/1; MG/1
1 TABLET ORAL EVERY 6 HOURS PRN
Qty: 120 TABLET | Refills: 0 | Status: SHIPPED | OUTPATIENT
Start: 2021-04-30 | End: 2021-06-03 | Stop reason: SDUPTHER

## 2021-03-01 ASSESSMENT — PATIENT HEALTH QUESTIONNAIRE - PHQ9
SUM OF ALL RESPONSES TO PHQ9 QUESTIONS 1 & 2: 1
2. FEELING DOWN, DEPRESSED OR HOPELESS: 1
SUM OF ALL RESPONSES TO PHQ QUESTIONS 1-9: 1

## 2021-03-01 ASSESSMENT — ENCOUNTER SYMPTOMS: BACK PAIN: 1

## 2021-03-01 NOTE — LETTER
CONTROLLED SUBSTANCE MEDICATION AGREEMENT     Patient Name: Rashmi Hook  Patient YOB: 1960   I understand, that controlled substance medications may be used to help better manage my symptoms and to improve my ability to function at home, work and in social settings. However, I also understand that these medications do have risks, which have been discussed with me, including possible development of physical or psychological dependence. I understand that successful treatment requires mutual trust and honesty between me and my provider. I understand and agree that following this Medication Agreement is necessary in continuing my provider-patient relationship and the success of my treatment plan. Explanation from my Provider: Benefits and Goals of Controlled Substance Medications: There are two potential goals for your treatment: (1) decreased pain and suffering (2) improved daily life functions. There are many possible treatments for your chronic condition(s). Alternatives such as physical therapy, yoga, massage, home daily exercise, meditation, relaxation techniques, injections, chiropractic manipulations, surgery, cognitive therapy, hypnosis and many medications that are not habit-forming may be used. Use of controlled substance medications may be helpful, but they are unlikely to resolve all symptoms or restore all function.    Explanation from my Provider: Risks of Controlled Substance Medications: Opioid pain medications: These medications can lead to problems such as addiction/dependence, sedation, lightheadedness/dizziness, memory issues, falls, constipation, nausea, or vomiting. They may also impair the ability to drive or operate machinery. Additionally, these medications may lower testosterone levels, leading to loss of bone strength, stamina and sex drive. They may cause problems with breathing, sleep apnea and reduced coughing, which is especially dangerous for patients with lung disease. Overdose or dangerous interactions with alcohol and other medications may occur, leading to death. Hyperalgesia may develop, which means patients receiving opioids for the treatment of pain may become more sensitive to certain painful stimuli, and in some cases, experience pain from ordinarily non-painful stimuli. Women between the ages of 14-53 who could become pregnant should carefully weigh the risks and benefits of opioids with their physicians, as these medications increase the risk of pregnancy complications, including miscarriage,  delivery and stillbirth. It is also possible for babies to be born addicted to opioids. Opioid dependence withdrawal symptoms may include; feelings of uneasiness, increased pain, irritability, belly pain, diarrhea, sweats and goose-flesh. Benzodiazepines and non-benzodiazepine sleep medications: These medications can lead to problems such as addiction/dependence, sedation, fatigue, lightheadedness, dizziness, incoordination, falls, depression, hallucinations, and impaired judgment, memory and concentration. The ability to drive and operate machinery may also be affected. Abnormal sleep-related behaviors have been reported, including sleepwalking, driving, making telephone calls, eating, or having sex while not fully awake. These medications can suppress breathing and worsen sleep apnea, particularly when combined with alcohol or other sedating medications, potentially leading to death. Dependence withdrawal symptoms may include tremors, anxiety, hallucinations and seizures. Stimulants:  Common adverse effects include addiction/dependence, increased blood  pressure and heart rate, decreased appetite, nausea, involuntary weight loss, insomnia,                                                                                                                     Initials:_______   irritability, and headaches. These risks may increase when these medications are combined with other stimulants, such as caffeine pills or energy drinks, certain weight loss supplements and oral decongestants. Dependence withdrawal symptoms may include depressed mood, loss of interest, suicidal thoughts, anxiety, fatigue, appetite changes and agitation. Testosterone replacement therapy:  Potential side effects include increased risk of stroke and heart attack, blood clots, increased blood pressure, increased cholesterol, enlarged prostate, sleep apnea, irritability/aggression and other mood disorders, and decreased fertility. I agree and understand that I and my prescriber have the following rights and responsibilities regarding my treatment plan:     1. MY RIGHTS:  To be informed of my treatment and medication plan. To be an active participant in my health and wellbeing. 2. MY RESPONSIBILITY AND UNDERSTANDING FOR USE OF MEDICATIONS  ? I will take medications at the dose and frequency as directed. For my safety, I will not increase or change how I take my medications without the recommendation of my healthcare provider. ? I will actively participate in any program recommended by my provider which may improve function, including social, physical, psychological programs. ? I will not take my medications with alcohol or other drugs not prescribed to me. I understand that drinking alcohol with my medications increases the chances of side effects, including reduced breathing rate and could lead to personal injury when operating machinery. ? I understand that if I have a history of substance use disorders, including alcohol or other illicit drugs, that I may be at increased risk of addiction to my medications. ? I agree to notify my provider immediately if I should become pregnant so that my treatment plan can be adjusted. ? I agree and understand that I shall only receive controlled substance medications from the prescriber that signed this agreement unless there is written agreement among other prescribers of controlled substances outlining the responsibility of the medications being prescribed. ? I understand that the if the controlled medication is not helping to achieve goals, the dosage may be tapered and no longer prescribed. 3. MY RESPONSIBILITY FOR COMMUNICATION / PRESCRIPTION RENEWALS  ? I agree that all controlled substance medications that I take will be prescribed only by my provider. If another healthcare provider prescribes me medication in an emergency, I will notify my provider within seventy-two (72) hours. ? I will arrange for refills at the prescribed interval ONLY during regular office hours. I will not ask for refills earlier than agreed, after-hours, on holidays or weekends. Refills may take up to 72 hours for processing and prescriptions to reach the pharmacy. ? I will inform my other health care providers that I am taking these medications and of the existence of this [id number]. In the event of an emergency, I will provide the same information to the emergency department prescribers. ? I will keep my provider updated on the pharmacy I am using for controlled medication prescription filling. Initials:_______  4. MY RESPONSIBILITY FOR PROTECTING MEDICATIONS  ? I will protect my prescriptions and medications. I understand that lost or misplaced prescriptions will not be replaced. ? I will keep medications only for my own use and will not share them with others. I will keep all medications away from children. ? I agree that if my medications are adjusted or discontinued, I will properly dispose of any remaining medications. I understand that I will be required to dispose of any remaining controlled medications as, directed by my prescriber, prior to being provided with any prescriptions for other controlled medications. Medication drop box locations can be found at: [email or web address]    5. MY RESPONSIBILITY WITH ILLEGAL DRUGS   ? I will not use illegal or street drugs or another person's prescription medications not prescribed to me.   ? If there are identified addiction type symptoms, then referral to a program may be provided by my provider and I agree to follow through with this recommendation.   6. MY RESPONSIBILITY FOR COOPERATION WITH INVESTIGATIONS ? I understand that my provider will comply with any applicable law and may discuss my use and/or possible misuse/abuse of controlled substances and alcohol, as appropriate, with any health care provider involved in my care, pharmacist, or legal authority. ? I authorize my provider and pharmacy to cooperate fully with law enforcement agencies (as permitted by law) in the investigation of any possible misuse, sale, or other diversion of my controlled substances. ? I agree to waive any applicable privilege or right of privacy or confidentiality with respect to these authorizations. 7. PROVIDERS RIGHT TO MONITOR FOR SAFETY: PRESCRIPTION MONITORING / DRUG TESTING  ? I consent to drug/toxicology screening and will submit to a drug screen upon my providers request to assure I am only taking the prescribed drugs for my safety monitoring. I understand that a drug screen is a laboratory test in which a sample of my urine, blood or saliva is checked to see what drugs I have been taking. This may entail an observed urine specimen, which means that a nurse or other health care provider may watch me provide urine, and I will cooperate if I am asked to provide an observed specimen. ? I understand that my provider will check a copy of my State Prescription Monitoring Program () Report in order to safely prescribe medications. ? Pill Counts: I consent to pill counts when requested. I may be asked to bring all my prescribed controlled substance medications, in their original bottles, to all of my scheduled appointments. In addition, my provider may ask me to come to the practice at any time for a random pill count. 8. TERMINATION OF THIS AGREEMENT  For my safety, my prescriber has the right to stop prescribing controlled substance medications and may end this agreement. Initials:_______  ?  Conditions that may result in termination of this agreement: a. I do not show any improvement in pain, or my activity has not improved. b. I develop rapid tolerance or loss of improvement, as described in my treatment plan.  c. I develop significant side effects from the medication. d. My behavior is not consistent with the responsibilities outlined above, thereby causing safety concerns to continue prescribing controlled substance medications. e. I fail to follow the terms of this agreement. f. Other:____________________________       UNDERSTANDING THIS MEDICATION AGREEMENT:    I have read the above and have had all my questions answered. For chronic disease management, I know that my symptoms can be managed with many types of treatments. A chronic medication trial may be part of my treatment, but I must be an active participant in my care. Medication therapy is only one part of my symptom management plan. In some cases, there may be limited scientific evidence to support the chronic use of certain medications to improve symptoms and daily function. Furthermore, in certain circumstances, there may be scientific information that suggests that the use of chronic controlled substances may worsen my symptoms and increase my risk of unintentional death directly related to this medication therapy. I know that if my provider feels my risk from controlled medications is greater than my benefit, I will have my controlled substance medication(s) compassionately lowered or removed altogether. I further agree to allow this office to contact my HIPAA contact if there are concerns about my safety and use of the controlled medications. I have agreed to use the prescribed controlled substance medications to me as instructed by my provider and as stated in this Medication Agreement. My initial on each page and my signature below shows that I have read each page and I have had the opportunity to ask questions with answers provided by my provider. Patient Name (Printed): _____________________________________  Patient Signature:  ______________________   Date: _____________    Prescriber Name (Printed): ___________________________________  Prescriber Signature: _____________________  Date: _____________

## 2021-03-01 NOTE — PROGRESS NOTES
OUTPATIENT PROGRESS NOTE  Date of Service:  3/1/2021  Address: [facility]  [address] [facility],[address] [address]  Dept: [phone]  Loc: [phone]    Subjective:      Patient ID:  [id number]  Janay Ennis is a 61 y.o. female     Back Pain  This is a chronic problem. The current episode started more than 1 year ago. The problem occurs constantly. The pain is present in the lumbar spine. The quality of the pain is described as aching and burning. The pain is moderate. The symptoms are aggravated by bending and twisting. She has tried analgesics, home exercises, muscle relaxant and NSAIDs for the symptoms. The treatment provided moderate relief. Hypertension  This is a chronic problem. The current episode started more than 1 year ago. The problem is unchanged. The problem is controlled. Associated symptoms include anxiety and malaise/fatigue. Risk factors for coronary artery disease include family history and stress. There are no compliance problems. Review of Systems   Constitutional: Positive for malaise/fatigue. Musculoskeletal: Positive for back pain.      Insomnia   She needs refills on her sleeping medication  It helped   Objective:   YOB: 1960    Date of Visit:  3/1/2021       Allergies   Allergen Reactions    Penicillins Rash       Outpatient Medications Marked as Taking for the 3/1/21 encounter (Office Visit) with Juanito Rodriguez, DO   Medication Sig Dispense Refill    hydroCHLOROthiazide (HYDRODIURIL) 25 MG tablet TAKE ONE TABLET BY MOUTH EVERY MORNING 30 tablet 2    cloNIDine (CATAPRES) 0.1 MG tablet TAKE ONE TABLET BY MOUTH TWICE A  tablet 0    LORazepam (ATIVAN) 1 MG tablet TAKE ONE TABLET BY MOUTH EVERY 8 HOURS AS NEEDED FOR ANXIETY 90 tablet 0    tiZANidine (ZANAFLEX) 4 MG tablet TAKE ONE TABLET BY MOUTH EVERY 8 HOURS AS NEEDED 40 tablet 1  zolpidem (AMBIEN) 10 MG tablet TAKE ONE TABLET BY MOUTH ONCE NIGHTLY AS NEEDED FOR SLEEP 30 tablet 0    propranolol (INDERAL LA) 160 MG extended release capsule TAKE ONE CAPSULE BY MOUTH DAILY 30 capsule 0    hydrOXYzine (ATARAX) 25 MG tablet TAKE TWO TABLETS BY MOUTH ONCE NIGHTLY AT BEDTIME AS NEEDED FOR ITCHING 60 tablet 2    NIFEdipine (PROCARDIA XL) 60 MG extended release tablet TAKE ONE TABLET BY MOUTH DAILY 90 tablet 0    HYDROcodone-acetaminophen (NORCO) 7.5-325 MG per tablet Take 1 tablet by mouth every 6 hours as needed for Pain for up to 30 days. 120 tablet 0    citalopram (CELEXA) 40 MG tablet TAKE ONE TABLET BY MOUTH DAILY 30 tablet 2    diclofenac sodium (VOLTAREN) 1 % GEL Apply 4 g topically 4 times daily 500 g 1    [DISCONTINUED] gabapentin (NEURONTIN) 300 MG capsule TAKE ONE CAPSULE BY MOUTH EVERY MORNING AND TAKE TWO CAPSULES BY MOUTH EVERY NIGHT AT BEDTIME 90 capsule 1       Vitals:    03/01/21 1431   BP: 114/72   Temp: 98 °F (36.7 °C)   Weight: 185 lb (83.9 kg)   Height: 5' 2\" (1.575 m)     Body mass index is 33.84 kg/m². Wt Readings from Last 3 Encounters:   03/01/21 185 lb (83.9 kg)   12/29/20 188 lb (85.3 kg)   12/14/20 188 lb (85.3 kg)     BP Readings from Last 3 Encounters:   03/01/21 114/72   12/14/20 (!) 142/84   12/14/20 (!) 174/104       Physical Exam  Vitals signs and nursing note reviewed. Constitutional:       Appearance: She is well-developed. HENT:      Head: Normocephalic. Neck:      Thyroid: No thyromegaly. Cardiovascular:      Rate and Rhythm: Normal rate and regular rhythm. Heart sounds: Normal heart sounds. Pulmonary:      Effort: Pulmonary effort is normal.      Breath sounds: Normal breath sounds. Lymphadenopathy:      Cervical: No cervical adenopathy. Neurological:      Mental Status: She is alert and oriented to person, place, and time. Psychiatric:         Behavior: Behavior normal.         Thought Content:  Thought content normal. Judgment: Judgment normal.            Assessment/Plan         Assessment/plan;  Benoit Chamberlain was seen today for 3 month follow-up, medication check and medication refill. Diagnoses and all orders for this visit:    Spinal stenosis of lumbar region without neurogenic claudication  -     gabapentin (NEURONTIN) 300 MG capsule; TAKE ONE CAPSULE BY MOUTH EVERY MORNING AND TAKE TWO CAPSULES BY MOUTH EVERY NIGHT AT BEDTIME  -     Discontinue: HYDROcodone-acetaminophen (NORCO) 7.5-325 MG per tablet; Take 1 tablet by mouth every 6 hours as needed for Pain for up to 30 days. -     Discontinue: HYDROcodone-acetaminophen (NORCO) 7.5-325 MG per tablet; Take 1 tablet by mouth every 6 hours as needed for Pain for up to 30 days.  -     HYDROcodone-acetaminophen (NORCO) 7.5-325 MG per tablet; Take 1 tablet by mouth every 6 hours as needed for Pain for up to 30 days. Primary insomnia  -     zolpidem (AMBIEN) 10 MG tablet; One q hs prn    Essential hypertension  -     propranolol (INDERAL LA) 160 MG extended release capsule; One daily    Primary osteoarthritis of right knee  -     Discontinue: HYDROcodone-acetaminophen (NORCO) 7.5-325 MG per tablet; Take 1 tablet by mouth every 6 hours as needed for Pain for up to 30 days. -     Discontinue: HYDROcodone-acetaminophen (NORCO) 7.5-325 MG per tablet; Take 1 tablet by mouth every 6 hours as needed for Pain for up to 30 days.  -     HYDROcodone-acetaminophen (NORCO) 7.5-325 MG per tablet; Take 1 tablet by mouth every 6 hours as needed for Pain for up to 30 days.  -     Drug Panel-PM-HI Res-UR Interp-A    Medication management  -     Drug Panel-PM-HI Res-UR Interp-A      No follow-ups on file.              Sanaz Pace DO

## 2021-03-07 LAB
6-ACETYLMORPHINE: NOT DETECTED
7-AMINOCLONAZEPAM: NOT DETECTED
ALPHA-OH-ALPRAZOLAM: NOT DETECTED
ALPRAZOLAM: NOT DETECTED
AMPHETAMINE: NOT DETECTED
BARBITURATES: NOT DETECTED
BENZOYLECGONINE: NOT DETECTED
BUPRENORPHINE: NOT DETECTED
CARISOPRODOL: NOT DETECTED
CLONAZEPAM: NOT DETECTED
CODEINE: NOT DETECTED
CREATININE URINE: 254.6 MG/DL (ref 20–400)
DIAZEPAM: NOT DETECTED
DRUGS EXPECTED: NORMAL
EER PAIN MGT DRUG PANEL, HIGH RES/EMIT U: NORMAL
ETHYL GLUCURONIDE: NOT DETECTED
FENTANYL: NOT DETECTED
HYDROCODONE: NOT DETECTED
HYDROMORPHONE: NOT DETECTED
LORAZEPAM: PRESENT
MARIJUANA METABOLITE: PRESENT
MDA: NOT DETECTED
MDEA: NOT DETECTED
MDMA URINE: NOT DETECTED
MEPERIDINE: NOT DETECTED
METHADONE: NOT DETECTED
METHAMPHETAMINE: NOT DETECTED
METHYLPHENIDATE: NOT DETECTED
MIDAZOLAM: NOT DETECTED
MORPHINE: NOT DETECTED
NORBUPRENORPHINE, FREE: NOT DETECTED
NORDIAZEPAM: NOT DETECTED
NORFENTANYL: NOT DETECTED
NORHYDROCODONE, URINE: NOT DETECTED
NOROXYCODONE: NOT DETECTED
NOROXYMORPHONE, URINE: NOT DETECTED
OXAZEPAM: NOT DETECTED
OXYCODONE: NOT DETECTED
OXYMORPHONE: NOT DETECTED
PAIN MANAGEMENT DRUG PANEL: NORMAL
PAIN MANAGEMENT DRUG PANEL: NORMAL
PCP: NOT DETECTED
PHENTERMINE: NOT DETECTED
TAPENTADOL, URINE: NOT DETECTED
TAPENTADOL-O-SULFATE, URINE: NOT DETECTED
TEMAZEPAM: NOT DETECTED
TRAMADOL: NOT DETECTED
ZOLPIDEM: NOT DETECTED

## 2021-03-09 ENCOUNTER — OFFICE VISIT (OUTPATIENT)
Dept: ORTHOPEDIC SURGERY | Age: 61
End: 2021-03-09
Payer: COMMERCIAL

## 2021-03-09 VITALS — RESPIRATION RATE: 16 BRPM | WEIGHT: 185 LBS | TEMPERATURE: 97.4 F | BODY MASS INDEX: 34.04 KG/M2 | HEIGHT: 62 IN

## 2021-03-09 DIAGNOSIS — S46.011A ROTATOR CUFF STRAIN, RIGHT, INITIAL ENCOUNTER: ICD-10-CM

## 2021-03-09 DIAGNOSIS — S52.572D OTHER CLOSED INTRA-ARTICULAR FRACTURE OF DISTAL END OF LEFT RADIUS WITH ROUTINE HEALING, SUBSEQUENT ENCOUNTER: Primary | ICD-10-CM

## 2021-03-09 DIAGNOSIS — M85.80 OSTEOPENIA DETERMINED BY X-RAY: ICD-10-CM

## 2021-03-09 PROCEDURE — 99213 OFFICE O/P EST LOW 20 MIN: CPT | Performed by: ORTHOPAEDIC SURGERY

## 2021-03-09 PROCEDURE — 99024 POSTOP FOLLOW-UP VISIT: CPT | Performed by: ORTHOPAEDIC SURGERY

## 2021-03-09 RX ORDER — METHYLPREDNISOLONE 4 MG/1
TABLET ORAL
Qty: 1 KIT | Refills: 0 | Status: SHIPPED | OUTPATIENT
Start: 2021-03-09 | End: 2021-03-15

## 2021-03-09 NOTE — PROGRESS NOTES
performed by Terrie Villafuerte MD at [facility] History     Socioeconomic History    Marital status: Single     Spouse name: Not on file    Number of children: Not on file    Years of education: Not on file    Highest education level: Not on file   Occupational History    Occupation:    Social Needs    Financial resource strain: Not on file    Food insecurity     Worry: Not on file     Inability: Not on file   [employer] needs     Medical: Not on file     Non-medical: Not on file   Tobacco Use    Smoking status: Former Smoker     Packs/day: 0.25     Years: 15.00     Pack years: 3.75     Types: Cigarettes    Smokeless tobacco: Never Used    Tobacco comment: encouraged to quit smoking    Substance and Sexual Activity    Alcohol use: Not Currently     Alcohol/week: 0.0 standard drinks    Drug use: Not Currently     Types: Marijuana    Sexual activity: Not on file   Lifestyle    Physical activity     Days per week: Not on file     Minutes per session: Not on file    Stress: Not on file   Relationships    Social connections     Talks on phone: Not on file     Gets together: Not on file     Attends Yarsanism service: Not on file     Active member of club or organization: Not on file     Attends meetings of clubs or organizations: Not on file     Relationship status: Not on file    Intimate partner violence     Fear of current or ex partner: Not on file     Emotionally abused: Not on file     Physically abused: Not on file     Forced sexual activity: Not on file   Other Topics Concern    Not on file   Social History Narrative    Not on file       Current Outpatient Medications   Medication Sig Dispense Refill    methylPREDNISolone (MEDROL, FRANCESCA,) 4 MG tablet Take by mouth as directed on kit.  1 kit 0    zolpidem (AMBIEN) 10 MG tablet One q hs prn 30 tablet 2    gabapentin (NEURONTIN) 300 MG capsule TAKE ONE CAPSULE BY MOUTH EVERY MORNING AND TAKE TWO CAPSULES BY MOUTH EVERY NIGHT AT BEDTIME 90 capsule 3    propranolol (INDERAL LA) 160 MG extended release capsule One daily 30 capsule 5    [START ON 4/30/2021] HYDROcodone-acetaminophen (NORCO) 7.5-325 MG per tablet Take 1 tablet by mouth every 6 hours as needed for Pain for up to 30 days. 120 tablet 0    hydroCHLOROthiazide (HYDRODIURIL) 25 MG tablet TAKE ONE TABLET BY MOUTH EVERY MORNING 30 tablet 2    cloNIDine (CATAPRES) 0.1 MG tablet TAKE ONE TABLET BY MOUTH TWICE A  tablet 0    LORazepam (ATIVAN) 1 MG tablet TAKE ONE TABLET BY MOUTH EVERY 8 HOURS AS NEEDED FOR ANXIETY 90 tablet 0    tiZANidine (ZANAFLEX) 4 MG tablet TAKE ONE TABLET BY MOUTH EVERY 8 HOURS AS NEEDED 40 tablet 1    hydrOXYzine (ATARAX) 25 MG tablet TAKE TWO TABLETS BY MOUTH ONCE NIGHTLY AT BEDTIME AS NEEDED FOR ITCHING 60 tablet 2    NIFEdipine (PROCARDIA XL) 60 MG extended release tablet TAKE ONE TABLET BY MOUTH DAILY 90 tablet 0    vitamin C (ASCORBIC ACID) 500 MG tablet Take 1 tablet by mouth 2 times daily for 14 days 28 tablet 0    citalopram (CELEXA) 40 MG tablet TAKE ONE TABLET BY MOUTH DAILY 30 tablet 2    diclofenac sodium (VOLTAREN) 1 % GEL Apply 4 g topically 4 times daily 500 g 1     No current facility-administered medications for this visit. Vitals:    03/09/21 1448   Resp: 16   Temp: 97.4 °F (36.3 °C)   TempSrc: Infrared   Weight: 185 lb (83.9 kg)   Height: 5' 2\" (1.575 m)       Physical Exam:  Body mass index is 33.84 kg/m². Left wrist - surgical incision well healed, c/d/i   No erythema or drainage   AROM E/F and S/P symmetric and intact  Right shoulder:   No obvious deformity/swelling/ecchymosis   No atrophy seen    moderate TTP over bicipital groove only   Range of Motion:     Forward flexion:   dysrhythmia    Abduction:   dysrhythmia    External rotation with arm at side:  70    Internal rotation:  L4   Strength:    Abduction:  5-/5     External rotation:  5-/5    Special tests:    equivocal lift-off sign negative belly-press test    positive Hawkin's test    positive Speed's test  BUE SILT M/U/R/A nerve distributions; AIN/PIN/IO intact   Radial pulse intact, RRR      Imaging:  Images were personally reviewed by myself and discussed with the patient  Left wrist 3 views performed today in clinic - s/p ORIF of the distal radius fracture with maintained reduction/alignment. No obvious hardware complications. Fracture appears healed. Right shoulder 4 views performed today in clinic - glenohumeral articulation is mildly narrowed with no evidence of subchondral cystic changes or osteophytes, there are no loose bodies appreciated. Denis's line is preserved. On axillary view, the humeral head is well-centered within the glenoid. The acromioclavicular joint demonstrates no significant degenerative changes. There are cystic changes involving the tuberosities. Calcific tendonitis is absent. Bones appear demineralized. Assessment & Plan:  61 y.o. female following up for   Diagnosis Orders   1. Other closed intra-articular fracture of distal end of left radius with routine healing, subsequent encounter  XR WRIST LEFT (MIN 3 VIEWS)   2. Rotator cuff strain, right, initial encounter  XR SHOULDER RIGHT (MIN 2 VIEWS)    methylPREDNISolone (MEDROL, FRANCESCA,) 4 MG tablet    [facility]   3. Osteopenia determined by x-ray with history of fragility fracture         No orders of the defined types were placed in this encounter. Left wrist - doi  ng well  Activities as tolerated      Right shoulder -   Preserved strength and ROM, albeit, inflamed and painful. I have recommended a course of conservative treatment, including activity modification, proper lifting technique, proper sleeping position, ice, NSAIDs and/or tylenol, and a home exercise program.  This was printed out and given to the patient.   I believe the patient will benefit from formal physical therapy, therefore, a referral was placed,

## 2021-03-13 DIAGNOSIS — M17.11 PRIMARY OSTEOARTHRITIS OF RIGHT KNEE: ICD-10-CM

## 2021-03-14 RX ORDER — TIZANIDINE 4 MG/1
TABLET ORAL
Qty: 40 TABLET | Refills: 0 | Status: SHIPPED | OUTPATIENT
Start: 2021-03-14 | End: 2021-03-30

## 2021-03-17 DIAGNOSIS — F41.1 GAD (GENERALIZED ANXIETY DISORDER): ICD-10-CM

## 2021-03-17 RX ORDER — LORAZEPAM 1 MG/1
TABLET ORAL
Qty: 90 TABLET | Refills: 0 | Status: SHIPPED | OUTPATIENT
Start: 2021-03-17 | End: 2021-04-14

## 2021-03-30 DIAGNOSIS — M17.11 PRIMARY OSTEOARTHRITIS OF RIGHT KNEE: ICD-10-CM

## 2021-03-30 RX ORDER — TIZANIDINE 4 MG/1
TABLET ORAL
Qty: 40 TABLET | Refills: 0 | Status: SHIPPED | OUTPATIENT
Start: 2021-03-30 | End: 2021-04-15

## 2021-04-04 DIAGNOSIS — F32.9 REACTIVE DEPRESSION: ICD-10-CM

## 2021-04-04 DIAGNOSIS — F41.9 ANXIETY: ICD-10-CM

## 2021-04-04 RX ORDER — CITALOPRAM 40 MG/1
TABLET ORAL
Qty: 30 TABLET | Refills: 1 | Status: SHIPPED | OUTPATIENT
Start: 2021-04-04 | End: 2021-06-12

## 2021-04-14 DIAGNOSIS — F41.1 GAD (GENERALIZED ANXIETY DISORDER): ICD-10-CM

## 2021-04-14 RX ORDER — LORAZEPAM 1 MG/1
TABLET ORAL
Qty: 90 TABLET | Refills: 0 | Status: SHIPPED | OUTPATIENT
Start: 2021-04-14 | End: 2021-05-16

## 2021-04-15 DIAGNOSIS — M17.11 PRIMARY OSTEOARTHRITIS OF RIGHT KNEE: ICD-10-CM

## 2021-04-15 RX ORDER — TIZANIDINE 4 MG/1
TABLET ORAL
Qty: 40 TABLET | Refills: 0 | Status: SHIPPED | OUTPATIENT
Start: 2021-04-15 | End: 2021-04-29

## 2021-04-29 DIAGNOSIS — M17.11 PRIMARY OSTEOARTHRITIS OF RIGHT KNEE: ICD-10-CM

## 2021-04-29 RX ORDER — TIZANIDINE 4 MG/1
TABLET ORAL
Qty: 40 TABLET | Refills: 0 | Status: SHIPPED | OUTPATIENT
Start: 2021-04-29 | End: 2021-05-16

## 2021-04-30 DIAGNOSIS — F51.01 PRIMARY INSOMNIA: ICD-10-CM

## 2021-04-30 RX ORDER — HYDROXYZINE HYDROCHLORIDE 25 MG/1
TABLET, FILM COATED ORAL
Qty: 60 TABLET | Refills: 1 | Status: SHIPPED | OUTPATIENT
Start: 2021-04-30 | End: 2021-06-03 | Stop reason: SDUPTHER

## 2021-05-15 DIAGNOSIS — M17.11 PRIMARY OSTEOARTHRITIS OF RIGHT KNEE: ICD-10-CM

## 2021-05-15 DIAGNOSIS — F41.1 GAD (GENERALIZED ANXIETY DISORDER): ICD-10-CM

## 2021-05-16 RX ORDER — TIZANIDINE 4 MG/1
TABLET ORAL
Qty: 40 TABLET | Refills: 0 | Status: SHIPPED | OUTPATIENT
Start: 2021-05-16 | End: 2021-05-17

## 2021-05-16 RX ORDER — LORAZEPAM 1 MG/1
TABLET ORAL
Qty: 90 TABLET | Refills: 0 | Status: SHIPPED | OUTPATIENT
Start: 2021-05-16 | End: 2021-06-17

## 2021-05-17 DIAGNOSIS — M17.11 PRIMARY OSTEOARTHRITIS OF RIGHT KNEE: ICD-10-CM

## 2021-05-17 RX ORDER — TIZANIDINE 4 MG/1
TABLET ORAL
Qty: 40 TABLET | Refills: 0 | Status: SHIPPED | OUTPATIENT
Start: 2021-05-17 | End: 2021-06-14

## 2021-06-01 DIAGNOSIS — F51.04 CHRONIC INSOMNIA: Primary | ICD-10-CM

## 2021-06-01 RX ORDER — ZOLPIDEM TARTRATE 10 MG/1
TABLET ORAL
Qty: 30 TABLET | Refills: 1 | Status: SHIPPED | OUTPATIENT
Start: 2021-06-01 | End: 2021-08-01

## 2021-06-01 RX ORDER — CLONIDINE HYDROCHLORIDE 0.1 MG/1
TABLET ORAL
Qty: 180 TABLET | Refills: 0 | Status: SHIPPED | OUTPATIENT
Start: 2021-06-01 | End: 2021-08-31

## 2021-06-01 RX ORDER — HYDROCHLOROTHIAZIDE 25 MG/1
TABLET ORAL
Qty: 30 TABLET | Refills: 1 | Status: SHIPPED | OUTPATIENT
Start: 2021-06-01 | End: 2021-08-01

## 2021-06-03 DIAGNOSIS — F51.01 PRIMARY INSOMNIA: ICD-10-CM

## 2021-06-03 DIAGNOSIS — M17.11 PRIMARY OSTEOARTHRITIS OF RIGHT KNEE: ICD-10-CM

## 2021-06-03 DIAGNOSIS — M48.061 SPINAL STENOSIS OF LUMBAR REGION WITHOUT NEUROGENIC CLAUDICATION: ICD-10-CM

## 2021-06-03 RX ORDER — HYDROCODONE BITARTRATE AND ACETAMINOPHEN 7.5; 325 MG/1; MG/1
1 TABLET ORAL EVERY 6 HOURS PRN
Qty: 120 TABLET | Refills: 0 | Status: SHIPPED | OUTPATIENT
Start: 2021-06-03 | End: 2021-07-06 | Stop reason: SDUPTHER

## 2021-06-03 RX ORDER — GABAPENTIN 300 MG/1
CAPSULE ORAL
Qty: 90 CAPSULE | Refills: 3 | Status: SHIPPED | OUTPATIENT
Start: 2021-06-03 | End: 2022-01-31

## 2021-06-03 RX ORDER — HYDROXYZINE HYDROCHLORIDE 25 MG/1
TABLET, FILM COATED ORAL
Qty: 60 TABLET | Refills: 1 | Status: SHIPPED | OUTPATIENT
Start: 2021-06-03 | End: 2021-08-14

## 2021-06-07 ENCOUNTER — OFFICE VISIT (OUTPATIENT)
Dept: FAMILY MEDICINE CLINIC | Age: 61
End: 2021-06-07
Payer: COMMERCIAL

## 2021-06-07 VITALS
SYSTOLIC BLOOD PRESSURE: 138 MMHG | DIASTOLIC BLOOD PRESSURE: 74 MMHG | WEIGHT: 186 LBS | HEART RATE: 56 BPM | BODY MASS INDEX: 34.02 KG/M2

## 2021-06-07 DIAGNOSIS — I10 ESSENTIAL HYPERTENSION: ICD-10-CM

## 2021-06-07 DIAGNOSIS — M48.061 SPINAL STENOSIS OF LUMBAR REGION WITHOUT NEUROGENIC CLAUDICATION: Primary | ICD-10-CM

## 2021-06-07 PROCEDURE — 99213 OFFICE O/P EST LOW 20 MIN: CPT | Performed by: FAMILY MEDICINE

## 2021-06-07 SDOH — ECONOMIC STABILITY: FOOD INSECURITY: WITHIN THE PAST 12 MONTHS, THE FOOD YOU BOUGHT JUST DIDN'T LAST AND YOU DIDN'T HAVE MONEY TO GET MORE.: NEVER TRUE

## 2021-06-07 SDOH — ECONOMIC STABILITY: FOOD INSECURITY: WITHIN THE PAST 12 MONTHS, YOU WORRIED THAT YOUR FOOD WOULD RUN OUT BEFORE YOU GOT MONEY TO BUY MORE.: NEVER TRUE

## 2021-06-07 ASSESSMENT — SOCIAL DETERMINANTS OF HEALTH (SDOH): HOW HARD IS IT FOR YOU TO PAY FOR THE VERY BASICS LIKE FOOD, HOUSING, MEDICAL CARE, AND HEATING?: NOT HARD AT ALL

## 2021-06-07 ASSESSMENT — PATIENT HEALTH QUESTIONNAIRE - PHQ9
SUM OF ALL RESPONSES TO PHQ9 QUESTIONS 1 & 2: 0
SUM OF ALL RESPONSES TO PHQ QUESTIONS 1-9: 0
2. FEELING DOWN, DEPRESSED OR HOPELESS: 0
1. LITTLE INTEREST OR PLEASURE IN DOING THINGS: 0
SUM OF ALL RESPONSES TO PHQ QUESTIONS 1-9: 0
SUM OF ALL RESPONSES TO PHQ QUESTIONS 1-9: 0

## 2021-06-08 NOTE — PROGRESS NOTES
OUTPATIENT PROGRESS NOTE  Date of Service:  6/7/2021  Address: [facility] [address] [facility] [address]  Dept: [phone]  Loc: [phone]    Subjective:      Patient ID:  [id number]  Mandy Man is a 61 y.o. female     Hypertension  This is a new problem. The current episode started in the past 7 days. The problem is unchanged. The problem is controlled. Associated symptoms include anxiety and malaise/fatigue. There are no associated agents to hypertension. Past treatments include calcium channel blockers and diuretics. The current treatment provides no improvement. Review of Systems   Constitutional: Positive for malaise/fatigue. Objective:   YOB: 1960    Date of Visit:  6/7/2021       Allergies   Allergen Reactions    Penicillins Rash       Outpatient Medications Marked as Taking for the 6/7/21 encounter (Office Visit) with Ludwig Mendez, DO   Medication Sig Dispense Refill    gabapentin (NEURONTIN) 300 MG capsule TAKE ONE CAPSULE BY MOUTH EVERY MORNING AND TAKE TWO CAPSULES BY MOUTH EVERY NIGHT AT BEDTIME 90 capsule 3    hydrOXYzine (ATARAX) 25 MG tablet TAKE TWO TABLETS BY MOUTH EVERY NIGHT AT BEDTIME AS NEEDED FOR ITCHING 60 tablet 1    HYDROcodone-acetaminophen (NORCO) 7.5-325 MG per tablet Take 1 tablet by mouth every 6 hours as needed for Pain for up to 30 days.  120 tablet 0    cloNIDine (CATAPRES) 0.1 MG tablet TAKE ONE TABLET BY MOUTH TWICE A  tablet 0    hydroCHLOROthiazide (HYDRODIURIL) 25 MG tablet TAKE ONE TABLET BY MOUTH EVERY MORNING 30 tablet 1    zolpidem (AMBIEN) 10 MG tablet TAKE ONE TABLET BY MOUTH EVERY NIGHT AT BEDTIME AS NEEDED 30 tablet 1    tiZANidine (ZANAFLEX) 4 MG tablet TAKE ONE TABLET BY MOUTH EVERY 8 HOURS AS NEEDED 40 tablet 0    LORazepam (ATIVAN) 1 MG tablet TAKE ONE TABLET BY MOUTH EVERY 8 HOURS AS NEEDED FOR ANXIETY 90 tablet 0    citalopram (CELEXA) 40 MG tablet TAKE ONE TABLET BY MOUTH DAILY 30 tablet 1    propranolol (INDERAL LA) 160 MG extended release capsule One daily 30 capsule 5    NIFEdipine (PROCARDIA XL) 60 MG extended release tablet TAKE ONE TABLET BY MOUTH DAILY 90 tablet 0    diclofenac sodium (VOLTAREN) 1 % GEL Apply 4 g topically 4 times daily 500 g 1       Vitals:    06/07/21 1502   BP: (!) 166/77   Pulse: 56   Weight: 186 lb (84.4 kg)     Body mass index is 34.02 kg/m². Wt Readings from Last 3 Encounters:   06/07/21 186 lb (84.4 kg)   03/09/21 185 lb (83.9 kg)   03/01/21 185 lb (83.9 kg)     BP Readings from Last 3 Encounters:   06/07/21 (!) 166/77   03/01/21 114/72   12/14/20 (!) 142/84       Physical Exam  Vitals and nursing note reviewed. Constitutional:       Appearance: She is well-developed. HENT:      Head: Normocephalic. Neck:      Thyroid: No thyromegaly. Cardiovascular:      Rate and Rhythm: Normal rate and regular rhythm. Heart sounds: Normal heart sounds. Pulmonary:      Effort: Pulmonary effort is normal.      Breath sounds: Normal breath sounds. Lymphadenopathy:      Cervical: No cervical adenopathy. Neurological:      Mental Status: She is alert and oriented to person, place, and time. Psychiatric:         Behavior: Behavior normal.         Thought Content: Thought content normal.         Judgment: Judgment normal.            Assessment/Plan         Assessment/plan;  Lea Chapman was seen today for 1 month follow-up.     Diagnoses and all orders for this visit:    Spinal stenosis of lumbar region without neurogenic claudication  Has refills  Essential hypertension    No changes to her medication             Nicole Ramos DO

## 2021-06-12 DIAGNOSIS — F41.9 ANXIETY: ICD-10-CM

## 2021-06-12 DIAGNOSIS — F32.9 REACTIVE DEPRESSION: ICD-10-CM

## 2021-06-12 RX ORDER — CITALOPRAM 40 MG/1
TABLET ORAL
Qty: 30 TABLET | Refills: 0 | Status: SHIPPED | OUTPATIENT
Start: 2021-06-12 | End: 2021-07-11

## 2021-06-14 DIAGNOSIS — M17.11 PRIMARY OSTEOARTHRITIS OF RIGHT KNEE: ICD-10-CM

## 2021-06-14 RX ORDER — TIZANIDINE 4 MG/1
TABLET ORAL
Qty: 40 TABLET | Refills: 0 | Status: SHIPPED | OUTPATIENT
Start: 2021-06-14 | End: 2021-06-15

## 2021-06-15 DIAGNOSIS — M17.11 PRIMARY OSTEOARTHRITIS OF RIGHT KNEE: ICD-10-CM

## 2021-06-15 RX ORDER — TIZANIDINE 4 MG/1
TABLET ORAL
Qty: 40 TABLET | Refills: 0 | Status: SHIPPED | OUTPATIENT
Start: 2021-06-15 | End: 2021-06-23

## 2021-06-16 DIAGNOSIS — I10 ESSENTIAL HYPERTENSION: ICD-10-CM

## 2021-06-16 RX ORDER — NIFEDIPINE 60 MG/1
TABLET, EXTENDED RELEASE ORAL
Qty: 90 TABLET | Refills: 0 | Status: SHIPPED | OUTPATIENT
Start: 2021-06-16 | End: 2021-08-14

## 2021-06-17 DIAGNOSIS — F41.1 GAD (GENERALIZED ANXIETY DISORDER): ICD-10-CM

## 2021-06-17 RX ORDER — LORAZEPAM 1 MG/1
TABLET ORAL
Qty: 90 TABLET | Refills: 0 | Status: SHIPPED | OUTPATIENT
Start: 2021-06-17 | End: 2021-07-18

## 2021-06-23 DIAGNOSIS — M17.11 PRIMARY OSTEOARTHRITIS OF RIGHT KNEE: ICD-10-CM

## 2021-06-23 RX ORDER — TIZANIDINE 4 MG/1
TABLET ORAL
Qty: 40 TABLET | Refills: 0 | Status: SHIPPED | OUTPATIENT
Start: 2021-06-23 | End: 2021-07-28

## 2021-07-06 DIAGNOSIS — M17.11 PRIMARY OSTEOARTHRITIS OF RIGHT KNEE: ICD-10-CM

## 2021-07-06 DIAGNOSIS — M48.061 SPINAL STENOSIS OF LUMBAR REGION WITHOUT NEUROGENIC CLAUDICATION: ICD-10-CM

## 2021-07-06 RX ORDER — HYDROCODONE BITARTRATE AND ACETAMINOPHEN 7.5; 325 MG/1; MG/1
1 TABLET ORAL EVERY 6 HOURS PRN
Qty: 120 TABLET | Refills: 0 | Status: SHIPPED | OUTPATIENT
Start: 2021-07-06 | End: 2021-08-04 | Stop reason: SDUPTHER

## 2021-07-11 DIAGNOSIS — F32.9 REACTIVE DEPRESSION: ICD-10-CM

## 2021-07-11 DIAGNOSIS — F41.9 ANXIETY: ICD-10-CM

## 2021-07-11 RX ORDER — CITALOPRAM 40 MG/1
TABLET ORAL
Qty: 30 TABLET | Refills: 0 | Status: SHIPPED | OUTPATIENT
Start: 2021-07-11 | End: 2021-08-08

## 2021-07-18 DIAGNOSIS — F41.1 GAD (GENERALIZED ANXIETY DISORDER): ICD-10-CM

## 2021-07-18 RX ORDER — LORAZEPAM 1 MG/1
TABLET ORAL
Qty: 90 TABLET | Refills: 0 | Status: SHIPPED | OUTPATIENT
Start: 2021-07-18 | End: 2021-07-19

## 2021-07-19 DIAGNOSIS — F41.1 GAD (GENERALIZED ANXIETY DISORDER): ICD-10-CM

## 2021-07-19 RX ORDER — LORAZEPAM 1 MG/1
TABLET ORAL
Qty: 90 TABLET | Refills: 0 | Status: SHIPPED | OUTPATIENT
Start: 2021-07-19 | End: 2021-08-18

## 2021-07-28 DIAGNOSIS — M17.11 PRIMARY OSTEOARTHRITIS OF RIGHT KNEE: ICD-10-CM

## 2021-07-28 RX ORDER — TIZANIDINE 4 MG/1
TABLET ORAL
Qty: 40 TABLET | Refills: 0 | Status: SHIPPED | OUTPATIENT
Start: 2021-07-28 | End: 2021-08-13

## 2021-07-30 DIAGNOSIS — F51.04 CHRONIC INSOMNIA: ICD-10-CM

## 2021-08-01 RX ORDER — HYDROCHLOROTHIAZIDE 25 MG/1
TABLET ORAL
Qty: 30 TABLET | Refills: 0 | Status: SHIPPED | OUTPATIENT
Start: 2021-08-01 | End: 2021-08-31

## 2021-08-01 RX ORDER — ZOLPIDEM TARTRATE 10 MG/1
TABLET ORAL
Qty: 30 TABLET | Refills: 0 | Status: SHIPPED | OUTPATIENT
Start: 2021-08-01 | End: 2021-08-31

## 2021-08-04 DIAGNOSIS — M17.11 PRIMARY OSTEOARTHRITIS OF RIGHT KNEE: ICD-10-CM

## 2021-08-04 DIAGNOSIS — M48.061 SPINAL STENOSIS OF LUMBAR REGION WITHOUT NEUROGENIC CLAUDICATION: ICD-10-CM

## 2021-08-04 RX ORDER — HYDROCODONE BITARTRATE AND ACETAMINOPHEN 7.5; 325 MG/1; MG/1
1 TABLET ORAL EVERY 6 HOURS PRN
Qty: 120 TABLET | Refills: 0 | Status: SHIPPED | OUTPATIENT
Start: 2021-08-04 | End: 2021-09-02 | Stop reason: SDUPTHER

## 2021-08-08 DIAGNOSIS — F41.9 ANXIETY: ICD-10-CM

## 2021-08-08 DIAGNOSIS — F32.9 REACTIVE DEPRESSION: ICD-10-CM

## 2021-08-08 RX ORDER — CITALOPRAM 40 MG/1
TABLET ORAL
Qty: 30 TABLET | Refills: 0 | Status: SHIPPED | OUTPATIENT
Start: 2021-08-08 | End: 2021-09-08

## 2021-08-11 ENCOUNTER — TELEPHONE (OUTPATIENT)
Dept: FAMILY MEDICINE CLINIC | Age: 61
End: 2021-08-11

## 2021-08-11 NOTE — TELEPHONE ENCOUNTER
Pt called to ask how long does a blood blister stay around. It between her big toe and the toe next to it. It is the size of a quarter. She can not put her shoe on.  Please give pt a call [phone]

## 2021-08-13 DIAGNOSIS — I10 ESSENTIAL HYPERTENSION: ICD-10-CM

## 2021-08-13 DIAGNOSIS — M17.11 PRIMARY OSTEOARTHRITIS OF RIGHT KNEE: ICD-10-CM

## 2021-08-13 DIAGNOSIS — F51.01 PRIMARY INSOMNIA: ICD-10-CM

## 2021-08-13 RX ORDER — TIZANIDINE 4 MG/1
TABLET ORAL
Qty: 40 TABLET | Refills: 0 | Status: SHIPPED | OUTPATIENT
Start: 2021-08-13 | End: 2021-08-26

## 2021-08-14 RX ORDER — NIFEDIPINE 60 MG/1
TABLET, EXTENDED RELEASE ORAL
Qty: 90 TABLET | Refills: 0 | Status: ON HOLD
Start: 2021-08-14 | End: 2021-09-27 | Stop reason: HOSPADM

## 2021-08-14 RX ORDER — HYDROXYZINE HYDROCHLORIDE 25 MG/1
TABLET, FILM COATED ORAL
Qty: 60 TABLET | Refills: 1 | Status: SHIPPED | OUTPATIENT
Start: 2021-08-14 | End: 2021-12-09

## 2021-08-19 DIAGNOSIS — F41.1 GAD (GENERALIZED ANXIETY DISORDER): ICD-10-CM

## 2021-08-20 RX ORDER — LORAZEPAM 1 MG/1
TABLET ORAL
Qty: 90 TABLET | Refills: 1 | Status: SHIPPED | OUTPATIENT
Start: 2021-08-20 | End: 2021-09-19

## 2021-08-26 DIAGNOSIS — M17.11 PRIMARY OSTEOARTHRITIS OF RIGHT KNEE: ICD-10-CM

## 2021-08-26 RX ORDER — TIZANIDINE 4 MG/1
TABLET ORAL
Qty: 40 TABLET | Refills: 0 | Status: SHIPPED | OUTPATIENT
Start: 2021-08-26 | End: 2021-09-12

## 2021-08-31 DIAGNOSIS — F51.04 CHRONIC INSOMNIA: ICD-10-CM

## 2021-08-31 RX ORDER — HYDROCHLOROTHIAZIDE 25 MG/1
TABLET ORAL
Qty: 30 TABLET | Refills: 0 | Status: ON HOLD
Start: 2021-08-31 | End: 2021-09-27 | Stop reason: HOSPADM

## 2021-08-31 RX ORDER — ZOLPIDEM TARTRATE 10 MG/1
TABLET ORAL
Qty: 30 TABLET | Refills: 0 | Status: SHIPPED | OUTPATIENT
Start: 2021-08-31 | End: 2021-09-30

## 2021-08-31 RX ORDER — CLONIDINE HYDROCHLORIDE 0.1 MG/1
TABLET ORAL
Qty: 180 TABLET | Refills: 0 | Status: SHIPPED | OUTPATIENT
Start: 2021-08-31 | End: 2021-09-23

## 2021-09-02 DIAGNOSIS — M48.061 SPINAL STENOSIS OF LUMBAR REGION WITHOUT NEUROGENIC CLAUDICATION: ICD-10-CM

## 2021-09-02 DIAGNOSIS — M17.11 PRIMARY OSTEOARTHRITIS OF RIGHT KNEE: ICD-10-CM

## 2021-09-02 RX ORDER — HYDROCODONE BITARTRATE AND ACETAMINOPHEN 7.5; 325 MG/1; MG/1
1 TABLET ORAL EVERY 6 HOURS PRN
Qty: 120 TABLET | Refills: 0 | Status: SHIPPED | OUTPATIENT
Start: 2021-09-02 | End: 2021-10-05 | Stop reason: SDUPTHER

## 2021-09-08 DIAGNOSIS — F41.9 ANXIETY: ICD-10-CM

## 2021-09-08 DIAGNOSIS — F32.9 REACTIVE DEPRESSION: ICD-10-CM

## 2021-09-08 RX ORDER — CITALOPRAM 40 MG/1
TABLET ORAL
Qty: 30 TABLET | Refills: 0 | Status: SHIPPED | OUTPATIENT
Start: 2021-09-08 | End: 2021-10-09

## 2021-09-12 DIAGNOSIS — M17.11 PRIMARY OSTEOARTHRITIS OF RIGHT KNEE: ICD-10-CM

## 2021-09-12 RX ORDER — TIZANIDINE 4 MG/1
TABLET ORAL
Qty: 40 TABLET | Refills: 0 | Status: ON HOLD | OUTPATIENT
Start: 2021-09-12 | End: 2021-09-26

## 2021-09-22 ENCOUNTER — TELEPHONE (OUTPATIENT)
Dept: PRIMARY CARE CLINIC | Age: 61
End: 2021-09-22

## 2021-09-22 NOTE — TELEPHONE ENCOUNTER
----- Message from Nisa Duncan sent at 9/22/2021  9:57 AM EDT -----  Subject: Referral Request    QUESTIONS   Reason for referral request? patient request to see a specialist for her   right knee that was broken . and her left knee hurts reason since Monday 9/20/21 patient fell down 30 times   Has the physician seen you for this condition before? No   Preferred Specialist (if applicable)? Do you already have an appointment scheduled? No  Additional Information for Provider? patient request do not want   javi , [facility] with [facility]   ---------------------------------------------------------------------------  --------------  CALL BACK INFO  What is the best way for the office to contact you? OK to leave message on   voicemail  Preferred Call Back Phone Number?  [phone]

## 2021-09-23 ENCOUNTER — APPOINTMENT (OUTPATIENT)
Dept: ULTRASOUND IMAGING | Age: 61
DRG: 684 | End: 2021-09-23
Payer: COMMERCIAL

## 2021-09-23 ENCOUNTER — TELEPHONE (OUTPATIENT)
Dept: FAMILY MEDICINE CLINIC | Age: 61
End: 2021-09-23

## 2021-09-23 ENCOUNTER — APPOINTMENT (OUTPATIENT)
Dept: GENERAL RADIOLOGY | Age: 61
DRG: 684 | End: 2021-09-23
Payer: COMMERCIAL

## 2021-09-23 ENCOUNTER — APPOINTMENT (OUTPATIENT)
Dept: CT IMAGING | Age: 61
DRG: 684 | End: 2021-09-23
Payer: COMMERCIAL

## 2021-09-23 ENCOUNTER — HOSPITAL ENCOUNTER (INPATIENT)
Age: 61
LOS: 4 days | Discharge: HOME | DRG: 684 | End: 2021-09-27
Attending: EMERGENCY MEDICINE | Admitting: INTERNAL MEDICINE
Payer: COMMERCIAL

## 2021-09-23 DIAGNOSIS — R79.89 ELEVATED SERUM CREATININE: ICD-10-CM
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
## 2021-09-23 DIAGNOSIS — N17.9 AKI (ACUTE KIDNEY INJURY) (HCC): ICD-10-CM

## 2021-09-23 DIAGNOSIS — R55 NEAR SYNCOPE: ICD-10-CM

## 2021-09-23 LAB
A/G RATIO: 1.4 (ref 1.1–2.2)
ALBUMIN SERPL-MCNC: 4.2 G/DL (ref 3.4–5)
ALP BLD-CCNC: 123 U/L (ref 40–129)
ALT SERPL-CCNC: 8 U/L (ref 10–40)
ANION GAP SERPL CALCULATED.3IONS-SCNC: 14 MMOL/L (ref 3–16)
AST SERPL-CCNC: 12 U/L (ref 15–37)
BASOPHILS ABSOLUTE: 0 K/UL (ref 0–0.2)
BASOPHILS RELATIVE PERCENT: 0.7 %
BILIRUB SERPL-MCNC: 0.5 MG/DL (ref 0–1)
BILIRUBIN URINE: NEGATIVE
BLOOD, URINE: NEGATIVE
BUN BLDV-MCNC: 20 MG/DL (ref 7–20)
CALCIUM SERPL-MCNC: 9 MG/DL (ref 8.3–10.6)
CHLORIDE BLD-SCNC: 94 MMOL/L (ref 99–110)
CLARITY: CLEAR
CO2: 23 MMOL/L (ref 21–32)
COLOR: YELLOW
CREAT SERPL-MCNC: 2.5 MG/DL (ref 0.6–1.2)
EOSINOPHILS ABSOLUTE: 0.1 K/UL (ref 0–0.6)
EOSINOPHILS RELATIVE PERCENT: 2 %
GFR AFRICAN AMERICAN: 24
GFR NON-AFRICAN AMERICAN: 20
GLOBULIN: 3 G/DL
GLUCOSE BLD-MCNC: 109 MG/DL (ref 70–99)
GLUCOSE URINE: NEGATIVE MG/DL
HCT VFR BLD CALC: 34.4 % (ref 36–48)
HEMOGLOBIN: 10.8 G/DL (ref 12–16)
KETONES, URINE: NEGATIVE MG/DL
LEUKOCYTE ESTERASE, URINE: NEGATIVE
LYMPHOCYTES ABSOLUTE: 1.2 K/UL (ref 1–5.1)
LYMPHOCYTES RELATIVE PERCENT: 18.4 %
MCH RBC QN AUTO: 26 PG (ref 26–34)
MCHC RBC AUTO-ENTMCNC: 31.5 G/DL (ref 31–36)
MCV RBC AUTO: 82.5 FL (ref 80–100)
MICROSCOPIC EXAMINATION: NORMAL
MONOCYTES ABSOLUTE: 0.8 K/UL (ref 0–1.3)
MONOCYTES RELATIVE PERCENT: 12.8 %
NEUTROPHILS ABSOLUTE: 4.3 K/UL (ref 1.7–7.7)
NEUTROPHILS RELATIVE PERCENT: 66.1 %
NITRITE, URINE: NEGATIVE
PDW BLD-RTO: 18.1 % (ref 12.4–15.4)
PH UA: 5.5 (ref 5–8)
PLATELET # BLD: 285 K/UL (ref 135–450)
PMV BLD AUTO: 8.5 FL (ref 5–10.5)
POTASSIUM REFLEX MAGNESIUM: 4.4 MMOL/L (ref 3.5–5.1)
PROTEIN UA: NEGATIVE MG/DL
RBC # BLD: 4.17 M/UL (ref 4–5.2)
SODIUM BLD-SCNC: 131 MMOL/L (ref 136–145)
SPECIFIC GRAVITY UA: 1.01 (ref 1–1.03)
TOTAL CK: 80 U/L (ref 26–192)
TOTAL PROTEIN: 7.2 G/DL (ref 6.4–8.2)
TROPONIN: <0.01 NG/ML
URINE TYPE: NORMAL
UROBILINOGEN, URINE: 0.2 E.U./DL
WBC # BLD: 6.6 K/UL (ref 4–11)

## 2021-09-23 PROCEDURE — 73562 X-RAY EXAM OF KNEE 3: CPT

## 2021-09-23 PROCEDURE — 80053 COMPREHEN METABOLIC PANEL: CPT

## 2021-09-23 PROCEDURE — 1200000000 HC SEMI PRIVATE

## 2021-09-23 PROCEDURE — 2580000003 HC RX 258: Performed by: EMERGENCY MEDICINE

## 2021-09-23 PROCEDURE — 99284 EMERGENCY DEPT VISIT MOD MDM: CPT

## 2021-09-23 PROCEDURE — 96360 HYDRATION IV INFUSION INIT: CPT

## 2021-09-23 PROCEDURE — 71045 X-RAY EXAM CHEST 1 VIEW: CPT

## 2021-09-23 PROCEDURE — 84484 ASSAY OF TROPONIN QUANT: CPT

## 2021-09-23 PROCEDURE — 2580000003 HC RX 258: Performed by: INTERNAL MEDICINE

## 2021-09-23 PROCEDURE — 82550 ASSAY OF CK (CPK): CPT

## 2021-09-23 PROCEDURE — 6370000000 HC RX 637 (ALT 250 FOR IP): Performed by: INTERNAL MEDICINE

## 2021-09-23 PROCEDURE — 70450 CT HEAD/BRAIN W/O DYE: CPT

## 2021-09-23 PROCEDURE — 36415 COLL VENOUS BLD VENIPUNCTURE: CPT

## 2021-09-23 PROCEDURE — 85025 COMPLETE CBC W/AUTO DIFF WBC: CPT

## 2021-09-23 PROCEDURE — 81003 URINALYSIS AUTO W/O SCOPE: CPT

## 2021-09-23 PROCEDURE — 72125 CT NECK SPINE W/O DYE: CPT

## 2021-09-23 PROCEDURE — 93005 ELECTROCARDIOGRAM TRACING: CPT | Performed by: EMERGENCY MEDICINE

## 2021-09-23 PROCEDURE — 76770 US EXAM ABDO BACK WALL COMP: CPT

## 2021-09-23 RX ORDER — ACETAMINOPHEN 325 MG/1
650 TABLET ORAL EVERY 6 HOURS PRN
Status: DISCONTINUED | OUTPATIENT
Start: 2021-09-23 | End: 2021-09-27 | Stop reason: HOSPADM

## 2021-09-23 RX ORDER — POLYETHYLENE GLYCOL 3350 17 G/17G
17 POWDER, FOR SOLUTION ORAL DAILY PRN
Status: DISCONTINUED | OUTPATIENT
Start: 2021-09-23 | End: 2021-09-27 | Stop reason: HOSPADM

## 2021-09-23 RX ORDER — ONDANSETRON 4 MG/1
4 TABLET, ORALLY DISINTEGRATING ORAL EVERY 8 HOURS PRN
Status: DISCONTINUED | OUTPATIENT
Start: 2021-09-23 | End: 2021-09-27 | Stop reason: HOSPADM

## 2021-09-23 RX ORDER — ONDANSETRON 2 MG/ML
4 INJECTION INTRAMUSCULAR; INTRAVENOUS EVERY 6 HOURS PRN
Status: DISCONTINUED | OUTPATIENT
Start: 2021-09-23 | End: 2021-09-27 | Stop reason: HOSPADM

## 2021-09-23 RX ORDER — SODIUM CHLORIDE 9 MG/ML
INJECTION, SOLUTION INTRAVENOUS CONTINUOUS
Status: DISCONTINUED | OUTPATIENT
Start: 2021-09-23 | End: 2021-09-25

## 2021-09-23 RX ORDER — SODIUM CHLORIDE 0.9 % (FLUSH) 0.9 %
5-40 SYRINGE (ML) INJECTION EVERY 12 HOURS SCHEDULED
Status: DISCONTINUED | OUTPATIENT
Start: 2021-09-23 | End: 2021-09-27 | Stop reason: HOSPADM

## 2021-09-23 RX ORDER — HYDRALAZINE HYDROCHLORIDE 20 MG/ML
10 INJECTION INTRAMUSCULAR; INTRAVENOUS EVERY 6 HOURS PRN
Status: DISCONTINUED | OUTPATIENT
Start: 2021-09-23 | End: 2021-09-27 | Stop reason: HOSPADM

## 2021-09-23 RX ORDER — 0.9 % SODIUM CHLORIDE 0.9 %
1000 INTRAVENOUS SOLUTION INTRAVENOUS ONCE
Status: COMPLETED | OUTPATIENT
Start: 2021-09-23 | End: 2021-09-23

## 2021-09-23 RX ORDER — TRAMADOL HYDROCHLORIDE 50 MG/1
50 TABLET ORAL EVERY 6 HOURS PRN
Status: DISCONTINUED | OUTPATIENT
Start: 2021-09-23 | End: 2021-09-27 | Stop reason: HOSPADM

## 2021-09-23 RX ORDER — ACETAMINOPHEN 650 MG/1
650 SUPPOSITORY RECTAL EVERY 6 HOURS PRN
Status: DISCONTINUED | OUTPATIENT
Start: 2021-09-23 | End: 2021-09-27 | Stop reason: HOSPADM

## 2021-09-23 RX ORDER — SODIUM CHLORIDE 0.9 % (FLUSH) 0.9 %
5-40 SYRINGE (ML) INJECTION PRN
Status: DISCONTINUED | OUTPATIENT
Start: 2021-09-23 | End: 2021-09-27 | Stop reason: HOSPADM

## 2021-09-23 RX ORDER — SODIUM CHLORIDE 9 MG/ML
25 INJECTION, SOLUTION INTRAVENOUS PRN
Status: DISCONTINUED | OUTPATIENT
Start: 2021-09-23 | End: 2021-09-27 | Stop reason: HOSPADM

## 2021-09-23 RX ADMIN — TRAMADOL HYDROCHLORIDE 50 MG: 50 TABLET ORAL at 23:21

## 2021-09-23 RX ADMIN — SODIUM CHLORIDE 1000 ML: 9 INJECTION, SOLUTION INTRAVENOUS at 14:40

## 2021-09-23 RX ADMIN — SODIUM CHLORIDE: 9 INJECTION, SOLUTION INTRAVENOUS at 23:18

## 2021-09-23 RX ADMIN — SODIUM CHLORIDE, PRESERVATIVE FREE 10 ML: 5 INJECTION INTRAVENOUS at 23:22

## 2021-09-23 ASSESSMENT — ENCOUNTER SYMPTOMS
ABDOMINAL PAIN: 0
COUGH: 0

## 2021-09-23 ASSESSMENT — PAIN SCALES - GENERAL
PAINLEVEL_OUTOF10: 0

## 2021-09-23 ASSESSMENT — PAIN DESCRIPTION - PAIN TYPE
TYPE: ACUTE PAIN
TYPE: ACUTE PAIN

## 2021-09-23 NOTE — ED NOTES
Report given to [facility] at this time. Pt to go to floor. Transport requested.       Hoda Dodge RN  09/23/21 2982

## 2021-09-23 NOTE — TELEPHONE ENCOUNTER
Patient states she fell on both of knees and now she is in pain. Patient states they are swollen, she can't bend them or straighten them out. She is requesting xrays and a referral to Ortho. Did tell patient to call her insurance for Ortho's covered. Please return patient's call.

## 2021-09-23 NOTE — PROGRESS NOTES
Medication Reconciliation    List of medications for Kenia Hsieh is currently taking is complete. Source of Information:   Epic records  Conversation with patient at bedside     Allergies  Allergy list not thoroughly reviewed with patient at this time  Allergies listed in Epic as follows: Penicillins     Current Medications:  No current facility-administered medications for this encounter. Current Outpatient Medications:     citalopram (CELEXA) 40 MG tablet, TAKE ONE TABLET BY MOUTH DAILY (Patient taking differently: Take 40 mg by mouth daily TAKE ONE TABLET BY MOUTH DAILY), Disp: 30 tablet, Rfl: 0    HYDROcodone-acetaminophen (NORCO) 7.5-325 MG per tablet, Take 1 tablet by mouth every 6 hours as needed for Pain for up to 30 days. , Disp: 120 tablet, Rfl: 0    zolpidem (AMBIEN) 10 MG tablet, TAKE ONE TABLET BY MOUTH EVERY NIGHT AT BEDTIME AS NEEDED (Patient taking differently: Take 10 mg by mouth nightly as needed for Sleep. ), Disp: 30 tablet, Rfl: 0    hydroCHLOROthiazide (HYDRODIURIL) 25 MG tablet, TAKE ONE TABLET BY MOUTH EVERY MORNING (Patient taking differently: Take 25 mg by mouth daily ), Disp: 30 tablet, Rfl: 0    NIFEdipine (PROCARDIA XL) 60 MG extended release tablet, TAKE ONE TABLET BY MOUTH DAILY (Patient taking differently: Take 60 mg by mouth daily TAKE ONE TABLET BY MOUTH DAILY), Disp: 90 tablet, Rfl: 0    hydrOXYzine (ATARAX) 25 MG tablet, TAKE TWO TABLETS BY MOUTH EVERY NIGHT AT BEDTIME AS NEEDED FOR ITCHING (Patient taking differently: Take 50 mg by mouth nightly as needed (sleep) TAKE TWO TABLETS BY MOUTH EVERY NIGHT AT BEDTIME AS NEEDED FOR ITCHING), Disp: 60 tablet, Rfl: 1    gabapentin (NEURONTIN) 300 MG capsule, TAKE ONE CAPSULE BY MOUTH EVERY MORNING AND TAKE TWO CAPSULES BY MOUTH EVERY NIGHT AT BEDTIME (Patient taking differently: Take 300-600 mg by mouth 2 times daily.  TAKE ONE CAPSULE BY MOUTH EVERY MORNING AND TAKE TWO CAPSULES BY MOUTH EVERY NIGHT AT BEDTIME), Disp: 90 capsule, Rfl: 3    propranolol (INDERAL LA) 160 MG extended release capsule, One daily (Patient taking differently: Take 160 mg by mouth daily One daily), Disp: 30 capsule, Rfl: 5    diclofenac sodium (VOLTAREN) 1 % GEL, Apply 4 g topically 4 times daily (Patient taking differently: Apply 4 g topically 4 times daily Apply to knees), Disp: 500 g, Rfl: 1    tiZANidine (ZANAFLEX) 4 MG tablet, TAKE ONE TABLET BY MOUTH EVERY 8 HOURS AS NEEDED, Disp: 40 tablet, Rfl: 0    Notes Regarding Home Medications:   Patient  says she took her blood pressure medication prior to arrival to the emergency department  Taking hydroxyzine for sleep  States she was told to stop taking clonidine       Purnima Dorsey [facility], PharmD   9/23/2021 5:54 PM

## 2021-09-23 NOTE — TELEPHONE ENCOUNTER
Pt called said she fell Monday and broke her knee (her diagnosis) and  Has since fell 30 times.   Said she needed a referral for a knee Dr. Bhavani Pedro did not want to come in.  [phone]

## 2021-09-23 NOTE — ED TRIAGE NOTES
Pt arrives for eval of bilat knee pain after falling 2 days ago. Pt sts she has frequent falls with episodes of syncope . Pt sts she sees stars. Pt sts she probably has a concussion from frequent falls. Pt noted to be bradycardic at this time. Pt has no complaints other than knees. Pt is a/ox4, resp nonlabored and pwd.

## 2021-09-23 NOTE — ED PROVIDER NOTES
[address] [facility]      Pt Name: Sami Abbott  MRN: [id number]  [place] 1960  Date of evaluation: 9/23/2021  Provider: Williams Jurado MD    CHIEF COMPLAINT     I'm in here because I think I broke my knees  HISTORY OF PRESENT ILLNESS  (Location/Symptom, Timing/Onset,Context/Setting, Quality, Duration, Modifying Factors, Severity). Note limiting factors. Chief Complaint   Patient presents with   Erminio Vázquez     bialteral knee pain, intermittent dizziness, pt has mulitple complaints    Loss of Consciousness     pt sts frequent syncopal episodes with falls      Sami Abbott is a 61 y.o. female who presents to the emergency department secondary to concern for broken knees. She states she fell because she felt dizzy and this happens to her frequently. She states she is supposed to use a walker but doesn't all the time because it is hard to fit through the doors. She fell on Tuesday in the kitchen. She did not fall today. She states she did hit her head on Tuesday and since then she has passed out ten times and every time she has hit her head. She states she lives alone. She states she came in today because she thinks her knee is broken, she did not think that on Tuesday she thought it was just sore. She states she has not been able to get to refrigerator since Tuesday because of her knees, when I ask her how she passed out ten times if she hasn't gotten out of bed she states she has gotten out of bed to use the bathroom twice and that's when she fell. She states she let her brother into her house today by crawling to the back door to let him in. When I asked her how she crawled with the knee pain she then stated she sat on her butt and scooted. She reports she has been vaccinated for covid. She states she has neck pain frequently, nothing new, states it gets hot and then itches - no new pain since she has been falling.      She tells me she is no longer taking her clonidine. She is taking propranolol, procardia, and HCTZ currently. Past medical history noted below, significant for anxiety, depression, hypertension, insomnia, lumbar disc herniation, lumbar spinal stenosis. Quit smoking over 10 years ago. Aside from what is stated above denies any other symptoms or modifying factors. Nursing Notes reviewed. REVIEW OF SYSTEMS  (2-9 systems for level 4, 10 or more for level 5)   Review of Systems   Constitutional: Positive for appetite change (decreased since Rosetta Areola because she cannot get to the refrigerator). Negative for fever. HENT: Negative for congestion. Eyes: Positive for visual disturbance (double vision for two months). Respiratory: Negative for cough. Cardiovascular: Negative for chest pain. Gastrointestinal: Negative for abdominal pain. Genitourinary: Negative for difficulty urinating. Musculoskeletal: Positive for gait problem (chronic). Skin: Negative for rash. Neurological: Positive for dizziness and syncope (states she remembers when she passes out, she states she'll be walking and get sparkles and she knows what's coming so she sits down but  more recently has not had the sparkle she just goes down but she stays awake). Negative for headaches.        PAST MEDICAL HISTORY     Past Medical History:   Diagnosis Date    Anxiety     Depression     Hypertension     Insomnia     Lumbar disc herniation     Lumbar spinal stenosis        SURGICALHISTORY       Past Surgical History:   Procedure Laterality Date    BRONCHOSCOPY N/A 10/10/2019    BRONCHOSCOPY WITH BRONCHOALVEOLAR LAVAGE performed by Caron Koyanagi, DO at [address] [facility]  10/10/2019    BRONCHOSCOPY DIAGNOSTIC OR CELL [address] ONLY performed by Caron Koyanagi, DO at [address]  SURGERY Left 12/14/2020    OPEN REDUCTION INTERNAL FIXATION LEFT DISTAL RADIUS - SYNTHES [facility]      r wrist plate    GASTRIC BYPASS SURGERY      HYSTERECTOMY      LUMBAR NERVE BLOCK N/A 6/17/2019    MIDLINE L5-S1  INTERLAMINAR EPIDURAL STEROID INJECTION WITH FLUOROSCOPY performed by Gutierrez Rausch MD at [address] [place] [address] Left 1/20/2020    LEFT L4 AND L5 TRANSFORAMINAL EPIDURAL STEROID INJECTION WITH FLUOROSCOPY (53857, 60873) performed by Gutierrez Rausch MD at [facility]       Previous Medications    CITALOPRAM (CELEXA) 40 MG TABLET    TAKE ONE TABLET BY MOUTH DAILY    CLONIDINE (CATAPRES) 0.1 MG TABLET    TAKE ONE TABLET BY MOUTH TWICE A DAY    DICLOFENAC SODIUM (VOLTAREN) 1 % GEL    Apply 4 g topically 4 times daily    GABAPENTIN (NEURONTIN) 300 MG CAPSULE    TAKE ONE CAPSULE BY MOUTH EVERY MORNING AND TAKE TWO CAPSULES BY MOUTH EVERY NIGHT AT BEDTIME    HYDROCHLOROTHIAZIDE (HYDRODIURIL) 25 MG TABLET    TAKE ONE TABLET BY MOUTH EVERY MORNING    HYDROCODONE-ACETAMINOPHEN (NORCO) 7.5-325 MG PER TABLET    Take 1 tablet by mouth every 6 hours as needed for Pain for up to 30 days. HYDROXYZINE (ATARAX) 25 MG TABLET    TAKE TWO TABLETS BY MOUTH EVERY NIGHT AT BEDTIME AS NEEDED FOR ITCHING    NIFEDIPINE (PROCARDIA XL) 60 MG EXTENDED RELEASE TABLET    TAKE ONE TABLET BY MOUTH DAILY    PROPRANOLOL (INDERAL LA) 160 MG EXTENDED RELEASE CAPSULE    One daily    TIZANIDINE (ZANAFLEX) 4 MG TABLET    TAKE ONE TABLET BY MOUTH EVERY 8 HOURS AS NEEDED    VITAMIN C (ASCORBIC ACID) 500 MG TABLET    Take 1 tablet by mouth 2 times daily for 14 days    ZOLPIDEM (AMBIEN) 10 MG TABLET    TAKE ONE TABLET BY MOUTH EVERY NIGHT AT BEDTIME AS NEEDED      ALLERGIES     Penicillins  FAMILY HISTORY     History reviewed. No pertinent family history.   SOCIAL HISTORY       Social History     Socioeconomic History    Marital status: Single     Spouse name: None    Number of children: None    Years of education: None    Highest education level: None Occupational History    Occupation:    Tobacco Use    Smoking status: Former Smoker     Packs/day: 0.25     Years: 15.00     Pack years: 3.75     Types: Cigarettes    Smokeless tobacco: Never Used    Tobacco comment: encouraged to quit smoking    Vaping Use    Vaping Use: Former   Substance and Sexual Activity    Alcohol use: Not Currently     Alcohol/week: 0.0 standard drinks    Drug use: Not Currently     Types: Marijuana    Sexual activity: None   Other Topics Concern    None   Social History Narrative    None     Social Determinants of Health     Financial Resource Strain: Low Risk     Difficulty of Paying Living Expenses: Not hard at all   Food Insecurity: No Food Insecurity    Worried About Running Out of Food in the Last Year: Never true    Solomon of Food in the Last Year: Never true   Transportation Needs:     Lack of Transportation (Medical):  Lack of Transportation (Non-Medical):    Physical Activity:     Days of Exercise per Week:     Minutes of Exercise per Session:    Stress:     Feeling of Stress :    Social Connections:     Frequency of Communication with Friends and Family:     Frequency of Social Gatherings with Friends and Family:     Attends [employer] Services:     Active Member of Clubs or Organizations:     Attends Club or Organization Meetings:     Marital Status:    Intimate Partner Violence:     Fear of Current or Ex-Partner:     Emotionally Abused:     Physically Abused:     Sexually Abused:      SCREENINGS         PHYSICAL EXAM  (up to 7 for level 4, 8 or more for level 5)   INITIAL VITALS: BP: (!) 133/92, Temp: 97.6 °F (36.4 °C), Pulse: (!) 49, Resp: 16, SpO2: 98 %   Physical Exam  Vitals reviewed. Constitutional:       Appearance: She is not toxic-appearing or diaphoretic. HENT:      Head: Normocephalic and atraumatic.       Right Ear: External ear normal.      Left Ear: External ear normal.      Mouth/Throat:      Mouth: Mucous membranes are dry. Comments: Tongue dry  Eyes:      General: No scleral icterus. Right eye: No discharge. Left eye: No discharge. Conjunctiva/sclera: Conjunctivae normal.   Neck:      Trachea: No tracheal deviation. Cardiovascular:      Rate and Rhythm: Bradycardia present. Pulmonary:      Effort: Pulmonary effort is normal. No respiratory distress. Abdominal:      Tenderness: There is no abdominal tenderness. There is no guarding or rebound. Musculoskeletal:         General: Signs of injury (bruising bilateral knees) present. No tenderness (no significant ttp of C/T/L spine or knees). Cervical back: Normal range of motion. No tenderness. Right lower leg: No edema. Left lower leg: No edema. Skin:     General: Skin is dry. Capillary Refill: Capillary refill takes more than 3 seconds. Neurological:      General: No focal deficit present. Mental Status: She is alert and oriented to person, place, and time. DIAGNOSTIC RESULTS   EKG: interpreted by the Emergency Department Physician who either signs or Co-signs this chart in the absence of a cardiologist.  Indication: fall  Interpretation: rate bradycardic 44, rhythm sinus, axis normal. SD/QRS wnl. QTc prolonged 487. Non specfic T/ST abnormalities noted. Comparison to prior EKG from 7.30.2019 shows she was janina at that time rate 53 and otherwise no significant changes noted. RADIOLOGY:   Interpretation per Radiologist below, if available at the time of this note:  XR KNEE RIGHT (3 VIEWS)   Final Result   Left knee: Mild arthritic changes. No acute osseous abnormality. Right knee: Arthritic changes most severe in the patellofemoral aspect of the   joint without acute fracture or dislocation. XR KNEE LEFT (3 VIEWS)   Final Result   Left knee: Mild arthritic changes. No acute osseous abnormality.       Right knee: Arthritic changes most severe in the patellofemoral aspect of the   joint without acute fracture or dislocation. CT HEAD WO CONTRAST   Final Result   No acute intracranial abnormality. CT CERVICAL SPINE WO CONTRAST   Preliminary Result   1. Multilevel degenerative changes of the cervical spine with no radiographic   findings to suggest presence of acute fracture. 2. Findings suggestive of presence of multiple dental caries as described   above. XR CHEST PORTABLE   Preliminary Result   Expiratory chest with no evidence of acute cardiopulmonary disease.            LABS:  Labs Reviewed   CBC WITH AUTO DIFFERENTIAL - Abnormal; Notable for the following components:       Result Value    Hemoglobin 10.8 (*)     Hematocrit 34.4 (*)     RDW 18.1 (*)     All other components within normal limits    Narrative:     Performed at:  [facility]  [address], [place] [facility] [employer]   Phone [phone]   COMPREHENSIVE METABOLIC PANEL W/ REFLEX TO MG FOR LOW K - Abnormal; Notable for the following components:    Sodium 131 (*)     Chloride 94 (*)     Glucose 109 (*)     CREATININE 2.5 (*)     GFR Non- 20 (*)     GFR  24 (*)     ALT 8 (*)     AST 12 (*)     All other components within normal limits    Narrative:     Performed at:  [facility]  [address], [place] [employer]   Phone [phone]   TROPONIN    Narrative:     Performed at:  [facility]  [address], [place] [facility] [employer]   Phone [phone]       EMERGENCY DEPARTMENT COURSE and DIFFERENTIAL DIAGNOSIS/MDM:   Patient was given the following medications:  Orders Placed This Encounter   Medications    0.9 % sodium chloride bolus     CONSULTS:  None    INITIAL VITALS: BP: (!) 133/92, Temp: 97.6 °F (36.4 °C), Pulse: (!) 49, Resp: 16, SpO2: 98 %   RECENT VITALS:  BP: 114/77,Temp: 97.6 °F (36.4 °C), Pulse: (!) 48, Resp: 19, SpO2: 100 %     Ant Clark is a 61 y.o. female who presents to the emergency department secondary to concern for symptoms as noted in HPI. On arrival she is awake, alert, oriented with vitals that are notable for HR in 40s and otherwise HDS and wnl. On exam BLE with knees wrapped which she states her RN niece did today. Once unwrapped she does have bruising to both knees no significant ttp. Chest and pelvis both stable to AP/lateral compression. Abdomen benign, no BLE edema. A peripheral IV was placed, labs were ordered along with EKG, CT head/cervical spine, bilateral knee xray imaging. EKG sinus janina with non specific abnormalities. Chest XR unremarkable. Labs shows ANALY with serum creatinine 2.5 up from baseline 1. CT head shows no acute findings. CT of the cervical spine shows multilevel degenerative changes with no findings to suggest acute fracture. She does also have findings suggestive of multiple dental caries. X-ray of the knees shows mild arthritic changes without acute osseous abnormality in the left and on the right more severe arthritic changes again without acute fracture or dislocation. On reassessment discussed results of imaging. Discussed recommendation for admission given her significantly elevated creatinine likely secondary to dehydration as well as her multiple near syncope/syncope episodes ongoing at home. Unclear if her falls are secondary to the multiple blood pressure medications she is on causing hypotension/bradycardia. It is also unclear if she is safe to be home alone as she currently is. She was in agreement with admission. Consulted hospitalist, Dr. Yaya Lieberman, at 15:29 for admission to telemetry. CRITICAL CARE TIME   Due to the immediate potential for life-threatening deterioration due to syncope and ANALY, I spent 35 minutes providing critical care.  There was a high probability of clinically significant/life threatening deterioration in the patient's condition which required my urgent intervention. This time excludes time spent performing procedures but includes time spent on direct patient care, history retrieval, review of the chart, and discussions with patient, family, and consultant(s). FINAL IMPRESSION      1. Fall, initial encounter    2. ANALY (acute kidney injury) ([facility] [address]    3. Elevated serum creatinine    4.  Near syncope        DISPOSITION/PLAN   DISPOSITION  Admission          (Please note that portions of this note were completed with a voice recognition program. Efforts were made to edit the dictations but occasionally words are mis-transcribed.)    Audelia Moyer MD (electronically signed)  Attending Emergency Physician      Audelia Moyer MD  09/23/21 [id number]

## 2021-09-23 NOTE — PROGRESS NOTES
Pt arrived to room 4104 from ED. Pt is A&O x4. Pt bradycardic. Call light in reach. Fall precautions in place and bed alarm armed.  Electronically signed by Inessa Walsh RN on 9/23/2021 at 6:41 PM

## 2021-09-24 ENCOUNTER — APPOINTMENT (OUTPATIENT)
Dept: GENERAL RADIOLOGY | Age: 61
DRG: 684 | End: 2021-09-24
Payer: COMMERCIAL

## 2021-09-24 LAB
ANION GAP SERPL CALCULATED.3IONS-SCNC: 12 MMOL/L (ref 3–16)
BASOPHILS ABSOLUTE: 0 K/UL (ref 0–0.2)
BASOPHILS RELATIVE PERCENT: 0.9 %
BUN BLDV-MCNC: 20 MG/DL (ref 7–20)
CALCIUM SERPL-MCNC: 8.3 MG/DL (ref 8.3–10.6)
CHLORIDE BLD-SCNC: 103 MMOL/L (ref 99–110)
CO2: 24 MMOL/L (ref 21–32)
CREAT SERPL-MCNC: 2 MG/DL (ref 0.6–1.2)
EKG ATRIAL RATE: 44 BPM
EKG DIAGNOSIS: NORMAL
EKG P AXIS: 39 DEGREES
EKG P-R INTERVAL: 160 MS
EKG Q-T INTERVAL: 570 MS
EKG QRS DURATION: 94 MS
EKG QTC CALCULATION (BAZETT): 487 MS
EKG R AXIS: 28 DEGREES
EKG T AXIS: 71 DEGREES
EKG VENTRICULAR RATE: 44 BPM
EOSINOPHILS ABSOLUTE: 0.2 K/UL (ref 0–0.6)
EOSINOPHILS RELATIVE PERCENT: 3.2 %
GFR AFRICAN AMERICAN: 31
GFR NON-AFRICAN AMERICAN: 25
GLUCOSE BLD-MCNC: 68 MG/DL (ref 70–99)
HCT VFR BLD CALC: 31.8 % (ref 36–48)
HEMOGLOBIN: 10.2 G/DL (ref 12–16)
LV EF: 58 %
LVEF MODALITY: NORMAL
LYMPHOCYTES ABSOLUTE: 1.1 K/UL (ref 1–5.1)
LYMPHOCYTES RELATIVE PERCENT: 22.1 %
MCH RBC QN AUTO: 26.5 PG (ref 26–34)
MCHC RBC AUTO-ENTMCNC: 32 G/DL (ref 31–36)
MCV RBC AUTO: 82.7 FL (ref 80–100)
MONOCYTES ABSOLUTE: 0.7 K/UL (ref 0–1.3)
MONOCYTES RELATIVE PERCENT: 14.6 %
NEUTROPHILS ABSOLUTE: 2.8 K/UL (ref 1.7–7.7)
NEUTROPHILS RELATIVE PERCENT: 59.2 %
PDW BLD-RTO: 18 % (ref 12.4–15.4)
PLATELET # BLD: 217 K/UL (ref 135–450)
PMV BLD AUTO: 8.8 FL (ref 5–10.5)
POTASSIUM REFLEX MAGNESIUM: 3.6 MMOL/L (ref 3.5–5.1)
RBC # BLD: 3.84 M/UL (ref 4–5.2)
SODIUM BLD-SCNC: 139 MMOL/L (ref 136–145)
TOTAL CK: 73 U/L (ref 26–192)
TROPONIN: <0.01 NG/ML
WBC # BLD: 4.8 K/UL (ref 4–11)

## 2021-09-24 PROCEDURE — 2500000003 HC RX 250 WO HCPCS: Performed by: ORTHOPAEDIC SURGERY

## 2021-09-24 PROCEDURE — 73630 X-RAY EXAM OF FOOT: CPT

## 2021-09-24 PROCEDURE — 36415 COLL VENOUS BLD VENIPUNCTURE: CPT

## 2021-09-24 PROCEDURE — 9990000010 HC NO CHARGE VISIT

## 2021-09-24 PROCEDURE — 82550 ASSAY OF CK (CPK): CPT

## 2021-09-24 PROCEDURE — 6360000002 HC RX W HCPCS: Performed by: INTERNAL MEDICINE

## 2021-09-24 PROCEDURE — 6360000002 HC RX W HCPCS: Performed by: ORTHOPAEDIC SURGERY

## 2021-09-24 PROCEDURE — 84484 ASSAY OF TROPONIN QUANT: CPT

## 2021-09-24 PROCEDURE — 20610 DRAIN/INJ JOINT/BURSA W/O US: CPT | Performed by: ORTHOPAEDIC SURGERY

## 2021-09-24 PROCEDURE — 6370000000 HC RX 637 (ALT 250 FOR IP): Performed by: INTERNAL MEDICINE

## 2021-09-24 PROCEDURE — 1200000000 HC SEMI PRIVATE

## 2021-09-24 PROCEDURE — 93306 TTE W/DOPPLER COMPLETE: CPT

## 2021-09-24 PROCEDURE — 80048 BASIC METABOLIC PNL TOTAL CA: CPT

## 2021-09-24 PROCEDURE — 93010 ELECTROCARDIOGRAM REPORT: CPT | Performed by: INTERNAL MEDICINE

## 2021-09-24 PROCEDURE — 85025 COMPLETE CBC W/AUTO DIFF WBC: CPT

## 2021-09-24 PROCEDURE — 2580000003 HC RX 258: Performed by: INTERNAL MEDICINE

## 2021-09-24 PROCEDURE — 99222 1ST HOSP IP/OBS MODERATE 55: CPT | Performed by: ORTHOPAEDIC SURGERY

## 2021-09-24 RX ORDER — TRIAMCINOLONE ACETONIDE 40 MG/ML
40 INJECTION, SUSPENSION INTRA-ARTICULAR; INTRAMUSCULAR ONCE
Status: COMPLETED | OUTPATIENT
Start: 2021-09-24 | End: 2021-09-24

## 2021-09-24 RX ORDER — 0.9 % SODIUM CHLORIDE 0.9 %
500 INTRAVENOUS SOLUTION INTRAVENOUS ONCE
Status: COMPLETED | OUTPATIENT
Start: 2021-09-24 | End: 2021-09-24

## 2021-09-24 RX ORDER — LIDOCAINE HYDROCHLORIDE 10 MG/ML
5 INJECTION, SOLUTION EPIDURAL; INFILTRATION; INTRACAUDAL; PERINEURAL ONCE
Status: COMPLETED | OUTPATIENT
Start: 2021-09-24 | End: 2021-09-24

## 2021-09-24 RX ORDER — ZOLPIDEM TARTRATE 5 MG/1
5 TABLET ORAL NIGHTLY PRN
Status: DISCONTINUED | OUTPATIENT
Start: 2021-09-24 | End: 2021-09-27 | Stop reason: HOSPADM

## 2021-09-24 RX ADMIN — TRAMADOL HYDROCHLORIDE 50 MG: 50 TABLET ORAL at 15:04

## 2021-09-24 RX ADMIN — SODIUM CHLORIDE: 9 INJECTION, SOLUTION INTRAVENOUS at 08:50

## 2021-09-24 RX ADMIN — SODIUM CHLORIDE 500 ML: 9 INJECTION, SOLUTION INTRAVENOUS at 14:48

## 2021-09-24 RX ADMIN — TRAMADOL HYDROCHLORIDE 50 MG: 50 TABLET ORAL at 08:49

## 2021-09-24 RX ADMIN — ENOXAPARIN SODIUM 30 MG: 30 INJECTION SUBCUTANEOUS at 08:49

## 2021-09-24 RX ADMIN — HYDRALAZINE HYDROCHLORIDE 10 MG: 20 INJECTION INTRAMUSCULAR; INTRAVENOUS at 20:57

## 2021-09-24 RX ADMIN — TRIAMCINOLONE ACETONIDE 40 MG: 40 INJECTION, SUSPENSION INTRA-ARTICULAR; INTRAMUSCULAR at 07:10

## 2021-09-24 RX ADMIN — LIDOCAINE HYDROCHLORIDE 5 ML: 10 INJECTION, SOLUTION EPIDURAL; INFILTRATION; INTRACAUDAL; PERINEURAL at 07:09

## 2021-09-24 RX ADMIN — TRAMADOL HYDROCHLORIDE 50 MG: 50 TABLET ORAL at 21:12

## 2021-09-24 RX ADMIN — ZOLPIDEM TARTRATE 5 MG: 5 TABLET ORAL at 21:12

## 2021-09-24 RX ADMIN — SODIUM CHLORIDE: 9 INJECTION, SOLUTION INTRAVENOUS at 17:21

## 2021-09-24 ASSESSMENT — PAIN SCALES - GENERAL
PAINLEVEL_OUTOF10: 10
PAINLEVEL_OUTOF10: 9
PAINLEVEL_OUTOF10: 8
PAINLEVEL_OUTOF10: 3
PAINLEVEL_OUTOF10: 3
PAINLEVEL_OUTOF10: 8
PAINLEVEL_OUTOF10: 0
PAINLEVEL_OUTOF10: 5
PAINLEVEL_OUTOF10: 10

## 2021-09-24 ASSESSMENT — PAIN DESCRIPTION - PAIN TYPE
TYPE: ACUTE PAIN

## 2021-09-24 ASSESSMENT — PAIN DESCRIPTION - LOCATION
LOCATION: ABDOMEN

## 2021-09-24 NOTE — PROGRESS NOTES
Pt states she has bloody stools noted on 9/22. Pt denies BM today. Pt began shaking after standing  and pivoting to bed. Pt states this is why she has falls at home. Pt states she never looses consciousness during theses episodes. Pt oriented to room and unit at this time. Bed alarm in place and call light within reach.

## 2021-09-24 NOTE — CARE COORDINATION
INITIAL CASE MANAGEMENT ASSESSMENT    Reviewed chart, met with patient to assess possible discharge needs. Explained Case Management role/services. SW interviewed patient alone at bedside today. Living Situation: Patient resides in a single family home alone with no pets. There are 1+1 steps leading up to the front door. Prior to medical admission patient reports that she had some trouble getting in and out of the property. ADLs: Prior to medical admission patient reports that she was independent. She stated that she doesn't anticipate any needs at discharge. DME: Prior to medical admission patient reports that she used a cane and a rollater. She stated that she doesn't anticipate any needs at discharge. PT/OT Recs: Ordered. Active Services: Prior to medical admission patient reports that she had no active services. She stated that she doesn't anticipate any needs at discharge. Transportation: Prior to medical admission patient reports that she was an active . She stated that family will likely transport her home at discharge. Medications: Patient receives medications from [address] [place] [address] in [place]. At this time there are no issues with access or affordability. PCP: Nadeem Wislon -[phone] (phone) and [phone] (fax number). Patient reports that her last appointment with this provider was over three months ago. HD/PD: N/A    PLAN/COMMENTS:   1) Monitor for therapy needs. 2) Waiting on nephrology clearance. SW/CM provided contact information for patient or family to call with any questions. SW/CM will follow and assist as needed. Respectfully submitted,    MANDO Jeronimo  [facility]   [phone]    Electronically signed by MANDO Morrissey on 9/24/2021 at 5:26 PM

## 2021-09-24 NOTE — CONSULTS
[facility] Orthopedic Surgery  Consult Note         This patient is seen in consultation at the request of Dr Nissa Singh MD    Reason for Consult:  Right knee pain/osteoarthritis. CHIEF COMPLAINT:  Right knee pain/osteoarthritis. History Obtained From:  patient, electronic medical record    HISTORY OF PRESENT ILLNESS:    Ms. Jose Lowry 61 y.o.  female seen today for consultation and evaluation of right knee pain which started after multiple falls past few weeks. She is complaining of sharp pain. Pain is increase with standing and walking and decrease with rest. Pain is sharp early in the morning with first few steps, dull achy pain by the end of the day. Alleviating factors: rest. No radiation and no numbness and tingling sensation. She also c/o left foot pain after the fall. No other complaint. No h/o gout. She got admitted to [facility] on 9/23/2021 for kidney issues.     Past Medical History:        Diagnosis Date    Anxiety     Depression     Hypertension     Insomnia     Lumbar disc herniation     Lumbar spinal stenosis        Past Surgical History:        Procedure Laterality Date    ABDOMEN SURGERY      BRONCHOSCOPY N/A 10/10/2019    BRONCHOSCOPY WITH BRONCHOALVEOLAR LAVAGE performed by Phu Contreras DO at [address]  10/10/2019    BRONCHOSCOPY DIAGNOSTIC OR CELL [address] ONLY performed by Phu Contreras DO at [facility], ESOPHAGUS      FOREARM SURGERY Left 12/14/2020    OPEN REDUCTION INTERNAL FIXATION LEFT DISTAL RADIUS - SYNTHES performed by Nelsy Mendoza MD at [facility] [address]      r wrist plate    GASTRIC BYPASS SURGERY      HYSTERECTOMY      LUMBAR NERVE BLOCK N/A 6/17/2019    MIDLINE L5-S1  INTERLAMINAR EPIDURAL STEROID INJECTION WITH FLUOROSCOPY performed by Bernie Gilmore MD at [address] [place] [address] Left 1/20/2020    LEFT L4 AND L5 TRANSFORAMINAL EPIDURAL STEROID INJECTION WITH FLUOROSCOPY (96175, 70991) performed by Saul Jaquez MD at [facility]       Medications prior to admission:   Prior to Admission medications    Medication Sig Start Date End Date Taking? Authorizing Provider   tiZANidine (ZANAFLEX) 4 MG tablet TAKE ONE TABLET BY MOUTH EVERY 8 HOURS AS NEEDED  Patient taking differently: Take 4 mg by mouth every 8 hours as needed (muscle spasms) TAKE ONE TABLET BY MOUTH EVERY 8 HOURS AS NEEDED 9/12/21  Yes Demetrice Saldaña DO   citalopram (CELEXA) 40 MG tablet TAKE ONE TABLET BY MOUTH DAILY  Patient taking differently: Take 40 mg by mouth daily TAKE ONE TABLET BY MOUTH DAILY 9/8/21  Yes Demetirce Saldaña DO   HYDROcodone-acetaminophen (NORCO) 7.5-325 MG per tablet Take 1 tablet by mouth every 6 hours as needed for Pain for up to 30 days. 9/2/21 10/2/21 Yes Demetrice Saldaña DO   zolpidem (AMBIEN) 10 MG tablet TAKE ONE TABLET BY MOUTH EVERY NIGHT AT BEDTIME AS NEEDED  Patient taking differently: Take 10 mg by mouth nightly as needed for Sleep. 8/31/21 9/30/21 Yes Demetrice Saldaña DO   hydroCHLOROthiazide (HYDRODIURIL) 25 MG tablet TAKE ONE TABLET BY MOUTH EVERY MORNING  Patient taking differently: Take 25 mg by mouth daily  8/31/21  Yes Demetrice Saldaña DO   NIFEdipine (PROCARDIA XL) 60 MG extended release tablet TAKE ONE TABLET BY MOUTH DAILY  Patient taking differently: Take 60 mg by mouth daily TAKE ONE TABLET BY MOUTH DAILY 8/14/21  Yes Demetrice Saldaña DO   hydrOXYzine (ATARAX) 25 MG tablet TAKE TWO TABLETS BY MOUTH EVERY NIGHT AT BEDTIME AS NEEDED FOR ITCHING  Patient taking differently: Take 50 mg by mouth nightly as needed (sleep) TAKE TWO TABLETS BY MOUTH EVERY NIGHT AT BEDTIME AS NEEDED FOR ITCHING 8/14/21  Yes Demetrice Saldaña DO   gabapentin (NEURONTIN) 300 MG capsule TAKE ONE CAPSULE BY MOUTH EVERY MORNING AND TAKE TWO CAPSULES BY MOUTH EVERY NIGHT AT BEDTIME  Patient taking differently: Take 300-600 mg by mouth 2 times daily.  TAKE ONE CAPSULE BY MOUTH EVERY MORNING AND TAKE TWO CAPSULES BY MOUTH EVERY NIGHT AT BEDTIME 6/3/21 9/23/21 Yes Demetrice Saldaña DO   propranolol (INDERAL LA) 160 MG extended release capsule One daily  Patient taking differently: Take 160 mg by mouth daily One daily 3/1/21  Yes Demetrice Saldaña DO   diclofenac sodium (VOLTAREN) 1 % GEL Apply 4 g topically 4 times daily  Patient taking differently: Apply 4 g topically 4 times daily Apply to knees 20  Yes Demetrice Saldaña DO       Current Medications:   Current Facility-Administered Medications: sodium chloride flush 0.9 % injection 5-40 mL, 5-40 mL, IntraVENous, 2 times per day  sodium chloride flush 0.9 % injection 5-40 mL, 5-40 mL, IntraVENous, PRN  0.9 % sodium chloride infusion, 25 mL, IntraVENous, PRN  enoxaparin (LOVENOX) injection 30 mg, 30 mg, SubCUTAneous, Daily  ondansetron (ZOFRAN-ODT) disintegrating tablet 4 mg, 4 mg, Oral, Q8H PRN **OR** ondansetron (ZOFRAN) injection 4 mg, 4 mg, IntraVENous, Q6H PRN  polyethylene glycol (GLYCOLAX) packet 17 g, 17 g, Oral, Daily PRN  acetaminophen (TYLENOL) tablet 650 mg, 650 mg, Oral, Q6H PRN **OR** acetaminophen (TYLENOL) suppository 650 mg, 650 mg, Rectal, Q6H PRN  0.9 % sodium chloride infusion, , IntraVENous, Continuous  traMADol (ULTRAM) tablet 50 mg, 50 mg, Oral, Q6H PRN  hydrALAZINE (APRESOLINE) injection 10 mg, 10 mg, IntraVENous, Q6H PRN  perflutren lipid microspheres (DEFINITY) injection 1.65 mg, 1.5 mL, IntraVENous, ONCE PRN    Allergies:  Penicillins    Social History     Socioeconomic History    Marital status: Single     Spouse name: Not on file    Number of children: Not on file    Years of education: Not on file    Highest education level: Not on file   Occupational History    Occupation:    Tobacco Use    Smoking status: Former Smoker     Packs/day: 0.25     Years: 15.00     Pack years: 3.75     Types: Cigarettes     Quit date: 1981     Years since quittin.0    Smokeless tobacco: Never Used    Tobacco comment:  quit smoking    Vaping Use    Vaping Use: Former   Substance and Sexual Activity    Alcohol use: Not Currently     Alcohol/week: 0.0 standard drinks    Drug use: Not Currently     Types: Marijuana    Sexual activity: Not on file   Other Topics Concern    Not on file   Social History Narrative    Not on file     Social Determinants of Health     Financial Resource Strain: Low Risk     Difficulty of Paying Living Expenses: Not hard at all   Food Insecurity: No Food Insecurity    Worried About [address] in the Last Year: Never true    [facility] in the Last Year: Never true   Transportation Needs:     Lack of Transportation (Medical):  Lack of Transportation (Non-Medical):    Physical Activity:     Days of Exercise per Week:     Minutes of Exercise per Session:    Stress:     Feeling of Stress :    Social Connections:     Frequency of Communication with Friends and Family:     Frequency of Social Gatherings with Friends and Family:     Attends Bahai Services:     Active Member of Clubs or Organizations:     Attends Club or Organization Meetings:     Marital Status:    Intimate Partner Violence:     Fear of Current or Ex-Partner:     Emotionally Abused:     Physically Abused:     Sexually Abused:        Family History:  Family History   Problem Relation Age of Onset    High Blood Pressure Mother          REVIEW OF SYSTEMS:   CONSTITUTIONAL: Denies unexplained weight loss, fevers, chills or fatigue  NEUROLOGICAL: Denies unsteady gait or progressive weakness    PSYCHOLOGICAL: Denies anxiety, depression   SKIN: Denies skin changes, delayed healing, rash, itching   HEMATOLOGIC: Denies easy bleeding or bruising  ENDOCRINE: Denies excessive thirst, urination, heat/cold  RESPIRATORY: Denies current dyspnea, cough  CARDIOVASCULAR: Negative for chest pain at this time. EYES: Negative for photophobia and visual disturbance.    ENT:  Negative for rhinorrhea, epistaxis, sore throat, or hearing loss. GI: Denies nausea, vomiting, diarrhea   : Denies bowel or bladder issues   MUSCULOSKELETAL: Right knee pain. All other ROS reviewed in chart or with patient or family and are grossly negative. PHYSICAL EXAMINATION:  Ms. Paula Gifford is a very pleasant 61 y.o. female who seen today in no acute distress, awake, alert, and oriented. She is well nourished and groomed. Patient with normal affect. Body mass index is 35.48 kg/m². . Skin warm and dry. Resting respiratory rate is 16. Resp deep and easy. Pulse is with regular rate and rhythm    BP (!) 89/55 Comment: Rn notified  Pulse 55   Temp 97.7 °F (36.5 °C) (Oral)   Resp 16   Ht 5' (1.524 m)   Wt 181 lb 10.5 oz (82.4 kg)   SpO2 97%   BMI 35.48 kg/m²        Airway is intact  Chest: chest clear, no wheezing, rales, normal symmetric air entry, no tachypnea, retractions or cyanosis  Heart: regular rate and rhythm ; heart sounds normal   Hearing intact, pupil equal and reactive bilateral  Lymphatics; No groin or axillary enlarged lymph nodes. Neck; No swelling  Abdomen; soft, non distended. MUSCULOSKELETAL:   Examination of both knees showing full ROM, right mild crepitus, tenderness on medial joint line, stable to varus and valgus stress. She has intact sensation and good pedal pulses. She has good strength in 2 planes, and has mild tenderness on deep palpation over the medial joint line. Knee reflex 1+ bilaterally.     NEUROLOGIC:   Sensory:    Touch:                     Right Upper Extremity:  normal                   Left Upper Extremity:  normal                  Right Lower Extremity:  normal                  Left Lower Extremity:  normal            DATA:    CBC:   Lab Results   Component Value Date    WBC 6.6 09/23/2021    RBC 4.17 09/23/2021    HGB 10.8 09/23/2021    HCT 34.4 09/23/2021    MCV 82.5 09/23/2021    MCH 26.0 09/23/2021    MCHC 31.5 09/23/2021    RDW 18.1 09/23/2021     09/23/2021    MPV 8.5 09/23/2021     WBC:    Lab Results   Component Value Date    WBC 6.6 09/23/2021     PT/INR:  No results found for: PROTIME, INR  PTT:  No results found for: APTT[APTT    IMAGING: Xray 3 views of the right knee was obtained 9/23/2021. These demonstrate moderate degenerative changes with narrowing of the joint space in the medial joint space compartment, subchondral sclerosis, and marginal osteophytosis. IMPRESSION: Right knee pain/osteoarthritis. PLAN: I believe she will benefit from cortisone injection right knee, and she agreed to have. PROCEDURE:    With the patient's permission, and under sterile condition, the right knee was prepared and draped with alcohol and injected with a mixture of 1 mL Kenalog 40mg and 4 mL of 1% lidocaine through the medial parapatellar port area. The patient tolerated the procedure well. A band-aid was applied and the patient was advised to ice the knee for 15-20 minutes to relieve any injection site related pain. She reported a good improvement immediatly after the injection. We will order xray left foot. Thank you very much for the kind consultation and allowing me to participate in this patient's care. I will continue to keep you apprised of her progress.         Cj Irwin MD   9/24/2021  6:30 AM

## 2021-09-24 NOTE — PLAN OF CARE
Problem: Falls - Risk of: Bed alarm in place and call light within reach.    Goal: Will remain free from falls  Description: Will remain free from falls  Outcome: Ongoing  Goal: Absence of physical injury  Description: Absence of physical injury  Outcome: Ongoing     Problem: Pain:  Goal: Pain level will decrease  Description: Pain level will decrease  Outcome: Ongoing  Goal: Control of acute pain  Description: Control of acute pain  Outcome: Ongoing  Goal: Control of chronic pain  Description: Control of chronic pain  Outcome: Ongoing

## 2021-09-24 NOTE — PROGRESS NOTES
Physical Therapy  Attempt Note  Herbie Gutierrez  [id number]   PT orders received and chart was reviewed. Ortho consulted and ordered L foot xray. Will defer therapy evaluation until after xray complete.   Electronically signed by Bob Goncalves, PT [id number] on 9/24/2021 at 2:23 PM

## 2021-09-24 NOTE — H&P
Hospital Medicine History & Physical      PCP: Chele Tucker DO    Date of Admission: 9/23/2021    Date of Service: Pt seen/examined on 9/23/21 and Admitted to Inpatient     Chief Complaint: Knee pain s/p fall    History Of Present Illness: The patient is a 61 y.o. female who presents to [facility] with knee pain R>L. Pt states she lives alone, has been having syncope and falling spells for a few weeks now. 2 days ago she fell on her knees and has been having worsening pain both knees R>L, she thought she fractured her knees and hence presented to the ER. Found to have ARF and admitted.     Past Medical History:        Diagnosis Date    Anxiety     Depression     Hypertension     Insomnia     Lumbar disc herniation     Lumbar spinal stenosis        Past Surgical History:        Procedure Laterality Date    BRONCHOSCOPY N/A 10/10/2019    BRONCHOSCOPY WITH BRONCHOALVEOLAR LAVAGE performed by Divya Hess DO at [address] [facility]  10/10/2019    BRONCHOSCOPY DIAGNOSTIC OR CELL [facility] ONLY performed by Divya Hess DO at [address]  SURGERY Left 12/14/2020    OPEN REDUCTION INTERNAL FIXATION LEFT DISTAL RADIUS - SYNTHES performed by Delma Wilkes MD at [address] aggarwal wrist plate    GASTRIC BYPASS SURGERY      HYSTERECTOMY      LUMBAR NERVE BLOCK N/A 6/17/2019    MIDLINE L5-S1  INTERLAMINAR EPIDURAL STEROID INJECTION WITH FLUOROSCOPY performed by Koffi Alas MD at [address] [facility] Left 1/20/2020    LEFT L4 AND L5 TRANSFORAMINAL EPIDURAL STEROID INJECTION WITH FLUOROSCOPY (78631, 47523) performed by Koffi Alas MD at [address]       Medications Prior to Admission:    Prior to Admission medications    Medication Sig Start Date End Date Taking? Authorizing Provider   tiZANidine (ZANAFLEX) 4 MG tablet TAKE ONE TABLET BY MOUTH EVERY 8 HOURS AS NEEDED  Patient taking differently: Take 4 mg by mouth every 8 hours as needed (muscle spasms) TAKE ONE TABLET BY MOUTH EVERY 8 HOURS AS NEEDED 9/12/21  Yes Demetrice Saldaña DO   citalopram (CELEXA) 40 MG tablet TAKE ONE TABLET BY MOUTH DAILY  Patient taking differently: Take 40 mg by mouth daily TAKE ONE TABLET BY MOUTH DAILY 9/8/21  Yes Demetrice Saldaña DO   HYDROcodone-acetaminophen (NORCO) 7.5-325 MG per tablet Take 1 tablet by mouth every 6 hours as needed for Pain for up to 30 days. 9/2/21 10/2/21 Yes Demetrice Saldaña DO   zolpidem (AMBIEN) 10 MG tablet TAKE ONE TABLET BY MOUTH EVERY NIGHT AT BEDTIME AS NEEDED  Patient taking differently: Take 10 mg by mouth nightly as needed for Sleep. 8/31/21 9/30/21 Yes Demetrice Saldaña DO   hydroCHLOROthiazide (HYDRODIURIL) 25 MG tablet TAKE ONE TABLET BY MOUTH EVERY MORNING  Patient taking differently: Take 25 mg by mouth daily  8/31/21  Yes Demetrice Saldaña DO   NIFEdipine (PROCARDIA XL) 60 MG extended release tablet TAKE ONE TABLET BY MOUTH DAILY  Patient taking differently: Take 60 mg by mouth daily TAKE ONE TABLET BY MOUTH DAILY 8/14/21  Yes Demetrice Saldaña DO   hydrOXYzine (ATARAX) 25 MG tablet TAKE TWO TABLETS BY MOUTH EVERY NIGHT AT BEDTIME AS NEEDED FOR ITCHING  Patient taking differently: Take 50 mg by mouth nightly as needed (sleep) TAKE TWO TABLETS BY MOUTH EVERY NIGHT AT BEDTIME AS NEEDED FOR ITCHING 8/14/21  Yes Demetrice Saldaña DO   gabapentin (NEURONTIN) 300 MG capsule TAKE ONE CAPSULE BY MOUTH EVERY MORNING AND TAKE TWO CAPSULES BY MOUTH EVERY NIGHT AT BEDTIME  Patient taking differently: Take 300-600 mg by mouth 2 times daily.  TAKE ONE CAPSULE BY MOUTH EVERY MORNING AND TAKE TWO CAPSULES BY MOUTH EVERY NIGHT AT BEDTIME 6/3/21 9/23/21 Yes Demetrice Saldaña DO   propranolol (INDERAL LA) 160 MG extended release capsule One daily  Patient taking differently: Take 160 mg by mouth daily One daily 3/1/21  Yes Demetrice Saldaña DO   diclofenac sodium (VOLTAREN) 1 % GEL Apply 4 g topically 4 times daily  Patient taking differently: Apply 4 g topically 4 times daily Apply to knees 7/27/20  Yes Demetrice Saldaña DO       Allergies:  Penicillins    Social History:  The patient currently lives at home    TOBACCO:   reports that she has quit smoking. Her smoking use included cigarettes. She has a 3.75 pack-year smoking history. She has never used smokeless tobacco.  ETOH:   reports previous alcohol use. Family History:  Reviewed in detail and negative for DM, Early CAD, Cancer, CVA. Positive as follows:    History reviewed. No pertinent family history. REVIEW OF SYSTEMS:   Positive for knee pain and as noted in the HPI. All other systems reviewed and negative. PHYSICAL EXAM:    BP (!) 108/58   Pulse (!) 48   Temp 97.7 °F (36.5 °C) (Oral)   Resp 16   Ht 5' (1.524 m)   Wt 180 lb (81.6 kg)   SpO2 96%   BMI 35.15 kg/m²     General appearance: No apparent distress appears stated age and cooperative. HEENT Normal cephalic, atraumatic without obvious deformity. Pupils equal, round, and reactive to light. Extra ocular muscles intact. Conjunctivae/corneas clear. Neck: Supple, No jugular venous distention/bruits. Trachea midline without thyromegaly or adenopathy with full range of motion. Lungs: Clear to auscultation, bilaterally without Rales/Wheezes/Rhonchi with good respiratory effort. Heart: Regular rhythm, bradycardia, with Normal S1/S2   Abdomen: Soft, non-tender or non-distended without rigidity or guarding and positive bowel sounds  Extremities: No clubbing, cyanosis, or edema bilaterally. Limited ROM rt LE 2/2 knee pain  Skin: Skin color, texture, turgor normal.  No rashes or lesions. Neurologic: Alert and oriented X 3, neurovascularly intact with sensory/motor intact upper extremities/lower extremities, bilaterally.   Cranial nerves: II-XII intact, grossly non-focal.  Mental status: Alert, oriented, thought content appropriate. Capillary Refill: Acceptable  < 3 seconds  Peripheral Pulses: +3 Easily felt, not easily obliterated with pressure      CXR:  I have reviewed the CXR with the following interpretation: clear    CBC   Recent Labs     21  1237   WBC 6.6   HGB 10.8*   HCT 34.4*         RENAL  Recent Labs     21  1237   *   K 4.4   CL 94*   CO2 23   BUN 20   CREATININE 2.5*     LFT'S  Recent Labs     21  1237   AST 12*   ALT 8*   BILITOT 0.5   ALKPHOS 123     COAG  No results for input(s): INR in the last 72 hours. CARDIAC ENZYMES  Recent Labs     21  1237   TROPONINI <0.01       U/A:    Lab Results   Component Value Date    COLORU YELLOW 10/07/2019    WBCUA 1 10/07/2019    RBCUA 1 10/07/2019    BACTERIA 1+ 2019    CLARITYU CLOUDY 10/07/2019    SPECGRAV 1.011 10/07/2019    LEUKOCYTESUR Negative 10/07/2019    BLOODU Negative 10/07/2019    GLUCOSEU Negative 10/07/2019       ABG  No results found for: XAI6LHD, BEART, A1TPLXNU, PHART, THGBART, QMF4NBS, PO2ART, MPX2SCS        Active Hospital Problems    Diagnosis Date Noted    ARF (acute renal failure) ([facility] [address] [N17.9] 2021         PHYSICIANS CERTIFICATION:    I certify that Georgina Yeager is expected to be hospitalized for > than 2 midnights based on the following assessment and plan:      ASSESSMENT/PLAN:    ARF - suspect pre-renal from syncope and falls and  intake. Creat 2.5, started on IVF NS, recheck creat in am. Check renal USG in am, avoid nephrotoxic meds. Check CPK    Bradycardia - suspect 2/2 taking BB while ARF. Hold BB and any austen blocking agents. Placed on tele, monitor, check echo in am    B/l knee pain R>L - xray reviewed - arthritis, pain control with tramadol. Will consult ortho since pt with significant pain in rt knee    HTN - BP labile, hold off BB. Will cont to monitor BP    Syncope with falls - suspect 2/2 renal failure and taking her meds. Check orthostatic in am    Hold home meds until renal fn improved      DVT Prophylaxis: lovenox  Diet: ADULT DIET; Regular  Code Status: Full Code  PT/OT Eval Status: ordered    Dispo - cont care       Lavinia Ramos MD    Thank you Collette Zimmerman DO for the opportunity to be involved in this patient's care. If you have any questions or concerns please feel free to contact me at [phone].

## 2021-09-24 NOTE — ACP (ADVANCE CARE PLANNING)
Advance Care Planning     Advance Care Planning Activator (Inpatient)  Conversation Note      Date of ACP Conversation: 9/24/2021     Conversation Conducted with: Patient with Decision Making Capacity    ACP Activator: MANDO Henry    Health Care Decision Maker:     Current Designated Health Care Decision Maker:     Primary Decision Maker: Lili Medellin - [phone]    Secondary Decision Maker: Mariusz Figueroa - [phone]    Care Preferences    Ventilation: \"If you were in your present state of health and suddenly became very ill and were unable to breathe on your own, what would your preference be about the use of a ventilator (breathing machine) if it were available to you? \"      Would the patient desire the use of ventilator (breathing machine)?: no    \"If your health worsens and it becomes clear that your chance of recovery is unlikely, what would your preference be about the use of a ventilator (breathing machine) if it were available to you? \"     Would the patient desire the use of ventilator (breathing machine)?: No      Resuscitation  \"CPR works best to restart the heart when there is a sudden event, like a heart attack, in someone who is otherwise healthy. Unfortunately, CPR does not typically restart the heart for people who have serious health conditions or who are very sick. \"    \"In the event your heart stopped as a result of an underlying serious health condition, would you want attempts to be made to restart your heart (answer \"yes\" for attempt to resuscitate) or would you prefer a natural death (answer \"no\" for do not attempt to resuscitate)? \" no       [] Yes   [] No   Educated Patient / Larissa Fajardok regarding differences between Advance Directives and portable DNR orders.     Length of ACP Conversation in minutes:  2    Conversation Outcomes:  [x] ACP discussion completed  [] Existing advance directive reviewed with patient; no changes to patient's previously recorded wishes  [] New Advance Directive completed  [] Portable Do Not Rescitate prepared for Provider review and signature  [] POLST/POST/MOLST/MOST prepared for Provider review and signature      Follow-up plan:    [] Schedule follow-up conversation to continue planning  [] Referred individual to Provider for additional questions/concerns   [] Advised patient/agent/surrogate to review completed ACP document and update if needed with changes in condition, patient preferences or care setting    [x] This note routed to one or more involved healthcare providers  Mitchell Aleman DO primary care physician. Respectfully submitted,    MANDO Bryan  [facility]   [phone]    Electronically signed by MANDO Goode on 9/24/2021 at 5:30 PM

## 2021-09-25 DIAGNOSIS — M17.11 PRIMARY OSTEOARTHRITIS OF RIGHT KNEE: ICD-10-CM

## 2021-09-25 LAB
ANION GAP SERPL CALCULATED.3IONS-SCNC: 19 MMOL/L (ref 3–16)
BUN BLDV-MCNC: 19 MG/DL (ref 7–20)
CALCIUM SERPL-MCNC: 8.8 MG/DL (ref 8.3–10.6)
CHLORIDE BLD-SCNC: 103 MMOL/L (ref 99–110)
CO2: 17 MMOL/L (ref 21–32)
CREAT SERPL-MCNC: 1.5 MG/DL (ref 0.6–1.2)
GFR AFRICAN AMERICAN: 43
GFR NON-AFRICAN AMERICAN: 35
GLUCOSE BLD-MCNC: 136 MG/DL (ref 70–99)
GLUCOSE BLD-MCNC: 153 MG/DL (ref 70–99)
GLUCOSE BLD-MCNC: 88 MG/DL (ref 70–99)
GLUCOSE BLD-MCNC: 92 MG/DL (ref 70–99)
PERFORMED ON: ABNORMAL
PERFORMED ON: ABNORMAL
PERFORMED ON: NORMAL
POTASSIUM SERPL-SCNC: 4 MMOL/L (ref 3.5–5.1)
SODIUM BLD-SCNC: 139 MMOL/L (ref 136–145)
TOTAL CK: 49 U/L (ref 26–192)

## 2021-09-25 PROCEDURE — 1200000000 HC SEMI PRIVATE

## 2021-09-25 PROCEDURE — 97162 PT EVAL MOD COMPLEX 30 MIN: CPT

## 2021-09-25 PROCEDURE — 97116 GAIT TRAINING THERAPY: CPT

## 2021-09-25 PROCEDURE — 6370000000 HC RX 637 (ALT 250 FOR IP): Performed by: INTERNAL MEDICINE

## 2021-09-25 PROCEDURE — 94760 N-INVAS EAR/PLS OXIMETRY 1: CPT

## 2021-09-25 PROCEDURE — 36415 COLL VENOUS BLD VENIPUNCTURE: CPT

## 2021-09-25 PROCEDURE — 82550 ASSAY OF CK (CPK): CPT

## 2021-09-25 PROCEDURE — 80048 BASIC METABOLIC PNL TOTAL CA: CPT

## 2021-09-25 PROCEDURE — 2580000003 HC RX 258: Performed by: INTERNAL MEDICINE

## 2021-09-25 PROCEDURE — 6360000002 HC RX W HCPCS: Performed by: INTERNAL MEDICINE

## 2021-09-25 RX ORDER — FAMOTIDINE 20 MG/1
10 TABLET, FILM COATED ORAL DAILY
Status: DISCONTINUED | OUTPATIENT
Start: 2021-09-25 | End: 2021-09-27 | Stop reason: HOSPADM

## 2021-09-25 RX ORDER — FAMOTIDINE 20 MG/1
20 TABLET, FILM COATED ORAL 2 TIMES DAILY
Status: DISCONTINUED | OUTPATIENT
Start: 2021-09-25 | End: 2021-09-25 | Stop reason: DRUGHIGH

## 2021-09-25 RX ORDER — NIFEDIPINE 30 MG/1
30 TABLET, EXTENDED RELEASE ORAL DAILY
Status: DISCONTINUED | OUTPATIENT
Start: 2021-09-25 | End: 2021-09-25

## 2021-09-25 RX ORDER — NIFEDIPINE 30 MG/1
30 TABLET, EXTENDED RELEASE ORAL 2 TIMES DAILY
Status: DISCONTINUED | OUTPATIENT
Start: 2021-09-25 | End: 2021-09-26

## 2021-09-25 RX ADMIN — NIFEDIPINE 30 MG: 30 TABLET, FILM COATED, EXTENDED RELEASE ORAL at 12:20

## 2021-09-25 RX ADMIN — SODIUM CHLORIDE: 9 INJECTION, SOLUTION INTRAVENOUS at 01:57

## 2021-09-25 RX ADMIN — TRAMADOL HYDROCHLORIDE 50 MG: 50 TABLET ORAL at 09:10

## 2021-09-25 RX ADMIN — ZOLPIDEM TARTRATE 5 MG: 5 TABLET ORAL at 21:25

## 2021-09-25 RX ADMIN — MAGNESIUM HYDROXIDE/ALUMINUM HYDROXICE/SIMETHICONE: 120; 1200; 1200 SUSPENSION ORAL at 12:20

## 2021-09-25 RX ADMIN — FAMOTIDINE 10 MG: 20 TABLET, FILM COATED ORAL at 10:56

## 2021-09-25 RX ADMIN — SODIUM CHLORIDE, PRESERVATIVE FREE 10 ML: 5 INJECTION INTRAVENOUS at 20:40

## 2021-09-25 RX ADMIN — ENOXAPARIN SODIUM 30 MG: 30 INJECTION SUBCUTANEOUS at 08:58

## 2021-09-25 RX ADMIN — NIFEDIPINE 30 MG: 30 TABLET, FILM COATED, EXTENDED RELEASE ORAL at 20:40

## 2021-09-25 RX ADMIN — TRAMADOL HYDROCHLORIDE 50 MG: 50 TABLET ORAL at 20:43

## 2021-09-25 ASSESSMENT — PAIN DESCRIPTION - ORIENTATION: ORIENTATION: LEFT

## 2021-09-25 ASSESSMENT — PAIN DESCRIPTION - FREQUENCY: FREQUENCY: INTERMITTENT

## 2021-09-25 ASSESSMENT — PAIN SCALES - GENERAL
PAINLEVEL_OUTOF10: 7
PAINLEVEL_OUTOF10: 2
PAINLEVEL_OUTOF10: 0
PAINLEVEL_OUTOF10: 9
PAINLEVEL_OUTOF10: 5

## 2021-09-25 ASSESSMENT — PAIN DESCRIPTION - PAIN TYPE: TYPE: ACUTE PAIN

## 2021-09-25 ASSESSMENT — PAIN - FUNCTIONAL ASSESSMENT: PAIN_FUNCTIONAL_ASSESSMENT: ACTIVITIES ARE NOT PREVENTED

## 2021-09-25 ASSESSMENT — PAIN DESCRIPTION - DESCRIPTORS: DESCRIPTORS: ACHING;DISCOMFORT

## 2021-09-25 ASSESSMENT — PAIN DESCRIPTION - ONSET: ONSET: GRADUAL

## 2021-09-25 ASSESSMENT — PAIN DESCRIPTION - PROGRESSION: CLINICAL_PROGRESSION: GRADUALLY WORSENING

## 2021-09-25 ASSESSMENT — PAIN DESCRIPTION - LOCATION: LOCATION: CHEST

## 2021-09-25 NOTE — PLAN OF CARE
Problem: Falls - Risk of:  Goal: Will remain free from falls  Description: Will remain free from falls  Outcome: Ongoing  Goal: Absence of physical injury  Description: Absence of physical injury  Outcome: Ongoing     Problem: Pain:  Goal: Pain level will decrease  Description: Pain level will decrease  Outcome: Ongoing  Goal: Control of acute pain  Description: Control of acute pain  Outcome: Ongoing  Goal: Control of chronic pain  Description: Control of chronic pain  Outcome: Ongoing     Problem: Falls - Risk of: Bed alarm in place and call light within reach  Goal: Will remain free from falls  Description: Will remain free from falls  Outcome: Ongoing  Goal: Absence of physical injury  Description: Absence of physical injury  Outcome: Ongoing     Problem: Pain:  Goal: Pain level will decrease  Description: Pain level will decrease  Outcome: Ongoing  Goal: Control of acute pain  Description: Control of acute pain  Outcome: Ongoing  Goal: Control of chronic pain  Description: Control of chronic pain  Outcome: Ongoing

## 2021-09-25 NOTE — PROGRESS NOTES
Rolling walker, Cane, 4 wheeled walker  ADL Assistance: [address]: [address] [facility] [address] [address] Responsibilities: Yes  Ambulation Assistance: Independent (no device in the house but uses the walker when at work)  Transfer Assistance: Independent (sleeps in regular bed)  Active : Yes  Mode of Transportation: Car  Occupation: Full time employment  Type of occupation: [employer] (reports she is working from home now)  Additional Comments: the pt reports at least 10 falls in the past week  Cognition   Cognition  Overall Cognitive Status: WFL    Objective  AROM RLE (degrees)  RLE AROM: WFL  AROM LLE (degrees)  LLE AROM : WFL  Strength RLE  Strength RLE: WFL  Strength LLE  Strength LLE: WFL     Sensation  Overall Sensation Status:  (reports toes on her L foot are numbish due to bruising)  Bed mobility  Supine to Sit: Independent  Sit to Supine: Independent  Transfers  Sit to Stand: Stand by assistance  Stand to sit: Stand by assistance  Ambulation  Ambulation?: Yes  More Ambulation?: Yes  Ambulation 1  Surface: level tile  Device: No Device  Assistance: Contact guard assistance  Quality of Gait: one small LOB that required CGA to recover; slowed pace and slightly guarded; reports feelings of chest discomfort and feeling out of breath with walking (RN was notified and he reported he was aware)  Distance: 25 feet x 1  Ambulation 2  Surface - 2: level tile  Device 2: [address] [facility] 2: Stand by assistance  Quality of Gait 2: more confident and with a quicker pace; no LOB  Distance: 25 feet  Comments: encouraged the pt that she should be using her walker in the house and she con't to report that it will not fit thruout her house     Balance  Sitting - Static: Good  Sitting - Dynamic: Good;-  Standing - Static: Good;-  Standing - Dynamic: Fair  Comments: the pt had one smal LOB when ambulating w/o a device requiring CGA to recover        Plan   Plan  Times per week: 3-5x/week  Current Treatment Recommendations: Functional Mobility Training, Transfer Training, Gait Training, Stair training, Patient/Caregiver Education & Training, Safety Education & Training, Strengthening, ROM  Safety Devices  Type of devices: Bed alarm in place, Gait belt, Patient at risk for falls, Left in bed, Nurse notified      AM-PAC Score  AM-PAC Inpatient Mobility Raw Score : 21 (09/25/21 1501)  AM-PAC Inpatient T-Scale Score : 50.25 (09/25/21 1501)  Mobility Inpatient CMS 0-100% Score: 28.97 (09/25/21 1501)  Mobility Inpatient CMS G-Code Modifier : Brennen Barry (09/25/21 1501)          Goals  Short term goals  Time Frame for Short term goals: upon d/c  Short term goal 1: Transfers sit <> stand with mod I. Short term goal 2: Ambulate around the room w/o a device with Sup. Short term goal 3: Ambulate longer distances in the hallway with the walker with Sup. Short term goal 4: Negotiate one step with SBA.   Patient Goals   Patient goals : to feel better and go home       Therapy Time   Individual Concurrent Group Co-treatment   Time In 1429         Time Out 1510         Minutes 41         Timed Code Treatment Minutes: 26 Minutes       Electronically signed by Jorgito Mora, PT [id number] on 9/25/2021 at 3:11 PM

## 2021-09-25 NOTE — PROGRESS NOTES
Clinical Pharmacy Note  Renal Dose Adjustment    Etter Duane is receiving Pepcid. This medication is renally eliminated. Based on the patient's Estimated Creatinine Clearance: 28 mL/min (A) (based on SCr of 2 mg/dL (H)). and urine output, the dose has been adjusted to 10 mg daily per protocol. Pharmacy will continue to monitor and adjust dose as needed for changes in renal function.     Erin Wild [facility],9/25/2021,10:48 AM

## 2021-09-25 NOTE — PLAN OF CARE
Problem: Falls - Risk of:  Goal: Will remain free from falls  Description: Will remain free from falls  9/25/2021 1012 by Syed Gay RN  Outcome: Ongoing     Problem: Falls - Risk of:  Goal: Absence of physical injury  Description: Absence of physical injury  9/25/2021 1012 by Syed Gay RN  Outcome: Ongoing     Problem: Pain:  Goal: Pain level will decrease  Description: Pain level will decrease  9/25/2021 1012 by Syed Gay RN  Outcome: Ongoing     Problem: Pain:  Goal: Control of acute pain  Description: Control of acute pain  9/25/2021 1012 by Syed Gay RN  Outcome: Ongoing     Problem: Pain:  Goal: Control of chronic pain  Description: Control of chronic pain  9/25/2021 1012 by Syed Gay RN  Outcome: Ongoing

## 2021-09-25 NOTE — PROGRESS NOTES
Hospitalist Progress Note      PCP: Philip Valerio DO    Date of Admission: 9/23/2021     Subjective: feels knee pain better today    Medications:  Reviewed    Infusion Medications    sodium chloride      sodium chloride 100 mL/hr at 09/24/21 1721     Scheduled Medications    sodium chloride flush  5-40 mL IntraVENous 2 times per day    enoxaparin  30 mg SubCUTAneous Daily     PRN Meds: zolpidem, sodium chloride flush, sodium chloride, ondansetron **OR** ondansetron, polyethylene glycol, acetaminophen **OR** acetaminophen, traMADol, hydrALAZINE, perflutren lipid microspheres      Intake/Output Summary (Last 24 hours) at 9/25/2021 0101  Last data filed at 9/24/2021 1841  Gross per 24 hour   Intake 580 ml   Output 1800 ml   Net -1220 ml       Physical Exam Performed:    BP (!) 178/91   Pulse 66   Temp 98.7 °F (37.1 °C) (Oral)   Resp 18   Ht 5' (1.524 m)   Wt 181 lb 10.5 oz (82.4 kg)   SpO2 95%   BMI 35.48 kg/m²     General appearance: No apparent distress appears stated age and cooperative. HEENT Normal cephalic, atraumatic without obvious deformity. Pupils equal, round, and reactive to light. Extra ocular muscles intact. Conjunctivae/corneas clear. Neck: Supple, No jugular venous distention/bruits. Trachea midline without thyromegaly or adenopathy with full range of motion. Lungs: Clear to auscultation, bilaterally without Rales/Wheezes/Rhonchi with good respiratory effort. Heart: Regular rhythm, bradycardia, with Normal S1/S2   Abdomen: Soft, non-tender or non-distended without rigidity or guarding and positive bowel sounds  Extremities: No clubbing, cyanosis, or edema bilaterally. Limited ROM rt LE 2/2 knee pain  Skin: Skin color, texture, turgor normal.  No rashes or lesions. Neurologic: Alert and oriented X 3, neurovascularly intact with sensory/motor intact upper extremities/lower extremities, bilaterally.   Cranial nerves: II-XII intact, grossly non-focal.  Mental status: Alert, oriented, thought content appropriate. Capillary Refill: Acceptable  < 3 seconds  Peripheral Pulses: +3 Easily felt, not easily obliterated with pressure       Labs:   Recent Labs     09/23/21  1237 09/24/21  0536   WBC 6.6 4.8   HGB 10.8* 10.2*   HCT 34.4* 31.8*    217     Recent Labs     09/23/21  1237 09/24/21  0536   * 139   K 4.4 3.6   CL 94* 103   CO2 23 24   BUN 20 20   CREATININE 2.5* 2.0*   CALCIUM 9.0 8.3     Recent Labs     09/23/21  1237   AST 12*   ALT 8*   BILITOT 0.5   ALKPHOS 123     No results for input(s): INR in the last 72 hours. Recent Labs     09/23/21  1237 09/23/21  2043 09/24/21  0236 09/24/21  0536   CKTOTAL  --  80  --  73   TROPONINI <0.01  --  <0.01  --        Urinalysis:      Lab Results   Component Value Date    NITRU Negative 09/23/2021    WBCUA 1 10/07/2019    BACTERIA 1+ 07/30/2019    RBCUA 1 10/07/2019    BLOODU Negative 09/23/2021    SPECGRAV 1.008 09/23/2021    GLUCOSEU Negative 09/23/2021       Radiology:  XR FOOT LEFT (MIN 3 VIEWS)   Final Result   Mild soft tissue swelling. Hallux valgus deformity. Degenerative changes. No acute osseous abnormality. RECOMMENDATION:   Follow-up studies as clinically indicated. US RENAL COMPLETE   Final Result   Mild atrophy, otherwise unremarkable ultrasound of the kidneys and urinary   bladder. XR KNEE RIGHT (3 VIEWS)   Final Result   Left knee: Mild arthritic changes. No acute osseous abnormality. Right knee: Arthritic changes most severe in the patellofemoral aspect of the   joint without acute fracture or dislocation. XR KNEE LEFT (3 VIEWS)   Final Result   Left knee: Mild arthritic changes. No acute osseous abnormality. Right knee: Arthritic changes most severe in the patellofemoral aspect of the   joint without acute fracture or dislocation. CT HEAD WO CONTRAST   Final Result   No acute intracranial abnormality.          CT CERVICAL SPINE WO CONTRAST   Preliminary Result 1. Multilevel degenerative changes of the cervical spine with no radiographic   findings to suggest presence of acute fracture. 2. Findings suggestive of presence of multiple dental caries as described   above. XR CHEST PORTABLE   Preliminary Result   Expiratory chest with no evidence of acute cardiopulmonary disease. Assessment/Plan:    Active Hospital Problems    Diagnosis     Fall [W19. XXXA]     [facility] failure[facility] ([facility]) [N17.9]     Primary osteoarthritis of right knee [M17.11]          ARF - suspect pre-renal from syncope and falls and  intake. Creat 2.5-->2.0, started on IVF NS, recheck creat in am. Check renal USG reviewed, avoid nephrotoxic meds. Check CPK normal     Bradycardia - suspect 2/2 taking BB while ARF. Hold BB and any austen blocking agents. Placed on tele, monitor, check echo in am     B/l knee pain R>L - xray reviewed - arthritis, pain control with tramadol. Consulted ortho since pt with significant pain in rt knee, s/p cortisone injection rt knee. Check xray left ankle     HTN - BP labile, hold off BB. Will cont to monitor BP     Syncope with falls - suspect 2/2 renal failure and taking her meds. Check orthostatic in am     Hold home meds until renal fn improved        DVT Prophylaxis: lovenox  Diet: ADULT DIET;  Regular  Code Status: Full Code    PT/OT Eval Status: ordered    Obinna Reilly MD

## 2021-09-26 ENCOUNTER — APPOINTMENT (OUTPATIENT)
Dept: CT IMAGING | Age: 61
DRG: 684 | End: 2021-09-26
Payer: COMMERCIAL

## 2021-09-26 LAB
ANION GAP SERPL CALCULATED.3IONS-SCNC: 12 MMOL/L (ref 3–16)
BUN BLDV-MCNC: 23 MG/DL (ref 7–20)
CALCIUM SERPL-MCNC: 9 MG/DL (ref 8.3–10.6)
CHLORIDE BLD-SCNC: 106 MMOL/L (ref 99–110)
CO2: 22 MMOL/L (ref 21–32)
CREAT SERPL-MCNC: 1.3 MG/DL (ref 0.6–1.2)
GFR AFRICAN AMERICAN: 50
GFR NON-AFRICAN AMERICAN: 42
GLUCOSE BLD-MCNC: 101 MG/DL (ref 70–99)
GLUCOSE BLD-MCNC: 109 MG/DL (ref 70–99)
GLUCOSE BLD-MCNC: 90 MG/DL (ref 70–99)
GLUCOSE BLD-MCNC: 94 MG/DL (ref 70–99)
GLUCOSE BLD-MCNC: 96 MG/DL (ref 70–99)
PERFORMED ON: ABNORMAL
PERFORMED ON: ABNORMAL
PERFORMED ON: NORMAL
PERFORMED ON: NORMAL
POTASSIUM SERPL-SCNC: 4.2 MMOL/L (ref 3.5–5.1)
SODIUM BLD-SCNC: 140 MMOL/L (ref 136–145)

## 2021-09-26 PROCEDURE — 36415 COLL VENOUS BLD VENIPUNCTURE: CPT

## 2021-09-26 PROCEDURE — 71250 CT THORAX DX C-: CPT

## 2021-09-26 PROCEDURE — 6360000002 HC RX W HCPCS: Performed by: INTERNAL MEDICINE

## 2021-09-26 PROCEDURE — 94760 N-INVAS EAR/PLS OXIMETRY 1: CPT

## 2021-09-26 PROCEDURE — 1200000000 HC SEMI PRIVATE

## 2021-09-26 PROCEDURE — 80048 BASIC METABOLIC PNL TOTAL CA: CPT

## 2021-09-26 PROCEDURE — 6370000000 HC RX 637 (ALT 250 FOR IP): Performed by: INTERNAL MEDICINE

## 2021-09-26 PROCEDURE — 2580000003 HC RX 258: Performed by: INTERNAL MEDICINE

## 2021-09-26 PROCEDURE — 93005 ELECTROCARDIOGRAM TRACING: CPT | Performed by: INTERNAL MEDICINE

## 2021-09-26 RX ORDER — NIFEDIPINE 60 MG/1
60 TABLET, EXTENDED RELEASE ORAL 2 TIMES DAILY
Status: DISCONTINUED | OUTPATIENT
Start: 2021-09-26 | End: 2021-09-27 | Stop reason: HOSPADM

## 2021-09-26 RX ORDER — TIZANIDINE 4 MG/1
TABLET ORAL
Qty: 40 TABLET | Refills: 0 | Status: SHIPPED | OUTPATIENT
Start: 2021-09-26 | End: 2021-10-13

## 2021-09-26 RX ORDER — CLONIDINE HYDROCHLORIDE 0.1 MG/1
TABLET ORAL
Qty: 180 TABLET | Refills: 0 | Status: SHIPPED | OUTPATIENT
Start: 2021-09-26

## 2021-09-26 RX ADMIN — ENOXAPARIN SODIUM 30 MG: 30 INJECTION SUBCUTANEOUS at 08:05

## 2021-09-26 RX ADMIN — SODIUM CHLORIDE, PRESERVATIVE FREE 10 ML: 5 INJECTION INTRAVENOUS at 08:05

## 2021-09-26 RX ADMIN — ACETAMINOPHEN 650 MG: 325 TABLET ORAL at 08:05

## 2021-09-26 RX ADMIN — NIFEDIPINE 30 MG: 30 TABLET, FILM COATED, EXTENDED RELEASE ORAL at 08:05

## 2021-09-26 RX ADMIN — HYDRALAZINE HYDROCHLORIDE 10 MG: 20 INJECTION INTRAMUSCULAR; INTRAVENOUS at 08:05

## 2021-09-26 RX ADMIN — NIFEDIPINE 60 MG: 60 TABLET, EXTENDED RELEASE ORAL at 20:08

## 2021-09-26 RX ADMIN — ZOLPIDEM TARTRATE 5 MG: 5 TABLET ORAL at 21:58

## 2021-09-26 RX ADMIN — TRAMADOL HYDROCHLORIDE 50 MG: 50 TABLET ORAL at 02:43

## 2021-09-26 RX ADMIN — SODIUM CHLORIDE, PRESERVATIVE FREE 10 ML: 5 INJECTION INTRAVENOUS at 20:09

## 2021-09-26 RX ADMIN — ACETAMINOPHEN 650 MG: 325 TABLET ORAL at 20:36

## 2021-09-26 RX ADMIN — TRAMADOL HYDROCHLORIDE 50 MG: 50 TABLET ORAL at 14:13

## 2021-09-26 RX ADMIN — FAMOTIDINE 10 MG: 20 TABLET, FILM COATED ORAL at 08:05

## 2021-09-26 RX ADMIN — TRAMADOL HYDROCHLORIDE 50 MG: 50 TABLET ORAL at 20:36

## 2021-09-26 ASSESSMENT — PAIN SCALES - GENERAL
PAINLEVEL_OUTOF10: 10
PAINLEVEL_OUTOF10: 9
PAINLEVEL_OUTOF10: 6
PAINLEVEL_OUTOF10: 3
PAINLEVEL_OUTOF10: 0
PAINLEVEL_OUTOF10: 1
PAINLEVEL_OUTOF10: 4
PAINLEVEL_OUTOF10: 5
PAINLEVEL_OUTOF10: 0

## 2021-09-26 ASSESSMENT — PAIN DESCRIPTION - LOCATION
LOCATION: HEAD;CHEST
LOCATION: CHEST;BREAST
LOCATION: HEAD
LOCATION: HEAD

## 2021-09-26 ASSESSMENT — PAIN - FUNCTIONAL ASSESSMENT
PAIN_FUNCTIONAL_ASSESSMENT: ACTIVITIES ARE NOT PREVENTED

## 2021-09-26 ASSESSMENT — PAIN DESCRIPTION - PROGRESSION
CLINICAL_PROGRESSION: NOT CHANGED

## 2021-09-26 ASSESSMENT — PAIN DESCRIPTION - DESCRIPTORS
DESCRIPTORS: ACHING;DISCOMFORT;HEADACHE
DESCRIPTORS: ACHING;DISCOMFORT

## 2021-09-26 ASSESSMENT — PAIN DESCRIPTION - ORIENTATION
ORIENTATION: MID;LEFT
ORIENTATION: MID
ORIENTATION: MID
ORIENTATION: LEFT

## 2021-09-26 ASSESSMENT — PAIN DESCRIPTION - PAIN TYPE
TYPE: ACUTE PAIN

## 2021-09-26 ASSESSMENT — PAIN DESCRIPTION - ONSET
ONSET: ON-GOING

## 2021-09-26 ASSESSMENT — PAIN DESCRIPTION - FREQUENCY
FREQUENCY: CONTINUOUS

## 2021-09-26 NOTE — PLAN OF CARE
Problem: Falls - Risk of:  Goal: Will remain free from falls  Description: Will remain free from falls  9/26/2021 1024 by Thomas Disla RN  Outcome: Ongoing     Problem: Falls - Risk of:  Goal: Absence of physical injury  Description: Absence of physical injury  9/26/2021 1024 by Thomas Disla RN  Outcome: Ongoing     Problem: Pain:  Goal: Pain level will decrease  Description: Pain level will decrease  9/26/2021 1024 by Thomas Disla RN  Outcome: Ongoing     Problem: Pain:  Goal: Control of acute pain  Description: Control of acute pain  9/26/2021 1024 by Thomas Disla RN  Outcome: Ongoing     Problem: Pain:  Goal: Control of chronic pain  Description: Control of chronic pain  Outcome: Ongoing

## 2021-09-26 NOTE — PROGRESS NOTES
Hospitalist Progress Note      PCP: Vivek Ordonez DO    Date of Admission: 9/23/2021    Subjective: pain fairly controlled, c/o left sided chest pain    Medications:  Reviewed    Infusion Medications    sodium chloride       Scheduled Medications    [START ON 9/27/2021] enoxaparin  40 mg SubCUTAneous Daily    NIFEdipine  60 mg Oral BID    famotidine  10 mg Oral Daily    sodium chloride flush  5-40 mL IntraVENous 2 times per day     PRN Meds: zolpidem, sodium chloride flush, sodium chloride, ondansetron **OR** ondansetron, polyethylene glycol, acetaminophen **OR** acetaminophen, traMADol, hydrALAZINE, perflutren lipid microspheres      Intake/Output Summary (Last 24 hours) at 9/26/2021 1147  Last data filed at 9/26/2021 1023  Gross per 24 hour   Intake 1428.02 ml   Output 800 ml   Net 628.02 ml       Physical Exam Performed:    BP (!) 171/98   Pulse 63   Temp 98.4 °F (36.9 °C) (Oral)   Resp 18   Ht 5' (1.524 m)   Wt 180 lb 12.4 oz (82 kg)   SpO2 96%   BMI 35.31 kg/m²     General appearance: No apparent distress appears stated age and cooperative. HEENT Normal cephalic, atraumatic without obvious deformity.  Pupils equal, round, and reactive to light.  Extra ocular muscles intact.  Conjunctivae/corneas clear. Neck: Supple, No jugular venous distention/bruits.  Trachea midline without thyromegaly or adenopathy with full range of motion. Lungs: Clear to auscultation, bilaterally without Rales/Wheezes/Rhonchi with good respiratory effort. Heart: Regular rhythm, bradycardia, with Normal S1/S2   Abdomen: Soft, non-tender or non-distended without rigidity or guarding and positive bowel sounds  Extremities: No clubbing, cyanosis, or edema bilaterally. Limited ROM rt LE 2/2 knee pain  Skin: Skin color, texture, turgor normal.  No rashes or lesions.   Neurologic: Alert and oriented X 3, neurovascularly intact with sensory/motor intact upper extremities/lower extremities, bilaterally.  Cranial nerves: II-XII intact, grossly non-focal.  Mental status: Alert, oriented, thought content appropriate. Capillary Refill: Acceptable  < 3 seconds  Peripheral Pulses: +3 Easily felt, not easily obliterated with pressure       Labs:   Recent Labs     09/23/21  1237 09/24/21  0536   WBC 6.6 4.8   HGB 10.8* 10.2*   HCT 34.4* 31.8*    217     Recent Labs     09/24/21  0536 09/25/21  0525 09/26/21  0616    139 140   K 3.6 4.0 4.2    103 106   CO2 24 17* 22   BUN 20 19 23*   CREATININE 2.0* 1.5* 1.3*   CALCIUM 8.3 8.8 9.0     Recent Labs     09/23/21  1237   AST 12*   ALT 8*   BILITOT 0.5   ALKPHOS 123     No results for input(s): INR in the last 72 hours. Recent Labs     09/23/21  1237 09/23/21  2043 09/24/21  0236 09/24/21  0536 09/25/21  0525   CKTOTAL  --  80  --  73 49   TROPONINI <0.01  --  <0.01  --   --        Urinalysis:      Lab Results   Component Value Date    NITRU Negative 09/23/2021    WBCUA 1 10/07/2019    BACTERIA 1+ 07/30/2019    RBCUA 1 10/07/2019    BLOODU Negative 09/23/2021    SPECGRAV 1.008 09/23/2021    GLUCOSEU Negative 09/23/2021       Radiology:  XR FOOT LEFT (MIN 3 VIEWS)   Final Result   Mild soft tissue swelling. Hallux valgus deformity. Degenerative changes. No acute osseous abnormality. RECOMMENDATION:   Follow-up studies as clinically indicated. US RENAL COMPLETE   Final Result   Mild atrophy, otherwise unremarkable ultrasound of the kidneys and urinary   bladder. XR KNEE RIGHT (3 VIEWS)   Final Result   Left knee: Mild arthritic changes. No acute osseous abnormality. Right knee: Arthritic changes most severe in the patellofemoral aspect of the   joint without acute fracture or dislocation. XR KNEE LEFT (3 VIEWS)   Final Result   Left knee: Mild arthritic changes. No acute osseous abnormality. Right knee: Arthritic changes most severe in the patellofemoral aspect of the   joint without acute fracture or dislocation.          CT HEAD WO CONTRAST   Final Result   No acute intracranial abnormality. CT CERVICAL SPINE WO CONTRAST   Preliminary Result   1. Multilevel degenerative changes of the cervical spine with no radiographic   findings to suggest presence of acute fracture. 2. Findings suggestive of presence of multiple dental caries as described   above. XR CHEST PORTABLE   Preliminary Result   Expiratory chest with no evidence of acute cardiopulmonary disease. Assessment/Plan:    Active Hospital Problems    Diagnosis     Fall [W19. XXXA]     [facility]) [N17.9]     Primary osteoarthritis of right knee [M17.11]          ARF - suspect pre-renal from syncope and falls and  intake. Creat 2.5-->2.0-->1.5, started on IVF NS, recheck creat in am. Check renal USG reviewed, avoid nephrotoxic meds. Check CPK normal     Bradycardia - suspect 2/2 taking BB while ARF. Hold BB and any austen blocking agents. Placed on tele, monitor, check echo in am     B/l knee pain R>L - xray reviewed - arthritis, pain control with tramadol. Consulted ortho since pt with significant pain in rt knee, s/p cortisone injection rt knee. Check xray left ankle with no fracture     HTN - BP creeping up, hold off BB. Start nifedipine     Syncope with falls - suspect 2/2 renal failure and taking her meds. Check orthostatic neg     Hold home meds until renal fn improved        DVT Prophylaxis: lovenox  Diet: ADULT DIET;  Regular  Code Status: Full Code    PT/OT Eval Status: ordered    Gian Rosas MD

## 2021-09-26 NOTE — PLAN OF CARE
Problem: Falls - Risk of:  Goal: Will remain free from falls  Description: Will remain free from falls  9/25/2021 2246 by Toyin Atkins RN  Outcome: Ongoing  9/25/2021 1012 by Janay Perez RN  Outcome: Ongoing     Problem: Falls - Risk of:  Goal: Absence of physical injury  Description: Absence of physical injury  9/25/2021 2246 by Toyin Atkins RN  Outcome: Ongoing  9/25/2021 1012 by Janay Perez RN  Outcome: Ongoing    Patient uses call light appropriately to express needs. Bed to lowest position with door open and call light in reach. All fall precautions implemented at this time. Bed alarm on for safety. Siderails up x2. Non skid footwear in place. Seen at intervals to ensure safety. All needs attended. Problem: Pain:  Goal: Pain level will decrease  Description: Pain level will decrease  9/25/2021 2246 by Toyin Atkins RN  Outcome: Ongoing  9/25/2021 1012 by Janay Perez RN  Outcome: Ongoing     Problem: Pain:  Goal: Control of acute pain  Description: Control of acute pain  9/25/2021 2246 by Toyin Atkins RN  Outcome: Ongoing  9/25/2021 1012 by Janay Perez RN  Outcome: Ongoing    Pain/discomfort being managed with PRN analgesics per MD orders. Pt able to express presence and absence of pain and rate pain appropriately using numerical scale. Non-pharmacologic comfort measures implemented. Rest and comfort measures implemented.

## 2021-09-26 NOTE — PROGRESS NOTES
Hospitalist Progress Note      PCP: Collette Zimmerman DO    Date of Admission: 9/23/2021    Subjective: cont to have left sided CP    Medications:  Reviewed    Infusion Medications    sodium chloride       Scheduled Medications    [START ON 9/27/2021] enoxaparin  40 mg SubCUTAneous Daily    NIFEdipine  60 mg Oral BID    famotidine  10 mg Oral Daily    sodium chloride flush  5-40 mL IntraVENous 2 times per day     PRN Meds: zolpidem, sodium chloride flush, sodium chloride, ondansetron **OR** ondansetron, polyethylene glycol, acetaminophen **OR** acetaminophen, traMADol, hydrALAZINE, perflutren lipid microspheres      Intake/Output Summary (Last 24 hours) at 9/26/2021 1654  Last data filed at 9/26/2021 1351  Gross per 24 hour   Intake 1578.02 ml   Output 800 ml   Net 778.02 ml       Physical Exam Performed:    BP (!) 165/78   Pulse 66   Temp 98.4 °F (36.9 °C) (Oral)   Resp 15   Ht 5' (1.524 m)   Wt 180 lb 12.4 oz (82 kg)   SpO2 96%   BMI 35.31 kg/m²        General appearance: No apparent distress appears stated age and cooperative. HEENT Normal cephalic, atraumatic without obvious deformity.  Pupils equal, round, and reactive to light.  Extra ocular muscles intact.  Conjunctivae/corneas clear. Neck: Supple, No jugular venous distention/bruits.  Trachea midline without thyromegaly or adenopathy with full range of motion. Lungs: Clear to auscultation, bilaterally without Rales/Wheezes/Rhonchi with good respiratory effort. Heart: Regular rhythm, bradycardia, with Normal S1/S2   Abdomen: Soft, non-tender or non-distended without rigidity or guarding and positive bowel sounds  Extremities: No clubbing, cyanosis, or edema bilaterally. Limited ROM rt LE 2/2 knee pain  Skin: Skin color, texture, turgor normal.  No rashes or lesions.   Neurologic: Alert and oriented X 3, neurovascularly intact with sensory/motor intact upper extremities/lower extremities, bilaterally.  Cranial nerves: II-XII intact, grossly non-focal.  Mental status: Alert, oriented, thought content appropriate. Capillary Refill: Acceptable  < 3 seconds  Peripheral Pulses: +3 Easily felt, not easily obliterated with pressure       Labs:   Recent Labs     09/24/21  0536   WBC 4.8   HGB 10.2*   HCT 31.8*        Recent Labs     09/24/21  0536 09/25/21  0525 09/26/21  0616    139 140   K 3.6 4.0 4.2    103 106   CO2 24 17* 22   BUN 20 19 23*   CREATININE 2.0* 1.5* 1.3*   CALCIUM 8.3 8.8 9.0     No results for input(s): AST, ALT, BILIDIR, BILITOT, ALKPHOS in the last 72 hours. No results for input(s): INR in the last 72 hours. Recent Labs     09/23/21 2043 09/24/21  0236 09/24/21  0536 09/25/21  0525   CKTOTAL 80  --  73 49   TROPONINI  --  <0.01  --   --        Urinalysis:      Lab Results   Component Value Date    NITRU Negative 09/23/2021    WBCUA 1 10/07/2019    BACTERIA 1+ 07/30/2019    RBCUA 1 10/07/2019    BLOODU Negative 09/23/2021    SPECGRAV 1.008 09/23/2021    GLUCOSEU Negative 09/23/2021       Radiology:  CT CHEST WO CONTRAST   Final Result   Mild cardiomegaly was noted primarily left atrial and left ventricular. No hilar, mediastinal or lung parenchymal mass. No hilar, mediastinal or axillary lymphadenopathy. No parenchymal infiltration pleural effusion. Postoperative changes were noted near the GE junction. A small fixed hiatal   hernia was noted. XR FOOT LEFT (MIN 3 VIEWS)   Final Result   Mild soft tissue swelling. Hallux valgus deformity. Degenerative changes. No acute osseous abnormality. RECOMMENDATION:   Follow-up studies as clinically indicated. US RENAL COMPLETE   Final Result   Mild atrophy, otherwise unremarkable ultrasound of the kidneys and urinary   bladder. XR KNEE RIGHT (3 VIEWS)   Final Result   Left knee: Mild arthritic changes. No acute osseous abnormality.       Right knee: Arthritic changes most severe in the patellofemoral aspect of the   joint without acute fracture or dislocation. XR KNEE LEFT (3 VIEWS)   Final Result   Left knee: Mild arthritic changes. No acute osseous abnormality. Right knee: Arthritic changes most severe in the patellofemoral aspect of the   joint without acute fracture or dislocation. CT HEAD WO CONTRAST   Final Result   No acute intracranial abnormality. CT CERVICAL SPINE WO CONTRAST   Preliminary Result   1. Multilevel degenerative changes of the cervical spine with no radiographic   findings to suggest presence of acute fracture. 2. Findings suggestive of presence of multiple dental caries as described   above. XR CHEST PORTABLE   Preliminary Result   Expiratory chest with no evidence of acute cardiopulmonary disease. Assessment/Plan:    Active Hospital Problems    Diagnosis     Fall [W19. XXXA]     [facility] failure) ([facility]) [N17.9]     Primary osteoarthritis of right knee [M17.11]          ARF - suspect pre-renal from syncope and falls and  intake. Creat 2.5-->2.0-->1.5-->1.3, started on IVF NS-->dc IVF, recheck creat in am. Check renal USG reviewed, avoid nephrotoxic meds. Check CPK normal     Bradycardia - suspect 2/2 taking BB while ARF. Hold BB and any austen blocking agents. Placed on tele, monitor, check echo with left atrial and ventr enlargement - HR improved    CP - left sided, worse with deep inspiration. Will check CT chest, if neg will check stress test in am     B/l knee pain R>L - xray reviewed - arthritis, pain control with tramadol. Consulted ortho since pt with significant pain in rt knee, s/p cortisone injection rt knee. Check xray left ankle with no fracture     HTN - BP creeping up, hold off BB. Started nifedipine-->BID. Avoid HCTZ/propranolol     Syncope with falls - suspect 2/2 renal failure and taking her meds.  Check orthostatic neg     Hold home meds until renal fn improved        DVT Prophylaxis: lovenox  Diet: ADULT DIET; Regular  Diet NPO  Code Status: Full Code    PT/OT Eval Status: ordered    Fede Amado MD

## 2021-09-27 VITALS
SYSTOLIC BLOOD PRESSURE: 170 MMHG | OXYGEN SATURATION: 97 % | HEIGHT: 60 IN | DIASTOLIC BLOOD PRESSURE: 93 MMHG | WEIGHT: 180.78 LBS | TEMPERATURE: 98.2 F | HEART RATE: 84 BPM | BODY MASS INDEX: 35.49 KG/M2 | RESPIRATION RATE: 18 BRPM

## 2021-09-27 LAB
ANION GAP SERPL CALCULATED.3IONS-SCNC: 16 MMOL/L (ref 3–16)
BUN BLDV-MCNC: 15 MG/DL (ref 7–20)
CALCIUM SERPL-MCNC: 9.3 MG/DL (ref 8.3–10.6)
CHLORIDE BLD-SCNC: 103 MMOL/L (ref 99–110)
CO2: 20 MMOL/L (ref 21–32)
CREAT SERPL-MCNC: 1.2 MG/DL (ref 0.6–1.2)
EKG ATRIAL RATE: 66 BPM
EKG DIAGNOSIS: NORMAL
EKG P AXIS: 11 DEGREES
EKG P-R INTERVAL: 146 MS
EKG Q-T INTERVAL: 452 MS
EKG QRS DURATION: 84 MS
EKG QTC CALCULATION (BAZETT): 473 MS
EKG R AXIS: 40 DEGREES
EKG T AXIS: -9 DEGREES
EKG VENTRICULAR RATE: 66 BPM
GFR AFRICAN AMERICAN: 55
GFR NON-AFRICAN AMERICAN: 46
GLUCOSE BLD-MCNC: 124 MG/DL (ref 70–99)
GLUCOSE BLD-MCNC: 127 MG/DL (ref 70–99)
GLUCOSE BLD-MCNC: 89 MG/DL (ref 70–99)
LV EF: 78 %
LVEF MODALITY: NORMAL
PERFORMED ON: ABNORMAL
PERFORMED ON: ABNORMAL
POTASSIUM SERPL-SCNC: 4.1 MMOL/L (ref 3.5–5.1)
SODIUM BLD-SCNC: 139 MMOL/L (ref 136–145)

## 2021-09-27 PROCEDURE — 93010 ELECTROCARDIOGRAM REPORT: CPT | Performed by: INTERNAL MEDICINE

## 2021-09-27 PROCEDURE — 94761 N-INVAS EAR/PLS OXIMETRY MLT: CPT

## 2021-09-27 PROCEDURE — 6370000000 HC RX 637 (ALT 250 FOR IP): Performed by: INTERNAL MEDICINE

## 2021-09-27 PROCEDURE — 2580000003 HC RX 258: Performed by: INTERNAL MEDICINE

## 2021-09-27 PROCEDURE — 93017 CV STRESS TEST TRACING ONLY: CPT

## 2021-09-27 PROCEDURE — 6360000002 HC RX W HCPCS: Performed by: INTERNAL MEDICINE

## 2021-09-27 PROCEDURE — 78452 HT MUSCLE IMAGE SPECT MULT: CPT

## 2021-09-27 PROCEDURE — A9502 TC99M TETROFOSMIN: HCPCS | Performed by: INTERNAL MEDICINE

## 2021-09-27 PROCEDURE — 3430000000 HC RX DIAGNOSTIC RADIOPHARMACEUTICAL: Performed by: INTERNAL MEDICINE

## 2021-09-27 PROCEDURE — 80048 BASIC METABOLIC PNL TOTAL CA: CPT

## 2021-09-27 PROCEDURE — 36415 COLL VENOUS BLD VENIPUNCTURE: CPT

## 2021-09-27 PROCEDURE — 97116 GAIT TRAINING THERAPY: CPT

## 2021-09-27 RX ORDER — OXYMETAZOLINE HYDROCHLORIDE 0.05 G/100ML
2 SPRAY NASAL 2 TIMES DAILY
Qty: 12 ML | Refills: 0 | Status: SHIPPED | OUTPATIENT
Start: 2021-09-27 | End: 2021-10-27

## 2021-09-27 RX ORDER — PANTOPRAZOLE SODIUM 40 MG/1
40 TABLET, DELAYED RELEASE ORAL
Qty: 30 TABLET | Refills: 0 | Status: SHIPPED | OUTPATIENT
Start: 2021-09-27

## 2021-09-27 RX ORDER — LEVOFLOXACIN 500 MG/1
500 TABLET, FILM COATED ORAL DAILY
Qty: 10 TABLET | Refills: 0 | Status: SHIPPED | OUTPATIENT
Start: 2021-09-27 | End: 2021-10-07

## 2021-09-27 RX ORDER — PSEUDOEPHEDRINE HYDROCHLORIDE 30 MG/1
30 TABLET ORAL EVERY 6 HOURS PRN
Qty: 20 TABLET | Refills: 0 | Status: SHIPPED | OUTPATIENT
Start: 2021-09-27 | End: 2021-10-02

## 2021-09-27 RX ORDER — NIFEDIPINE 60 MG/1
60 TABLET, FILM COATED, EXTENDED RELEASE ORAL 2 TIMES DAILY
Qty: 30 TABLET | Refills: 3 | Status: SHIPPED | OUTPATIENT
Start: 2021-09-27

## 2021-09-27 RX ADMIN — ONDANSETRON 4 MG: 2 INJECTION INTRAMUSCULAR; INTRAVENOUS at 01:55

## 2021-09-27 RX ADMIN — TETROFOSMIN 10 MILLICURIE: 1.38 INJECTION, POWDER, LYOPHILIZED, FOR SOLUTION INTRAVENOUS at 06:59

## 2021-09-27 RX ADMIN — NIFEDIPINE 60 MG: 60 TABLET, EXTENDED RELEASE ORAL at 11:04

## 2021-09-27 RX ADMIN — TRAMADOL HYDROCHLORIDE 50 MG: 50 TABLET ORAL at 11:10

## 2021-09-27 RX ADMIN — REGADENOSON 0.4 MG: 0.08 INJECTION, SOLUTION INTRAVENOUS at 08:37

## 2021-09-27 RX ADMIN — SODIUM CHLORIDE, PRESERVATIVE FREE 10 ML: 5 INJECTION INTRAVENOUS at 11:06

## 2021-09-27 RX ADMIN — ENOXAPARIN SODIUM 40 MG: 40 INJECTION SUBCUTANEOUS at 11:04

## 2021-09-27 RX ADMIN — FAMOTIDINE 10 MG: 20 TABLET, FILM COATED ORAL at 11:04

## 2021-09-27 RX ADMIN — ACETAMINOPHEN 650 MG: 325 TABLET ORAL at 02:40

## 2021-09-27 RX ADMIN — TETROFOSMIN 30 MILLICURIE: 1.38 INJECTION, POWDER, LYOPHILIZED, FOR SOLUTION INTRAVENOUS at 08:27

## 2021-09-27 ASSESSMENT — PAIN DESCRIPTION - ONSET
ONSET: ON-GOING
ONSET: GRADUAL

## 2021-09-27 ASSESSMENT — PAIN DESCRIPTION - PROGRESSION
CLINICAL_PROGRESSION: GRADUALLY WORSENING
CLINICAL_PROGRESSION: NOT CHANGED

## 2021-09-27 ASSESSMENT — PAIN DESCRIPTION - PAIN TYPE
TYPE: ACUTE PAIN
TYPE: ACUTE PAIN

## 2021-09-27 ASSESSMENT — PAIN SCALES - GENERAL
PAINLEVEL_OUTOF10: 0
PAINLEVEL_OUTOF10: 3
PAINLEVEL_OUTOF10: 9
PAINLEVEL_OUTOF10: 10
PAINLEVEL_OUTOF10: 0

## 2021-09-27 ASSESSMENT — PAIN DESCRIPTION - DESCRIPTORS
DESCRIPTORS: HEADACHE
DESCRIPTORS: HEADACHE

## 2021-09-27 ASSESSMENT — PAIN DESCRIPTION - LOCATION
LOCATION: HEAD
LOCATION: HEAD

## 2021-09-27 ASSESSMENT — PAIN DESCRIPTION - FREQUENCY
FREQUENCY: INTERMITTENT
FREQUENCY: INTERMITTENT

## 2021-09-27 ASSESSMENT — PAIN DESCRIPTION - ORIENTATION: ORIENTATION: MID

## 2021-09-27 NOTE — PROGRESS NOTES
Removed IV and tele, pt tolerated well. Reviewed all discharge instructions with pt. Pt voiced understanding of all. Pt given wheelchair, brother will take pt to discharge.

## 2021-09-27 NOTE — CARE COORDINATION
SW called patient's room to attempt to review the discharge plan, however, there was no answer. SW will visit bedside momentarily. Respectfully submitted,    MANDO Bryan  [facility]   [phone]    Electronically signed by MANDO Bhat on 9/27/2021 at 11:42 AM

## 2021-09-27 NOTE — PROGRESS NOTES
Physical Therapy  Facility/Department: [facility]  Daily Treatment Note/D/c Summary  NAME: Alec Calderon  : 1960  MRN: [id number]    Date of Service: 2021  Kristin Vásquez scored a 24/24 on the AM-PAC short mobility form. Current research shows that an AM-PAC score of 18 or greater is typically associated with a discharge to the patient's home setting. Based on the patient's AM-PAC score and their current functional mobility, No further therapy recommended. Please see assessment section for further patient specific details. If patient discharges prior to next session this note will serve as a discharge summary. Please see below for the latest assessment towards goals. Discharge Recommendations:  Home with assist PRN   PT Equipment Recommendations  Equipment Needed: No    Assessment   Assessment: Pt. has progressed with functional mobility, no longer needs the use of a FWW.  Amb. 400' without an AD without LOB or noticable unsteadiness. Will d/c from PT with no further needs  Prognosis: Good  PT Education: PT Role;General Safety  Patient Education: Verbalized understandign  Barriers to Learning: none  REQUIRES PT FOLLOW UP: No  Activity Tolerance  Activity Tolerance: Patient Tolerated treatment well     Patient Diagnosis(es): The primary encounter diagnosis was Fall, initial encounter. Diagnoses of ANALY (acute kidney injury) ([facility] [address] Elevated serum creatinine, and Near syncope were also pertinent to this visit. has a past medical history of Anxiety, Depression, Hypertension, Insomnia, Lumbar disc herniation, and Lumbar spinal stenosis. has a past surgical history that includes Hysterectomy; Gastric bypass surgery; Cholecystectomy; fracture surgery; lumbar nerve block (N/A, 2019); bronchoscopy (N/A, 10/10/2019); bronchoscopy (10/10/2019); Pain management procedure (Left, 2020); Forearm surgery (Left, 2020);  Dilatation, esophagus; and Abdomen longer distances in the hallway with the walker with Sup. .- Goal met 9/27  Short term goal 4: Negotiate one step with SBA. Patient Goals   Patient goals : to feel better and go home  Met 3/4 goals, did not attempt steps but do not anticipate a problem.   Plan    Plan  Times per week: 3-5x/week  Current Treatment Recommendations: Functional Mobility Training, Transfer Training, Gait Training, Stair training, Patient/Caregiver Education & Training, Safety Education & Training, Strengthening, ROM  Safety Devices  Type of devices: Call light within reach (UAL)  Restraints  Initially in place: No     Therapy Time   Individual Concurrent Group Co-treatment   Time In 1500         Time Out 1515         Minutes 15         Timed Code Treatment Minutes: [address] [facility] [address] [place], [place], [id number]

## 2021-09-27 NOTE — PLAN OF CARE
Problem: Falls - Risk of:  Goal: Will remain free from falls  Description: Will remain free from falls  9/27/2021 0918 by Brodie Senior RN  Outcome: Ongoing  9/26/2021 2259 by Marilyn Farr RN  Outcome: Ongoing  Goal: Absence of physical injury  Description: Absence of physical injury  9/27/2021 0918 by Brodie Senior RN  Outcome: Ongoing  9/26/2021 2259 by Marilyn Farr RN  Outcome: Ongoing     Problem: Pain:  Goal: Pain level will decrease  Description: Pain level will decrease  9/27/2021 0918 by Brodie Senior RN  Outcome: Ongoing  9/26/2021 2259 by Marilyn aFrr RN  Outcome: Ongoing  Goal: Control of acute pain  Description: Control of acute pain  9/27/2021 0918 by Brodie Senior RN  Outcome: Ongoing  9/26/2021 2259 by Marilyn Farr RN  Outcome: Ongoing  Goal: Control of chronic pain  Description: Control of chronic pain  Outcome: Ongoing     Problem: Skin Integrity:  Goal: Will show no infection signs and symptoms  Description: Will show no infection signs and symptoms  Outcome: Ongoing  Goal: Absence of new skin breakdown  Description: Absence of new skin breakdown  Outcome: Ongoing

## 2021-09-27 NOTE — PLAN OF CARE
Problem: Falls - Risk of:  Goal: Will remain free from falls  Description: Will remain free from falls  9/26/2021 2259 by Ted Vigil RN  Outcome: Ongoing  9/26/2021 1024 by Meenu Leon RN  Outcome: Ongoing     Problem: Falls - Risk of:  Goal: Absence of physical injury  Description: Absence of physical injury  9/26/2021 2259 by Ted Vigil RN  Outcome: Ongoing  9/26/2021 1024 by Meenu Leon RN  Outcome: Ongoing     Patient uses call light appropriately to express needs. Bed to lowest position with door open and call light in reach. All fall precautions implemented at this time. Bed alarm on for safety. Siderails up x2. Non skid footwear in place. Seen at intervals to ensure safety. All needs attended. Problem: Pain:  Goal: Pain level will decrease  Description: Pain level will decrease  9/26/2021 2259 by Ted Vigil RN  Outcome: Ongoing  9/26/2021 1024 by Meenu Leon RN  Outcome: Ongoing     Problem: Pain:  Goal: Control of acute pain  Description: Control of acute pain  9/26/2021 2259 by Ted Vigil RN  Outcome: Ongoing  9/26/2021 1024 by Meenu Leon RN  Outcome: Ongoing    Pain/discomfort being managed with PRN analgesics per MD orders. Pt able to express presence and absence of pain and rate pain appropriately using numerical scale. Non-pharmacologic comfort measures implemented. Rest and comfort promoted.

## 2021-09-27 NOTE — PROGRESS NOTES
Occupational Therapy    OT order received and chart review. Pt independent with ADLs/moblity. Pt with discharge order and plans to return home with family support. No OT is warranted at this time.     Electronically signed by Neto Daniels OT on 9/27/2021 at 3:40 PM

## 2021-09-28 ENCOUNTER — CARE COORDINATION (OUTPATIENT)
Dept: CASE MANAGEMENT | Age: 61
End: 2021-09-28

## 2021-09-28 NOTE — CARE COORDINATION
Pa[facility] Transitions Initial Follow Up Call    Call within 2 business days of discharge: Yes    Patient: Carter Abel Patient : 1960   MRN: [id number]  Reason for Admission: s/p fall  Discharge Date: 21 RARS: Readmission Risk Score: 11      Last Discharge [facility]       Complaint Diagnosis Description Type Department Provider    21 Fall; Loss of Consciousness Fall, initial encounter . .. ED to Hosp-Admission (Discharged) (ADMITTED) Bashir Bullock MD; Sherine Arellano ... Spoke with: no one    Facility: [facility]    Initial attempt at [employer] discharge phone call. Unable to reach patient. Left message. Contact info provided. Requested return call to CTN. Follow Up  No future appointments.     Regine Hernandes RN

## 2021-09-30 ENCOUNTER — CARE COORDINATION (OUTPATIENT)
Dept: CASE MANAGEMENT | Age: 61
End: 2021-09-30

## 2021-09-30 NOTE — CARE COORDINATION
Care Transitions Outreach Attempt    Call within 2 business days of discharge: Yes   Attempted to reach patient for transitions of care follow up. Unable to reach patient. Left HIPPA Compliant VM. Resolving per 2nd unsuccessful attempt and no returned call. Juan Miguel Roblero LPN, [facility]: [phone]      Patient: Sharon Garrett Patient : 1960 MRN: [id number]    Last Discharge [facility]       Complaint Diagnosis Description Type Department Provider    21 Fall; Loss of Consciousness Fall, initial encounter . .. ED to Hosp-Admission (Discharged) (ADMITTED) Solomon Hidalgo MD; Ric Shipley ... Was this an external facility discharge? No Discharge Facility:     Noted following upcoming appointments from discharge chart review:   [facility] follow up appointment(s): No future appointments.   Non-[facility] follow up appointment(s):

## 2021-10-02 DIAGNOSIS — F51.04 CHRONIC INSOMNIA: Primary | ICD-10-CM

## 2021-10-02 RX ORDER — ZOLPIDEM TARTRATE 10 MG/1
TABLET ORAL
Qty: 30 TABLET | Refills: 1 | Status: SHIPPED | OUTPATIENT
Start: 2021-10-02 | End: 2021-11-01

## 2021-10-04 ENCOUNTER — TELEPHONE (OUTPATIENT)
Dept: FAMILY MEDICINE CLINIC | Age: 61
End: 2021-10-04

## 2021-10-04 DIAGNOSIS — M17.11 PRIMARY OSTEOARTHRITIS OF RIGHT KNEE: ICD-10-CM

## 2021-10-04 DIAGNOSIS — M48.061 SPINAL STENOSIS OF LUMBAR REGION WITHOUT NEUROGENIC CLAUDICATION: ICD-10-CM

## 2021-10-04 NOTE — TELEPHONE ENCOUNTER
----- Message from Daric Fraser sent at 10/4/2021  9:51 AM EDT -----  Subject: Hospital Follow Up    QUESTIONS  What hospital was the Patient Discharged from? [facility]  Date of Discharge? 2021-09-27  Discharge Location? Home  Reason for hospitalization as patient stated? Fall she took, keep passing   out  What question does the patient have, if applicable? Patient needs some   medication refills and wants to get her medication changed from what the   hosiptal put her on.   ---------------------------------------------------------------------------  --------------  CALL BACK INFO  What is the best way for the office to contact you? OK to leave message on   voicemail  Preferred Call Back Phone Number? [phone]  ---------------------------------------------------------------------------  --------------  SCRIPT ANSWERS  Relationship to Patient? Self  (Patient requests to see provider urgently. )? No  (Has the patient been discharged from the hospital within 2 business days   AND does not have a Telephone Encounter  Follow Up From [address] [place] [address]   documented in [address]?)? No  Do you have any questions for your primary care provider that need to be   answered prior to your appointment? (Use RN Triage if question pertains to   anything on the red flag list)? Yes  (Patient needs follow up visit after hospital discharge) Book first   available appointment within 7 days OF DISCHARGE, if no appt, proceed to   book the next available time slot within 14 days OF DISCHARGE AND Send   Message to Provider. [facility] Follow Up appointment cannot be booked   beyond 14 Days and should result in a Message to Provider. ?  Yes

## 2021-10-04 NOTE — TELEPHONE ENCOUNTER
Left voice message informing pt that we can get her on the schedule for 10/11, to call back to schedule

## 2021-10-04 NOTE — TELEPHONE ENCOUNTER
Scheduled hospital follow up on 10/11/21 per Ena. Patient also requesting med refill. Last office visit 6/7/21.

## 2021-10-05 RX ORDER — HYDROCODONE BITARTRATE AND ACETAMINOPHEN 7.5; 325 MG/1; MG/1
1 TABLET ORAL EVERY 6 HOURS PRN
Qty: 120 TABLET | Refills: 0 | Status: SHIPPED | OUTPATIENT
Start: 2021-10-05 | End: 2021-10-26 | Stop reason: SDUPTHER

## 2021-10-09 DIAGNOSIS — F32.9 REACTIVE DEPRESSION: ICD-10-CM

## 2021-10-09 DIAGNOSIS — F41.9 ANXIETY: ICD-10-CM

## 2021-10-09 RX ORDER — CITALOPRAM 40 MG/1
40 TABLET ORAL DAILY
Qty: 90 TABLET | Refills: 2 | Status: SHIPPED | OUTPATIENT
Start: 2021-10-09 | End: 2022-07-10

## 2021-10-13 DIAGNOSIS — M17.11 PRIMARY OSTEOARTHRITIS OF RIGHT KNEE: ICD-10-CM

## 2021-10-13 RX ORDER — TIZANIDINE 4 MG/1
TABLET ORAL
Qty: 40 TABLET | Refills: 0 | Status: SHIPPED | OUTPATIENT
Start: 2021-10-13 | End: 2021-10-26

## 2021-10-20 NOTE — DISCHARGE SUMMARY
Hospital Medicine Discharge Summary    Patient ID: Kenia Hsieh      Patient's PCP: Orlin Andrew DO    Admit Date: 2021     Discharge Date: 2021      Admitting Physician: Nelsy Carmona MD     Discharge Physician: Nelsy Carmona MD     Discharge Diagnoses: Active Hospital Problems    Diagnosis     Fall [W19. XXXA]     ARF (acute renal failure) ([facility]) [N17.9]     Primary osteoarthritis of right knee [M17.11]        The patient was seen and examined on day of discharge and this discharge summary is in conjunction with any daily progress note from day of discharge. Hospital Course: The patient is a 61 y.o. female who presents to [facility] with knee pain R>L. Pt states she lives alone, has been having syncope and falling spells for a few weeks now. 2 days ago she fell on her knees and has been having worsening pain both knees R>L, she thought she fractured her knees and hence presented to the ER. Found to have ARF and admitted. ARF - suspect pre-renal from syncope and falls and  intake. Creat 2.5-->2.0-->1.5-->1.3, started on IVF NS-->dc IVF, recheck creat in am. Check renal USG reviewed, avoid nephrotoxic meds. Check CPK normal     Bradycardia - suspect 2/2 taking BB while ARF. Hold BB and any austen blocking agents. Placed on tele, monitor, check echo with left atrial and ventr enlargement - HR improved     CP - left sided, worse with deep inspiration. Will check CT chest, if neg will check stress test in am     B/l knee pain R>L - xray reviewed - arthritis, pain control with tramadol. Consulted ortho since pt with significant pain in rt knee, s/p cortisone injection rt knee. Check xray left ankle with no fracture     HTN - BP creeping up, hold off BB. Started nifedipine-->BID. Avoid HCTZ/propranolol     Syncope with falls - suspect 2/2 renal failure and taking her meds.  Check orthostatic neg     Hold home meds until renal fn improved       Physical Exam Performed:     BP (!) 170/93   Pulse 84   Temp 98.2 °F (36.8 °C) (Oral)   Resp 18   Ht 5' (1.524 m)   Wt 180 lb 12.4 oz (82 kg)   SpO2 97%   BMI 35.31 kg/m²     General appearance: No apparent distress appears stated age and cooperative. HEENT Normal cephalic, atraumatic without obvious deformity.  Pupils equal, round, and reactive to light.  Extra ocular muscles intact.  Conjunctivae/corneas clear. Neck: Supple, No jugular venous distention/bruits.  Trachea midline without thyromegaly or adenopathy with full range of motion. Lungs: Clear to auscultation, bilaterally without Rales/Wheezes/Rhonchi with good respiratory effort. Heart: Regular rhythm, bradycardia, with Normal S1/S2   Abdomen: Soft, non-tender or non-distended without rigidity or guarding and positive bowel sounds  Extremities: No clubbing, cyanosis, or edema bilaterally. Limited ROM rt LE 2/2 knee pain  Skin: Skin color, texture, turgor normal.  No rashes or lesions. Neurologic: Alert and oriented X 3, neurovascularly intact with sensory/motor intact upper extremities/lower extremities, bilaterally.  Cranial nerves: II-XII intact, grossly non-focal.  Mental status: Alert, oriented, thought content appropriate. Capillary Refill: Acceptable  < 3 seconds  Peripheral Pulses: +3 Easily felt, not easily obliterated with pressure       Labs:  For convenience and continuity at follow-up the following most recent labs are provided:      CBC:    Lab Results   Component Value Date    WBC 4.8 09/24/2021    HGB 10.2 09/24/2021    HCT 31.8 09/24/2021     09/24/2021       Renal:    Lab Results   Component Value Date     09/27/2021    K 4.1 09/27/2021    K 3.6 09/24/2021     09/27/2021    CO2 20 09/27/2021    BUN 15 09/27/2021    CREATININE 1.2 09/27/2021    CALCIUM 9.3 09/27/2021    PHOS 3.0 10/15/2019         Significant Diagnostic Studies    Radiology:   NM MYOCARDIAL SPECT REST EXERCISE OR RX   Final Result      CT CHEST WO CONTRAST regular diet      Discharge Medications:     Discharge Medication List as of 9/27/2021  3:07 PM           Details   pantoprazole (PROTONIX) 40 MG tablet Take 1 tablet by mouth every morning (before breakfast), Disp-30 tablet, R-0Normal      oxymetazoline (12 HOUR NASAL SPRAY) 0.05 % nasal spray 2 sprays by Nasal route 2 times daily, Disp-12 mL, R-0Normal      pseudoephedrine (DECONGESTANT) 30 MG tablet Take 1 tablet by mouth every 6 hours as needed for Congestion, Disp-20 tablet, R-0Normal      levoFLOXacin (LEVAQUIN) 500 MG tablet Take 1 tablet by mouth daily for 10 days, Disp-10 tablet, R-0Normal              Details   NIFEdipine (ADALAT CC) 60 MG extended release tablet Take 1 tablet by mouth 2 times daily, Disp-30 tablet, R-3Normal              Details   cloNIDine (CATAPRES) 0.1 MG tablet TAKE ONE TABLET BY MOUTH TWICE A DAY, Disp-180 tablet, R-0Normal      tiZANidine (ZANAFLEX) 4 MG tablet TAKE ONE TABLET BY MOUTH EVERY 8 HOURS AS NEEDED, Disp-40 tablet, R-0Normal      citalopram (CELEXA) 40 MG tablet TAKE ONE TABLET BY MOUTH DAILY, Disp-30 tablet, R-0Normal      HYDROcodone-acetaminophen (NORCO) 7.5-325 MG per tablet Take 1 tablet by mouth every 6 hours as needed for Pain for up to 30 days. , Disp-120 tablet, R-0Normal      zolpidem (AMBIEN) 10 MG tablet TAKE ONE TABLET BY MOUTH EVERY NIGHT AT BEDTIME AS NEEDED, Disp-30 tablet, R-0Normal      hydrOXYzine (ATARAX) 25 MG tablet TAKE TWO TABLETS BY MOUTH EVERY NIGHT AT BEDTIME AS NEEDED FOR ITCHING, Disp-60 tablet, R-1Normal      gabapentin (NEURONTIN) 300 MG capsule TAKE ONE CAPSULE BY MOUTH EVERY MORNING AND TAKE TWO CAPSULES BY MOUTH EVERY NIGHT AT BEDTIME, Disp-90 capsule, R-3Normal      diclofenac sodium (VOLTAREN) 1 % GEL Apply 4 g topically 4 times daily, Topical, 4 TIMES DAILY Starting Mon 7/27/2020, Disp-500 g,R-1, Normal             Time Spent on discharge is more than 30 minutes in the examination, evaluation, counseling and review of medications and discharge plan. Signed:    Teresa Daniel MD   10/19/2021      Thank you [facility] for the opportunity to be involved in this patient's care. If you have any questions or concerns please feel free to contact me at [phone].

## 2021-10-26 ENCOUNTER — VIRTUAL VISIT (OUTPATIENT)
Dept: FAMILY MEDICINE CLINIC | Age: 61
End: 2021-10-26
Payer: COMMERCIAL

## 2021-10-26 DIAGNOSIS — M17.11 PRIMARY OSTEOARTHRITIS OF RIGHT KNEE: ICD-10-CM

## 2021-10-26 DIAGNOSIS — R55 SYNCOPE, UNSPECIFIED SYNCOPE TYPE: Primary | ICD-10-CM

## 2021-10-26 DIAGNOSIS — M48.061 SPINAL STENOSIS OF LUMBAR REGION WITHOUT NEUROGENIC CLAUDICATION: ICD-10-CM

## 2021-10-26 DIAGNOSIS — Q21.12 PFO (PATENT FORAMEN OVALE): ICD-10-CM

## 2021-10-26 PROCEDURE — 99214 OFFICE O/P EST MOD 30 MIN: CPT | Performed by: FAMILY MEDICINE

## 2021-10-26 RX ORDER — TIZANIDINE 4 MG/1
TABLET ORAL
Qty: 40 TABLET | Refills: 0 | Status: SHIPPED | OUTPATIENT
Start: 2021-10-26 | End: 2021-10-26 | Stop reason: SDUPTHER

## 2021-10-26 RX ORDER — HYDROCODONE BITARTRATE AND ACETAMINOPHEN 7.5; 325 MG/1; MG/1
1 TABLET ORAL EVERY 6 HOURS PRN
Qty: 120 TABLET | Refills: 0 | Status: SHIPPED | OUTPATIENT
Start: 2021-11-04 | End: 2021-12-03 | Stop reason: SDUPTHER

## 2021-10-26 RX ORDER — TIZANIDINE 4 MG/1
TABLET ORAL
Qty: 40 TABLET | Refills: 0 | Status: SHIPPED | OUTPATIENT
Start: 2021-10-26 | End: 2021-11-23

## 2021-10-26 RX ORDER — PROPRANOLOL HYDROCHLORIDE 80 MG/1
80 CAPSULE, EXTENDED RELEASE ORAL DAILY
Qty: 30 CAPSULE | Refills: 2 | Status: SHIPPED | OUTPATIENT
Start: 2021-10-26 | End: 2022-01-25

## 2021-10-26 ASSESSMENT — ENCOUNTER SYMPTOMS
BACK PAIN: 1
RESPIRATORY NEGATIVE: 1

## 2021-10-26 NOTE — PROGRESS NOTES
Natalia Han (:  1960) is a 61 y.o. female,Established patient, here for evaluation of the following chief complaint(s): Loss of Consciousness and Back Pain         ASSESSMENT/PLAN:  1. Syncope, unspecified syncope type  -     ECHO Complete With Bubble Study; Future  Pt continues to have syncope off the beta blocker    Wants to restart due to the tremor  Not able to write   On nifedipine bid   2. PFO (patent foramen ovale)  -     ECHO Complete With Bubble Study; Future  Echo in hospital indicates this may be an issue  3. Primary osteoarthritis of right knee  -     tiZANidine (ZANAFLEX) 4 MG tablet; One q 8 hours prn, Disp-40 tablet, R-0Normal  -     HYDROcodone-acetaminophen (NORCO) 7.5-325 MG per tablet; Take 1 tablet by mouth every 6 hours as needed for Pain for up to 30 days. , Disp-120 tablet, R-0Normal  4. Spinal stenosis of lumbar region without neurogenic claudication  -     HYDROcodone-acetaminophen (NORCO) 7.5-325 MG per tablet; Take 1 tablet by mouth every 6 hours as needed for Pain for up to 30 days. , Disp-120 tablet, R-0Normal  Controlled Substance Monitoring:    Acute and Chronic Pain Monitoring:   RX Monitoring 10/26/2021   Attestation -   Periodic Controlled Substance Monitoring Possible medication side effects, risk of tolerance/dependence & alternative treatments discussed. ;No signs of potential drug abuse or diversion identified. ;Assessed functional status. Chronic Pain > 50 MEDD Re-evaluated the status of the patient's underlying condition causing pain.;Obtained or confirmed \"Consent for Opioid Use\" on file. SUBJECTIVE/OBJECTIVE:  Loss of Consciousness  This is a recurrent problem. The current episode started more than 1 year ago. The problem occurs intermittently. The problem has been waxing and waning. She lost consciousness for a period of less than 1 minute. The symptoms are aggravated by standing. Associated symptoms include back pain and weakness.  Associated symptoms comments: Feels it coming on   Feels funny inside  More likely to occur in the evening   . Treatments tried: fluids in hospital   has happened 7 times since released from hospital. Her past medical history is significant for HTN. had echo in hospital which showed septal hypertrophy   Left atrium dilated  Possible pfo     Back and knee pain  Needs her pain meds refilled    Only takes prn but she is in a great deal of pain  The medicine does help and no side effects     Review of Systems   Constitutional: Positive for activity change and fatigue. Respiratory: Negative. Cardiovascular: Positive for syncope. Musculoskeletal: Positive for arthralgias, back pain, gait problem and myalgias. Neurological: Positive for syncope and weakness. No flowsheet data found. Physical Exam  Constitutional:       General: She is not in acute distress. Appearance: She is well-developed. HENT:      Head: Normocephalic. Neurological:      Mental Status: She is alert and oriented to person, place, and time. Psychiatric:         Behavior: Behavior normal.         Thought Content: Thought content normal.         Judgment: Judgment normal.         On this date 10/26/2021 I have spent 25 minutes reviewing previous notes, test results and face to face (virtual) with the patient discussing the diagnosis and importance of compliance with the treatment plan as well as documenting on the day of the visit. Kylee Gardner was evaluated through a synchronous (real-time) audio-video encounter. The patient (or guardian if applicable) is aware that this is a billable service. Verbal consent to proceed has been obtained within the past 12 months. The visit was conducted pursuant to the emergency declaration under the [address] [place] [address], [address] [place] [address] and the [employer] General Act.   Patient identification was verified, and a caregiver was present when appropriate. The patient was located in a state where the provider was credentialed to provide care. An electronic signature was used to authenticate this note.     --Stephanie Jain, DO

## 2021-11-17 DIAGNOSIS — F41.9 ANXIETY: Primary | ICD-10-CM

## 2021-11-17 RX ORDER — LORAZEPAM 1 MG/1
TABLET ORAL
Qty: 90 TABLET | Refills: 0 | Status: SHIPPED | OUTPATIENT
Start: 2021-11-17 | End: 2021-12-16

## 2021-11-17 RX ORDER — HYDROCHLOROTHIAZIDE 25 MG/1
TABLET ORAL
Qty: 30 TABLET | Refills: 2 | Status: SHIPPED | OUTPATIENT
Start: 2021-11-17 | End: 2022-02-22

## 2021-11-23 DIAGNOSIS — M17.11 PRIMARY OSTEOARTHRITIS OF RIGHT KNEE: ICD-10-CM

## 2021-11-23 RX ORDER — TIZANIDINE 4 MG/1
TABLET ORAL
Qty: 40 TABLET | Refills: 0 | Status: SHIPPED | OUTPATIENT
Start: 2021-11-23 | End: 2021-12-09

## 2021-12-03 DIAGNOSIS — M48.061 SPINAL STENOSIS OF LUMBAR REGION WITHOUT NEUROGENIC CLAUDICATION: ICD-10-CM

## 2021-12-03 DIAGNOSIS — M17.11 PRIMARY OSTEOARTHRITIS OF RIGHT KNEE: ICD-10-CM

## 2021-12-03 RX ORDER — HYDROCODONE BITARTRATE AND ACETAMINOPHEN 7.5; 325 MG/1; MG/1
1 TABLET ORAL EVERY 6 HOURS PRN
Qty: 120 TABLET | Refills: 0 | Status: SHIPPED | OUTPATIENT
Start: 2021-12-03 | End: 2022-01-03 | Stop reason: SDUPTHER

## 2021-12-04 DIAGNOSIS — F51.04 CHRONIC INSOMNIA: Primary | ICD-10-CM

## 2021-12-04 RX ORDER — ZOLPIDEM TARTRATE 10 MG/1
TABLET ORAL
Qty: 30 TABLET | Refills: 0 | Status: SHIPPED | OUTPATIENT
Start: 2021-12-04 | End: 2021-12-30

## 2021-12-09 DIAGNOSIS — F51.01 PRIMARY INSOMNIA: ICD-10-CM

## 2021-12-09 DIAGNOSIS — M17.11 PRIMARY OSTEOARTHRITIS OF RIGHT KNEE: ICD-10-CM

## 2021-12-09 RX ORDER — HYDROXYZINE HYDROCHLORIDE 25 MG/1
TABLET, FILM COATED ORAL
Qty: 60 TABLET | Refills: 1 | Status: SHIPPED | OUTPATIENT
Start: 2021-12-09 | End: 2022-01-31

## 2021-12-09 RX ORDER — TIZANIDINE 4 MG/1
TABLET ORAL
Qty: 40 TABLET | Refills: 0 | Status: SHIPPED | OUTPATIENT
Start: 2021-12-09 | End: 2021-12-21

## 2021-12-16 DIAGNOSIS — F41.9 ANXIETY: ICD-10-CM

## 2021-12-16 RX ORDER — LORAZEPAM 1 MG/1
TABLET ORAL
Qty: 90 TABLET | Refills: 0 | Status: SHIPPED | OUTPATIENT
Start: 2021-12-16 | End: 2022-01-15

## 2021-12-21 DIAGNOSIS — M17.11 PRIMARY OSTEOARTHRITIS OF RIGHT KNEE: ICD-10-CM

## 2021-12-21 RX ORDER — TIZANIDINE 4 MG/1
TABLET ORAL
Qty: 40 TABLET | Refills: 0 | Status: SHIPPED | OUTPATIENT
Start: 2021-12-21 | End: 2021-12-31

## 2021-12-30 DIAGNOSIS — F51.04 CHRONIC INSOMNIA: ICD-10-CM

## 2021-12-30 RX ORDER — ZOLPIDEM TARTRATE 10 MG/1
TABLET ORAL
Qty: 30 TABLET | Refills: 1 | Status: SHIPPED | OUTPATIENT
Start: 2021-12-30 | End: 2022-01-29

## 2021-12-31 DIAGNOSIS — M17.11 PRIMARY OSTEOARTHRITIS OF RIGHT KNEE: ICD-10-CM

## 2021-12-31 RX ORDER — TIZANIDINE 4 MG/1
TABLET ORAL
Qty: 40 TABLET | Refills: 0 | Status: SHIPPED | OUTPATIENT
Start: 2021-12-31 | End: 2022-01-11

## 2022-01-03 DIAGNOSIS — M48.061 SPINAL STENOSIS OF LUMBAR REGION WITHOUT NEUROGENIC CLAUDICATION: ICD-10-CM

## 2022-01-03 DIAGNOSIS — M17.11 PRIMARY OSTEOARTHRITIS OF RIGHT KNEE: ICD-10-CM

## 2022-01-03 RX ORDER — HYDROCODONE BITARTRATE AND ACETAMINOPHEN 7.5; 325 MG/1; MG/1
1 TABLET ORAL EVERY 6 HOURS PRN
Qty: 120 TABLET | Refills: 0 | Status: SHIPPED | OUTPATIENT
Start: 2022-01-03 | End: 2022-01-31 | Stop reason: SDUPTHER

## 2022-01-11 DIAGNOSIS — M17.11 PRIMARY OSTEOARTHRITIS OF RIGHT KNEE: ICD-10-CM

## 2022-01-11 RX ORDER — TIZANIDINE 4 MG/1
TABLET ORAL
Qty: 40 TABLET | Refills: 0 | Status: SHIPPED | OUTPATIENT
Start: 2022-01-11 | End: 2022-01-25

## 2022-01-18 DIAGNOSIS — F41.1 GAD (GENERALIZED ANXIETY DISORDER): Primary | ICD-10-CM

## 2022-01-18 RX ORDER — LORAZEPAM 1 MG/1
TABLET ORAL
Qty: 90 TABLET | Refills: 0 | Status: SHIPPED | OUTPATIENT
Start: 2022-01-18 | End: 2022-02-17

## 2022-01-25 DIAGNOSIS — M17.11 PRIMARY OSTEOARTHRITIS OF RIGHT KNEE: ICD-10-CM

## 2022-01-25 RX ORDER — TIZANIDINE 4 MG/1
TABLET ORAL
Qty: 40 TABLET | Refills: 0 | Status: SHIPPED | OUTPATIENT
Start: 2022-01-25 | End: 2022-02-10

## 2022-01-25 RX ORDER — PROPRANOLOL HYDROCHLORIDE 80 MG/1
CAPSULE, EXTENDED RELEASE ORAL
Qty: 30 CAPSULE | Refills: 2 | Status: SHIPPED | OUTPATIENT
Start: 2022-01-25 | End: 2022-04-28

## 2022-01-31 DIAGNOSIS — M48.061 SPINAL STENOSIS OF LUMBAR REGION WITHOUT NEUROGENIC CLAUDICATION: ICD-10-CM

## 2022-01-31 DIAGNOSIS — F51.01 PRIMARY INSOMNIA: ICD-10-CM

## 2022-01-31 DIAGNOSIS — M17.11 PRIMARY OSTEOARTHRITIS OF RIGHT KNEE: ICD-10-CM

## 2022-01-31 RX ORDER — GABAPENTIN 300 MG/1
CAPSULE ORAL
Qty: 90 CAPSULE | Refills: 3 | Status: SHIPPED | OUTPATIENT
Start: 2022-01-31 | End: 2022-03-04 | Stop reason: SDUPTHER

## 2022-01-31 RX ORDER — HYDROXYZINE HYDROCHLORIDE 25 MG/1
TABLET, FILM COATED ORAL
Qty: 60 TABLET | Refills: 1 | Status: SHIPPED | OUTPATIENT
Start: 2022-01-31 | End: 2022-03-04 | Stop reason: SDUPTHER

## 2022-01-31 RX ORDER — HYDROCODONE BITARTRATE AND ACETAMINOPHEN 7.5; 325 MG/1; MG/1
1 TABLET ORAL EVERY 6 HOURS PRN
Qty: 120 TABLET | Refills: 0 | Status: SHIPPED | OUTPATIENT
Start: 2022-01-31 | End: 2022-03-04 | Stop reason: SDUPTHER

## 2022-02-10 DIAGNOSIS — M17.11 PRIMARY OSTEOARTHRITIS OF RIGHT KNEE: ICD-10-CM

## 2022-02-10 RX ORDER — TIZANIDINE 4 MG/1
TABLET ORAL
Qty: 40 TABLET | Refills: 0 | Status: SHIPPED | OUTPATIENT
Start: 2022-02-10 | End: 2022-02-25 | Stop reason: SDUPTHER

## 2022-02-22 DIAGNOSIS — F51.04 CHRONIC INSOMNIA: Primary | ICD-10-CM

## 2022-02-22 DIAGNOSIS — F41.9 ANXIETY: ICD-10-CM

## 2022-02-22 RX ORDER — ZOLPIDEM TARTRATE 10 MG/1
TABLET ORAL
Qty: 30 TABLET | Refills: 0 | Status: SHIPPED | OUTPATIENT
Start: 2022-02-22 | End: 2022-03-24

## 2022-02-22 RX ORDER — LORAZEPAM 1 MG/1
TABLET ORAL
Qty: 90 TABLET | Refills: 0 | Status: SHIPPED | OUTPATIENT
Start: 2022-02-22 | End: 2022-03-22

## 2022-02-22 RX ORDER — HYDROCHLOROTHIAZIDE 25 MG/1
TABLET ORAL
Qty: 30 TABLET | Refills: 2 | Status: SHIPPED | OUTPATIENT
Start: 2022-02-22 | End: 2022-03-04 | Stop reason: ALTCHOICE

## 2022-02-25 DIAGNOSIS — M17.11 PRIMARY OSTEOARTHRITIS OF RIGHT KNEE: ICD-10-CM

## 2022-02-25 RX ORDER — TIZANIDINE 4 MG/1
TABLET ORAL
Qty: 40 TABLET | Refills: 0 | Status: SHIPPED | OUTPATIENT
Start: 2022-02-25 | End: 2022-03-04 | Stop reason: SDUPTHER

## 2022-03-04 ENCOUNTER — OFFICE VISIT (OUTPATIENT)
Dept: FAMILY MEDICINE CLINIC | Age: 62
End: 2022-03-04
Payer: COMMERCIAL

## 2022-03-04 VITALS
WEIGHT: 180 LBS | BODY MASS INDEX: 35.34 KG/M2 | DIASTOLIC BLOOD PRESSURE: 94 MMHG | HEIGHT: 60 IN | SYSTOLIC BLOOD PRESSURE: 160 MMHG

## 2022-03-04 DIAGNOSIS — M48.061 SPINAL STENOSIS OF LUMBAR REGION WITHOUT NEUROGENIC CLAUDICATION: ICD-10-CM

## 2022-03-04 DIAGNOSIS — M17.11 PRIMARY OSTEOARTHRITIS OF RIGHT KNEE: ICD-10-CM

## 2022-03-04 DIAGNOSIS — L29.9 GENERALIZED PRURITUS: Primary | ICD-10-CM

## 2022-03-04 DIAGNOSIS — F51.01 PRIMARY INSOMNIA: ICD-10-CM

## 2022-03-04 PROCEDURE — 99214 OFFICE O/P EST MOD 30 MIN: CPT | Performed by: FAMILY MEDICINE

## 2022-03-04 RX ORDER — TIZANIDINE 4 MG/1
TABLET ORAL
Qty: 40 TABLET | Refills: 1 | Status: SHIPPED | OUTPATIENT
Start: 2022-03-04 | End: 2022-03-14

## 2022-03-04 RX ORDER — HYDROXYZINE HYDROCHLORIDE 25 MG/1
TABLET, FILM COATED ORAL
Qty: 60 TABLET | Refills: 1 | Status: SHIPPED | OUTPATIENT
Start: 2022-03-04 | End: 2022-09-16

## 2022-03-04 RX ORDER — HYDROCODONE BITARTRATE AND ACETAMINOPHEN 7.5; 325 MG/1; MG/1
1 TABLET ORAL EVERY 6 HOURS PRN
Qty: 120 TABLET | Refills: 0 | Status: SHIPPED | OUTPATIENT
Start: 2022-03-04 | End: 2022-04-01 | Stop reason: SDUPTHER

## 2022-03-04 RX ORDER — GABAPENTIN 300 MG/1
CAPSULE ORAL
Qty: 90 CAPSULE | Refills: 3 | Status: SHIPPED | OUTPATIENT
Start: 2022-03-04 | End: 2022-04-05

## 2022-03-04 NOTE — PROGRESS NOTES
OUTPATIENT PROGRESS NOTE  Date of Service:  3/4/2022  Address: [address] [address]  Dept: [phone]  Loc: [phone]    Subjective:      Patient ID:  [id number]  Otoniel Jay is a 64 y.o. female     Back Pain  This is a chronic problem. The current episode started more than 1 year ago. The problem occurs constantly. The problem has been gradually worsening since onset. The pain is present in the lumbar spine. The pain is moderate. The symptoms are aggravated by bending, position, standing and twisting. Risk factors include lack of exercise. She has tried analgesics, home exercises, muscle relaxant and NSAIDs for the symptoms. The treatment provided moderate relief. Skin itching  She itches on and off all day  No rash   Not sure if anxiety related  Taking the atarax    Anxiety and insomnia  Medication is helping  Sleeping ok   Needs refill of ambien     Review of Systems   Constitutional: Positive for activity change and fatigue. Musculoskeletal: Positive for arthralgias, back pain, gait problem and myalgias. Psychiatric/Behavioral: Positive for agitation and sleep disturbance. The patient is nervous/anxious. Objective:   YOB: 1960    Date of Visit:  3/4/2022       Allergies   Allergen Reactions    Penicillins Rash       Outpatient Medications Marked as Taking for the 3/4/22 encounter (Office Visit) with Veto Mantilla, DO   Medication Sig Dispense Refill    tiZANidine (ZANAFLEX) 4 MG tablet One q 8 prn 40 tablet 0    LORazepam (ATIVAN) 1 MG tablet TAKE ONE TABLET BY MOUTH EVERY 8 HOURS AS NEEDED FOR ANXIETY 90 tablet 0    zolpidem (AMBIEN) 10 MG tablet TAKE ONE TABLET BY MOUTH EVERY NIGHT AT BEDTIME AS NEEDED 30 tablet 0    HYDROcodone-acetaminophen (NORCO) 7.5-325 MG per tablet Take 1 tablet by mouth every 6 hours as needed for Pain for up to 30 days.  120 tablet 0    Assessment/plan;  Jennifer Cedillo was seen today for 3 month follow-up, medication refill, medication check, injections and pruritis. Diagnoses and all orders for this visit:    Generalized pruritus  -     hydrOXYzine (ATARAX) 25 MG tablet; TAKE TWO TABLETS BY MOUTH AT BEDTIME AS NEEDED FOR ITCHING    Primary osteoarthritis of right knee  -     tiZANidine (ZANAFLEX) 4 MG tablet; One q 8 prn  -     HYDROcodone-acetaminophen (NORCO) 7.5-325 MG per tablet; Take 1 tablet by mouth every 6 hours as needed for Pain for up to 30 days. Spinal stenosis of lumbar region without neurogenic claudication  -     gabapentin (NEURONTIN) 300 MG capsule; One tid  -     HYDROcodone-acetaminophen (NORCO) 7.5-325 MG per tablet; Take 1 tablet by mouth every 6 hours as needed for Pain for up to 30 days.     Primary insomnia                   Maya Moscoso DO

## 2022-03-05 ASSESSMENT — ENCOUNTER SYMPTOMS: BACK PAIN: 1

## 2022-03-14 DIAGNOSIS — M17.11 PRIMARY OSTEOARTHRITIS OF RIGHT KNEE: ICD-10-CM

## 2022-03-14 RX ORDER — TIZANIDINE 4 MG/1
TABLET ORAL
Qty: 40 TABLET | Refills: 0 | Status: SHIPPED | OUTPATIENT
Start: 2022-03-14 | End: 2022-05-03 | Stop reason: SDUPTHER

## 2022-03-14 NOTE — TELEPHONE ENCOUNTER
Last office visit 3/4/2022     Last written     Next office visit scheduled Visit date not found    Requested Prescriptions     Pending Prescriptions Disp Refills    tiZANidine (ZANAFLEX) 4 MG tablet [Pharmacy Med Name: tiZANidine HCL 4MG TABLET] 40 tablet 1     Sig: TAKE ONE TABLET BY MOUTH EVERY 8 HOURS AS NEEDED

## 2022-03-22 DIAGNOSIS — F41.9 ANXIETY: ICD-10-CM

## 2022-03-22 RX ORDER — LORAZEPAM 1 MG/1
TABLET ORAL
Qty: 90 TABLET | Refills: 0 | Status: SHIPPED | OUTPATIENT
Start: 2022-03-22 | End: 2022-04-20

## 2022-03-22 NOTE — TELEPHONE ENCOUNTER
Last office visit 3/4/2022     Last written     Next office visit scheduled Visit date not found    Requested Prescriptions     Pending Prescriptions Disp Refills    LORazepam (ATIVAN) 1 MG tablet [Pharmacy Med Name: LORazepam 1 MG TABLET] 90 tablet      Sig: TAKE ONE TABLET BY MOUTH EVERY 8 HOURS AS NEEDED FOR ANXIETY

## 2022-03-26 RX ORDER — NIFEDIPINE 60 MG/1
TABLET, EXTENDED RELEASE ORAL
Qty: 90 TABLET | Refills: 1 | Status: SHIPPED | OUTPATIENT
Start: 2022-03-26 | End: 2022-09-22

## 2022-03-28 ENCOUNTER — OFFICE VISIT (OUTPATIENT)
Dept: FAMILY MEDICINE CLINIC | Age: 62
End: 2022-03-28
Payer: COMMERCIAL

## 2022-03-28 VITALS
SYSTOLIC BLOOD PRESSURE: 132 MMHG | DIASTOLIC BLOOD PRESSURE: 92 MMHG | BODY MASS INDEX: 34.36 KG/M2 | WEIGHT: 175 LBS | TEMPERATURE: 98 F | HEIGHT: 60 IN

## 2022-03-28 DIAGNOSIS — M17.11 PRIMARY OSTEOARTHRITIS OF RIGHT KNEE: Primary | ICD-10-CM

## 2022-03-28 PROCEDURE — 99213 OFFICE O/P EST LOW 20 MIN: CPT | Performed by: FAMILY MEDICINE

## 2022-03-28 PROCEDURE — 20610 DRAIN/INJ JOINT/BURSA W/O US: CPT | Performed by: FAMILY MEDICINE

## 2022-03-28 RX ADMIN — METHYLPREDNISOLONE ACETATE 80 MG: 80 INJECTION, SUSPENSION INTRA-ARTICULAR; INTRALESIONAL; INTRAMUSCULAR; SOFT TISSUE at 09:20

## 2022-03-28 ASSESSMENT — ENCOUNTER SYMPTOMS
GASTROINTESTINAL NEGATIVE: 1
RESPIRATORY NEGATIVE: 1

## 2022-03-28 NOTE — PROGRESS NOTES
Marcial Freed (:  1960) is a 64 y.o. female,Established patient, here for evaluation of the following chief complaint(s):  Knee Pain (requesting a cortisone shot in right knee )         ASSESSMENT/PLAN:  1. Primary osteoarthritis of right knee  -     [address] Jennie AGUERO MD, Orthopedic Surgery, [address] DRAIN/INJECT LARGE JOINT/BURSA    Tolerated shot well  See ortho if not helping         Subjective   SUBJECTIVE/OBJECTIVE:  Knee Pain   There was no injury mechanism. The pain is present in the right knee. The quality of the pain is described as aching, shooting and stabbing. The pain is at a severity of 9/10. The pain is severe. The pain has been fluctuating since onset. Associated symptoms include an inability to bear weight. She reports no foreign bodies present. The symptoms are aggravated by movement, palpation and weight bearing. She has tried acetaminophen and NSAIDs for the symptoms. The treatment provided no relief. Review of Systems   Constitutional: Negative. HENT: Negative. Respiratory: Negative. Cardiovascular: Negative. Gastrointestinal: Negative. Musculoskeletal: Positive for arthralgias, gait problem and joint swelling. Psychiatric/Behavioral: Positive for decreased concentration. Objective   Physical Exam  Constitutional:       General: She is not in acute distress. Appearance: She is well-developed. HENT:      Head: Normocephalic. Musculoskeletal:         General: Tenderness (right knee with crepitus on flex/ext  tenderness medial to patella  under sterile conditions injected 1 cc depomedrol  tolerated well) present. No swelling. Neurological:      Mental Status: She is alert and oriented to person, place, and time. Psychiatric:         Behavior: Behavior normal.         Thought Content:  Thought content normal.         Judgment: Judgment normal.              An electronic signature was used to authenticate this note.    --Jolie Perera, DO

## 2022-03-29 RX ORDER — METHYLPREDNISOLONE ACETATE 80 MG/ML
80 INJECTION, SUSPENSION INTRA-ARTICULAR; INTRALESIONAL; INTRAMUSCULAR; SOFT TISSUE ONCE
Status: SHIPPED | OUTPATIENT
Start: 2022-03-29

## 2022-03-29 RX ORDER — METHYLPREDNISOLONE ACETATE 80 MG/ML
80 INJECTION, SUSPENSION INTRA-ARTICULAR; INTRALESIONAL; INTRAMUSCULAR; SOFT TISSUE ONCE
Status: COMPLETED | OUTPATIENT
Start: 2022-03-29 | End: 2022-03-28

## 2022-03-31 ENCOUNTER — OFFICE VISIT (OUTPATIENT)
Dept: ORTHOPEDIC SURGERY | Age: 62
End: 2022-03-31
Payer: COMMERCIAL

## 2022-03-31 VITALS — HEIGHT: 60 IN | WEIGHT: 175 LBS | BODY MASS INDEX: 34.36 KG/M2

## 2022-03-31 DIAGNOSIS — M17.11 PRIMARY OSTEOARTHRITIS OF RIGHT KNEE: Primary | ICD-10-CM

## 2022-03-31 PROCEDURE — 99213 OFFICE O/P EST LOW 20 MIN: CPT | Performed by: PHYSICIAN ASSISTANT

## 2022-04-01 DIAGNOSIS — M48.061 SPINAL STENOSIS OF LUMBAR REGION WITHOUT NEUROGENIC CLAUDICATION: ICD-10-CM

## 2022-04-01 DIAGNOSIS — M17.11 PRIMARY OSTEOARTHRITIS OF RIGHT KNEE: ICD-10-CM

## 2022-04-01 RX ORDER — HYDROCODONE BITARTRATE AND ACETAMINOPHEN 7.5; 325 MG/1; MG/1
1 TABLET ORAL EVERY 6 HOURS PRN
Qty: 120 TABLET | Refills: 0 | Status: SHIPPED | OUTPATIENT
Start: 2022-04-01 | End: 2022-04-29 | Stop reason: SDUPTHER

## 2022-04-02 DIAGNOSIS — F51.04 CHRONIC INSOMNIA: Primary | ICD-10-CM

## 2022-04-03 RX ORDER — ZOLPIDEM TARTRATE 10 MG/1
TABLET ORAL
Qty: 30 TABLET | Refills: 1 | Status: SHIPPED | OUTPATIENT
Start: 2022-04-03 | End: 2022-05-03

## 2022-04-04 NOTE — PROGRESS NOTES
This dictation was done with Dragon dictation and may contain mechanical errors related to translation. I have today reviewed with Chen Machado the clinically relevant, past medical history, medications, allergies, family history, social history, and Review Of Systems form the patients most recent history form & I have documented any details relevant to today's presenting complaints in my history below. Ms. Alfonso Aldana self-reported past medical history, medications, allergies, family history, social history, and Review Of Systems form has been scanned into the chart under the \"Media\" tab. Subjective:  Chen Machado is a 64 y.o. who is here complaining of pain in both of her knees. She does remember specific injury but she has had progressive worsening of right knee pain and some swelling mainly in the anterior aspect. She had initial care by Dr. Nai Frank which including conservative measures up to the point where this week she was given a cortisone injection which she felt did not provide significant relief. She also tried some massage and bought a brace from pharmacy which did not help with her symptoms. She is done treatments with a heating pad and using ice packs and has not had a lot of success with that either. The patient states that she has a 9 out of 10 pain and comes here for evaluation and treatment referred.       Patient Active Problem List   Diagnosis    Hypertension    Lumbar disc herniation    DDD (degenerative disc disease), lumbar    Lumbar stenosis    Lumbar spinal stenosis    Primary osteoarthritis of right knee    ANALY (acute kidney injury) ([address]    Hypotension    Multifocal pneumonia    Abnormal CT of the chest    Shortness of breath    Absolute anemia    CKD (chronic kidney disease) stage 3, GFR 30-59 ml/min ([address]    Community acquired pneumonia    Closed fracture of distal end of left radius, initial encounter    ARF (acute renal failure) ([address]           Current Outpatient Medications on File Prior to Visit   Medication Sig Dispense Refill    NIFEdipine (PROCARDIA XL) 60 MG extended release tablet TAKE ONE TABLET BY MOUTH DAILY 90 tablet 1    LORazepam (ATIVAN) 1 MG tablet TAKE ONE TABLET BY MOUTH EVERY 8 HOURS AS NEEDED FOR ANXIETY 90 tablet 0    tiZANidine (ZANAFLEX) 4 MG tablet TAKE ONE TABLET BY MOUTH EVERY 8 HOURS AS NEEDED 40 tablet 0    gabapentin (NEURONTIN) 300 MG capsule One tid 90 capsule 3    hydrOXYzine (ATARAX) 25 MG tablet TAKE TWO TABLETS BY MOUTH AT BEDTIME AS NEEDED FOR ITCHING 60 tablet 1    propranolol (INDERAL LA) 80 MG extended release capsule TAKE ONE CAPSULE BY MOUTH DAILY 30 capsule 2    citalopram (CELEXA) 40 MG tablet Take 1 tablet by mouth daily TAKE ONE TABLET BY MOUTH DAILY 90 tablet 2    NIFEdipine (ADALAT CC) 60 MG extended release tablet Take 1 tablet by mouth 2 times daily 30 tablet 3    pantoprazole (PROTONIX) 40 MG tablet Take 1 tablet by mouth every morning (before breakfast) 30 tablet 0    cloNIDine (CATAPRES) 0.1 MG tablet TAKE ONE TABLET BY MOUTH TWICE A  tablet 0    diclofenac sodium (VOLTAREN) 1 % GEL Apply 4 g topically 4 times daily (Patient taking differently: Apply 4 g topically 4 times daily Apply to knees) 500 g 1     Current Facility-Administered Medications on File Prior to Visit   Medication Dose Route Frequency Provider Last Rate Last Admin    methylPREDNISolone acetate (DEPO-MEDROL) injection 80 mg  80 mg IntraMUSCular Once Demetrice Saldaña DO             Objective:   Height 5' (1.524 m), weight 175 lb (79.4 kg), not currently breastfeeding. Upon examination this is a very pleasant 27-year-old female who is alert and oriented x3 she has good symmetric motion to the hips with a negative straight leg raise at 40 degrees bilaterally. The right knee has genu valgus alignment with a lot of crepitus during flexion extension through the patellofemoral joint.   There is some muscle diminishing tone in the VMO but she has 0 to 138 degrees of motion. She is got no significant varus or valgus laxity but she does have medial joint line tenderness in the absence of a minute Queen. Neuro exam grossly intact both lower extremities. Intact sensation to light touch. Motor exam 4+ to 5/5 in all major motor groups. Negative London's sign. Skin is warm, dry and intact with out erythema or significant increased temperature around the knee joint(s). There are no cutaneous lesions or lymphadenopathy present. X-RAYS:  X-rays taken the office today show bone-on-bone osteoarthritis through the patellofemoral joint with lateralization of the patella no obvious fractures were seen osteopenia is present. Also there is advanced degenerative changes in the lateral compartment with loss of joint space and subchondral sclerosis and osteophyte formation. Assessment:  Right knee moderate to advanced osteoarthritis    Plan:  During today's visit, there was approximately 30 minutes of face-to-face discussion in regards to the patient's current condition and treatment options. More than 50 % of the time was counseling and coordination of care as indicated above. We talked about short and long-term expectations and ultimately I think a total knee replacement may be in her best interest.  The recent cortisone shot was not very successful.   I think trying hyaluronic acid at this point may provide some relief and possibly postpone the need for a total knee replacement there is complete disclosure the risk benefits of the rationale treatment and we will look to get this approved preauthorized if possible      PROCEDURE NOTE:   X-rays      They will schedule a follow up in 1 to 2 weeks

## 2022-04-20 DIAGNOSIS — F41.9 ANXIETY: ICD-10-CM

## 2022-04-20 RX ORDER — LORAZEPAM 1 MG/1
TABLET ORAL
Qty: 90 TABLET | Refills: 0 | Status: SHIPPED | OUTPATIENT
Start: 2022-04-20 | End: 2022-05-20

## 2022-04-28 RX ORDER — PROPRANOLOL HYDROCHLORIDE 80 MG/1
CAPSULE, EXTENDED RELEASE ORAL
Qty: 30 CAPSULE | Refills: 2 | Status: SHIPPED | OUTPATIENT
Start: 2022-04-28 | End: 2022-07-24

## 2022-04-29 DIAGNOSIS — M17.11 PRIMARY OSTEOARTHRITIS OF RIGHT KNEE: ICD-10-CM

## 2022-04-29 DIAGNOSIS — M48.061 SPINAL STENOSIS OF LUMBAR REGION WITHOUT NEUROGENIC CLAUDICATION: ICD-10-CM

## 2022-04-29 RX ORDER — HYDROCODONE BITARTRATE AND ACETAMINOPHEN 7.5; 325 MG/1; MG/1
1 TABLET ORAL EVERY 6 HOURS PRN
Qty: 120 TABLET | Refills: 0 | Status: SHIPPED | OUTPATIENT
Start: 2022-04-29 | End: 2022-05-27 | Stop reason: SDUPTHER

## 2022-05-03 DIAGNOSIS — M17.11 PRIMARY OSTEOARTHRITIS OF RIGHT KNEE: ICD-10-CM

## 2022-05-03 RX ORDER — TIZANIDINE 4 MG/1
TABLET ORAL
Qty: 40 TABLET | Refills: 0 | Status: SHIPPED | OUTPATIENT
Start: 2022-05-03 | End: 2022-05-20

## 2022-05-12 ENCOUNTER — TELEPHONE (OUTPATIENT)
Dept: ORTHOPEDIC SURGERY | Age: 62
End: 2022-05-12

## 2022-05-12 NOTE — TELEPHONE ENCOUNTER
Surgery and/or Procedure Scheduling     Contact Name: Shirley Sender  Surgical/Procedure Request: PATIENT IS REQ GEL INJECTIONS IN R KNEE.  [address] [place], [place] [address]way [facility]   Patient Contact Number: [phone]

## 2022-05-19 ENCOUNTER — OFFICE VISIT (OUTPATIENT)
Dept: ORTHOPEDIC SURGERY | Age: 62
End: 2022-05-19
Payer: COMMERCIAL

## 2022-05-19 VITALS — HEIGHT: 60 IN | WEIGHT: 184 LBS | BODY MASS INDEX: 36.12 KG/M2

## 2022-05-19 DIAGNOSIS — M17.11 PRIMARY OSTEOARTHRITIS OF RIGHT KNEE: Primary | ICD-10-CM

## 2022-05-19 PROCEDURE — 20610 DRAIN/INJ JOINT/BURSA W/O US: CPT | Performed by: PHYSICIAN ASSISTANT

## 2022-05-20 DIAGNOSIS — F41.9 ANXIETY: ICD-10-CM

## 2022-05-20 DIAGNOSIS — M17.11 PRIMARY OSTEOARTHRITIS OF RIGHT KNEE: ICD-10-CM

## 2022-05-20 RX ORDER — TIZANIDINE 4 MG/1
TABLET ORAL
Qty: 40 TABLET | Refills: 0 | Status: SHIPPED | OUTPATIENT
Start: 2022-05-20 | End: 2022-06-07

## 2022-05-20 RX ORDER — LORAZEPAM 1 MG/1
TABLET ORAL
Qty: 90 TABLET | Refills: 1 | Status: SHIPPED | OUTPATIENT
Start: 2022-05-20 | End: 2022-07-20

## 2022-05-23 NOTE — PROGRESS NOTES
This dictation was done with [employer] dictation and may contain mechanical errors related to translation. Height 5' (1.524 m), weight 184 lb (83.5 kg), not currently breastfeeding. This is a very pleasant 60-year-old female who has ongoing pain from osteoarthritis in her right knee. She rates it about an 8 out of 10. At today's visit she has some crepitus during flexion extension through the patellofemoral joint she has some medial joint line tenderness but negative for Mariangel negative for Lachman. She is got good distal pulses and good dorsiflexion and plantarflexion strength. She is got good symmetric motion to the hips and negative straight leg raise. My impression is right knee osteoarthritis. She has been preauthorized and we were going to do a injection of hyaluronic acid today. In the appropriate sterile fashion her right knee was injected with theGel-Syn injection and she tolerated well.   Talked about postinjection care and she will follow-up with us in 1 week

## 2022-05-25 ENCOUNTER — OFFICE VISIT (OUTPATIENT)
Dept: ORTHOPEDIC SURGERY | Age: 62
End: 2022-05-25
Payer: COMMERCIAL

## 2022-05-25 DIAGNOSIS — M17.11 PRIMARY OSTEOARTHRITIS OF RIGHT KNEE: Primary | ICD-10-CM

## 2022-05-25 PROCEDURE — 20610 DRAIN/INJ JOINT/BURSA W/O US: CPT | Performed by: PHYSICIAN ASSISTANT

## 2022-05-26 NOTE — PROGRESS NOTES
This dictation was done with gokit dictation and may contain mechanical errors related to translation. This is a pleasant 28-year-old female who is here for her second in a series of 3 Gel-syn for the osteoarthritis in her right knee. All in all she has a little bit of edema she has a little bit of crepitus but has good motion and feels like she has had some improvement with the first shot. My impression is right knee osteoarthritis. With her consent she was injected with the secondGel-Syn injection into the right knee articular space.   This was the prepackaged amount she tolerated well we will see her back in 1 week

## 2022-05-27 DIAGNOSIS — M17.11 PRIMARY OSTEOARTHRITIS OF RIGHT KNEE: ICD-10-CM

## 2022-05-27 DIAGNOSIS — M48.061 SPINAL STENOSIS OF LUMBAR REGION WITHOUT NEUROGENIC CLAUDICATION: ICD-10-CM

## 2022-05-27 RX ORDER — HYDROCODONE BITARTRATE AND ACETAMINOPHEN 7.5; 325 MG/1; MG/1
1 TABLET ORAL EVERY 6 HOURS PRN
Qty: 120 TABLET | Refills: 0 | Status: SHIPPED | OUTPATIENT
Start: 2022-05-27 | End: 2022-06-26

## 2022-06-02 ENCOUNTER — OFFICE VISIT (OUTPATIENT)
Dept: ORTHOPEDIC SURGERY | Age: 62
End: 2022-06-02
Payer: COMMERCIAL

## 2022-06-02 VITALS — HEIGHT: 60 IN | BODY MASS INDEX: 36.12 KG/M2 | WEIGHT: 184 LBS

## 2022-06-02 DIAGNOSIS — M17.11 PRIMARY OSTEOARTHRITIS OF RIGHT KNEE: Primary | ICD-10-CM

## 2022-06-02 PROCEDURE — 99999 PR OFFICE/OUTPT VISIT,PROCEDURE ONLY: CPT | Performed by: PHYSICIAN ASSISTANT

## 2022-06-02 PROCEDURE — 20610 DRAIN/INJ JOINT/BURSA W/O US: CPT | Performed by: PHYSICIAN ASSISTANT

## 2022-06-04 DIAGNOSIS — F51.04 CHRONIC INSOMNIA: Primary | ICD-10-CM

## 2022-06-05 RX ORDER — ZOLPIDEM TARTRATE 10 MG/1
TABLET ORAL
Qty: 30 TABLET | Refills: 0 | Status: SHIPPED | OUTPATIENT
Start: 2022-06-05 | End: 2022-06-27 | Stop reason: SDUPTHER

## 2022-06-05 NOTE — PROGRESS NOTES
This dictation was done with [employer] dictation and may contain mechanical errors related to translation. Height 5' (1.524 m), weight 184 lb (83.5 kg), not currently breastfeeding. This is a very pleasant 54-year-old female who is here for her third in a series of 3 injections of hyaluronic acid for her right knee osteoarthritis. Looking at her knee today she still has a little bit of swelling has good range of motion with a little bit of crepitus during flexion extension through the patellofemoral joint no varus or valgus laxity decent quad tone good distal pulses. My impression is right knee osteoarthritis    At this point we talked about short and long-term expectations she was given the injection of the Gel-Syn prepackaged dose into the right knee intra-articular space under sterile conditions.     She will tentatively make an appointment to follow-up in 4 to 6 weeks further treatment will based on how she responds to this

## 2022-06-07 DIAGNOSIS — M17.11 PRIMARY OSTEOARTHRITIS OF RIGHT KNEE: ICD-10-CM

## 2022-06-07 RX ORDER — TIZANIDINE 4 MG/1
TABLET ORAL
Qty: 40 TABLET | Refills: 0 | Status: SHIPPED | OUTPATIENT
Start: 2022-06-07 | End: 2022-06-22

## 2022-06-22 DIAGNOSIS — M17.11 PRIMARY OSTEOARTHRITIS OF RIGHT KNEE: ICD-10-CM

## 2022-06-22 RX ORDER — TIZANIDINE 4 MG/1
TABLET ORAL
Qty: 40 TABLET | Refills: 0 | Status: SHIPPED | OUTPATIENT
Start: 2022-06-22 | End: 2022-07-23 | Stop reason: SDUPTHER

## 2022-06-22 NOTE — TELEPHONE ENCOUNTER
Last office visit 3/28/2022     Last written     Next office visit scheduled Visit date not found    Requested Prescriptions     Pending Prescriptions Disp Refills    tiZANidine (ZANAFLEX) 4 MG tablet [Pharmacy Med Name: tiZANidine HCL 4MG TABLET] 40 tablet 0     Sig: TAKE ONE TABLET BY MOUTH EVERY 8 HOURS AS NEEDED

## 2022-06-27 DIAGNOSIS — F51.04 CHRONIC INSOMNIA: ICD-10-CM

## 2022-06-27 DIAGNOSIS — M48.061 SPINAL STENOSIS OF LUMBAR REGION WITHOUT NEUROGENIC CLAUDICATION: Primary | ICD-10-CM

## 2022-06-27 RX ORDER — ZOLPIDEM TARTRATE 10 MG/1
TABLET ORAL
Qty: 30 TABLET | Refills: 0 | Status: SHIPPED | OUTPATIENT
Start: 2022-07-05 | End: 2022-08-05

## 2022-06-27 RX ORDER — HYDROCODONE BITARTRATE AND ACETAMINOPHEN 7.5; 325 MG/1; MG/1
1 TABLET ORAL EVERY 6 HOURS PRN
Qty: 120 TABLET | Refills: 0 | Status: SHIPPED | OUTPATIENT
Start: 2022-06-27 | End: 2022-07-27 | Stop reason: SDUPTHER

## 2022-06-27 NOTE — TELEPHONE ENCOUNTER
Patient requesting a refill of her hydrocodone (did not know strength) to [facility] 3/28/22  No upcoming appt scheduled

## 2022-07-09 DIAGNOSIS — F32.9 REACTIVE DEPRESSION: ICD-10-CM

## 2022-07-09 DIAGNOSIS — F41.9 ANXIETY: ICD-10-CM

## 2022-07-10 RX ORDER — CITALOPRAM 40 MG/1
TABLET ORAL
Qty: 90 TABLET | Refills: 2 | Status: SHIPPED | OUTPATIENT
Start: 2022-07-10

## 2022-07-11 ENCOUNTER — TELEPHONE (OUTPATIENT)
Dept: FAMILY MEDICINE CLINIC | Age: 62
End: 2022-07-11

## 2022-07-11 NOTE — TELEPHONE ENCOUNTER
Patient called stating severe headache, nasal congestion, nasal drainage, stomach upset, tiredness and weakness since 7/4.  Patient states she has taken 2 home COVID test and both were negative

## 2022-07-12 ENCOUNTER — NURSE ONLY (OUTPATIENT)
Dept: FAMILY MEDICINE CLINIC | Age: 62
End: 2022-07-12

## 2022-07-12 DIAGNOSIS — Z20.822 COVID-19 RULED OUT: Primary | ICD-10-CM

## 2022-07-12 LAB — SARS-COV-2, PCR: NOT DETECTED

## 2022-07-12 NOTE — PROGRESS NOTES
Pt schedul;ed on lab schedule for covid test. Pt in garage in [place]. Pt swabed without issue,     Pt also handed me FMLA paperwork to be completed by dr Roshan Rowe. Pt advised that she would talk to dr Jojo Nguyen about it tomorrow on [facility]V.

## 2022-07-13 ENCOUNTER — TELEMEDICINE (OUTPATIENT)
Dept: FAMILY MEDICINE CLINIC | Age: 62
End: 2022-07-13
Payer: COMMERCIAL

## 2022-07-13 ENCOUNTER — TELEPHONE (OUTPATIENT)
Dept: FAMILY MEDICINE CLINIC | Age: 62
End: 2022-07-13

## 2022-07-13 DIAGNOSIS — J40 BRONCHITIS: Primary | ICD-10-CM

## 2022-07-13 PROCEDURE — 99213 OFFICE O/P EST LOW 20 MIN: CPT | Performed by: FAMILY MEDICINE

## 2022-07-13 RX ORDER — METHYLPREDNISOLONE 4 MG/1
TABLET ORAL
Qty: 1 KIT | Refills: 0 | Status: SHIPPED | OUTPATIENT
Start: 2022-07-13 | End: 2022-07-19

## 2022-07-13 RX ORDER — LEVOFLOXACIN 500 MG/1
500 TABLET, FILM COATED ORAL DAILY
Qty: 10 TABLET | Refills: 0 | Status: SHIPPED | OUTPATIENT
Start: 2022-07-13 | End: 2022-07-23 | Stop reason: SDUPTHER

## 2022-07-13 RX ORDER — DEXTROMETHORPHAN HYDROBROMIDE AND PROMETHAZINE HYDROCHLORIDE 15; 6.25 MG/5ML; MG/5ML
5 SYRUP ORAL 4 TIMES DAILY PRN
Qty: 180 ML | Refills: 0 | Status: SHIPPED | OUTPATIENT
Start: 2022-07-13 | End: 2022-07-23

## 2022-07-13 ASSESSMENT — ENCOUNTER SYMPTOMS
COUGH: 1
WHEEZING: 1
SHORTNESS OF BREATH: 1

## 2022-07-13 NOTE — PROGRESS NOTES
Selvin Villalba (:  1960) is a Established patient, here for evaluation of the following:    Assessment & Plan   Below is the assessment and plan developed based on review of pertinent history, physical exam, labs, studies, and medications. 1. Bronchitis  -     levoFLOXacin (LEVAQUIN) 500 MG tablet; Take 1 tablet by mouth daily for 10 days, Disp-10 tablet, R-0Normal  -     methylPREDNISolone (MEDROL DOSEPACK) 4 MG tablet; Take by mouth., Disp-1 kit, R-0Normal  -     promethazine-dextromethorphan (PROMETHAZINE-DM) 6.25-15 MG/5ML syrup; Take 5 mLs by mouth 4 times daily as needed for Cough, Disp-180 mL, R-0Normal  urged pt to go to ER   Pt refuses   She will call her daughter to  prescriptions  She says she will go to er if worsening symptoms        Subjective   Cough  This is a new problem. The current episode started in the past 7 days. The problem has been gradually worsening. The problem occurs every few minutes. The cough is productive of sputum. Associated symptoms include headaches, myalgias, nasal congestion, postnasal drip, shortness of breath and wheezing. Exacerbated by: any activity. She has tried rest for the symptoms. The treatment provided no relief. Her past medical history is significant for pneumonia. Review of Systems   HENT: Positive for postnasal drip. Respiratory: Positive for cough, shortness of breath and wheezing. Musculoskeletal: Positive for myalgias. Neurological: Positive for headaches. Objective   Patient-Reported Vitals  No data recorded     Physical Exam  Constitutional:       Appearance: She is ill-appearing. HENT:      Head: Normocephalic. Pulmonary:      Comments: Some dyspnea with conversation  Skin:     Findings: Rash present. Neurological:      General: No focal deficit present. Mental Status: She is alert. Selvin Villalba, was evaluated through a synchronous (real-time) audio-video encounter.  The patient (or guardian if applicable) is aware that this is a billable service, which includes applicable co-pays. This Virtual Visit was conducted with patient's (and/or legal guardian's) consent. The visit was conducted pursuant to the emergency declaration under the [facility], [address] [place] [address] authority and the [employer] General Act. Patient identification was verified, and a caregiver was present when appropriate. The patient was located at Home: [address] [place] [address].    Provider was located at [facility] ([place] [address]): [address] [facility],  [address],

## 2022-07-13 NOTE — TELEPHONE ENCOUNTER
Called patients daughter Teddy Singh per Dr. Vidhi Mcgregor request. Informed her that Dr. Harvinder Bianchi saw patient for a virtual visit and that she encouraged patient to go to the ER but patients denied. Wanted to inform daughter to check in. Claudia verbalized understanding and thanked MA for phone call. Advised her to call us if patient needs anything or any questions or concerns.

## 2022-07-17 RX ORDER — HYDROCHLOROTHIAZIDE 25 MG/1
TABLET ORAL
Qty: 30 TABLET | Refills: 2 | Status: SHIPPED | OUTPATIENT
Start: 2022-07-17

## 2022-07-19 ENCOUNTER — TELEPHONE (OUTPATIENT)
Dept: FAMILY MEDICINE CLINIC | Age: 62
End: 2022-07-19

## 2022-07-20 ENCOUNTER — TELEMEDICINE (OUTPATIENT)
Dept: FAMILY MEDICINE CLINIC | Age: 62
End: 2022-07-20
Payer: COMMERCIAL

## 2022-07-20 DIAGNOSIS — F41.9 ANXIETY: ICD-10-CM

## 2022-07-20 DIAGNOSIS — M25.50 POLYARTHRALGIA: ICD-10-CM

## 2022-07-20 DIAGNOSIS — J18.9 PNEUMONIA DUE TO INFECTIOUS ORGANISM, UNSPECIFIED LATERALITY, UNSPECIFIED PART OF LUNG: Primary | ICD-10-CM

## 2022-07-20 DIAGNOSIS — M17.11 PRIMARY OSTEOARTHRITIS OF RIGHT KNEE: ICD-10-CM

## 2022-07-20 DIAGNOSIS — M48.061 SPINAL STENOSIS OF LUMBAR REGION WITHOUT NEUROGENIC CLAUDICATION: ICD-10-CM

## 2022-07-20 PROCEDURE — 99214 OFFICE O/P EST MOD 30 MIN: CPT | Performed by: FAMILY MEDICINE

## 2022-07-20 RX ORDER — LORAZEPAM 1 MG/1
TABLET ORAL
Qty: 90 TABLET | Refills: 0 | Status: SHIPPED | OUTPATIENT
Start: 2022-07-20 | End: 2022-08-19

## 2022-07-20 ASSESSMENT — ENCOUNTER SYMPTOMS
COUGH: 1
BACK PAIN: 1
SHORTNESS OF BREATH: 1

## 2022-07-20 NOTE — PROGRESS NOTES
Shante Granados (:  1960) is a Established patient, here for evaluation of the following:    Assessment & Plan   Below is the assessment and plan developed based on review of pertinent history, physical exam, labs, studies, and medications. 1. Pneumonia due to infectious organism, unspecified laterality, unspecified part of lung  Doing better on the antibiotic  Note to return to work  Call if new or increasing symptoms   2. Spinal stenosis of lumbar region without neurogenic claudication  Fmla paperwork to be off if flares and unable to go to work  Keep current meds the same  3. Primary osteoarthritis of right knee  Continue to follow up with ortho prn  Fmla forms completed   4. Polyarthralgia    No follow-ups on file. Subjective   HPI  Review of Systems   Constitutional:  Positive for activity change and fatigue. Respiratory:  Positive for cough and shortness of breath. Cardiovascular: Negative. Musculoskeletal:  Positive for arthralgias, back pain, gait problem, joint swelling and myalgias. Skin: Negative. Neurological:  Positive for weakness. Objective   Patient-Reported Vitals  No data recorded     Physical Exam  Constitutional:       General: She is not in acute distress. Appearance: She is well-developed. HENT:      Head: Normocephalic. Neurological:      Mental Status: She is alert and oriented to person, place, and time. Psychiatric:         Behavior: Behavior normal.         Thought Content: Thought content normal.         Judgment: Judgment normal.            On this date 2022 I have spent 25 minutes reviewing previous notes, test results and face to face (virtual) with the patient discussing the diagnosis and importance of compliance with the treatment plan as well as documenting on the day of the visit. Shante Granados, was evaluated through a synchronous (real-time) audio-video encounter.  The patient (or guardian if applicable) is aware that this is a billable service, which includes applicable co-pays. This Virtual Visit was conducted with patient's (and/or legal guardian's) consent. The visit was conducted pursuant to the emergency declaration under the [address] [place] [address], [address] [place] [address] authority and the [employer] and [employer] General Act. Patient identification was verified, and a caregiver was present when appropriate. The patient was located at Home: [address] [place] [address].    Provider was located at [facility] ([address]): [address] [facility],  [address],

## 2022-07-20 NOTE — LETTER
[address] [facility]  [facility] [address]. [address] [facility],First Floor [address]  Phone: [phone]  Fax: [phone]    Ranulfo Campbell DO        July 20, 2022     Patient: Alec Calderon   YOB: 1960   Date of Visit: 7/20/2022       To Whom It May Concern:    Ronald Carrington has a medical excuse from work due to Upper Respiratory Infection and Pneumonia beginning 7/5/22and through 7/18/22 and return 7/19/22. If you have any questions or concerns, please don't hesitate to call.     Sincerely,        Ranulfo Campbell DO

## 2022-07-23 DIAGNOSIS — M17.11 PRIMARY OSTEOARTHRITIS OF RIGHT KNEE: ICD-10-CM

## 2022-07-23 DIAGNOSIS — J40 BRONCHITIS: ICD-10-CM

## 2022-07-23 RX ORDER — TIZANIDINE 4 MG/1
TABLET ORAL
Qty: 40 TABLET | Refills: 0 | Status: SHIPPED | OUTPATIENT
Start: 2022-07-23 | End: 2022-08-11

## 2022-07-23 RX ORDER — LEVOFLOXACIN 500 MG/1
500 TABLET, FILM COATED ORAL DAILY
Qty: 10 TABLET | Refills: 0 | Status: SHIPPED | OUTPATIENT
Start: 2022-07-23 | End: 2022-08-02

## 2022-07-24 RX ORDER — PROPRANOLOL HYDROCHLORIDE 80 MG/1
CAPSULE, EXTENDED RELEASE ORAL
Qty: 30 CAPSULE | Refills: 2 | Status: SHIPPED | OUTPATIENT
Start: 2022-07-24 | End: 2022-10-21

## 2022-07-27 DIAGNOSIS — M48.061 SPINAL STENOSIS OF LUMBAR REGION WITHOUT NEUROGENIC CLAUDICATION: ICD-10-CM

## 2022-07-27 RX ORDER — HYDROCODONE BITARTRATE AND ACETAMINOPHEN 7.5; 325 MG/1; MG/1
1 TABLET ORAL EVERY 6 HOURS PRN
Qty: 120 TABLET | Refills: 0 | Status: SHIPPED | OUTPATIENT
Start: 2022-07-27 | End: 2022-08-24 | Stop reason: SDUPTHER

## 2022-07-27 NOTE — TELEPHONE ENCOUNTER
Last office visit 7/20/2022     Last written     Next office visit scheduled Visit date not found    Requested Prescriptions     Pending Prescriptions Disp Refills    HYDROcodone-acetaminophen (NORCO) 7.5-325 MG per tablet 120 tablet 0     Sig: Take 1 tablet by mouth every 6 hours as needed for Pain for up to 30 days.  Intended supply: 30 days

## 2022-08-01 ENCOUNTER — TELEPHONE (OUTPATIENT)
Dept: FAMILY MEDICINE CLINIC | Age: 62
End: 2022-08-01

## 2022-08-01 NOTE — TELEPHONE ENCOUNTER
Spoke to pt, she stated she didn't go back to work on the 19th because she was still feeling the same way.

## 2022-08-01 NOTE — TELEPHONE ENCOUNTER
Pt called to ask Dr Zuleyka Matamoros for a work note. She has been off almost the whole month of July . She needs a extended note from 7/19/22 to 8/1/22 uri and pneumonia. She is going back tomorrow on 8/2/22.  Please give pt a call [phone]

## 2022-08-05 DIAGNOSIS — F51.04 CHRONIC INSOMNIA: ICD-10-CM

## 2022-08-05 RX ORDER — ZOLPIDEM TARTRATE 10 MG/1
TABLET ORAL
Qty: 30 TABLET | Refills: 0 | Status: SHIPPED | OUTPATIENT
Start: 2022-08-05 | End: 2022-08-26

## 2022-08-11 DIAGNOSIS — M17.11 PRIMARY OSTEOARTHRITIS OF RIGHT KNEE: ICD-10-CM

## 2022-08-11 RX ORDER — TIZANIDINE 4 MG/1
TABLET ORAL
Qty: 40 TABLET | Refills: 0 | Status: SHIPPED | OUTPATIENT
Start: 2022-08-11 | End: 2022-08-26

## 2022-08-19 DIAGNOSIS — F41.9 ANXIETY: ICD-10-CM

## 2022-08-19 RX ORDER — LORAZEPAM 1 MG/1
TABLET ORAL
Qty: 90 TABLET | Refills: 0 | Status: SHIPPED | OUTPATIENT
Start: 2022-08-19 | End: 2022-09-16

## 2022-08-24 DIAGNOSIS — M48.061 SPINAL STENOSIS OF LUMBAR REGION WITHOUT NEUROGENIC CLAUDICATION: ICD-10-CM

## 2022-08-24 RX ORDER — HYDROCODONE BITARTRATE AND ACETAMINOPHEN 7.5; 325 MG/1; MG/1
1 TABLET ORAL EVERY 6 HOURS PRN
Qty: 120 TABLET | Refills: 0 | Status: SHIPPED | OUTPATIENT
Start: 2022-08-24 | End: 2022-09-23 | Stop reason: SDUPTHER

## 2022-08-26 DIAGNOSIS — F51.04 CHRONIC INSOMNIA: ICD-10-CM

## 2022-08-26 DIAGNOSIS — M17.11 PRIMARY OSTEOARTHRITIS OF RIGHT KNEE: ICD-10-CM

## 2022-08-26 RX ORDER — ZOLPIDEM TARTRATE 10 MG/1
TABLET ORAL
Qty: 30 TABLET | Refills: 1 | Status: SHIPPED | OUTPATIENT
Start: 2022-08-26 | End: 2022-09-26

## 2022-08-26 RX ORDER — TIZANIDINE 4 MG/1
TABLET ORAL
Qty: 40 TABLET | Refills: 0 | Status: SHIPPED | OUTPATIENT
Start: 2022-08-26 | End: 2022-09-09

## 2022-09-09 DIAGNOSIS — M17.11 PRIMARY OSTEOARTHRITIS OF RIGHT KNEE: ICD-10-CM

## 2022-09-09 RX ORDER — TIZANIDINE 4 MG/1
TABLET ORAL
Qty: 40 TABLET | Refills: 0 | Status: SHIPPED | OUTPATIENT
Start: 2022-09-09 | End: 2022-09-29

## 2022-09-16 DIAGNOSIS — F41.9 ANXIETY: ICD-10-CM

## 2022-09-16 DIAGNOSIS — L29.9 GENERALIZED PRURITUS: ICD-10-CM

## 2022-09-16 RX ORDER — HYDROXYZINE HYDROCHLORIDE 25 MG/1
TABLET, FILM COATED ORAL
Qty: 60 TABLET | Refills: 1 | Status: SHIPPED | OUTPATIENT
Start: 2022-09-16

## 2022-09-16 RX ORDER — LORAZEPAM 1 MG/1
TABLET ORAL
Qty: 90 TABLET | Refills: 0 | Status: SHIPPED | OUTPATIENT
Start: 2022-09-16 | End: 2022-10-17

## 2022-09-22 DIAGNOSIS — M48.061 SPINAL STENOSIS OF LUMBAR REGION WITHOUT NEUROGENIC CLAUDICATION: ICD-10-CM

## 2022-09-22 RX ORDER — NIFEDIPINE 60 MG/1
TABLET, EXTENDED RELEASE ORAL
Qty: 90 TABLET | Refills: 1 | Status: SHIPPED | OUTPATIENT
Start: 2022-09-22

## 2022-09-23 RX ORDER — HYDROCODONE BITARTRATE AND ACETAMINOPHEN 7.5; 325 MG/1; MG/1
1 TABLET ORAL EVERY 6 HOURS PRN
Qty: 120 TABLET | Refills: 0 | Status: SHIPPED | OUTPATIENT
Start: 2022-09-23 | End: 2022-10-18 | Stop reason: SDUPTHER

## 2022-09-29 DIAGNOSIS — M17.11 PRIMARY OSTEOARTHRITIS OF RIGHT KNEE: ICD-10-CM

## 2022-09-29 RX ORDER — TIZANIDINE 4 MG/1
TABLET ORAL
Qty: 40 TABLET | Refills: 0 | Status: SHIPPED | OUTPATIENT
Start: 2022-09-29 | End: 2022-10-14 | Stop reason: SDUPTHER

## 2022-10-14 DIAGNOSIS — M17.11 PRIMARY OSTEOARTHRITIS OF RIGHT KNEE: ICD-10-CM

## 2022-10-14 RX ORDER — TIZANIDINE 4 MG/1
TABLET ORAL
Qty: 40 TABLET | Refills: 0 | Status: SHIPPED | OUTPATIENT
Start: 2022-10-14 | End: 2022-10-18

## 2022-10-16 DIAGNOSIS — F41.9 ANXIETY: ICD-10-CM

## 2022-10-17 RX ORDER — LORAZEPAM 1 MG/1
TABLET ORAL
Qty: 90 TABLET | Refills: 0 | Status: SHIPPED | OUTPATIENT
Start: 2022-10-17 | End: 2022-11-16

## 2022-10-18 DIAGNOSIS — M48.061 SPINAL STENOSIS OF LUMBAR REGION WITHOUT NEUROGENIC CLAUDICATION: ICD-10-CM

## 2022-10-18 DIAGNOSIS — M17.11 PRIMARY OSTEOARTHRITIS OF RIGHT KNEE: ICD-10-CM

## 2022-10-18 RX ORDER — TIZANIDINE 4 MG/1
TABLET ORAL
Qty: 40 TABLET | Refills: 0 | Status: SHIPPED | OUTPATIENT
Start: 2022-10-18 | End: 2022-10-31 | Stop reason: SDUPTHER

## 2022-10-18 RX ORDER — HYDROCODONE BITARTRATE AND ACETAMINOPHEN 7.5; 325 MG/1; MG/1
1 TABLET ORAL EVERY 6 HOURS PRN
Qty: 120 TABLET | Refills: 0 | Status: SHIPPED | OUTPATIENT
Start: 2022-10-23 | End: 2022-10-19 | Stop reason: SDUPTHER

## 2022-10-18 NOTE — TELEPHONE ENCOUNTER
Last office visit 7/20/2022     Last written      Next office visit scheduled 10/18/2022    Requested Prescriptions     Pending Prescriptions Disp Refills    tiZANidine (Eulalio Krabbe) 4 MG tablet [Pharmacy Med Name: tiZANidine HCL 4MG TABLET] 40 tablet 0     Sig: TAKE ONE TABLET BY MOUTH DAILY

## 2022-10-19 DIAGNOSIS — M48.061 SPINAL STENOSIS OF LUMBAR REGION WITHOUT NEUROGENIC CLAUDICATION: ICD-10-CM

## 2022-10-19 RX ORDER — HYDROCODONE BITARTRATE AND ACETAMINOPHEN 7.5; 325 MG/1; MG/1
1 TABLET ORAL EVERY 6 HOURS PRN
Qty: 120 TABLET | Refills: 0 | Status: SHIPPED | OUTPATIENT
Start: 2022-10-23 | End: 2022-10-21 | Stop reason: SDUPTHER

## 2022-10-19 NOTE — TELEPHONE ENCOUNTER
----- Message from Nayeli Flynn sent at 10/19/2022 10:38 AM EDT -----  Subject: Refill Request    QUESTIONS  Name of Medication? HYDROcodone-acetaminophen (NORCO) 7.5-325 MG per   tablet  Patient-reported dosage and instructions? 7.5-325MG AS NEEDED  How many days do you have left? 4  Preferred Pharmacy? [facility] [id number]  Pharmacy phone number (if available)? [phone]  ---------------------------------------------------------------------------  --------------  Razia ESCALANTE  What is the best way for the office to contact you? OK to leave message on   voicemail  Preferred Call Back Phone Number? [phone]  ---------------------------------------------------------------------------  --------------  SCRIPT ANSWERS  Relationship to Patient?  Self

## 2022-10-21 DIAGNOSIS — M48.061 SPINAL STENOSIS OF LUMBAR REGION WITHOUT NEUROGENIC CLAUDICATION: ICD-10-CM

## 2022-10-21 RX ORDER — HYDROCODONE BITARTRATE AND ACETAMINOPHEN 7.5; 325 MG/1; MG/1
1 TABLET ORAL EVERY 6 HOURS PRN
Qty: 120 TABLET | Refills: 0 | Status: SHIPPED | OUTPATIENT
Start: 2022-10-23 | End: 2022-11-22

## 2022-10-21 RX ORDER — PROPRANOLOL HYDROCHLORIDE 80 MG/1
CAPSULE, EXTENDED RELEASE ORAL
Qty: 30 CAPSULE | Refills: 2 | Status: SHIPPED | OUTPATIENT
Start: 2022-10-21

## 2022-10-31 DIAGNOSIS — M17.11 PRIMARY OSTEOARTHRITIS OF RIGHT KNEE: ICD-10-CM

## 2022-10-31 DIAGNOSIS — F51.04 CHRONIC INSOMNIA: Primary | ICD-10-CM

## 2022-10-31 RX ORDER — TIZANIDINE 4 MG/1
TABLET ORAL
Qty: 40 TABLET | Refills: 0 | Status: SHIPPED | OUTPATIENT
Start: 2022-10-31

## 2022-10-31 RX ORDER — ZOLPIDEM TARTRATE 10 MG/1
TABLET ORAL
Qty: 30 TABLET | Refills: 0 | Status: SHIPPED | OUTPATIENT
Start: 2022-10-31 | End: 2022-11-30

## 2022-10-31 NOTE — TELEPHONE ENCOUNTER
Last office visit 7/20/2022     Last written      Next office visit scheduled Visit date not found    Requested Prescriptions     Pending Prescriptions Disp Refills    zolpidem (AMBIEN) 10 MG tablet [Pharmacy Med Name: ZOLPIDEM TARTRATE 10 MG TABLET] 30 tablet      Sig: TAKE ONE TABLET BY MOUTH EVERY NIGHT AT BEDTIME AS NEEDED

## 2022-11-01 ENCOUNTER — TELEPHONE (OUTPATIENT)
Dept: ORTHOPEDIC SURGERY | Age: 62
End: 2022-11-01

## 2022-11-01 NOTE — TELEPHONE ENCOUNTER
Submitted PA for Zolpidem Tartrate 10MG tablets  Via CMM Key: [id number] STATUS: APPROVED 10/02/22 - 11/01/23; Approval letter attached. If this requires a response please respond to the pool ( [facility] [address]). Thank you please advise patient.

## 2022-11-23 ENCOUNTER — OFFICE VISIT (OUTPATIENT)
Dept: FAMILY MEDICINE CLINIC | Age: 62
End: 2022-11-23
Payer: COMMERCIAL

## 2022-11-23 VITALS
DIASTOLIC BLOOD PRESSURE: 78 MMHG | SYSTOLIC BLOOD PRESSURE: 126 MMHG | WEIGHT: 183.8 LBS | HEIGHT: 60 IN | BODY MASS INDEX: 36.08 KG/M2

## 2022-11-23 DIAGNOSIS — F41.9 ANXIETY: ICD-10-CM

## 2022-11-23 DIAGNOSIS — M17.11 PRIMARY OSTEOARTHRITIS OF RIGHT KNEE: Primary | ICD-10-CM

## 2022-11-23 DIAGNOSIS — M48.061 SPINAL STENOSIS OF LUMBAR REGION WITHOUT NEUROGENIC CLAUDICATION: ICD-10-CM

## 2022-11-23 PROCEDURE — 20610 DRAIN/INJ JOINT/BURSA W/O US: CPT | Performed by: FAMILY MEDICINE

## 2022-11-23 PROCEDURE — 90471 IMMUNIZATION ADMIN: CPT | Performed by: FAMILY MEDICINE

## 2022-11-23 PROCEDURE — 3078F DIAST BP <80 MM HG: CPT | Performed by: FAMILY MEDICINE

## 2022-11-23 PROCEDURE — 3074F SYST BP LT 130 MM HG: CPT | Performed by: FAMILY MEDICINE

## 2022-11-23 PROCEDURE — 90674 CCIIV4 VAC NO PRSV 0.5 ML IM: CPT | Performed by: FAMILY MEDICINE

## 2022-11-23 PROCEDURE — 99214 OFFICE O/P EST MOD 30 MIN: CPT | Performed by: FAMILY MEDICINE

## 2022-11-23 RX ORDER — METHYLPREDNISOLONE ACETATE 80 MG/ML
80 INJECTION, SUSPENSION INTRA-ARTICULAR; INTRALESIONAL; INTRAMUSCULAR; SOFT TISSUE ONCE
Status: COMPLETED | OUTPATIENT
Start: 2022-11-23 | End: 2022-11-23

## 2022-11-23 RX ADMIN — METHYLPREDNISOLONE ACETATE 80 MG: 80 INJECTION, SUSPENSION INTRA-ARTICULAR; INTRALESIONAL; INTRAMUSCULAR; SOFT TISSUE at 15:20

## 2022-11-23 ASSESSMENT — PATIENT HEALTH QUESTIONNAIRE - PHQ9
SUM OF ALL RESPONSES TO PHQ QUESTIONS 1-9: 0
SUM OF ALL RESPONSES TO PHQ QUESTIONS 1-9: 0
2. FEELING DOWN, DEPRESSED OR HOPELESS: 0
1. LITTLE INTEREST OR PLEASURE IN DOING THINGS: 0
SUM OF ALL RESPONSES TO PHQ9 QUESTIONS 1 & 2: 0
SUM OF ALL RESPONSES TO PHQ QUESTIONS 1-9: 0
SUM OF ALL RESPONSES TO PHQ QUESTIONS 1-9: 0

## 2022-11-23 NOTE — PROGRESS NOTES
MG extended release tablet TAKE ONE TABLET BY MOUTH DAILY 90 tablet 1    hydrOXYzine HCl (ATARAX) 25 MG tablet TAKE TWO TABLETS BY MOUTH AT BEDTIME AS NEEDED FOR ITCHING 60 tablet 1    hydroCHLOROthiazide (HYDRODIURIL) 25 MG tablet TAKE ONE TABLET BY MOUTH EVERY MORNING 30 tablet 2    citalopram (CELEXA) 40 MG tablet TAKE ONE TABLET BY MOUTH DAILY 90 tablet 2       Vitals:    11/23/22 1449   BP: 126/78   Weight: 183 lb 12.8 oz (83.4 kg)   Height: 5' (1.524 m)     Body mass index is 35.9 kg/m². Wt Readings from Last 3 Encounters:   11/23/22 183 lb 12.8 oz (83.4 kg)   06/02/22 184 lb (83.5 kg)   05/19/22 184 lb (83.5 kg)     BP Readings from Last 3 Encounters:   11/23/22 126/78   03/28/22 (!) 132/92   03/04/22 (!) 160/94       Physical Exam  Constitutional:       Appearance: Normal appearance. HENT:      Head: Normocephalic. Cardiovascular:      Rate and Rhythm: Normal rate and regular rhythm. Pulses: Normal pulses. Heart sounds: Normal heart sounds. Musculoskeletal:      Comments: Right knee with crepitus on full ext  Stable  Under sterile conditions injected 1 cc depomederol medial over tibial tuberosity  Tolerated well   Skin:     General: Skin is warm and dry. Findings: No rash. Neurological:      General: No focal deficit present. Mental Status: She is alert. Psychiatric:         Mood and Affect: Mood normal.         Behavior: Behavior normal.         Thought Content: Thought content normal.          Assessment/Plan       Assessment/plan;  Celia Boyerlarry was seen today for medication refill. Diagnoses and all orders for this visit:    Primary osteoarthritis of right knee  -     methylPREDNISolone acetate (DEPO-MEDROL) injection 80 mg    Spinal stenosis of lumbar region without neurogenic claudication  -     gabapentin (NEURONTIN) 300 MG capsule; One tid  -     HYDROcodone-acetaminophen (NORCO) 7.5-325 MG per tablet;  Take 1 tablet by mouth every 6 hours as needed for Pain for up to 30 days. Intended supply: 30 days    Anxiety  -     LORazepam (ATIVAN) 1 MG tablet; TAKE ONE TABLET BY MOUTH EVERY 8 HOURS AS NEEDED FOR ANXIETY    Other orders  -     Influenza, FLUCELVAX, (age 10 mo+), IM, PF, 0.5 mL      No follow-ups on file.                   Teodora Moritz, DO

## 2022-11-25 DIAGNOSIS — F41.1 GAD (GENERALIZED ANXIETY DISORDER): Primary | ICD-10-CM

## 2022-11-25 RX ORDER — HYDROCODONE BITARTRATE AND ACETAMINOPHEN 7.5; 325 MG/1; MG/1
1 TABLET ORAL EVERY 6 HOURS PRN
Qty: 120 TABLET | Refills: 0 | Status: SHIPPED | OUTPATIENT
Start: 2022-11-25 | End: 2022-12-25

## 2022-11-25 RX ORDER — LORAZEPAM 1 MG/1
TABLET ORAL
Qty: 90 TABLET | Refills: 1 | Status: SHIPPED | OUTPATIENT
Start: 2022-11-25 | End: 2022-12-25

## 2022-11-25 RX ORDER — GABAPENTIN 300 MG/1
CAPSULE ORAL
Qty: 90 CAPSULE | Refills: 3 | Status: SHIPPED | OUTPATIENT
Start: 2022-11-25 | End: 2022-12-25

## 2022-11-25 RX ORDER — LORAZEPAM 1 MG/1
TABLET ORAL
Qty: 90 TABLET | Refills: 2 | Status: SHIPPED | OUTPATIENT
Start: 2022-11-25 | End: 2022-12-23

## 2022-11-25 NOTE — TELEPHONE ENCOUNTER
Last office visit 11/23/2022     Last written      Next office visit scheduled Visit date not found    Requested Prescriptions     Pending Prescriptions Disp Refills    LORazepam (ATIVAN) 1 MG tablet [Pharmacy Med Name: LORazepam 1 MG TABLET] 90 tablet      Sig: TAKE ONE TABLET BY MOUTH EVERY 8 HOURS AS NEEDED FOR ANXIETY

## 2022-11-26 ASSESSMENT — ENCOUNTER SYMPTOMS
RESPIRATORY NEGATIVE: 1
BACK PAIN: 1
GASTROINTESTINAL NEGATIVE: 1

## 2022-12-05 DIAGNOSIS — F51.04 CHRONIC INSOMNIA: Primary | ICD-10-CM

## 2022-12-05 RX ORDER — ZOLPIDEM TARTRATE 10 MG/1
TABLET ORAL
Qty: 30 TABLET | Refills: 1 | Status: SHIPPED | OUTPATIENT
Start: 2022-12-05 | End: 2023-01-04

## 2022-12-14 ENCOUNTER — COMMUNITY OUTREACH (OUTPATIENT)
Dept: FAMILY MEDICINE CLINIC | Age: 62
End: 2022-12-14

## 2022-12-22 DIAGNOSIS — M48.061 SPINAL STENOSIS OF LUMBAR REGION WITHOUT NEUROGENIC CLAUDICATION: ICD-10-CM

## 2022-12-22 RX ORDER — HYDROCODONE BITARTRATE AND ACETAMINOPHEN 7.5; 325 MG/1; MG/1
1 TABLET ORAL EVERY 6 HOURS PRN
Qty: 120 TABLET | Refills: 0 | Status: SHIPPED | OUTPATIENT
Start: 2022-12-22 | End: 2023-01-21

## 2022-12-22 NOTE — TELEPHONE ENCOUNTER
Last OV: 11/23/2022    No future appts     HYDROcodone-acetaminophen (NORCO) 7.5-325 MG per tablet       Sig: Take 1 tablet by mouth every 6 hours as needed for Pain for up to 30 days.  Intended supply: 30 days   Disp:  120 tablet    Refills:  0   Start: 12/22/2022 - 1/21/2023   Earliest Fill Date: 12/22/2022   Class: Normal   For: Spinal stenosis of lumbar region without neurogenic claudication   To pharmacy: Reduce doses taken as pain becomes manageable   Last ordered: 3 weeks ago by Gino Eisenberg, DO      To be filled at: [facility] [id number] Nabila Colvin, [address] [phone] [facility] [phone]

## 2023-01-11 ENCOUNTER — TELEPHONE (OUTPATIENT)
Dept: FAMILY MEDICINE CLINIC | Age: 63
End: 2023-01-11

## 2023-01-11 NOTE — TELEPHONE ENCOUNTER
----- Message from Ha Landers sent at 1/11/2023  2:02 PM EST -----  Subject: Message to Provider    QUESTIONS  Information for Provider? Patient express she will be dropping off her   FMLA paperwork to be filled out (because she lost a page from the past)  ---------------------------------------------------------------------------  --------------  Fabian CRUM  [phone]; OK to leave message on voicemail  ---------------------------------------------------------------------------  --------------  SCRIPT ANSWERS  Relationship to Patient?  Self

## 2023-01-12 DIAGNOSIS — M17.11 PRIMARY OSTEOARTHRITIS OF RIGHT KNEE: ICD-10-CM

## 2023-01-12 RX ORDER — NIFEDIPINE 60 MG/1
TABLET, EXTENDED RELEASE ORAL
Qty: 90 TABLET | Refills: 1 | Status: SHIPPED | OUTPATIENT
Start: 2023-01-12

## 2023-01-12 RX ORDER — TIZANIDINE 4 MG/1
TABLET ORAL
Qty: 40 TABLET | Refills: 0 | Status: SHIPPED | OUTPATIENT
Start: 2023-01-12 | End: 2023-01-13

## 2023-01-12 NOTE — TELEPHONE ENCOUNTER
Last office visit 11/23/2022     Last written      Next office visit scheduled Visit date not found    Requested Prescriptions     Pending Prescriptions Disp Refills    tiZANidine (Maddy Rack) 4 MG tablet [Pharmacy Med Name: tiZANidine HCL 4MG TABLET] 40 tablet 0     Sig: TAKE ONE TABLET BY MOUTH DAILY    NIFEdipine (PROCARDIA XL) 60 MG extended release tablet [Pharmacy Med Name: NIFEdipine ER 60 MG TAB, 24 HR] 90 tablet 1     Sig: TAKE ONE TABLET BY MOUTH DAILY

## 2023-01-13 DIAGNOSIS — M17.11 PRIMARY OSTEOARTHRITIS OF RIGHT KNEE: ICD-10-CM

## 2023-01-13 RX ORDER — TIZANIDINE 4 MG/1
TABLET ORAL
Qty: 40 TABLET | Refills: 0 | Status: SHIPPED | OUTPATIENT
Start: 2023-01-13

## 2023-01-21 RX ORDER — PROPRANOLOL HYDROCHLORIDE 80 MG/1
CAPSULE, EXTENDED RELEASE ORAL
Qty: 30 CAPSULE | Refills: 2 | Status: SHIPPED | OUTPATIENT
Start: 2023-01-21

## 2023-01-23 DIAGNOSIS — M48.061 SPINAL STENOSIS OF LUMBAR REGION WITHOUT NEUROGENIC CLAUDICATION: ICD-10-CM

## 2023-01-23 DIAGNOSIS — F41.1 GAD (GENERALIZED ANXIETY DISORDER): ICD-10-CM

## 2023-01-23 RX ORDER — HYDROCODONE BITARTRATE AND ACETAMINOPHEN 7.5; 325 MG/1; MG/1
1 TABLET ORAL EVERY 6 HOURS PRN
Qty: 120 TABLET | Refills: 0 | Status: SHIPPED | OUTPATIENT
Start: 2023-01-23 | End: 2023-02-22

## 2023-01-23 RX ORDER — LORAZEPAM 1 MG/1
TABLET ORAL
Qty: 90 TABLET | Refills: 0 | Status: SHIPPED | OUTPATIENT
Start: 2023-01-26 | End: 2023-02-25

## 2023-01-24 DIAGNOSIS — L29.9 GENERALIZED PRURITUS: ICD-10-CM

## 2023-01-24 RX ORDER — HYDROXYZINE HYDROCHLORIDE 25 MG/1
TABLET, FILM COATED ORAL
Qty: 60 TABLET | Refills: 1 | Status: SHIPPED | OUTPATIENT
Start: 2023-01-24

## 2023-02-02 DIAGNOSIS — F51.04 CHRONIC INSOMNIA: Primary | ICD-10-CM

## 2023-02-02 RX ORDER — ZOLPIDEM TARTRATE 10 MG/1
TABLET ORAL
Qty: 30 TABLET | Refills: 0 | Status: SHIPPED | OUTPATIENT
Start: 2023-02-02 | End: 2023-03-04

## 2023-02-14 ENCOUNTER — TELEPHONE (OUTPATIENT)
Dept: FAMILY MEDICINE CLINIC | Age: 63
End: 2023-02-14

## 2023-02-14 DIAGNOSIS — M17.11 PRIMARY OSTEOARTHRITIS OF RIGHT KNEE: ICD-10-CM

## 2023-02-14 RX ORDER — TIZANIDINE 4 MG/1
TABLET ORAL
Qty: 90 TABLET | Refills: 1 | Status: SHIPPED | OUTPATIENT
Start: 2023-02-14

## 2023-02-14 NOTE — TELEPHONE ENCOUNTER
Pt need a new rx for Tizanidine 4 mg with new direction and quantity. She has been taking them 3 a day instead of one a day per Dr Gwyn Mitchell. Please give pt a call when complete [phone].  Kvng Hernández [phone]

## 2023-02-24 DIAGNOSIS — M48.061 SPINAL STENOSIS OF LUMBAR REGION WITHOUT NEUROGENIC CLAUDICATION: ICD-10-CM

## 2023-02-24 DIAGNOSIS — F41.1 GAD (GENERALIZED ANXIETY DISORDER): ICD-10-CM

## 2023-02-24 RX ORDER — LORAZEPAM 1 MG/1
TABLET ORAL
Qty: 90 TABLET | Refills: 0 | OUTPATIENT
Start: 2023-02-24 | End: 2023-03-26

## 2023-02-24 RX ORDER — HYDROCODONE BITARTRATE AND ACETAMINOPHEN 7.5; 325 MG/1; MG/1
1 TABLET ORAL EVERY 6 HOURS PRN
Qty: 120 TABLET | Refills: 0 | OUTPATIENT
Start: 2023-02-24 | End: 2023-03-26

## 2023-02-24 RX ORDER — GABAPENTIN 300 MG/1
CAPSULE ORAL
Qty: 90 CAPSULE | Refills: 3 | OUTPATIENT
Start: 2023-02-24 | End: 2023-03-26

## 2023-02-24 NOTE — TELEPHONE ENCOUNTER
Patient calling for medication refills. Advised she is over the 3 month requirement. Patient does not have future appt scheduled.

## 2023-02-24 NOTE — TELEPHONE ENCOUNTER
Pt called back and scheduled an appt on 3/6/23. She wants to know if she can get her refills or enough to hold her until her appt. She need Gabapentin 300 mg, Lorazepam 1 mg and Hydrocodone 7.5-325 mg.  Please give pt a call [phone]

## 2023-02-24 NOTE — TELEPHONE ENCOUNTER
Left message on voicemail advising pt refills are not authorized until she is seen for appt and to call to schedule.

## 2023-03-03 DIAGNOSIS — F51.04 CHRONIC INSOMNIA: ICD-10-CM

## 2023-03-03 RX ORDER — ZOLPIDEM TARTRATE 10 MG/1
TABLET ORAL
Qty: 30 TABLET | Refills: 0 | Status: CANCELLED | OUTPATIENT
Start: 2023-03-03 | End: 2023-04-02

## 2023-03-05 DIAGNOSIS — F51.04 CHRONIC INSOMNIA: Primary | ICD-10-CM

## 2023-03-06 ENCOUNTER — OFFICE VISIT (OUTPATIENT)
Dept: FAMILY MEDICINE CLINIC | Age: 63
End: 2023-03-06
Payer: COMMERCIAL

## 2023-03-06 VITALS
BODY MASS INDEX: 33.57 KG/M2 | DIASTOLIC BLOOD PRESSURE: 88 MMHG | HEIGHT: 60 IN | WEIGHT: 171 LBS | SYSTOLIC BLOOD PRESSURE: 138 MMHG

## 2023-03-06 DIAGNOSIS — F51.04 CHRONIC INSOMNIA: ICD-10-CM

## 2023-03-06 DIAGNOSIS — M17.11 PRIMARY OSTEOARTHRITIS OF RIGHT KNEE: ICD-10-CM

## 2023-03-06 DIAGNOSIS — L29.9 GENERALIZED PRURITUS: ICD-10-CM

## 2023-03-06 DIAGNOSIS — I10 PRIMARY HYPERTENSION: ICD-10-CM

## 2023-03-06 DIAGNOSIS — F41.9 ANXIETY: ICD-10-CM

## 2023-03-06 DIAGNOSIS — M48.061 SPINAL STENOSIS OF LUMBAR REGION WITHOUT NEUROGENIC CLAUDICATION: ICD-10-CM

## 2023-03-06 DIAGNOSIS — L20.84 INTRINSIC ECZEMA: ICD-10-CM

## 2023-03-06 DIAGNOSIS — M25.50 POLYARTHRALGIA: Primary | ICD-10-CM

## 2023-03-06 DIAGNOSIS — F32.9 REACTIVE DEPRESSION: ICD-10-CM

## 2023-03-06 PROCEDURE — 3075F SYST BP GE 130 - 139MM HG: CPT | Performed by: FAMILY MEDICINE

## 2023-03-06 PROCEDURE — 3079F DIAST BP 80-89 MM HG: CPT | Performed by: FAMILY MEDICINE

## 2023-03-06 PROCEDURE — 99214 OFFICE O/P EST MOD 30 MIN: CPT | Performed by: FAMILY MEDICINE

## 2023-03-06 RX ORDER — CITALOPRAM 40 MG/1
TABLET ORAL
Qty: 90 TABLET | Refills: 2 | Status: SHIPPED | OUTPATIENT
Start: 2023-03-06

## 2023-03-06 RX ORDER — HYDROXYZINE HYDROCHLORIDE 25 MG/1
TABLET, FILM COATED ORAL
Qty: 60 TABLET | Refills: 1 | Status: SHIPPED | OUTPATIENT
Start: 2023-03-06

## 2023-03-06 RX ORDER — TIZANIDINE 4 MG/1
TABLET ORAL
Qty: 90 TABLET | Refills: 1 | Status: SHIPPED | OUTPATIENT
Start: 2023-03-06

## 2023-03-06 RX ORDER — GABAPENTIN 300 MG/1
CAPSULE ORAL
Qty: 90 CAPSULE | Refills: 3 | Status: SHIPPED | OUTPATIENT
Start: 2023-03-06 | End: 2023-06-15

## 2023-03-06 RX ORDER — NIFEDIPINE 60 MG/1
TABLET, EXTENDED RELEASE ORAL
Qty: 90 TABLET | Refills: 1 | Status: SHIPPED | OUTPATIENT
Start: 2023-03-06

## 2023-03-06 RX ORDER — HYDROCODONE BITARTRATE AND ACETAMINOPHEN 7.5; 325 MG/1; MG/1
1 TABLET ORAL EVERY 6 HOURS PRN
Qty: 120 TABLET | Refills: 0 | Status: SHIPPED | OUTPATIENT
Start: 2023-03-06 | End: 2023-04-05

## 2023-03-06 RX ORDER — HYDROCHLOROTHIAZIDE 25 MG/1
TABLET ORAL
Qty: 90 TABLET | Refills: 2 | Status: SHIPPED | OUTPATIENT
Start: 2023-03-06

## 2023-03-06 RX ORDER — ZOLPIDEM TARTRATE 10 MG/1
TABLET ORAL
Qty: 30 TABLET | Refills: 2 | Status: SHIPPED | OUTPATIENT
Start: 2023-03-06 | End: 2023-04-05

## 2023-03-06 RX ORDER — ZOLPIDEM TARTRATE 10 MG/1
TABLET ORAL
Qty: 30 TABLET | Refills: 0 | Status: SHIPPED | OUTPATIENT
Start: 2023-03-06 | End: 2023-03-06 | Stop reason: SDUPTHER

## 2023-03-06 RX ORDER — PROPRANOLOL HYDROCHLORIDE 80 MG/1
CAPSULE, EXTENDED RELEASE ORAL
Qty: 90 CAPSULE | Refills: 2 | Status: SHIPPED | OUTPATIENT
Start: 2023-03-06

## 2023-03-06 RX ORDER — AMMONIUM LACTATE 12 G/100G
CREAM TOPICAL
Qty: 385 G | Refills: 1 | Status: SHIPPED | OUTPATIENT
Start: 2023-03-06 | End: 2023-04-05

## 2023-03-06 ASSESSMENT — PATIENT HEALTH QUESTIONNAIRE - PHQ9
SUM OF ALL RESPONSES TO PHQ QUESTIONS 1-9: 0
1. LITTLE INTEREST OR PLEASURE IN DOING THINGS: 0
SUM OF ALL RESPONSES TO PHQ QUESTIONS 1-9: 0
SUM OF ALL RESPONSES TO PHQ9 QUESTIONS 1 & 2: 0
2. FEELING DOWN, DEPRESSED OR HOPELESS: 0

## 2023-03-06 ASSESSMENT — ENCOUNTER SYMPTOMS
RESPIRATORY NEGATIVE: 1
GASTROINTESTINAL NEGATIVE: 1

## 2023-03-06 NOTE — PROGRESS NOTES
Nichelle Laura (:  1960) is a 58 y.o. female,Established patient, here for evaluation of the following chief complaint(s):  3 Month Follow-Up, Medication Refill, Medication Check, and Rash (Comes and goes across back of neck)         ASSESSMENT/PLAN:  1. Polyarthralgia  2. Chronic insomnia  -     zolpidem (AMBIEN) 10 MG tablet; TAKE ONE TABLET BY MOUTH EVERY NIGHT AT BEDTIME AS NEEDED, Disp-30 tablet, R-2Normal  3. Anxiety  -     citalopram (CELEXA) 40 MG tablet; TAKE ONE TABLET BY MOUTH DAILY, Disp-90 tablet, R-2Normal  4. Reactive depression  -     citalopram (CELEXA) 40 MG tablet; TAKE ONE TABLET BY MOUTH DAILY, Disp-90 tablet, R-2Normal  5. Generalized pruritus  -     hydrOXYzine HCl (ATARAX) 25 MG tablet; TAKE TWO TABLETS BY MOUTH EVERY NIGHT AT BEDTIME AS NEEDED FOR ITCHING, Disp-60 tablet, R-1Normal  6. Primary osteoarthritis of right knee  -     tiZANidine (ZANAFLEX) 4 MG tablet; One tid prn muscle spasm, Disp-90 tablet, R-1Normal  7. Spinal stenosis of lumbar region without neurogenic claudication  -     HYDROcodone-acetaminophen (NORCO) 7.5-325 MG per tablet; Take 1 tablet by mouth every 6 hours as needed for Pain for up to 30 days. Intended supply: 30 days, Disp-120 tablet, R-0Normal  -     gabapentin (NEURONTIN) 300 MG capsule; One tid, Disp-90 capsule, R-3Normal  8. Primary hypertension  -     propranolol (INDERAL LA) 80 MG extended release capsule; TAKE ONE CAPSULE BY MOUTH DAILY, Disp-90 capsule, R-2Normal  -     NIFEdipine (PROCARDIA XL) 60 MG extended release tablet; TAKE ONE TABLET BY MOUTH DAILY, Disp-90 tablet, R-1Normal  -     hydroCHLOROthiazide (HYDRODIURIL) 25 MG tablet; TAKE ONE TABLET BY MOUTH EVERY MORNING, Disp-90 tablet, R-2Normal  9. Intrinsic eczema  -     ammonium lactate (LAC-HYDRIN) 12 % cream; Apply topically as needed. , Disp-385 g, R-1, Normal      No follow-ups on file.          Subjective   SUBJECTIVE/OBJECTIVE:  Rash  Has itching upper back and shoulder  Comes and goes  No rash to see  She is taking atarax for itching but does not help  Putting on moisturizer    Chronic low back pain  Needs her pain meds refilled  The medicine takes the edge off but still hurts  Also taking the gabapentin    Chronic anxiety and insomnia  Needs her meds refilled  Sleeping ok  Takes prn  No side effects      Review of Systems   Constitutional: Negative.    Respiratory: Negative.     Cardiovascular: Negative.    Gastrointestinal: Negative.    Skin:  Positive for rash.   Neurological: Negative.    Psychiatric/Behavioral:  Positive for agitation and sleep disturbance. The patient is nervous/anxious.         Objective   Physical Exam  Constitutional:       General: She is not in acute distress.     Appearance: She is well-developed.   HENT:      Head: Normocephalic.   Neurological:      Mental Status: She is alert and oriented to person, place, and time.   Psychiatric:         Behavior: Behavior normal.         Thought Content: Thought content normal.         Judgment: Judgment normal.                An electronic signature was used to authenticate this note.    --CANDICE DELONG, DO

## 2023-03-07 DIAGNOSIS — F41.1 GAD (GENERALIZED ANXIETY DISORDER): ICD-10-CM

## 2023-03-07 RX ORDER — LORAZEPAM 1 MG/1
TABLET ORAL
Qty: 90 TABLET | Refills: 0 | Status: SHIPPED | OUTPATIENT
Start: 2023-03-07 | End: 2023-04-06

## 2023-03-14 DIAGNOSIS — L20.84 INTRINSIC ECZEMA: ICD-10-CM

## 2023-03-14 RX ORDER — AMMONIUM LACTATE 12 G/100G
CREAM TOPICAL
Qty: 385 G | Refills: 1 | Status: SHIPPED | OUTPATIENT
Start: 2023-03-14 | End: 2023-04-13

## 2023-04-05 DIAGNOSIS — M48.061 SPINAL STENOSIS OF LUMBAR REGION WITHOUT NEUROGENIC CLAUDICATION: ICD-10-CM

## 2023-04-05 DIAGNOSIS — F41.1 GAD (GENERALIZED ANXIETY DISORDER): ICD-10-CM

## 2023-04-05 DIAGNOSIS — F51.04 CHRONIC INSOMNIA: ICD-10-CM

## 2023-04-05 RX ORDER — ZOLPIDEM TARTRATE 10 MG/1
TABLET ORAL
Qty: 30 TABLET | Refills: 2 | Status: SHIPPED | OUTPATIENT
Start: 2023-04-05 | End: 2023-05-05

## 2023-04-05 RX ORDER — HYDROCODONE BITARTRATE AND ACETAMINOPHEN 7.5; 325 MG/1; MG/1
1 TABLET ORAL EVERY 6 HOURS PRN
Qty: 120 TABLET | Refills: 0 | Status: SHIPPED | OUTPATIENT
Start: 2023-04-05 | End: 2023-05-05

## 2023-04-05 RX ORDER — LORAZEPAM 1 MG/1
TABLET ORAL
Qty: 90 TABLET | Refills: 0 | Status: SHIPPED | OUTPATIENT
Start: 2023-04-05 | End: 2023-05-05

## 2023-05-04 ENCOUNTER — TELEPHONE (OUTPATIENT)
Dept: FAMILY MEDICINE CLINIC | Age: 63
End: 2023-05-04

## 2023-05-04 DIAGNOSIS — M48.061 SPINAL STENOSIS OF LUMBAR REGION WITHOUT NEUROGENIC CLAUDICATION: ICD-10-CM

## 2023-05-04 DIAGNOSIS — F41.1 GAD (GENERALIZED ANXIETY DISORDER): ICD-10-CM

## 2023-05-04 DIAGNOSIS — F51.04 CHRONIC INSOMNIA: ICD-10-CM

## 2023-05-04 RX ORDER — LORAZEPAM 1 MG/1
TABLET ORAL
Qty: 90 TABLET | Refills: 0 | Status: SHIPPED | OUTPATIENT
Start: 2023-05-04 | End: 2023-06-03

## 2023-05-04 RX ORDER — ZOLPIDEM TARTRATE 10 MG/1
TABLET ORAL
Qty: 30 TABLET | Refills: 2 | Status: SHIPPED | OUTPATIENT
Start: 2023-05-04 | End: 2023-06-03

## 2023-05-04 RX ORDER — HYDROCODONE BITARTRATE AND ACETAMINOPHEN 7.5; 325 MG/1; MG/1
1 TABLET ORAL EVERY 6 HOURS PRN
Qty: 120 TABLET | Refills: 0 | Status: SHIPPED | OUTPATIENT
Start: 2023-05-04 | End: 2023-06-03

## 2023-05-23 ENCOUNTER — NURSE ONLY (OUTPATIENT)
Dept: FAMILY MEDICINE CLINIC | Age: 63
End: 2023-05-23
Payer: COMMERCIAL

## 2023-05-23 ENCOUNTER — E-VISIT (OUTPATIENT)
Dept: FAMILY MEDICINE CLINIC | Age: 63
End: 2023-05-23

## 2023-05-23 DIAGNOSIS — J02.9 ACUTE PHARYNGITIS, UNSPECIFIED ETIOLOGY: Primary | ICD-10-CM

## 2023-05-23 DIAGNOSIS — J02.0 STREP THROAT: Primary | ICD-10-CM

## 2023-05-23 LAB — S PYO AG THROAT QL: POSITIVE

## 2023-05-23 PROCEDURE — 99422 OL DIG E/M SVC 11-20 MIN: CPT | Performed by: FAMILY MEDICINE

## 2023-05-23 PROCEDURE — 87880 STREP A ASSAY W/OPTIC: CPT | Performed by: FAMILY MEDICINE

## 2023-05-23 RX ORDER — AZITHROMYCIN 250 MG/1
250 TABLET, FILM COATED ORAL SEE ADMIN INSTRUCTIONS
Qty: 6 TABLET | Refills: 0 | Status: SHIPPED | OUTPATIENT
Start: 2023-05-23 | End: 2023-05-28

## 2023-05-23 ASSESSMENT — LIFESTYLE VARIABLES: SMOKING_STATUS: NO, I'VE NEVER SMOKED

## 2023-05-23 NOTE — PROGRESS NOTES
Armando Barros (1960) initiated an asynchronous digital communication through [employer]. HPI: per patient questionnaire     Exam: not applicable    Diagnoses and all orders for this visit:  Diagnoses and all orders for this visit:    Strep throat          Time: EV2 - 11-20 minutes were spent on the digital evaluation and management of this patient.      Jasbir Gonzalez DO

## 2023-05-23 NOTE — PROGRESS NOTES
Pt in for strep test, exposure to strep from grandchildren. S/T since Sunday. Pt instructed on how to submit e-visit.

## 2023-05-30 SDOH — ECONOMIC STABILITY: HOUSING INSECURITY
IN THE LAST 12 MONTHS, WAS THERE A TIME WHEN YOU DID NOT HAVE A STEADY PLACE TO SLEEP OR SLEPT IN A SHELTER (INCLUDING NOW)?: NO

## 2023-05-30 SDOH — ECONOMIC STABILITY: FOOD INSECURITY: WITHIN THE PAST 12 MONTHS, THE FOOD YOU BOUGHT JUST DIDN'T LAST AND YOU DIDN'T HAVE MONEY TO GET MORE.: NEVER TRUE

## 2023-05-30 SDOH — ECONOMIC STABILITY: TRANSPORTATION INSECURITY
IN THE PAST 12 MONTHS, HAS LACK OF TRANSPORTATION KEPT YOU FROM MEETINGS, WORK, OR FROM GETTING THINGS NEEDED FOR DAILY LIVING?: NO

## 2023-05-30 SDOH — ECONOMIC STABILITY: INCOME INSECURITY: HOW HARD IS IT FOR YOU TO PAY FOR THE VERY BASICS LIKE FOOD, HOUSING, MEDICAL CARE, AND HEATING?: SOMEWHAT HARD

## 2023-05-30 SDOH — ECONOMIC STABILITY: FOOD INSECURITY: WITHIN THE PAST 12 MONTHS, YOU WORRIED THAT YOUR FOOD WOULD RUN OUT BEFORE YOU GOT MONEY TO BUY MORE.: NEVER TRUE

## 2023-05-31 ENCOUNTER — TELEPHONE (OUTPATIENT)
Dept: FAMILY MEDICINE CLINIC | Age: 63
End: 2023-05-31

## 2023-05-31 NOTE — TELEPHONE ENCOUNTER
Pt called to ask Dr Gavin Clark about getting a second round of antibiotics for her throat. She still has white bumps and her throat is still sore. Please give pt a call [phone].  [address] [phone]

## 2023-05-31 NOTE — TELEPHONE ENCOUNTER
LVM for pt to call the office, need to know when pt started the Zithromax, prescribed 5/23/23. Zithromax continues to work for additional 5 days after completing course.

## 2023-06-02 ENCOUNTER — OFFICE VISIT (OUTPATIENT)
Dept: FAMILY MEDICINE CLINIC | Age: 63
End: 2023-06-02
Payer: COMMERCIAL

## 2023-06-02 VITALS — BODY MASS INDEX: 33.4 KG/M2 | DIASTOLIC BLOOD PRESSURE: 80 MMHG | SYSTOLIC BLOOD PRESSURE: 160 MMHG | WEIGHT: 171 LBS

## 2023-06-02 DIAGNOSIS — F51.04 CHRONIC INSOMNIA: ICD-10-CM

## 2023-06-02 DIAGNOSIS — F41.1 GAD (GENERALIZED ANXIETY DISORDER): Primary | ICD-10-CM

## 2023-06-02 DIAGNOSIS — M48.061 SPINAL STENOSIS OF LUMBAR REGION WITHOUT NEUROGENIC CLAUDICATION: ICD-10-CM

## 2023-06-02 PROBLEM — F11.10 OPIOID ABUSE, UNCOMPLICATED (HCC): Status: ACTIVE | Noted: 2023-06-02

## 2023-06-02 PROCEDURE — 3074F SYST BP LT 130 MM HG: CPT | Performed by: FAMILY MEDICINE

## 2023-06-02 PROCEDURE — 99213 OFFICE O/P EST LOW 20 MIN: CPT | Performed by: FAMILY MEDICINE

## 2023-06-02 PROCEDURE — 3078F DIAST BP <80 MM HG: CPT | Performed by: FAMILY MEDICINE

## 2023-06-02 RX ORDER — ZOLPIDEM TARTRATE 10 MG/1
TABLET ORAL
Qty: 30 TABLET | Refills: 2 | Status: SHIPPED | OUTPATIENT
Start: 2023-06-02 | End: 2023-07-01 | Stop reason: SDUPTHER

## 2023-06-02 RX ORDER — LORAZEPAM 1 MG/1
TABLET ORAL
Qty: 90 TABLET | Refills: 0 | Status: SHIPPED | OUTPATIENT
Start: 2023-06-02 | End: 2023-07-01 | Stop reason: SDUPTHER

## 2023-06-02 RX ORDER — DOXYCYCLINE HYCLATE 100 MG
100 TABLET ORAL 2 TIMES DAILY
Qty: 14 TABLET | Refills: 0 | Status: SHIPPED | OUTPATIENT
Start: 2023-06-02 | End: 2023-06-09

## 2023-06-02 RX ORDER — HYDROCODONE BITARTRATE AND ACETAMINOPHEN 7.5; 325 MG/1; MG/1
1 TABLET ORAL EVERY 6 HOURS PRN
Qty: 120 TABLET | Refills: 0 | Status: SHIPPED | OUTPATIENT
Start: 2023-06-02 | End: 2023-07-01 | Stop reason: SDUPTHER

## 2023-06-27 ASSESSMENT — ENCOUNTER SYMPTOMS
RESPIRATORY NEGATIVE: 1
GASTROINTESTINAL NEGATIVE: 1

## 2023-07-01 DIAGNOSIS — M48.061 SPINAL STENOSIS OF LUMBAR REGION WITHOUT NEUROGENIC CLAUDICATION: ICD-10-CM

## 2023-07-01 DIAGNOSIS — L29.9 GENERALIZED PRURITUS: ICD-10-CM

## 2023-07-01 DIAGNOSIS — F41.1 GAD (GENERALIZED ANXIETY DISORDER): ICD-10-CM

## 2023-07-01 DIAGNOSIS — F51.04 CHRONIC INSOMNIA: ICD-10-CM

## 2023-07-02 RX ORDER — GABAPENTIN 300 MG/1
CAPSULE ORAL
Qty: 90 CAPSULE | Refills: 3 | Status: SHIPPED | OUTPATIENT
Start: 2023-07-02 | End: 2023-10-10

## 2023-07-02 RX ORDER — ZOLPIDEM TARTRATE 10 MG/1
TABLET ORAL
Qty: 30 TABLET | Refills: 2 | Status: SHIPPED | OUTPATIENT
Start: 2023-07-02 | End: 2023-07-31

## 2023-07-02 RX ORDER — HYDROCODONE BITARTRATE AND ACETAMINOPHEN 7.5; 325 MG/1; MG/1
1 TABLET ORAL EVERY 6 HOURS PRN
Qty: 120 TABLET | Refills: 0 | Status: SHIPPED | OUTPATIENT
Start: 2023-07-02 | End: 2023-08-01

## 2023-07-02 RX ORDER — HYDROXYZINE HYDROCHLORIDE 25 MG/1
TABLET, FILM COATED ORAL
Qty: 60 TABLET | Refills: 1 | Status: SHIPPED | OUTPATIENT
Start: 2023-07-02

## 2023-07-02 RX ORDER — LORAZEPAM 1 MG/1
TABLET ORAL
Qty: 90 TABLET | Refills: 0 | Status: SHIPPED | OUTPATIENT
Start: 2023-07-02 | End: 2023-07-31

## 2023-07-13 DIAGNOSIS — M17.11 PRIMARY OSTEOARTHRITIS OF RIGHT KNEE: ICD-10-CM

## 2023-07-13 RX ORDER — TIZANIDINE 4 MG/1
TABLET ORAL
Qty: 90 TABLET | Refills: 1 | OUTPATIENT
Start: 2023-07-13

## 2023-07-13 NOTE — TELEPHONE ENCOUNTER
Last office visit 6/2/2023     Last written     Next office visit scheduled 9/1/2023    Requested Prescriptions     Pending Prescriptions Disp Refills    tiZANidine (ZANAFLEX) 4 MG tablet 90 tablet 1     Sig: One tid prn muscle spasm

## 2023-07-31 DIAGNOSIS — L29.9 GENERALIZED PRURITUS: ICD-10-CM

## 2023-07-31 DIAGNOSIS — F51.04 CHRONIC INSOMNIA: ICD-10-CM

## 2023-07-31 DIAGNOSIS — F41.1 GAD (GENERALIZED ANXIETY DISORDER): ICD-10-CM

## 2023-07-31 DIAGNOSIS — M48.061 SPINAL STENOSIS OF LUMBAR REGION WITHOUT NEUROGENIC CLAUDICATION: ICD-10-CM

## 2023-07-31 RX ORDER — HYDROCODONE BITARTRATE AND ACETAMINOPHEN 7.5; 325 MG/1; MG/1
1 TABLET ORAL EVERY 6 HOURS PRN
Qty: 120 TABLET | Refills: 0 | Status: SHIPPED | OUTPATIENT
Start: 2023-07-31 | End: 2023-08-30

## 2023-07-31 RX ORDER — ZOLPIDEM TARTRATE 10 MG/1
TABLET ORAL
Qty: 30 TABLET | Refills: 2 | Status: SHIPPED | OUTPATIENT
Start: 2023-07-31 | End: 2023-08-29

## 2023-07-31 RX ORDER — HYDROXYZINE HYDROCHLORIDE 25 MG/1
TABLET, FILM COATED ORAL
Qty: 60 TABLET | Refills: 1 | Status: SHIPPED | OUTPATIENT
Start: 2023-07-31

## 2023-07-31 RX ORDER — LORAZEPAM 1 MG/1
TABLET ORAL
Qty: 90 TABLET | Refills: 0 | Status: SHIPPED | OUTPATIENT
Start: 2023-07-31 | End: 2023-08-29

## 2023-07-31 NOTE — TELEPHONE ENCOUNTER
Last office visit 6/2/2023     Last written      Next office visit scheduled 9/1/2023    Requested Prescriptions     Pending Prescriptions Disp Refills    hydrOXYzine HCl (ATARAX) 25 MG tablet 60 tablet 1     Sig: TAKE TWO TABLETS BY MOUTH EVERY NIGHT AT BEDTIME AS NEEDED FOR ITCHING    LORazepam (ATIVAN) 1 MG tablet 90 tablet 0     Sig: TAKE ONE TABLET BY MOUTH EVERY 8 HOURS AS NEEDED FOR ANXIETY    HYDROcodone-acetaminophen (NORCO) 7.5-325 MG per tablet 120 tablet 0     Sig: Take 1 tablet by mouth every 6 hours as needed for Pain for up to 30 days.  Intended supply: 30 days    zolpidem (AMBIEN) 10 MG tablet 30 tablet 2     Sig: TAKE ONE TABLET BY MOUTH EVERY NIGHT AT BEDTIME AS NEEDED

## 2023-08-05 ENCOUNTER — APPOINTMENT (OUTPATIENT)
Dept: GENERAL RADIOLOGY | Age: 63
End: 2023-08-05
Payer: COMMERCIAL

## 2023-08-05 ENCOUNTER — HOSPITAL ENCOUNTER (EMERGENCY)
Age: 63
Discharge: HOME | End: 2023-08-05
Payer: COMMERCIAL

## 2023-08-05 VITALS
BODY MASS INDEX: 34.76 KG/M2 | HEART RATE: 81 BPM | SYSTOLIC BLOOD PRESSURE: 209 MMHG | RESPIRATION RATE: 14 BRPM | DIASTOLIC BLOOD PRESSURE: 93 MMHG | OXYGEN SATURATION: 92 % | TEMPERATURE: 99.4 F | WEIGHT: 178 LBS
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
## 2023-08-05 DIAGNOSIS — I10 ASYMPTOMATIC HYPERTENSION: ICD-10-CM

## 2023-08-05 DIAGNOSIS — S49.92XA INJURY OF LEFT SHOULDER, INITIAL ENCOUNTER: ICD-10-CM

## 2023-08-05 PROCEDURE — 99283 EMERGENCY DEPT VISIT LOW MDM: CPT

## 2023-08-05 PROCEDURE — 6370000000 HC RX 637 (ALT 250 FOR IP): Performed by: PHYSICIAN ASSISTANT

## 2023-08-05 PROCEDURE — 73080 X-RAY EXAM OF ELBOW: CPT

## 2023-08-05 PROCEDURE — 73030 X-RAY EXAM OF SHOULDER: CPT

## 2023-08-05 RX ORDER — HYDROCODONE BITARTRATE AND ACETAMINOPHEN 5; 325 MG/1; MG/1
1 TABLET ORAL ONCE
Status: COMPLETED | OUTPATIENT
Start: 2023-08-05 | End: 2023-08-05

## 2023-08-05 RX ORDER — ONDANSETRON 4 MG/1
4 TABLET, ORALLY DISINTEGRATING ORAL ONCE
Status: COMPLETED | OUTPATIENT
Start: 2023-08-05 | End: 2023-08-05

## 2023-08-05 RX ADMIN — HYDROCODONE BITARTRATE AND ACETAMINOPHEN 1 TABLET: 5; 325 TABLET ORAL at 13:45

## 2023-08-05 RX ADMIN — ONDANSETRON 4 MG: 4 TABLET, ORALLY DISINTEGRATING ORAL at 13:45

## 2023-08-05 ASSESSMENT — PAIN SCALES - GENERAL: PAINLEVEL_OUTOF10: 9

## 2023-08-05 ASSESSMENT — ENCOUNTER SYMPTOMS
VOMITING: 0
BACK PAIN: 0
PHOTOPHOBIA: 0
ABDOMINAL PAIN: 0
COLOR CHANGE: 0
CHEST TIGHTNESS: 0
COUGH: 0
RESPIRATORY NEGATIVE: 1
DIARRHEA: 0
SHORTNESS OF BREATH: 0
CONSTIPATION: 0
NAUSEA: 0

## 2023-08-05 ASSESSMENT — PAIN - FUNCTIONAL ASSESSMENT
PAIN_FUNCTIONAL_ASSESSMENT: 0-10
PAIN_FUNCTIONAL_ASSESSMENT: PREVENTS OR INTERFERES SOME ACTIVE ACTIVITIES AND ADLS

## 2023-08-05 ASSESSMENT — PAIN DESCRIPTION - DESCRIPTORS: DESCRIPTORS: ACHING

## 2023-08-05 ASSESSMENT — PAIN DESCRIPTION - ORIENTATION: ORIENTATION: LEFT

## 2023-08-05 ASSESSMENT — PAIN DESCRIPTION - LOCATION: LOCATION: SHOULDER

## 2023-08-05 ASSESSMENT — PAIN DESCRIPTION - PAIN TYPE: TYPE: ACUTE PAIN

## 2023-08-12 DIAGNOSIS — M17.11 PRIMARY OSTEOARTHRITIS OF RIGHT KNEE: ICD-10-CM

## 2023-08-13 RX ORDER — TIZANIDINE 4 MG/1
TABLET ORAL
Qty: 90 TABLET | Refills: 1 | Status: SHIPPED | OUTPATIENT
Start: 2023-08-13

## 2023-08-29 DIAGNOSIS — M48.061 SPINAL STENOSIS OF LUMBAR REGION WITHOUT NEUROGENIC CLAUDICATION: ICD-10-CM

## 2023-08-29 DIAGNOSIS — F41.1 GAD (GENERALIZED ANXIETY DISORDER): ICD-10-CM

## 2023-08-29 DIAGNOSIS — L29.9 GENERALIZED PRURITUS: ICD-10-CM

## 2023-08-29 DIAGNOSIS — F51.04 CHRONIC INSOMNIA: ICD-10-CM

## 2023-08-29 RX ORDER — HYDROCODONE BITARTRATE AND ACETAMINOPHEN 7.5; 325 MG/1; MG/1
1 TABLET ORAL EVERY 6 HOURS PRN
Qty: 120 TABLET | Refills: 0 | Status: SHIPPED | OUTPATIENT
Start: 2023-08-29 | End: 2023-09-28

## 2023-08-29 RX ORDER — ZOLPIDEM TARTRATE 10 MG/1
TABLET ORAL
Qty: 30 TABLET | Refills: 0 | Status: SHIPPED | OUTPATIENT
Start: 2023-08-29 | End: 2023-09-27

## 2023-08-29 RX ORDER — LORAZEPAM 1 MG/1
TABLET ORAL
Qty: 90 TABLET | Refills: 0 | Status: SHIPPED | OUTPATIENT
Start: 2023-08-29 | End: 2023-09-27

## 2023-08-29 RX ORDER — GABAPENTIN 300 MG/1
CAPSULE ORAL
Qty: 90 CAPSULE | Refills: 3 | Status: SHIPPED | OUTPATIENT
Start: 2023-08-29 | End: 2023-12-07

## 2023-08-29 RX ORDER — HYDROXYZINE HYDROCHLORIDE 25 MG/1
TABLET, FILM COATED ORAL
Qty: 60 TABLET | Refills: 1 | Status: SHIPPED | OUTPATIENT
Start: 2023-08-29

## 2023-08-30 DIAGNOSIS — I10 PRIMARY HYPERTENSION: ICD-10-CM

## 2023-08-30 RX ORDER — NIFEDIPINE 60 MG/1
TABLET, EXTENDED RELEASE ORAL
Qty: 90 TABLET | Refills: 1 | Status: SHIPPED | OUTPATIENT
Start: 2023-08-30

## 2023-09-01 ENCOUNTER — OFFICE VISIT (OUTPATIENT)
Dept: FAMILY MEDICINE CLINIC | Age: 63
End: 2023-09-01
Payer: COMMERCIAL

## 2023-09-01 VITALS
SYSTOLIC BLOOD PRESSURE: 130 MMHG | DIASTOLIC BLOOD PRESSURE: 88 MMHG | HEIGHT: 60 IN | BODY MASS INDEX: 34.36 KG/M2 | WEIGHT: 175 LBS

## 2023-09-01 DIAGNOSIS — M48.061 SPINAL STENOSIS OF LUMBAR REGION WITHOUT NEUROGENIC CLAUDICATION: Primary | ICD-10-CM

## 2023-09-01 DIAGNOSIS — I10 PRIMARY HYPERTENSION: ICD-10-CM

## 2023-09-01 PROCEDURE — 90471 IMMUNIZATION ADMIN: CPT | Performed by: FAMILY MEDICINE

## 2023-09-01 PROCEDURE — 3078F DIAST BP <80 MM HG: CPT | Performed by: FAMILY MEDICINE

## 2023-09-01 PROCEDURE — 90694 VACC AIIV4 NO PRSRV 0.5ML IM: CPT | Performed by: FAMILY MEDICINE

## 2023-09-01 PROCEDURE — 99213 OFFICE O/P EST LOW 20 MIN: CPT | Performed by: FAMILY MEDICINE

## 2023-09-01 PROCEDURE — 3074F SYST BP LT 130 MM HG: CPT | Performed by: FAMILY MEDICINE

## 2023-09-01 NOTE — PROGRESS NOTES
Subjective:      Patient ID: Dre Pimentel is a 58 y.o. female. HPI  Med checkup   She is following up about her pain medicine  She does not need a refill  Chronic pain in her back  Takes regularly  No side effects   Review of Systems   Constitutional: Negative. HENT: Negative. Respiratory: Negative. Cardiovascular: Negative. Gastrointestinal: Negative. Neurological: Negative. Psychiatric/Behavioral:  Positive for decreased concentration. YOB: 1960    Date of Visit:  9/1/2023    Allergies   Allergen Reactions    Penicillins Rash       Outpatient Medications Marked as Taking for the 9/1/23 encounter (Office Visit) with Nancy Overall, DO   Medication Sig Dispense Refill    NIFEdipine (PROCARDIA XL) 60 MG extended release tablet TAKE ONE TABLET BY MOUTH DAILY 90 tablet 1    gabapentin (NEURONTIN) 300 MG capsule One tid 90 capsule 3    hydrOXYzine HCl (ATARAX) 25 MG tablet TAKE TWO TABLETS BY MOUTH EVERY NIGHT AT BEDTIME AS NEEDED FOR ITCHING 60 tablet 1    LORazepam (ATIVAN) 1 MG tablet TAKE ONE TABLET BY MOUTH EVERY 8 HOURS AS NEEDED FOR ANXIETY 90 tablet 0    HYDROcodone-acetaminophen (NORCO) 7.5-325 MG per tablet Take 1 tablet by mouth every 6 hours as needed for Pain for up to 30 days.  Intended supply: 30 days 120 tablet 0    zolpidem (AMBIEN) 10 MG tablet TAKE ONE TABLET BY MOUTH EVERY NIGHT AT BEDTIME AS NEEDED 30 tablet 0    tiZANidine (ZANAFLEX) 4 MG tablet One tid prn muscle spasm 90 tablet 1    propranolol (INDERAL LA) 80 MG extended release capsule TAKE ONE CAPSULE BY MOUTH DAILY 90 capsule 2    hydroCHLOROthiazide (HYDRODIURIL) 25 MG tablet TAKE ONE TABLET BY MOUTH EVERY MORNING 90 tablet 2    citalopram (CELEXA) 40 MG tablet TAKE ONE TABLET BY MOUTH DAILY 90 tablet 2    NIFEdipine (ADALAT CC) 60 MG extended release tablet Take 1 tablet by mouth 2 times daily 30 tablet 3    pantoprazole (PROTONIX) 40 MG tablet Take 1 tablet by mouth every morning (before breakfast)

## 2023-09-04 ASSESSMENT — ENCOUNTER SYMPTOMS
RESPIRATORY NEGATIVE: 1
GASTROINTESTINAL NEGATIVE: 1

## 2023-09-11 DIAGNOSIS — M17.11 PRIMARY OSTEOARTHRITIS OF RIGHT KNEE: ICD-10-CM

## 2023-09-11 RX ORDER — TIZANIDINE 4 MG/1
TABLET ORAL
Qty: 90 TABLET | Refills: 1 | Status: SHIPPED | OUTPATIENT
Start: 2023-09-11

## 2023-09-27 DIAGNOSIS — F41.9 ANXIETY: ICD-10-CM

## 2023-09-27 DIAGNOSIS — L29.9 GENERALIZED PRURITUS: ICD-10-CM

## 2023-09-27 DIAGNOSIS — F51.04 CHRONIC INSOMNIA: ICD-10-CM

## 2023-09-27 DIAGNOSIS — F32.9 REACTIVE DEPRESSION: ICD-10-CM

## 2023-09-27 DIAGNOSIS — M48.061 SPINAL STENOSIS OF LUMBAR REGION WITHOUT NEUROGENIC CLAUDICATION: ICD-10-CM

## 2023-09-27 DIAGNOSIS — F41.1 GAD (GENERALIZED ANXIETY DISORDER): ICD-10-CM

## 2023-09-27 DIAGNOSIS — I10 PRIMARY HYPERTENSION: ICD-10-CM

## 2023-09-27 RX ORDER — HYDROCODONE BITARTRATE AND ACETAMINOPHEN 7.5; 325 MG/1; MG/1
1 TABLET ORAL EVERY 6 HOURS PRN
Qty: 120 TABLET | Refills: 0 | Status: SHIPPED | OUTPATIENT
Start: 2023-09-27 | End: 2023-09-29 | Stop reason: SDUPTHER

## 2023-09-27 RX ORDER — CITALOPRAM 40 MG/1
TABLET ORAL
Qty: 90 TABLET | Refills: 2 | Status: SHIPPED | OUTPATIENT
Start: 2023-09-27

## 2023-09-27 RX ORDER — HYDROXYZINE HYDROCHLORIDE 25 MG/1
TABLET, FILM COATED ORAL
Qty: 60 TABLET | Refills: 1 | Status: SHIPPED | OUTPATIENT
Start: 2023-09-27

## 2023-09-27 RX ORDER — ZOLPIDEM TARTRATE 10 MG/1
TABLET ORAL
Qty: 30 TABLET | Refills: 0 | Status: SHIPPED | OUTPATIENT
Start: 2023-09-27 | End: 2023-10-26

## 2023-09-27 RX ORDER — LORAZEPAM 1 MG/1
TABLET ORAL
Qty: 90 TABLET | Refills: 0 | Status: SHIPPED | OUTPATIENT
Start: 2023-09-27 | End: 2023-10-26

## 2023-09-27 RX ORDER — HYDROCHLOROTHIAZIDE 25 MG/1
TABLET ORAL
Qty: 90 TABLET | Refills: 2 | Status: SHIPPED | OUTPATIENT
Start: 2023-09-27

## 2023-09-27 NOTE — TELEPHONE ENCOUNTER
Last office visit 9/1/2023       Next office visit scheduled Visit date not found    Requested Prescriptions     Pending Prescriptions Disp Refills    hydroCHLOROthiazide (HYDRODIURIL) 25 MG tablet 90 tablet 2     Sig: TAKE ONE TABLET BY MOUTH EVERY MORNING    citalopram (CELEXA) 40 MG tablet 90 tablet 2     Sig: TAKE ONE TABLET BY MOUTH DAILY    hydrOXYzine HCl (ATARAX) 25 MG tablet 60 tablet 1     Sig: TAKE TWO TABLETS BY MOUTH EVERY NIGHT AT BEDTIME AS NEEDED FOR ITCHING    LORazepam (ATIVAN) 1 MG tablet 90 tablet 0     Sig: TAKE ONE TABLET BY MOUTH EVERY 8 HOURS AS NEEDED FOR ANXIETY    HYDROcodone-acetaminophen (NORCO) 7.5-325 MG per tablet 120 tablet 0     Sig: Take 1 tablet by mouth every 6 hours as needed for Pain for up to 30 days.  Intended supply: 30 days    zolpidem (AMBIEN) 10 MG tablet 30 tablet 0     Sig: TAKE ONE TABLET BY MOUTH EVERY NIGHT AT BEDTIME AS NEEDED

## 2023-09-29 DIAGNOSIS — M48.061 SPINAL STENOSIS OF LUMBAR REGION WITHOUT NEUROGENIC CLAUDICATION: ICD-10-CM

## 2023-09-29 RX ORDER — HYDROCODONE BITARTRATE AND ACETAMINOPHEN 7.5; 325 MG/1; MG/1
1 TABLET ORAL EVERY 6 HOURS PRN
Qty: 120 TABLET | Refills: 0 | Status: SHIPPED | OUTPATIENT
Start: 2023-09-29 | End: 2023-10-01 | Stop reason: SDUPTHER

## 2023-09-30 DIAGNOSIS — M48.061 SPINAL STENOSIS OF LUMBAR REGION WITHOUT NEUROGENIC CLAUDICATION: ICD-10-CM

## 2023-09-30 RX ORDER — HYDROCODONE BITARTRATE AND ACETAMINOPHEN 7.5; 325 MG/1; MG/1
1 TABLET ORAL EVERY 6 HOURS PRN
Qty: 120 TABLET | Refills: 0 | Status: CANCELLED | OUTPATIENT
Start: 2023-09-30 | End: 2023-10-30

## 2023-10-01 DIAGNOSIS — M48.061 SPINAL STENOSIS OF LUMBAR REGION WITHOUT NEUROGENIC CLAUDICATION: ICD-10-CM

## 2023-10-01 RX ORDER — HYDROCODONE BITARTRATE AND ACETAMINOPHEN 7.5; 325 MG/1; MG/1
1 TABLET ORAL EVERY 6 HOURS PRN
Qty: 120 TABLET | Refills: 0 | Status: SHIPPED | OUTPATIENT
Start: 2023-10-01 | End: 2023-10-31

## 2023-10-11 DIAGNOSIS — M17.11 PRIMARY OSTEOARTHRITIS OF RIGHT KNEE: ICD-10-CM

## 2023-10-11 RX ORDER — TIZANIDINE 4 MG/1
TABLET ORAL
Qty: 90 TABLET | Refills: 1 | Status: SHIPPED | OUTPATIENT
Start: 2023-10-11

## 2023-10-11 NOTE — TELEPHONE ENCOUNTER
Last office visit 9/1/2023     Last written      Next office visit scheduled 12/20/2023    Requested Prescriptions     Pending Prescriptions Disp Refills    tiZANidine (ZANAFLEX) 4 MG tablet 90 tablet 1     Sig: One tid prn muscle spasm

## 2023-10-25 DIAGNOSIS — F51.04 CHRONIC INSOMNIA: ICD-10-CM

## 2023-10-25 DIAGNOSIS — M48.061 SPINAL STENOSIS OF LUMBAR REGION WITHOUT NEUROGENIC CLAUDICATION: ICD-10-CM

## 2023-10-25 DIAGNOSIS — F41.1 GAD (GENERALIZED ANXIETY DISORDER): ICD-10-CM

## 2023-10-25 DIAGNOSIS — L29.9 GENERALIZED PRURITUS: ICD-10-CM

## 2023-10-25 RX ORDER — HYDROCODONE BITARTRATE AND ACETAMINOPHEN 7.5; 325 MG/1; MG/1
1 TABLET ORAL EVERY 6 HOURS PRN
Qty: 120 TABLET | Refills: 0 | Status: SHIPPED | OUTPATIENT
Start: 2023-10-25 | End: 2023-11-24

## 2023-10-25 RX ORDER — HYDROXYZINE HYDROCHLORIDE 25 MG/1
TABLET, FILM COATED ORAL
Qty: 60 TABLET | Refills: 1 | Status: SHIPPED | OUTPATIENT
Start: 2023-10-25

## 2023-10-25 RX ORDER — ZOLPIDEM TARTRATE 10 MG/1
TABLET ORAL
Qty: 30 TABLET | Refills: 0 | Status: SHIPPED | OUTPATIENT
Start: 2023-10-25 | End: 2023-11-23

## 2023-10-25 RX ORDER — LORAZEPAM 1 MG/1
TABLET ORAL
Qty: 90 TABLET | Refills: 0 | Status: SHIPPED | OUTPATIENT
Start: 2023-10-25 | End: 2023-11-23

## 2023-10-25 RX ORDER — GABAPENTIN 300 MG/1
CAPSULE ORAL
Qty: 90 CAPSULE | Refills: 3 | Status: SHIPPED | OUTPATIENT
Start: 2023-10-25 | End: 2024-02-02

## 2023-11-10 DIAGNOSIS — M17.11 PRIMARY OSTEOARTHRITIS OF RIGHT KNEE: ICD-10-CM

## 2023-11-11 RX ORDER — TIZANIDINE 4 MG/1
TABLET ORAL
Qty: 90 TABLET | Refills: 1 | Status: SHIPPED | OUTPATIENT
Start: 2023-11-11

## 2023-11-24 ENCOUNTER — PATIENT MESSAGE (OUTPATIENT)
Dept: FAMILY MEDICINE CLINIC | Age: 63
End: 2023-11-24

## 2023-11-24 DIAGNOSIS — F41.1 GAD (GENERALIZED ANXIETY DISORDER): ICD-10-CM

## 2023-11-24 DIAGNOSIS — M48.061 SPINAL STENOSIS OF LUMBAR REGION WITHOUT NEUROGENIC CLAUDICATION: ICD-10-CM

## 2023-11-24 DIAGNOSIS — L29.9 GENERALIZED PRURITUS: ICD-10-CM

## 2023-11-24 DIAGNOSIS — F51.04 CHRONIC INSOMNIA: ICD-10-CM

## 2023-11-25 RX ORDER — HYDROXYZINE HYDROCHLORIDE 25 MG/1
TABLET, FILM COATED ORAL
Qty: 60 TABLET | Refills: 1 | Status: SHIPPED | OUTPATIENT
Start: 2023-11-25

## 2023-11-25 RX ORDER — HYDROCODONE BITARTRATE AND ACETAMINOPHEN 7.5; 325 MG/1; MG/1
1 TABLET ORAL EVERY 6 HOURS PRN
Qty: 120 TABLET | Refills: 0 | Status: SHIPPED | OUTPATIENT
Start: 2023-11-25 | End: 2023-12-25

## 2023-11-25 RX ORDER — GABAPENTIN 300 MG/1
CAPSULE ORAL
Qty: 90 CAPSULE | Refills: 3 | Status: SHIPPED | OUTPATIENT
Start: 2023-11-25 | End: 2024-03-03

## 2023-11-27 DIAGNOSIS — F41.1 GAD (GENERALIZED ANXIETY DISORDER): ICD-10-CM

## 2023-11-27 DIAGNOSIS — F51.04 CHRONIC INSOMNIA: ICD-10-CM

## 2023-11-27 RX ORDER — LORAZEPAM 1 MG/1
TABLET ORAL
Qty: 90 TABLET | Refills: 0 | Status: SHIPPED | OUTPATIENT
Start: 2023-11-27 | End: 2023-12-26

## 2023-11-27 RX ORDER — LORAZEPAM 1 MG/1
TABLET ORAL
Qty: 90 TABLET | Refills: 0 | Status: SHIPPED | OUTPATIENT
Start: 2023-11-27 | End: 2023-11-27 | Stop reason: SDUPTHER

## 2023-11-27 RX ORDER — ZOLPIDEM TARTRATE 10 MG/1
TABLET ORAL
Qty: 30 TABLET | Refills: 0 | Status: SHIPPED | OUTPATIENT
Start: 2023-11-27 | End: 2023-11-27 | Stop reason: SDUPTHER

## 2023-11-27 RX ORDER — ZOLPIDEM TARTRATE 10 MG/1
TABLET ORAL
Qty: 30 TABLET | Refills: 0 | Status: SHIPPED | OUTPATIENT
Start: 2023-11-27 | End: 2023-12-26

## 2023-11-27 NOTE — TELEPHONE ENCOUNTER
From: Denise León  Sent: 11/26/2023 7:32 AM EST  [facility] [facility]  Subject: Medicine refill     Not sure iif you seen my message about lorazepam and my Zolpidem are not on my refill list anymore. I need these refilled. Thanks.

## 2023-11-27 NOTE — TELEPHONE ENCOUNTER
Last office visit 9/1/2023       Next office visit scheduled 12/20/2023    Requested Prescriptions     Pending Prescriptions Disp Refills    LORazepam (ATIVAN) 1 MG tablet 90 tablet 0     Sig: TAKE ONE TABLET BY MOUTH EVERY 8 HOURS AS NEEDED FOR ANXIETY    zolpidem (AMBIEN) 10 MG tablet 30 tablet 0     Sig: TAKE ONE TABLET BY MOUTH EVERY NIGHT AT BEDTIME AS NEEDED

## 2023-12-20 PROBLEM — F11.10 OPIOID ABUSE, UNCOMPLICATED (HCC): Status: RESOLVED | Noted: 2023-06-02 | Resolved: 2023-12-20

## 2024-01-23 DIAGNOSIS — F51.04 CHRONIC INSOMNIA: ICD-10-CM

## 2024-01-23 DIAGNOSIS — F41.1 GAD (GENERALIZED ANXIETY DISORDER): ICD-10-CM

## 2024-01-23 RX ORDER — ZOLPIDEM TARTRATE 10 MG/1
TABLET ORAL
Qty: 30 TABLET | Refills: 2 | Status: SHIPPED | OUTPATIENT
Start: 2024-01-23 | End: 2024-01-24 | Stop reason: SDUPTHER

## 2024-01-23 RX ORDER — LORAZEPAM 1 MG/1
TABLET ORAL
Qty: 90 TABLET | Refills: 2 | Status: SHIPPED | OUTPATIENT
Start: 2024-01-23 | End: 2024-01-24 | Stop reason: SDUPTHER

## 2024-01-23 NOTE — TELEPHONE ENCOUNTER
Last office visit 12/20/2023     Next office visit scheduled 3/20/2024    Requested Prescriptions     Pending Prescriptions Disp Refills    LORazepam (ATIVAN) 1 MG tablet 90 tablet 2     Sig: TAKE ONE TABLET BY MOUTH EVERY 8 HOURS AS NEEDED FOR ANXIETY    zolpidem (AMBIEN) 10 MG tablet 30 tablet 2     Sig: TAKE ONE TABLET BY MOUTH EVERY NIGHT AT BEDTIME AS NEEDED

## 2024-01-24 DIAGNOSIS — I10 PRIMARY HYPERTENSION: ICD-10-CM

## 2024-01-24 DIAGNOSIS — F51.04 CHRONIC INSOMNIA: ICD-10-CM

## 2024-01-24 DIAGNOSIS — M48.061 SPINAL STENOSIS OF LUMBAR REGION WITHOUT NEUROGENIC CLAUDICATION: ICD-10-CM

## 2024-01-24 DIAGNOSIS — F41.1 GAD (GENERALIZED ANXIETY DISORDER): ICD-10-CM

## 2024-01-24 RX ORDER — HYDROCODONE BITARTRATE AND ACETAMINOPHEN 7.5; 325 MG/1; MG/1
1 TABLET ORAL EVERY 6 HOURS PRN
Qty: 120 TABLET | Refills: 0 | Status: SHIPPED | OUTPATIENT
Start: 2024-01-24 | End: 2024-02-23

## 2024-01-24 RX ORDER — LORAZEPAM 1 MG/1
TABLET ORAL
Qty: 90 TABLET | Refills: 2 | Status: SHIPPED | OUTPATIENT
Start: 2024-01-24 | End: 2024-02-16

## 2024-01-24 RX ORDER — HYDROCHLOROTHIAZIDE 25 MG/1
TABLET ORAL
Qty: 90 TABLET | Refills: 2 | Status: SHIPPED | OUTPATIENT
Start: 2024-01-24

## 2024-01-24 RX ORDER — ZOLPIDEM TARTRATE 10 MG/1
TABLET ORAL
Qty: 30 TABLET | Refills: 2 | Status: SHIPPED | OUTPATIENT
Start: 2024-01-24 | End: 2024-02-16

## 2024-01-24 RX ORDER — GABAPENTIN 300 MG/1
CAPSULE ORAL
Qty: 90 CAPSULE | Refills: 3 | Status: SHIPPED | OUTPATIENT
Start: 2024-01-24 | End: 2024-05-02

## 2024-01-24 NOTE — TELEPHONE ENCOUNTER
Last office visit 12/20/2023       Next office visit scheduled 3/20/2024    Requested Prescriptions     Pending Prescriptions Disp Refills    gabapentin (NEURONTIN) 300 MG capsule 90 capsule 3     Sig: One tid    HYDROcodone-acetaminophen (NORCO) 7.5-325 MG per tablet 120 tablet 0     Sig: Take 1 tablet by mouth every 6 hours as needed for Pain for up to 30 days. Intended supply: 30 days    hydroCHLOROthiazide (HYDRODIURIL) 25 MG tablet 90 tablet 2     Sig: TAKE ONE TABLET BY MOUTH EVERY MORNING    LORazepam (ATIVAN) 1 MG tablet 90 tablet 2     Sig: TAKE ONE TABLET BY MOUTH EVERY 8 HOURS AS NEEDED FOR ANXIETY    zolpidem (AMBIEN) 10 MG tablet 30 tablet 2     Sig: TAKE ONE TABLET BY MOUTH EVERY NIGHT AT BEDTIME AS NEEDED

## 2024-02-13 DIAGNOSIS — M17.11 PRIMARY OSTEOARTHRITIS OF RIGHT KNEE: ICD-10-CM

## 2024-02-13 RX ORDER — TIZANIDINE 4 MG/1
TABLET ORAL
Qty: 90 TABLET | Refills: 1 | Status: SHIPPED | OUTPATIENT
Start: 2024-02-13

## 2024-02-22 DIAGNOSIS — M48.061 SPINAL STENOSIS OF LUMBAR REGION WITHOUT NEUROGENIC CLAUDICATION: ICD-10-CM

## 2024-02-22 DIAGNOSIS — F41.1 GAD (GENERALIZED ANXIETY DISORDER): ICD-10-CM

## 2024-02-22 DIAGNOSIS — L29.9 GENERALIZED PRURITUS: ICD-10-CM

## 2024-02-22 RX ORDER — LORAZEPAM 1 MG/1
TABLET ORAL
Qty: 90 TABLET | Refills: 2 | Status: SHIPPED | OUTPATIENT
Start: 2024-02-22 | End: 2024-03-16

## 2024-02-22 RX ORDER — GABAPENTIN 300 MG/1
CAPSULE ORAL
Qty: 90 CAPSULE | Refills: 3 | Status: SHIPPED | OUTPATIENT
Start: 2024-02-22 | End: 2024-05-31

## 2024-02-22 RX ORDER — HYDROXYZINE HYDROCHLORIDE 25 MG/1
TABLET, FILM COATED ORAL
Qty: 60 TABLET | Refills: 1 | Status: SHIPPED | OUTPATIENT
Start: 2024-02-22

## 2024-02-22 RX ORDER — HYDROCODONE BITARTRATE AND ACETAMINOPHEN 7.5; 325 MG/1; MG/1
1 TABLET ORAL EVERY 6 HOURS PRN
Qty: 120 TABLET | Refills: 0 | Status: SHIPPED | OUTPATIENT
Start: 2024-02-22 | End: 2024-03-23

## 2024-02-22 NOTE — TELEPHONE ENCOUNTER
Last office visit 12/20/2023     Next office visit scheduled 3/20/2024    Requested Prescriptions     Pending Prescriptions Disp Refills    LORazepam (ATIVAN) 1 MG tablet 90 tablet 2     Sig: TAKE ONE TABLET BY MOUTH EVERY 8 HOURS AS NEEDED FOR ANXIETY

## 2024-03-01 DIAGNOSIS — F51.04 CHRONIC INSOMNIA: ICD-10-CM

## 2024-03-01 RX ORDER — ZOLPIDEM TARTRATE 10 MG/1
TABLET ORAL
Qty: 30 TABLET | Refills: 0 | Status: SHIPPED | OUTPATIENT
Start: 2024-03-01 | End: 2024-03-24

## 2024-03-01 NOTE — TELEPHONE ENCOUNTER
Last office visit 12/20/2023     Next office visit scheduled 3/20/2024    Requested Prescriptions     Pending Prescriptions Disp Refills    zolpidem (AMBIEN) 10 MG tablet 30 tablet 2     Sig: TAKE ONE TABLET BY MOUTH EVERY NIGHT AT BEDTIME AS NEEDED

## 2024-03-20 ENCOUNTER — OFFICE VISIT (OUTPATIENT)
Dept: FAMILY MEDICINE CLINIC | Age: 64
End: 2024-03-20
Payer: COMMERCIAL

## 2024-03-20 VITALS
BODY MASS INDEX: 35.93 KG/M2 | WEIGHT: 183 LBS | HEIGHT: 60 IN | DIASTOLIC BLOOD PRESSURE: 82 MMHG | SYSTOLIC BLOOD PRESSURE: 134 MMHG

## 2024-03-20 DIAGNOSIS — L29.9 GENERALIZED PRURITUS: ICD-10-CM

## 2024-03-20 DIAGNOSIS — F41.1 GAD (GENERALIZED ANXIETY DISORDER): ICD-10-CM

## 2024-03-20 DIAGNOSIS — E66.01 SEVERE OBESITY (BMI 35.0-39.9) WITH COMORBIDITY (HCC): ICD-10-CM

## 2024-03-20 DIAGNOSIS — I10 PRIMARY HYPERTENSION: ICD-10-CM

## 2024-03-20 DIAGNOSIS — M48.061 SPINAL STENOSIS OF LUMBAR REGION WITHOUT NEUROGENIC CLAUDICATION: ICD-10-CM

## 2024-03-20 PROCEDURE — 3079F DIAST BP 80-89 MM HG: CPT | Performed by: FAMILY MEDICINE

## 2024-03-20 PROCEDURE — 99214 OFFICE O/P EST MOD 30 MIN: CPT | Performed by: FAMILY MEDICINE

## 2024-03-20 PROCEDURE — 3075F SYST BP GE 130 - 139MM HG: CPT | Performed by: FAMILY MEDICINE

## 2024-03-20 RX ORDER — GABAPENTIN 300 MG/1
CAPSULE ORAL
Qty: 90 CAPSULE | Refills: 3 | Status: SHIPPED | OUTPATIENT
Start: 2024-03-20 | End: 2024-06-27

## 2024-03-20 RX ORDER — LORAZEPAM 1 MG/1
TABLET ORAL
Qty: 90 TABLET | Refills: 2 | Status: SHIPPED | OUTPATIENT
Start: 2024-03-20 | End: 2024-04-12

## 2024-03-20 RX ORDER — NIFEDIPINE 60 MG/1
60 TABLET, EXTENDED RELEASE ORAL DAILY
Qty: 90 TABLET | Refills: 1 | Status: SHIPPED | OUTPATIENT
Start: 2024-03-20

## 2024-03-20 RX ORDER — DOXEPIN 6 MG/1
1 TABLET, FILM COATED ORAL
Qty: 30 TABLET | Refills: 1 | Status: SHIPPED | OUTPATIENT
Start: 2024-03-20

## 2024-03-20 RX ORDER — HYDROXYZINE HYDROCHLORIDE 25 MG/1
TABLET, FILM COATED ORAL
Qty: 60 TABLET | Refills: 1 | Status: CANCELLED | OUTPATIENT
Start: 2024-03-20

## 2024-03-20 RX ORDER — HYDROCODONE BITARTRATE AND ACETAMINOPHEN 7.5; 325 MG/1; MG/1
1 TABLET ORAL EVERY 6 HOURS PRN
Qty: 120 TABLET | Refills: 0 | Status: SHIPPED | OUTPATIENT
Start: 2024-03-20 | End: 2024-04-19

## 2024-03-20 ASSESSMENT — PATIENT HEALTH QUESTIONNAIRE - PHQ9
7. TROUBLE CONCENTRATING ON THINGS, SUCH AS READING THE NEWSPAPER OR WATCHING TELEVISION: NOT AT ALL
SUM OF ALL RESPONSES TO PHQ QUESTIONS 1-9: 0
4. FEELING TIRED OR HAVING LITTLE ENERGY: NOT AT ALL
SUM OF ALL RESPONSES TO PHQ QUESTIONS 1-9: 0
2. FEELING DOWN, DEPRESSED OR HOPELESS: NOT AT ALL
SUM OF ALL RESPONSES TO PHQ QUESTIONS 1-9: 0
3. TROUBLE FALLING OR STAYING ASLEEP: NOT AT ALL
9. THOUGHTS THAT YOU WOULD BE BETTER OFF DEAD, OR OF HURTING YOURSELF: NOT AT ALL
5. POOR APPETITE OR OVEREATING: NOT AT ALL
6. FEELING BAD ABOUT YOURSELF - OR THAT YOU ARE A FAILURE OR HAVE LET YOURSELF OR YOUR FAMILY DOWN: NOT AT ALL
8. MOVING OR SPEAKING SO SLOWLY THAT OTHER PEOPLE COULD HAVE NOTICED. OR THE OPPOSITE, BEING SO FIGETY OR RESTLESS THAT YOU HAVE BEEN MOVING AROUND A LOT MORE THAN USUAL: NOT AT ALL
SUM OF ALL RESPONSES TO PHQ QUESTIONS 1-9: 0
SUM OF ALL RESPONSES TO PHQ9 QUESTIONS 1 & 2: 0
10. IF YOU CHECKED OFF ANY PROBLEMS, HOW DIFFICULT HAVE THESE PROBLEMS MADE IT FOR YOU TO DO YOUR WORK, TAKE CARE OF THINGS AT HOME, OR GET ALONG WITH OTHER PEOPLE: NOT DIFFICULT AT ALL
1. LITTLE INTEREST OR PLEASURE IN DOING THINGS: NOT AT ALL

## 2024-03-20 ASSESSMENT — ENCOUNTER SYMPTOMS
RESPIRATORY NEGATIVE: 1
GASTROINTESTINAL NEGATIVE: 1

## 2024-03-20 NOTE — PROGRESS NOTES
OUTPATIENT PROGRESS NOTE  Date of Service:  3/20/2024  Address: [facility]  [facility]  [address]  [address]  [place] [address]  Dept: [phone]  Loc: [phone]    Subjective:      Patient ID:  [id number]  Shahida Vásquez is a 63 y.o. female     Hypertension  This is a chronic problem. The current episode started more than 1 year ago. The problem is unchanged. The problem is controlled. Associated symptoms include anxiety and malaise/fatigue. There are no associated agents to hypertension. Risk factors for coronary artery disease include obesity and post-menopausal state.       Chronic low back pain  She needs her pain meds refilled  This does help   No side effects    Itching at night  The atarax is not helping   Review of Systems   Constitutional:  Positive for activity change, fatigue and malaise/fatigue.   Respiratory: Negative.     Cardiovascular: Negative.    Gastrointestinal: Negative.    Skin: Negative.    Neurological: Negative.        Objective:   YOB: 1960    Date of Visit:  3/20/2024       Allergies   Allergen Reactions    Penicillins Rash       Outpatient Medications Marked as Taking for the 3/20/24 encounter (Office Visit) with Demetrice Saldaña,    Medication Sig Dispense Refill    zolpidem (AMBIEN) 10 MG tablet TAKE ONE TABLET BY MOUTH EVERY NIGHT AT BEDTIME AS NEEDED 30 tablet 0    hydrOXYzine HCl (ATARAX) 25 MG tablet TAKE TWO TABLETS BY MOUTH EVERY NIGHT AT BEDTIME AS NEEDED FOR ITCHING 60 tablet 1    gabapentin (NEURONTIN) 300 MG capsule One tid 90 capsule 3    HYDROcodone-acetaminophen (NORCO) 7.5-325 MG per tablet Take 1 tablet by mouth every 6 hours as needed for Pain for up to 30 days. Intended supply: 30 days 120 tablet 0    tiZANidine (ZANAFLEX) 4 MG tablet One tid prn muscle spasm 90 tablet 1    hydroCHLOROthiazide (HYDRODIURIL) 25 MG tablet TAKE ONE TABLET BY MOUTH EVERY MORNING 90 tablet 2    citalopram (CELEXA)

## 2024-04-21 ENCOUNTER — PATIENT MESSAGE (OUTPATIENT)
Dept: FAMILY MEDICINE CLINIC | Age: 64
End: 2024-04-21

## 2024-04-21 DIAGNOSIS — M48.061 SPINAL STENOSIS OF LUMBAR REGION WITHOUT NEUROGENIC CLAUDICATION: ICD-10-CM

## 2024-04-21 DIAGNOSIS — F41.1 GAD (GENERALIZED ANXIETY DISORDER): ICD-10-CM

## 2024-04-21 DIAGNOSIS — L29.9 GENERALIZED PRURITUS: ICD-10-CM

## 2024-04-21 DIAGNOSIS — F51.04 CHRONIC INSOMNIA: ICD-10-CM

## 2024-04-21 RX ORDER — HYDROXYZINE HYDROCHLORIDE 25 MG/1
TABLET, FILM COATED ORAL
Qty: 60 TABLET | Refills: 1 | Status: SHIPPED | OUTPATIENT
Start: 2024-04-21

## 2024-04-21 RX ORDER — GABAPENTIN 300 MG/1
CAPSULE ORAL
Qty: 90 CAPSULE | Refills: 3 | Status: SHIPPED | OUTPATIENT
Start: 2024-04-21 | End: 2024-07-29

## 2024-04-22 RX ORDER — HYDROCODONE BITARTRATE AND ACETAMINOPHEN 7.5; 325 MG/1; MG/1
1 TABLET ORAL EVERY 6 HOURS PRN
Qty: 120 TABLET | Refills: 0 | Status: SHIPPED | OUTPATIENT
Start: 2024-04-22 | End: 2024-05-22

## 2024-04-22 RX ORDER — LORAZEPAM 1 MG/1
TABLET ORAL
Qty: 90 TABLET | Refills: 2 | Status: SHIPPED | OUTPATIENT
Start: 2024-04-22 | End: 2024-05-15

## 2024-04-22 RX ORDER — ZOLPIDEM TARTRATE 10 MG/1
TABLET ORAL
Qty: 30 TABLET | Refills: 0 | Status: SHIPPED | OUTPATIENT
Start: 2024-04-22 | End: 2024-05-15

## 2024-04-22 NOTE — TELEPHONE ENCOUNTER
Last office visit 3/20/2024     Next office visit scheduled 6/19/2024    Requested Prescriptions     Pending Prescriptions Disp Refills    LORazepam (ATIVAN) 1 MG tablet 90 tablet 2     Sig: TAKE ONE TABLET BY MOUTH EVERY 8 HOURS AS NEEDED FOR ANXIETY    HYDROcodone-acetaminophen (NORCO) 7.5-325 MG per tablet 120 tablet 0     Sig: Take 1 tablet by mouth every 6 hours as needed for Pain for up to 30 days. Intended supply: 30 days    zolpidem (AMBIEN) 10 MG tablet 30 tablet 0     Sig: TAKE ONE TABLET BY MOUTH EVERY NIGHT AT BEDTIME AS NEEDED

## 2024-05-01 DIAGNOSIS — M17.11 PRIMARY OSTEOARTHRITIS OF RIGHT KNEE: ICD-10-CM

## 2024-05-01 RX ORDER — TIZANIDINE 4 MG/1
TABLET ORAL
Qty: 90 TABLET | Refills: 1 | Status: SHIPPED | OUTPATIENT
Start: 2024-05-01

## 2024-05-01 NOTE — TELEPHONE ENCOUNTER
Last office visit 3/20/2024       Next office visit scheduled 6/19/2024    Requested Prescriptions     Pending Prescriptions Disp Refills    tiZANidine (ZANAFLEX) 4 MG tablet 90 tablet 1     Sig: One tid prn muscle spasm

## 2024-05-17 DIAGNOSIS — I10 PRIMARY HYPERTENSION: ICD-10-CM

## 2024-05-17 DIAGNOSIS — L29.9 GENERALIZED PRURITUS: ICD-10-CM

## 2024-05-17 DIAGNOSIS — M48.061 SPINAL STENOSIS OF LUMBAR REGION WITHOUT NEUROGENIC CLAUDICATION: ICD-10-CM

## 2024-05-17 RX ORDER — HYDROXYZINE HYDROCHLORIDE 25 MG/1
TABLET, FILM COATED ORAL
Qty: 60 TABLET | Refills: 1 | Status: SHIPPED | OUTPATIENT
Start: 2024-05-17

## 2024-05-17 RX ORDER — GABAPENTIN 300 MG/1
CAPSULE ORAL
Qty: 90 CAPSULE | Refills: 3 | Status: SHIPPED | OUTPATIENT
Start: 2024-05-17 | End: 2024-08-24

## 2024-05-17 RX ORDER — DOXEPIN 6 MG/1
1 TABLET, FILM COATED ORAL
Qty: 30 TABLET | Refills: 1 | Status: SHIPPED | OUTPATIENT
Start: 2024-05-17

## 2024-05-17 RX ORDER — HYDROCHLOROTHIAZIDE 25 MG/1
TABLET ORAL
Qty: 90 TABLET | Refills: 2 | Status: SHIPPED | OUTPATIENT
Start: 2024-05-17

## 2024-05-17 RX ORDER — HYDROCODONE BITARTRATE AND ACETAMINOPHEN 7.5; 325 MG/1; MG/1
1 TABLET ORAL EVERY 6 HOURS PRN
Qty: 120 TABLET | Refills: 0 | Status: SHIPPED | OUTPATIENT
Start: 2024-05-17 | End: 2024-06-16

## 2024-05-17 NOTE — TELEPHONE ENCOUNTER
Last office visit 3/20/2024     Last written      Next office visit scheduled 6/19/2024    Requested Prescriptions     Pending Prescriptions Disp Refills    hydroCHLOROthiazide (HYDRODIURIL) 25 MG tablet 90 tablet 2     Sig: TAKE ONE TABLET BY MOUTH EVERY MORNING    Doxepin HCl 6 MG TABS 30 tablet 1     Sig: Take 1 tablet by mouth nightly as needed (itching)    hydrOXYzine HCl (ATARAX) 25 MG tablet 60 tablet 1     Sig: TAKE TWO TABLETS BY MOUTH EVERY NIGHT AT BEDTIME AS NEEDED FOR ITCHING    gabapentin (NEURONTIN) 300 MG capsule 90 capsule 3     Sig: One tid    HYDROcodone-acetaminophen (NORCO) 7.5-325 MG per tablet 120 tablet 0     Sig: Take 1 tablet by mouth every 6 hours as needed for Pain for up to 30 days. Intended supply: 30 days

## 2024-05-20 DIAGNOSIS — F41.1 GAD (GENERALIZED ANXIETY DISORDER): ICD-10-CM

## 2024-05-20 RX ORDER — LORAZEPAM 1 MG/1
TABLET ORAL
Qty: 90 TABLET | Refills: 2 | Status: SHIPPED | OUTPATIENT
Start: 2024-05-20 | End: 2024-06-12

## 2024-05-20 NOTE — TELEPHONE ENCOUNTER
Last office visit 3/20/2024     Next office visit scheduled 6/19/2024    Requested Prescriptions     Pending Prescriptions Disp Refills    LORazepam (ATIVAN) 1 MG tablet 90 tablet 2     Sig: TAKE ONE TABLET BY MOUTH EVERY 8 HOURS AS NEEDED FOR ANXIETY

## 2024-05-29 DIAGNOSIS — F51.04 CHRONIC INSOMNIA: ICD-10-CM

## 2024-05-29 RX ORDER — ZOLPIDEM TARTRATE 10 MG/1
TABLET ORAL
Qty: 30 TABLET | Refills: 0 | Status: SHIPPED | OUTPATIENT
Start: 2024-05-29 | End: 2024-06-21

## 2024-05-29 NOTE — TELEPHONE ENCOUNTER
Last office visit 3/20/2024     Next office visit scheduled 6/19/2024    Requested Prescriptions     Pending Prescriptions Disp Refills    zolpidem (AMBIEN) 10 MG tablet 30 tablet 0     Sig: TAKE ONE TABLET BY MOUTH EVERY NIGHT AT BEDTIME AS NEEDED

## 2024-05-31 DIAGNOSIS — M17.11 PRIMARY OSTEOARTHRITIS OF RIGHT KNEE: ICD-10-CM

## 2024-05-31 DIAGNOSIS — L29.9 GENERALIZED PRURITUS: ICD-10-CM

## 2024-05-31 RX ORDER — TIZANIDINE 4 MG/1
TABLET ORAL
Qty: 90 TABLET | Refills: 1 | Status: SHIPPED | OUTPATIENT
Start: 2024-05-31

## 2024-05-31 RX ORDER — HYDROXYZINE HYDROCHLORIDE 25 MG/1
TABLET, FILM COATED ORAL
Qty: 60 TABLET | Refills: 1 | Status: SHIPPED | OUTPATIENT
Start: 2024-05-31

## 2024-06-19 ENCOUNTER — OFFICE VISIT (OUTPATIENT)
Dept: FAMILY MEDICINE CLINIC | Age: 64
End: 2024-06-19
Payer: COMMERCIAL

## 2024-06-19 VITALS — DIASTOLIC BLOOD PRESSURE: 78 MMHG | SYSTOLIC BLOOD PRESSURE: 138 MMHG

## 2024-06-19 DIAGNOSIS — F41.9 ANXIETY: ICD-10-CM

## 2024-06-19 DIAGNOSIS — R73.9 HYPERGLYCEMIA: ICD-10-CM

## 2024-06-19 DIAGNOSIS — Z12.31 ENCOUNTER FOR SCREENING MAMMOGRAM FOR MALIGNANT NEOPLASM OF BREAST: ICD-10-CM

## 2024-06-19 DIAGNOSIS — R25.2 MUSCLE CRAMPS: ICD-10-CM

## 2024-06-19 DIAGNOSIS — L29.9 GENERALIZED PRURITUS: ICD-10-CM

## 2024-06-19 DIAGNOSIS — F32.9 REACTIVE DEPRESSION: ICD-10-CM

## 2024-06-19 DIAGNOSIS — M48.061 SPINAL STENOSIS OF LUMBAR REGION WITHOUT NEUROGENIC CLAUDICATION: ICD-10-CM

## 2024-06-19 DIAGNOSIS — I10 PRIMARY HYPERTENSION: Primary | ICD-10-CM

## 2024-06-19 DIAGNOSIS — Z12.11 COLON CANCER SCREENING: ICD-10-CM

## 2024-06-19 DIAGNOSIS — F51.04 CHRONIC INSOMNIA: ICD-10-CM

## 2024-06-19 DIAGNOSIS — M17.11 PRIMARY OSTEOARTHRITIS OF RIGHT KNEE: ICD-10-CM

## 2024-06-19 DIAGNOSIS — F41.1 GAD (GENERALIZED ANXIETY DISORDER): ICD-10-CM

## 2024-06-19 LAB
25(OH)D3 SERPL-MCNC: 6.3 NG/ML
ALBUMIN SERPL-MCNC: 4.2 G/DL (ref 3.4–5)
ALBUMIN/GLOB SERPL: 1.9 {RATIO} (ref 1.1–2.2)
ALP SERPL-CCNC: 136 U/L (ref 40–129)
ALT SERPL-CCNC: 7 U/L (ref 10–40)
ANION GAP SERPL CALCULATED.3IONS-SCNC: 13 MMOL/L (ref 3–16)
AST SERPL-CCNC: 12 U/L (ref 15–37)
BILIRUB SERPL-MCNC: <0.2 MG/DL (ref 0–1)
BUN SERPL-MCNC: 25 MG/DL (ref 7–20)
CALCIUM SERPL-MCNC: 7.8 MG/DL (ref 8.3–10.6)
CHLORIDE SERPL-SCNC: 100 MMOL/L (ref 99–110)
CO2 SERPL-SCNC: 23 MMOL/L (ref 21–32)
CREAT SERPL-MCNC: 1.3 MG/DL (ref 0.6–1.2)
DEPRECATED RDW RBC AUTO: 18.2 % (ref 12.4–15.4)
GFR SERPLBLD CREATININE-BSD FMLA CKD-EPI: 46 ML/MIN/{1.73_M2}
GLUCOSE SERPL-MCNC: 98 MG/DL (ref 70–99)
HCT VFR BLD AUTO: 32.2 % (ref 36–48)
HGB BLD-MCNC: 10.2 G/DL (ref 12–16)
MAGNESIUM SERPL-MCNC: 2.4 MG/DL (ref 1.8–2.4)
MCH RBC QN AUTO: 25.4 PG (ref 26–34)
MCHC RBC AUTO-ENTMCNC: 31.6 G/DL (ref 31–36)
MCV RBC AUTO: 80.5 FL (ref 80–100)
PLATELET # BLD AUTO: 269 K/UL (ref 135–450)
PMV BLD AUTO: 9.3 FL (ref 5–10.5)
POTASSIUM SERPL-SCNC: 4.6 MMOL/L (ref 3.5–5.1)
PROT SERPL-MCNC: 6.4 G/DL (ref 6.4–8.2)
RBC # BLD AUTO: 4 M/UL (ref 4–5.2)
SODIUM SERPL-SCNC: 136 MMOL/L (ref 136–145)
TSH SERPL DL<=0.005 MIU/L-ACNC: 0.85 UIU/ML (ref 0.27–4.2)
VIT B12 SERPL-MCNC: 175 PG/ML (ref 211–911)
WBC # BLD AUTO: 5.6 K/UL (ref 4–11)

## 2024-06-19 PROCEDURE — G2211 COMPLEX E/M VISIT ADD ON: HCPCS | Performed by: FAMILY MEDICINE

## 2024-06-19 PROCEDURE — 3075F SYST BP GE 130 - 139MM HG: CPT | Performed by: FAMILY MEDICINE

## 2024-06-19 PROCEDURE — 99214 OFFICE O/P EST MOD 30 MIN: CPT | Performed by: FAMILY MEDICINE

## 2024-06-19 PROCEDURE — 3078F DIAST BP <80 MM HG: CPT | Performed by: FAMILY MEDICINE

## 2024-06-19 RX ORDER — CITALOPRAM 40 MG/1
TABLET ORAL
Qty: 90 TABLET | Refills: 2 | Status: SHIPPED | OUTPATIENT
Start: 2024-06-19

## 2024-06-19 RX ORDER — GABAPENTIN 300 MG/1
CAPSULE ORAL
Qty: 90 CAPSULE | Refills: 3 | Status: SHIPPED | OUTPATIENT
Start: 2024-06-19 | End: 2024-09-26

## 2024-06-19 RX ORDER — ZOLPIDEM TARTRATE 10 MG/1
TABLET ORAL
Qty: 30 TABLET | Refills: 0 | Status: SHIPPED | OUTPATIENT
Start: 2024-06-19 | End: 2024-07-12

## 2024-06-19 RX ORDER — HYDROXYZINE HYDROCHLORIDE 25 MG/1
TABLET, FILM COATED ORAL
Qty: 60 TABLET | Refills: 1 | Status: SHIPPED | OUTPATIENT
Start: 2024-06-19

## 2024-06-19 RX ORDER — HYDROCODONE BITARTRATE AND ACETAMINOPHEN 7.5; 325 MG/1; MG/1
1 TABLET ORAL EVERY 6 HOURS PRN
Qty: 120 TABLET | Refills: 0 | Status: SHIPPED | OUTPATIENT
Start: 2024-06-19 | End: 2024-07-19

## 2024-06-19 RX ORDER — TIZANIDINE 4 MG/1
TABLET ORAL
Qty: 90 TABLET | Refills: 1 | Status: SHIPPED | OUTPATIENT
Start: 2024-06-19

## 2024-06-19 RX ORDER — LORAZEPAM 1 MG/1
TABLET ORAL
Qty: 90 TABLET | Refills: 2 | Status: SHIPPED | OUTPATIENT
Start: 2024-06-19 | End: 2024-07-12

## 2024-06-19 RX ORDER — HYDROCHLOROTHIAZIDE 25 MG/1
TABLET ORAL
Qty: 90 TABLET | Refills: 2 | Status: SHIPPED | OUTPATIENT
Start: 2024-06-19

## 2024-06-19 SDOH — ECONOMIC STABILITY: FOOD INSECURITY: WITHIN THE PAST 12 MONTHS, THE FOOD YOU BOUGHT JUST DIDN'T LAST AND YOU DIDN'T HAVE MONEY TO GET MORE.: NEVER TRUE

## 2024-06-19 SDOH — ECONOMIC STABILITY: FOOD INSECURITY: WITHIN THE PAST 12 MONTHS, YOU WORRIED THAT YOUR FOOD WOULD RUN OUT BEFORE YOU GOT MONEY TO BUY MORE.: NEVER TRUE

## 2024-06-19 SDOH — ECONOMIC STABILITY: INCOME INSECURITY: HOW HARD IS IT FOR YOU TO PAY FOR THE VERY BASICS LIKE FOOD, HOUSING, MEDICAL CARE, AND HEATING?: NOT HARD AT ALL

## 2024-06-19 ASSESSMENT — ENCOUNTER SYMPTOMS
RESPIRATORY NEGATIVE: 1
BACK PAIN: 1
GASTROINTESTINAL NEGATIVE: 1

## 2024-06-19 NOTE — PROGRESS NOTES
Shahida Vásquez (:  1960) is a 63 y.o. female,Established patient, here for evaluation of the following chief complaint(s):  Follow-up (generalized pruritus)      Assessment & Plan   1. Primary hypertension  -     hydroCHLOROthiazide (HYDRODIURIL) 25 MG tablet; TAKE ONE TABLET BY MOUTH EVERY MORNING, Disp-90 tablet, R-2Normal  2. Anxiety  -     citalopram (CELEXA) 40 MG tablet; TAKE ONE TABLET BY MOUTH DAILY, Disp-90 tablet, R-2Normal  3. Reactive depression  -     citalopram (CELEXA) 40 MG tablet; TAKE ONE TABLET BY MOUTH DAILY, Disp-90 tablet, R-2Normal  4. Spinal stenosis of lumbar region without neurogenic claudication  -     gabapentin (NEURONTIN) 300 MG capsule; One tid, Disp-90 capsule, R-3Normal  -     HYDROcodone-acetaminophen (NORCO) 7.5-325 MG per tablet; Take 1 tablet by mouth every 6 hours as needed for Pain for up to 30 days. Intended supply: 30 days, Disp-120 tablet, R-0Normal  5. Generalized pruritus  -     hydrOXYzine HCl (ATARAX) 25 MG tablet; TAKE TWO TABLETS BY MOUTH EVERY NIGHT AT BEDTIME AS NEEDED FOR ITCHING, Disp-60 tablet, R-1Normal  6. Primary osteoarthritis of right knee  -     tiZANidine (ZANAFLEX) 4 MG tablet; One tid prn muscle spasm, Disp-90 tablet, R-1Normal  7. KEYLA (generalized anxiety disorder)  -     LORazepam (ATIVAN) 1 MG tablet; TAKE ONE TABLET BY MOUTH EVERY 8 HOURS AS NEEDED FOR ANXIETY, Disp-90 tablet, R-2Normal  8. Chronic insomnia  -     zolpidem (AMBIEN) 10 MG tablet; TAKE ONE TABLET BY MOUTH EVERY NIGHT AT BEDTIME AS NEEDED, Disp-30 tablet, R-0Normal  9. Encounter for screening mammogram for malignant neoplasm of breast  -     REN DIGITAL SCREEN W OR WO CAD BILATERAL; Future  10. Muscle cramps  -     CBC  -     Comprehensive Metabolic Panel  -     TSH  -     Vitamin B12  -     Vitamin D 25 Hydroxy  -     Magnesium  11. Hyperglycemia  -     Hemoglobin A1C  12. Colon cancer screening  -     Cologuard (Fecal DNA Colorectal Cancer Screening)      No follow-ups on

## 2024-06-20 ENCOUNTER — TELEPHONE (OUTPATIENT)
Dept: FAMILY MEDICINE CLINIC | Age: 64
End: 2024-06-20

## 2024-06-20 LAB
EST. AVERAGE GLUCOSE BLD GHB EST-MCNC: 114 MG/DL
HBA1C MFR BLD: 5.6 %

## 2024-06-20 RX ORDER — ERGOCALCIFEROL 1.25 MG/1
50000 CAPSULE ORAL WEEKLY
Qty: 12 CAPSULE | Refills: 1 | Status: SHIPPED | OUTPATIENT
Start: 2024-06-20

## 2024-06-20 NOTE — TELEPHONE ENCOUNTER
SUBMITTED PA FOR Zolpidem Tartrate 10MG tablets  VIA [facility] Key: [id number] STATUS PENDING.      FOLLOW UP DONE DAILY: IF NO RESPONSE IN 3 DAYS WE WILL REFAX FOR STATUS CHECK. IF ANOTHER 3 DAYS GOES BY WITH NO RESPONSE WILL CALL INSURANCE FOR STATUS.

## 2024-06-21 NOTE — TELEPHONE ENCOUNTER
The medication is APPROVED. LETTER AVAILABLE IN MEDIA  APPROVED 05/21/2024 - 06/20/2025    If this requires a response please respond to the pool ( P MHCX [facility] MEDICATION PRE-AUTH).      Thank you please advise patient.

## 2024-07-19 DIAGNOSIS — M48.061 SPINAL STENOSIS OF LUMBAR REGION WITHOUT NEUROGENIC CLAUDICATION: ICD-10-CM

## 2024-07-19 DIAGNOSIS — I10 PRIMARY HYPERTENSION: ICD-10-CM

## 2024-07-19 DIAGNOSIS — F41.1 GAD (GENERALIZED ANXIETY DISORDER): ICD-10-CM

## 2024-07-19 DIAGNOSIS — F51.04 CHRONIC INSOMNIA: ICD-10-CM

## 2024-07-19 RX ORDER — ZOLPIDEM TARTRATE 10 MG/1
TABLET ORAL
Qty: 30 TABLET | Refills: 0 | Status: SHIPPED | OUTPATIENT
Start: 2024-07-19 | End: 2024-08-11

## 2024-07-19 RX ORDER — HYDROCODONE BITARTRATE AND ACETAMINOPHEN 7.5; 325 MG/1; MG/1
1 TABLET ORAL EVERY 6 HOURS PRN
Qty: 120 TABLET | Refills: 0 | Status: SHIPPED | OUTPATIENT
Start: 2024-07-19 | End: 2024-08-18

## 2024-07-19 RX ORDER — HYDROCHLOROTHIAZIDE 25 MG/1
TABLET ORAL
Qty: 90 TABLET | Refills: 2 | Status: SHIPPED | OUTPATIENT
Start: 2024-07-19

## 2024-07-19 RX ORDER — LORAZEPAM 1 MG/1
TABLET ORAL
Qty: 90 TABLET | Refills: 2 | Status: SHIPPED | OUTPATIENT
Start: 2024-07-19 | End: 2024-08-11

## 2024-07-19 RX ORDER — ERGOCALCIFEROL 1.25 MG/1
50000 CAPSULE ORAL WEEKLY
Qty: 12 CAPSULE | Refills: 1 | Status: SHIPPED | OUTPATIENT
Start: 2024-07-19

## 2024-07-19 NOTE — TELEPHONE ENCOUNTER
Patient called requesting a refill of lorazepam 1mg and ambien 10mg to be sent to [facility] on [address]

## 2024-07-19 NOTE — TELEPHONE ENCOUNTER
Last office visit 6/19/2024       Next office visit scheduled 9/18/2024    Requested Prescriptions     Pending Prescriptions Disp Refills    LORazepam (ATIVAN) 1 MG tablet 90 tablet 2     Sig: TAKE ONE TABLET BY MOUTH EVERY 8 HOURS AS NEEDED FOR ANXIETY    zolpidem (AMBIEN) 10 MG tablet 30 tablet 0     Sig: TAKE ONE TABLET BY MOUTH EVERY NIGHT AT BEDTIME AS NEEDED

## 2024-07-28 DIAGNOSIS — M17.11 PRIMARY OSTEOARTHRITIS OF RIGHT KNEE: ICD-10-CM

## 2024-07-28 RX ORDER — ERGOCALCIFEROL 1.25 MG/1
50000 CAPSULE ORAL WEEKLY
Qty: 12 CAPSULE | Refills: 1 | Status: SHIPPED | OUTPATIENT
Start: 2024-07-28

## 2024-07-28 RX ORDER — TIZANIDINE 4 MG/1
TABLET ORAL
Qty: 90 TABLET | Refills: 1 | Status: SHIPPED | OUTPATIENT
Start: 2024-07-28

## 2024-08-18 DIAGNOSIS — M48.061 SPINAL STENOSIS OF LUMBAR REGION WITHOUT NEUROGENIC CLAUDICATION: ICD-10-CM

## 2024-08-18 DIAGNOSIS — L29.9 GENERALIZED PRURITUS: ICD-10-CM

## 2024-08-18 RX ORDER — GABAPENTIN 300 MG/1
CAPSULE ORAL
Qty: 90 CAPSULE | Refills: 3 | Status: SHIPPED | OUTPATIENT
Start: 2024-08-18 | End: 2024-11-25

## 2024-08-18 RX ORDER — HYDROXYZINE HYDROCHLORIDE 25 MG/1
TABLET, FILM COATED ORAL
Qty: 60 TABLET | Refills: 1 | Status: SHIPPED | OUTPATIENT
Start: 2024-08-18

## 2024-08-18 RX ORDER — HYDROCODONE BITARTRATE AND ACETAMINOPHEN 7.5; 325 MG/1; MG/1
1 TABLET ORAL EVERY 6 HOURS PRN
Qty: 120 TABLET | Refills: 0 | Status: SHIPPED | OUTPATIENT
Start: 2024-08-18 | End: 2024-09-17

## 2024-08-19 DIAGNOSIS — F41.1 GAD (GENERALIZED ANXIETY DISORDER): ICD-10-CM

## 2024-08-19 DIAGNOSIS — F51.04 CHRONIC INSOMNIA: ICD-10-CM

## 2024-08-19 RX ORDER — ZOLPIDEM TARTRATE 10 MG/1
TABLET ORAL
Qty: 30 TABLET | Refills: 0 | Status: SHIPPED | OUTPATIENT
Start: 2024-08-19 | End: 2024-09-11

## 2024-08-19 RX ORDER — LORAZEPAM 1 MG/1
TABLET ORAL
Qty: 90 TABLET | Refills: 2 | Status: SHIPPED | OUTPATIENT
Start: 2024-08-19 | End: 2024-09-11

## 2024-08-27 ENCOUNTER — HOSPITAL ENCOUNTER (OUTPATIENT)
Dept: MAMMOGRAPHY | Age: 64
Discharge: HOME | End: 2024-09-01

## 2024-08-27 DIAGNOSIS — Z12.31 VISIT FOR SCREENING MAMMOGRAM: ICD-10-CM

## 2024-08-30 DIAGNOSIS — M17.11 PRIMARY OSTEOARTHRITIS OF RIGHT KNEE: ICD-10-CM

## 2024-09-16 DIAGNOSIS — M17.11 PRIMARY OSTEOARTHRITIS OF RIGHT KNEE: ICD-10-CM

## 2024-09-16 DIAGNOSIS — L29.9 GENERALIZED PRURITUS: ICD-10-CM

## 2024-09-16 DIAGNOSIS — M48.061 SPINAL STENOSIS OF LUMBAR REGION WITHOUT NEUROGENIC CLAUDICATION: ICD-10-CM

## 2024-09-16 DIAGNOSIS — F41.1 GAD (GENERALIZED ANXIETY DISORDER): ICD-10-CM

## 2024-09-16 DIAGNOSIS — F51.04 CHRONIC INSOMNIA: ICD-10-CM

## 2024-09-16 DIAGNOSIS — I10 PRIMARY HYPERTENSION: ICD-10-CM

## 2024-09-16 RX ORDER — HYDROCHLOROTHIAZIDE 25 MG/1
TABLET ORAL
Qty: 90 TABLET | Refills: 2 | Status: SHIPPED | OUTPATIENT
Start: 2024-09-16

## 2024-09-16 RX ORDER — GABAPENTIN 300 MG/1
CAPSULE ORAL
Qty: 90 CAPSULE | Refills: 3 | Status: SHIPPED | OUTPATIENT
Start: 2024-09-16 | End: 2024-12-24

## 2024-09-16 RX ORDER — HYDROXYZINE HYDROCHLORIDE 25 MG/1
TABLET, FILM COATED ORAL
Qty: 60 TABLET | Refills: 1 | Status: SHIPPED | OUTPATIENT
Start: 2024-09-16

## 2024-09-16 RX ORDER — HYDROCODONE BITARTRATE AND ACETAMINOPHEN 7.5; 325 MG/1; MG/1
1 TABLET ORAL EVERY 6 HOURS PRN
Qty: 120 TABLET | Refills: 0 | Status: SHIPPED | OUTPATIENT
Start: 2024-09-16 | End: 2024-10-16

## 2024-09-16 RX ORDER — ZOLPIDEM TARTRATE 10 MG/1
TABLET ORAL
Qty: 30 TABLET | Refills: 0 | Status: SHIPPED | OUTPATIENT
Start: 2024-09-16 | End: 2024-10-09

## 2024-09-16 RX ORDER — LORAZEPAM 1 MG/1
TABLET ORAL
Qty: 90 TABLET | Refills: 2 | Status: SHIPPED | OUTPATIENT
Start: 2024-09-16 | End: 2024-10-09

## 2024-09-18 ENCOUNTER — TELEPHONE (OUTPATIENT)
Dept: FAMILY MEDICINE CLINIC | Age: 64
End: 2024-09-18

## 2024-09-19 ENCOUNTER — TELEPHONE (OUTPATIENT)
Dept: FAMILY MEDICINE CLINIC | Age: 64
End: 2024-09-19

## 2024-09-19 NOTE — TELEPHONE ENCOUNTER
Pt fell a week ago and hit her head. She is not sure if she has conclusion. She has a headache, some dizziness and  vomiting. Please give pt a call [phone]

## 2024-09-24 DIAGNOSIS — M17.11 PRIMARY OSTEOARTHRITIS OF RIGHT KNEE: ICD-10-CM

## 2024-10-03 ENCOUNTER — OFFICE VISIT (OUTPATIENT)
Dept: FAMILY MEDICINE CLINIC | Age: 64
End: 2024-10-03

## 2024-10-03 VITALS
SYSTOLIC BLOOD PRESSURE: 132 MMHG | BODY MASS INDEX: 35.93 KG/M2 | WEIGHT: 183 LBS | DIASTOLIC BLOOD PRESSURE: 78 MMHG | HEIGHT: 60 IN

## 2024-10-03 DIAGNOSIS — I10 PRIMARY HYPERTENSION: ICD-10-CM

## 2024-10-03 DIAGNOSIS — F41.1 GAD (GENERALIZED ANXIETY DISORDER): ICD-10-CM

## 2024-10-03 DIAGNOSIS — M48.061 SPINAL STENOSIS OF LUMBAR REGION WITHOUT NEUROGENIC CLAUDICATION: Primary | ICD-10-CM

## 2024-10-03 ASSESSMENT — ENCOUNTER SYMPTOMS
RESPIRATORY NEGATIVE: 1
GASTROINTESTINAL NEGATIVE: 1

## 2024-10-03 NOTE — PROGRESS NOTES
OUTPATIENT PROGRESS NOTE  Date of Service:  10/3/2024  Address: [facility]  [facility]  [address]  [address]  [place] [address]  Dept: [phone]  Loc: [phone]    Subjective:      Patient ID:  [id number]  Shahida Vásquez is a 63 y.o. female     Hypertension  This is a chronic problem. The current episode started more than 1 year ago. The problem is unchanged. The problem is controlled. Associated symptoms include malaise/fatigue. (Has had some lightheadedness   passed out and hit her head on bathroom sink) Past treatments include diuretics and calcium channel blockers. The current treatment provides significant improvement.   She does not want to do any more testing  had work up for syncope in past     Anxiety  She is taking her meds  Working well  Celexa and taking ativan prn  Needs refills    Chronic low back pain   Taking her pain meds regularly   No problems with meds  No refills needed   Review of Systems   Constitutional:  Positive for malaise/fatigue.   HENT: Negative.     Respiratory: Negative.     Cardiovascular: Negative.    Gastrointestinal: Negative.    Musculoskeletal:  Positive for arthralgias and myalgias.   Neurological: Negative.    Psychiatric/Behavioral:  Positive for agitation, decreased concentration and sleep disturbance. The patient is nervous/anxious.        Objective:   YOB: 1960    Date of Visit:  10/3/2024       Allergies   Allergen Reactions    Penicillins Rash       Outpatient Medications Marked as Taking for the 10/3/24 encounter (Office Visit) with Demetrice Saldaña,    Medication Sig Dispense Refill    tiZANidine (ZANAFLEX) 4 MG tablet One tid prn muscle spasm 90 tablet 1    hydroCHLOROthiazide (HYDRODIURIL) 25 MG tablet TAKE ONE TABLET BY MOUTH EVERY MORNING 90 tablet 2    gabapentin (NEURONTIN) 300 MG capsule One tid 90 capsule 3    hydrOXYzine HCl (ATARAX) 25 MG tablet TAKE TWO TABLETS BY MOUTH EVERY NIGHT

## 2024-10-14 DIAGNOSIS — F41.1 GAD (GENERALIZED ANXIETY DISORDER): ICD-10-CM

## 2024-10-14 DIAGNOSIS — F51.04 CHRONIC INSOMNIA: ICD-10-CM

## 2024-10-14 RX ORDER — LORAZEPAM 1 MG/1
TABLET ORAL
Qty: 90 TABLET | Refills: 2 | Status: SHIPPED | OUTPATIENT
Start: 2024-10-14 | End: 2024-11-06

## 2024-10-14 RX ORDER — ZOLPIDEM TARTRATE 10 MG/1
TABLET ORAL
Qty: 30 TABLET | Refills: 0 | Status: SHIPPED | OUTPATIENT
Start: 2024-10-14 | End: 2024-11-06

## 2024-10-14 NOTE — TELEPHONE ENCOUNTER
Last office visit 10/3/2024       Next office visit scheduled 1/3/2025    Requested Prescriptions     Pending Prescriptions Disp Refills    LORazepam (ATIVAN) 1 MG tablet 90 tablet 2     Sig: TAKE ONE TABLET BY MOUTH EVERY 8 HOURS AS NEEDED FOR ANXIETY    zolpidem (AMBIEN) 10 MG tablet 30 tablet 0     Sig: TAKE ONE TABLET BY MOUTH EVERY NIGHT AT BEDTIME AS NEEDED

## 2024-10-15 DIAGNOSIS — M48.061 SPINAL STENOSIS OF LUMBAR REGION WITHOUT NEUROGENIC CLAUDICATION: ICD-10-CM

## 2024-10-15 RX ORDER — HYDROCODONE BITARTRATE AND ACETAMINOPHEN 7.5; 325 MG/1; MG/1
1 TABLET ORAL EVERY 6 HOURS PRN
Qty: 120 TABLET | Refills: 0 | Status: SHIPPED | OUTPATIENT
Start: 2024-10-15 | End: 2024-11-14

## 2024-10-16 ENCOUNTER — PATIENT MESSAGE (OUTPATIENT)
Dept: FAMILY MEDICINE CLINIC | Age: 64
End: 2024-10-16

## 2024-10-16 RX ORDER — CYPROHEPTADINE HYDROCHLORIDE 4 MG/1
4 TABLET ORAL 3 TIMES DAILY PRN
Qty: 30 TABLET | Refills: 1 | Status: SHIPPED | OUTPATIENT
Start: 2024-10-16

## 2024-10-23 DIAGNOSIS — M17.11 PRIMARY OSTEOARTHRITIS OF RIGHT KNEE: ICD-10-CM

## 2024-11-05 ENCOUNTER — E-VISIT (OUTPATIENT)
Dept: FAMILY MEDICINE CLINIC | Age: 64
End: 2024-11-05
Payer: COMMERCIAL

## 2024-11-05 DIAGNOSIS — J01.90 ACUTE BACTERIAL SINUSITIS: Primary | ICD-10-CM

## 2024-11-05 DIAGNOSIS — B96.89 ACUTE BACTERIAL SINUSITIS: Primary | ICD-10-CM

## 2024-11-05 PROCEDURE — 99422 OL DIG E/M SVC 11-20 MIN: CPT | Performed by: FAMILY MEDICINE

## 2024-11-05 RX ORDER — DOXYCYCLINE HYCLATE 100 MG
100 TABLET ORAL 2 TIMES DAILY
Qty: 20 TABLET | Refills: 0 | Status: SHIPPED | OUTPATIENT
Start: 2024-11-05 | End: 2024-11-15

## 2024-11-05 ASSESSMENT — LIFESTYLE VARIABLES: SMOKING_STATUS: NO, I'VE NEVER SMOKED

## 2024-11-06 NOTE — PROGRESS NOTES
Shahida Vásquez (1960) initiated an asynchronous digital communication through [employer].    HPI: per patient questionnaire     Exam: not applicable    Diagnoses and all orders for this visit:  Diagnoses and all orders for this visit:    Acute bacterial sinusitis  -     doxycycline hyclate (VIBRA-TABS) 100 MG tablet; Take 1 tablet by mouth 2 times daily for 10 days          Time: EV2 - 11-20 minutes were spent on the digital evaluation and management of this patient.     CANDICE DELONG, DO

## 2024-11-11 DIAGNOSIS — M17.11 PRIMARY OSTEOARTHRITIS OF RIGHT KNEE: ICD-10-CM

## 2024-11-11 DIAGNOSIS — M48.061 SPINAL STENOSIS OF LUMBAR REGION WITHOUT NEUROGENIC CLAUDICATION: ICD-10-CM

## 2024-11-11 RX ORDER — GABAPENTIN 300 MG/1
CAPSULE ORAL
Qty: 90 CAPSULE | Refills: 3 | Status: SHIPPED | OUTPATIENT
Start: 2024-11-11 | End: 2025-02-18

## 2024-11-11 RX ORDER — HYDROCODONE BITARTRATE AND ACETAMINOPHEN 7.5; 325 MG/1; MG/1
1 TABLET ORAL EVERY 6 HOURS PRN
Qty: 120 TABLET | Refills: 0 | Status: SHIPPED | OUTPATIENT
Start: 2024-11-11 | End: 2024-12-11

## 2024-11-11 NOTE — TELEPHONE ENCOUNTER
Last office visit 11/5/2024     Last written      Next office visit scheduled 1/3/2025    Requested Prescriptions     Pending Prescriptions Disp Refills    gabapentin (NEURONTIN) 300 MG capsule 90 capsule 3     Sig: One tid    HYDROcodone-acetaminophen (NORCO) 7.5-325 MG per tablet 120 tablet 0     Sig: Take 1 tablet by mouth every 6 hours as needed for Pain for up to 30 days. Intended supply: 30 days    tiZANidine (ZANAFLEX) 4 MG tablet 90 tablet 1     Sig: One tid prn muscle spasm

## 2024-11-12 DIAGNOSIS — F41.1 GAD (GENERALIZED ANXIETY DISORDER): ICD-10-CM

## 2024-11-12 DIAGNOSIS — F51.04 CHRONIC INSOMNIA: ICD-10-CM

## 2024-11-12 RX ORDER — LORAZEPAM 1 MG/1
TABLET ORAL
Qty: 90 TABLET | Refills: 2 | Status: SHIPPED | OUTPATIENT
Start: 2024-11-12 | End: 2024-12-05

## 2024-11-12 RX ORDER — ZOLPIDEM TARTRATE 10 MG/1
TABLET ORAL
Qty: 30 TABLET | Refills: 0 | Status: SHIPPED | OUTPATIENT
Start: 2024-11-12 | End: 2024-12-05

## 2024-11-12 NOTE — TELEPHONE ENCOUNTER
Last office visit 11/5/2024       Next office visit scheduled 1/3/2025    Requested Prescriptions     Pending Prescriptions Disp Refills    LORazepam (ATIVAN) 1 MG tablet 90 tablet 2     Sig: TAKE ONE TABLET BY MOUTH EVERY 8 HOURS AS NEEDED FOR ANXIETY    zolpidem (AMBIEN) 10 MG tablet 30 tablet 0     Sig: TAKE ONE TABLET BY MOUTH EVERY NIGHT AT BEDTIME AS NEEDED

## 2024-11-19 DIAGNOSIS — M17.11 PRIMARY OSTEOARTHRITIS OF RIGHT KNEE: ICD-10-CM

## 2024-11-19 NOTE — TELEPHONE ENCOUNTER
Patient called to have prescription transferred to [facility] as [facility] is having issues with the .     Rx pended

## 2024-12-04 ENCOUNTER — OFFICE VISIT (OUTPATIENT)
Dept: FAMILY MEDICINE CLINIC | Age: 64
End: 2024-12-04

## 2024-12-04 ENCOUNTER — TELEPHONE (OUTPATIENT)
Dept: FAMILY MEDICINE CLINIC | Age: 64
End: 2024-12-04

## 2024-12-04 VITALS — BODY MASS INDEX: 34.36 KG/M2 | HEIGHT: 60 IN | WEIGHT: 175 LBS

## 2024-12-04 DIAGNOSIS — S00.93XS TRAUMATIC HEMATOMA OF HEAD, SEQUELA: Primary | ICD-10-CM

## 2024-12-04 DIAGNOSIS — G44.311 INTRACTABLE ACUTE POST-TRAUMATIC HEADACHE: ICD-10-CM

## 2024-12-04 ASSESSMENT — ENCOUNTER SYMPTOMS
RESPIRATORY NEGATIVE: 1
GASTROINTESTINAL NEGATIVE: 1

## 2024-12-04 NOTE — TELEPHONE ENCOUNTER
Patient called stating she needs a letter for her employer from Dr Saldaña stating that she has intermittent episodes of passing out and may need to call out of work. Patient states if gets up does not feel well or if she does pass out she does not like to drive so therefore has to call out of work. Patient states Dr Saldaña is aware and has been running test to find cause

## 2024-12-04 NOTE — TELEPHONE ENCOUNTER
Spoke with patient and clarified- she just needs a letter. She states she has used up her [facility] paperwork.

## 2024-12-04 NOTE — PROGRESS NOTES
Shahida Váqsuez (:  1960) is a 63 y.o. female,Established patient, here for evaluation of the following chief complaint(s):  Headache and Other (Lump on head/)         Assessment & Plan  Traumatic hematoma of head, sequela   To ER if increasing symptoms, changes in symptoms, double vision or nausea/vomiting    Orders:    CT HEAD WO CONTRAST; Future    Intractable acute post-traumatic headache   To ER if increasing symptoms    Orders:    CT HEAD WO CONTRAST; Future      No follow-ups on file.       Subjective   Headache    Headache  Pt hurt her head 10/24  she has been fine but in last few days started with pain on the left side of her forehead   has a headache  No new injury  No vision changes   No nausea      Review of Systems   Constitutional: Negative.    HENT: Negative.     Respiratory: Negative.     Cardiovascular: Negative.    Gastrointestinal: Negative.    Neurological:  Positive for headaches.   Psychiatric/Behavioral:  Positive for decreased concentration.           Objective   Physical Exam  Vitals and nursing note reviewed.   Constitutional:       Appearance: She is well-developed.   HENT:      Head: Normocephalic.      Comments: Left forehead with tenderness to touch   small hematoma  Ela  eomi   Neck:      Thyroid: No thyromegaly.   Cardiovascular:      Rate and Rhythm: Normal rate and regular rhythm.      Heart sounds: Normal heart sounds.   Pulmonary:      Effort: Pulmonary effort is normal.      Breath sounds: Normal breath sounds.   Lymphadenopathy:      Cervical: No cervical adenopathy.   Neurological:      Mental Status: She is alert and oriented to person, place, and time.   Psychiatric:         Behavior: Behavior normal.         Thought Content: Thought content normal.         Judgment: Judgment normal.                  An electronic signature was used to authenticate this note.    --CANDICE DELONG DO

## 2024-12-09 ENCOUNTER — TELEPHONE (OUTPATIENT)
Dept: FAMILY MEDICINE CLINIC | Age: 64
End: 2024-12-09

## 2024-12-09 NOTE — TELEPHONE ENCOUNTER
Pt called and sent over forms for [employer] Accommodation Request Form to be filled out for her employer. She needs Part III -A and B filled out. 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 If any questions give pt a call [phone]. Forms are on MA desk.

## 2024-12-11 ENCOUNTER — HOSPITAL ENCOUNTER (OUTPATIENT)
Dept: CT IMAGING | Age: 64
Discharge: HOME | End: 2024-12-11
Payer: COMMERCIAL

## 2024-12-11 ENCOUNTER — TELEPHONE (OUTPATIENT)
Dept: FAMILY MEDICINE CLINIC | Age: 64
End: 2024-12-11

## 2024-12-11 DIAGNOSIS — G44.311 INTRACTABLE ACUTE POST-TRAUMATIC HEADACHE: ICD-10-CM

## 2024-12-11 DIAGNOSIS — F41.1 GAD (GENERALIZED ANXIETY DISORDER): ICD-10-CM

## 2024-12-11 DIAGNOSIS — I10 PRIMARY HYPERTENSION: ICD-10-CM

## 2024-12-11 DIAGNOSIS — M48.061 SPINAL STENOSIS OF LUMBAR REGION WITHOUT NEUROGENIC CLAUDICATION: ICD-10-CM

## 2024-12-11 DIAGNOSIS — F51.04 CHRONIC INSOMNIA: ICD-10-CM

## 2024-12-11 DIAGNOSIS — L29.9 GENERALIZED PRURITUS: ICD-10-CM

## 2024-12-11 DIAGNOSIS — S00.93XS TRAUMATIC HEMATOMA OF HEAD, SEQUELA: ICD-10-CM

## 2024-12-11 PROCEDURE — 70450 CT HEAD/BRAIN W/O DYE: CPT

## 2024-12-11 RX ORDER — HYDROXYZINE HYDROCHLORIDE 25 MG/1
TABLET, FILM COATED ORAL
Qty: 60 TABLET | Refills: 1 | Status: SHIPPED | OUTPATIENT
Start: 2024-12-11

## 2024-12-11 RX ORDER — HYDROCODONE BITARTRATE AND ACETAMINOPHEN 7.5; 325 MG/1; MG/1
1 TABLET ORAL EVERY 6 HOURS PRN
Qty: 120 TABLET | Refills: 0 | Status: SHIPPED | OUTPATIENT
Start: 2024-12-11 | End: 2025-01-10

## 2024-12-11 RX ORDER — PROPRANOLOL HYDROCHLORIDE 80 MG/1
CAPSULE, EXTENDED RELEASE ORAL
Qty: 90 CAPSULE | Refills: 2 | Status: SHIPPED | OUTPATIENT
Start: 2024-12-11

## 2024-12-11 RX ORDER — LORAZEPAM 1 MG/1
TABLET ORAL
Qty: 90 TABLET | Refills: 2 | Status: SHIPPED | OUTPATIENT
Start: 2024-12-11 | End: 2025-01-03

## 2024-12-11 RX ORDER — ERGOCALCIFEROL 1.25 MG/1
50000 CAPSULE, LIQUID FILLED ORAL WEEKLY
Qty: 12 CAPSULE | Refills: 1 | Status: SHIPPED | OUTPATIENT
Start: 2024-12-11

## 2024-12-11 RX ORDER — ZOLPIDEM TARTRATE 10 MG/1
TABLET ORAL
Qty: 30 TABLET | Refills: 0 | Status: SHIPPED | OUTPATIENT
Start: 2024-12-11 | End: 2025-01-03

## 2024-12-11 RX ORDER — GABAPENTIN 300 MG/1
CAPSULE ORAL
Qty: 90 CAPSULE | Refills: 3 | Status: SHIPPED | OUTPATIENT
Start: 2024-12-11 | End: 2025-03-20

## 2024-12-11 NOTE — TELEPHONE ENCOUNTER
Last office visit 12/4/2024      Next office visit scheduled 1/3/2025    Requested Prescriptions     Pending Prescriptions Disp Refills    propranolol (INDERAL LA) 80 MG extended release capsule 90 capsule 2     Sig: TAKE ONE CAPSULE BY MOUTH DAILY    vitamin D (ERGOCALCIFEROL) 1.25 MG (80272 UT) CAPS capsule 12 capsule 1     Sig: Take 1 capsule by mouth once a week    hydrOXYzine HCl (ATARAX) 25 MG tablet 60 tablet 1     Sig: TAKE TWO TABLETS BY MOUTH EVERY NIGHT AT BEDTIME AS NEEDED FOR ITCHING    gabapentin (NEURONTIN) 300 MG capsule 90 capsule 3     Sig: One tid    HYDROcodone-acetaminophen (NORCO) 7.5-325 MG per tablet 120 tablet 0     Sig: Take 1 tablet by mouth every 6 hours as needed for Pain for up to 30 days. Intended supply: 30 days

## 2024-12-11 NOTE — TELEPHONE ENCOUNTER
Pt called to get refills for     Zolpidem 10 mg  Lorazepam 1 mg     Please send to [facility] [phone]     If any questions give pt a call [phone]     Last ov 12/4/24  Future appt 1-3-25

## 2024-12-12 NOTE — TELEPHONE ENCOUNTER
Spoke with patient she states she needs accommodations for work due to ongoing episodes of passing out. She states when she wakes up and feels \"funny\" she is scared to drive her car. She would like to work from home majority of the week but would go in on days she felt ok. She also wants to see if they can accommodate her for \"RA parking\" she states this is for employees with handicap placards.

## 2024-12-20 DIAGNOSIS — M17.11 PRIMARY OSTEOARTHRITIS OF RIGHT KNEE: ICD-10-CM

## 2024-12-20 NOTE — TELEPHONE ENCOUNTER
Last office visit 12/4/2024     Next office visit scheduled 1/3/2025    Requested Prescriptions     Pending Prescriptions Disp Refills    tiZANidine (ZANAFLEX) 4 MG tablet 90 tablet 0     Sig: One tid prn muscle spasm

## 2024-12-30 ENCOUNTER — HOSPITAL ENCOUNTER (INPATIENT)
Age: 64
LOS: 21 days | Discharge: NURSING, REHAB OR LONG-TERM CARE | DRG: 215 | End: 2025-01-20
Attending: EMERGENCY MEDICINE | Admitting: INTERNAL MEDICINE
Payer: COMMERCIAL

## 2024-12-30 ENCOUNTER — APPOINTMENT (OUTPATIENT)
Dept: GENERAL RADIOLOGY | Age: 64
DRG: 215 | End: 2024-12-30
Payer: COMMERCIAL

## 2024-12-30 DIAGNOSIS — R07.9 CHEST PAIN: ICD-10-CM

## 2024-12-30 DIAGNOSIS — I21.19 ACUTE ST ELEVATION MYOCARDIAL INFARCTION (STEMI) INVOLVING OTHER CORONARY ARTERY OF INFERIOR WALL (HCC): Primary | ICD-10-CM

## 2024-12-30 DIAGNOSIS — F39 MOOD DISORDER (HCC): ICD-10-CM

## 2024-12-30 DIAGNOSIS — I24.9 ACUTE CORONARY SYNDROME (HCC): ICD-10-CM

## 2024-12-30 DIAGNOSIS — T14.8XXA HEMATOMA: ICD-10-CM

## 2024-12-30 DIAGNOSIS — I21.11 ST ELEVATION MYOCARDIAL INFARCTION INVOLVING RIGHT CORONARY ARTERY (HCC): ICD-10-CM

## 2024-12-30 DIAGNOSIS — J96.00 ACUTE RESPIRATORY FAILURE, UNSPECIFIED WHETHER WITH HYPOXIA OR HYPERCAPNIA: ICD-10-CM

## 2024-12-30 DIAGNOSIS — D62 ABLA (ACUTE BLOOD LOSS ANEMIA): ICD-10-CM

## 2024-12-30 DIAGNOSIS — I21.19 ST ELEVATION MYOCARDIAL INFARCTION (STEMI) INVOLVING OTHER CORONARY ARTERY OF INFERIOR WALL (HCC): ICD-10-CM

## 2024-12-30 DIAGNOSIS — R06.02 SHORTNESS OF BREATH: ICD-10-CM

## 2024-12-30 DIAGNOSIS — I21.3 STEMI (ST ELEVATION MYOCARDIAL INFARCTION) (HCC): ICD-10-CM

## 2024-12-30 DIAGNOSIS — J96.01 ACUTE RESPIRATORY FAILURE WITH HYPOXIA: ICD-10-CM

## 2024-12-30 DIAGNOSIS — R57.0 CARDIOGENIC SHOCK: ICD-10-CM

## 2024-12-30 DIAGNOSIS — R07.9 CHEST PAIN, UNSPECIFIED TYPE: ICD-10-CM

## 2024-12-30 DIAGNOSIS — N17.9 AKI (ACUTE KIDNEY INJURY) (HCC): ICD-10-CM

## 2024-12-30 DIAGNOSIS — I42.9 CARDIOMYOPATHY, UNSPECIFIED TYPE (HCC): ICD-10-CM

## 2024-12-30 LAB
ANION GAP SERPL CALCULATED.3IONS-SCNC: 15 MMOL/L (ref 3–16)
ANION GAP SERPL CALCULATED.3IONS-SCNC: 19 MMOL/L (ref 3–16)
ANISOCYTOSIS BLD QL SMEAR: ABNORMAL
APTT BLD: 26.3 SEC (ref 22.1–36.4)
BASE EXCESS MIXED: -10
BASE EXCESS MIXED: -9
BASOPHILS # BLD: 0 K/UL (ref 0–0.2)
BASOPHILS NFR BLD: 0 %
BUN SERPL-MCNC: 39 MG/DL (ref 7–20)
BUN SERPL-MCNC: 40 MG/DL (ref 7–20)
CA-I BLD-SCNC: 1.13 MMOL/L (ref 1.12–1.32)
CALCIUM SERPL-MCNC: 7.6 MG/DL (ref 8.3–10.6)
CALCIUM SERPL-MCNC: 8.5 MG/DL (ref 8.3–10.6)
CHLORIDE SERPL-SCNC: 103 MMOL/L (ref 99–110)
CHLORIDE SERPL-SCNC: 99 MMOL/L (ref 99–110)
CO2 SERPL-SCNC: 15 MMOL/L (ref 21–32)
CO2 SERPL-SCNC: 16 MMOL/L (ref 21–32)
CREAT SERPL-MCNC: 2.2 MG/DL (ref 0.6–1.2)
CREAT SERPL-MCNC: 2.5 MG/DL (ref 0.6–1.2)
DEPRECATED RDW RBC AUTO: 16.9 % (ref 12.4–15.4)
DEPRECATED RDW RBC AUTO: 17.4 % (ref 12.4–15.4)
EOSINOPHIL # BLD: 0.1 K/UL (ref 0–0.6)
EOSINOPHIL NFR BLD: 1 %
GFR SERPLBLD CREATININE-BSD FMLA CKD-EPI: 21 ML/MIN/{1.73_M2}
GFR SERPLBLD CREATININE-BSD FMLA CKD-EPI: 24 ML/MIN/{1.73_M2}
GLUCOSE BLD-MCNC: 132 MG/DL (ref 70–99)
GLUCOSE SERPL-MCNC: 104 MG/DL (ref 70–99)
GLUCOSE SERPL-MCNC: 132 MG/DL (ref 70–99)
HCO3, MIXED: 17.4 MMOL/L
HCO3, MIXED: 18.3 MMOL/L
HCT VFR BLD AUTO: 25 % (ref 36–48)
HCT VFR BLD AUTO: 27.3 % (ref 36–48)
HCT VFR BLD AUTO: 32.1 % (ref 36–48)
HEMOCCULT STL QL: NORMAL
HGB BLD CALC-MCNC: 8.5 GM/DL (ref 12–16)
HGB BLD-MCNC: 10.3 G/DL (ref 12–16)
HGB BLD-MCNC: 8.5 G/DL (ref 12–16)
INR PPP: 1.09 (ref 0.85–1.15)
LACTATE BLD-SCNC: 0.73 MMOL/L (ref 0.4–2)
LYMPHOCYTES # BLD: 2.5 K/UL (ref 1–5.1)
LYMPHOCYTES NFR BLD: 19 %
MCH RBC QN AUTO: 27.2 PG (ref 26–34)
MCH RBC QN AUTO: 27.7 PG (ref 26–34)
MCHC RBC AUTO-ENTMCNC: 31 G/DL (ref 31–36)
MCHC RBC AUTO-ENTMCNC: 32 G/DL (ref 31–36)
MCV RBC AUTO: 84.8 FL (ref 80–100)
MCV RBC AUTO: 89.4 FL (ref 80–100)
MONOCYTES # BLD: 0.9 K/UL (ref 0–1.3)
MONOCYTES NFR BLD: 7 %
NEUTROPHILS # BLD: 9.6 K/UL (ref 1.7–7.7)
NEUTROPHILS NFR BLD: 71 %
NEUTS BAND NFR BLD MANUAL: 2 % (ref 0–7)
O2 SAT, MIXED: 78 %
O2 SAT, MIXED: 79 %
PCO2 MIXED: 39.4 MM HG
PCO2 MIXED: 40.6 MM HG
PERFORMED ON: ABNORMAL
PERFORMED ON: ABNORMAL
PH, MIXED: 7.25 (ref 7.35–7.45)
PH, MIXED: 7.26 (ref 7.35–7.45)
PLATELET # BLD AUTO: 223 K/UL (ref 135–450)
PLATELET # BLD AUTO: 333 K/UL (ref 135–450)
PLATELET BLD QL SMEAR: ADEQUATE
PMV BLD AUTO: 10 FL (ref 5–10.5)
PMV BLD AUTO: 9.5 FL (ref 5–10.5)
PO2 MIXED: 49 MM HG
PO2 MIXED: 49 MM HG
POC ACT LR: 151 SEC
POC ACT LR: 206 SEC
POC ACT LR: 269 SEC
POC ACT LR: 295 SEC
POC SAMPLE TYPE: ABNORMAL
POC SAMPLE TYPE: ABNORMAL
POTASSIUM BLD-SCNC: 3.6 MMOL/L (ref 3.5–5.1)
POTASSIUM SERPL-SCNC: 3.6 MMOL/L (ref 3.5–5.1)
POTASSIUM SERPL-SCNC: 4.2 MMOL/L (ref 3.5–5.1)
PROTHROMBIN TIME: 14.3 SEC (ref 11.9–14.9)
RBC # BLD AUTO: 3.06 M/UL (ref 4–5.2)
RBC # BLD AUTO: 3.78 M/UL (ref 4–5.2)
REASON FOR REJECTION: NORMAL
REJECTED TEST: NORMAL
SLIDE REVIEW: ABNORMAL
SODIUM BLD-SCNC: 134 MMOL/L (ref 136–145)
SODIUM SERPL-SCNC: 133 MMOL/L (ref 136–145)
SODIUM SERPL-SCNC: 134 MMOL/L (ref 136–145)
TCO2 CALC MIXED: 19 MMOL/L
TCO2 CALC MIXED: 20 MMOL/L
TROPONIN, HIGH SENSITIVITY: ABNORMAL NG/L (ref 0–14)
WBC # BLD AUTO: 10.2 K/UL (ref 4–11)
WBC # BLD AUTO: 13.2 K/UL (ref 4–11)

## 2024-12-30 PROCEDURE — C1887 CATHETER, GUIDING: HCPCS | Performed by: STUDENT IN AN ORGANIZED HEALTH CARE EDUCATION/TRAINING PROGRAM

## 2024-12-30 PROCEDURE — 82947 ASSAY GLUCOSE BLOOD QUANT: CPT

## 2024-12-30 PROCEDURE — 6360000002 HC RX W HCPCS: Performed by: STUDENT IN AN ORGANIZED HEALTH CARE EDUCATION/TRAINING PROGRAM

## 2024-12-30 PROCEDURE — 2500000003 HC RX 250 WO HCPCS: Performed by: STUDENT IN AN ORGANIZED HEALTH CARE EDUCATION/TRAINING PROGRAM

## 2024-12-30 PROCEDURE — 33995 INSJ PERQ VAD R HRT VENOUS: CPT | Performed by: STUDENT IN AN ORGANIZED HEALTH CARE EDUCATION/TRAINING PROGRAM

## 2024-12-30 PROCEDURE — 94761 N-INVAS EAR/PLS OXIMETRY MLT: CPT

## 2024-12-30 PROCEDURE — 93005 ELECTROCARDIOGRAM TRACING: CPT | Performed by: EMERGENCY MEDICINE

## 2024-12-30 PROCEDURE — 84484 ASSAY OF TROPONIN QUANT: CPT

## 2024-12-30 PROCEDURE — 84295 ASSAY OF SERUM SODIUM: CPT

## 2024-12-30 PROCEDURE — 93460 R&L HRT ART/VENTRICLE ANGIO: CPT | Performed by: STUDENT IN AN ORGANIZED HEALTH CARE EDUCATION/TRAINING PROGRAM

## 2024-12-30 PROCEDURE — 71045 X-RAY EXAM CHEST 1 VIEW: CPT

## 2024-12-30 PROCEDURE — 85347 COAGULATION TIME ACTIVATED: CPT

## 2024-12-30 PROCEDURE — 99153 MOD SED SAME PHYS/QHP EA: CPT | Performed by: STUDENT IN AN ORGANIZED HEALTH CARE EDUCATION/TRAINING PROGRAM

## 2024-12-30 PROCEDURE — 85730 THROMBOPLASTIN TIME PARTIAL: CPT

## 2024-12-30 PROCEDURE — 2709999900 HC NON-CHARGEABLE SUPPLY: Performed by: STUDENT IN AN ORGANIZED HEALTH CARE EDUCATION/TRAINING PROGRAM

## 2024-12-30 PROCEDURE — 85610 PROTHROMBIN TIME: CPT

## 2024-12-30 PROCEDURE — 33990 INSJ PERQ VAD L HRT ARTERIAL: CPT | Performed by: STUDENT IN AN ORGANIZED HEALTH CARE EDUCATION/TRAINING PROGRAM

## 2024-12-30 PROCEDURE — 6360000004 HC RX CONTRAST MEDICATION: Performed by: STUDENT IN AN ORGANIZED HEALTH CARE EDUCATION/TRAINING PROGRAM

## 2024-12-30 PROCEDURE — 96361 HYDRATE IV INFUSION ADD-ON: CPT

## 2024-12-30 PROCEDURE — 6370000000 HC RX 637 (ALT 250 FOR IP): Performed by: NURSE PRACTITIONER

## 2024-12-30 PROCEDURE — 2100000000 HC CCU R&B

## 2024-12-30 PROCEDURE — 36556 INSERT NON-TUNNEL CV CATH: CPT | Performed by: STUDENT IN AN ORGANIZED HEALTH CARE EDUCATION/TRAINING PROGRAM

## 2024-12-30 PROCEDURE — 4A023N8 MEASUREMENT OF CARDIAC SAMPLING AND PRESSURE, BILATERAL, PERCUTANEOUS APPROACH: ICD-10-PCS | Performed by: STUDENT IN AN ORGANIZED HEALTH CARE EDUCATION/TRAINING PROGRAM

## 2024-12-30 PROCEDURE — 80048 BASIC METABOLIC PNL TOTAL CA: CPT

## 2024-12-30 PROCEDURE — 2580000003 HC RX 258: Performed by: STUDENT IN AN ORGANIZED HEALTH CARE EDUCATION/TRAINING PROGRAM

## 2024-12-30 PROCEDURE — 2580000003 HC RX 258: Performed by: HOSPITALIST

## 2024-12-30 PROCEDURE — 84132 ASSAY OF SERUM POTASSIUM: CPT

## 2024-12-30 PROCEDURE — 99291 CRITICAL CARE FIRST HOUR: CPT | Performed by: STUDENT IN AN ORGANIZED HEALTH CARE EDUCATION/TRAINING PROGRAM

## 2024-12-30 PROCEDURE — 94002 VENT MGMT INPAT INIT DAY: CPT

## 2024-12-30 PROCEDURE — C1889 IMPLANT/INSERT DEVICE, NOC: HCPCS | Performed by: STUDENT IN AN ORGANIZED HEALTH CARE EDUCATION/TRAINING PROGRAM

## 2024-12-30 PROCEDURE — C1760 CLOSURE DEV, VASC: HCPCS | Performed by: STUDENT IN AN ORGANIZED HEALTH CARE EDUCATION/TRAINING PROGRAM

## 2024-12-30 PROCEDURE — 6360000002 HC RX W HCPCS: Performed by: EMERGENCY MEDICINE

## 2024-12-30 PROCEDURE — 82330 ASSAY OF CALCIUM: CPT

## 2024-12-30 PROCEDURE — 2580000003 HC RX 258: Performed by: EMERGENCY MEDICINE

## 2024-12-30 PROCEDURE — 82270 OCCULT BLOOD FECES: CPT

## 2024-12-30 PROCEDURE — 2580000003 HC RX 258

## 2024-12-30 PROCEDURE — 99152 MOD SED SAME PHYS/QHP 5/>YRS: CPT | Performed by: STUDENT IN AN ORGANIZED HEALTH CARE EDUCATION/TRAINING PROGRAM

## 2024-12-30 PROCEDURE — 96374 THER/PROPH/DIAG INJ IV PUSH: CPT

## 2024-12-30 PROCEDURE — 36415 COLL VENOUS BLD VENIPUNCTURE: CPT

## 2024-12-30 PROCEDURE — 99285 EMERGENCY DEPT VISIT HI MDM: CPT

## 2024-12-30 PROCEDURE — 85014 HEMATOCRIT: CPT

## 2024-12-30 PROCEDURE — C1751 CATH, INF, PER/CENT/MIDLINE: HCPCS | Performed by: STUDENT IN AN ORGANIZED HEALTH CARE EDUCATION/TRAINING PROGRAM

## 2024-12-30 PROCEDURE — 2500000003 HC RX 250 WO HCPCS: Performed by: HOSPITALIST

## 2024-12-30 PROCEDURE — 85027 COMPLETE CBC AUTOMATED: CPT

## 2024-12-30 PROCEDURE — 2700000000 HC OXYGEN THERAPY PER DAY

## 2024-12-30 PROCEDURE — 85025 COMPLETE CBC W/AUTO DIFF WBC: CPT

## 2024-12-30 PROCEDURE — 6360000002 HC RX W HCPCS: Performed by: INTERNAL MEDICINE

## 2024-12-30 PROCEDURE — 94003 VENT MGMT INPAT SUBQ DAY: CPT

## 2024-12-30 PROCEDURE — C1769 GUIDE WIRE: HCPCS | Performed by: STUDENT IN AN ORGANIZED HEALTH CARE EDUCATION/TRAINING PROGRAM

## 2024-12-30 PROCEDURE — 82803 BLOOD GASES ANY COMBINATION: CPT

## 2024-12-30 PROCEDURE — 83605 ASSAY OF LACTIC ACID: CPT

## 2024-12-30 PROCEDURE — C1894 INTRO/SHEATH, NON-LASER: HCPCS | Performed by: STUDENT IN AN ORGANIZED HEALTH CARE EDUCATION/TRAINING PROGRAM

## 2024-12-30 DEVICE — KIT CATHETER PUMP INSERTION INTRACARDIAC IMPELLA CP: Type: IMPLANTABLE DEVICE | Status: FUNCTIONAL

## 2024-12-30 DEVICE — RP FLEX W SMART ASSIST SET, US
Type: IMPLANTABLE DEVICE | Status: FUNCTIONAL
Brand: IMPELLA

## 2024-12-30 RX ORDER — SODIUM CHLORIDE 9 MG/ML
INJECTION, SOLUTION INTRAVENOUS PRN
Status: DISCONTINUED | OUTPATIENT
Start: 2024-12-30 | End: 2024-12-30

## 2024-12-30 RX ORDER — CLOPIDOGREL BISULFATE 75 MG/1
75 TABLET ORAL DAILY
Status: DISCONTINUED | OUTPATIENT
Start: 2024-12-31 | End: 2025-01-20 | Stop reason: HOSPADM

## 2024-12-30 RX ORDER — MIDAZOLAM HYDROCHLORIDE 1 MG/ML
INJECTION, SOLUTION INTRAMUSCULAR; INTRAVENOUS PRN
Status: DISCONTINUED | OUTPATIENT
Start: 2024-12-30 | End: 2024-12-30 | Stop reason: HOSPADM

## 2024-12-30 RX ORDER — MIDAZOLAM HYDROCHLORIDE 1 MG/ML
1-10 INJECTION, SOLUTION INTRAVENOUS CONTINUOUS
Status: DISCONTINUED | OUTPATIENT
Start: 2024-12-30 | End: 2024-12-30

## 2024-12-30 RX ORDER — PHENYLEPHRINE HCL IN 0.9% NACL 1 MG/10 ML
SYRINGE (ML) INTRAVENOUS PRN
Status: DISCONTINUED | OUTPATIENT
Start: 2024-12-30 | End: 2024-12-30 | Stop reason: HOSPADM

## 2024-12-30 RX ORDER — FENTANYL CITRATE-0.9 % NACL/PF 10 MCG/ML
PLASTIC BAG, INJECTION (ML) INTRAVENOUS CONTINUOUS PRN
Status: COMPLETED | OUTPATIENT
Start: 2024-12-30 | End: 2024-12-30

## 2024-12-30 RX ORDER — PROPOFOL 10 MG/ML
5-50 INJECTION, EMULSION INTRAVENOUS CONTINUOUS
Status: DISCONTINUED | OUTPATIENT
Start: 2024-12-30 | End: 2024-12-30 | Stop reason: SDUPTHER

## 2024-12-30 RX ORDER — SODIUM CHLORIDE 9 MG/ML
INJECTION, SOLUTION INTRAVENOUS PRN
Status: DISCONTINUED | OUTPATIENT
Start: 2024-12-30 | End: 2025-01-20 | Stop reason: HOSPADM

## 2024-12-30 RX ORDER — 0.9 % SODIUM CHLORIDE 0.9 %
1000 INTRAVENOUS SOLUTION INTRAVENOUS ONCE
Status: COMPLETED | OUTPATIENT
Start: 2024-12-30 | End: 2024-12-30

## 2024-12-30 RX ORDER — NOREPINEPHRINE BITARTRATE 0.06 MG/ML
INJECTION, SOLUTION INTRAVENOUS CONTINUOUS PRN
Status: DISCONTINUED | OUTPATIENT
Start: 2024-12-30 | End: 2024-12-30 | Stop reason: HOSPADM

## 2024-12-30 RX ORDER — ONDANSETRON 4 MG/1
4 TABLET, ORALLY DISINTEGRATING ORAL EVERY 8 HOURS PRN
Status: DISCONTINUED | OUTPATIENT
Start: 2024-12-30 | End: 2025-01-20 | Stop reason: HOSPADM

## 2024-12-30 RX ORDER — SODIUM CHLORIDE 0.9 % (FLUSH) 0.9 %
5-40 SYRINGE (ML) INJECTION PRN
Status: DISCONTINUED | OUTPATIENT
Start: 2024-12-30 | End: 2024-12-30

## 2024-12-30 RX ORDER — ACETAMINOPHEN 325 MG/1
650 TABLET ORAL EVERY 6 HOURS PRN
Status: DISCONTINUED | OUTPATIENT
Start: 2024-12-30 | End: 2024-12-30

## 2024-12-30 RX ORDER — ETOMIDATE 2 MG/ML
INJECTION INTRAVENOUS PRN
Status: DISCONTINUED | OUTPATIENT
Start: 2024-12-30 | End: 2024-12-30 | Stop reason: HOSPADM

## 2024-12-30 RX ORDER — ASPIRIN 81 MG/1
81 TABLET, CHEWABLE ORAL DAILY
Status: DISCONTINUED | OUTPATIENT
Start: 2024-12-31 | End: 2025-01-16

## 2024-12-30 RX ORDER — SODIUM CHLORIDE 9 MG/ML
INJECTION, SOLUTION INTRAVENOUS CONTINUOUS PRN
Status: COMPLETED | OUTPATIENT
Start: 2024-12-30 | End: 2024-12-30

## 2024-12-30 RX ORDER — CLOPIDOGREL 300 MG/1
600 TABLET, FILM COATED ORAL ONCE
Status: DISCONTINUED | OUTPATIENT
Start: 2024-12-30 | End: 2024-12-31

## 2024-12-30 RX ORDER — POLYETHYLENE GLYCOL 3350 17 G/17G
17 POWDER, FOR SOLUTION ORAL DAILY PRN
Status: DISCONTINUED | OUTPATIENT
Start: 2024-12-30 | End: 2025-01-20 | Stop reason: HOSPADM

## 2024-12-30 RX ORDER — ASPIRIN 81 MG/1
324 TABLET, CHEWABLE ORAL ONCE
Status: COMPLETED | OUTPATIENT
Start: 2024-12-30 | End: 2024-12-30

## 2024-12-30 RX ORDER — MIDAZOLAM HYDROCHLORIDE 1 MG/ML
1-10 INJECTION, SOLUTION INTRAVENOUS CONTINUOUS
Status: DISCONTINUED | OUTPATIENT
Start: 2024-12-30 | End: 2025-01-10

## 2024-12-30 RX ORDER — SODIUM CHLORIDE 0.9 % (FLUSH) 0.9 %
5-40 SYRINGE (ML) INJECTION EVERY 12 HOURS SCHEDULED
Status: DISCONTINUED | OUTPATIENT
Start: 2024-12-30 | End: 2024-12-30

## 2024-12-30 RX ORDER — HEPARIN SODIUM 1000 [USP'U]/ML
INJECTION, SOLUTION INTRAVENOUS; SUBCUTANEOUS PRN
Status: DISCONTINUED | OUTPATIENT
Start: 2024-12-30 | End: 2024-12-30 | Stop reason: HOSPADM

## 2024-12-30 RX ORDER — SODIUM CHLORIDE 9 MG/ML
INJECTION, SOLUTION INTRAVENOUS CONTINUOUS
Status: ACTIVE | OUTPATIENT
Start: 2024-12-30 | End: 2024-12-31

## 2024-12-30 RX ORDER — HEPARIN SODIUM 10000 [USP'U]/100ML
5-30 INJECTION, SOLUTION INTRAVENOUS CONTINUOUS
Status: DISCONTINUED | OUTPATIENT
Start: 2024-12-30 | End: 2025-01-09

## 2024-12-30 RX ORDER — ACETAMINOPHEN 650 MG/1
650 SUPPOSITORY RECTAL EVERY 6 HOURS PRN
Status: DISCONTINUED | OUTPATIENT
Start: 2024-12-30 | End: 2025-01-20 | Stop reason: HOSPADM

## 2024-12-30 RX ORDER — NOREPINEPHRINE BITARTRATE 0.06 MG/ML
1-100 INJECTION, SOLUTION INTRAVENOUS CONTINUOUS
Status: DISCONTINUED | OUTPATIENT
Start: 2024-12-30 | End: 2025-01-10

## 2024-12-30 RX ORDER — SODIUM CHLORIDE 0.9 % (FLUSH) 0.9 %
5-40 SYRINGE (ML) INJECTION EVERY 12 HOURS SCHEDULED
Status: DISCONTINUED | OUTPATIENT
Start: 2024-12-30 | End: 2025-01-02

## 2024-12-30 RX ORDER — SODIUM CHLORIDE 9 MG/ML
INJECTION, SOLUTION INTRAVENOUS PRN
Status: DISCONTINUED | OUTPATIENT
Start: 2024-12-30 | End: 2025-01-02

## 2024-12-30 RX ORDER — ATROPINE SULFATE 0.1 MG/ML
1 INJECTION INTRAVENOUS ONCE
Status: COMPLETED | OUTPATIENT
Start: 2024-12-30 | End: 2024-12-30

## 2024-12-30 RX ORDER — ONDANSETRON 2 MG/ML
4 INJECTION INTRAMUSCULAR; INTRAVENOUS EVERY 6 HOURS PRN
Status: DISCONTINUED | OUTPATIENT
Start: 2024-12-30 | End: 2025-01-20 | Stop reason: HOSPADM

## 2024-12-30 RX ORDER — LIDOCAINE HYDROCHLORIDE 10 MG/ML
INJECTION, SOLUTION INFILTRATION; PERINEURAL PRN
Status: DISCONTINUED | OUTPATIENT
Start: 2024-12-30 | End: 2024-12-30 | Stop reason: HOSPADM

## 2024-12-30 RX ORDER — ACETAMINOPHEN 325 MG/1
650 TABLET ORAL EVERY 4 HOURS PRN
Status: DISCONTINUED | OUTPATIENT
Start: 2024-12-30 | End: 2025-01-13

## 2024-12-30 RX ORDER — SODIUM CHLORIDE 0.9 % (FLUSH) 0.9 %
5-40 SYRINGE (ML) INJECTION PRN
Status: DISCONTINUED | OUTPATIENT
Start: 2024-12-30 | End: 2025-01-20 | Stop reason: HOSPADM

## 2024-12-30 RX ORDER — SUCCINYLCHOLINE/SOD CL,ISO/PF 200MG/10ML
SYRINGE (ML) INTRAVENOUS PRN
Status: DISCONTINUED | OUTPATIENT
Start: 2024-12-30 | End: 2024-12-30 | Stop reason: HOSPADM

## 2024-12-30 RX ORDER — FENTANYL CITRATE-0.9 % NACL/PF 10 MCG/ML
25-200 PLASTIC BAG, INJECTION (ML) INTRAVENOUS CONTINUOUS
Status: DISCONTINUED | OUTPATIENT
Start: 2024-12-30 | End: 2024-12-30 | Stop reason: SDUPTHER

## 2024-12-30 RX ORDER — PROPOFOL 10 MG/ML
5-50 INJECTION, EMULSION INTRAVENOUS CONTINUOUS
Status: DISCONTINUED | OUTPATIENT
Start: 2024-12-30 | End: 2025-01-10

## 2024-12-30 RX ORDER — PROPOFOL 10 MG/ML
INJECTION, EMULSION INTRAVENOUS CONTINUOUS PRN
Status: COMPLETED | OUTPATIENT
Start: 2024-12-30 | End: 2024-12-30

## 2024-12-30 RX ORDER — HEPARIN SODIUM 1000 [USP'U]/ML
4000 INJECTION, SOLUTION INTRAVENOUS; SUBCUTANEOUS ONCE
Status: DISCONTINUED | OUTPATIENT
Start: 2024-12-30 | End: 2024-12-30

## 2024-12-30 RX ORDER — MIDAZOLAM HYDROCHLORIDE 1 MG/ML
INJECTION, SOLUTION INTRAVENOUS CONTINUOUS PRN
Status: COMPLETED | OUTPATIENT
Start: 2024-12-30 | End: 2024-12-30

## 2024-12-30 RX ORDER — SODIUM CHLORIDE 0.9 % (FLUSH) 0.9 %
5-40 SYRINGE (ML) INJECTION PRN
Status: DISCONTINUED | OUTPATIENT
Start: 2024-12-30 | End: 2025-01-02

## 2024-12-30 RX ORDER — DEXMEDETOMIDINE HYDROCHLORIDE 4 UG/ML
INJECTION INTRAVENOUS CONTINUOUS PRN
Status: DISCONTINUED | OUTPATIENT
Start: 2024-12-30 | End: 2024-12-30 | Stop reason: HOSPADM

## 2024-12-30 RX ORDER — SODIUM CHLORIDE 9 MG/ML
INJECTION, SOLUTION INTRAVENOUS
Status: COMPLETED
Start: 2024-12-30 | End: 2024-12-30

## 2024-12-30 RX ORDER — SODIUM CHLORIDE 0.9 % (FLUSH) 0.9 %
5-40 SYRINGE (ML) INJECTION EVERY 12 HOURS SCHEDULED
Status: DISCONTINUED | OUTPATIENT
Start: 2024-12-30 | End: 2025-01-20 | Stop reason: HOSPADM

## 2024-12-30 RX ORDER — IOPAMIDOL 755 MG/ML
INJECTION, SOLUTION INTRAVASCULAR PRN
Status: DISCONTINUED | OUTPATIENT
Start: 2024-12-30 | End: 2024-12-30 | Stop reason: HOSPADM

## 2024-12-30 RX ADMIN — SODIUM CHLORIDE, PRESERVATIVE FREE 10 ML: 5 INJECTION INTRAVENOUS at 22:00

## 2024-12-30 RX ADMIN — SODIUM CHLORIDE, PRESERVATIVE FREE 10 ML: 5 INJECTION INTRAVENOUS at 22:11

## 2024-12-30 RX ADMIN — SODIUM BICARBONATE: 84 INJECTION, SOLUTION INTRAVENOUS at 21:45

## 2024-12-30 RX ADMIN — SODIUM CHLORIDE: 9 INJECTION, SOLUTION INTRAVENOUS at 21:53

## 2024-12-30 RX ADMIN — MIDAZOLAM IN SODIUM CHLORIDE 5 MG/HR: 1 INJECTION INTRAVENOUS at 21:35

## 2024-12-30 RX ADMIN — HEPARIN SODIUM 500 UNITS/HR: 10000 INJECTION, SOLUTION INTRAVENOUS at 23:17

## 2024-12-30 RX ADMIN — PROPOFOL 50 MCG/KG/MIN: 10 INJECTION, EMULSION INTRAVENOUS at 23:54

## 2024-12-30 RX ADMIN — ATROPINE SULFATE 1 MG: 0.1 INJECTION INTRAVENOUS at 16:14

## 2024-12-30 RX ADMIN — Medication 200 MCG/HR: at 21:48

## 2024-12-30 RX ADMIN — SODIUM CHLORIDE: 9 INJECTION, SOLUTION INTRAVENOUS at 21:52

## 2024-12-30 RX ADMIN — PROPOFOL 50 MCG/KG/MIN: 10 INJECTION, EMULSION INTRAVENOUS at 21:43

## 2024-12-30 RX ADMIN — SODIUM BICARBONATE: 84 INJECTION, SOLUTION INTRAVENOUS at 21:43

## 2024-12-30 RX ADMIN — Medication 5 MCG/MIN: at 21:49

## 2024-12-30 RX ADMIN — SODIUM CHLORIDE 1000 ML: 9 INJECTION, SOLUTION INTRAVENOUS at 16:15

## 2024-12-30 RX ADMIN — ASPIRIN 81 MG 324 MG: 81 TABLET ORAL at 16:15

## 2024-12-30 RX ADMIN — SODIUM CHLORIDE: 9 INJECTION, SOLUTION INTRAVENOUS at 23:56

## 2024-12-30 RX ADMIN — SODIUM CHLORIDE: 9 INJECTION, SOLUTION INTRAVENOUS at 21:32

## 2024-12-30 ASSESSMENT — PAIN DESCRIPTION - LOCATION: LOCATION: CHEST

## 2024-12-30 ASSESSMENT — PAIN SCALES - GENERAL
PAINLEVEL_OUTOF10: 6
PAINLEVEL_OUTOF10: 0

## 2024-12-30 ASSESSMENT — PULMONARY FUNCTION TESTS
PIF_VALUE: 23
PIF_VALUE: 32
PIF_VALUE: 26
PIF_VALUE: 23

## 2024-12-30 NOTE — PRE SEDATION
Brief Pre-Op Note/Sedation Assessment      Shahida Vásquez  1960  [id number]      Planned Procedure: Cardiac Catheterization Procedure  Post Procedure Plan: Return to same level of care  Consent: I have discussed with the patient and/or the patient representative the indication, alternatives, and the possible risks and/or complications of the planned procedure and the anesthesia methods. The patient and/or patient representative appear to understand and agree to proceed.        Chief Complaint:   STEMI      Indications for Cath Procedure:  Presentation:  ACS > 24 hrs, Worsening Angina, and Suspected CAD  2.  Anginal Classification within 2 weeks:  CCS IV - Inability to perform any activity without angina or angina at rest, i.e., severe limitation  3.  Angina Symptoms Assessment:  Typical Chest Pain  4.  Heart Failure Class within last 2 weeks:  No symptoms  5.  Cardiovascular Instability:  No    Prior Ischemic Workup/Eval:  Pre-Procedural Medications: Yes: Beta Blockers and Ca Channel Blockers  2.   Stress Test Completed?  No    Does Patient need surgery?  Cath Valve Surgery:  No    Pre-Procedure Medical History:  Vital Signs:  BP (!) 70/51   Pulse (!) 49   Temp 97.6 °F (36.4 °C) (Oral)   Resp 16   Ht 1.524 m (5')   Wt 74.4 kg (164 lb)   SpO2 95%   BMI 32.03 kg/m²     Allergies:    Allergies   Allergen Reactions    Penicillins Rash     Medications:    Current Facility-Administered Medications   Medication Dose Route Frequency Provider Last Rate Last Admin    lidocaine 1 % injection    PRN Ang Enriquez MD   5 mL at 12/30/24 1634    phenylephrine (JACKY-SYNEPHRINE) 1 MG/10ML prefilled syringe (Push Dose)    PRN Ang Enriquez MD   200 mcg at 12/30/24 1710    norepinephrine (LEVOPHED) 16 mg in sodium chloride 0.9 % 250 mL infusion    Continuous PRN Ang Enriquez MD   Stopped at 12/30/24 1803    heparin (porcine) injection   IntraVENous PRN Ang Enriquez MD   6,000 Units at 12/30/24 1653

## 2024-12-30 NOTE — ED PROVIDER NOTES
I personally saw the patient and made/approved the management plan and take responsibility for the patient management.    For further details of Shahida Vásquez's emergency department encounter, please see the advanced practice provider's documentation.    I, Richy Lam MD, am the primary physician provider of record.    CHIEF COMPLAINT  Chief Complaint   Patient presents with    Fatigue     Pt arrives from home by herself. Pt C/O CP and fatigue since Thursday. Pt states that the CP started to radiate down her bilateral arms. Pt rates her pain a 6 out of 10.      Briefly, Shahida Vásquez is a 64 y.o. female  who presents to the ED complaining of onset Thursday of chest discomfort with shortness of breath malaise fatigue and even a syncopal event.  The pain does radiate down the arms periodically.  The patient's initial complaint was actually fatigue and diarrhea and as such this did cause a brief delay in obtaining her EKG.  When this was obtained, it was immediately identified as an inferior STEMI and alert was activated.    FOCUSED PHYSICAL EXAMINATION  BP (!) 70/51   Pulse (!) 49   Temp 97.6 °F (36.4 °C) (Oral)   Resp 16   Ht 1.524 m (5')   Wt 74.4 kg (164 lb)   SpO2 95%   BMI 32.03 kg/m²    Focused physical examination notable for mild acute distress, well-appearing, well-nourished, normal speech and mentation without obvious facial droop, no obvious rash.  No obvious cranial nerve deficits on my initial exam.  Pale appearing.  Rectal exam per CHARLIE.  Abdomen is soft nontender nondistended.  Bradycardic regular rhythm clear to auscultation bilaterally.      EKG performed in ED:  The 12 lead EKG was interpreted by me independent of a cardiologist as follows:  Rate: bradycardia with a rate of 49  Rhythm: junctional  Axis: normal  Intervals: normal WV, narrow QRS, normal QTc  ST segments: ST elevations inferiorly with anterolateral ST depressions  T waves: no abnormal inversions  Non-specific T wave

## 2024-12-30 NOTE — ED NOTES
Patient arrived from home by self, waiting outside on bench  approached this RN arriving for work, patient states can you get wheelchair for me? This RN helped pt into ER, pt able to get checked in then placed in lobby, then was able to ambulate to bathroom by self, where she has missed several calls for her name because she is having active diarrhea. Pt requesting something to sit in bathroom with here at this time. Staff unable to sit with pt, will however help as best as we can.

## 2024-12-30 NOTE — ED PROVIDER NOTES
[facility]  EMERGENCY DEPARTMENT ENCOUNTER        Pt Name: Shahida Vásquez  MRN: [id number]  Birthdate 1960  Date of evaluation: 12/30/2024  Provider: TRESSA Bar - CNP  PCP: Demetrice Saldaña DO  Note Started: 4:18 PM EST 12/30/24       I have seen and evaluated this patient with my supervising physician Richy Lam MD.      CHIEF COMPLAINT       Chief Complaint   Patient presents with    Fatigue     Pt arrives from home by herself. Pt C/O CP and fatigue since Thursday. Pt states that the CP started to radiate down her bilateral arms. Pt rates her pain a 6 out of 10.        HISTORY OF PRESENT ILLNESS: 1 or more Elements     History from : Patient    Limitations to history : None    Shahida Vásquez is a 64 y.o. female who presents to the emergency department with complaint of diarrhea and fatigue since Thursday.  The patient did have an extended time in the bathroom prior to triage as she reported that she needed to have a bowel movement.    Patient was then placed in bay 2 for triage, reported chest pain and fatigue since Thursday.  Chest pain did radiate down bilateral arms.  Rated her pain as 6 of 10.    She is pale and appears unwell.  EKG was completed and was given to Dr. Lam, STEMI alert was immediately called.  Patient was moved to room 2 via stretcher    Nursing Notes were all reviewed and agreed with or any disagreements were addressed in the HPI.    REVIEW OF SYSTEMS :      Review of Systems   Constitutional:  Positive for fatigue. Negative for activity change, chills and fever.   Respiratory:  Positive for shortness of breath. Negative for chest tightness.    Cardiovascular:  Positive for chest pain.   Gastrointestinal:  Positive for diarrhea. Negative for abdominal pain, nausea and vomiting.   Genitourinary:  Negative for dysuria.   All other systems reviewed and are negative.      Positives and Pertinent negatives as per HPI.     SURGICAL HISTORY     Past Surgical History:

## 2024-12-31 ENCOUNTER — APPOINTMENT (OUTPATIENT)
Age: 64
DRG: 215 | End: 2024-12-31
Attending: STUDENT IN AN ORGANIZED HEALTH CARE EDUCATION/TRAINING PROGRAM
Payer: COMMERCIAL

## 2024-12-31 ENCOUNTER — APPOINTMENT (OUTPATIENT)
Dept: GENERAL RADIOLOGY | Age: 64
DRG: 215 | End: 2024-12-31
Payer: COMMERCIAL

## 2024-12-31 DIAGNOSIS — I21.3 ST ELEVATION MYOCARDIAL INFARCTION (STEMI), UNSPECIFIED ARTERY (HCC): Primary | ICD-10-CM

## 2024-12-31 PROBLEM — Z79.891 CHRONIC PRESCRIPTION OPIATE USE: Status: ACTIVE | Noted: 2024-12-31

## 2024-12-31 PROBLEM — I21.9 ACUTE MYOCARDIAL INFARCTION (HCC): Status: ACTIVE | Noted: 2024-12-30

## 2024-12-31 PROBLEM — I24.9 ACUTE CORONARY SYNDROME (HCC): Status: ACTIVE | Noted: 2024-12-31

## 2024-12-31 PROBLEM — F39 MOOD DISORDER (HCC): Status: ACTIVE | Noted: 2024-12-31

## 2024-12-31 PROBLEM — D62 ABLA (ACUTE BLOOD LOSS ANEMIA): Status: ACTIVE | Noted: 2024-12-31

## 2024-12-31 PROBLEM — J96.00 ACUTE RESPIRATORY FAILURE: Status: ACTIVE | Noted: 2024-12-31

## 2024-12-31 PROBLEM — I21.11 ST ELEVATION MYOCARDIAL INFARCTION INVOLVING RIGHT CORONARY ARTERY (HCC): Status: ACTIVE | Noted: 2024-12-30

## 2024-12-31 PROBLEM — R57.0 CARDIOGENIC SHOCK: Status: ACTIVE | Noted: 2024-12-31

## 2024-12-31 LAB
ABO + RH BLD: NORMAL
ALBUMIN SERPL-MCNC: 3.1 G/DL (ref 3.4–5)
ALP SERPL-CCNC: 131 U/L (ref 40–129)
ALT SERPL-CCNC: 23 U/L (ref 10–40)
ANION GAP SERPL CALCULATED.3IONS-SCNC: 10 MMOL/L (ref 3–16)
ANION GAP SERPL CALCULATED.3IONS-SCNC: 13 MMOL/L (ref 3–16)
ANION GAP SERPL CALCULATED.3IONS-SCNC: 16 MMOL/L (ref 3–16)
APTT BLD: 99.6 SEC (ref 22.1–36.4)
AST SERPL-CCNC: 104 U/L (ref 15–37)
BACTERIA URNS QL MICRO: ABNORMAL /HPF
BASE EXCESS BLDA CALC-SCNC: -4 MMOL/L (ref -3–3)
BASE EXCESS BLDA CALC-SCNC: -5 MMOL/L (ref -3–3)
BASE EXCESS BLDA CALC-SCNC: -7 MMOL/L (ref -3–3)
BASE EXCESS MIXED: -4
BASE EXCESS MIXED: -4
BASE EXCESS MIXED: -5
BASE EXCESS MIXED: -7
BASOPHILS # BLD: 0.1 K/UL (ref 0–0.2)
BASOPHILS NFR BLD: 0.5 %
BILIRUB DIRECT SERPL-MCNC: 0.3 MG/DL (ref 0–0.3)
BILIRUB INDIRECT SERPL-MCNC: 0.2 MG/DL (ref 0–1)
BILIRUB SERPL-MCNC: 0.5 MG/DL (ref 0–1)
BILIRUB UR QL STRIP.AUTO: NEGATIVE
BLD GP AB SCN SERPL QL: NORMAL
BUN SERPL-MCNC: 28 MG/DL (ref 7–20)
BUN SERPL-MCNC: 33 MG/DL (ref 7–20)
BUN SERPL-MCNC: 37 MG/DL (ref 7–20)
CA-I BLD-SCNC: 1.11 MMOL/L (ref 1.12–1.32)
CA-I BLD-SCNC: 1.13 MMOL/L (ref 1.12–1.32)
CA-I BLD-SCNC: 1.13 MMOL/L (ref 1.12–1.32)
CA-I BLD-SCNC: 1.14 MMOL/L (ref 1.12–1.32)
CALCIUM SERPL-MCNC: 7.7 MG/DL (ref 8.3–10.6)
CALCIUM SERPL-MCNC: 7.8 MG/DL (ref 8.3–10.6)
CALCIUM SERPL-MCNC: 7.8 MG/DL (ref 8.3–10.6)
CHLORIDE SERPL-SCNC: 104 MMOL/L (ref 99–110)
CHLORIDE SERPL-SCNC: 106 MMOL/L (ref 99–110)
CHLORIDE SERPL-SCNC: 107 MMOL/L (ref 99–110)
CHOLEST SERPL-MCNC: 127 MG/DL (ref 0–199)
CLARITY UR: CLEAR
CO2 BLDA-SCNC: 20 MMOL/L
CO2 BLDA-SCNC: 20 MMOL/L
CO2 BLDA-SCNC: 21 MMOL/L
CO2 BLDA-SCNC: 22 MMOL/L
CO2 BLDA-SCNC: 23 MMOL/L
CO2 SERPL-SCNC: 16 MMOL/L (ref 21–32)
CO2 SERPL-SCNC: 17 MMOL/L (ref 21–32)
CO2 SERPL-SCNC: 20 MMOL/L (ref 21–32)
COLOR UR: YELLOW
CREAT SERPL-MCNC: 1.5 MG/DL (ref 0.6–1.2)
CREAT SERPL-MCNC: 1.7 MG/DL (ref 0.6–1.2)
CREAT SERPL-MCNC: 2 MG/DL (ref 0.6–1.2)
DEPRECATED RDW RBC AUTO: 16 % (ref 12.4–15.4)
DEPRECATED RDW RBC AUTO: 17.3 % (ref 12.4–15.4)
ECHO BSA: 1.77 M2
ECHO BSA: 1.82 M2
ECHO IVC PROX: 2 CM
ECHO LV EDV A2C: 60 ML
ECHO LV EDV A4C: 69 ML
ECHO LV EDV INDEX A4C: 39 ML/M2
ECHO LV EDV NDEX A2C: 34 ML/M2
ECHO LV EJECTION FRACTION A2C: 28 %
ECHO LV EJECTION FRACTION A4C: 19 %
ECHO LV EJECTION FRACTION BIPLANE: 22 % (ref 55–100)
ECHO LV ESV A2C: 43 ML
ECHO LV ESV A4C: 56 ML
ECHO LV ESV INDEX A2C: 24 ML/M2
ECHO LV ESV INDEX A4C: 32 ML/M2
ECHO LV FRACTIONAL SHORTENING: 24 % (ref 28–44)
ECHO LV INTERNAL DIMENSION DIASTOLE INDEX: 2.16 CM/M2
ECHO LV INTERNAL DIMENSION DIASTOLIC: 3.8 CM (ref 3.9–5.3)
ECHO LV INTERNAL DIMENSION SYSTOLIC INDEX: 1.65 CM/M2
ECHO LV INTERNAL DIMENSION SYSTOLIC: 2.9 CM
ECHO LV IVSD: 1.1 CM (ref 0.6–0.9)
ECHO LV MASS 2D: 134.7 G (ref 67–162)
ECHO LV MASS INDEX 2D: 76.5 G/M2 (ref 43–95)
ECHO LV POSTERIOR WALL DIASTOLIC: 1.1 CM (ref 0.6–0.9)
ECHO LV RELATIVE WALL THICKNESS RATIO: 0.58
ECHO LVOT AREA: 2.3 CM2
ECHO LVOT DIAM: 1.7 CM
ECHO RV BASAL DIMENSION: 4.1 CM
ECHO RV FRACTIONAL AREA CHANGE: 27 %
ECHO RV FREE WALL PEAK S': 7 CM/S
ECHO RV LONGITUDINAL DIMENSION: 7.5 CM
ECHO RV MID DIMENSION: 2.6 CM
ECHO RV TAPSE: 1.4 CM (ref 1.7–?)
EKG DIAGNOSIS: NORMAL
EKG Q-T INTERVAL: 494 MS
EKG QRS DURATION: 96 MS
EKG QTC CALCULATION (BAZETT): 446 MS
EKG R AXIS: 33 DEGREES
EKG T AXIS: 24 DEGREES
EKG VENTRICULAR RATE: 49 BPM
EOSINOPHIL # BLD: 0 K/UL (ref 0–0.6)
EOSINOPHIL NFR BLD: 0.3 %
EPI CELLS #/AREA URNS AUTO: 6 /HPF (ref 0–5)
GFR SERPLBLD CREATININE-BSD FMLA CKD-EPI: 27 ML/MIN/{1.73_M2}
GFR SERPLBLD CREATININE-BSD FMLA CKD-EPI: 33 ML/MIN/{1.73_M2}
GFR SERPLBLD CREATININE-BSD FMLA CKD-EPI: 39 ML/MIN/{1.73_M2}
GLUCOSE BLD-MCNC: 101 MG/DL (ref 70–99)
GLUCOSE BLD-MCNC: 106 MG/DL (ref 70–99)
GLUCOSE BLD-MCNC: 107 MG/DL (ref 70–99)
GLUCOSE BLD-MCNC: 115 MG/DL (ref 70–99)
GLUCOSE BLD-MCNC: 120 MG/DL (ref 70–99)
GLUCOSE BLD-MCNC: 99 MG/DL (ref 70–99)
GLUCOSE SERPL-MCNC: 103 MG/DL (ref 70–99)
GLUCOSE SERPL-MCNC: 114 MG/DL (ref 70–99)
GLUCOSE SERPL-MCNC: 117 MG/DL (ref 70–99)
GLUCOSE UR STRIP.AUTO-MCNC: NEGATIVE MG/DL
HCO3 BLDA-SCNC: 18.8 MMOL/L (ref 21–29)
HCO3 BLDA-SCNC: 19.1 MMOL/L (ref 21–29)
HCO3 BLDA-SCNC: 20.2 MMOL/L (ref 21–29)
HCO3 BLDA-SCNC: 20.7 MMOL/L (ref 21–29)
HCO3 BLDA-SCNC: 21.5 MMOL/L (ref 21–29)
HCO3, MIXED: 19.7 MMOL/L
HCO3, MIXED: 21 MMOL/L
HCO3, MIXED: 21.5 MMOL/L
HCO3, MIXED: 22 MMOL/L
HCT VFR BLD AUTO: 21 % (ref 36–48)
HCT VFR BLD AUTO: 23 % (ref 36–48)
HCT VFR BLD AUTO: 23.9 % (ref 36–48)
HCT VFR BLD AUTO: 24 % (ref 36–48)
HCT VFR BLD AUTO: 25 % (ref 36–48)
HCT VFR BLD AUTO: 26 % (ref 36–48)
HCT VFR BLD AUTO: 26.1 % (ref 36–48)
HCT VFR BLD AUTO: 26.2 % (ref 36–48)
HDLC SERPL-MCNC: 27 MG/DL (ref 40–60)
HGB BLD CALC-MCNC: 7.3 GM/DL (ref 12–16)
HGB BLD CALC-MCNC: 7.8 GM/DL (ref 12–16)
HGB BLD CALC-MCNC: 8.3 GM/DL (ref 12–16)
HGB BLD CALC-MCNC: 8.6 GM/DL (ref 12–16)
HGB BLD CALC-MCNC: 8.7 GM/DL (ref 12–16)
HGB BLD-MCNC: 8 G/DL (ref 12–16)
HGB BLD-MCNC: 8.6 G/DL (ref 12–16)
HGB BLD-MCNC: 8.6 G/DL (ref 12–16)
HGB UR QL STRIP.AUTO: ABNORMAL
HYALINE CASTS #/AREA URNS AUTO: 2 /LPF (ref 0–8)
INHALED O2 FLOW RATE: 40 L/MIN
INR PPP: 1.19 (ref 0.85–1.15)
KETONES UR STRIP.AUTO-MCNC: ABNORMAL MG/DL
LACTATE BLD-SCNC: 0.49 MMOL/L (ref 0.4–2)
LACTATE BLD-SCNC: 0.61 MMOL/L (ref 0.4–2)
LACTATE BLD-SCNC: 0.68 MMOL/L (ref 0.4–2)
LACTATE BLD-SCNC: <0.3 MMOL/L (ref 0.4–2)
LDLC SERPL CALC-MCNC: 65 MG/DL
LEUKOCYTE ESTERASE UR QL STRIP.AUTO: ABNORMAL
LYMPHOCYTES # BLD: 1.1 K/UL (ref 1–5.1)
LYMPHOCYTES NFR BLD: 10.9 %
MAGNESIUM SERPL-MCNC: 1.83 MG/DL (ref 1.8–2.4)
MAGNESIUM SERPL-MCNC: 1.85 MG/DL (ref 1.8–2.4)
MCH RBC QN AUTO: 27.6 PG (ref 26–34)
MCH RBC QN AUTO: 28.1 PG (ref 26–34)
MCHC RBC AUTO-ENTMCNC: 32.9 G/DL (ref 31–36)
MCHC RBC AUTO-ENTMCNC: 32.9 G/DL (ref 31–36)
MCV RBC AUTO: 84.1 FL (ref 80–100)
MCV RBC AUTO: 85.4 FL (ref 80–100)
MONOCYTES # BLD: 1.3 K/UL (ref 0–1.3)
MONOCYTES NFR BLD: 12.6 %
NEUTROPHILS # BLD: 7.9 K/UL (ref 1.7–7.7)
NEUTROPHILS NFR BLD: 75.7 %
NITRITE UR QL STRIP.AUTO: NEGATIVE
O2 SAT, MIXED: 56 %
O2 SAT, MIXED: 58 %
O2 SAT, MIXED: 62 %
O2 SAT, MIXED: 74 %
PCO2 BLDA: 27.1 MM HG (ref 35–45)
PCO2 BLDA: 35.4 MM HG (ref 35–45)
PCO2 BLDA: 36.7 MM HG (ref 35–45)
PCO2 BLDA: 36.9 MM HG (ref 35–45)
PCO2 BLDA: 38 MM HG (ref 35–45)
PCO2 MIXED: 37.7 MM HG
PCO2 MIXED: 39.9 MM HG
PCO2 MIXED: 41.7 MM HG
PCO2 MIXED: 41.8 MM HG
PERFORMED ON: ABNORMAL
PERFORMED ON: NORMAL
PH BLDA: 7.32 [PH] (ref 7.35–7.45)
PH BLDA: 7.36 [PH] (ref 7.35–7.45)
PH BLDA: 7.45 [PH] (ref 7.35–7.45)
PH UR STRIP.AUTO: 5.5 [PH] (ref 5–8)
PH, MIXED: 7.28 (ref 7.35–7.45)
PH, MIXED: 7.31 (ref 7.35–7.45)
PH, MIXED: 7.35 (ref 7.35–7.45)
PH, MIXED: 7.37 (ref 7.35–7.45)
PHOSPHATE SERPL-MCNC: 4.6 MG/DL (ref 2.5–4.9)
PLATELET # BLD AUTO: 171 K/UL (ref 135–450)
PLATELET # BLD AUTO: 250 K/UL (ref 135–450)
PMV BLD AUTO: 8.9 FL (ref 5–10.5)
PMV BLD AUTO: 9.1 FL (ref 5–10.5)
PO2 BLDA: 150.2 MM HG (ref 75–108)
PO2 BLDA: 151.6 MM HG (ref 75–108)
PO2 BLDA: 155.8 MM HG (ref 75–108)
PO2 BLDA: 240.8 MM HG (ref 75–108)
PO2 BLDA: 408 MM HG (ref 75–108)
PO2 MIXED: 31 MM HG
PO2 MIXED: 32 MM HG
PO2 MIXED: 34 MM HG
PO2 MIXED: 44 MM HG
POC ACT LR: 126 SEC
POC ACT LR: 131 SEC
POC ACT LR: 135 SEC
POC ACT LR: 141 SEC
POC ACT LR: 142 SEC
POC ACT LR: 144 SEC
POC ACT LR: 146 SEC
POC ACT LR: 155 SEC
POC ACT LR: 155 SEC
POC ACT LR: 159 SEC
POC ACT LR: 161 SEC
POC ACT LR: 168 SEC
POC ACT LR: 171 SEC
POC ACT LR: 171 SEC
POC ACT LR: 178 SEC
POC ACT LR: 184 SEC
POC ACT LR: 192 SEC
POC ACT LR: 199 SEC
POC ACT LR: 208 SEC
POC ACT LR: 253 SEC
POC SAMPLE TYPE: ABNORMAL
POC SAMPLE TYPE: NORMAL
POTASSIUM BLD-SCNC: 3 MMOL/L (ref 3.5–5.1)
POTASSIUM BLD-SCNC: 3.1 MMOL/L (ref 3.5–5.1)
POTASSIUM BLD-SCNC: 3.2 MMOL/L (ref 3.5–5.1)
POTASSIUM BLD-SCNC: 3.3 MMOL/L (ref 3.5–5.1)
POTASSIUM BLD-SCNC: 3.3 MMOL/L (ref 3.5–5.1)
POTASSIUM SERPL-SCNC: 3.4 MMOL/L (ref 3.5–5.1)
POTASSIUM SERPL-SCNC: 3.4 MMOL/L (ref 3.5–5.1)
POTASSIUM SERPL-SCNC: 3.9 MMOL/L (ref 3.5–5.1)
POTASSIUM SERPL-SCNC: 3.9 MMOL/L (ref 3.5–5.1)
PROT SERPL-MCNC: 5.9 G/DL (ref 6.4–8.2)
PROT UR STRIP.AUTO-MCNC: ABNORMAL MG/DL
PROTHROMBIN TIME: 15.3 SEC (ref 11.9–14.9)
RBC # BLD AUTO: 3.07 M/UL (ref 4–5.2)
RBC # BLD AUTO: 3.11 M/UL (ref 4–5.2)
RBC CLUMPS #/AREA URNS AUTO: 34 /HPF (ref 0–4)
SAO2 % BLDA: 100 % (ref 93–100)
SAO2 % BLDA: 99 % (ref 93–100)
SAO2 % BLDA: 99 % (ref 93–100)
SODIUM BLD-SCNC: 139 MMOL/L (ref 136–145)
SODIUM BLD-SCNC: 140 MMOL/L (ref 136–145)
SODIUM BLD-SCNC: 140 MMOL/L (ref 136–145)
SODIUM SERPL-SCNC: 136 MMOL/L (ref 136–145)
SODIUM SERPL-SCNC: 136 MMOL/L (ref 136–145)
SODIUM SERPL-SCNC: 137 MMOL/L (ref 136–145)
SP GR UR STRIP.AUTO: 1.02 (ref 1–1.03)
TCO2 CALC MIXED: 21 MMOL/L
TCO2 CALC MIXED: 22 MMOL/L
TCO2 CALC MIXED: 23 MMOL/L
TCO2 CALC MIXED: 23 MMOL/L
TRIGL SERPL-MCNC: 174 MG/DL (ref 0–150)
UA DIPSTICK W REFLEX MICRO PNL UR: YES
URN SPEC COLLECT METH UR: ABNORMAL
UROBILINOGEN UR STRIP-ACNC: 1 E.U./DL
VLDLC SERPL CALC-MCNC: 35 MG/DL
WBC # BLD AUTO: 10.4 K/UL (ref 4–11)
WBC # BLD AUTO: 8 K/UL (ref 4–11)
WBC #/AREA URNS AUTO: 3 /HPF (ref 0–5)

## 2024-12-31 PROCEDURE — 84132 ASSAY OF SERUM POTASSIUM: CPT

## 2024-12-31 PROCEDURE — 2500000003 HC RX 250 WO HCPCS: Performed by: HOSPITALIST

## 2024-12-31 PROCEDURE — 6360000002 HC RX W HCPCS: Performed by: INTERNAL MEDICINE

## 2024-12-31 PROCEDURE — 94003 VENT MGMT INPAT SUBQ DAY: CPT

## 2024-12-31 PROCEDURE — 99291 CRITICAL CARE FIRST HOUR: CPT | Performed by: INTERNAL MEDICINE

## 2024-12-31 PROCEDURE — 85610 PROTHROMBIN TIME: CPT

## 2024-12-31 PROCEDURE — 36430 TRANSFUSION BLD/BLD COMPNT: CPT

## 2024-12-31 PROCEDURE — 6360000002 HC RX W HCPCS: Performed by: STUDENT IN AN ORGANIZED HEALTH CARE EDUCATION/TRAINING PROGRAM

## 2024-12-31 PROCEDURE — 80061 LIPID PANEL: CPT

## 2024-12-31 PROCEDURE — 2580000003 HC RX 258: Performed by: INTERNAL MEDICINE

## 2024-12-31 PROCEDURE — 2500000003 HC RX 250 WO HCPCS: Performed by: STUDENT IN AN ORGANIZED HEALTH CARE EDUCATION/TRAINING PROGRAM

## 2024-12-31 PROCEDURE — 6370000000 HC RX 637 (ALT 250 FOR IP): Performed by: STUDENT IN AN ORGANIZED HEALTH CARE EDUCATION/TRAINING PROGRAM

## 2024-12-31 PROCEDURE — 93005 ELECTROCARDIOGRAM TRACING: CPT | Performed by: STUDENT IN AN ORGANIZED HEALTH CARE EDUCATION/TRAINING PROGRAM

## 2024-12-31 PROCEDURE — 93321 DOPPLER ECHO F-UP/LMTD STD: CPT | Performed by: STUDENT IN AN ORGANIZED HEALTH CARE EDUCATION/TRAINING PROGRAM

## 2024-12-31 PROCEDURE — 86923 COMPATIBILITY TEST ELECTRIC: CPT

## 2024-12-31 PROCEDURE — 2700000000 HC OXYGEN THERAPY PER DAY

## 2024-12-31 PROCEDURE — 93308 TTE F-UP OR LMTD: CPT | Performed by: STUDENT IN AN ORGANIZED HEALTH CARE EDUCATION/TRAINING PROGRAM

## 2024-12-31 PROCEDURE — 83735 ASSAY OF MAGNESIUM: CPT

## 2024-12-31 PROCEDURE — 94761 N-INVAS EAR/PLS OXIMETRY MLT: CPT

## 2024-12-31 PROCEDURE — 93321 DOPPLER ECHO F-UP/LMTD STD: CPT

## 2024-12-31 PROCEDURE — 80076 HEPATIC FUNCTION PANEL: CPT

## 2024-12-31 PROCEDURE — 85018 HEMOGLOBIN: CPT

## 2024-12-31 PROCEDURE — 82803 BLOOD GASES ANY COMBINATION: CPT

## 2024-12-31 PROCEDURE — P9036 PLATELET PHERESIS IRRADIATED: HCPCS

## 2024-12-31 PROCEDURE — 2580000003 HC RX 258: Performed by: STUDENT IN AN ORGANIZED HEALTH CARE EDUCATION/TRAINING PROGRAM

## 2024-12-31 PROCEDURE — 81001 URINALYSIS AUTO W/SCOPE: CPT

## 2024-12-31 PROCEDURE — 86901 BLOOD TYPING SEROLOGIC RH(D): CPT

## 2024-12-31 PROCEDURE — P9016 RBC LEUKOCYTES REDUCED: HCPCS

## 2024-12-31 PROCEDURE — 37799 UNLISTED PX VASCULAR SURGERY: CPT

## 2024-12-31 PROCEDURE — 93010 ELECTROCARDIOGRAM REPORT: CPT | Performed by: INTERNAL MEDICINE

## 2024-12-31 PROCEDURE — 2100000000 HC CCU R&B

## 2024-12-31 PROCEDURE — 6360000002 HC RX W HCPCS

## 2024-12-31 PROCEDURE — 74018 RADEX ABDOMEN 1 VIEW: CPT

## 2024-12-31 PROCEDURE — 83605 ASSAY OF LACTIC ACID: CPT

## 2024-12-31 PROCEDURE — 84100 ASSAY OF PHOSPHORUS: CPT

## 2024-12-31 PROCEDURE — 85014 HEMATOCRIT: CPT

## 2024-12-31 PROCEDURE — 86850 RBC ANTIBODY SCREEN: CPT

## 2024-12-31 PROCEDURE — 82330 ASSAY OF CALCIUM: CPT

## 2024-12-31 PROCEDURE — 2500000003 HC RX 250 WO HCPCS: Performed by: INTERNAL MEDICINE

## 2024-12-31 PROCEDURE — 85730 THROMBOPLASTIN TIME PARTIAL: CPT

## 2024-12-31 PROCEDURE — 80048 BASIC METABOLIC PNL TOTAL CA: CPT

## 2024-12-31 PROCEDURE — 93325 DOPPLER ECHO COLOR FLOW MAPG: CPT | Performed by: STUDENT IN AN ORGANIZED HEALTH CARE EDUCATION/TRAINING PROGRAM

## 2024-12-31 PROCEDURE — 85025 COMPLETE CBC W/AUTO DIFF WBC: CPT

## 2024-12-31 PROCEDURE — 85027 COMPLETE CBC AUTOMATED: CPT

## 2024-12-31 PROCEDURE — 84295 ASSAY OF SERUM SODIUM: CPT

## 2024-12-31 PROCEDURE — 85347 COAGULATION TIME ACTIVATED: CPT

## 2024-12-31 PROCEDURE — 99291 CRITICAL CARE FIRST HOUR: CPT | Performed by: STUDENT IN AN ORGANIZED HEALTH CARE EDUCATION/TRAINING PROGRAM

## 2024-12-31 PROCEDURE — P9045 ALBUMIN (HUMAN), 5%, 250 ML: HCPCS | Performed by: INTERNAL MEDICINE

## 2024-12-31 PROCEDURE — 82947 ASSAY GLUCOSE BLOOD QUANT: CPT

## 2024-12-31 PROCEDURE — 2500000003 HC RX 250 WO HCPCS

## 2024-12-31 PROCEDURE — 86900 BLOOD TYPING SEROLOGIC ABO: CPT

## 2024-12-31 RX ORDER — HEPARIN SODIUM (PORCINE) LOCK FLUSH IV SOLN 100 UNIT/ML 100 UNIT/ML
1.3 SOLUTION INTRAVENOUS PRN
Status: DISCONTINUED | OUTPATIENT
Start: 2024-12-31 | End: 2024-12-31

## 2024-12-31 RX ORDER — CLOPIDOGREL 300 MG/1
600 TABLET, FILM COATED ORAL ONCE
Status: COMPLETED | OUTPATIENT
Start: 2024-12-31 | End: 2024-12-31

## 2024-12-31 RX ORDER — HEPARIN SODIUM 1000 [USP'U]/ML
1300 INJECTION, SOLUTION INTRAVENOUS; SUBCUTANEOUS PRN
Status: DISCONTINUED | OUTPATIENT
Start: 2024-12-31 | End: 2025-01-13

## 2024-12-31 RX ORDER — POTASSIUM CHLORIDE 29.8 MG/ML
20 INJECTION INTRAVENOUS PRN
Status: DISCONTINUED | OUTPATIENT
Start: 2024-12-31 | End: 2024-12-31

## 2024-12-31 RX ORDER — PROPRANOLOL HYDROCHLORIDE 80 MG/1
80 CAPSULE, EXTENDED RELEASE ORAL DAILY
Status: DISCONTINUED | OUTPATIENT
Start: 2024-12-31 | End: 2025-01-14

## 2024-12-31 RX ORDER — HYDROCHLOROTHIAZIDE 25 MG/1
25 TABLET ORAL DAILY
Status: DISCONTINUED | OUTPATIENT
Start: 2024-12-31 | End: 2025-01-04

## 2024-12-31 RX ORDER — HEPARIN SODIUM 1000 [USP'U]/ML
60 INJECTION, SOLUTION INTRAVENOUS; SUBCUTANEOUS PRN
Status: DISCONTINUED | OUTPATIENT
Start: 2024-12-31 | End: 2024-12-31

## 2024-12-31 RX ORDER — PANTOPRAZOLE SODIUM 40 MG/10ML
40 INJECTION, POWDER, LYOPHILIZED, FOR SOLUTION INTRAVENOUS DAILY
Status: DISCONTINUED | OUTPATIENT
Start: 2024-12-31 | End: 2025-01-08

## 2024-12-31 RX ORDER — HEPARIN SODIUM 1000 [USP'U]/ML
60 INJECTION, SOLUTION INTRAVENOUS; SUBCUTANEOUS ONCE
Status: DISCONTINUED | OUTPATIENT
Start: 2024-12-31 | End: 2024-12-31

## 2024-12-31 RX ORDER — NIFEDIPINE 60 MG/1
60 TABLET, EXTENDED RELEASE ORAL DAILY
Status: DISCONTINUED | OUTPATIENT
Start: 2024-12-31 | End: 2024-12-31

## 2024-12-31 RX ORDER — ALBUMIN HUMAN 50 G/1000ML
12.5 SOLUTION INTRAVENOUS ONCE
Status: COMPLETED | OUTPATIENT
Start: 2024-12-31 | End: 2024-12-31

## 2024-12-31 RX ORDER — HEPARIN SODIUM 1000 [USP'U]/ML
4700 INJECTION, SOLUTION INTRAVENOUS; SUBCUTANEOUS ONCE
Status: COMPLETED | OUTPATIENT
Start: 2024-12-31 | End: 2024-12-31

## 2024-12-31 RX ORDER — HYDROXYZINE HYDROCHLORIDE 25 MG/1
25 TABLET, FILM COATED ORAL NIGHTLY PRN
Status: DISCONTINUED | OUTPATIENT
Start: 2024-12-31 | End: 2025-01-20 | Stop reason: HOSPADM

## 2024-12-31 RX ORDER — HEPARIN SODIUM 1000 [USP'U]/ML
1200 INJECTION, SOLUTION INTRAVENOUS; SUBCUTANEOUS PRN
Status: DISCONTINUED | OUTPATIENT
Start: 2024-12-31 | End: 2025-01-13

## 2024-12-31 RX ORDER — POTASSIUM CHLORIDE 29.8 MG/ML
20 INJECTION INTRAVENOUS ONCE
Status: COMPLETED | OUTPATIENT
Start: 2024-12-31 | End: 2024-12-31

## 2024-12-31 RX ORDER — HEPARIN SODIUM 1000 [USP'U]/ML
INJECTION, SOLUTION INTRAVENOUS; SUBCUTANEOUS
Status: COMPLETED
Start: 2024-12-31 | End: 2024-12-31

## 2024-12-31 RX ORDER — POTASSIUM CHLORIDE 29.8 MG/ML
INJECTION INTRAVENOUS
Status: COMPLETED
Start: 2024-12-31 | End: 2024-12-31

## 2024-12-31 RX ORDER — LORAZEPAM 1 MG/1
1 TABLET ORAL EVERY 8 HOURS PRN
Status: DISCONTINUED | OUTPATIENT
Start: 2024-12-31 | End: 2025-01-20 | Stop reason: HOSPADM

## 2024-12-31 RX ORDER — HEPARIN SODIUM 1000 [USP'U]/ML
30 INJECTION, SOLUTION INTRAVENOUS; SUBCUTANEOUS PRN
Status: DISCONTINUED | OUTPATIENT
Start: 2024-12-31 | End: 2025-01-09

## 2024-12-31 RX ORDER — SODIUM CHLORIDE 9 MG/ML
INJECTION, SOLUTION INTRAVENOUS CONTINUOUS
Status: ACTIVE | OUTPATIENT
Start: 2024-12-31 | End: 2024-12-31

## 2024-12-31 RX ORDER — HEPARIN SODIUM 1000 [USP'U]/ML
5000 INJECTION, SOLUTION INTRAVENOUS; SUBCUTANEOUS ONCE
Status: COMPLETED | OUTPATIENT
Start: 2024-12-31 | End: 2024-12-31

## 2024-12-31 RX ORDER — GABAPENTIN 300 MG/1
300 CAPSULE ORAL 3 TIMES DAILY
Status: DISCONTINUED | OUTPATIENT
Start: 2024-12-31 | End: 2025-01-18

## 2024-12-31 RX ORDER — HEPARIN SODIUM 1000 [USP'U]/ML
100 INJECTION, SOLUTION INTRAVENOUS; SUBCUTANEOUS PRN
Status: DISCONTINUED | OUTPATIENT
Start: 2024-12-31 | End: 2024-12-31

## 2024-12-31 RX ORDER — SODIUM CHLORIDE 9 MG/ML
INJECTION, SOLUTION INTRAVENOUS PRN
Status: DISCONTINUED | OUTPATIENT
Start: 2024-12-31 | End: 2025-01-03

## 2024-12-31 RX ORDER — ALBUMIN (HUMAN) 12.5 G/50ML
250 SOLUTION INTRAVENOUS ONCE
Status: DISCONTINUED | OUTPATIENT
Start: 2024-12-31 | End: 2024-12-31

## 2024-12-31 RX ORDER — CITALOPRAM HYDROBROMIDE 20 MG/1
40 TABLET ORAL DAILY
Status: DISCONTINUED | OUTPATIENT
Start: 2024-12-31 | End: 2025-01-20 | Stop reason: HOSPADM

## 2024-12-31 RX ADMIN — POTASSIUM CHLORIDE 20 MEQ: 29.8 INJECTION, SOLUTION INTRAVENOUS at 12:38

## 2024-12-31 RX ADMIN — Medication 50 MEQ: at 05:45

## 2024-12-31 RX ADMIN — POTASSIUM CHLORIDE 20 MEQ: 29.8 INJECTION, SOLUTION INTRAVENOUS at 11:31

## 2024-12-31 RX ADMIN — PROPOFOL 50 MCG/KG/MIN: 10 INJECTION, EMULSION INTRAVENOUS at 13:34

## 2024-12-31 RX ADMIN — Medication 200 MCG/HR: at 04:12

## 2024-12-31 RX ADMIN — HEPARIN SODIUM 100 UNITS: 1000 INJECTION INTRAVENOUS; SUBCUTANEOUS at 02:01

## 2024-12-31 RX ADMIN — SODIUM BICARBONATE 50 MEQ: 84 INJECTION INTRAVENOUS at 05:45

## 2024-12-31 RX ADMIN — ASPIRIN 81 MG: 81 TABLET, CHEWABLE ORAL at 10:44

## 2024-12-31 RX ADMIN — HEPARIN SODIUM 4700 UNITS: 1000 INJECTION INTRAVENOUS; SUBCUTANEOUS at 18:25

## 2024-12-31 RX ADMIN — HEPARIN SODIUM 1350 UNITS/HR: 10000 INJECTION, SOLUTION INTRAVENOUS at 23:04

## 2024-12-31 RX ADMIN — SODIUM CHLORIDE, PRESERVATIVE FREE 10 ML: 5 INJECTION INTRAVENOUS at 21:00

## 2024-12-31 RX ADMIN — PROPOFOL 50 MCG/KG/MIN: 10 INJECTION, EMULSION INTRAVENOUS at 21:55

## 2024-12-31 RX ADMIN — CITALOPRAM HYDROBROMIDE 40 MG: 20 TABLET ORAL at 12:00

## 2024-12-31 RX ADMIN — Medication 200 MCG/HR: at 21:09

## 2024-12-31 RX ADMIN — SODIUM CHLORIDE, PRESERVATIVE FREE 10 ML: 5 INJECTION INTRAVENOUS at 09:07

## 2024-12-31 RX ADMIN — PROPOFOL 50 MCG/KG/MIN: 10 INJECTION, EMULSION INTRAVENOUS at 06:12

## 2024-12-31 RX ADMIN — HEPARIN SODIUM: 5000 INJECTION INTRAVENOUS; SUBCUTANEOUS at 15:39

## 2024-12-31 RX ADMIN — SODIUM CHLORIDE: 9 INJECTION, SOLUTION INTRAVENOUS at 20:46

## 2024-12-31 RX ADMIN — SODIUM BICARBONATE: 84 INJECTION, SOLUTION INTRAVENOUS at 15:35

## 2024-12-31 RX ADMIN — Medication 200 MCG/HR: at 15:33

## 2024-12-31 RX ADMIN — HEPARIN SODIUM 5000 UNITS: 1000 INJECTION INTRAVENOUS; SUBCUTANEOUS at 01:21

## 2024-12-31 RX ADMIN — HEPARIN SODIUM 100 UNITS: 1000 INJECTION INTRAVENOUS; SUBCUTANEOUS at 01:54

## 2024-12-31 RX ADMIN — PANTOPRAZOLE SODIUM 40 MG: 40 INJECTION, POWDER, FOR SOLUTION INTRAVENOUS at 08:55

## 2024-12-31 RX ADMIN — SODIUM CHLORIDE, PRESERVATIVE FREE 10 ML: 5 INJECTION INTRAVENOUS at 15:44

## 2024-12-31 RX ADMIN — POTASSIUM CHLORIDE 20 MEQ: 29.8 INJECTION, SOLUTION INTRAVENOUS at 19:35

## 2024-12-31 RX ADMIN — CLOPIDOGREL BISULFATE 600 MG: 300 TABLET, FILM COATED ORAL at 08:55

## 2024-12-31 RX ADMIN — ALBUMIN (HUMAN) 12.5 G: 12.5 INJECTION, SOLUTION INTRAVENOUS at 21:38

## 2024-12-31 RX ADMIN — PROPOFOL 50 MCG/KG/MIN: 10 INJECTION, EMULSION INTRAVENOUS at 09:01

## 2024-12-31 RX ADMIN — MIDAZOLAM IN SODIUM CHLORIDE 5 MG/HR: 1 INJECTION INTRAVENOUS at 09:04

## 2024-12-31 RX ADMIN — HEPARIN SODIUM 5000 UNITS: 1000 INJECTION, SOLUTION INTRAVENOUS; SUBCUTANEOUS at 01:21

## 2024-12-31 RX ADMIN — HEPARIN SODIUM 4700 UNITS: 1000 INJECTION INTRAVENOUS; SUBCUTANEOUS at 11:43

## 2024-12-31 RX ADMIN — POTASSIUM CHLORIDE 20 MEQ: 29.8 INJECTION INTRAVENOUS at 19:35

## 2024-12-31 RX ADMIN — Medication 200 MCG/HR: at 09:39

## 2024-12-31 RX ADMIN — SODIUM BICARBONATE: 84 INJECTION, SOLUTION INTRAVENOUS at 15:48

## 2024-12-31 ASSESSMENT — PULMONARY FUNCTION TESTS
PIF_VALUE: 25
PIF_VALUE: 24
PIF_VALUE: 20
PIF_VALUE: 26
PIF_VALUE: 25
PIF_VALUE: 25
PIF_VALUE: 23
PIF_VALUE: 26
PIF_VALUE: 26
PIF_VALUE: 24
PIF_VALUE: 33
PIF_VALUE: 28
PIF_VALUE: 18
PIF_VALUE: 26
PIF_VALUE: 28
PIF_VALUE: 26
PIF_VALUE: 19
PIF_VALUE: 21
PIF_VALUE: 26
PIF_VALUE: 18
PIF_VALUE: 24
PIF_VALUE: 19
PIF_VALUE: 29
PIF_VALUE: 25
PIF_VALUE: 22
PIF_VALUE: 19
PIF_VALUE: 19
PIF_VALUE: 24
PIF_VALUE: 19
PIF_VALUE: 20
PIF_VALUE: 26
PIF_VALUE: 26
PIF_VALUE: 20
PIF_VALUE: 28
PIF_VALUE: 28

## 2024-12-31 ASSESSMENT — PAIN SCALES - GENERAL
PAINLEVEL_OUTOF10: 0

## 2024-12-31 NOTE — PROCEDURES
PROCEDURE NOTE  Date: 12/30/2024   Name: Shahida Vásquez  YOB: 1960    Intubation    Date/Time: 12/30/2024 10:11 PM    Performed by: Ang Truong MD  Authorized by: Ang Truong MD  Consent: Verbal consent obtained.  Consent given by: patient  Patient identity confirmed: arm band  Indications: airway protection and respiratory failure  Intubation method: fiberoptic oral  Patient status: sedated  Preoxygenation: BVM  Sedatives: etomidate  Paralytic: succinylcholine  Laryngoscope size: Mac 4  Tube size: 7.5 mm  Tube type: cuffed  Number of attempts: 1  Cricoid pressure: no  Cords visualized: yes  Post-procedure assessment: CO2 detector  Breath sounds: absent over the epigastrium  Cuff inflated: yes  ETT to lip: 23 cm  Tube secured with: ETT bullard  Chest x-ray interpreted by me.  Chest x-ray findings: endotracheal tube in appropriate position  Patient tolerance: patient tolerated the procedure well with no immediate complications  Comments: Intubated in the  cath lab

## 2024-12-31 NOTE — FLOWSHEET NOTE
Reviewed trends in ACT with Impella rep, last one being 178. Recommends changing purge fluid to heparin solution.   OK with Dr. Enriquez  Pharmacy will change

## 2024-12-31 NOTE — BRIEF OP NOTE
Brief Postoperative Note      Patient: Shahida Vásquez  YOB: 1960  MRN: [id number]    Date of Procedure: 12/30/2024    Pre-Op Diagnosis Codes:      * STEMI (ST elevation myocardial infarction) ([facility]) [I21.3]    Post-Op Diagnosis: Same       Procedure(s):  Left heart cath / coronary angiography  Right heart cath  Ventricular assist device (VAD) insertion    Surgeon(s):  Ang Enriquez MD Mahida, Jeovany JORDAN MD    Estimated Blood Loss (mL): less than 50   Complications: None    Specimens:   * No specimens in log *    Implants:  Implant Name Type Inv. Item Serial No.  Lot No. LRB No. Used Action   CATHETER VENTRICULAR ASST DEV SMARTASSIST MICROAXIAL BLD  - [id number] Ventricular assist devices CATHETER VENTRICULAR ASST DEV SMARTASSIST MICROAXIAL BLD   [employer] [id number] N/A 1 Implanted   KIT CATHETER PUMP INSERTION INTRACARDIAC IMPELLA CP - [id number] Ventricular assist devices KIT CATHETER PUMP INSERTION INTRACARDIAC IMPELLA CP  [employer]  N/A 1 Implanted       Findings:  - LHC performed by Dr. Enriquez with culprit RCA lesion, delayed presentation forming collaterals from the left system  - RHC performed by Dr. Enriquez with evidence of profound RV systolic dysfunction  - Impella CP placed L CFA  - Impella RP placed R CFV  - Antegrade perfusion sheath L SFA  - Retrograde feeding sheath for external bypass in R CFA  - Vascath inserted in L CFV  - TD swan exchanged from 8 Fr [employer] VIP swan    Electronically signed by Jeovany Martinez MD on 12/30/2024 at 8:12 PM

## 2024-12-31 NOTE — CONSENT
Informed Consent for Blood Component Transfusion Note    I have discussed with the daughter the rationale for blood component transfusion; its benefits in treating or preventing fatigue, organ damage, or death; and its risk which includes mild transfusion reactions, rare risk of blood borne infection, or more serious but rare reactions. I have discussed the alternatives to transfusion, including the risk and consequences of not receiving transfusion. The daughter had an opportunity to ask questions and had agreed to proceed with transfusion of blood components.    Electronically signed by Peña Alanis DO on 12/31/24 at 11:05 AM EST

## 2025-01-01 ENCOUNTER — APPOINTMENT (OUTPATIENT)
Dept: GENERAL RADIOLOGY | Age: 65
DRG: 215 | End: 2025-01-01
Payer: COMMERCIAL

## 2025-01-01 LAB
ANION GAP SERPL CALCULATED.3IONS-SCNC: 9 MMOL/L (ref 3–16)
ANION GAP SERPL CALCULATED.3IONS-SCNC: 9 MMOL/L (ref 3–16)
BASE EXCESS BLDA CALC-SCNC: -3 MMOL/L (ref -3–3)
BASE EXCESS BLDA CALC-SCNC: -7 MMOL/L (ref -3–3)
BASE EXCESS MIXED: -3
BASE EXCESS MIXED: -4
BASE EXCESS MIXED: -4
BASE EXCESS MIXED: -5
BASOPHILS # BLD: 0 K/UL (ref 0–0.2)
BASOPHILS NFR BLD: 0.5 %
BLOOD BANK DISPENSE STATUS: NORMAL
BLOOD BANK PRODUCT CODE: NORMAL
BPU ID: NORMAL
BUN SERPL-MCNC: 20 MG/DL (ref 7–20)
BUN SERPL-MCNC: 23 MG/DL (ref 7–20)
CALCIUM SERPL-MCNC: 8 MG/DL (ref 8.3–10.6)
CALCIUM SERPL-MCNC: 8.2 MG/DL (ref 8.3–10.6)
CHLORIDE SERPL-SCNC: 104 MMOL/L (ref 99–110)
CHLORIDE SERPL-SCNC: 107 MMOL/L (ref 99–110)
CO2 BLDA-SCNC: 20 MMOL/L
CO2 BLDA-SCNC: 23 MMOL/L
CO2 SERPL-SCNC: 20 MMOL/L (ref 21–32)
CO2 SERPL-SCNC: 20 MMOL/L (ref 21–32)
CREAT SERPL-MCNC: 1.3 MG/DL (ref 0.6–1.2)
CREAT SERPL-MCNC: 1.4 MG/DL (ref 0.6–1.2)
DEPRECATED RDW RBC AUTO: 16 % (ref 12.4–15.4)
DESCRIPTION BLOOD BANK: NORMAL
EKG ATRIAL RATE: 312 BPM
EKG DIAGNOSIS: NORMAL
EKG Q-T INTERVAL: 480 MS
EKG QRS DURATION: 156 MS
EKG QTC CALCULATION (BAZETT): 467 MS
EKG R AXIS: 64 DEGREES
EKG T AXIS: 126 DEGREES
EKG VENTRICULAR RATE: 57 BPM
EOSINOPHIL # BLD: 0 K/UL (ref 0–0.6)
EOSINOPHIL NFR BLD: 0.6 %
GFR SERPLBLD CREATININE-BSD FMLA CKD-EPI: 42 ML/MIN/{1.73_M2}
GFR SERPLBLD CREATININE-BSD FMLA CKD-EPI: 46 ML/MIN/{1.73_M2}
GLUCOSE BLD-MCNC: 108 MG/DL (ref 70–99)
GLUCOSE BLD-MCNC: 112 MG/DL (ref 70–99)
GLUCOSE SERPL-MCNC: 111 MG/DL (ref 70–99)
GLUCOSE SERPL-MCNC: 113 MG/DL (ref 70–99)
HCO3 BLDA-SCNC: 18.5 MMOL/L (ref 21–29)
HCO3 BLDA-SCNC: 22.1 MMOL/L (ref 21–29)
HCO3, MIXED: 20.2 MMOL/L
HCO3, MIXED: 21 MMOL/L
HCO3, MIXED: 21.8 MMOL/L
HCO3, MIXED: 22 MMOL/L
HCT VFR BLD AUTO: 21 % (ref 36–48)
HCT VFR BLD AUTO: 22 % (ref 36–48)
HCT VFR BLD AUTO: 23 % (ref 36–48)
HCT VFR BLD AUTO: 24.3 % (ref 36–48)
HCT VFR BLD AUTO: 25.9 % (ref 36–48)
HGB BLD CALC-MCNC: 7.1 GM/DL (ref 12–16)
HGB BLD CALC-MCNC: 7.5 GM/DL (ref 12–16)
HGB BLD-MCNC: 7.5 G/DL (ref 12–16)
HGB BLD-MCNC: 8 G/DL (ref 12–16)
HGB BLD-MCNC: 8.6 G/DL (ref 12–16)
LDH SERPL L TO P-CCNC: 633 U/L (ref 100–190)
LYMPHOCYTES # BLD: 0.8 K/UL (ref 1–5.1)
LYMPHOCYTES NFR BLD: 10.3 %
MCH RBC QN AUTO: 27.8 PG (ref 26–34)
MCHC RBC AUTO-ENTMCNC: 33.1 G/DL (ref 31–36)
MCV RBC AUTO: 84 FL (ref 80–100)
MONOCYTES # BLD: 1.2 K/UL (ref 0–1.3)
MONOCYTES NFR BLD: 15.5 %
NEUTROPHILS # BLD: 5.7 K/UL (ref 1.7–7.7)
NEUTROPHILS NFR BLD: 73.1 %
O2 SAT, MIXED: 47 %
O2 SAT, MIXED: 49 %
O2 SAT, MIXED: 56 %
O2 SAT, MIXED: 56 %
PCO2 BLDA: 32 MM HG (ref 35–45)
PCO2 BLDA: 37.2 MM HG (ref 35–45)
PCO2 MIXED: 33.3 MM HG
PCO2 MIXED: 35.9 MM HG
PCO2 MIXED: 37.2 MM HG
PCO2 MIXED: 42.6 MM HG
PERFORMED ON: ABNORMAL
PERFORMED ON: NORMAL
PERFORMED ON: NORMAL
PH BLDA: 7.37 [PH] (ref 7.35–7.45)
PH BLDA: 7.38 [PH] (ref 7.35–7.45)
PH, MIXED: 7.32 (ref 7.35–7.45)
PH, MIXED: 7.38 (ref 7.35–7.45)
PH, MIXED: 7.38 (ref 7.35–7.45)
PH, MIXED: 7.39 (ref 7.35–7.45)
PLATELET # BLD AUTO: 134 K/UL (ref 135–450)
PMV BLD AUTO: 8.8 FL (ref 5–10.5)
PO2 BLDA: 150.5 MM HG (ref 75–108)
PO2 BLDA: 150.8 MM HG (ref 75–108)
PO2 MIXED: 26 MM HG
PO2 MIXED: 29 MM HG
PO2 MIXED: 29 MM HG
PO2 MIXED: 30 MM HG
POC ACT LR: 118 SEC
POC ACT LR: 163 SEC
POC ACT LR: 167 SEC
POC ACT LR: 170 SEC
POC ACT LR: 171 SEC
POC ACT LR: 172 SEC
POC ACT LR: 178 SEC
POC ACT LR: 179 SEC
POC ACT LR: 180 SEC
POC ACT LR: 181 SEC
POC ACT LR: 181 SEC
POC ACT LR: 184 SEC
POC ACT LR: 189 SEC
POC ACT LR: 190 SEC
POC ACT LR: 196 SEC
POC ACT LR: 199 SEC
POC ACT LR: 205 SEC
POC ACT LR: 210 SEC
POC ACT LR: 210 SEC
POC ACT LR: 219 SEC
POC ACT LR: 226 SEC
POC SAMPLE TYPE: ABNORMAL
POC SAMPLE TYPE: NORMAL
POC SAMPLE TYPE: NORMAL
POTASSIUM BLD-SCNC: 3.4 MMOL/L (ref 3.5–5.1)
POTASSIUM SERPL-SCNC: 3.7 MMOL/L (ref 3.5–5.1)
POTASSIUM SERPL-SCNC: 3.8 MMOL/L (ref 3.5–5.1)
RBC # BLD AUTO: 2.89 M/UL (ref 4–5.2)
SAO2 % BLDA: 99 % (ref 93–100)
SAO2 % BLDA: 99 % (ref 93–100)
SODIUM SERPL-SCNC: 133 MMOL/L (ref 136–145)
SODIUM SERPL-SCNC: 136 MMOL/L (ref 136–145)
TCO2 CALC MIXED: 21 MMOL/L
TCO2 CALC MIXED: 22 MMOL/L
TCO2 CALC MIXED: 23 MMOL/L
TCO2 CALC MIXED: 23 MMOL/L
WBC # BLD AUTO: 7.8 K/UL (ref 4–11)

## 2025-01-01 PROCEDURE — 6360000002 HC RX W HCPCS: Performed by: INTERNAL MEDICINE

## 2025-01-01 PROCEDURE — 2500000003 HC RX 250 WO HCPCS: Performed by: STUDENT IN AN ORGANIZED HEALTH CARE EDUCATION/TRAINING PROGRAM

## 2025-01-01 PROCEDURE — 85347 COAGULATION TIME ACTIVATED: CPT

## 2025-01-01 PROCEDURE — 36430 TRANSFUSION BLD/BLD COMPNT: CPT

## 2025-01-01 PROCEDURE — 82947 ASSAY GLUCOSE BLOOD QUANT: CPT

## 2025-01-01 PROCEDURE — 85018 HEMOGLOBIN: CPT

## 2025-01-01 PROCEDURE — 2700000000 HC OXYGEN THERAPY PER DAY

## 2025-01-01 PROCEDURE — 87150 DNA/RNA AMPLIFIED PROBE: CPT

## 2025-01-01 PROCEDURE — 80048 BASIC METABOLIC PNL TOTAL CA: CPT

## 2025-01-01 PROCEDURE — 6370000000 HC RX 637 (ALT 250 FOR IP): Performed by: STUDENT IN AN ORGANIZED HEALTH CARE EDUCATION/TRAINING PROGRAM

## 2025-01-01 PROCEDURE — 5A1955Z RESPIRATORY VENTILATION, GREATER THAN 96 CONSECUTIVE HOURS: ICD-10-PCS | Performed by: INTERNAL MEDICINE

## 2025-01-01 PROCEDURE — 2100000000 HC CCU R&B

## 2025-01-01 PROCEDURE — 85025 COMPLETE CBC W/AUTO DIFF WBC: CPT

## 2025-01-01 PROCEDURE — 83051 HEMOGLOBIN PLASMA: CPT

## 2025-01-01 PROCEDURE — 2580000003 HC RX 258: Performed by: STUDENT IN AN ORGANIZED HEALTH CARE EDUCATION/TRAINING PROGRAM

## 2025-01-01 PROCEDURE — 93010 ELECTROCARDIOGRAM REPORT: CPT | Performed by: INTERNAL MEDICINE

## 2025-01-01 PROCEDURE — 87040 BLOOD CULTURE FOR BACTERIA: CPT

## 2025-01-01 PROCEDURE — 2500000003 HC RX 250 WO HCPCS: Performed by: HOSPITALIST

## 2025-01-01 PROCEDURE — 71045 X-RAY EXAM CHEST 1 VIEW: CPT

## 2025-01-01 PROCEDURE — 85014 HEMATOCRIT: CPT

## 2025-01-01 PROCEDURE — 99291 CRITICAL CARE FIRST HOUR: CPT | Performed by: INTERNAL MEDICINE

## 2025-01-01 PROCEDURE — 94003 VENT MGMT INPAT SUBQ DAY: CPT

## 2025-01-01 PROCEDURE — 82803 BLOOD GASES ANY COMBINATION: CPT

## 2025-01-01 PROCEDURE — 37799 UNLISTED PX VASCULAR SURGERY: CPT

## 2025-01-01 PROCEDURE — 6360000002 HC RX W HCPCS: Performed by: STUDENT IN AN ORGANIZED HEALTH CARE EDUCATION/TRAINING PROGRAM

## 2025-01-01 PROCEDURE — 99291 CRITICAL CARE FIRST HOUR: CPT | Performed by: STUDENT IN AN ORGANIZED HEALTH CARE EDUCATION/TRAINING PROGRAM

## 2025-01-01 PROCEDURE — 94761 N-INVAS EAR/PLS OXIMETRY MLT: CPT

## 2025-01-01 PROCEDURE — 84132 ASSAY OF SERUM POTASSIUM: CPT

## 2025-01-01 PROCEDURE — 0BH17EZ INSERTION OF ENDOTRACHEAL AIRWAY INTO TRACHEA, VIA NATURAL OR ARTIFICIAL OPENING: ICD-10-PCS | Performed by: INTERNAL MEDICINE

## 2025-01-01 PROCEDURE — 83615 LACTATE (LD) (LDH) ENZYME: CPT

## 2025-01-01 RX ORDER — POTASSIUM CHLORIDE 29.8 MG/ML
20 INJECTION INTRAVENOUS ONCE
Status: COMPLETED | OUTPATIENT
Start: 2025-01-01 | End: 2025-01-01

## 2025-01-01 RX ADMIN — CITALOPRAM HYDROBROMIDE 40 MG: 20 TABLET ORAL at 09:18

## 2025-01-01 RX ADMIN — PROPOFOL 40 MCG/KG/MIN: 10 INJECTION, EMULSION INTRAVENOUS at 08:27

## 2025-01-01 RX ADMIN — PANTOPRAZOLE SODIUM 40 MG: 40 INJECTION, POWDER, FOR SOLUTION INTRAVENOUS at 09:18

## 2025-01-01 RX ADMIN — Medication 150 MCG/HR: at 11:25

## 2025-01-01 RX ADMIN — HEPARIN SODIUM: 5000 INJECTION INTRAVENOUS; SUBCUTANEOUS at 14:19

## 2025-01-01 RX ADMIN — Medication 150 MCG/HR: at 03:38

## 2025-01-01 RX ADMIN — SODIUM CHLORIDE, PRESERVATIVE FREE 10 ML: 5 INJECTION INTRAVENOUS at 20:35

## 2025-01-01 RX ADMIN — SODIUM CHLORIDE, PRESERVATIVE FREE 10 ML: 5 INJECTION INTRAVENOUS at 09:19

## 2025-01-01 RX ADMIN — SODIUM BICARBONATE: 84 INJECTION, SOLUTION INTRAVENOUS at 14:19

## 2025-01-01 RX ADMIN — ASPIRIN 81 MG: 81 TABLET, CHEWABLE ORAL at 09:18

## 2025-01-01 RX ADMIN — SODIUM CHLORIDE, PRESERVATIVE FREE 10 ML: 5 INJECTION INTRAVENOUS at 20:36

## 2025-01-01 RX ADMIN — PROPOFOL 35 MCG/KG/MIN: 10 INJECTION, EMULSION INTRAVENOUS at 04:32

## 2025-01-01 RX ADMIN — POTASSIUM CHLORIDE 20 MEQ: 29.8 INJECTION, SOLUTION INTRAVENOUS at 02:48

## 2025-01-01 RX ADMIN — PROPOFOL 40 MCG/KG/MIN: 10 INJECTION, EMULSION INTRAVENOUS at 14:27

## 2025-01-01 RX ADMIN — PROPOFOL 40 MCG/KG/MIN: 10 INJECTION, EMULSION INTRAVENOUS at 18:44

## 2025-01-01 RX ADMIN — PROPOFOL 40 MCG/KG/MIN: 10 INJECTION, EMULSION INTRAVENOUS at 23:53

## 2025-01-01 RX ADMIN — ACETAMINOPHEN 650 MG: 325 TABLET ORAL at 02:58

## 2025-01-01 RX ADMIN — HEPARIN SODIUM 1400 UNITS/HR: 10000 INJECTION, SOLUTION INTRAVENOUS at 21:01

## 2025-01-01 RX ADMIN — SODIUM CHLORIDE, PRESERVATIVE FREE 10 ML: 5 INJECTION INTRAVENOUS at 09:18

## 2025-01-01 RX ADMIN — MIDAZOLAM IN SODIUM CHLORIDE 5 MG/HR: 1 INJECTION INTRAVENOUS at 04:55

## 2025-01-01 RX ADMIN — Medication 150 MCG/HR: at 18:46

## 2025-01-01 RX ADMIN — CLOPIDOGREL BISULFATE 75 MG: 75 TABLET ORAL at 09:18

## 2025-01-01 RX ADMIN — ACETAMINOPHEN 650 MG: 325 TABLET ORAL at 09:17

## 2025-01-01 ASSESSMENT — PAIN SCALES - GENERAL
PAINLEVEL_OUTOF10: 0

## 2025-01-01 ASSESSMENT — PULMONARY FUNCTION TESTS
PIF_VALUE: 30
PIF_VALUE: 31
PIF_VALUE: 29
PIF_VALUE: 27
PIF_VALUE: 37
PIF_VALUE: 30
PIF_VALUE: 28
PIF_VALUE: 29
PIF_VALUE: 28
PIF_VALUE: 24
PIF_VALUE: 25
PIF_VALUE: 29
PIF_VALUE: 30
PIF_VALUE: 30
PIF_VALUE: 27
PIF_VALUE: 32
PIF_VALUE: 29
PIF_VALUE: 30
PIF_VALUE: 29
PIF_VALUE: 29
PIF_VALUE: 32
PIF_VALUE: 32
PIF_VALUE: 33
PIF_VALUE: 27
PIF_VALUE: 25
PIF_VALUE: 29
PIF_VALUE: 26
PIF_VALUE: 29
PIF_VALUE: 28
PIF_VALUE: 29
PIF_VALUE: 28
PIF_VALUE: 29
PIF_VALUE: 24
PIF_VALUE: 23
PIF_VALUE: 30
PIF_VALUE: 32
PIF_VALUE: 26
PIF_VALUE: 29
PIF_VALUE: 29
PIF_VALUE: 30
PIF_VALUE: 31
PIF_VALUE: 26
PIF_VALUE: 40

## 2025-01-02 ENCOUNTER — APPOINTMENT (OUTPATIENT)
Dept: GENERAL RADIOLOGY | Age: 65
DRG: 215 | End: 2025-01-02
Payer: COMMERCIAL

## 2025-01-02 ENCOUNTER — APPOINTMENT (OUTPATIENT)
Age: 65
DRG: 215 | End: 2025-01-02
Attending: STUDENT IN AN ORGANIZED HEALTH CARE EDUCATION/TRAINING PROGRAM
Payer: COMMERCIAL

## 2025-01-02 LAB
ALBUMIN SERPL-MCNC: 1.8 G/DL (ref 3.4–5)
ALBUMIN SERPL-MCNC: 2.3 G/DL (ref 3.4–5)
ALBUMIN/GLOB SERPL: 1 {RATIO} (ref 1.1–2.2)
ALP SERPL-CCNC: 105 U/L (ref 40–129)
ALP SERPL-CCNC: 135 U/L (ref 40–129)
ALT SERPL-CCNC: 15 U/L (ref 10–40)
ALT SERPL-CCNC: 17 U/L (ref 10–40)
ANION GAP SERPL CALCULATED.3IONS-SCNC: 11 MMOL/L (ref 3–16)
ANION GAP SERPL CALCULATED.3IONS-SCNC: 8 MMOL/L (ref 3–16)
ANISOCYTOSIS BLD QL SMEAR: ABNORMAL
AST SERPL-CCNC: 33 U/L (ref 15–37)
AST SERPL-CCNC: 71 U/L (ref 15–37)
BASE EXCESS BLDA CALC-SCNC: -14 MMOL/L (ref -3–3)
BASE EXCESS BLDA CALC-SCNC: -5 MMOL/L (ref -3–3)
BASE EXCESS BLDA CALC-SCNC: -6 MMOL/L (ref -3–3)
BASE EXCESS MIXED: -3
BASE EXCESS MIXED: -4
BASOPHILS # BLD: 0 K/UL (ref 0–0.2)
BASOPHILS # BLD: 0 K/UL (ref 0–0.2)
BASOPHILS NFR BLD: 0 %
BASOPHILS NFR BLD: 0.6 %
BILIRUB DIRECT SERPL-MCNC: 0.7 MG/DL (ref 0–0.3)
BILIRUB INDIRECT SERPL-MCNC: 0.3 MG/DL (ref 0–1)
BILIRUB SERPL-MCNC: 1 MG/DL (ref 0–1)
BILIRUB SERPL-MCNC: 1 MG/DL (ref 0–1)
BLOOD BANK DISPENSE STATUS: NORMAL
BLOOD BANK DISPENSE STATUS: NORMAL
BLOOD BANK PRODUCT CODE: NORMAL
BLOOD BANK PRODUCT CODE: NORMAL
BPU ID: NORMAL
BPU ID: NORMAL
BUN SERPL-MCNC: 18 MG/DL (ref 7–20)
BUN SERPL-MCNC: 19 MG/DL (ref 7–20)
CA-I BLD-SCNC: 1.09 MMOL/L (ref 1.12–1.32)
CALCIUM SERPL-MCNC: 6.8 MG/DL (ref 8.3–10.6)
CALCIUM SERPL-MCNC: 7.9 MG/DL (ref 8.3–10.6)
CHLORIDE SERPL-SCNC: 105 MMOL/L (ref 99–110)
CHLORIDE SERPL-SCNC: 109 MMOL/L (ref 99–110)
CO2 BLDA-SCNC: 16 MMOL/L
CO2 BLDA-SCNC: 22 MMOL/L
CO2 BLDA-SCNC: 22 MMOL/L
CO2 SERPL-SCNC: 15 MMOL/L (ref 21–32)
CO2 SERPL-SCNC: 21 MMOL/L (ref 21–32)
CREAT SERPL-MCNC: 1.2 MG/DL (ref 0.6–1.2)
CREAT SERPL-MCNC: 1.4 MG/DL (ref 0.6–1.2)
DEPRECATED RDW RBC AUTO: 15.4 % (ref 12.4–15.4)
DEPRECATED RDW RBC AUTO: 15.6 % (ref 12.4–15.4)
DEPRECATED RDW RBC AUTO: 15.8 % (ref 12.4–15.4)
DESCRIPTION BLOOD BANK: NORMAL
DESCRIPTION BLOOD BANK: NORMAL
ECHO BSA: 1.79 M2
ECHO BSA: 1.85 M2
ECHO LV EDV A2C: 93 ML
ECHO LV EDV A4C: 94 ML
ECHO LV EDV INDEX A4C: 53 ML/M2
ECHO LV EDV NDEX A2C: 52 ML/M2
ECHO LV EJECTION FRACTION A2C: 46 %
ECHO LV EJECTION FRACTION A4C: 39 %
ECHO LV EJECTION FRACTION BIPLANE: 42 % (ref 55–100)
ECHO LV ESV A2C: 50 ML
ECHO LV ESV A4C: 58 ML
ECHO LV ESV INDEX A2C: 28 ML/M2
ECHO LV ESV INDEX A4C: 33 ML/M2
ECHO RV TAPSE: 1.8 CM (ref 1.7–?)
EOSINOPHIL # BLD: 0 K/UL (ref 0–0.6)
EOSINOPHIL # BLD: 0.1 K/UL (ref 0–0.6)
EOSINOPHIL NFR BLD: 0 %
EOSINOPHIL NFR BLD: 1.6 %
GFR SERPLBLD CREATININE-BSD FMLA CKD-EPI: 42 ML/MIN/{1.73_M2}
GFR SERPLBLD CREATININE-BSD FMLA CKD-EPI: 51 ML/MIN/{1.73_M2}
GLUCOSE BLD-MCNC: 154 MG/DL (ref 70–99)
GLUCOSE BLD-MCNC: 200 MG/DL (ref 70–99)
GLUCOSE SERPL-MCNC: 103 MG/DL (ref 70–99)
GLUCOSE SERPL-MCNC: 194 MG/DL (ref 70–99)
HCO3 BLDA-SCNC: 14.4 MMOL/L (ref 21–29)
HCO3 BLDA-SCNC: 20.4 MMOL/L (ref 21–29)
HCO3 BLDA-SCNC: 20.6 MMOL/L (ref 21–29)
HCO3, MIXED: 21.2 MMOL/L
HCO3, MIXED: 24.4 MMOL/L
HCT VFR BLD AUTO: 21.9 % (ref 36–48)
HCT VFR BLD AUTO: 23.6 % (ref 36–48)
HCT VFR BLD AUTO: 33 % (ref 36–48)
HCT VFR BLD AUTO: 34.7 % (ref 36–48)
HCT VFR BLD AUTO: 36.8 % (ref 36–48)
HGB BLD CALC-MCNC: 11.4 GM/DL (ref 12–16)
HGB BLD-MCNC: 11.7 G/DL (ref 12–16)
HGB BLD-MCNC: 12 G/DL (ref 12–16)
HGB BLD-MCNC: 7.1 G/DL (ref 12–16)
HGB BLD-MCNC: 8 G/DL (ref 12–16)
LACTATE BLD-SCNC: 3.65 MMOL/L (ref 0.4–2)
LDH SERPL L TO P-CCNC: 541 U/L (ref 100–190)
LYMPHOCYTES # BLD: 0.8 K/UL (ref 1–5.1)
LYMPHOCYTES # BLD: 1.9 K/UL (ref 1–5.1)
LYMPHOCYTES NFR BLD: 11 %
LYMPHOCYTES NFR BLD: 11.2 %
MAGNESIUM SERPL-MCNC: 1.62 MG/DL (ref 1.8–2.4)
MCH RBC QN AUTO: 27.4 PG (ref 26–34)
MCH RBC QN AUTO: 27.6 PG (ref 26–34)
MCH RBC QN AUTO: 28.1 PG (ref 26–34)
MCHC RBC AUTO-ENTMCNC: 32.8 G/DL (ref 31–36)
MCHC RBC AUTO-ENTMCNC: 33.7 G/DL (ref 31–36)
MCHC RBC AUTO-ENTMCNC: 33.9 G/DL (ref 31–36)
MCV RBC AUTO: 81.4 FL (ref 80–100)
MCV RBC AUTO: 83 FL (ref 80–100)
MCV RBC AUTO: 84.1 FL (ref 80–100)
MONOCYTES # BLD: 0.2 K/UL (ref 0–1.3)
MONOCYTES # BLD: 0.7 K/UL (ref 0–1.3)
MONOCYTES NFR BLD: 1 %
MONOCYTES NFR BLD: 9.3 %
MYELOCYTES NFR BLD MANUAL: 1 %
NEUTROPHILS # BLD: 15.3 K/UL (ref 1.7–7.7)
NEUTROPHILS # BLD: 5.8 K/UL (ref 1.7–7.7)
NEUTROPHILS NFR BLD: 77.3 %
NEUTROPHILS NFR BLD: 86 %
NEUTS BAND NFR BLD MANUAL: 1 % (ref 0–7)
O2 SAT, MIXED: 34 %
O2 SAT, MIXED: 65 %
PCO2 BLDA: 38.1 MM HG (ref 35–45)
PCO2 BLDA: 38.2 MM HG (ref 35–45)
PCO2 BLDA: 41.6 MM HG (ref 35–45)
PCO2 MIXED: 34.8 MM HG
PCO2 MIXED: 54.2 MM HG
PERFORMED ON: ABNORMAL
PERFORMED ON: NORMAL
PH BLDA: 7.18 [PH] (ref 7.35–7.45)
PH BLDA: 7.3 [PH] (ref 7.35–7.45)
PH BLDA: 7.34 [PH] (ref 7.35–7.45)
PH, MIXED: 7.26 (ref 7.35–7.45)
PH, MIXED: 7.39 (ref 7.35–7.45)
PLATELET # BLD AUTO: 102 K/UL (ref 135–450)
PLATELET # BLD AUTO: 45 K/UL (ref 135–450)
PLATELET # BLD AUTO: 66 K/UL (ref 135–450)
PLATELET BLD QL SMEAR: ABNORMAL
PMV BLD AUTO: 8.8 FL (ref 5–10.5)
PMV BLD AUTO: 9.1 FL (ref 5–10.5)
PMV BLD AUTO: 9.1 FL (ref 5–10.5)
PO2 BLDA: 100 MM HG (ref 75–108)
PO2 BLDA: 165.9 MM HG (ref 75–108)
PO2 BLDA: 285.2 MM HG (ref 75–108)
PO2 MIXED: 24 MM HG
PO2 MIXED: 34 MM HG
POC ACT LR: 161 SEC
POC ACT LR: 165 SEC
POC ACT LR: 166 SEC
POC ACT LR: 184 SEC
POC ACT LR: 188 SEC
POC ACT LR: 189 SEC
POC ACT LR: 189 SEC
POC ACT LR: 190 SEC
POC ACT LR: 193 SEC
POC ACT LR: 194 SEC
POC ACT LR: 196 SEC
POC ACT LR: 248 SEC
POC ACT LR: 267 SEC
POC ACT LR: 380 SEC
POC SAMPLE TYPE: ABNORMAL
POC SAMPLE TYPE: NORMAL
POLYCHROMASIA BLD QL SMEAR: ABNORMAL
POTASSIUM BLD-SCNC: 4.4 MMOL/L (ref 3.5–5.1)
POTASSIUM SERPL-SCNC: 3.6 MMOL/L (ref 3.5–5.1)
POTASSIUM SERPL-SCNC: 4.4 MMOL/L (ref 3.5–5.1)
PROT SERPL-MCNC: 3.6 G/DL (ref 6.4–8.2)
PROT SERPL-MCNC: 5 G/DL (ref 6.4–8.2)
RBC # BLD AUTO: 2.85 M/UL (ref 4–5.2)
RBC # BLD AUTO: 4.26 M/UL (ref 4–5.2)
RBC # BLD AUTO: 4.37 M/UL (ref 4–5.2)
SAO2 % BLDA: 100 % (ref 93–100)
SAO2 % BLDA: 97 % (ref 93–100)
SAO2 % BLDA: 99 % (ref 93–100)
SLIDE REVIEW: ABNORMAL
SODIUM BLD-SCNC: 137 MMOL/L (ref 136–145)
SODIUM SERPL-SCNC: 134 MMOL/L (ref 136–145)
SODIUM SERPL-SCNC: 135 MMOL/L (ref 136–145)
TCO2 CALC MIXED: 22 MMOL/L
TCO2 CALC MIXED: 26 MMOL/L
WBC # BLD AUTO: 17.4 K/UL (ref 4–11)
WBC # BLD AUTO: 20 K/UL (ref 4–11)
WBC # BLD AUTO: 7.5 K/UL (ref 4–11)

## 2025-01-02 PROCEDURE — 82947 ASSAY GLUCOSE BLOOD QUANT: CPT

## 2025-01-02 PROCEDURE — C1751 CATH, INF, PER/CENT/MIDLINE: HCPCS | Performed by: STUDENT IN AN ORGANIZED HEALTH CARE EDUCATION/TRAINING PROGRAM

## 2025-01-02 PROCEDURE — 6360000002 HC RX W HCPCS: Performed by: INTERNAL MEDICINE

## 2025-01-02 PROCEDURE — 85018 HEMOGLOBIN: CPT

## 2025-01-02 PROCEDURE — 02HA3RZ INSERTION OF SHORT-TERM EXTERNAL HEART ASSIST SYSTEM INTO HEART, PERCUTANEOUS APPROACH: ICD-10-PCS | Performed by: STUDENT IN AN ORGANIZED HEALTH CARE EDUCATION/TRAINING PROGRAM

## 2025-01-02 PROCEDURE — 90945 DIALYSIS ONE EVALUATION: CPT

## 2025-01-02 PROCEDURE — 83615 LACTATE (LD) (LDH) ENZYME: CPT

## 2025-01-02 PROCEDURE — 85014 HEMATOCRIT: CPT

## 2025-01-02 PROCEDURE — 94761 N-INVAS EAR/PLS OXIMETRY MLT: CPT

## 2025-01-02 PROCEDURE — 2580000003 HC RX 258: Performed by: INTERNAL MEDICINE

## 2025-01-02 PROCEDURE — 2720000010 HC SURG SUPPLY STERILE: Performed by: STUDENT IN AN ORGANIZED HEALTH CARE EDUCATION/TRAINING PROGRAM

## 2025-01-02 PROCEDURE — 84295 ASSAY OF SERUM SODIUM: CPT

## 2025-01-02 PROCEDURE — 6360000002 HC RX W HCPCS: Performed by: STUDENT IN AN ORGANIZED HEALTH CARE EDUCATION/TRAINING PROGRAM

## 2025-01-02 PROCEDURE — 93325 DOPPLER ECHO COLOR FLOW MAPG: CPT | Performed by: INTERNAL MEDICINE

## 2025-01-02 PROCEDURE — 2500000003 HC RX 250 WO HCPCS: Performed by: STUDENT IN AN ORGANIZED HEALTH CARE EDUCATION/TRAINING PROGRAM

## 2025-01-02 PROCEDURE — 6360000004 HC RX CONTRAST MEDICATION: Performed by: STUDENT IN AN ORGANIZED HEALTH CARE EDUCATION/TRAINING PROGRAM

## 2025-01-02 PROCEDURE — 93308 TTE F-UP OR LMTD: CPT

## 2025-01-02 PROCEDURE — 71045 X-RAY EXAM CHEST 1 VIEW: CPT

## 2025-01-02 PROCEDURE — 93454 CORONARY ARTERY ANGIO S&I: CPT | Performed by: STUDENT IN AN ORGANIZED HEALTH CARE EDUCATION/TRAINING PROGRAM

## 2025-01-02 PROCEDURE — 99291 CRITICAL CARE FIRST HOUR: CPT | Performed by: STUDENT IN AN ORGANIZED HEALTH CARE EDUCATION/TRAINING PROGRAM

## 2025-01-02 PROCEDURE — 92978 ENDOLUMINL IVUS OCT C 1ST: CPT | Performed by: STUDENT IN AN ORGANIZED HEALTH CARE EDUCATION/TRAINING PROGRAM

## 2025-01-02 PROCEDURE — C1874 STENT, COATED/COV W/DEL SYS: HCPCS | Performed by: STUDENT IN AN ORGANIZED HEALTH CARE EDUCATION/TRAINING PROGRAM

## 2025-01-02 PROCEDURE — 36415 COLL VENOUS BLD VENIPUNCTURE: CPT

## 2025-01-02 PROCEDURE — 93308 TTE F-UP OR LMTD: CPT | Performed by: INTERNAL MEDICINE

## 2025-01-02 PROCEDURE — 85027 COMPLETE CBC AUTOMATED: CPT

## 2025-01-02 PROCEDURE — 82330 ASSAY OF CALCIUM: CPT

## 2025-01-02 PROCEDURE — 2500000003 HC RX 250 WO HCPCS: Performed by: HOSPITALIST

## 2025-01-02 PROCEDURE — 33967 INSERT I-AORT PERCUT DEVICE: CPT | Performed by: STUDENT IN AN ORGANIZED HEALTH CARE EDUCATION/TRAINING PROGRAM

## 2025-01-02 PROCEDURE — 75710 ARTERY X-RAYS ARM/LEG: CPT | Performed by: STUDENT IN AN ORGANIZED HEALTH CARE EDUCATION/TRAINING PROGRAM

## 2025-01-02 PROCEDURE — 99152 MOD SED SAME PHYS/QHP 5/>YRS: CPT | Performed by: STUDENT IN AN ORGANIZED HEALTH CARE EDUCATION/TRAINING PROGRAM

## 2025-01-02 PROCEDURE — 84132 ASSAY OF SERUM POTASSIUM: CPT

## 2025-01-02 PROCEDURE — C1725 CATH, TRANSLUMIN NON-LASER: HCPCS | Performed by: STUDENT IN AN ORGANIZED HEALTH CARE EDUCATION/TRAINING PROGRAM

## 2025-01-02 PROCEDURE — 5A0221D ASSISTANCE WITH CARDIAC OUTPUT USING IMPELLER PUMP, CONTINUOUS: ICD-10-PCS | Performed by: STUDENT IN AN ORGANIZED HEALTH CARE EDUCATION/TRAINING PROGRAM

## 2025-01-02 PROCEDURE — 33992 RMVL PERQ LEFT HEART VAD: CPT | Performed by: STUDENT IN AN ORGANIZED HEALTH CARE EDUCATION/TRAINING PROGRAM

## 2025-01-02 PROCEDURE — C1753 CATH, INTRAVAS ULTRASOUND: HCPCS | Performed by: STUDENT IN AN ORGANIZED HEALTH CARE EDUCATION/TRAINING PROGRAM

## 2025-01-02 PROCEDURE — 83735 ASSAY OF MAGNESIUM: CPT

## 2025-01-02 PROCEDURE — 83051 HEMOGLOBIN PLASMA: CPT

## 2025-01-02 PROCEDURE — 027036Z DILATION OF CORONARY ARTERY, ONE ARTERY WITH THREE DRUG-ELUTING INTRALUMINAL DEVICES, PERCUTANEOUS APPROACH: ICD-10-PCS | Performed by: STUDENT IN AN ORGANIZED HEALTH CARE EDUCATION/TRAINING PROGRAM

## 2025-01-02 PROCEDURE — 85025 COMPLETE CBC W/AUTO DIFF WBC: CPT

## 2025-01-02 PROCEDURE — 99153 MOD SED SAME PHYS/QHP EA: CPT | Performed by: STUDENT IN AN ORGANIZED HEALTH CARE EDUCATION/TRAINING PROGRAM

## 2025-01-02 PROCEDURE — 92928 PRQ TCAT PLMT NTRAC ST 1 LES: CPT | Performed by: STUDENT IN AN ORGANIZED HEALTH CARE EDUCATION/TRAINING PROGRAM

## 2025-01-02 PROCEDURE — C1769 GUIDE WIRE: HCPCS | Performed by: STUDENT IN AN ORGANIZED HEALTH CARE EDUCATION/TRAINING PROGRAM

## 2025-01-02 PROCEDURE — 6370000000 HC RX 637 (ALT 250 FOR IP): Performed by: STUDENT IN AN ORGANIZED HEALTH CARE EDUCATION/TRAINING PROGRAM

## 2025-01-02 PROCEDURE — C1894 INTRO/SHEATH, NON-LASER: HCPCS | Performed by: STUDENT IN AN ORGANIZED HEALTH CARE EDUCATION/TRAINING PROGRAM

## 2025-01-02 PROCEDURE — 2580000003 HC RX 258: Performed by: STUDENT IN AN ORGANIZED HEALTH CARE EDUCATION/TRAINING PROGRAM

## 2025-01-02 PROCEDURE — 2709999900 HC NON-CHARGEABLE SUPPLY: Performed by: STUDENT IN AN ORGANIZED HEALTH CARE EDUCATION/TRAINING PROGRAM

## 2025-01-02 PROCEDURE — 99291 CRITICAL CARE FIRST HOUR: CPT | Performed by: INTERNAL MEDICINE

## 2025-01-02 PROCEDURE — 37224 HC FEM POP TERRITORY PLASTY: CPT | Performed by: STUDENT IN AN ORGANIZED HEALTH CARE EDUCATION/TRAINING PROGRAM

## 2025-01-02 PROCEDURE — 85347 COAGULATION TIME ACTIVATED: CPT

## 2025-01-02 PROCEDURE — 2100000000 HC CCU R&B

## 2025-01-02 PROCEDURE — C1887 CATHETER, GUIDING: HCPCS | Performed by: STUDENT IN AN ORGANIZED HEALTH CARE EDUCATION/TRAINING PROGRAM

## 2025-01-02 PROCEDURE — 82803 BLOOD GASES ANY COMBINATION: CPT

## 2025-01-02 PROCEDURE — 94003 VENT MGMT INPAT SUBQ DAY: CPT

## 2025-01-02 PROCEDURE — 80053 COMPREHEN METABOLIC PANEL: CPT

## 2025-01-02 PROCEDURE — C1760 CLOSURE DEV, VASC: HCPCS | Performed by: STUDENT IN AN ORGANIZED HEALTH CARE EDUCATION/TRAINING PROGRAM

## 2025-01-02 PROCEDURE — 2500000003 HC RX 250 WO HCPCS: Performed by: INTERNAL MEDICINE

## 2025-01-02 PROCEDURE — 2700000000 HC OXYGEN THERAPY PER DAY

## 2025-01-02 PROCEDURE — 83605 ASSAY OF LACTIC ACID: CPT

## 2025-01-02 DEVICE — XIENCE SIERRA™ EVEROLIMUS ELUTING CORONARY STENT SYSTEM 4.00 MM X 28 MM / RAPID-EXCHANGE
Type: IMPLANTABLE DEVICE | Status: FUNCTIONAL
Brand: XIENCE SIERRA™

## 2025-01-02 DEVICE — XIENCE SIERRA™ EVEROLIMUS ELUTING CORONARY STENT SYSTEM 3.50 MM X 38 MM / RAPID-EXCHANGE
Type: IMPLANTABLE DEVICE | Status: FUNCTIONAL
Brand: XIENCE SIERRA™

## 2025-01-02 RX ORDER — HEPARIN SODIUM 1000 [USP'U]/ML
INJECTION, SOLUTION INTRAVENOUS; SUBCUTANEOUS PRN
Status: DISCONTINUED | OUTPATIENT
Start: 2025-01-02 | End: 2025-01-02 | Stop reason: HOSPADM

## 2025-01-02 RX ORDER — SODIUM CHLORIDE 0.9 % (FLUSH) 0.9 %
5-40 SYRINGE (ML) INJECTION EVERY 12 HOURS SCHEDULED
Status: DISCONTINUED | OUTPATIENT
Start: 2025-01-02 | End: 2025-01-13

## 2025-01-02 RX ORDER — SODIUM CHLORIDE 0.9 % (FLUSH) 0.9 %
5-40 SYRINGE (ML) INJECTION PRN
Status: DISCONTINUED | OUTPATIENT
Start: 2025-01-02 | End: 2025-01-03 | Stop reason: HOSPADM

## 2025-01-02 RX ORDER — ATROPINE SULFATE 0.1 MG/ML
INJECTION INTRAVENOUS PRN
Status: DISCONTINUED | OUTPATIENT
Start: 2025-01-02 | End: 2025-01-02 | Stop reason: HOSPADM

## 2025-01-02 RX ORDER — SODIUM CHLORIDE 9 MG/ML
INJECTION, SOLUTION INTRAVENOUS CONTINUOUS
Status: DISCONTINUED | OUTPATIENT
Start: 2025-01-02 | End: 2025-01-02

## 2025-01-02 RX ORDER — SODIUM CHLORIDE 9 MG/ML
INJECTION, SOLUTION INTRAVENOUS PRN
Status: DISCONTINUED | OUTPATIENT
Start: 2025-01-02 | End: 2025-01-03

## 2025-01-02 RX ORDER — ACETAMINOPHEN 325 MG/1
650 TABLET ORAL EVERY 4 HOURS PRN
Status: DISCONTINUED | OUTPATIENT
Start: 2025-01-02 | End: 2025-01-20 | Stop reason: HOSPADM

## 2025-01-02 RX ORDER — NITROGLYCERIN 20 MG/100ML
INJECTION INTRAVENOUS CONTINUOUS PRN
Status: COMPLETED | OUTPATIENT
Start: 2025-01-02 | End: 2025-01-02

## 2025-01-02 RX ORDER — PHENYLEPHRINE HCL IN 0.9% NACL 1 MG/10 ML
SYRINGE (ML) INTRAVENOUS PRN
Status: DISCONTINUED | OUTPATIENT
Start: 2025-01-02 | End: 2025-01-02 | Stop reason: HOSPADM

## 2025-01-02 RX ORDER — DOBUTAMINE HYDROCHLORIDE 200 MG/100ML
2.5-1 INJECTION INTRAVENOUS CONTINUOUS
Status: DISCONTINUED | OUTPATIENT
Start: 2025-01-02 | End: 2025-01-10

## 2025-01-02 RX ORDER — DOBUTAMINE HYDROCHLORIDE 200 MG/100ML
INJECTION INTRAVENOUS CONTINUOUS PRN
Status: COMPLETED | OUTPATIENT
Start: 2025-01-02 | End: 2025-01-02

## 2025-01-02 RX ORDER — PROTAMINE SULFATE 10 MG/ML
INJECTION, SOLUTION INTRAVENOUS PRN
Status: DISCONTINUED | OUTPATIENT
Start: 2025-01-02 | End: 2025-01-02 | Stop reason: HOSPADM

## 2025-01-02 RX ORDER — MAGNESIUM SULFATE 1 G/100ML
1000 INJECTION INTRAVENOUS ONCE
Status: COMPLETED | OUTPATIENT
Start: 2025-01-02 | End: 2025-01-02

## 2025-01-02 RX ORDER — IOPAMIDOL 755 MG/ML
INJECTION, SOLUTION INTRAVASCULAR PRN
Status: DISCONTINUED | OUTPATIENT
Start: 2025-01-02 | End: 2025-01-02 | Stop reason: HOSPADM

## 2025-01-02 RX ORDER — SODIUM CHLORIDE 0.9 % (FLUSH) 0.9 %
5-40 SYRINGE (ML) INJECTION EVERY 12 HOURS SCHEDULED
Status: DISCONTINUED | OUTPATIENT
Start: 2025-01-02 | End: 2025-01-03 | Stop reason: HOSPADM

## 2025-01-02 RX ORDER — SODIUM CHLORIDE 9 MG/ML
INJECTION, SOLUTION INTRAVENOUS PRN
Status: DISCONTINUED | OUTPATIENT
Start: 2025-01-02 | End: 2025-01-20 | Stop reason: HOSPADM

## 2025-01-02 RX ORDER — SODIUM CHLORIDE 0.9 % (FLUSH) 0.9 %
5-40 SYRINGE (ML) INJECTION PRN
Status: DISCONTINUED | OUTPATIENT
Start: 2025-01-02 | End: 2025-01-20 | Stop reason: HOSPADM

## 2025-01-02 RX ORDER — NOREPINEPHRINE BITARTRATE 0.06 MG/ML
INJECTION, SOLUTION INTRAVENOUS CONTINUOUS PRN
Status: DISCONTINUED | OUTPATIENT
Start: 2025-01-02 | End: 2025-01-02 | Stop reason: HOSPADM

## 2025-01-02 RX ORDER — ASCORBIC ACID 500 MG
500 TABLET ORAL DAILY
Status: DISPENSED | OUTPATIENT
Start: 2025-01-02 | End: 2025-01-05

## 2025-01-02 RX ORDER — SODIUM CHLORIDE 9 MG/ML
INJECTION, SOLUTION INTRAVENOUS PRN
Status: DISCONTINUED | OUTPATIENT
Start: 2025-01-02 | End: 2025-01-02

## 2025-01-02 RX ORDER — AMIODARONE HYDROCHLORIDE 150 MG/3ML
INJECTION, SOLUTION INTRAVENOUS PRN
Status: DISCONTINUED | OUTPATIENT
Start: 2025-01-02 | End: 2025-01-02 | Stop reason: HOSPADM

## 2025-01-02 RX ADMIN — SODIUM CHLORIDE, PRESERVATIVE FREE 10 ML: 5 INJECTION INTRAVENOUS at 08:17

## 2025-01-02 RX ADMIN — CLOPIDOGREL BISULFATE 75 MG: 75 TABLET ORAL at 08:16

## 2025-01-02 RX ADMIN — HEPARIN SODIUM 1450 UNITS/HR: 10000 INJECTION, SOLUTION INTRAVENOUS at 19:24

## 2025-01-02 RX ADMIN — Medication 150 MCG/HR: at 02:08

## 2025-01-02 RX ADMIN — PROPOFOL 40 MCG/KG/MIN: 10 INJECTION, EMULSION INTRAVENOUS at 23:51

## 2025-01-02 RX ADMIN — Medication 150 MCG/HR: at 08:15

## 2025-01-02 RX ADMIN — MAGNESIUM SULFATE 1000 MG: 1 INJECTION INTRAVENOUS at 21:45

## 2025-01-02 RX ADMIN — SODIUM BICARBONATE: 84 INJECTION, SOLUTION INTRAVENOUS at 20:54

## 2025-01-02 RX ADMIN — SODIUM CHLORIDE, PRESERVATIVE FREE 10 ML: 5 INJECTION INTRAVENOUS at 08:16

## 2025-01-02 RX ADMIN — SODIUM BICARBONATE 50 MEQ: 84 INJECTION INTRAVENOUS at 19:11

## 2025-01-02 RX ADMIN — SODIUM BICARBONATE 50 MEQ: 84 INJECTION INTRAVENOUS at 19:12

## 2025-01-02 RX ADMIN — Medication 150 MCG/HR: at 17:00

## 2025-01-02 RX ADMIN — ASPIRIN 81 MG: 81 TABLET, CHEWABLE ORAL at 08:16

## 2025-01-02 RX ADMIN — DOBUTAMINE HYDROCHLORIDE 5 MCG/KG/MIN: 200 INJECTION INTRAVENOUS at 21:02

## 2025-01-02 RX ADMIN — CALCIUM GLUCONATE 3000 MG: 98 INJECTION, SOLUTION INTRAVENOUS at 20:32

## 2025-01-02 RX ADMIN — MIDAZOLAM IN SODIUM CHLORIDE 5 MG/HR: 1 INJECTION INTRAVENOUS at 02:15

## 2025-01-02 RX ADMIN — HEPARIN SODIUM: 5000 INJECTION INTRAVENOUS; SUBCUTANEOUS at 19:47

## 2025-01-02 RX ADMIN — PROPOFOL 40 MCG/KG/MIN: 10 INJECTION, EMULSION INTRAVENOUS at 04:51

## 2025-01-02 RX ADMIN — PROPOFOL 40 MCG/KG/MIN: 10 INJECTION, EMULSION INTRAVENOUS at 10:45

## 2025-01-02 RX ADMIN — CITALOPRAM HYDROBROMIDE 40 MG: 20 TABLET ORAL at 08:16

## 2025-01-02 RX ADMIN — PANTOPRAZOLE SODIUM 40 MG: 40 INJECTION, POWDER, FOR SOLUTION INTRAVENOUS at 08:16

## 2025-01-02 RX ADMIN — Medication 150 MCG/HR: at 23:30

## 2025-01-02 RX ADMIN — ACETAMINOPHEN 650 MG: 325 TABLET ORAL at 09:39

## 2025-01-02 RX ADMIN — SODIUM CHLORIDE: 9 INJECTION, SOLUTION INTRAVENOUS at 12:07

## 2025-01-02 ASSESSMENT — PAIN SCALES - GENERAL
PAINLEVEL_OUTOF10: 0

## 2025-01-02 ASSESSMENT — PULMONARY FUNCTION TESTS
PIF_VALUE: 27
PIF_VALUE: 28
PIF_VALUE: 28
PIF_VALUE: 24
PIF_VALUE: 29
PIF_VALUE: 27
PIF_VALUE: 28
PIF_VALUE: 23
PIF_VALUE: 30
PIF_VALUE: 23
PIF_VALUE: 23
PIF_VALUE: 25
PIF_VALUE: 24
PIF_VALUE: 29
PIF_VALUE: 27
PIF_VALUE: 26

## 2025-01-02 NOTE — SEDATION DOCUMENTATION
Brief Pre-Op Note/Sedation Assessment      Shahida Vásquez  1960  [id number]  12:41 PM    Planned Procedure: Cardiac Catheterization Procedure  Post Procedure Plan: Return to same level of care  Consent: I have discussed with the patient and/or the patient representative the indication, alternatives, and the possible risks and/or complications of the planned procedure and the anesthesia methods. The patient and/or patient representative appear to understand and agree to proceed.        Chief Complaint:   STEMI      Indications for Cath Procedure:  Presentation:  ACS > 24 hrs  2.  Anginal Classification within 2 weeks:  CCS III - Symptoms with everyday living activities, i.e., moderate limitation  3.  Angina Symptoms Assessment:  Typical Chest Pain  4.  Heart Failure Class within last 2 weeks:  Yes:  Heart Failure Type: Systolic Severity:  Class IV - Symptoms of HF at rest  5.  Cardiovascular Instability:  Yes:  Acute CHF symptoms and Cardiogenic shock    Prior Ischemic Workup/Eval:  Pre-Procedural Medications: Yes: Aspirin  2.   Stress Test Completed?  No    Does Patient need surgery?  Cath Valve Surgery:  No    Pre-Procedure Medical History:  Vital Signs:  BP (!) 89/61   Pulse 65   Temp 99.2 °F (37.3 °C) (Esophageal)   Resp 22   Ht 1.549 m (5' 1\")   Wt 79.4 kg (175 lb)   SpO2 100%   BMI 33.07 kg/m²     Allergies:    Allergies   Allergen Reactions    Penicillins Rash     Medications:    Current Facility-Administered Medications   Medication Dose Route Frequency Provider Last Rate Last Admin    sodium chloride flush 0.9 % injection 5-40 mL  5-40 mL IntraVENous 2 times per day Ang Enriquez MD        sodium chloride flush 0.9 % injection 5-40 mL  5-40 mL IntraVENous PRN Ang Enriquez MD        ascorbic acid (VITAMIN C) tablet 500 mg  500 mg Oral Daily Eliceo Evangelista MD        0.9 % sodium chloride infusion   IntraVENous Continuous Eliceo Evangelista MD 70 mL/hr at 01/02/25 1215 Rate Verify at

## 2025-01-03 ENCOUNTER — APPOINTMENT (OUTPATIENT)
Dept: GENERAL RADIOLOGY | Age: 65
DRG: 215 | End: 2025-01-03
Payer: COMMERCIAL

## 2025-01-03 LAB
ABO + RH BLD: NORMAL
ALBUMIN SERPL-MCNC: 1.7 G/DL (ref 3.4–5)
ALBUMIN/GLOB SERPL: 0.9 {RATIO} (ref 1.1–2.2)
ALP SERPL-CCNC: 122 U/L (ref 40–129)
ALT SERPL-CCNC: 27 U/L (ref 10–40)
ANION GAP SERPL CALCULATED.3IONS-SCNC: 10 MMOL/L (ref 3–16)
ANION GAP SERPL CALCULATED.3IONS-SCNC: 10 MMOL/L (ref 3–16)
ANION GAP SERPL CALCULATED.3IONS-SCNC: 11 MMOL/L (ref 3–16)
ANISOCYTOSIS BLD QL SMEAR: ABNORMAL
AST SERPL-CCNC: 136 U/L (ref 15–37)
BASE EXCESS BLDA CALC-SCNC: -3 MMOL/L (ref -3–3)
BASE EXCESS BLDA CALC-SCNC: -4 MMOL/L (ref -3–3)
BASE EXCESS MIXED: -1
BASE EXCESS MIXED: -4
BASE EXCESS MIXED: -5
BASOPHILS # BLD: 0 K/UL (ref 0–0.2)
BASOPHILS NFR BLD: 0 %
BILIRUB SERPL-MCNC: 1.6 MG/DL (ref 0–1)
BLD GP AB SCN SERPL QL: NORMAL
BLOOD BANK DISPENSE STATUS: NORMAL
BLOOD BANK PRODUCT CODE: NORMAL
BPU ID: NORMAL
BUN SERPL-MCNC: 23 MG/DL (ref 7–20)
BUN SERPL-MCNC: 27 MG/DL (ref 7–20)
BUN SERPL-MCNC: 27 MG/DL (ref 7–20)
CALCIUM SERPL-MCNC: 7.1 MG/DL (ref 8.3–10.6)
CALCIUM SERPL-MCNC: 7.5 MG/DL (ref 8.3–10.6)
CALCIUM SERPL-MCNC: 7.6 MG/DL (ref 8.3–10.6)
CHLORIDE SERPL-SCNC: 100 MMOL/L (ref 99–110)
CHLORIDE SERPL-SCNC: 103 MMOL/L (ref 99–110)
CHLORIDE SERPL-SCNC: 107 MMOL/L (ref 99–110)
CO2 BLDA-SCNC: 22 MMOL/L
CO2 BLDA-SCNC: 23 MMOL/L
CO2 SERPL-SCNC: 18 MMOL/L (ref 21–32)
CO2 SERPL-SCNC: 19 MMOL/L (ref 21–32)
CO2 SERPL-SCNC: 22 MMOL/L (ref 21–32)
CREAT SERPL-MCNC: 1.7 MG/DL (ref 0.6–1.2)
CREAT SERPL-MCNC: 1.9 MG/DL (ref 0.6–1.2)
CREAT SERPL-MCNC: 2.1 MG/DL (ref 0.6–1.2)
DEPRECATED RDW RBC AUTO: 15.5 % (ref 12.4–15.4)
DEPRECATED RDW RBC AUTO: 16.1 % (ref 12.4–15.4)
DESCRIPTION BLOOD BANK: NORMAL
ECHO BSA: 1.79 M2
ECHO BSA: 1.85 M2
ECHO LV EDV A4C: 67 ML
ECHO LV EDV INDEX A4C: 38 ML/M2
ECHO LV EF PHYSICIAN: 25 %
ECHO LV EJECTION FRACTION A4C: 35 %
ECHO LV EJECTION FRACTION BIPLANE: 35 % (ref 55–100)
ECHO LV ESV A4C: 44 ML
ECHO LV ESV INDEX A4C: 25 ML/M2
EOSINOPHIL # BLD: 0 K/UL (ref 0–0.6)
EOSINOPHIL NFR BLD: 0 %
GFR SERPLBLD CREATININE-BSD FMLA CKD-EPI: 26 ML/MIN/{1.73_M2}
GFR SERPLBLD CREATININE-BSD FMLA CKD-EPI: 29 ML/MIN/{1.73_M2}
GFR SERPLBLD CREATININE-BSD FMLA CKD-EPI: 33 ML/MIN/{1.73_M2}
GLUCOSE BLD-MCNC: 174 MG/DL (ref 70–99)
GLUCOSE BLD-MCNC: 177 MG/DL (ref 70–99)
GLUCOSE SERPL-MCNC: 165 MG/DL (ref 70–99)
GLUCOSE SERPL-MCNC: 166 MG/DL (ref 70–99)
GLUCOSE SERPL-MCNC: 170 MG/DL (ref 70–99)
HCO3 BLDA-SCNC: 21.3 MMOL/L (ref 21–29)
HCO3 BLDA-SCNC: 21.7 MMOL/L (ref 21–29)
HCO3, MIXED: 21.1 MMOL/L
HCO3, MIXED: 21.7 MMOL/L
HCO3, MIXED: 24 MMOL/L
HCT VFR BLD AUTO: 22 % (ref 36–48)
HCT VFR BLD AUTO: 26 % (ref 36–48)
HCT VFR BLD AUTO: 28.6 % (ref 36–48)
HCT VFR BLD AUTO: 30.2 % (ref 36–48)
HCT VFR BLD AUTO: 34.4 % (ref 36–48)
HGB BLD CALC-MCNC: 7.4 GM/DL (ref 12–16)
HGB BLD CALC-MCNC: 8.8 GM/DL (ref 12–16)
HGB BLD-MCNC: 10.3 G/DL (ref 12–16)
HGB BLD-MCNC: 11.8 G/DL (ref 12–16)
HGB BLD-MCNC: 9.6 G/DL (ref 12–16)
HGB FREE PLAS-MCNC: 4.2 MG/DL (ref 0–9.7)
HGB FREE PLAS-MCNC: 4.7 MG/DL (ref 0–9.7)
LACTATE BLD-SCNC: 2.79 MMOL/L (ref 0.4–2)
LDH SERPL L TO P-CCNC: 1286 U/L (ref 100–190)
LYMPHOCYTES # BLD: 1.2 K/UL (ref 1–5.1)
LYMPHOCYTES NFR BLD: 6 %
MAGNESIUM SERPL-MCNC: 1.55 MG/DL (ref 1.8–2.4)
MAGNESIUM SERPL-MCNC: 1.9 MG/DL (ref 1.8–2.4)
MCH RBC QN AUTO: 27.6 PG (ref 26–34)
MCH RBC QN AUTO: 27.7 PG (ref 26–34)
MCHC RBC AUTO-ENTMCNC: 33.6 G/DL (ref 31–36)
MCHC RBC AUTO-ENTMCNC: 34.3 G/DL (ref 31–36)
MCV RBC AUTO: 81 FL (ref 80–100)
MCV RBC AUTO: 82 FL (ref 80–100)
MONOCYTES # BLD: 1.8 K/UL (ref 0–1.3)
MONOCYTES NFR BLD: 9 %
NEUTROPHILS # BLD: 17.2 K/UL (ref 1.7–7.7)
NEUTROPHILS NFR BLD: 84 %
NEUTS BAND NFR BLD MANUAL: 1 % (ref 0–7)
O2 SAT, MIXED: 56 %
O2 SAT, MIXED: 57 %
O2 SAT, MIXED: 66 %
PCO2 BLDA: 34.8 MM HG (ref 35–45)
PCO2 BLDA: 36.1 MM HG (ref 35–45)
PCO2 MIXED: 38.5 MM HG
PCO2 MIXED: 39.1 MM HG
PCO2 MIXED: 41.5 MM HG
PERFORMED ON: ABNORMAL
PERFORMED ON: NORMAL
PH BLDA: 7.38 [PH] (ref 7.35–7.45)
PH BLDA: 7.4 [PH] (ref 7.35–7.45)
PH, MIXED: 7.35 (ref 7.35–7.45)
PH, MIXED: 7.35 (ref 7.35–7.45)
PH, MIXED: 7.37 (ref 7.35–7.45)
PHOSPHATE SERPL-MCNC: 5.1 MG/DL (ref 2.5–4.9)
PLATELET # BLD AUTO: 143 K/UL (ref 135–450)
PLATELET # BLD AUTO: 43 K/UL (ref 135–450)
PLATELET BLD QL SMEAR: ADEQUATE
PMV BLD AUTO: 7.9 FL (ref 5–10.5)
PMV BLD AUTO: 9.3 FL (ref 5–10.5)
PO2 BLDA: 92.4 MM HG (ref 75–108)
PO2 BLDA: 97.9 MM HG (ref 75–108)
PO2 MIXED: 31 MM HG
PO2 MIXED: 31 MM HG
PO2 MIXED: 36 MM HG
POC ACT LR: 178 SEC
POC ACT LR: 185 SEC
POC ACT LR: 187 SEC
POC ACT LR: 189 SEC
POC ACT LR: 191 SEC
POC ACT LR: 195 SEC
POC ACT LR: 196 SEC
POC ACT LR: 210 SEC
POC ACT LR: 221 SEC
POC ACT LR: 221 SEC
POC ACT LR: 231 SEC
POC ACT LR: 235 SEC
POC ACT LR: 242 SEC
POC ACT LR: 86 SEC
POC SAMPLE TYPE: ABNORMAL
POC SAMPLE TYPE: NORMAL
POLYCHROMASIA BLD QL SMEAR: ABNORMAL
POTASSIUM SERPL-SCNC: 4.2 MMOL/L (ref 3.5–5.1)
POTASSIUM SERPL-SCNC: 4.5 MMOL/L (ref 3.5–5.1)
POTASSIUM SERPL-SCNC: 4.5 MMOL/L (ref 3.5–5.1)
PROT SERPL-MCNC: 3.6 G/DL (ref 6.4–8.2)
RBC # BLD AUTO: 3.48 M/UL (ref 4–5.2)
RBC # BLD AUTO: 4.24 M/UL (ref 4–5.2)
REPORT: NORMAL
SAO2 % BLDA: 97 % (ref 93–100)
SAO2 % BLDA: 98 % (ref 93–100)
SLIDE REVIEW: ABNORMAL
SODIUM SERPL-SCNC: 131 MMOL/L (ref 136–145)
SODIUM SERPL-SCNC: 133 MMOL/L (ref 136–145)
SODIUM SERPL-SCNC: 136 MMOL/L (ref 136–145)
TCO2 CALC MIXED: 22 MMOL/L
TCO2 CALC MIXED: 23 MMOL/L
TCO2 CALC MIXED: 25 MMOL/L
WBC # BLD AUTO: 17.6 K/UL (ref 4–11)
WBC # BLD AUTO: 20.2 K/UL (ref 4–11)

## 2025-01-03 PROCEDURE — 85014 HEMATOCRIT: CPT

## 2025-01-03 PROCEDURE — 6370000000 HC RX 637 (ALT 250 FOR IP): Performed by: STUDENT IN AN ORGANIZED HEALTH CARE EDUCATION/TRAINING PROGRAM

## 2025-01-03 PROCEDURE — 82803 BLOOD GASES ANY COMBINATION: CPT

## 2025-01-03 PROCEDURE — 2500000003 HC RX 250 WO HCPCS: Performed by: INTERNAL MEDICINE

## 2025-01-03 PROCEDURE — 2500000003 HC RX 250 WO HCPCS: Performed by: STUDENT IN AN ORGANIZED HEALTH CARE EDUCATION/TRAINING PROGRAM

## 2025-01-03 PROCEDURE — 2100000000 HC CCU R&B

## 2025-01-03 PROCEDURE — 93325 DOPPLER ECHO COLOR FLOW MAPG: CPT | Performed by: STUDENT IN AN ORGANIZED HEALTH CARE EDUCATION/TRAINING PROGRAM

## 2025-01-03 PROCEDURE — 83051 HEMOGLOBIN PLASMA: CPT

## 2025-01-03 PROCEDURE — 36415 COLL VENOUS BLD VENIPUNCTURE: CPT

## 2025-01-03 PROCEDURE — 2580000003 HC RX 258: Performed by: STUDENT IN AN ORGANIZED HEALTH CARE EDUCATION/TRAINING PROGRAM

## 2025-01-03 PROCEDURE — 6360000002 HC RX W HCPCS: Performed by: STUDENT IN AN ORGANIZED HEALTH CARE EDUCATION/TRAINING PROGRAM

## 2025-01-03 PROCEDURE — 71045 X-RAY EXAM CHEST 1 VIEW: CPT

## 2025-01-03 PROCEDURE — 94003 VENT MGMT INPAT SUBQ DAY: CPT

## 2025-01-03 PROCEDURE — P9016 RBC LEUKOCYTES REDUCED: HCPCS

## 2025-01-03 PROCEDURE — 80048 BASIC METABOLIC PNL TOTAL CA: CPT

## 2025-01-03 PROCEDURE — 36556 INSERT NON-TUNNEL CV CATH: CPT | Performed by: STUDENT IN AN ORGANIZED HEALTH CARE EDUCATION/TRAINING PROGRAM

## 2025-01-03 PROCEDURE — 85025 COMPLETE CBC W/AUTO DIFF WBC: CPT

## 2025-01-03 PROCEDURE — 84100 ASSAY OF PHOSPHORUS: CPT

## 2025-01-03 PROCEDURE — 2709999900 HC NON-CHARGEABLE SUPPLY: Performed by: STUDENT IN AN ORGANIZED HEALTH CARE EDUCATION/TRAINING PROGRAM

## 2025-01-03 PROCEDURE — 36430 TRANSFUSION BLD/BLD COMPNT: CPT

## 2025-01-03 PROCEDURE — 6360000002 HC RX W HCPCS: Performed by: INTERNAL MEDICINE

## 2025-01-03 PROCEDURE — C1894 INTRO/SHEATH, NON-LASER: HCPCS | Performed by: STUDENT IN AN ORGANIZED HEALTH CARE EDUCATION/TRAINING PROGRAM

## 2025-01-03 PROCEDURE — 93308 TTE F-UP OR LMTD: CPT | Performed by: STUDENT IN AN ORGANIZED HEALTH CARE EDUCATION/TRAINING PROGRAM

## 2025-01-03 PROCEDURE — 2580000003 HC RX 258: Performed by: INTERNAL MEDICINE

## 2025-01-03 PROCEDURE — 83615 LACTATE (LD) (LDH) ENZYME: CPT

## 2025-01-03 PROCEDURE — 90945 DIALYSIS ONE EVALUATION: CPT

## 2025-01-03 PROCEDURE — 86923 COMPATIBILITY TEST ELECTRIC: CPT

## 2025-01-03 PROCEDURE — 6370000000 HC RX 637 (ALT 250 FOR IP): Performed by: INTERNAL MEDICINE

## 2025-01-03 PROCEDURE — 05HN33Z INSERTION OF INFUSION DEVICE INTO LEFT INTERNAL JUGULAR VEIN, PERCUTANEOUS APPROACH: ICD-10-PCS | Performed by: INTERNAL MEDICINE

## 2025-01-03 PROCEDURE — 86850 RBC ANTIBODY SCREEN: CPT

## 2025-01-03 PROCEDURE — 85018 HEMOGLOBIN: CPT

## 2025-01-03 PROCEDURE — 99291 CRITICAL CARE FIRST HOUR: CPT | Performed by: INTERNAL MEDICINE

## 2025-01-03 PROCEDURE — 83605 ASSAY OF LACTIC ACID: CPT

## 2025-01-03 PROCEDURE — 85027 COMPLETE CBC AUTOMATED: CPT

## 2025-01-03 PROCEDURE — C1752 CATH,HEMODIALYSIS,SHORT-TERM: HCPCS | Performed by: STUDENT IN AN ORGANIZED HEALTH CARE EDUCATION/TRAINING PROGRAM

## 2025-01-03 PROCEDURE — 86900 BLOOD TYPING SEROLOGIC ABO: CPT

## 2025-01-03 PROCEDURE — 85347 COAGULATION TIME ACTIVATED: CPT

## 2025-01-03 PROCEDURE — 99291 CRITICAL CARE FIRST HOUR: CPT | Performed by: STUDENT IN AN ORGANIZED HEALTH CARE EDUCATION/TRAINING PROGRAM

## 2025-01-03 PROCEDURE — 83735 ASSAY OF MAGNESIUM: CPT

## 2025-01-03 PROCEDURE — 82947 ASSAY GLUCOSE BLOOD QUANT: CPT

## 2025-01-03 PROCEDURE — 86022 PLATELET ANTIBODIES: CPT

## 2025-01-03 PROCEDURE — 86901 BLOOD TYPING SEROLOGIC RH(D): CPT

## 2025-01-03 RX ORDER — LIDOCAINE HYDROCHLORIDE 10 MG/ML
INJECTION, SOLUTION INFILTRATION; PERINEURAL PRN
Status: DISCONTINUED | OUTPATIENT
Start: 2025-01-03 | End: 2025-01-03 | Stop reason: HOSPADM

## 2025-01-03 RX ORDER — PHENYLEPHRINE HCL IN 0.9% NACL 1 MG/10 ML
SYRINGE (ML) INTRAVENOUS PRN
Status: DISCONTINUED | OUTPATIENT
Start: 2025-01-03 | End: 2025-01-03 | Stop reason: HOSPADM

## 2025-01-03 RX ORDER — PROPOFOL 10 MG/ML
INJECTION, EMULSION INTRAVENOUS CONTINUOUS PRN
Status: COMPLETED | OUTPATIENT
Start: 2025-01-03 | End: 2025-01-03

## 2025-01-03 RX ORDER — SODIUM CHLORIDE 0.9 % (FLUSH) 0.9 %
1.1-1.9 SYRINGE (ML) INJECTION PRN
Status: DISCONTINUED | OUTPATIENT
Start: 2025-01-03 | End: 2025-01-13

## 2025-01-03 RX ORDER — MAGNESIUM SULFATE 1 G/100ML
1000 INJECTION INTRAVENOUS ONCE
Status: COMPLETED | OUTPATIENT
Start: 2025-01-03 | End: 2025-01-03

## 2025-01-03 RX ORDER — SODIUM CHLORIDE 0.9 % (FLUSH) 0.9 %
1.1-1.9 SYRINGE (ML) INJECTION PRN
Status: DISCONTINUED | OUTPATIENT
Start: 2025-01-03 | End: 2025-01-04

## 2025-01-03 RX ADMIN — AMIODARONE HYDROCHLORIDE 0.5 MG/MIN: 1.8 INJECTION, SOLUTION INTRAVENOUS at 08:53

## 2025-01-03 RX ADMIN — Medication: at 15:39

## 2025-01-03 RX ADMIN — CALCIUM GLUCONATE 3000 MG: 98 INJECTION, SOLUTION INTRAVENOUS at 14:46

## 2025-01-03 RX ADMIN — WATER 2000 MG: 1 INJECTION INTRAMUSCULAR; INTRAVENOUS; SUBCUTANEOUS at 15:05

## 2025-01-03 RX ADMIN — Medication: at 21:18

## 2025-01-03 RX ADMIN — PANTOPRAZOLE SODIUM 40 MG: 40 INJECTION, POWDER, FOR SOLUTION INTRAVENOUS at 09:25

## 2025-01-03 RX ADMIN — Medication 50 MCG/HR: at 17:27

## 2025-01-03 RX ADMIN — DOBUTAMINE HYDROCHLORIDE 5 MCG/KG/MIN: 200 INJECTION INTRAVENOUS at 15:21

## 2025-01-03 RX ADMIN — AMIODARONE HYDROCHLORIDE 0.5 MG/MIN: 1.8 INJECTION, SOLUTION INTRAVENOUS at 21:22

## 2025-01-03 RX ADMIN — OXYCODONE HYDROCHLORIDE AND ACETAMINOPHEN 500 MG: 500 TABLET ORAL at 09:25

## 2025-01-03 RX ADMIN — Medication: at 15:41

## 2025-01-03 RX ADMIN — PROPOFOL 25 MCG/KG/MIN: 10 INJECTION, EMULSION INTRAVENOUS at 05:41

## 2025-01-03 RX ADMIN — MAGNESIUM SULFATE HEPTAHYDRATE 1000 MG: 1 INJECTION, SOLUTION INTRAVENOUS at 22:20

## 2025-01-03 RX ADMIN — SODIUM BICARBONATE: 84 INJECTION, SOLUTION INTRAVENOUS at 11:32

## 2025-01-03 RX ADMIN — CLOPIDOGREL BISULFATE 75 MG: 75 TABLET ORAL at 09:25

## 2025-01-03 RX ADMIN — HEPARIN SODIUM 5000 UNITS: 5000 INJECTION INTRAVENOUS; SUBCUTANEOUS at 15:38

## 2025-01-03 RX ADMIN — HEPARIN SODIUM 5000 UNITS: 5000 INJECTION INTRAVENOUS; SUBCUTANEOUS at 15:39

## 2025-01-03 RX ADMIN — SODIUM CHLORIDE, PRESERVATIVE FREE 10 ML: 5 INJECTION INTRAVENOUS at 09:25

## 2025-01-03 RX ADMIN — CITALOPRAM HYDROBROMIDE 40 MG: 20 TABLET ORAL at 09:25

## 2025-01-03 RX ADMIN — ASPIRIN 81 MG: 81 TABLET, CHEWABLE ORAL at 09:25

## 2025-01-03 RX ADMIN — Medication: at 21:15

## 2025-01-03 ASSESSMENT — PULMONARY FUNCTION TESTS
PIF_VALUE: 24
PIF_VALUE: 24
PIF_VALUE: 22
PIF_VALUE: 21
PIF_VALUE: 23
PIF_VALUE: 24
PIF_VALUE: 19
PIF_VALUE: 20
PIF_VALUE: 25
PIF_VALUE: 20
PIF_VALUE: 21
PIF_VALUE: 22
PIF_VALUE: 21
PIF_VALUE: 22
PIF_VALUE: 24
PIF_VALUE: 19
PIF_VALUE: 20
PIF_VALUE: 23
PIF_VALUE: 21
PIF_VALUE: 24
PIF_VALUE: 21
PIF_VALUE: 20
PIF_VALUE: 21
PIF_VALUE: 20
PIF_VALUE: 22

## 2025-01-03 ASSESSMENT — PAIN SCALES - GENERAL
PAINLEVEL_OUTOF10: 0

## 2025-01-03 NOTE — SEDATION DOCUMENTATION
Brief Pre-Op Note/Sedation Assessment      Shahida Vásquez  1960  [id number]  1:31 PM    Planned Procedure: Placement of temporary hemodialysis catheter  Post Procedure Plan: Return to same level of care  Consent: I have discussed with the patient and/or the patient representative the indication, alternatives, and the possible risks and/or complications of the planned procedure and the anesthesia methods. The patient and/or patient representative appear to understand and agree to proceed.        Chief Complaint:   STEMI  Shock  ATN    Indications for Cath Procedure:  Presentation:  ACS > 24 hrs  2.  Anginal Classification within 2 weeks:  CCS III - Symptoms with everyday living activities, i.e., moderate limitation  3.  Angina Symptoms Assessment:  Typical Chest Pain  4.  Heart Failure Class within last 2 weeks:  Yes:  Heart Failure Type: Systolic Severity:  Class IV - Symptoms of HF at rest  5.  Cardiovascular Instability:  Yes:  Acute CHF symptoms and Cardiogenic shock    Prior Ischemic Workup/Eval:  Pre-Procedural Medications: Yes: Aspirin  2.   Stress Test Completed?  No    Does Patient need surgery?  Cath Valve Surgery:  No    Pre-Procedure Medical History:  Vital Signs:  BP (!) 122/53   Pulse 82   Temp 98.4 °F (36.9 °C) (Core)   Resp 24   Ht 1.549 m (5' 1\")   Wt 84.6 kg (186 lb 8.2 oz)   SpO2 99%   BMI 35.24 kg/m²     Allergies:    Allergies   Allergen Reactions    Penicillins Rash     Medications:    Current Facility-Administered Medications   Medication Dose Route Frequency Provider Last Rate Last Admin    prismaSATE BGK 4/2.5 dialysis solution   Dialysis Continuous Eliceo Evangelista MD        prismaTE BGK 4/2.5 dialysis solution   Dialysis Continuous Eliceo Evangelista MD prismaSATE BGK 4/2.5 dialysis solution   Dialysis Continuous Eliceo Evangelista MD        sodium chloride flush 0.9 % injection 1.1-1.9 mL  1.1-1.9 mL IntraCATHeter PRN Eliceo Evangelista MD        And

## 2025-01-04 ENCOUNTER — APPOINTMENT (OUTPATIENT)
Dept: GENERAL RADIOLOGY | Age: 65
DRG: 215 | End: 2025-01-04
Payer: COMMERCIAL

## 2025-01-04 LAB
ALBUMIN SERPL-MCNC: 2 G/DL (ref 3.4–5)
ALBUMIN SERPL-MCNC: 2.1 G/DL (ref 3.4–5)
ALP SERPL-CCNC: 128 U/L (ref 40–129)
ALT SERPL-CCNC: 21 U/L (ref 10–40)
ANION GAP SERPL CALCULATED.3IONS-SCNC: 10 MMOL/L (ref 3–16)
APTT BLD: 114.2 SEC (ref 22.1–36.4)
AST SERPL-CCNC: 51 U/L (ref 15–37)
BASE EXCESS BLDA CALC-SCNC: 0 MMOL/L (ref -3–3)
BASE EXCESS MIXED: 1
BASE EXCESS MIXED: 2
BASE EXCESS MIXED: 2
BASE EXCESS MIXED: 3
BASE EXCESS MIXED: 4
BILIRUB DIRECT SERPL-MCNC: 0.3 MG/DL (ref 0–0.3)
BILIRUB INDIRECT SERPL-MCNC: 0.1 MG/DL (ref 0–1)
BILIRUB SERPL-MCNC: 0.4 MG/DL (ref 0–1)
BLOOD BANK DISPENSE STATUS: NORMAL
BLOOD BANK PRODUCT CODE: NORMAL
BPU ID: NORMAL
BUN SERPL-MCNC: 22 MG/DL (ref 7–20)
BUN SERPL-MCNC: 23 MG/DL (ref 7–20)
BUN SERPL-MCNC: 24 MG/DL (ref 7–20)
CA-I BLD-SCNC: 0.99 MMOL/L (ref 1.12–1.32)
CA-I BLD-SCNC: 1 MMOL/L (ref 1.12–1.32)
CA-I BLD-SCNC: 1.14 MMOL/L (ref 1.12–1.32)
CALCIUM SERPL-MCNC: 7.5 MG/DL (ref 8.3–10.6)
CALCIUM SERPL-MCNC: 7.6 MG/DL (ref 8.3–10.6)
CALCIUM SERPL-MCNC: 7.7 MG/DL (ref 8.3–10.6)
CHLORIDE SERPL-SCNC: 100 MMOL/L (ref 99–110)
CHLORIDE SERPL-SCNC: 100 MMOL/L (ref 99–110)
CHLORIDE SERPL-SCNC: 99 MMOL/L (ref 99–110)
CO2 BLDA-SCNC: 25 MMOL/L
CO2 SERPL-SCNC: 23 MMOL/L (ref 21–32)
CO2 SERPL-SCNC: 24 MMOL/L (ref 21–32)
CO2 SERPL-SCNC: 25 MMOL/L (ref 21–32)
CREAT SERPL-MCNC: 1.6 MG/DL (ref 0.6–1.2)
CREAT SERPL-MCNC: 1.6 MG/DL (ref 0.6–1.2)
CREAT SERPL-MCNC: 1.8 MG/DL (ref 0.6–1.2)
DEPRECATED RDW RBC AUTO: 15.8 % (ref 12.4–15.4)
DESCRIPTION BLOOD BANK: NORMAL
FIBRINOGEN PPP-MCNC: 706 MG/DL (ref 227–534)
GFR SERPLBLD CREATININE-BSD FMLA CKD-EPI: 31 ML/MIN/{1.73_M2}
GFR SERPLBLD CREATININE-BSD FMLA CKD-EPI: 36 ML/MIN/{1.73_M2}
GFR SERPLBLD CREATININE-BSD FMLA CKD-EPI: 36 ML/MIN/{1.73_M2}
GLUCOSE BLD-MCNC: 140 MG/DL (ref 70–99)
GLUCOSE BLD-MCNC: 153 MG/DL (ref 70–99)
GLUCOSE BLD-MCNC: 155 MG/DL (ref 70–99)
GLUCOSE SERPL-MCNC: 143 MG/DL (ref 70–99)
GLUCOSE SERPL-MCNC: 153 MG/DL (ref 70–99)
GLUCOSE SERPL-MCNC: 161 MG/DL (ref 70–99)
HCO3 BLDA-SCNC: 23.8 MMOL/L (ref 21–29)
HCO3, MIXED: 26.1 MMOL/L
HCO3, MIXED: 26.7 MMOL/L
HCO3, MIXED: 27.7 MMOL/L
HCO3, MIXED: 28.5 MMOL/L
HCO3, MIXED: 29.3 MMOL/L
HCT VFR BLD AUTO: 29.4 % (ref 36–48)
HEPARIN INDUCED PLATELET ANTIBODY: NEGATIVE
HGB BLD-MCNC: 10.1 G/DL (ref 12–16)
HGB FREE PLAS-MCNC: 27.2 MG/DL (ref 0–9.7)
INR PPP: 1.02 (ref 0.85–1.15)
LACTATE BLD-SCNC: 1.67 MMOL/L (ref 0.4–2)
MAGNESIUM SERPL-MCNC: 2.14 MG/DL (ref 1.8–2.4)
MAGNESIUM SERPL-MCNC: 2.36 MG/DL (ref 1.8–2.4)
MCH RBC QN AUTO: 28.1 PG (ref 26–34)
MCHC RBC AUTO-ENTMCNC: 34.4 G/DL (ref 31–36)
MCV RBC AUTO: 81.5 FL (ref 80–100)
O2 SAT, MIXED: 58 %
O2 SAT, MIXED: 60 %
O2 SAT, MIXED: 61 %
O2 SAT, MIXED: 63 %
O2 SAT, MIXED: 65 %
ORGANISM: ABNORMAL
ORGANISM: ABNORMAL
PCO2 BLDA: 34.5 MM HG (ref 35–45)
PCO2 MIXED: 41.3 MM HG
PCO2 MIXED: 43.1 MM HG
PCO2 MIXED: 48.7 MM HG
PCO2 MIXED: 48.9 MM HG
PCO2 MIXED: 49.6 MM HG
PERFORMED ON: ABNORMAL
PERFORMED ON: NORMAL
PERFORMED ON: NORMAL
PH BLDA: 7.45 [PH] (ref 7.35–7.45)
PH BLDV: 7.47 [PH] (ref 7.35–7.45)
PH BLDV: 7.52 [PH] (ref 7.35–7.45)
PH, MIXED: 7.36 (ref 7.35–7.45)
PH, MIXED: 7.37 (ref 7.35–7.45)
PH, MIXED: 7.38 (ref 7.35–7.45)
PH, MIXED: 7.39 (ref 7.35–7.45)
PH, MIXED: 7.42 (ref 7.35–7.45)
PHOSPHATE SERPL-MCNC: 3.3 MG/DL (ref 2.5–4.9)
PHOSPHATE SERPL-MCNC: 3.4 MG/DL (ref 2.5–4.9)
PLATELET # BLD AUTO: 92 K/UL (ref 135–450)
PMV BLD AUTO: 9.1 FL (ref 5–10.5)
PO2 BLDA: 95.6 MM HG (ref 75–108)
PO2 MIXED: 30 MM HG
PO2 MIXED: 32 MM HG
PO2 MIXED: 33 MM HG
PO2 MIXED: 34 MM HG
PO2 MIXED: 35 MM HG
POC ACT LR: 153 SEC
POC ACT LR: 159 SEC
POC ACT LR: 167 SEC
POC ACT LR: 168 SEC
POC ACT LR: 169 SEC
POC ACT LR: 171 SEC
POC ACT LR: 171 SEC
POC ACT LR: 172 SEC
POC ACT LR: 172 SEC
POC ACT LR: 173 SEC
POC ACT LR: 173 SEC
POC ACT LR: 174 SEC
POC ACT LR: 176 SEC
POC ACT LR: 177 SEC
POC ACT LR: 181 SEC
POC ACT LR: 181 SEC
POC ACT LR: 185 SEC
POC ACT LR: 185 SEC
POC ACT LR: 188 SEC
POC ACT LR: 197 SEC
POC ACT LR: 198 SEC
POC ACT LR: 202 SEC
POC ACT LR: 203 SEC
POC ACT LR: 205 SEC
POC SAMPLE TYPE: ABNORMAL
POC SAMPLE TYPE: NORMAL
POC SAMPLE TYPE: NORMAL
POTASSIUM SERPL-SCNC: 4.3 MMOL/L (ref 3.5–5.1)
POTASSIUM SERPL-SCNC: 4.3 MMOL/L (ref 3.5–5.1)
POTASSIUM SERPL-SCNC: 4.4 MMOL/L (ref 3.5–5.1)
PROT SERPL-MCNC: 4.7 G/DL (ref 6.4–8.2)
PROTHROMBIN TIME: 13.6 SEC (ref 11.9–14.9)
RBC # BLD AUTO: 3.61 M/UL (ref 4–5.2)
REPORT: NORMAL
RESP PATH DNA+RNA PNL L RESP NAA+NON-PRB: ABNORMAL
SAO2 % BLDA: 98 % (ref 93–100)
SODIUM SERPL-SCNC: 133 MMOL/L (ref 136–145)
SODIUM SERPL-SCNC: 134 MMOL/L (ref 136–145)
SODIUM SERPL-SCNC: 134 MMOL/L (ref 136–145)
TCO2 CALC MIXED: 27 MMOL/L
TCO2 CALC MIXED: 28 MMOL/L
TCO2 CALC MIXED: 29 MMOL/L
TCO2 CALC MIXED: 30 MMOL/L
TCO2 CALC MIXED: 31 MMOL/L
TRIGL SERPL-MCNC: 152 MG/DL (ref 0–150)
WBC # BLD AUTO: 24.2 K/UL (ref 4–11)

## 2025-01-04 PROCEDURE — 82947 ASSAY GLUCOSE BLOOD QUANT: CPT

## 2025-01-04 PROCEDURE — 2700000000 HC OXYGEN THERAPY PER DAY

## 2025-01-04 PROCEDURE — 99291 CRITICAL CARE FIRST HOUR: CPT | Performed by: INTERNAL MEDICINE

## 2025-01-04 PROCEDURE — 87205 SMEAR GRAM STAIN: CPT

## 2025-01-04 PROCEDURE — 6360000002 HC RX W HCPCS: Performed by: STUDENT IN AN ORGANIZED HEALTH CARE EDUCATION/TRAINING PROGRAM

## 2025-01-04 PROCEDURE — 94761 N-INVAS EAR/PLS OXIMETRY MLT: CPT

## 2025-01-04 PROCEDURE — 82803 BLOOD GASES ANY COMBINATION: CPT

## 2025-01-04 PROCEDURE — 2500000003 HC RX 250 WO HCPCS: Performed by: HOSPITALIST

## 2025-01-04 PROCEDURE — 83735 ASSAY OF MAGNESIUM: CPT

## 2025-01-04 PROCEDURE — 2580000003 HC RX 258: Performed by: STUDENT IN AN ORGANIZED HEALTH CARE EDUCATION/TRAINING PROGRAM

## 2025-01-04 PROCEDURE — 6360000002 HC RX W HCPCS: Performed by: INTERNAL MEDICINE

## 2025-01-04 PROCEDURE — 2500000003 HC RX 250 WO HCPCS: Performed by: INTERNAL MEDICINE

## 2025-01-04 PROCEDURE — 85610 PROTHROMBIN TIME: CPT

## 2025-01-04 PROCEDURE — 0202U NFCT DS 22 TRGT SARS-COV-2: CPT

## 2025-01-04 PROCEDURE — 80069 RENAL FUNCTION PANEL: CPT

## 2025-01-04 PROCEDURE — 84478 ASSAY OF TRIGLYCERIDES: CPT

## 2025-01-04 PROCEDURE — 36415 COLL VENOUS BLD VENIPUNCTURE: CPT

## 2025-01-04 PROCEDURE — 80076 HEPATIC FUNCTION PANEL: CPT

## 2025-01-04 PROCEDURE — 71045 X-RAY EXAM CHEST 1 VIEW: CPT

## 2025-01-04 PROCEDURE — 6370000000 HC RX 637 (ALT 250 FOR IP): Performed by: INTERNAL MEDICINE

## 2025-01-04 PROCEDURE — 84100 ASSAY OF PHOSPHORUS: CPT

## 2025-01-04 PROCEDURE — 85730 THROMBOPLASTIN TIME PARTIAL: CPT

## 2025-01-04 PROCEDURE — 6370000000 HC RX 637 (ALT 250 FOR IP): Performed by: STUDENT IN AN ORGANIZED HEALTH CARE EDUCATION/TRAINING PROGRAM

## 2025-01-04 PROCEDURE — 85027 COMPLETE CBC AUTOMATED: CPT

## 2025-01-04 PROCEDURE — 85384 FIBRINOGEN ACTIVITY: CPT

## 2025-01-04 PROCEDURE — 87070 CULTURE OTHR SPECIMN AEROBIC: CPT

## 2025-01-04 PROCEDURE — 87633 RESP VIRUS 12-25 TARGETS: CPT

## 2025-01-04 PROCEDURE — 2500000003 HC RX 250 WO HCPCS: Performed by: STUDENT IN AN ORGANIZED HEALTH CARE EDUCATION/TRAINING PROGRAM

## 2025-01-04 PROCEDURE — 2580000003 HC RX 258: Performed by: INTERNAL MEDICINE

## 2025-01-04 PROCEDURE — 85347 COAGULATION TIME ACTIVATED: CPT

## 2025-01-04 PROCEDURE — 90945 DIALYSIS ONE EVALUATION: CPT

## 2025-01-04 PROCEDURE — 82330 ASSAY OF CALCIUM: CPT

## 2025-01-04 PROCEDURE — 83605 ASSAY OF LACTIC ACID: CPT

## 2025-01-04 PROCEDURE — 2100000000 HC CCU R&B

## 2025-01-04 RX ORDER — POTASSIUM CHLORIDE 29.8 MG/ML
20 INJECTION INTRAVENOUS PRN
Status: DISCONTINUED | OUTPATIENT
Start: 2025-01-04 | End: 2025-01-13

## 2025-01-04 RX ORDER — CALCIUM GLUCONATE 20 MG/ML
2000 INJECTION, SOLUTION INTRAVENOUS PRN
Status: DISCONTINUED | OUTPATIENT
Start: 2025-01-04 | End: 2025-01-13

## 2025-01-04 RX ORDER — CALCIUM GLUCONATE 20 MG/ML
1000 INJECTION, SOLUTION INTRAVENOUS PRN
Status: DISCONTINUED | OUTPATIENT
Start: 2025-01-04 | End: 2025-01-13

## 2025-01-04 RX ORDER — SODIUM CHLORIDE 9 MG/ML
INJECTION, SOLUTION INTRAVENOUS
Status: DISPENSED
Start: 2025-01-04 | End: 2025-01-04

## 2025-01-04 RX ORDER — MAGNESIUM SULFATE 1 G/100ML
1000 INJECTION INTRAVENOUS PRN
Status: DISCONTINUED | OUTPATIENT
Start: 2025-01-04 | End: 2025-01-20 | Stop reason: HOSPADM

## 2025-01-04 RX ADMIN — PROPOFOL 5 MCG/KG/MIN: 10 INJECTION, EMULSION INTRAVENOUS at 02:56

## 2025-01-04 RX ADMIN — Medication: at 23:44

## 2025-01-04 RX ADMIN — PHENYLEPHRINE HYDROCHLORIDE 70 MCG/MIN: 50 INJECTION INTRAVENOUS at 00:48

## 2025-01-04 RX ADMIN — SODIUM CHLORIDE, PRESERVATIVE FREE 10 ML: 5 INJECTION INTRAVENOUS at 08:59

## 2025-01-04 RX ADMIN — Medication: at 02:16

## 2025-01-04 RX ADMIN — Medication: at 07:33

## 2025-01-04 RX ADMIN — HEPARIN SODIUM 850 UNITS/HR: 10000 INJECTION, SOLUTION INTRAVENOUS at 00:43

## 2025-01-04 RX ADMIN — WATER 2000 MG: 1 INJECTION INTRAMUSCULAR; INTRAVENOUS; SUBCUTANEOUS at 12:52

## 2025-01-04 RX ADMIN — CITALOPRAM HYDROBROMIDE 40 MG: 20 TABLET ORAL at 08:59

## 2025-01-04 RX ADMIN — DOBUTAMINE HYDROCHLORIDE 5 MCG/KG/MIN: 200 INJECTION INTRAVENOUS at 15:53

## 2025-01-04 RX ADMIN — CALCIUM GLUCONATE 1000 MG: 20 INJECTION, SOLUTION INTRAVENOUS at 20:32

## 2025-01-04 RX ADMIN — PANTOPRAZOLE SODIUM 40 MG: 40 INJECTION, POWDER, FOR SOLUTION INTRAVENOUS at 08:59

## 2025-01-04 RX ADMIN — SODIUM CHLORIDE: 9 INJECTION, SOLUTION INTRAVENOUS at 20:29

## 2025-01-04 RX ADMIN — HEPARIN SODIUM 1050 UNITS/HR: 10000 INJECTION, SOLUTION INTRAVENOUS at 23:16

## 2025-01-04 RX ADMIN — CALCIUM GLUCONATE 1000 MG: 20 INJECTION, SOLUTION INTRAVENOUS at 12:55

## 2025-01-04 RX ADMIN — Medication: at 02:15

## 2025-01-04 RX ADMIN — CLOPIDOGREL BISULFATE 75 MG: 75 TABLET ORAL at 08:59

## 2025-01-04 RX ADMIN — SODIUM CHLORIDE, PRESERVATIVE FREE 10 ML: 5 INJECTION INTRAVENOUS at 09:10

## 2025-01-04 RX ADMIN — HEPARIN SODIUM: 5000 INJECTION INTRAVENOUS; SUBCUTANEOUS at 15:44

## 2025-01-04 RX ADMIN — Medication: at 12:47

## 2025-01-04 RX ADMIN — SODIUM BICARBONATE: 84 INJECTION, SOLUTION INTRAVENOUS at 02:54

## 2025-01-04 RX ADMIN — OXYCODONE HYDROCHLORIDE AND ACETAMINOPHEN 500 MG: 500 TABLET ORAL at 08:58

## 2025-01-04 RX ADMIN — ASPIRIN 81 MG: 81 TABLET, CHEWABLE ORAL at 08:59

## 2025-01-04 RX ADMIN — Medication: at 17:55

## 2025-01-04 ASSESSMENT — PAIN SCALES - GENERAL
PAINLEVEL_OUTOF10: 0

## 2025-01-04 ASSESSMENT — PULMONARY FUNCTION TESTS
PIF_VALUE: 28
PIF_VALUE: 29
PIF_VALUE: 23
PIF_VALUE: 33
PIF_VALUE: 32
PIF_VALUE: 31
PIF_VALUE: 33
PIF_VALUE: 29
PIF_VALUE: 35
PIF_VALUE: 29
PIF_VALUE: 27
PIF_VALUE: 21
PIF_VALUE: 20
PIF_VALUE: 39
PIF_VALUE: 35
PIF_VALUE: 23
PIF_VALUE: 24
PIF_VALUE: 24
PIF_VALUE: 25
PIF_VALUE: 24
PIF_VALUE: 26
PIF_VALUE: 28
PIF_VALUE: 23
PIF_VALUE: 31
PIF_VALUE: 25
PIF_VALUE: 26
PIF_VALUE: 30
PIF_VALUE: 28
PIF_VALUE: 27
PIF_VALUE: 27
PIF_VALUE: 33
PIF_VALUE: 27

## 2025-01-05 ENCOUNTER — APPOINTMENT (OUTPATIENT)
Dept: GENERAL RADIOLOGY | Age: 65
DRG: 215 | End: 2025-01-05
Payer: COMMERCIAL

## 2025-01-05 LAB
ALBUMIN SERPL-MCNC: 2 G/DL (ref 3.4–5)
ANION GAP SERPL CALCULATED.3IONS-SCNC: 6 MMOL/L (ref 3–16)
ANION GAP SERPL CALCULATED.3IONS-SCNC: 9 MMOL/L (ref 3–16)
BACTERIA BLD CULT ORG #2: ABNORMAL
BACTERIA BLD CULT ORG #2: ABNORMAL
BACTERIA BLD CULT: NORMAL
BASE EXCESS MIXED: 2
BASE EXCESS MIXED: 3
BASE EXCESS MIXED: 4
BUN SERPL-MCNC: 23 MG/DL (ref 7–20)
BUN SERPL-MCNC: 24 MG/DL (ref 7–20)
CA-I BLD-SCNC: 0.99 MMOL/L (ref 1.12–1.32)
CA-I BLD-SCNC: 1.15 MMOL/L (ref 1.12–1.32)
CA-I BLD-SCNC: 1.18 MMOL/L (ref 1.12–1.32)
CALCIUM SERPL-MCNC: 7.5 MG/DL (ref 8.3–10.6)
CALCIUM SERPL-MCNC: 7.6 MG/DL (ref 8.3–10.6)
CHLORIDE SERPL-SCNC: 101 MMOL/L (ref 99–110)
CHLORIDE SERPL-SCNC: 101 MMOL/L (ref 99–110)
CO2 SERPL-SCNC: 24 MMOL/L (ref 21–32)
CO2 SERPL-SCNC: 26 MMOL/L (ref 21–32)
CREAT SERPL-MCNC: 1.7 MG/DL (ref 0.6–1.2)
CREAT SERPL-MCNC: 1.8 MG/DL (ref 0.6–1.2)
DEPRECATED RDW RBC AUTO: 16 % (ref 12.4–15.4)
DEPRECATED RDW RBC AUTO: 16.5 % (ref 12.4–15.4)
GFR SERPLBLD CREATININE-BSD FMLA CKD-EPI: 31 ML/MIN/{1.73_M2}
GFR SERPLBLD CREATININE-BSD FMLA CKD-EPI: 33 ML/MIN/{1.73_M2}
GLUCOSE SERPL-MCNC: 124 MG/DL (ref 70–99)
GLUCOSE SERPL-MCNC: 125 MG/DL (ref 70–99)
HCO3, MIXED: 27.5 MMOL/L
HCO3, MIXED: 28.9 MMOL/L
HCO3, MIXED: 29.5 MMOL/L
HCT VFR BLD AUTO: 23 % (ref 36–48)
HCT VFR BLD AUTO: 23.6 % (ref 36–48)
HCT VFR BLD AUTO: 24 % (ref 36–48)
HCT VFR BLD AUTO: 24.2 % (ref 36–48)
HGB BLD CALC-MCNC: 8 GM/DL (ref 12–16)
HGB BLD CALC-MCNC: 8 GM/DL (ref 12–16)
HGB BLD-MCNC: 7.7 G/DL (ref 12–16)
HGB BLD-MCNC: 8 G/DL (ref 12–16)
MAGNESIUM SERPL-MCNC: 2.37 MG/DL (ref 1.8–2.4)
MAGNESIUM SERPL-MCNC: 2.48 MG/DL (ref 1.8–2.4)
MCH RBC QN AUTO: 27.6 PG (ref 26–34)
MCH RBC QN AUTO: 27.9 PG (ref 26–34)
MCHC RBC AUTO-ENTMCNC: 32.4 G/DL (ref 31–36)
MCHC RBC AUTO-ENTMCNC: 33 G/DL (ref 31–36)
MCV RBC AUTO: 83.6 FL (ref 80–100)
MCV RBC AUTO: 86 FL (ref 80–100)
O2 SAT, MIXED: 50 %
O2 SAT, MIXED: 52 %
O2 SAT, MIXED: 64 %
ORGANISM: ABNORMAL
ORGANISM: ABNORMAL
PCO2 MIXED: 49.4 MM HG
PCO2 MIXED: 51.7 MM HG
PCO2 MIXED: 51.7 MM HG
PERFORMED ON: ABNORMAL
PERFORMED ON: ABNORMAL
PERFORMED ON: NORMAL
PH BLDV: 7.45 [PH] (ref 7.35–7.45)
PH, MIXED: 7.35 (ref 7.35–7.45)
PH, MIXED: 7.36 (ref 7.35–7.45)
PH, MIXED: 7.37 (ref 7.35–7.45)
PHOSPHATE SERPL-MCNC: 2.8 MG/DL (ref 2.5–4.9)
PLATELET # BLD AUTO: 78 K/UL (ref 135–450)
PLATELET # BLD AUTO: 87 K/UL (ref 135–450)
PMV BLD AUTO: 9.4 FL (ref 5–10.5)
PMV BLD AUTO: 9.5 FL (ref 5–10.5)
PO2 MIXED: 28 MM HG
PO2 MIXED: 29 MM HG
PO2 MIXED: 35 MM HG
POC ACT LR: 107 SEC
POC ACT LR: 156 SEC
POC ACT LR: 156 SEC
POC ACT LR: 164 SEC
POC ACT LR: 168 SEC
POC ACT LR: 174 SEC
POC ACT LR: 175 SEC
POC ACT LR: 184 SEC
POC ACT LR: 185 SEC
POC ACT LR: 188 SEC
POC ACT LR: 188 SEC
POC ACT LR: 189 SEC
POC ACT LR: 190 SEC
POC ACT LR: 191 SEC
POC ACT LR: 191 SEC
POC ACT LR: 193 SEC
POC ACT LR: 195 SEC
POC SAMPLE TYPE: ABNORMAL
POC SAMPLE TYPE: ABNORMAL
POC SAMPLE TYPE: NORMAL
POTASSIUM BLD-SCNC: 4.1 MMOL/L (ref 3.5–5.1)
POTASSIUM SERPL-SCNC: 4.1 MMOL/L (ref 3.5–5.1)
POTASSIUM SERPL-SCNC: 4.2 MMOL/L (ref 3.5–5.1)
RBC # BLD AUTO: 2.75 M/UL (ref 4–5.2)
RBC # BLD AUTO: 2.89 M/UL (ref 4–5.2)
SODIUM SERPL-SCNC: 133 MMOL/L (ref 136–145)
SODIUM SERPL-SCNC: 134 MMOL/L (ref 136–145)
TCO2 CALC MIXED: 29 MMOL/L
TCO2 CALC MIXED: 31 MMOL/L
TCO2 CALC MIXED: 31 MMOL/L
WBC # BLD AUTO: 18.1 K/UL (ref 4–11)
WBC # BLD AUTO: 21.8 K/UL (ref 4–11)

## 2025-01-05 PROCEDURE — 2500000003 HC RX 250 WO HCPCS: Performed by: HOSPITALIST

## 2025-01-05 PROCEDURE — 84132 ASSAY OF SERUM POTASSIUM: CPT

## 2025-01-05 PROCEDURE — 2500000003 HC RX 250 WO HCPCS: Performed by: INTERNAL MEDICINE

## 2025-01-05 PROCEDURE — 6360000002 HC RX W HCPCS: Performed by: INTERNAL MEDICINE

## 2025-01-05 PROCEDURE — 99291 CRITICAL CARE FIRST HOUR: CPT | Performed by: INTERNAL MEDICINE

## 2025-01-05 PROCEDURE — 2700000000 HC OXYGEN THERAPY PER DAY

## 2025-01-05 PROCEDURE — 82803 BLOOD GASES ANY COMBINATION: CPT

## 2025-01-05 PROCEDURE — 2500000003 HC RX 250 WO HCPCS: Performed by: STUDENT IN AN ORGANIZED HEALTH CARE EDUCATION/TRAINING PROGRAM

## 2025-01-05 PROCEDURE — 90945 DIALYSIS ONE EVALUATION: CPT

## 2025-01-05 PROCEDURE — 71045 X-RAY EXAM CHEST 1 VIEW: CPT

## 2025-01-05 PROCEDURE — 94003 VENT MGMT INPAT SUBQ DAY: CPT

## 2025-01-05 PROCEDURE — 85027 COMPLETE CBC AUTOMATED: CPT

## 2025-01-05 PROCEDURE — 2580000003 HC RX 258: Performed by: STUDENT IN AN ORGANIZED HEALTH CARE EDUCATION/TRAINING PROGRAM

## 2025-01-05 PROCEDURE — 82330 ASSAY OF CALCIUM: CPT

## 2025-01-05 PROCEDURE — 2580000003 HC RX 258

## 2025-01-05 PROCEDURE — 6360000002 HC RX W HCPCS: Performed by: STUDENT IN AN ORGANIZED HEALTH CARE EDUCATION/TRAINING PROGRAM

## 2025-01-05 PROCEDURE — 94761 N-INVAS EAR/PLS OXIMETRY MLT: CPT

## 2025-01-05 PROCEDURE — 85014 HEMATOCRIT: CPT

## 2025-01-05 PROCEDURE — 83735 ASSAY OF MAGNESIUM: CPT

## 2025-01-05 PROCEDURE — 2580000003 HC RX 258: Performed by: INTERNAL MEDICINE

## 2025-01-05 PROCEDURE — 80069 RENAL FUNCTION PANEL: CPT

## 2025-01-05 PROCEDURE — 85347 COAGULATION TIME ACTIVATED: CPT

## 2025-01-05 PROCEDURE — 6370000000 HC RX 637 (ALT 250 FOR IP): Performed by: STUDENT IN AN ORGANIZED HEALTH CARE EDUCATION/TRAINING PROGRAM

## 2025-01-05 PROCEDURE — 2100000000 HC CCU R&B

## 2025-01-05 RX ORDER — SODIUM CHLORIDE 9 MG/ML
INJECTION, SOLUTION INTRAVENOUS
Status: COMPLETED
Start: 2025-01-05 | End: 2025-01-05

## 2025-01-05 RX ADMIN — HEPARIN SODIUM 1000 UNITS: 1000 INJECTION INTRAVENOUS; SUBCUTANEOUS at 02:50

## 2025-01-05 RX ADMIN — SODIUM CHLORIDE, PRESERVATIVE FREE 10 ML: 5 INJECTION INTRAVENOUS at 22:16

## 2025-01-05 RX ADMIN — AMIODARONE HYDROCHLORIDE 0.5 MG/MIN: 1.8 INJECTION, SOLUTION INTRAVENOUS at 19:39

## 2025-01-05 RX ADMIN — WATER 2000 MG: 1 INJECTION INTRAMUSCULAR; INTRAVENOUS; SUBCUTANEOUS at 13:47

## 2025-01-05 RX ADMIN — Medication: at 23:48

## 2025-01-05 RX ADMIN — Medication: at 08:16

## 2025-01-05 RX ADMIN — Medication: at 21:15

## 2025-01-05 RX ADMIN — PANTOPRAZOLE SODIUM 40 MG: 40 INJECTION, POWDER, FOR SOLUTION INTRAVENOUS at 08:20

## 2025-01-05 RX ADMIN — SODIUM CHLORIDE, PRESERVATIVE FREE 10 ML: 5 INJECTION INTRAVENOUS at 08:20

## 2025-01-05 RX ADMIN — PROPOFOL 10 MCG/KG/MIN: 10 INJECTION, EMULSION INTRAVENOUS at 20:56

## 2025-01-05 RX ADMIN — HEPARIN SODIUM 5000 UNITS: 5000 INJECTION INTRAVENOUS; SUBCUTANEOUS at 04:00

## 2025-01-05 RX ADMIN — SODIUM CHLORIDE, PRESERVATIVE FREE 10 ML: 5 INJECTION INTRAVENOUS at 22:15

## 2025-01-05 RX ADMIN — HEPARIN SODIUM 1450 UNITS/HR: 10000 INJECTION, SOLUTION INTRAVENOUS at 18:35

## 2025-01-05 RX ADMIN — SODIUM CHLORIDE 500 ML: 9 INJECTION, SOLUTION INTRAVENOUS at 17:06

## 2025-01-05 RX ADMIN — DOBUTAMINE HYDROCHLORIDE 5 MCG/KG/MIN: 200 INJECTION INTRAVENOUS at 13:34

## 2025-01-05 RX ADMIN — ASPIRIN 81 MG: 81 TABLET, CHEWABLE ORAL at 09:03

## 2025-01-05 RX ADMIN — AMIODARONE HYDROCHLORIDE 1 MG/MIN: 1.8 INJECTION, SOLUTION INTRAVENOUS at 13:43

## 2025-01-05 RX ADMIN — Medication 75 MCG/HR: at 23:50

## 2025-01-05 RX ADMIN — CLOPIDOGREL BISULFATE 75 MG: 75 TABLET ORAL at 09:03

## 2025-01-05 RX ADMIN — HEPARIN SODIUM 1200 UNITS: 1000 INJECTION INTRAVENOUS; SUBCUTANEOUS at 02:49

## 2025-01-05 RX ADMIN — CITALOPRAM HYDROBROMIDE 40 MG: 20 TABLET ORAL at 09:03

## 2025-01-05 RX ADMIN — Medication 75 MCG/HR: at 12:10

## 2025-01-05 RX ADMIN — CALCIUM GLUCONATE 1000 MG: 20 INJECTION, SOLUTION INTRAVENOUS at 06:51

## 2025-01-05 ASSESSMENT — PAIN SCALES - GENERAL
PAINLEVEL_OUTOF10: 0

## 2025-01-05 ASSESSMENT — PULMONARY FUNCTION TESTS
PIF_VALUE: 22
PIF_VALUE: 22
PIF_VALUE: 23
PIF_VALUE: 22
PIF_VALUE: 18
PIF_VALUE: 24
PIF_VALUE: 25
PIF_VALUE: 29
PIF_VALUE: 24
PIF_VALUE: 29
PIF_VALUE: 29
PIF_VALUE: 24
PIF_VALUE: 19
PIF_VALUE: 25
PIF_VALUE: 24
PIF_VALUE: 23
PIF_VALUE: 29
PIF_VALUE: 22
PIF_VALUE: 26
PIF_VALUE: 27
PIF_VALUE: 23
PIF_VALUE: 24
PIF_VALUE: 16
PIF_VALUE: 32
PIF_VALUE: 25
PIF_VALUE: 22

## 2025-01-06 ENCOUNTER — APPOINTMENT (OUTPATIENT)
Age: 65
DRG: 215 | End: 2025-01-06
Attending: STUDENT IN AN ORGANIZED HEALTH CARE EDUCATION/TRAINING PROGRAM
Payer: COMMERCIAL

## 2025-01-06 ENCOUNTER — APPOINTMENT (OUTPATIENT)
Dept: GENERAL RADIOLOGY | Age: 65
DRG: 215 | End: 2025-01-06
Payer: COMMERCIAL

## 2025-01-06 LAB
ABO + RH BLD: NORMAL
ALBUMIN SERPL-MCNC: 2.3 G/DL (ref 3.4–5)
ALBUMIN SERPL-MCNC: 2.4 G/DL (ref 3.4–5)
ALBUMIN SERPL-MCNC: 2.4 G/DL (ref 3.4–5)
ALP SERPL-CCNC: 106 U/L (ref 40–129)
ALT SERPL-CCNC: 16 U/L (ref 10–40)
ANION GAP SERPL CALCULATED.3IONS-SCNC: 10 MMOL/L (ref 3–16)
ANION GAP SERPL CALCULATED.3IONS-SCNC: 10 MMOL/L (ref 3–16)
AST SERPL-CCNC: 27 U/L (ref 15–37)
BACTERIA SPEC RESP CULT: NORMAL
BASE EXCESS BLDA CALC-SCNC: 2 MMOL/L (ref -3–3)
BASE EXCESS MIXED: 2
BASE EXCESS MIXED: 3
BASE EXCESS MIXED: 4
BILIRUB DIRECT SERPL-MCNC: 0.2 MG/DL (ref 0–0.3)
BILIRUB INDIRECT SERPL-MCNC: 0.1 MG/DL (ref 0–1)
BILIRUB SERPL-MCNC: 0.3 MG/DL (ref 0–1)
BLD GP AB SCN SERPL QL: NORMAL
BLOOD BANK DISPENSE STATUS: NORMAL
BLOOD BANK PRODUCT CODE: NORMAL
BPU ID: NORMAL
BUN SERPL-MCNC: 25 MG/DL (ref 7–20)
BUN SERPL-MCNC: 26 MG/DL (ref 7–20)
CA-I BLD-SCNC: 1.03 MMOL/L (ref 1.12–1.32)
CA-I BLD-SCNC: 1.04 MMOL/L (ref 1.12–1.32)
CA-I BLD-SCNC: 1.05 MMOL/L (ref 1.12–1.32)
CA-I BLD-SCNC: 1.16 MMOL/L (ref 1.12–1.32)
CA-I BLD-SCNC: 1.22 MMOL/L (ref 1.12–1.32)
CALCIUM SERPL-MCNC: 7.7 MG/DL (ref 8.3–10.6)
CALCIUM SERPL-MCNC: 7.9 MG/DL (ref 8.3–10.6)
CHLORIDE SERPL-SCNC: 100 MMOL/L (ref 99–110)
CHLORIDE SERPL-SCNC: 100 MMOL/L (ref 99–110)
CO2 BLDA-SCNC: 29 MMOL/L
CO2 SERPL-SCNC: 24 MMOL/L (ref 21–32)
CO2 SERPL-SCNC: 24 MMOL/L (ref 21–32)
CREAT SERPL-MCNC: 1.9 MG/DL (ref 0.6–1.2)
CREAT SERPL-MCNC: 2 MG/DL (ref 0.6–1.2)
DEPRECATED RDW RBC AUTO: 16 % (ref 12.4–15.4)
DEPRECATED RDW RBC AUTO: 16.5 % (ref 12.4–15.4)
DESCRIPTION BLOOD BANK: NORMAL
ECHO AO ASC DIAM: 2.7 CM
ECHO AO ASCENDING AORTA INDEX: 1.55 CM/M2
ECHO AO ROOT DIAM: 2.5 CM
ECHO AO ROOT INDEX: 1.44 CM/M2
ECHO AV AREA PEAK VELOCITY: 1.4 CM2
ECHO AV AREA VTI: 2.7 CM2
ECHO AV AREA/BSA PEAK VELOCITY: 0.8 CM2/M2
ECHO AV AREA/BSA VTI: 1.6 CM2/M2
ECHO AV MEAN GRADIENT: 5 MMHG
ECHO AV MEAN GRADIENT: 5 MMHG
ECHO AV MEAN VELOCITY: 1.1 M/S
ECHO AV PEAK GRADIENT: 31 MMHG
ECHO AV PEAK VELOCITY: 2.8 M/S
ECHO AV VELOCITY RATIO: 0.46
ECHO AV VTI: 24.7 CM
ECHO BSA: 1.79 M2
ECHO EST RA PRESSURE: 8 MMHG
ECHO IVC PROX: 1.9 CM
ECHO LA AREA 2C: 19.6 CM2
ECHO LA AREA 4C: 16.7 CM2
ECHO LA DIAMETER INDEX: 1.9 CM/M2
ECHO LA DIAMETER: 3.3 CM
ECHO LA MAJOR AXIS: 5.2 CM
ECHO LA MINOR AXIS: 5.8 CM
ECHO LA TO AORTIC ROOT RATIO: 1.32
ECHO LA VOL BP: 51 ML (ref 22–52)
ECHO LA VOL MOD A2C: 54 ML (ref 22–52)
ECHO LA VOL MOD A4C: 43 ML (ref 22–52)
ECHO LA VOL/BSA BIPLANE: 29 ML/M2 (ref 16–34)
ECHO LA VOLUME INDEX MOD A2C: 31 ML/M2 (ref 16–34)
ECHO LA VOLUME INDEX MOD A4C: 25 ML/M2 (ref 16–34)
ECHO LV E' LATERAL VELOCITY: 9.03 CM/S
ECHO LV E' SEPTAL VELOCITY: 5.66 CM/S
ECHO LV EDV A2C: 76 ML
ECHO LV EDV A4C: 89 ML
ECHO LV EDV INDEX A4C: 51 ML/M2
ECHO LV EDV NDEX A2C: 44 ML/M2
ECHO LV EF PHYSICIAN: 50 %
ECHO LV EJECTION FRACTION A2C: 53 %
ECHO LV EJECTION FRACTION A4C: 61 %
ECHO LV EJECTION FRACTION BIPLANE: 56 % (ref 55–100)
ECHO LV ESV A2C: 36 ML
ECHO LV ESV A4C: 35 ML
ECHO LV ESV INDEX A2C: 21 ML/M2
ECHO LV ESV INDEX A4C: 20 ML/M2
ECHO LV FRACTIONAL SHORTENING: 22 % (ref 28–44)
ECHO LV INTERNAL DIMENSION DIASTOLE INDEX: 2.07 CM/M2
ECHO LV INTERNAL DIMENSION DIASTOLIC: 3.6 CM (ref 3.9–5.3)
ECHO LV INTERNAL DIMENSION SYSTOLIC INDEX: 1.61 CM/M2
ECHO LV INTERNAL DIMENSION SYSTOLIC: 2.8 CM
ECHO LV IVSD: 1.3 CM (ref 0.6–0.9)
ECHO LV MASS 2D: 150.6 G (ref 67–162)
ECHO LV MASS INDEX 2D: 86.6 G/M2 (ref 43–95)
ECHO LV POSTERIOR WALL DIASTOLIC: 1.2 CM (ref 0.6–0.9)
ECHO LV RELATIVE WALL THICKNESS RATIO: 0.67
ECHO LVOT AREA: 3.1 CM2
ECHO LVOT AV VTI INDEX: 0.87
ECHO LVOT DIAM: 2 CM
ECHO LVOT MEAN GRADIENT: 3 MMHG
ECHO LVOT PEAK GRADIENT: 6 MMHG
ECHO LVOT PEAK VELOCITY: 1.3 M/S
ECHO LVOT STROKE VOLUME INDEX: 38.6 ML/M2
ECHO LVOT SV: 67.2 ML
ECHO LVOT VTI: 21.4 CM
ECHO MV A VELOCITY: 0.86 M/S
ECHO MV AREA VTI: 3.1 CM2
ECHO MV E DECELERATION TIME (DT): 285 MS
ECHO MV E VELOCITY: 0.4 M/S
ECHO MV E/A RATIO: 0.47
ECHO MV E/E' LATERAL: 4.43
ECHO MV E/E' RATIO (AVERAGED): 5.75
ECHO MV E/E' SEPTAL: 7.07
ECHO MV LVOT VTI INDEX: 1.03
ECHO MV MAX VELOCITY: 0.8 M/S
ECHO MV MEAN GRADIENT: 2 MMHG
ECHO MV MEAN VELOCITY: 0.6 M/S
ECHO MV PEAK GRADIENT: 3 MMHG
ECHO MV VTI: 22 CM
ECHO PV MAX VELOCITY: 1.1 M/S
ECHO PV PEAK GRADIENT: 5 MMHG
ECHO RA AREA 4C: 11 CM2
ECHO RA END SYSTOLIC VOLUME APICAL 4 CHAMBER INDEX BSA: 13 ML/M2
ECHO RA VOLUME: 23 ML
ECHO RIGHT VENTRICULAR SYSTOLIC PRESSURE (RVSP): 44 MMHG
ECHO RV BASAL DIMENSION: 3.3 CM
ECHO RV FREE WALL PEAK S': 11.1 CM/S
ECHO RV LONGITUDINAL DIMENSION: 6.9 CM
ECHO RV MID DIMENSION: 2.3 CM
ECHO RV TAPSE: 1.4 CM (ref 1.7–?)
ECHO TV E WAVE: 2.7 M/S
ECHO TV REGURGITANT MAX VELOCITY: 2.98 M/S
ECHO TV REGURGITANT PEAK GRADIENT: 36 MMHG
GFR SERPLBLD CREATININE-BSD FMLA CKD-EPI: 27 ML/MIN/{1.73_M2}
GFR SERPLBLD CREATININE-BSD FMLA CKD-EPI: 29 ML/MIN/{1.73_M2}
GLUCOSE BLD-MCNC: 111 MG/DL (ref 70–99)
GLUCOSE BLD-MCNC: 125 MG/DL (ref 70–99)
GLUCOSE SERPL-MCNC: 123 MG/DL (ref 70–99)
GLUCOSE SERPL-MCNC: 124 MG/DL (ref 70–99)
GRAM STN SPEC: NORMAL
HCO3 BLDA-SCNC: 27.5 MMOL/L (ref 21–29)
HCO3, MIXED: 28.2 MMOL/L
HCO3, MIXED: 28.5 MMOL/L
HCO3, MIXED: 30.6 MMOL/L
HCT VFR BLD AUTO: 22 % (ref 36–48)
HCT VFR BLD AUTO: 23 % (ref 36–48)
HCT VFR BLD AUTO: 24.4 % (ref 36–48)
HCT VFR BLD AUTO: 27 % (ref 36–48)
HGB BLD CALC-MCNC: 7.9 GM/DL (ref 12–16)
HGB BLD CALC-MCNC: 9 GM/DL (ref 12–16)
HGB BLD-MCNC: 7.4 G/DL (ref 12–16)
HGB BLD-MCNC: 8.2 G/DL (ref 12–16)
INHALED O2 FLOW RATE: 40 L/MIN
MAGNESIUM SERPL-MCNC: 2.57 MG/DL (ref 1.8–2.4)
MAGNESIUM SERPL-MCNC: 2.58 MG/DL (ref 1.8–2.4)
MCH RBC QN AUTO: 28.2 PG (ref 26–34)
MCH RBC QN AUTO: 28.7 PG (ref 26–34)
MCHC RBC AUTO-ENTMCNC: 33.4 G/DL (ref 31–36)
MCHC RBC AUTO-ENTMCNC: 33.4 G/DL (ref 31–36)
MCV RBC AUTO: 84.4 FL (ref 80–100)
MCV RBC AUTO: 85.8 FL (ref 80–100)
O2 SAT, MIXED: 53 %
O2 SAT, MIXED: 54 %
O2 SAT, MIXED: 64 %
ORGANISM: ABNORMAL
PCO2 BLDA: 46 MM HG (ref 35–45)
PCO2 MIXED: 52.6 MM HG
PCO2 MIXED: 57.2 MM HG
PCO2 MIXED: 61.5 MM HG
PERFORMED ON: ABNORMAL
PH BLDA: 7.38 [PH] (ref 7.35–7.45)
PH BLDV: 7.45 [PH] (ref 7.35–7.45)
PH BLDV: 7.47 [PH] (ref 7.35–7.45)
PH BLDV: 7.48 [PH] (ref 7.35–7.45)
PH, MIXED: 7.3 (ref 7.35–7.45)
PH, MIXED: 7.3 (ref 7.35–7.45)
PH, MIXED: 7.34 (ref 7.35–7.45)
PHOSPHATE SERPL-MCNC: 2.5 MG/DL (ref 2.5–4.9)
PHOSPHATE SERPL-MCNC: 2.7 MG/DL (ref 2.5–4.9)
PLATELET # BLD AUTO: 114 K/UL (ref 135–450)
PLATELET # BLD AUTO: 129 K/UL (ref 135–450)
PMV BLD AUTO: 9 FL (ref 5–10.5)
PMV BLD AUTO: 9.5 FL (ref 5–10.5)
PO2 BLDA: 157.8 MM HG (ref 75–108)
PO2 MIXED: 30 MM HG
PO2 MIXED: 32 MM HG
PO2 MIXED: 37 MM HG
POC ACT LR: 147 SEC
POC ACT LR: 157 SEC
POC ACT LR: 171 SEC
POC ACT LR: 174 SEC
POC ACT LR: 176 SEC
POC ACT LR: 177 SEC
POC ACT LR: 178 SEC
POC ACT LR: 179 SEC
POC ACT LR: 182 SEC
POC ACT LR: 184 SEC
POC ACT LR: 187 SEC
POC ACT LR: 188 SEC
POC ACT LR: 191 SEC
POC ACT LR: 192 SEC
POC SAMPLE TYPE: ABNORMAL
POTASSIUM BLD-SCNC: 4.4 MMOL/L (ref 3.5–5.1)
POTASSIUM BLD-SCNC: 4.6 MMOL/L (ref 3.5–5.1)
POTASSIUM SERPL-SCNC: 4.4 MMOL/L (ref 3.5–5.1)
POTASSIUM SERPL-SCNC: 4.6 MMOL/L (ref 3.5–5.1)
PROT SERPL-MCNC: 5.1 G/DL (ref 6.4–8.2)
RBC # BLD AUTO: 2.61 M/UL (ref 4–5.2)
RBC # BLD AUTO: 2.85 M/UL (ref 4–5.2)
REPORT: NORMAL
RESP PATH DNA+RNA PNL NPH NAA+NON-PROBE: ABNORMAL
SAO2 % BLDA: 99 % (ref 93–100)
SODIUM BLD-SCNC: 133 MMOL/L (ref 136–145)
SODIUM SERPL-SCNC: 134 MMOL/L (ref 136–145)
SODIUM SERPL-SCNC: 134 MMOL/L (ref 136–145)
TCO2 CALC MIXED: 30 MMOL/L
TCO2 CALC MIXED: 30 MMOL/L
TCO2 CALC MIXED: 33 MMOL/L
TRIGL SERPL-MCNC: 131 MG/DL (ref 0–150)
WBC # BLD AUTO: 18.5 K/UL (ref 4–11)
WBC # BLD AUTO: 19.5 K/UL (ref 4–11)

## 2025-01-06 PROCEDURE — 2580000003 HC RX 258: Performed by: STUDENT IN AN ORGANIZED HEALTH CARE EDUCATION/TRAINING PROGRAM

## 2025-01-06 PROCEDURE — 82330 ASSAY OF CALCIUM: CPT

## 2025-01-06 PROCEDURE — 6360000002 HC RX W HCPCS: Performed by: INTERNAL MEDICINE

## 2025-01-06 PROCEDURE — 85027 COMPLETE CBC AUTOMATED: CPT

## 2025-01-06 PROCEDURE — 80069 RENAL FUNCTION PANEL: CPT

## 2025-01-06 PROCEDURE — 2500000003 HC RX 250 WO HCPCS: Performed by: INTERNAL MEDICINE

## 2025-01-06 PROCEDURE — 2580000003 HC RX 258: Performed by: INTERNAL MEDICINE

## 2025-01-06 PROCEDURE — 6360000002 HC RX W HCPCS: Performed by: STUDENT IN AN ORGANIZED HEALTH CARE EDUCATION/TRAINING PROGRAM

## 2025-01-06 PROCEDURE — 83735 ASSAY OF MAGNESIUM: CPT

## 2025-01-06 PROCEDURE — 71045 X-RAY EXAM CHEST 1 VIEW: CPT

## 2025-01-06 PROCEDURE — 90945 DIALYSIS ONE EVALUATION: CPT

## 2025-01-06 PROCEDURE — 2500000003 HC RX 250 WO HCPCS: Performed by: HOSPITALIST

## 2025-01-06 PROCEDURE — 36430 TRANSFUSION BLD/BLD COMPNT: CPT

## 2025-01-06 PROCEDURE — 2500000003 HC RX 250 WO HCPCS: Performed by: STUDENT IN AN ORGANIZED HEALTH CARE EDUCATION/TRAINING PROGRAM

## 2025-01-06 PROCEDURE — 2700000000 HC OXYGEN THERAPY PER DAY

## 2025-01-06 PROCEDURE — 86850 RBC ANTIBODY SCREEN: CPT

## 2025-01-06 PROCEDURE — 6370000000 HC RX 637 (ALT 250 FOR IP): Performed by: STUDENT IN AN ORGANIZED HEALTH CARE EDUCATION/TRAINING PROGRAM

## 2025-01-06 PROCEDURE — 36415 COLL VENOUS BLD VENIPUNCTURE: CPT

## 2025-01-06 PROCEDURE — 93306 TTE W/DOPPLER COMPLETE: CPT

## 2025-01-06 PROCEDURE — 84132 ASSAY OF SERUM POTASSIUM: CPT

## 2025-01-06 PROCEDURE — 94761 N-INVAS EAR/PLS OXIMETRY MLT: CPT

## 2025-01-06 PROCEDURE — 86900 BLOOD TYPING SEROLOGIC ABO: CPT

## 2025-01-06 PROCEDURE — 82803 BLOOD GASES ANY COMBINATION: CPT

## 2025-01-06 PROCEDURE — P9016 RBC LEUKOCYTES REDUCED: HCPCS

## 2025-01-06 PROCEDURE — 86923 COMPATIBILITY TEST ELECTRIC: CPT

## 2025-01-06 PROCEDURE — 93306 TTE W/DOPPLER COMPLETE: CPT | Performed by: STUDENT IN AN ORGANIZED HEALTH CARE EDUCATION/TRAINING PROGRAM

## 2025-01-06 PROCEDURE — 84295 ASSAY OF SERUM SODIUM: CPT

## 2025-01-06 PROCEDURE — 2100000000 HC CCU R&B

## 2025-01-06 PROCEDURE — 80076 HEPATIC FUNCTION PANEL: CPT

## 2025-01-06 PROCEDURE — 94003 VENT MGMT INPAT SUBQ DAY: CPT

## 2025-01-06 PROCEDURE — 99291 CRITICAL CARE FIRST HOUR: CPT | Performed by: STUDENT IN AN ORGANIZED HEALTH CARE EDUCATION/TRAINING PROGRAM

## 2025-01-06 PROCEDURE — 82947 ASSAY GLUCOSE BLOOD QUANT: CPT

## 2025-01-06 PROCEDURE — 84478 ASSAY OF TRIGLYCERIDES: CPT

## 2025-01-06 PROCEDURE — 85347 COAGULATION TIME ACTIVATED: CPT

## 2025-01-06 PROCEDURE — 86901 BLOOD TYPING SEROLOGIC RH(D): CPT

## 2025-01-06 PROCEDURE — 99291 CRITICAL CARE FIRST HOUR: CPT | Performed by: INTERNAL MEDICINE

## 2025-01-06 PROCEDURE — 85014 HEMATOCRIT: CPT

## 2025-01-06 RX ORDER — SODIUM CHLORIDE 9 MG/ML
INJECTION, SOLUTION INTRAVENOUS PRN
Status: DISCONTINUED | OUTPATIENT
Start: 2025-01-06 | End: 2025-01-10

## 2025-01-06 RX ADMIN — ASPIRIN 81 MG: 81 TABLET, CHEWABLE ORAL at 10:35

## 2025-01-06 RX ADMIN — Medication: at 06:49

## 2025-01-06 RX ADMIN — Medication: at 23:49

## 2025-01-06 RX ADMIN — Medication 75 MCG/HR: at 16:28

## 2025-01-06 RX ADMIN — HEPARIN SODIUM 1600 UNITS/HR: 10000 INJECTION, SOLUTION INTRAVENOUS at 12:42

## 2025-01-06 RX ADMIN — DOBUTAMINE HYDROCHLORIDE 2.5 MCG/KG/MIN: 200 INJECTION INTRAVENOUS at 14:17

## 2025-01-06 RX ADMIN — Medication: at 13:29

## 2025-01-06 RX ADMIN — CLOPIDOGREL BISULFATE 75 MG: 75 TABLET ORAL at 10:35

## 2025-01-06 RX ADMIN — PROPOFOL 20 MCG/KG/MIN: 10 INJECTION, EMULSION INTRAVENOUS at 10:56

## 2025-01-06 RX ADMIN — PANTOPRAZOLE SODIUM 40 MG: 40 INJECTION, POWDER, FOR SOLUTION INTRAVENOUS at 10:35

## 2025-01-06 RX ADMIN — AMIODARONE HYDROCHLORIDE 0.5 MG/MIN: 1.8 INJECTION, SOLUTION INTRAVENOUS at 07:56

## 2025-01-06 RX ADMIN — WATER 2000 MG: 1 INJECTION INTRAMUSCULAR; INTRAVENOUS; SUBCUTANEOUS at 13:30

## 2025-01-06 RX ADMIN — CALCIUM GLUCONATE 1000 MG: 20 INJECTION, SOLUTION INTRAVENOUS at 23:55

## 2025-01-06 RX ADMIN — CITALOPRAM HYDROBROMIDE 40 MG: 20 TABLET ORAL at 10:35

## 2025-01-06 RX ADMIN — CALCIUM GLUCONATE 1000 MG: 20 INJECTION, SOLUTION INTRAVENOUS at 18:37

## 2025-01-06 RX ADMIN — SODIUM CHLORIDE, PRESERVATIVE FREE 10 ML: 5 INJECTION INTRAVENOUS at 20:28

## 2025-01-06 RX ADMIN — SODIUM CHLORIDE, PRESERVATIVE FREE 10 ML: 5 INJECTION INTRAVENOUS at 10:35

## 2025-01-06 RX ADMIN — HEPARIN SODIUM: 5000 INJECTION INTRAVENOUS; SUBCUTANEOUS at 14:19

## 2025-01-06 RX ADMIN — CALCIUM GLUCONATE 1000 MG: 20 INJECTION, SOLUTION INTRAVENOUS at 05:48

## 2025-01-06 RX ADMIN — AMIODARONE HYDROCHLORIDE 0.5 MG/MIN: 1.8 INJECTION, SOLUTION INTRAVENOUS at 19:56

## 2025-01-06 RX ADMIN — Medication: at 14:53

## 2025-01-06 RX ADMIN — PHENYLEPHRINE HYDROCHLORIDE 30 MCG/MIN: 50 INJECTION INTRAVENOUS at 23:22

## 2025-01-06 RX ADMIN — PROPOFOL 20 MCG/KG/MIN: 10 INJECTION, EMULSION INTRAVENOUS at 19:58

## 2025-01-06 ASSESSMENT — PAIN SCALES - GENERAL
PAINLEVEL_OUTOF10: 0

## 2025-01-06 ASSESSMENT — PULMONARY FUNCTION TESTS
PIF_VALUE: 20
PIF_VALUE: 17
PIF_VALUE: 21
PIF_VALUE: 17
PIF_VALUE: 22
PIF_VALUE: 39
PIF_VALUE: 25
PIF_VALUE: 17
PIF_VALUE: 25
PIF_VALUE: 19
PIF_VALUE: 20
PIF_VALUE: 19
PIF_VALUE: 21
PIF_VALUE: 19
PIF_VALUE: 21
PIF_VALUE: 18
PIF_VALUE: 25
PIF_VALUE: 19
PIF_VALUE: 22
PIF_VALUE: 20
PIF_VALUE: 20
PIF_VALUE: 21
PIF_VALUE: 27
PIF_VALUE: 17
PIF_VALUE: 27
PIF_VALUE: 19
PIF_VALUE: 19
PIF_VALUE: 16
PIF_VALUE: 21
PIF_VALUE: 20
PIF_VALUE: 18
PIF_VALUE: 40

## 2025-01-06 ASSESSMENT — PAIN DESCRIPTION - PAIN TYPE: TYPE: ACUTE PAIN

## 2025-01-06 ASSESSMENT — PAIN DESCRIPTION - ORIENTATION: ORIENTATION: LEFT

## 2025-01-06 ASSESSMENT — PAIN DESCRIPTION - LOCATION: LOCATION: GROIN

## 2025-01-06 NOTE — FLOWSHEET NOTE
Left groin noted to have areas of palpable hematoma inferior to insertion site.  Manual pressure held x20 mins to decompress two areas of increasing firmness  Areas decompressed to soft.   Cont to closely monitor.

## 2025-01-06 NOTE — FLOWSHEET NOTE
Increasing areas of firmness noted to left groin site again.  Dr. Enriquez made aware and at bedside. Assessment and pressure held by MD.  ACT below 180sec on last check.   Obvious discomfort to patient with palpation  Decision made to place femostop with no air but only tension to site and cont to monitor.  R DP pulse easily 1+ palp.  Skin noted to have excoriation in the folds. Dry gauze placed for protection  Cont to monitor left groin site  MD to order vascular US to r/o pseudoaneurysm for am.

## 2025-01-07 ENCOUNTER — APPOINTMENT (OUTPATIENT)
Dept: VASCULAR LAB | Age: 65
DRG: 215 | End: 2025-01-07
Attending: STUDENT IN AN ORGANIZED HEALTH CARE EDUCATION/TRAINING PROGRAM
Payer: COMMERCIAL

## 2025-01-07 ENCOUNTER — APPOINTMENT (OUTPATIENT)
Dept: GENERAL RADIOLOGY | Age: 65
DRG: 215 | End: 2025-01-07
Payer: COMMERCIAL

## 2025-01-07 ENCOUNTER — APPOINTMENT (OUTPATIENT)
Age: 65
DRG: 215 | End: 2025-01-07
Attending: STUDENT IN AN ORGANIZED HEALTH CARE EDUCATION/TRAINING PROGRAM
Payer: COMMERCIAL

## 2025-01-07 LAB
ALBUMIN SERPL-MCNC: 2.5 G/DL (ref 3.4–5)
ANION GAP SERPL CALCULATED.3IONS-SCNC: 8 MMOL/L (ref 3–16)
BASE EXCESS BLDA CALC-SCNC: 0 MMOL/L (ref -3–3)
BASE EXCESS BLDA CALC-SCNC: 2 MMOL/L (ref -3–3)
BASE EXCESS BLDV CALC-SCNC: 0 MMOL/L (ref -3–3)
BASE EXCESS MIXED: -5
BASE EXCESS MIXED: 2
BASE EXCESS MIXED: 3
BASE EXCESS MIXED: 3
BUN SERPL-MCNC: 27 MG/DL (ref 7–20)
CA-I BLD-SCNC: 1.03 MMOL/L (ref 1.12–1.32)
CA-I BLD-SCNC: 1.06 MMOL/L (ref 1.12–1.32)
CA-I BLD-SCNC: 1.07 MMOL/L (ref 1.12–1.32)
CA-I BLD-SCNC: 1.24 MMOL/L (ref 1.12–1.32)
CALCIUM SERPL-MCNC: 7.9 MG/DL (ref 8.3–10.6)
CHLORIDE SERPL-SCNC: 100 MMOL/L (ref 99–110)
CO2 BLDA-SCNC: 26 MMOL/L
CO2 BLDA-SCNC: 28 MMOL/L
CO2 BLDV-SCNC: 26 MMOL/L
CO2 SERPL-SCNC: 24 MMOL/L (ref 21–32)
CREAT SERPL-MCNC: 1.7 MG/DL (ref 0.6–1.2)
DEPRECATED RDW RBC AUTO: 16.2 % (ref 12.4–15.4)
DEPRECATED RDW RBC AUTO: 19 % (ref 12.4–15.4)
DEPRECATED RDW RBC AUTO: 19.5 % (ref 12.4–15.4)
ECHO BSA: 1.79 M2
ECHO BSA: 1.95 M2
ECHO LV EDV A2C: 74 ML
ECHO LV EDV A4C: 80 ML
ECHO LV EDV INDEX A4C: 43 ML/M2
ECHO LV EDV NDEX A2C: 40 ML/M2
ECHO LV EF PHYSICIAN: 45 %
ECHO LV EJECTION FRACTION A2C: 50 %
ECHO LV EJECTION FRACTION A4C: 52 %
ECHO LV EJECTION FRACTION BIPLANE: 50 % (ref 55–100)
ECHO LV ESV A2C: 37 ML
ECHO LV ESV A4C: 38 ML
ECHO LV ESV INDEX A2C: 20 ML/M2
ECHO LV ESV INDEX A4C: 20 ML/M2
ECHO RV FRACTIONAL AREA CHANGE: 44 %
ECHO RV TAPSE: 1.6 CM (ref 1.7–?)
GFR SERPLBLD CREATININE-BSD FMLA CKD-EPI: 33 ML/MIN/{1.73_M2}
GLUCOSE BLD-MCNC: 188 MG/DL (ref 70–99)
GLUCOSE SERPL-MCNC: 123 MG/DL (ref 70–99)
HCO3 BLDA-SCNC: 24.7 MMOL/L (ref 21–29)
HCO3 BLDA-SCNC: 26.5 MMOL/L (ref 21–29)
HCO3 BLDV-SCNC: 24.5 MMOL/L (ref 23–29)
HCO3, MIXED: 20.2 MMOL/L
HCO3, MIXED: 27.6 MMOL/L
HCO3, MIXED: 28.5 MMOL/L
HCO3, MIXED: 28.5 MMOL/L
HCT VFR BLD AUTO: 22.9 % (ref 36–48)
HCT VFR BLD AUTO: 24.6 % (ref 36–48)
HCT VFR BLD AUTO: 26.2 % (ref 36–48)
HCT VFR BLD AUTO: 27 % (ref 36–48)
HEMOCCULT STL QL: NORMAL
HGB BLD CALC-MCNC: 9.3 GM/DL (ref 12–16)
HGB BLD-MCNC: 7.6 G/DL (ref 12–16)
HGB BLD-MCNC: 8.2 G/DL (ref 12–16)
HGB BLD-MCNC: 8.8 G/DL (ref 12–16)
MAGNESIUM SERPL-MCNC: 2.38 MG/DL (ref 1.8–2.4)
MAGNESIUM SERPL-MCNC: 2.56 MG/DL (ref 1.8–2.4)
MCH RBC QN AUTO: 28.2 PG (ref 26–34)
MCH RBC QN AUTO: 28.5 PG (ref 26–34)
MCH RBC QN AUTO: 28.7 PG (ref 26–34)
MCHC RBC AUTO-ENTMCNC: 33.3 G/DL (ref 31–36)
MCHC RBC AUTO-ENTMCNC: 33.4 G/DL (ref 31–36)
MCHC RBC AUTO-ENTMCNC: 33.4 G/DL (ref 31–36)
MCV RBC AUTO: 84.6 FL (ref 80–100)
MCV RBC AUTO: 85.6 FL (ref 80–100)
MCV RBC AUTO: 85.9 FL (ref 80–100)
O2 SAT, MIXED: 41 %
O2 SAT, MIXED: 41 %
O2 SAT, MIXED: 48 %
O2 SAT, MIXED: 55 %
PCO2 BLDA: 40.7 MM HG (ref 35–45)
PCO2 BLDA: 43.5 MM HG (ref 35–45)
PCO2 BLDV: 39.7 MM HG (ref 40–50)
PCO2 MIXED: 35.2 MM HG
PCO2 MIXED: 50.4 MM HG
PCO2 MIXED: 52.8 MM HG
PCO2 MIXED: 54 MM HG
PERFORMED ON: ABNORMAL
PERFORMED ON: NORMAL
PH BLDA: 7.39 [PH] (ref 7.35–7.45)
PH BLDA: 7.39 [PH] (ref 7.35–7.45)
PH BLDV: 7.37 [PH] (ref 7.35–7.45)
PH BLDV: 7.4 [PH] (ref 7.35–7.45)
PH BLDV: 7.43 [PH] (ref 7.35–7.45)
PH BLDV: 7.45 [PH] (ref 7.35–7.45)
PH, MIXED: 7.33 (ref 7.35–7.45)
PH, MIXED: 7.34 (ref 7.35–7.45)
PH, MIXED: 7.35 (ref 7.35–7.45)
PH, MIXED: 7.37 (ref 7.35–7.45)
PHOSPHATE SERPL-MCNC: 2.6 MG/DL (ref 2.5–4.9)
PLATELET # BLD AUTO: 159 K/UL (ref 135–450)
PLATELET # BLD AUTO: 179 K/UL (ref 135–450)
PLATELET # BLD AUTO: 185 K/UL (ref 135–450)
PMV BLD AUTO: 8.6 FL (ref 5–10.5)
PMV BLD AUTO: 9.1 FL (ref 5–10.5)
PMV BLD AUTO: 9.1 FL (ref 5–10.5)
PO2 BLDA: 130.7 MM HG (ref 75–108)
PO2 BLDA: 96.7 MM HG (ref 75–108)
PO2 BLDV: 164 MM HG
PO2 MIXED: 24 MM HG
PO2 MIXED: 25 MM HG
PO2 MIXED: 28 MM HG
PO2 MIXED: 32 MM HG
POC ACT LR: 156 SEC
POC ACT LR: 169 SEC
POC ACT LR: 172 SEC
POC ACT LR: 177 SEC
POC ACT LR: 179 SEC
POC ACT LR: 187 SEC
POC SAMPLE TYPE: ABNORMAL
POC SAMPLE TYPE: NORMAL
POTASSIUM SERPL-SCNC: 4.9 MMOL/L (ref 3.5–5.1)
RBC # BLD AUTO: 2.68 M/UL (ref 4–5.2)
RBC # BLD AUTO: 2.91 M/UL (ref 4–5.2)
RBC # BLD AUTO: 3.06 M/UL (ref 4–5.2)
SAO2 % BLDA: 97 % (ref 93–100)
SAO2 % BLDA: 99 % (ref 93–100)
SAO2 % BLDV: 100 %
SODIUM SERPL-SCNC: 132 MMOL/L (ref 136–145)
TCO2 CALC MIXED: 21 MMOL/L
TCO2 CALC MIXED: 29 MMOL/L
TCO2 CALC MIXED: 30 MMOL/L
TCO2 CALC MIXED: 30 MMOL/L
VAS LEFT CFA DIST PSV: 183 CM/S
VAS LEFT CFA PROX PSV: 170 CM/S
VAS LEFT PFA PROX PSV: 111 CM/S
VAS LEFT SFA PROX PSV: 110 CM/S
VAS LEFT SFA PROX VEL RATIO: 0.6
VAS RIGHT CFA PROX PSV: 183 CM/S
WBC # BLD AUTO: 20.1 K/UL (ref 4–11)
WBC # BLD AUTO: 24.8 K/UL (ref 4–11)
WBC # BLD AUTO: 25.7 K/UL (ref 4–11)

## 2025-01-07 PROCEDURE — 82803 BLOOD GASES ANY COMBINATION: CPT

## 2025-01-07 PROCEDURE — 2500000003 HC RX 250 WO HCPCS: Performed by: INTERNAL MEDICINE

## 2025-01-07 PROCEDURE — 82270 OCCULT BLOOD FECES: CPT

## 2025-01-07 PROCEDURE — 2700000000 HC OXYGEN THERAPY PER DAY

## 2025-01-07 PROCEDURE — 6360000002 HC RX W HCPCS: Performed by: INTERNAL MEDICINE

## 2025-01-07 PROCEDURE — 2580000003 HC RX 258: Performed by: STUDENT IN AN ORGANIZED HEALTH CARE EDUCATION/TRAINING PROGRAM

## 2025-01-07 PROCEDURE — 6370000000 HC RX 637 (ALT 250 FOR IP): Performed by: NURSE PRACTITIONER

## 2025-01-07 PROCEDURE — 85025 COMPLETE CBC W/AUTO DIFF WBC: CPT

## 2025-01-07 PROCEDURE — 2500000003 HC RX 250 WO HCPCS: Performed by: STUDENT IN AN ORGANIZED HEALTH CARE EDUCATION/TRAINING PROGRAM

## 2025-01-07 PROCEDURE — 82947 ASSAY GLUCOSE BLOOD QUANT: CPT

## 2025-01-07 PROCEDURE — 30233N1 TRANSFUSION OF NONAUTOLOGOUS RED BLOOD CELLS INTO PERIPHERAL VEIN, PERCUTANEOUS APPROACH: ICD-10-PCS | Performed by: INTERNAL MEDICINE

## 2025-01-07 PROCEDURE — 6370000000 HC RX 637 (ALT 250 FOR IP): Performed by: STUDENT IN AN ORGANIZED HEALTH CARE EDUCATION/TRAINING PROGRAM

## 2025-01-07 PROCEDURE — 83735 ASSAY OF MAGNESIUM: CPT

## 2025-01-07 PROCEDURE — 94761 N-INVAS EAR/PLS OXIMETRY MLT: CPT

## 2025-01-07 PROCEDURE — 85014 HEMATOCRIT: CPT

## 2025-01-07 PROCEDURE — 99291 CRITICAL CARE FIRST HOUR: CPT | Performed by: INTERNAL MEDICINE

## 2025-01-07 PROCEDURE — 90945 DIALYSIS ONE EVALUATION: CPT

## 2025-01-07 PROCEDURE — 93308 TTE F-UP OR LMTD: CPT | Performed by: STUDENT IN AN ORGANIZED HEALTH CARE EDUCATION/TRAINING PROGRAM

## 2025-01-07 PROCEDURE — 36430 TRANSFUSION BLD/BLD COMPNT: CPT

## 2025-01-07 PROCEDURE — 93308 TTE F-UP OR LMTD: CPT

## 2025-01-07 PROCEDURE — 2500000003 HC RX 250 WO HCPCS: Performed by: HOSPITALIST

## 2025-01-07 PROCEDURE — 2100000000 HC CCU R&B

## 2025-01-07 PROCEDURE — 82330 ASSAY OF CALCIUM: CPT

## 2025-01-07 PROCEDURE — 93325 DOPPLER ECHO COLOR FLOW MAPG: CPT | Performed by: STUDENT IN AN ORGANIZED HEALTH CARE EDUCATION/TRAINING PROGRAM

## 2025-01-07 PROCEDURE — 85347 COAGULATION TIME ACTIVATED: CPT

## 2025-01-07 PROCEDURE — 85027 COMPLETE CBC AUTOMATED: CPT

## 2025-01-07 PROCEDURE — 80069 RENAL FUNCTION PANEL: CPT

## 2025-01-07 PROCEDURE — 2580000003 HC RX 258: Performed by: INTERNAL MEDICINE

## 2025-01-07 PROCEDURE — 2580000003 HC RX 258

## 2025-01-07 PROCEDURE — 6360000002 HC RX W HCPCS: Performed by: STUDENT IN AN ORGANIZED HEALTH CARE EDUCATION/TRAINING PROGRAM

## 2025-01-07 PROCEDURE — 93926 LOWER EXTREMITY STUDY: CPT

## 2025-01-07 PROCEDURE — 84100 ASSAY OF PHOSPHORUS: CPT

## 2025-01-07 PROCEDURE — 94003 VENT MGMT INPAT SUBQ DAY: CPT

## 2025-01-07 PROCEDURE — 71045 X-RAY EXAM CHEST 1 VIEW: CPT

## 2025-01-07 PROCEDURE — 99291 CRITICAL CARE FIRST HOUR: CPT | Performed by: STUDENT IN AN ORGANIZED HEALTH CARE EDUCATION/TRAINING PROGRAM

## 2025-01-07 PROCEDURE — 93926 LOWER EXTREMITY STUDY: CPT | Performed by: INTERNAL MEDICINE

## 2025-01-07 RX ORDER — DEXTROSE MONOHYDRATE 100 MG/ML
INJECTION, SOLUTION INTRAVENOUS CONTINUOUS PRN
Status: DISCONTINUED | OUTPATIENT
Start: 2025-01-07 | End: 2025-01-20 | Stop reason: HOSPADM

## 2025-01-07 RX ORDER — INSULIN LISPRO 100 [IU]/ML
0-4 INJECTION, SOLUTION INTRAVENOUS; SUBCUTANEOUS EVERY 4 HOURS
Status: DISCONTINUED | OUTPATIENT
Start: 2025-01-07 | End: 2025-01-17

## 2025-01-07 RX ORDER — SODIUM CHLORIDE 9 MG/ML
INJECTION, SOLUTION INTRAVENOUS
Status: DISPENSED
Start: 2025-01-07 | End: 2025-01-08

## 2025-01-07 RX ORDER — GLUCAGON 1 MG/ML
1 KIT INJECTION PRN
Status: DISCONTINUED | OUTPATIENT
Start: 2025-01-07 | End: 2025-01-20 | Stop reason: HOSPADM

## 2025-01-07 RX ORDER — SODIUM CHLORIDE 9 MG/ML
INJECTION, SOLUTION INTRAVENOUS
Status: COMPLETED
Start: 2025-01-07 | End: 2025-01-07

## 2025-01-07 RX ORDER — SODIUM CHLORIDE 9 MG/ML
INJECTION, SOLUTION INTRAVENOUS PRN
Status: DISCONTINUED | OUTPATIENT
Start: 2025-01-07 | End: 2025-01-10

## 2025-01-07 RX ORDER — CALCIUM CHLORIDE, MAGNESIUM CHLORIDE, DEXTROSE MONOHYDRATE, LACTIC ACID, SODIUM CHLORIDE, SODIUM BICARBONATE AND POTASSIUM CHLORIDE 5.15; 2.03; 22; 5.4; 6.46; 3.09; .157 G/L; G/L; G/L; G/L; G/L; G/L; G/L
INJECTION INTRAVENOUS CONTINUOUS
Status: DISCONTINUED | OUTPATIENT
Start: 2025-01-07 | End: 2025-01-08

## 2025-01-07 RX ADMIN — HEPARIN SODIUM 1700 UNITS/HR: 10000 INJECTION, SOLUTION INTRAVENOUS at 04:41

## 2025-01-07 RX ADMIN — Medication 5 MCG/MIN: at 23:20

## 2025-01-07 RX ADMIN — Medication: at 00:58

## 2025-01-07 RX ADMIN — ASPIRIN 81 MG: 81 TABLET, CHEWABLE ORAL at 10:17

## 2025-01-07 RX ADMIN — Medication 75 MCG/HR: at 06:50

## 2025-01-07 RX ADMIN — CALCIUM GLUCONATE 1000 MG: 20 INJECTION, SOLUTION INTRAVENOUS at 13:47

## 2025-01-07 RX ADMIN — INSULIN LISPRO 1 UNITS: 100 INJECTION, SOLUTION INTRAVENOUS; SUBCUTANEOUS at 22:33

## 2025-01-07 RX ADMIN — VASOPRESSIN 0.03 UNITS/MIN: 20 INJECTION INTRAVENOUS at 14:00

## 2025-01-07 RX ADMIN — SODIUM CHLORIDE, PRESERVATIVE FREE 10 ML: 5 INJECTION INTRAVENOUS at 10:18

## 2025-01-07 RX ADMIN — PROPOFOL 25 MCG/KG/MIN: 10 INJECTION, EMULSION INTRAVENOUS at 03:00

## 2025-01-07 RX ADMIN — PROPOFOL 25 MCG/KG/MIN: 10 INJECTION, EMULSION INTRAVENOUS at 13:51

## 2025-01-07 RX ADMIN — CALCIUM CHLORIDE, MAGNESIUM CHLORIDE, DEXTROSE MONOHYDRATE, LACTIC ACID, SODIUM CHLORIDE, SODIUM BICARBONATE AND POTASSIUM CHLORIDE: 5.15; 2.03; 22; 5.4; 6.46; 3.09; .157 INJECTION INTRAVENOUS at 20:21

## 2025-01-07 RX ADMIN — PROPOFOL 25 MCG/KG/MIN: 10 INJECTION, EMULSION INTRAVENOUS at 20:24

## 2025-01-07 RX ADMIN — VASOPRESSIN 0.03 UNITS/MIN: 20 INJECTION INTRAVENOUS at 23:08

## 2025-01-07 RX ADMIN — SODIUM CHLORIDE 500 ML: 9 INJECTION, SOLUTION INTRAVENOUS at 01:48

## 2025-01-07 RX ADMIN — SODIUM CHLORIDE, PRESERVATIVE FREE 10 ML: 5 INJECTION INTRAVENOUS at 10:17

## 2025-01-07 RX ADMIN — WATER 2000 MG: 1 INJECTION INTRAMUSCULAR; INTRAVENOUS; SUBCUTANEOUS at 15:45

## 2025-01-07 RX ADMIN — AMIODARONE HYDROCHLORIDE 0.5 MG/MIN: 1.8 INJECTION, SOLUTION INTRAVENOUS at 09:04

## 2025-01-07 RX ADMIN — HEPARIN SODIUM 1800 UNITS/HR: 10000 INJECTION, SOLUTION INTRAVENOUS at 20:27

## 2025-01-07 RX ADMIN — AMIODARONE HYDROCHLORIDE 0.5 MG/MIN: 1.8 INJECTION, SOLUTION INTRAVENOUS at 22:42

## 2025-01-07 RX ADMIN — CALCIUM CHLORIDE, MAGNESIUM CHLORIDE, DEXTROSE MONOHYDRATE, LACTIC ACID, SODIUM CHLORIDE, SODIUM BICARBONATE AND POTASSIUM CHLORIDE: 5.15; 2.03; 22; 5.4; 6.46; 3.09; .157 INJECTION INTRAVENOUS at 15:21

## 2025-01-07 RX ADMIN — Medication: at 04:53

## 2025-01-07 RX ADMIN — PANTOPRAZOLE SODIUM 40 MG: 40 INJECTION, POWDER, FOR SOLUTION INTRAVENOUS at 10:16

## 2025-01-07 RX ADMIN — Medication 75 MCG/HR: at 18:41

## 2025-01-07 RX ADMIN — CALCIUM CHLORIDE INJECTION 1000 MG: 100 INJECTION, SOLUTION INTRAVENOUS at 19:37

## 2025-01-07 RX ADMIN — Medication: at 10:09

## 2025-01-07 RX ADMIN — CALCIUM CHLORIDE, MAGNESIUM CHLORIDE, DEXTROSE MONOHYDRATE, LACTIC ACID, SODIUM CHLORIDE, SODIUM BICARBONATE AND POTASSIUM CHLORIDE: 5.15; 2.03; 22; 5.4; 6.46; 3.09; .157 INJECTION INTRAVENOUS at 20:39

## 2025-01-07 RX ADMIN — CALCIUM GLUCONATE 1000 MG: 20 INJECTION, SOLUTION INTRAVENOUS at 05:45

## 2025-01-07 RX ADMIN — CITALOPRAM HYDROBROMIDE 40 MG: 20 TABLET ORAL at 10:17

## 2025-01-07 RX ADMIN — CLOPIDOGREL BISULFATE 75 MG: 75 TABLET ORAL at 10:17

## 2025-01-07 ASSESSMENT — PULMONARY FUNCTION TESTS
PIF_VALUE: 17
PIF_VALUE: 37
PIF_VALUE: 16
PIF_VALUE: 18
PIF_VALUE: 20
PIF_VALUE: 23
PIF_VALUE: 17
PIF_VALUE: 23
PIF_VALUE: 16
PIF_VALUE: 19
PIF_VALUE: 18
PIF_VALUE: 16
PIF_VALUE: 19
PIF_VALUE: 23
PIF_VALUE: 18
PIF_VALUE: 18
PIF_VALUE: 23
PIF_VALUE: 17
PIF_VALUE: 19
PIF_VALUE: 16
PIF_VALUE: 18
PIF_VALUE: 28
PIF_VALUE: 29
PIF_VALUE: 22
PIF_VALUE: 16
PIF_VALUE: 22
PIF_VALUE: 19
PIF_VALUE: 21
PIF_VALUE: 17
PIF_VALUE: 22
PIF_VALUE: 17
PIF_VALUE: 16
PIF_VALUE: 39
PIF_VALUE: 18

## 2025-01-07 ASSESSMENT — PAIN SCALES - GENERAL
PAINLEVEL_OUTOF10: 0
PAINLEVEL_OUTOF10: 1
PAINLEVEL_OUTOF10: 1
PAINLEVEL_OUTOF10: 0
PAINLEVEL_OUTOF10: 0

## 2025-01-08 LAB
ALBUMIN SERPL-MCNC: 2.2 G/DL (ref 3.4–5)
ALBUMIN SERPL-MCNC: 2.5 G/DL (ref 3.4–5)
ALP SERPL-CCNC: 76 U/L (ref 40–129)
ALP SERPL-CCNC: 86 U/L (ref 40–129)
ALT SERPL-CCNC: 11 U/L (ref 10–40)
ALT SERPL-CCNC: 11 U/L (ref 10–40)
ANION GAP SERPL CALCULATED.3IONS-SCNC: 11 MMOL/L (ref 3–16)
ANION GAP SERPL CALCULATED.3IONS-SCNC: 9 MMOL/L (ref 3–16)
ANISOCYTOSIS BLD QL SMEAR: ABNORMAL
AST SERPL-CCNC: 21 U/L (ref 15–37)
AST SERPL-CCNC: 23 U/L (ref 15–37)
BASE EXCESS BLDA CALC-SCNC: 1 MMOL/L (ref -3–3)
BASE EXCESS BLDA CALC-SCNC: 2 MMOL/L (ref -3–3)
BASE EXCESS BLDA CALC-SCNC: 2 MMOL/L (ref -3–3)
BASE EXCESS MIXED: -4
BASE EXCESS MIXED: 0
BASE EXCESS MIXED: 1
BASE EXCESS MIXED: 2
BASE EXCESS MIXED: 2
BASOPHILS # BLD: 0 K/UL (ref 0–0.2)
BASOPHILS # BLD: 0.2 K/UL (ref 0–0.2)
BASOPHILS NFR BLD: 0 %
BASOPHILS NFR BLD: 1 %
BILIRUB DIRECT SERPL-MCNC: 0.2 MG/DL (ref 0–0.3)
BILIRUB DIRECT SERPL-MCNC: 0.2 MG/DL (ref 0–0.3)
BILIRUB INDIRECT SERPL-MCNC: 0.1 MG/DL (ref 0–1)
BILIRUB INDIRECT SERPL-MCNC: 0.3 MG/DL (ref 0–1)
BILIRUB SERPL-MCNC: 0.3 MG/DL (ref 0–1)
BILIRUB SERPL-MCNC: 0.5 MG/DL (ref 0–1)
BUN SERPL-MCNC: 31 MG/DL (ref 7–20)
BUN SERPL-MCNC: 36 MG/DL (ref 7–20)
BURR CELLS BLD QL SMEAR: ABNORMAL
CA-I BLD-SCNC: 1.03 MMOL/L (ref 1.12–1.32)
CA-I BLD-SCNC: 1.05 MMOL/L (ref 1.12–1.32)
CA-I BLD-SCNC: 1.24 MMOL/L (ref 1.12–1.32)
CA-I BLD-SCNC: 1.25 MMOL/L (ref 1.12–1.32)
CA-I BLD-SCNC: 1.27 MMOL/L (ref 1.12–1.32)
CALCIUM SERPL-MCNC: 8.1 MG/DL (ref 8.3–10.6)
CALCIUM SERPL-MCNC: 8.1 MG/DL (ref 8.3–10.6)
CHLORIDE SERPL-SCNC: 99 MMOL/L (ref 99–110)
CHLORIDE SERPL-SCNC: 99 MMOL/L (ref 99–110)
CO2 BLDA-SCNC: 28 MMOL/L
CO2 BLDA-SCNC: 28 MMOL/L
CO2 BLDA-SCNC: 29 MMOL/L
CO2 SERPL-SCNC: 22 MMOL/L (ref 21–32)
CO2 SERPL-SCNC: 23 MMOL/L (ref 21–32)
CREAT SERPL-MCNC: 1.6 MG/DL (ref 0.6–1.2)
CREAT SERPL-MCNC: 1.6 MG/DL (ref 0.6–1.2)
DACRYOCYTES BLD QL SMEAR: ABNORMAL
DACRYOCYTES BLD QL SMEAR: ABNORMAL
DEPRECATED RDW RBC AUTO: 16.5 % (ref 12.4–15.4)
DEPRECATED RDW RBC AUTO: 17.6 % (ref 12.4–15.4)
DEPRECATED RDW RBC AUTO: 18.2 % (ref 12.4–15.4)
DEPRECATED RDW RBC AUTO: 19.7 % (ref 12.4–15.4)
ECHO BSA: 1.95 M2
EOSINOPHIL # BLD: 0 K/UL (ref 0–0.6)
EOSINOPHIL # BLD: 0.6 K/UL (ref 0–0.6)
EOSINOPHIL NFR BLD: 0 %
EOSINOPHIL NFR BLD: 2 %
GFR SERPLBLD CREATININE-BSD FMLA CKD-EPI: 36 ML/MIN/{1.73_M2}
GFR SERPLBLD CREATININE-BSD FMLA CKD-EPI: 36 ML/MIN/{1.73_M2}
GLUCOSE BLD-MCNC: 116 MG/DL (ref 70–99)
GLUCOSE BLD-MCNC: 128 MG/DL (ref 70–99)
GLUCOSE BLD-MCNC: 128 MG/DL (ref 70–99)
GLUCOSE BLD-MCNC: 139 MG/DL (ref 70–99)
GLUCOSE BLD-MCNC: 154 MG/DL (ref 70–99)
GLUCOSE BLD-MCNC: 157 MG/DL (ref 70–99)
GLUCOSE BLD-MCNC: 184 MG/DL (ref 70–99)
GLUCOSE SERPL-MCNC: 127 MG/DL (ref 70–99)
GLUCOSE SERPL-MCNC: 164 MG/DL (ref 70–99)
HCO3 BLDA-SCNC: 26.1 MMOL/L (ref 21–29)
HCO3 BLDA-SCNC: 27 MMOL/L (ref 21–29)
HCO3 BLDA-SCNC: 27.2 MMOL/L (ref 21–29)
HCO3, MIXED: 22 MMOL/L
HCO3, MIXED: 26.3 MMOL/L
HCO3, MIXED: 26.4 MMOL/L
HCO3, MIXED: 27.8 MMOL/L
HCO3, MIXED: 28.1 MMOL/L
HCT VFR BLD AUTO: 20.4 % (ref 36–48)
HCT VFR BLD AUTO: 22 % (ref 36–48)
HCT VFR BLD AUTO: 23.5 % (ref 36–48)
HCT VFR BLD AUTO: 25 % (ref 36–48)
HCT VFR BLD AUTO: 29 % (ref 36–48)
HCT VFR BLD AUTO: 29.4 % (ref 36–48)
HGB BLD CALC-MCNC: 7.5 GM/DL (ref 12–16)
HGB BLD CALC-MCNC: 8.6 GM/DL (ref 12–16)
HGB BLD-MCNC: 6.6 G/DL (ref 12–16)
HGB BLD-MCNC: 7.8 G/DL (ref 12–16)
HGB BLD-MCNC: 9.8 G/DL (ref 12–16)
HGB BLD-MCNC: 9.9 G/DL (ref 12–16)
LACTATE BLD-SCNC: 1.03 MMOL/L (ref 0.4–2)
LYMPHOCYTES # BLD: 0.9 K/UL (ref 1–5.1)
LYMPHOCYTES # BLD: 1.7 K/UL (ref 1–5.1)
LYMPHOCYTES # BLD: 1.7 K/UL (ref 1–5.1)
LYMPHOCYTES # BLD: 3.1 K/UL (ref 1–5.1)
LYMPHOCYTES NFR BLD: 11 %
LYMPHOCYTES NFR BLD: 3 %
LYMPHOCYTES NFR BLD: 7 %
LYMPHOCYTES NFR BLD: 7 %
MACROCYTES BLD QL SMEAR: ABNORMAL
MAGNESIUM SERPL-MCNC: 2.09 MG/DL (ref 1.8–2.4)
MAGNESIUM SERPL-MCNC: 2.23 MG/DL (ref 1.8–2.4)
MCH RBC QN AUTO: 28.3 PG (ref 26–34)
MCH RBC QN AUTO: 28.3 PG (ref 26–34)
MCH RBC QN AUTO: 28.4 PG (ref 26–34)
MCH RBC QN AUTO: 28.4 PG (ref 26–34)
MCHC RBC AUTO-ENTMCNC: 32.5 G/DL (ref 31–36)
MCHC RBC AUTO-ENTMCNC: 33.1 G/DL (ref 31–36)
MCHC RBC AUTO-ENTMCNC: 33.7 G/DL (ref 31–36)
MCHC RBC AUTO-ENTMCNC: 33.7 G/DL (ref 31–36)
MCV RBC AUTO: 83.8 FL (ref 80–100)
MCV RBC AUTO: 84.3 FL (ref 80–100)
MCV RBC AUTO: 85.8 FL (ref 80–100)
MCV RBC AUTO: 87.1 FL (ref 80–100)
METAMYELOCYTES NFR BLD MANUAL: 1 %
METAMYELOCYTES NFR BLD MANUAL: 2 %
METAMYELOCYTES NFR BLD MANUAL: 6 %
MICROCYTES BLD QL SMEAR: ABNORMAL
MONOCYTES # BLD: 1.1 K/UL (ref 0–1.3)
MONOCYTES # BLD: 2.2 K/UL (ref 0–1.3)
MONOCYTES # BLD: 2.7 K/UL (ref 0–1.3)
MONOCYTES # BLD: 3 K/UL (ref 0–1.3)
MONOCYTES NFR BLD: 10 %
MONOCYTES NFR BLD: 11 %
MONOCYTES NFR BLD: 4 %
MONOCYTES NFR BLD: 9 %
MYELOCYTES NFR BLD MANUAL: 1 %
MYELOCYTES NFR BLD MANUAL: 2 %
NEUTROPHILS # BLD: 20 K/UL (ref 1.7–7.7)
NEUTROPHILS # BLD: 20.9 K/UL (ref 1.7–7.7)
NEUTROPHILS # BLD: 23.5 K/UL (ref 1.7–7.7)
NEUTROPHILS # BLD: 25.7 K/UL (ref 1.7–7.7)
NEUTROPHILS NFR BLD: 67 %
NEUTROPHILS NFR BLD: 72 %
NEUTROPHILS NFR BLD: 74 %
NEUTROPHILS NFR BLD: 83 %
NEUTS BAND NFR BLD MANUAL: 10 % (ref 0–7)
NEUTS BAND NFR BLD MANUAL: 12 % (ref 0–7)
NEUTS BAND NFR BLD MANUAL: 5 % (ref 0–7)
O2 SAT, MIXED: 34 %
O2 SAT, MIXED: 35 %
O2 SAT, MIXED: 35 %
O2 SAT, MIXED: 49 %
O2 SAT, MIXED: 53 %
OVALOCYTES BLD QL SMEAR: ABNORMAL
PATH INTERP BLD-IMP: NORMAL
PATH INTERP BLD-IMP: YES
PCO2 BLDA: 44.2 MM HG (ref 35–45)
PCO2 BLDA: 44.5 MM HG (ref 35–45)
PCO2 BLDA: 45.8 MM HG (ref 35–45)
PCO2 MIXED: 40 MM HG
PCO2 MIXED: 48.1 MM HG
PCO2 MIXED: 49.3 MM HG
PCO2 MIXED: 51.6 MM HG
PCO2 MIXED: 54.5 MM HG
PERFORMED ON: ABNORMAL
PH BLDA: 7.38 [PH] (ref 7.35–7.45)
PH BLDA: 7.38 [PH] (ref 7.35–7.45)
PH BLDA: 7.39 [PH] (ref 7.35–7.45)
PH BLDV: 7.41 [PH] (ref 7.35–7.45)
PH BLDV: 7.49 [PH] (ref 7.35–7.45)
PH, MIXED: 7.32 (ref 7.35–7.45)
PH, MIXED: 7.32 (ref 7.35–7.45)
PH, MIXED: 7.35 (ref 7.35–7.45)
PH, MIXED: 7.35 (ref 7.35–7.45)
PH, MIXED: 7.36 (ref 7.35–7.45)
PHOSPHATE SERPL-MCNC: 2.7 MG/DL (ref 2.5–4.9)
PHOSPHATE SERPL-MCNC: 3.5 MG/DL (ref 2.5–4.9)
PLATELET # BLD AUTO: 180 K/UL (ref 135–450)
PLATELET # BLD AUTO: 185 K/UL (ref 135–450)
PLATELET # BLD AUTO: 192 K/UL (ref 135–450)
PLATELET # BLD AUTO: 208 K/UL (ref 135–450)
PLATELET BLD QL SMEAR: ADEQUATE
PLATELET BLD QL SMEAR: ADEQUATE
PMV BLD AUTO: 8.8 FL (ref 5–10.5)
PMV BLD AUTO: 8.9 FL (ref 5–10.5)
PMV BLD AUTO: 9.2 FL (ref 5–10.5)
PMV BLD AUTO: 9.3 FL (ref 5–10.5)
PO2 BLDA: 147.7 MM HG (ref 75–108)
PO2 BLDA: 154.7 MM HG (ref 75–108)
PO2 BLDA: 96.8 MM HG (ref 75–108)
PO2 MIXED: 22 MM HG
PO2 MIXED: 23 MM HG
PO2 MIXED: 23 MM HG
PO2 MIXED: 28 MM HG
PO2 MIXED: 30 MM HG
POC ACT LR: 156 SEC
POC ACT LR: 158 SEC
POC ACT LR: 161 SEC
POC ACT LR: 165 SEC
POC ACT LR: 167 SEC
POC ACT LR: 183 SEC
POC ACT LR: 186 SEC
POC ACT LR: 188 SEC
POC ACT LR: 188 SEC
POC ACT LR: 192 SEC
POC ACT LR: 192 SEC
POC ACT LR: 194 SEC
POC ACT LR: 195 SEC
POC ACT LR: 198 SEC
POC ACT LR: 200 SEC
POC ACT LR: 202 SEC
POC ACT LR: 204 SEC
POC ACT LR: 205 SEC
POC ACT LR: 207 SEC
POC ACT LR: 207 SEC
POC SAMPLE TYPE: ABNORMAL
POLYCHROMASIA BLD QL SMEAR: ABNORMAL
POTASSIUM SERPL-SCNC: 5.1 MMOL/L (ref 3.5–5.1)
POTASSIUM SERPL-SCNC: 5.5 MMOL/L (ref 3.5–5.1)
PROT SERPL-MCNC: 5 G/DL (ref 6.4–8.2)
PROT SERPL-MCNC: 5 G/DL (ref 6.4–8.2)
RBC # BLD AUTO: 2.34 M/UL (ref 4–5.2)
RBC # BLD AUTO: 2.73 M/UL (ref 4–5.2)
RBC # BLD AUTO: 3.46 M/UL (ref 4–5.2)
RBC # BLD AUTO: 3.49 M/UL (ref 4–5.2)
SAO2 % BLDA: 97 % (ref 93–100)
SAO2 % BLDA: 99 % (ref 93–100)
SAO2 % BLDA: 99 % (ref 93–100)
SCHISTOCYTES BLD QL SMEAR: ABNORMAL
SLIDE REVIEW: ABNORMAL
SODIUM SERPL-SCNC: 131 MMOL/L (ref 136–145)
SODIUM SERPL-SCNC: 132 MMOL/L (ref 136–145)
TCO2 CALC MIXED: 23 MMOL/L
TCO2 CALC MIXED: 28 MMOL/L
TCO2 CALC MIXED: 28 MMOL/L
TCO2 CALC MIXED: 29 MMOL/L
TCO2 CALC MIXED: 30 MMOL/L
TRIGL SERPL-MCNC: 147 MG/DL (ref 0–150)
WBC # BLD AUTO: 24.7 K/UL (ref 4–11)
WBC # BLD AUTO: 24.9 K/UL (ref 4–11)
WBC # BLD AUTO: 28.3 K/UL (ref 4–11)
WBC # BLD AUTO: 29.5 K/UL (ref 4–11)

## 2025-01-08 PROCEDURE — 2580000003 HC RX 258: Performed by: STUDENT IN AN ORGANIZED HEALTH CARE EDUCATION/TRAINING PROGRAM

## 2025-01-08 PROCEDURE — 2100000000 HC CCU R&B

## 2025-01-08 PROCEDURE — 83605 ASSAY OF LACTIC ACID: CPT

## 2025-01-08 PROCEDURE — 33992 RMVL PERQ LEFT HEART VAD: CPT | Performed by: STUDENT IN AN ORGANIZED HEALTH CARE EDUCATION/TRAINING PROGRAM

## 2025-01-08 PROCEDURE — 82330 ASSAY OF CALCIUM: CPT

## 2025-01-08 PROCEDURE — 83735 ASSAY OF MAGNESIUM: CPT

## 2025-01-08 PROCEDURE — 85014 HEMATOCRIT: CPT

## 2025-01-08 PROCEDURE — 2580000003 HC RX 258

## 2025-01-08 PROCEDURE — 94761 N-INVAS EAR/PLS OXIMETRY MLT: CPT

## 2025-01-08 PROCEDURE — 6360000002 HC RX W HCPCS: Performed by: INTERNAL MEDICINE

## 2025-01-08 PROCEDURE — 82947 ASSAY GLUCOSE BLOOD QUANT: CPT

## 2025-01-08 PROCEDURE — 84478 ASSAY OF TRIGLYCERIDES: CPT

## 2025-01-08 PROCEDURE — C1760 CLOSURE DEV, VASC: HCPCS | Performed by: STUDENT IN AN ORGANIZED HEALTH CARE EDUCATION/TRAINING PROGRAM

## 2025-01-08 PROCEDURE — 6360000002 HC RX W HCPCS: Performed by: STUDENT IN AN ORGANIZED HEALTH CARE EDUCATION/TRAINING PROGRAM

## 2025-01-08 PROCEDURE — 2709999900 HC NON-CHARGEABLE SUPPLY: Performed by: STUDENT IN AN ORGANIZED HEALTH CARE EDUCATION/TRAINING PROGRAM

## 2025-01-08 PROCEDURE — 2000000000 HC ICU R&B

## 2025-01-08 PROCEDURE — 85025 COMPLETE CBC W/AUTO DIFF WBC: CPT

## 2025-01-08 PROCEDURE — 5A02210 ASSISTANCE WITH CARDIAC OUTPUT USING BALLOON PUMP, CONTINUOUS: ICD-10-PCS | Performed by: STUDENT IN AN ORGANIZED HEALTH CARE EDUCATION/TRAINING PROGRAM

## 2025-01-08 PROCEDURE — 80076 HEPATIC FUNCTION PANEL: CPT

## 2025-01-08 PROCEDURE — 2700000000 HC OXYGEN THERAPY PER DAY

## 2025-01-08 PROCEDURE — 6360000002 HC RX W HCPCS

## 2025-01-08 PROCEDURE — 33968 REMOVE AORTIC ASSIST DEVICE: CPT | Performed by: STUDENT IN AN ORGANIZED HEALTH CARE EDUCATION/TRAINING PROGRAM

## 2025-01-08 PROCEDURE — 90945 DIALYSIS ONE EVALUATION: CPT

## 2025-01-08 PROCEDURE — C1894 INTRO/SHEATH, NON-LASER: HCPCS | Performed by: STUDENT IN AN ORGANIZED HEALTH CARE EDUCATION/TRAINING PROGRAM

## 2025-01-08 PROCEDURE — 99152 MOD SED SAME PHYS/QHP 5/>YRS: CPT | Performed by: STUDENT IN AN ORGANIZED HEALTH CARE EDUCATION/TRAINING PROGRAM

## 2025-01-08 PROCEDURE — 80048 BASIC METABOLIC PNL TOTAL CA: CPT

## 2025-01-08 PROCEDURE — 99291 CRITICAL CARE FIRST HOUR: CPT | Performed by: STUDENT IN AN ORGANIZED HEALTH CARE EDUCATION/TRAINING PROGRAM

## 2025-01-08 PROCEDURE — 36430 TRANSFUSION BLD/BLD COMPNT: CPT

## 2025-01-08 PROCEDURE — 99291 CRITICAL CARE FIRST HOUR: CPT | Performed by: INTERNAL MEDICINE

## 2025-01-08 PROCEDURE — 99153 MOD SED SAME PHYS/QHP EA: CPT | Performed by: STUDENT IN AN ORGANIZED HEALTH CARE EDUCATION/TRAINING PROGRAM

## 2025-01-08 PROCEDURE — 6370000000 HC RX 637 (ALT 250 FOR IP): Performed by: STUDENT IN AN ORGANIZED HEALTH CARE EDUCATION/TRAINING PROGRAM

## 2025-01-08 PROCEDURE — P9045 ALBUMIN (HUMAN), 5%, 250 ML: HCPCS

## 2025-01-08 PROCEDURE — 85347 COAGULATION TIME ACTIVATED: CPT

## 2025-01-08 PROCEDURE — 2500000003 HC RX 250 WO HCPCS: Performed by: INTERNAL MEDICINE

## 2025-01-08 PROCEDURE — 2500000003 HC RX 250 WO HCPCS: Performed by: HOSPITALIST

## 2025-01-08 PROCEDURE — 2500000003 HC RX 250 WO HCPCS: Performed by: STUDENT IN AN ORGANIZED HEALTH CARE EDUCATION/TRAINING PROGRAM

## 2025-01-08 PROCEDURE — 94003 VENT MGMT INPAT SUBQ DAY: CPT

## 2025-01-08 PROCEDURE — 84100 ASSAY OF PHOSPHORUS: CPT

## 2025-01-08 PROCEDURE — 02PA3RZ REMOVAL OF SHORT-TERM EXTERNAL HEART ASSIST SYSTEM FROM HEART, PERCUTANEOUS APPROACH: ICD-10-PCS | Performed by: STUDENT IN AN ORGANIZED HEALTH CARE EDUCATION/TRAINING PROGRAM

## 2025-01-08 PROCEDURE — C1769 GUIDE WIRE: HCPCS | Performed by: STUDENT IN AN ORGANIZED HEALTH CARE EDUCATION/TRAINING PROGRAM

## 2025-01-08 PROCEDURE — 82803 BLOOD GASES ANY COMBINATION: CPT

## 2025-01-08 RX ORDER — SODIUM CHLORIDE 9 MG/ML
INJECTION, SOLUTION INTRAVENOUS
Status: DISPENSED
Start: 2025-01-08 | End: 2025-01-08

## 2025-01-08 RX ORDER — ALBUMIN HUMAN 50 G/1000ML
12.5 SOLUTION INTRAVENOUS ONCE
Status: COMPLETED | OUTPATIENT
Start: 2025-01-08 | End: 2025-01-08

## 2025-01-08 RX ORDER — ALBUMIN HUMAN 50 G/1000ML
SOLUTION INTRAVENOUS
Status: COMPLETED
Start: 2025-01-08 | End: 2025-01-08

## 2025-01-08 RX ORDER — SODIUM CHLORIDE 0.9 % (FLUSH) 0.9 %
5-40 SYRINGE (ML) INJECTION EVERY 12 HOURS SCHEDULED
Status: DISCONTINUED | OUTPATIENT
Start: 2025-01-08 | End: 2025-01-08 | Stop reason: SDUPTHER

## 2025-01-08 RX ORDER — SODIUM CHLORIDE 9 MG/ML
INJECTION, SOLUTION INTRAVENOUS PRN
Status: DISCONTINUED | OUTPATIENT
Start: 2025-01-08 | End: 2025-01-10

## 2025-01-08 RX ORDER — CALCIUM CHLORIDE, MAGNESIUM CHLORIDE, DEXTROSE MONOHYDRATE, LACTIC ACID, SODIUM CHLORIDE, SODIUM BICARBONATE AND POTASSIUM CHLORIDE 5.15; 2.03; 22; 5.4; 6.46; 3.09; .157 G/L; G/L; G/L; G/L; G/L; G/L; G/L
INJECTION INTRAVENOUS CONTINUOUS
Status: DISCONTINUED | OUTPATIENT
Start: 2025-01-08 | End: 2025-01-09

## 2025-01-08 RX ORDER — PANTOPRAZOLE SODIUM 40 MG/10ML
40 INJECTION, POWDER, LYOPHILIZED, FOR SOLUTION INTRAVENOUS 2 TIMES DAILY
Status: DISCONTINUED | OUTPATIENT
Start: 2025-01-08 | End: 2025-01-08

## 2025-01-08 RX ORDER — ACETAMINOPHEN 325 MG/1
650 TABLET ORAL EVERY 4 HOURS PRN
Status: DISCONTINUED | OUTPATIENT
Start: 2025-01-08 | End: 2025-01-08 | Stop reason: SDUPTHER

## 2025-01-08 RX ORDER — SODIUM CHLORIDE 9 MG/ML
INJECTION, SOLUTION INTRAVENOUS PRN
Status: COMPLETED | OUTPATIENT
Start: 2025-01-08 | End: 2025-01-08

## 2025-01-08 RX ORDER — SODIUM CHLORIDE 0.9 % (FLUSH) 0.9 %
5-40 SYRINGE (ML) INJECTION PRN
Status: DISCONTINUED | OUTPATIENT
Start: 2025-01-08 | End: 2025-01-08 | Stop reason: SDUPTHER

## 2025-01-08 RX ORDER — SODIUM CHLORIDE 9 MG/ML
INJECTION, SOLUTION INTRAVENOUS
Status: COMPLETED
Start: 2025-01-08 | End: 2025-01-08

## 2025-01-08 RX ORDER — FENTANYL CITRATE 50 UG/ML
INJECTION, SOLUTION INTRAMUSCULAR; INTRAVENOUS PRN
Status: DISCONTINUED | OUTPATIENT
Start: 2025-01-08 | End: 2025-01-08 | Stop reason: HOSPADM

## 2025-01-08 RX ORDER — SODIUM CHLORIDE 9 MG/ML
INJECTION, SOLUTION INTRAVENOUS PRN
Status: DISCONTINUED | OUTPATIENT
Start: 2025-01-08 | End: 2025-01-08 | Stop reason: SDUPTHER

## 2025-01-08 RX ADMIN — CLOPIDOGREL BISULFATE 75 MG: 75 TABLET ORAL at 10:08

## 2025-01-08 RX ADMIN — PROPOFOL 40 MCG/KG/MIN: 10 INJECTION, EMULSION INTRAVENOUS at 19:15

## 2025-01-08 RX ADMIN — ASPIRIN 81 MG: 81 TABLET, CHEWABLE ORAL at 10:08

## 2025-01-08 RX ADMIN — CALCIUM CHLORIDE, MAGNESIUM CHLORIDE, DEXTROSE MONOHYDRATE, LACTIC ACID, SODIUM CHLORIDE, SODIUM BICARBONATE AND POTASSIUM CHLORIDE: 5.15; 2.03; 22; 5.4; 6.46; 3.09; .157 INJECTION INTRAVENOUS at 07:56

## 2025-01-08 RX ADMIN — HEPARIN SODIUM 5000 UNITS: 5000 INJECTION INTRAVENOUS; SUBCUTANEOUS at 14:22

## 2025-01-08 RX ADMIN — SODIUM CHLORIDE: 9 INJECTION, SOLUTION INTRAVENOUS at 16:37

## 2025-01-08 RX ADMIN — Medication 150 MCG/HR: at 16:25

## 2025-01-08 RX ADMIN — CALCIUM GLUCONATE 1000 MG: 20 INJECTION, SOLUTION INTRAVENOUS at 14:20

## 2025-01-08 RX ADMIN — HEPARIN SODIUM 5000 UNITS: 5000 INJECTION INTRAVENOUS; SUBCUTANEOUS at 14:23

## 2025-01-08 RX ADMIN — CALCIUM CHLORIDE, MAGNESIUM CHLORIDE, DEXTROSE MONOHYDRATE, LACTIC ACID, SODIUM CHLORIDE, SODIUM BICARBONATE AND POTASSIUM CHLORIDE: 5.15; 2.03; 22; 5.4; 6.46; 3.09; .157 INJECTION INTRAVENOUS at 14:56

## 2025-01-08 RX ADMIN — HEPARIN SODIUM 5000 UNITS: 5000 INJECTION INTRAVENOUS; SUBCUTANEOUS at 14:20

## 2025-01-08 RX ADMIN — AMIODARONE HYDROCHLORIDE 0.5 MG/MIN: 1.8 INJECTION, SOLUTION INTRAVENOUS at 11:25

## 2025-01-08 RX ADMIN — SODIUM CHLORIDE, PRESERVATIVE FREE 10 ML: 5 INJECTION INTRAVENOUS at 20:55

## 2025-01-08 RX ADMIN — CALCIUM GLUCONATE 1000 MG: 20 INJECTION, SOLUTION INTRAVENOUS at 18:05

## 2025-01-08 RX ADMIN — CALCIUM CHLORIDE, MAGNESIUM CHLORIDE, DEXTROSE MONOHYDRATE, LACTIC ACID, SODIUM CHLORIDE, SODIUM BICARBONATE AND POTASSIUM CHLORIDE: 5.15; 2.03; 22; 5.4; 6.46; 3.09; .157 INJECTION INTRAVENOUS at 06:34

## 2025-01-08 RX ADMIN — PANTOPRAZOLE SODIUM 40 MG: 40 INJECTION, POWDER, FOR SOLUTION INTRAVENOUS at 10:08

## 2025-01-08 RX ADMIN — CALCIUM CHLORIDE, MAGNESIUM CHLORIDE, DEXTROSE MONOHYDRATE, LACTIC ACID, SODIUM CHLORIDE, SODIUM BICARBONATE AND POTASSIUM CHLORIDE: 5.15; 2.03; 22; 5.4; 6.46; 3.09; .157 INJECTION INTRAVENOUS at 14:57

## 2025-01-08 RX ADMIN — SODIUM CHLORIDE, PRESERVATIVE FREE 10 ML: 5 INJECTION INTRAVENOUS at 15:28

## 2025-01-08 RX ADMIN — CALCIUM CHLORIDE, MAGNESIUM CHLORIDE, DEXTROSE MONOHYDRATE, LACTIC ACID, SODIUM CHLORIDE, SODIUM BICARBONATE AND POTASSIUM CHLORIDE: 5.15; 2.03; 22; 5.4; 6.46; 3.09; .157 INJECTION INTRAVENOUS at 19:45

## 2025-01-08 RX ADMIN — PHENYLEPHRINE HYDROCHLORIDE 30 MCG/MIN: 50 INJECTION INTRAVENOUS at 00:14

## 2025-01-08 RX ADMIN — ALBUMIN HUMAN 12.5 G: 50 SOLUTION INTRAVENOUS at 10:22

## 2025-01-08 RX ADMIN — SODIUM CHLORIDE, PRESERVATIVE FREE 10 ML: 5 INJECTION INTRAVENOUS at 16:36

## 2025-01-08 RX ADMIN — ALBUMIN (HUMAN) 12.5 G: 12.5 INJECTION, SOLUTION INTRAVENOUS at 10:22

## 2025-01-08 RX ADMIN — DOBUTAMINE HYDROCHLORIDE 2.5 MCG/KG/MIN: 200 INJECTION INTRAVENOUS at 09:37

## 2025-01-08 RX ADMIN — PANTOPRAZOLE SODIUM 8 MG/HR: 40 INJECTION, POWDER, FOR SOLUTION INTRAVENOUS at 22:42

## 2025-01-08 RX ADMIN — SODIUM CHLORIDE, PRESERVATIVE FREE 10 ML: 5 INJECTION INTRAVENOUS at 20:54

## 2025-01-08 RX ADMIN — WATER 2000 MG: 1 INJECTION INTRAMUSCULAR; INTRAVENOUS; SUBCUTANEOUS at 15:21

## 2025-01-08 RX ADMIN — Medication 175 MCG/HR: at 02:27

## 2025-01-08 RX ADMIN — CITALOPRAM HYDROBROMIDE 40 MG: 20 TABLET ORAL at 10:08

## 2025-01-08 RX ADMIN — Medication 150 MCG/HR: at 09:36

## 2025-01-08 RX ADMIN — PANTOPRAZOLE SODIUM 8 MG/HR: 40 INJECTION, POWDER, FOR SOLUTION INTRAVENOUS at 11:29

## 2025-01-08 ASSESSMENT — PULMONARY FUNCTION TESTS
PIF_VALUE: 17
PIF_VALUE: 17
PIF_VALUE: 19
PIF_VALUE: 17
PIF_VALUE: 29
PIF_VALUE: 17
PIF_VALUE: 17
PIF_VALUE: 16
PIF_VALUE: 18
PIF_VALUE: 18
PIF_VALUE: 17
PIF_VALUE: 18
PIF_VALUE: 19
PIF_VALUE: 18
PIF_VALUE: 19
PIF_VALUE: 17
PIF_VALUE: 20
PIF_VALUE: 19
PIF_VALUE: 17
PIF_VALUE: 17
PIF_VALUE: 18
PIF_VALUE: 17
PIF_VALUE: 22
PIF_VALUE: 19

## 2025-01-08 ASSESSMENT — PAIN SCALES - GENERAL
PAINLEVEL_OUTOF10: 0

## 2025-01-08 NOTE — SEDATION DOCUMENTATION
Brief Pre-Op Note/Sedation Assessment      Shahida Vásquez  1960  [id number]  11:56 AM    Planned Procedure: Cardiac Catheterization Procedure  Post Procedure Plan: Return to same level of care  Consent: I have discussed with the patient and/or the patient representative the indication, alternatives, and the possible risks and/or complications of the planned procedure and the anesthesia methods. The patient and/or patient representative appear to understand and agree to proceed.        Chief Complaint:   STEMI  Shock    Indications for Cath Procedure:  Presentation:  ACS > 24 hrs, Cardiomyopathy, and LV Dysfunction  2.  Anginal Classification within 2 weeks:  CCS IV - Inability to perform any activity without angina or angina at rest, i.e., severe limitation  3.  Angina Symptoms Assessment:  Typical Chest Pain  4.  Heart Failure Class within last 2 weeks:  Yes:  Heart Failure Type: Systolic Severity:  Class IV - Symptoms of HF at rest  5.  Cardiovascular Instability:  Yes:  Decompensating heart failure and Cardiogenic shock    Prior Ischemic Workup/Eval:  Pre-Procedural Medications: Yes: Aspirin and STATIN  2.   Stress Test Completed?  No    Does Patient need surgery?  Cath Valve Surgery:  No    Pre-Procedure Medical History:  Vital Signs:  BP (!) 116/57   Pulse 68   Temp 97.9 °F (36.6 °C) (Esophageal)   Resp 20   Ht 1.549 m (5' 1\")   Wt 82.4 kg (181 lb 10.5 oz)   SpO2 97%   BMI 34.32 kg/m²     Allergies:    Allergies   Allergen Reactions    Penicillins Rash     Medications:    Current Facility-Administered Medications   Medication Dose Route Frequency Provider Last Rate Last Admin    0.9 % sodium chloride infusion   IntraVENous PRN Ang Enriquez MD        0.9 % sodium chloride infusion   IntraVENous PRN Ang Enriquze MD        sodium chloride 0.9 % infusion             sodium chloride 0.9 % infusion             sodium chloride 0.9 % infusion             pantoprazole (PROTONIX) 80 mg in sodium

## 2025-01-08 NOTE — BRIEF OP NOTE
Brief Postoperative Note      Patient: Shahida Vásquez  YOB: 1960  MRN: [id number]    Date of Procedure: 1/8/2025    Pre-Op Diagnosis: Mixed distributive, hypovolemic, cardiogenic shock    Post-Op Diagnosis: Same       Procedure(s):  Ventricular assist device (VAD) removal    Surgeon(s):  Ang Enriquez MD Mahida, Jeovany JORDAN MD    Anesthesia: Continuation of IV sedation for vent    Estimated Blood Loss (mL): Minimal    Complications: None    Findings:  - Successful decannulation and perclose of RP impella and venous sheath  - Successful IABP removal  - Successful perclose and mynx closure of 9Fr IABP sheath  - Manual pressure held for 15 minutes in lab    Electronically signed by Jeovany Martinez MD on 1/8/2025 at 4:49 PM

## 2025-01-08 NOTE — FLOWSHEET NOTE
Patient returned fro cath lab, vitals as follows.   Impella and Balloon pump out, right groin soft, dressing CDI, no S/S of hematoma.    01/08/25 1345   Vitals   Temp 97.3 °F (36.3 °C)   Temp Source Esophageal   Pulse 65   BP (!) 144/64   MAP (Calculated) 91   BP Location Arterial   Cardiac Rhythm Sinus rhythm   Ectopy PAC;PVC   Ectopy Frequency Frequent   Art Line   ABP (Arterial line BP) 144/64   ABP Mean (Arterial Line Mean) 90 mmHg   Oxygen Therapy   FiO2  30 %   Vent Settings   Resp Rate (Set) 20 bpm   Rate Measured 20 br/min   Vt (Set, mL) 450 mL   Vt (Measured) 467 mL   PEEP/CPAP (cmH2O) 5   Peak Inspiratory Pressure (cmH2O) 22 cmH2O   Blomkest-Pancho   PAP (Systolic/Diastolic) 49/20   PAP (Mean) 31 mmHg   CVP (Mean) 14 mmHg   Impella Left   PANCHO 2.07

## 2025-01-09 PROBLEM — I21.11 ACUTE ST ELEVATION MYOCARDIAL INFARCTION (STEMI) INVOLVING RIGHT CORONARY ARTERY (HCC): Status: ACTIVE | Noted: 2025-01-09

## 2025-01-09 LAB
ANION GAP SERPL CALCULATED.3IONS-SCNC: 10 MMOL/L (ref 3–16)
ANION GAP SERPL CALCULATED.3IONS-SCNC: 9 MMOL/L (ref 3–16)
ANISOCYTOSIS BLD QL SMEAR: ABNORMAL
ANISOCYTOSIS BLD QL SMEAR: ABNORMAL
BASE EXCESS BLDA CALC-SCNC: -3 MMOL/L (ref -3–3)
BASE EXCESS MIXED: 0
BASOPHILS # BLD: 0 K/UL (ref 0–0.2)
BASOPHILS # BLD: 0 K/UL (ref 0–0.2)
BASOPHILS NFR BLD: 0 %
BASOPHILS NFR BLD: 0 %
BUN SERPL-MCNC: 34 MG/DL (ref 7–20)
BUN SERPL-MCNC: 36 MG/DL (ref 7–20)
BURR CELLS BLD QL SMEAR: ABNORMAL
BURR CELLS BLD QL SMEAR: ABNORMAL
CA-I BLD-SCNC: 1.11 MMOL/L (ref 1.12–1.32)
CA-I BLD-SCNC: 1.12 MMOL/L (ref 1.12–1.32)
CALCIUM SERPL-MCNC: 8.4 MG/DL (ref 8.3–10.6)
CALCIUM SERPL-MCNC: 8.4 MG/DL (ref 8.3–10.6)
CHLORIDE SERPL-SCNC: 100 MMOL/L (ref 99–110)
CHLORIDE SERPL-SCNC: 100 MMOL/L (ref 99–110)
CO2 BLDA-SCNC: 24 MMOL/L
CO2 SERPL-SCNC: 23 MMOL/L (ref 21–32)
CO2 SERPL-SCNC: 24 MMOL/L (ref 21–32)
CREAT SERPL-MCNC: 1.5 MG/DL (ref 0.6–1.2)
CREAT SERPL-MCNC: 1.7 MG/DL (ref 0.6–1.2)
DEPRECATED RDW RBC AUTO: 17.5 % (ref 12.4–15.4)
DEPRECATED RDW RBC AUTO: 17.5 % (ref 12.4–15.4)
EOSINOPHIL # BLD: 0 K/UL (ref 0–0.6)
EOSINOPHIL # BLD: 0 K/UL (ref 0–0.6)
EOSINOPHIL NFR BLD: 0 %
EOSINOPHIL NFR BLD: 0 %
GFR SERPLBLD CREATININE-BSD FMLA CKD-EPI: 33 ML/MIN/{1.73_M2}
GFR SERPLBLD CREATININE-BSD FMLA CKD-EPI: 39 ML/MIN/{1.73_M2}
GLUCOSE BLD-MCNC: 103 MG/DL (ref 70–99)
GLUCOSE BLD-MCNC: 110 MG/DL (ref 70–99)
GLUCOSE BLD-MCNC: 113 MG/DL (ref 70–99)
GLUCOSE BLD-MCNC: 143 MG/DL (ref 70–99)
GLUCOSE SERPL-MCNC: 106 MG/DL (ref 70–99)
GLUCOSE SERPL-MCNC: 127 MG/DL (ref 70–99)
HCO3 BLDA-SCNC: 22.4 MMOL/L (ref 21–29)
HCO3, MIXED: 25.1 MMOL/L
HCT VFR BLD AUTO: 22.8 % (ref 36–48)
HCT VFR BLD AUTO: 26.1 % (ref 36–48)
HCT VFR BLD AUTO: 27.6 % (ref 36–48)
HGB BLD-MCNC: 7.5 G/DL (ref 12–16)
HGB BLD-MCNC: 8.7 G/DL (ref 12–16)
HGB BLD-MCNC: 9.3 G/DL (ref 12–16)
LYMPHOCYTES # BLD: 1.1 K/UL (ref 1–5.1)
LYMPHOCYTES # BLD: 1.9 K/UL (ref 1–5.1)
LYMPHOCYTES NFR BLD: 6 %
LYMPHOCYTES NFR BLD: 8 %
MACROCYTES BLD QL SMEAR: ABNORMAL
MACROCYTES BLD QL SMEAR: ABNORMAL
MAGNESIUM SERPL-MCNC: 1.93 MG/DL (ref 1.8–2.4)
MAGNESIUM SERPL-MCNC: 1.99 MG/DL (ref 1.8–2.4)
MCH RBC QN AUTO: 28 PG (ref 26–34)
MCH RBC QN AUTO: 28.3 PG (ref 26–34)
MCHC RBC AUTO-ENTMCNC: 32.7 G/DL (ref 31–36)
MCHC RBC AUTO-ENTMCNC: 33.3 G/DL (ref 31–36)
MCV RBC AUTO: 84.9 FL (ref 80–100)
MCV RBC AUTO: 85.7 FL (ref 80–100)
METAMYELOCYTES NFR BLD MANUAL: 3 %
MICROCYTES BLD QL SMEAR: ABNORMAL
MICROCYTES BLD QL SMEAR: ABNORMAL
MONOCYTES # BLD: 0.2 K/UL (ref 0–1.3)
MONOCYTES # BLD: 0.6 K/UL (ref 0–1.3)
MONOCYTES NFR BLD: 1 %
MONOCYTES NFR BLD: 3 %
NEUTROPHILS # BLD: 17.4 K/UL (ref 1.7–7.7)
NEUTROPHILS # BLD: 21.7 K/UL (ref 1.7–7.7)
NEUTROPHILS NFR BLD: 86 %
NEUTROPHILS NFR BLD: 91 %
NEUTS BAND NFR BLD MANUAL: 2 % (ref 0–7)
NRBC BLD-RTO: 1 /100 WBC
NRBC BLD-RTO: 1 /100 WBC
O2 SAT, MIXED: 45 %
OVALOCYTES BLD QL SMEAR: ABNORMAL
OVALOCYTES BLD QL SMEAR: ABNORMAL
PCO2 BLDA: 36.9 MM HG (ref 35–45)
PCO2 MIXED: 42.2 MM HG
PERFORMED ON: ABNORMAL
PERFORMED ON: NORMAL
PH BLDA: 7.39 [PH] (ref 7.35–7.45)
PH BLDV: 7.38 [PH] (ref 7.35–7.45)
PH BLDV: 7.43 [PH] (ref 7.35–7.45)
PH BLDV: 7.44 [PH] (ref 7.35–7.45)
PH BLDV: 7.46 [PH] (ref 7.35–7.45)
PH, MIXED: 7.38 (ref 7.35–7.45)
PHOSPHATE SERPL-MCNC: 2.2 MG/DL (ref 2.5–4.9)
PHOSPHATE SERPL-MCNC: 2.7 MG/DL (ref 2.5–4.9)
PLATELET # BLD AUTO: 175 K/UL (ref 135–450)
PLATELET # BLD AUTO: 183 K/UL (ref 135–450)
PLATELET BLD QL SMEAR: ADEQUATE
PMV BLD AUTO: 8.8 FL (ref 5–10.5)
PMV BLD AUTO: 9.5 FL (ref 5–10.5)
PO2 BLDA: 103.8 MM HG (ref 75–108)
PO2 MIXED: 26 MM HG
POC SAMPLE TYPE: ABNORMAL
POC SAMPLE TYPE: NORMAL
POLYCHROMASIA BLD QL SMEAR: ABNORMAL
POLYCHROMASIA BLD QL SMEAR: ABNORMAL
POTASSIUM SERPL-SCNC: 4.6 MMOL/L (ref 3.5–5.1)
POTASSIUM SERPL-SCNC: 4.9 MMOL/L (ref 3.5–5.1)
RBC # BLD AUTO: 2.67 M/UL (ref 4–5.2)
RBC # BLD AUTO: 3.08 M/UL (ref 4–5.2)
SAO2 % BLDA: 98 % (ref 93–100)
SCHISTOCYTES BLD QL SMEAR: ABNORMAL
SCHISTOCYTES BLD QL SMEAR: ABNORMAL
SLIDE REVIEW: ABNORMAL
SODIUM SERPL-SCNC: 133 MMOL/L (ref 136–145)
SODIUM SERPL-SCNC: 133 MMOL/L (ref 136–145)
TCO2 CALC MIXED: 26 MMOL/L
TOXIC GRANULES BLD QL SMEAR: PRESENT
WBC # BLD AUTO: 19.1 K/UL (ref 4–11)
WBC # BLD AUTO: 23.8 K/UL (ref 4–11)

## 2025-01-09 PROCEDURE — 76937 US GUIDE VASCULAR ACCESS: CPT

## 2025-01-09 PROCEDURE — 84100 ASSAY OF PHOSPHORUS: CPT

## 2025-01-09 PROCEDURE — 2580000003 HC RX 258: Performed by: INTERNAL MEDICINE

## 2025-01-09 PROCEDURE — 2580000003 HC RX 258: Performed by: STUDENT IN AN ORGANIZED HEALTH CARE EDUCATION/TRAINING PROGRAM

## 2025-01-09 PROCEDURE — 82947 ASSAY GLUCOSE BLOOD QUANT: CPT

## 2025-01-09 PROCEDURE — 80048 BASIC METABOLIC PNL TOTAL CA: CPT

## 2025-01-09 PROCEDURE — 6370000000 HC RX 637 (ALT 250 FOR IP): Performed by: STUDENT IN AN ORGANIZED HEALTH CARE EDUCATION/TRAINING PROGRAM

## 2025-01-09 PROCEDURE — 86923 COMPATIBILITY TEST ELECTRIC: CPT

## 2025-01-09 PROCEDURE — 6360000002 HC RX W HCPCS: Performed by: INTERNAL MEDICINE

## 2025-01-09 PROCEDURE — 2000000000 HC ICU R&B

## 2025-01-09 PROCEDURE — 99291 CRITICAL CARE FIRST HOUR: CPT | Performed by: INTERNAL MEDICINE

## 2025-01-09 PROCEDURE — 85018 HEMOGLOBIN: CPT

## 2025-01-09 PROCEDURE — 2500000003 HC RX 250 WO HCPCS: Performed by: INTERNAL MEDICINE

## 2025-01-09 PROCEDURE — 82330 ASSAY OF CALCIUM: CPT

## 2025-01-09 PROCEDURE — 36415 COLL VENOUS BLD VENIPUNCTURE: CPT

## 2025-01-09 PROCEDURE — 6360000002 HC RX W HCPCS: Performed by: STUDENT IN AN ORGANIZED HEALTH CARE EDUCATION/TRAINING PROGRAM

## 2025-01-09 PROCEDURE — 90945 DIALYSIS ONE EVALUATION: CPT

## 2025-01-09 PROCEDURE — 85014 HEMATOCRIT: CPT

## 2025-01-09 PROCEDURE — 86901 BLOOD TYPING SEROLOGIC RH(D): CPT

## 2025-01-09 PROCEDURE — P9016 RBC LEUKOCYTES REDUCED: HCPCS

## 2025-01-09 PROCEDURE — 2500000003 HC RX 250 WO HCPCS: Performed by: HOSPITALIST

## 2025-01-09 PROCEDURE — 86850 RBC ANTIBODY SCREEN: CPT

## 2025-01-09 PROCEDURE — 2500000003 HC RX 250 WO HCPCS: Performed by: STUDENT IN AN ORGANIZED HEALTH CARE EDUCATION/TRAINING PROGRAM

## 2025-01-09 PROCEDURE — 87040 BLOOD CULTURE FOR BACTERIA: CPT

## 2025-01-09 PROCEDURE — 83735 ASSAY OF MAGNESIUM: CPT

## 2025-01-09 PROCEDURE — 2100000000 HC CCU R&B

## 2025-01-09 PROCEDURE — 86900 BLOOD TYPING SEROLOGIC ABO: CPT

## 2025-01-09 PROCEDURE — 82803 BLOOD GASES ANY COMBINATION: CPT

## 2025-01-09 PROCEDURE — 85025 COMPLETE CBC W/AUTO DIFF WBC: CPT

## 2025-01-09 PROCEDURE — 99232 SBSQ HOSP IP/OBS MODERATE 35: CPT | Performed by: STUDENT IN AN ORGANIZED HEALTH CARE EDUCATION/TRAINING PROGRAM

## 2025-01-09 PROCEDURE — 2580000003 HC RX 258: Performed by: HOSPITALIST

## 2025-01-09 RX ORDER — SODIUM CHLORIDE 0.9 % (FLUSH) 0.9 %
5-40 SYRINGE (ML) INJECTION EVERY 12 HOURS SCHEDULED
Status: DISCONTINUED | OUTPATIENT
Start: 2025-01-09 | End: 2025-01-20 | Stop reason: HOSPADM

## 2025-01-09 RX ORDER — SODIUM CHLORIDE 9 MG/ML
INJECTION, SOLUTION INTRAVENOUS PRN
Status: DISCONTINUED | OUTPATIENT
Start: 2025-01-09 | End: 2025-01-10

## 2025-01-09 RX ORDER — MORPHINE SULFATE 2 MG/ML
2 INJECTION, SOLUTION INTRAMUSCULAR; INTRAVENOUS EVERY 4 HOURS PRN
Status: DISCONTINUED | OUTPATIENT
Start: 2025-01-09 | End: 2025-01-19

## 2025-01-09 RX ORDER — LIDOCAINE HYDROCHLORIDE 20 MG/ML
100 INJECTION, SOLUTION INFILTRATION; PERINEURAL ONCE
Status: DISCONTINUED | OUTPATIENT
Start: 2025-01-09 | End: 2025-01-13

## 2025-01-09 RX ORDER — FUROSEMIDE 10 MG/ML
80 INJECTION INTRAMUSCULAR; INTRAVENOUS ONCE
Status: COMPLETED | OUTPATIENT
Start: 2025-01-09 | End: 2025-01-09

## 2025-01-09 RX ADMIN — CLOPIDOGREL BISULFATE 75 MG: 75 TABLET ORAL at 08:20

## 2025-01-09 RX ADMIN — HEPARIN SODIUM 5000 UNITS: 5000 INJECTION INTRAVENOUS; SUBCUTANEOUS at 20:15

## 2025-01-09 RX ADMIN — SODIUM CHLORIDE: 9 INJECTION, SOLUTION INTRAVENOUS at 07:05

## 2025-01-09 RX ADMIN — PROPOFOL 20 MCG/KG/MIN: 10 INJECTION, EMULSION INTRAVENOUS at 00:47

## 2025-01-09 RX ADMIN — ASPIRIN 81 MG: 81 TABLET, CHEWABLE ORAL at 08:20

## 2025-01-09 RX ADMIN — CALCIUM CHLORIDE, MAGNESIUM CHLORIDE, DEXTROSE MONOHYDRATE, LACTIC ACID, SODIUM CHLORIDE, SODIUM BICARBONATE AND POTASSIUM CHLORIDE: 5.15; 2.03; 22; 5.4; 6.46; 3.09; .157 INJECTION INTRAVENOUS at 02:19

## 2025-01-09 RX ADMIN — Medication 100 MCG/HR: at 03:41

## 2025-01-09 RX ADMIN — VASOPRESSIN 0.03 UNITS/MIN: 20 INJECTION INTRAVENOUS at 07:04

## 2025-01-09 RX ADMIN — SODIUM PHOSPHATE, MONOBASIC, MONOHYDRATE AND SODIUM PHOSPHATE, DIBASIC, ANHYDROUS 6 MMOL: 142; 276 INJECTION, SOLUTION INTRAVENOUS at 15:41

## 2025-01-09 RX ADMIN — WATER 2000 MG: 1 INJECTION INTRAMUSCULAR; INTRAVENOUS; SUBCUTANEOUS at 14:39

## 2025-01-09 RX ADMIN — SODIUM CHLORIDE, PRESERVATIVE FREE 10 ML: 5 INJECTION INTRAVENOUS at 08:20

## 2025-01-09 RX ADMIN — SODIUM CHLORIDE, PRESERVATIVE FREE 10 ML: 5 INJECTION INTRAVENOUS at 19:40

## 2025-01-09 RX ADMIN — MORPHINE SULFATE 2 MG: 2 INJECTION, SOLUTION INTRAMUSCULAR; INTRAVENOUS at 17:09

## 2025-01-09 RX ADMIN — CALCIUM GLUCONATE 1000 MG: 20 INJECTION, SOLUTION INTRAVENOUS at 00:54

## 2025-01-09 RX ADMIN — PANTOPRAZOLE SODIUM 8 MG/HR: 40 INJECTION, POWDER, FOR SOLUTION INTRAVENOUS at 22:18

## 2025-01-09 RX ADMIN — CALCIUM GLUCONATE 1000 MG: 20 INJECTION, SOLUTION INTRAVENOUS at 21:55

## 2025-01-09 RX ADMIN — HEPARIN SODIUM 5000 UNITS: 5000 INJECTION INTRAVENOUS; SUBCUTANEOUS at 01:12

## 2025-01-09 RX ADMIN — SODIUM CHLORIDE: 9 INJECTION, SOLUTION INTRAVENOUS at 00:52

## 2025-01-09 RX ADMIN — Medication: at 07:45

## 2025-01-09 RX ADMIN — Medication: at 21:00

## 2025-01-09 RX ADMIN — FUROSEMIDE 80 MG: 10 INJECTION, SOLUTION INTRAMUSCULAR; INTRAVENOUS at 13:17

## 2025-01-09 RX ADMIN — CALCIUM GLUCONATE 1000 MG: 20 INJECTION, SOLUTION INTRAVENOUS at 06:08

## 2025-01-09 RX ADMIN — HEPARIN SODIUM 5000 UNITS: 5000 INJECTION INTRAVENOUS; SUBCUTANEOUS at 20:48

## 2025-01-09 RX ADMIN — AMIODARONE HYDROCHLORIDE 0.5 MG/MIN: 1.8 INJECTION, SOLUTION INTRAVENOUS at 00:44

## 2025-01-09 RX ADMIN — CITALOPRAM HYDROBROMIDE 40 MG: 20 TABLET ORAL at 08:20

## 2025-01-09 RX ADMIN — AMIODARONE HYDROCHLORIDE 0.5 MG/MIN: 1.8 INJECTION, SOLUTION INTRAVENOUS at 22:20

## 2025-01-09 RX ADMIN — HEPARIN SODIUM 5000 UNITS: 5000 INJECTION INTRAVENOUS; SUBCUTANEOUS at 20:16

## 2025-01-09 RX ADMIN — Medication: at 02:22

## 2025-01-09 RX ADMIN — CALCIUM GLUCONATE 1000 MG: 20 INJECTION, SOLUTION INTRAVENOUS at 16:15

## 2025-01-09 ASSESSMENT — PULMONARY FUNCTION TESTS
PIF_VALUE: 17
PIF_VALUE: 17
PIF_VALUE: 18
PIF_VALUE: 23
PIF_VALUE: 17
PIF_VALUE: 19
PIF_VALUE: 14
PIF_VALUE: 17
PIF_VALUE: 19
PIF_VALUE: 23
PIF_VALUE: 12

## 2025-01-09 ASSESSMENT — PAIN SCALES - GENERAL
PAINLEVEL_OUTOF10: 0

## 2025-01-09 NOTE — PROCEDURES
PROCEDURE NOTE  Date: 1/9/2025   Name: Shahida Vásquez  YOB: 1960    Procedures Insert PICC    Instructed to use R arm by Dr Evangelista. Assessed vessels high in upper right arm. Vessels measure too small for PICC placement. In order to allow proper blood flow around PICC to decrease causing a DVT/SVT SOP is not to use > 45% of the vessel with PICC.    Patients vessels will not support  a 5fr TL PICC. Though not recorded a 4Fr  PICC is also seen to be close to capacity if not over.    Cephalic:52%      Basilic 61% - not pictured      Brachial 63%      Cannot place PICC at this time due to inadequate vessel size.

## 2025-01-10 LAB
ABO + RH BLD: NORMAL
ALBUMIN SERPL-MCNC: 2.1 G/DL (ref 3.4–5)
ALP SERPL-CCNC: 71 U/L (ref 40–129)
ALT SERPL-CCNC: 8 U/L (ref 10–40)
ANION GAP SERPL CALCULATED.3IONS-SCNC: 10 MMOL/L (ref 3–16)
ANION GAP SERPL CALCULATED.3IONS-SCNC: 11 MMOL/L (ref 3–16)
ANISOCYTOSIS BLD QL SMEAR: ABNORMAL
ANISOCYTOSIS BLD QL SMEAR: ABNORMAL
AST SERPL-CCNC: 20 U/L (ref 15–37)
BASOPHILS # BLD: 0 K/UL (ref 0–0.2)
BASOPHILS # BLD: 0 K/UL (ref 0–0.2)
BASOPHILS NFR BLD: 0 %
BASOPHILS NFR BLD: 0 %
BILIRUB DIRECT SERPL-MCNC: 0.3 MG/DL (ref 0–0.3)
BILIRUB INDIRECT SERPL-MCNC: 0.1 MG/DL (ref 0–1)
BILIRUB SERPL-MCNC: 0.4 MG/DL (ref 0–1)
BLD GP AB SCN SERPL QL: NORMAL
BLD GP AB SCN SERPL QL: NORMAL
BLOOD BANK DISPENSE STATUS: NORMAL
BLOOD BANK PRODUCT CODE: NORMAL
BPU ID: NORMAL
BUN SERPL-MCNC: 32 MG/DL (ref 7–20)
BUN SERPL-MCNC: 34 MG/DL (ref 7–20)
BURR CELLS BLD QL SMEAR: ABNORMAL
CA-I BLD-SCNC: 1.08 MMOL/L (ref 1.12–1.32)
CA-I BLD-SCNC: 1.09 MMOL/L (ref 1.12–1.32)
CALCIUM SERPL-MCNC: 7.9 MG/DL (ref 8.3–10.6)
CALCIUM SERPL-MCNC: 8 MG/DL (ref 8.3–10.6)
CHLORIDE SERPL-SCNC: 101 MMOL/L (ref 99–110)
CHLORIDE SERPL-SCNC: 103 MMOL/L (ref 99–110)
CO2 SERPL-SCNC: 22 MMOL/L (ref 21–32)
CO2 SERPL-SCNC: 22 MMOL/L (ref 21–32)
CREAT SERPL-MCNC: 1.7 MG/DL (ref 0.6–1.2)
CREAT SERPL-MCNC: 1.7 MG/DL (ref 0.6–1.2)
DEPRECATED RDW RBC AUTO: 16.6 % (ref 12.4–15.4)
DEPRECATED RDW RBC AUTO: 17.3 % (ref 12.4–15.4)
DESCRIPTION BLOOD BANK: NORMAL
EOSINOPHIL # BLD: 0 K/UL (ref 0–0.6)
EOSINOPHIL # BLD: 0 K/UL (ref 0–0.6)
EOSINOPHIL NFR BLD: 0 %
EOSINOPHIL NFR BLD: 0 %
GFR SERPLBLD CREATININE-BSD FMLA CKD-EPI: 33 ML/MIN/{1.73_M2}
GFR SERPLBLD CREATININE-BSD FMLA CKD-EPI: 33 ML/MIN/{1.73_M2}
GLUCOSE BLD-MCNC: 98 MG/DL (ref 70–99)
GLUCOSE SERPL-MCNC: 103 MG/DL (ref 70–99)
GLUCOSE SERPL-MCNC: 89 MG/DL (ref 70–99)
HCT VFR BLD AUTO: 24.6 % (ref 36–48)
HCT VFR BLD AUTO: 25.2 % (ref 36–48)
HGB BLD-MCNC: 8.3 G/DL (ref 12–16)
HGB BLD-MCNC: 8.3 G/DL (ref 12–16)
LYMPHOCYTES # BLD: 0.2 K/UL (ref 1–5.1)
LYMPHOCYTES # BLD: 0.8 K/UL (ref 1–5.1)
LYMPHOCYTES NFR BLD: 1 %
LYMPHOCYTES NFR BLD: 5 %
MACROCYTES BLD QL SMEAR: ABNORMAL
MAGNESIUM SERPL-MCNC: 2.07 MG/DL (ref 1.8–2.4)
MAGNESIUM SERPL-MCNC: 2.12 MG/DL (ref 1.8–2.4)
MCH RBC QN AUTO: 28.7 PG (ref 26–34)
MCH RBC QN AUTO: 28.9 PG (ref 26–34)
MCHC RBC AUTO-ENTMCNC: 33 G/DL (ref 31–36)
MCHC RBC AUTO-ENTMCNC: 33.8 G/DL (ref 31–36)
MCV RBC AUTO: 85.6 FL (ref 80–100)
MCV RBC AUTO: 87.1 FL (ref 80–100)
METAMYELOCYTES NFR BLD MANUAL: 1 %
MICROCYTES BLD QL SMEAR: ABNORMAL
MONOCYTES # BLD: 0.7 K/UL (ref 0–1.3)
MONOCYTES # BLD: 1 K/UL (ref 0–1.3)
MONOCYTES NFR BLD: 4 %
MONOCYTES NFR BLD: 6 %
MYELOCYTES NFR BLD MANUAL: 1 %
NEUTROPHILS # BLD: 15 K/UL (ref 1.7–7.7)
NEUTROPHILS # BLD: 16.3 K/UL (ref 1.7–7.7)
NEUTROPHILS NFR BLD: 88 %
NEUTROPHILS NFR BLD: 89 %
NEUTS BAND NFR BLD MANUAL: 5 % (ref 0–7)
OVALOCYTES BLD QL SMEAR: ABNORMAL
PERFORMED ON: NORMAL
PH BLDV: 7.42 [PH] (ref 7.35–7.45)
PH BLDV: 7.44 [PH] (ref 7.35–7.45)
PHOSPHATE SERPL-MCNC: 2.1 MG/DL (ref 2.5–4.9)
PHOSPHATE SERPL-MCNC: 2.5 MG/DL (ref 2.5–4.9)
PLATELET # BLD AUTO: 143 K/UL (ref 135–450)
PLATELET # BLD AUTO: 158 K/UL (ref 135–450)
PLATELET BLD QL SMEAR: ABNORMAL
PMV BLD AUTO: 8.7 FL (ref 5–10.5)
PMV BLD AUTO: 8.8 FL (ref 5–10.5)
POLYCHROMASIA BLD QL SMEAR: ABNORMAL
POLYCHROMASIA BLD QL SMEAR: ABNORMAL
POTASSIUM SERPL-SCNC: 4 MMOL/L (ref 3.5–5.1)
POTASSIUM SERPL-SCNC: 4.2 MMOL/L (ref 3.5–5.1)
PROT SERPL-MCNC: 4.5 G/DL (ref 6.4–8.2)
RBC # BLD AUTO: 2.87 M/UL (ref 4–5.2)
RBC # BLD AUTO: 2.9 M/UL (ref 4–5.2)
SCHISTOCYTES BLD QL SMEAR: ABNORMAL
SLIDE REVIEW: ABNORMAL
SODIUM SERPL-SCNC: 134 MMOL/L (ref 136–145)
SODIUM SERPL-SCNC: 135 MMOL/L (ref 136–145)
WBC # BLD AUTO: 16.9 K/UL (ref 4–11)
WBC # BLD AUTO: 17.2 K/UL (ref 4–11)

## 2025-01-10 PROCEDURE — 2580000003 HC RX 258: Performed by: INTERNAL MEDICINE

## 2025-01-10 PROCEDURE — 6360000002 HC RX W HCPCS: Performed by: INTERNAL MEDICINE

## 2025-01-10 PROCEDURE — 80076 HEPATIC FUNCTION PANEL: CPT

## 2025-01-10 PROCEDURE — 6370000000 HC RX 637 (ALT 250 FOR IP): Performed by: STUDENT IN AN ORGANIZED HEALTH CARE EDUCATION/TRAINING PROGRAM

## 2025-01-10 PROCEDURE — 2500000003 HC RX 250 WO HCPCS: Performed by: HOSPITALIST

## 2025-01-10 PROCEDURE — 2500000003 HC RX 250 WO HCPCS: Performed by: INTERNAL MEDICINE

## 2025-01-10 PROCEDURE — 82330 ASSAY OF CALCIUM: CPT

## 2025-01-10 PROCEDURE — 90945 DIALYSIS ONE EVALUATION: CPT

## 2025-01-10 PROCEDURE — 36430 TRANSFUSION BLD/BLD COMPNT: CPT

## 2025-01-10 PROCEDURE — 85025 COMPLETE CBC W/AUTO DIFF WBC: CPT

## 2025-01-10 PROCEDURE — 2500000003 HC RX 250 WO HCPCS: Performed by: STUDENT IN AN ORGANIZED HEALTH CARE EDUCATION/TRAINING PROGRAM

## 2025-01-10 PROCEDURE — 83735 ASSAY OF MAGNESIUM: CPT

## 2025-01-10 PROCEDURE — 2580000003 HC RX 258: Performed by: STUDENT IN AN ORGANIZED HEALTH CARE EDUCATION/TRAINING PROGRAM

## 2025-01-10 PROCEDURE — 2000000000 HC ICU R&B

## 2025-01-10 PROCEDURE — 6360000002 HC RX W HCPCS: Performed by: STUDENT IN AN ORGANIZED HEALTH CARE EDUCATION/TRAINING PROGRAM

## 2025-01-10 PROCEDURE — 92523 SPEECH SOUND LANG COMPREHEN: CPT

## 2025-01-10 PROCEDURE — 84100 ASSAY OF PHOSPHORUS: CPT

## 2025-01-10 PROCEDURE — 99233 SBSQ HOSP IP/OBS HIGH 50: CPT | Performed by: INTERNAL MEDICINE

## 2025-01-10 PROCEDURE — 6360000002 HC RX W HCPCS: Performed by: HOSPITALIST

## 2025-01-10 PROCEDURE — 2100000000 HC CCU R&B

## 2025-01-10 PROCEDURE — 6370000000 HC RX 637 (ALT 250 FOR IP): Performed by: INTERNAL MEDICINE

## 2025-01-10 PROCEDURE — 92610 EVALUATE SWALLOWING FUNCTION: CPT

## 2025-01-10 PROCEDURE — 80048 BASIC METABOLIC PNL TOTAL CA: CPT

## 2025-01-10 RX ORDER — PANTOPRAZOLE SODIUM 40 MG/10ML
40 INJECTION, POWDER, LYOPHILIZED, FOR SOLUTION INTRAVENOUS 2 TIMES DAILY
Status: DISCONTINUED | OUTPATIENT
Start: 2025-01-10 | End: 2025-01-20 | Stop reason: HOSPADM

## 2025-01-10 RX ORDER — FUROSEMIDE 10 MG/ML
40 INJECTION INTRAMUSCULAR; INTRAVENOUS 3 TIMES DAILY
Status: COMPLETED | OUTPATIENT
Start: 2025-01-10 | End: 2025-01-11

## 2025-01-10 RX ORDER — AMIODARONE HYDROCHLORIDE 200 MG/1
200 TABLET ORAL 2 TIMES DAILY
Status: DISCONTINUED | OUTPATIENT
Start: 2025-01-10 | End: 2025-01-10

## 2025-01-10 RX ORDER — DIGOXIN 0.25 MG/ML
250 INJECTION INTRAMUSCULAR; INTRAVENOUS EVERY 4 HOURS
Status: COMPLETED | OUTPATIENT
Start: 2025-01-10 | End: 2025-01-11

## 2025-01-10 RX ORDER — AMIODARONE HYDROCHLORIDE 200 MG/1
200 TABLET ORAL ONCE
Status: COMPLETED | OUTPATIENT
Start: 2025-01-10 | End: 2025-01-10

## 2025-01-10 RX ORDER — AMIODARONE HYDROCHLORIDE 200 MG/1
400 TABLET ORAL 2 TIMES DAILY
Status: DISCONTINUED | OUTPATIENT
Start: 2025-01-11 | End: 2025-01-15

## 2025-01-10 RX ADMIN — ONDANSETRON 4 MG: 2 INJECTION, SOLUTION INTRAMUSCULAR; INTRAVENOUS at 21:51

## 2025-01-10 RX ADMIN — ACETAMINOPHEN 650 MG: 325 TABLET ORAL at 21:50

## 2025-01-10 RX ADMIN — Medication: at 08:18

## 2025-01-10 RX ADMIN — SODIUM CHLORIDE, PRESERVATIVE FREE 10 ML: 5 INJECTION INTRAVENOUS at 10:46

## 2025-01-10 RX ADMIN — AMIODARONE HYDROCHLORIDE 200 MG: 200 TABLET ORAL at 12:38

## 2025-01-10 RX ADMIN — HEPARIN SODIUM 5000 UNITS: 5000 INJECTION INTRAVENOUS; SUBCUTANEOUS at 11:55

## 2025-01-10 RX ADMIN — ASPIRIN 81 MG: 81 TABLET, CHEWABLE ORAL at 10:46

## 2025-01-10 RX ADMIN — MORPHINE SULFATE 2 MG: 2 INJECTION, SOLUTION INTRAMUSCULAR; INTRAVENOUS at 01:35

## 2025-01-10 RX ADMIN — HEPARIN SODIUM 5000 UNITS: 5000 INJECTION INTRAVENOUS; SUBCUTANEOUS at 05:45

## 2025-01-10 RX ADMIN — AMIODARONE HYDROCHLORIDE 200 MG: 200 TABLET ORAL at 22:59

## 2025-01-10 RX ADMIN — SODIUM PHOSPHATE, MONOBASIC, MONOHYDRATE AND SODIUM PHOSPHATE, DIBASIC, ANHYDROUS 20 MMOL: 142; 276 INJECTION, SOLUTION INTRAVENOUS at 12:20

## 2025-01-10 RX ADMIN — AMIODARONE HYDROCHLORIDE 200 MG: 200 TABLET ORAL at 21:51

## 2025-01-10 RX ADMIN — CITALOPRAM HYDROBROMIDE 40 MG: 20 TABLET ORAL at 10:45

## 2025-01-10 RX ADMIN — PANTOPRAZOLE SODIUM 40 MG: 40 INJECTION, POWDER, FOR SOLUTION INTRAVENOUS at 10:45

## 2025-01-10 RX ADMIN — DIGOXIN 250 MCG: 0.25 INJECTION INTRAMUSCULAR; INTRAVENOUS at 22:59

## 2025-01-10 RX ADMIN — AMIODARONE HYDROCHLORIDE 0.5 MG/MIN: 1.8 INJECTION, SOLUTION INTRAVENOUS at 08:38

## 2025-01-10 RX ADMIN — SODIUM CHLORIDE, PRESERVATIVE FREE 10 ML: 5 INJECTION INTRAVENOUS at 21:52

## 2025-01-10 RX ADMIN — WATER 2000 MG: 1 INJECTION INTRAMUSCULAR; INTRAVENOUS; SUBCUTANEOUS at 14:11

## 2025-01-10 RX ADMIN — HEPARIN SODIUM 1300 UNITS: 1000 INJECTION INTRAVENOUS; SUBCUTANEOUS at 12:15

## 2025-01-10 RX ADMIN — SODIUM PHOSPHATE, MONOBASIC, MONOHYDRATE AND SODIUM PHOSPHATE, DIBASIC, ANHYDROUS 6 MMOL: 142; 276 INJECTION, SOLUTION INTRAVENOUS at 02:48

## 2025-01-10 RX ADMIN — FUROSEMIDE 40 MG: 10 INJECTION, SOLUTION INTRAMUSCULAR; INTRAVENOUS at 12:38

## 2025-01-10 RX ADMIN — PANTOPRAZOLE SODIUM 40 MG: 40 INJECTION, POWDER, FOR SOLUTION INTRAVENOUS at 21:51

## 2025-01-10 RX ADMIN — FUROSEMIDE 40 MG: 10 INJECTION, SOLUTION INTRAMUSCULAR; INTRAVENOUS at 21:51

## 2025-01-10 RX ADMIN — HEPARIN SODIUM 5000 UNITS: 5000 INJECTION INTRAVENOUS; SUBCUTANEOUS at 11:54

## 2025-01-10 RX ADMIN — SODIUM CHLORIDE, PRESERVATIVE FREE 10 ML: 5 INJECTION INTRAVENOUS at 21:51

## 2025-01-10 RX ADMIN — CALCIUM GLUCONATE 1000 MG: 20 INJECTION, SOLUTION INTRAVENOUS at 16:46

## 2025-01-10 RX ADMIN — HEPARIN SODIUM 5000 UNITS: 5000 INJECTION INTRAVENOUS; SUBCUTANEOUS at 05:46

## 2025-01-10 RX ADMIN — CLOPIDOGREL BISULFATE 75 MG: 75 TABLET ORAL at 10:45

## 2025-01-10 RX ADMIN — Medication: at 02:20

## 2025-01-10 RX ADMIN — CALCIUM GLUCONATE 1000 MG: 20 INJECTION, SOLUTION INTRAVENOUS at 06:57

## 2025-01-10 RX ADMIN — HEPARIN SODIUM 1200 UNITS: 1000 INJECTION INTRAVENOUS; SUBCUTANEOUS at 12:15

## 2025-01-10 ASSESSMENT — PAIN SCALES - GENERAL
PAINLEVEL_OUTOF10: 0

## 2025-01-11 ENCOUNTER — APPOINTMENT (OUTPATIENT)
Dept: GENERAL RADIOLOGY | Age: 65
DRG: 215 | End: 2025-01-11
Payer: COMMERCIAL

## 2025-01-11 LAB
ANION GAP SERPL CALCULATED.3IONS-SCNC: 13 MMOL/L (ref 3–16)
BLOOD BANK DISPENSE STATUS: NORMAL
BLOOD BANK PRODUCT CODE: NORMAL
BPU ID: NORMAL
BUN SERPL-MCNC: 42 MG/DL (ref 7–20)
CA-I BLD-SCNC: 1.01 MMOL/L (ref 1.12–1.32)
CALCIUM SERPL-MCNC: 7.6 MG/DL (ref 8.3–10.6)
CHLORIDE SERPL-SCNC: 104 MMOL/L (ref 99–110)
CO2 SERPL-SCNC: 22 MMOL/L (ref 21–32)
CREAT SERPL-MCNC: 2.2 MG/DL (ref 0.6–1.2)
DESCRIPTION BLOOD BANK: NORMAL
GFR SERPLBLD CREATININE-BSD FMLA CKD-EPI: 24 ML/MIN/{1.73_M2}
GLUCOSE BLD-MCNC: 102 MG/DL (ref 70–99)
GLUCOSE BLD-MCNC: 106 MG/DL (ref 70–99)
GLUCOSE BLD-MCNC: 84 MG/DL (ref 70–99)
GLUCOSE BLD-MCNC: 91 MG/DL (ref 70–99)
GLUCOSE BLD-MCNC: 92 MG/DL (ref 70–99)
GLUCOSE SERPL-MCNC: 92 MG/DL (ref 70–99)
HBV SURFACE AB SERPL IA-ACNC: <3.5 MIU/ML
HBV SURFACE AG SERPL QL IA: NORMAL
MAGNESIUM SERPL-MCNC: 2.05 MG/DL (ref 1.8–2.4)
PERFORMED ON: ABNORMAL
PERFORMED ON: ABNORMAL
PERFORMED ON: NORMAL
PH BLDV: 7.48 [PH] (ref 7.35–7.45)
POTASSIUM SERPL-SCNC: 4 MMOL/L (ref 3.5–5.1)
SODIUM SERPL-SCNC: 139 MMOL/L (ref 136–145)

## 2025-01-11 PROCEDURE — 86706 HEP B SURFACE ANTIBODY: CPT

## 2025-01-11 PROCEDURE — 2500000003 HC RX 250 WO HCPCS: Performed by: INTERNAL MEDICINE

## 2025-01-11 PROCEDURE — 99232 SBSQ HOSP IP/OBS MODERATE 35: CPT | Performed by: INTERNAL MEDICINE

## 2025-01-11 PROCEDURE — 92526 ORAL FUNCTION THERAPY: CPT

## 2025-01-11 PROCEDURE — 74018 RADEX ABDOMEN 1 VIEW: CPT

## 2025-01-11 PROCEDURE — 6360000002 HC RX W HCPCS: Performed by: INTERNAL MEDICINE

## 2025-01-11 PROCEDURE — 2500000003 HC RX 250 WO HCPCS: Performed by: HOSPITALIST

## 2025-01-11 PROCEDURE — 6370000000 HC RX 637 (ALT 250 FOR IP): Performed by: STUDENT IN AN ORGANIZED HEALTH CARE EDUCATION/TRAINING PROGRAM

## 2025-01-11 PROCEDURE — 36592 COLLECT BLOOD FROM PICC: CPT

## 2025-01-11 PROCEDURE — 5A1D70Z PERFORMANCE OF URINARY FILTRATION, INTERMITTENT, LESS THAN 6 HOURS PER DAY: ICD-10-PCS | Performed by: INTERNAL MEDICINE

## 2025-01-11 PROCEDURE — 82330 ASSAY OF CALCIUM: CPT

## 2025-01-11 PROCEDURE — 83735 ASSAY OF MAGNESIUM: CPT

## 2025-01-11 PROCEDURE — 90935 HEMODIALYSIS ONE EVALUATION: CPT

## 2025-01-11 PROCEDURE — 6370000000 HC RX 637 (ALT 250 FOR IP): Performed by: INTERNAL MEDICINE

## 2025-01-11 PROCEDURE — 6360000002 HC RX W HCPCS: Performed by: STUDENT IN AN ORGANIZED HEALTH CARE EDUCATION/TRAINING PROGRAM

## 2025-01-11 PROCEDURE — 87340 HEPATITIS B SURFACE AG IA: CPT

## 2025-01-11 PROCEDURE — 2000000000 HC ICU R&B

## 2025-01-11 PROCEDURE — 85025 COMPLETE CBC W/AUTO DIFF WBC: CPT

## 2025-01-11 PROCEDURE — 92507 TX SP LANG VOICE COMM INDIV: CPT

## 2025-01-11 PROCEDURE — 80048 BASIC METABOLIC PNL TOTAL CA: CPT

## 2025-01-11 PROCEDURE — 71045 X-RAY EXAM CHEST 1 VIEW: CPT

## 2025-01-11 RX ORDER — HEPARIN SODIUM 1000 [USP'U]/ML
2500 INJECTION, SOLUTION INTRAVENOUS; SUBCUTANEOUS PRN
Status: DISCONTINUED | OUTPATIENT
Start: 2025-01-11 | End: 2025-01-20 | Stop reason: HOSPADM

## 2025-01-11 RX ORDER — WATER 10 ML/10ML
INJECTION INTRAMUSCULAR; INTRAVENOUS; SUBCUTANEOUS
Status: DISPENSED
Start: 2025-01-11 | End: 2025-01-12

## 2025-01-11 RX ADMIN — ASPIRIN 81 MG: 81 TABLET, CHEWABLE ORAL at 08:33

## 2025-01-11 RX ADMIN — SODIUM CHLORIDE, PRESERVATIVE FREE 10 ML: 5 INJECTION INTRAVENOUS at 08:41

## 2025-01-11 RX ADMIN — PANTOPRAZOLE SODIUM 40 MG: 40 INJECTION, POWDER, FOR SOLUTION INTRAVENOUS at 20:30

## 2025-01-11 RX ADMIN — DIGOXIN 250 MCG: 0.25 INJECTION INTRAMUSCULAR; INTRAVENOUS at 02:30

## 2025-01-11 RX ADMIN — ALTEPLASE 4 MG: 2.2 INJECTION, POWDER, LYOPHILIZED, FOR SOLUTION INTRAVENOUS at 14:26

## 2025-01-11 RX ADMIN — PANTOPRAZOLE SODIUM 40 MG: 40 INJECTION, POWDER, FOR SOLUTION INTRAVENOUS at 08:40

## 2025-01-11 RX ADMIN — AMIODARONE HYDROCHLORIDE 400 MG: 200 TABLET ORAL at 20:30

## 2025-01-11 RX ADMIN — AMIODARONE HYDROCHLORIDE 400 MG: 200 TABLET ORAL at 08:33

## 2025-01-11 RX ADMIN — CITALOPRAM HYDROBROMIDE 40 MG: 20 TABLET ORAL at 08:33

## 2025-01-11 RX ADMIN — WATER 2000 MG: 1 INJECTION INTRAMUSCULAR; INTRAVENOUS; SUBCUTANEOUS at 15:03

## 2025-01-11 RX ADMIN — FUROSEMIDE 40 MG: 10 INJECTION, SOLUTION INTRAMUSCULAR; INTRAVENOUS at 08:39

## 2025-01-11 RX ADMIN — CLOPIDOGREL BISULFATE 75 MG: 75 TABLET ORAL at 08:33

## 2025-01-11 RX ADMIN — SODIUM CHLORIDE, PRESERVATIVE FREE 10 ML: 5 INJECTION INTRAVENOUS at 20:31

## 2025-01-11 NOTE — DIALYSIS
Treatment time: 3 hours  Net UF: 2000 ml     Pre weight: 84.2 kg  Post weight:82.2 kg  EDW: TBD kg    Access used: R Vascath     Access function: poorly with -225 ml/min due to high arterial pressures. Post HD lines dwelled with cathflo     Medications or blood products given: Heparin Bolus and Dwells     Regular outpatient schedule: ANALY first treatment today 11/11/25     Summary of response to treatment: Patient tolerated treatment well and without any complications. Patient remained stable throughout entire treatment and upon the exiting the dialysis suite via transport.     Report given to Celeste Hinojosa RN and copy of dialysis treatment record placed in chart, to be scanned into EMR.

## 2025-01-12 ENCOUNTER — APPOINTMENT (OUTPATIENT)
Dept: CT IMAGING | Age: 65
DRG: 215 | End: 2025-01-12
Payer: COMMERCIAL

## 2025-01-12 LAB
ANION GAP SERPL CALCULATED.3IONS-SCNC: 13 MMOL/L (ref 3–16)
ANISOCYTOSIS BLD QL SMEAR: ABNORMAL
BASOPHILS # BLD: 0 K/UL (ref 0–0.2)
BASOPHILS NFR BLD: 0 %
BUN SERPL-MCNC: 48 MG/DL (ref 7–20)
CALCIUM SERPL-MCNC: 7.9 MG/DL (ref 8.3–10.6)
CHLORIDE SERPL-SCNC: 104 MMOL/L (ref 99–110)
CO2 SERPL-SCNC: 23 MMOL/L (ref 21–32)
CREAT SERPL-MCNC: 2.2 MG/DL (ref 0.6–1.2)
DEPRECATED RDW RBC AUTO: 17 % (ref 12.4–15.4)
EOSINOPHIL # BLD: 0.2 K/UL (ref 0–0.6)
EOSINOPHIL NFR BLD: 1 %
GFR SERPLBLD CREATININE-BSD FMLA CKD-EPI: 24 ML/MIN/{1.73_M2}
GLUCOSE BLD-MCNC: 110 MG/DL (ref 70–99)
GLUCOSE BLD-MCNC: 113 MG/DL (ref 70–99)
GLUCOSE BLD-MCNC: 116 MG/DL (ref 70–99)
GLUCOSE BLD-MCNC: 125 MG/DL (ref 70–99)
GLUCOSE SERPL-MCNC: 122 MG/DL (ref 70–99)
HCT VFR BLD AUTO: 23.5 % (ref 36–48)
HGB BLD-MCNC: 7.9 G/DL (ref 12–16)
LYMPHOCYTES # BLD: 0.2 K/UL (ref 1–5.1)
LYMPHOCYTES NFR BLD: 1 %
MACROCYTES BLD QL SMEAR: ABNORMAL
MAGNESIUM SERPL-MCNC: 2 MG/DL (ref 1.8–2.4)
MCH RBC QN AUTO: 29.2 PG (ref 26–34)
MCHC RBC AUTO-ENTMCNC: 33.4 G/DL (ref 31–36)
MCV RBC AUTO: 87.3 FL (ref 80–100)
METAMYELOCYTES NFR BLD MANUAL: 1 %
MICROCYTES BLD QL SMEAR: ABNORMAL
MONOCYTES # BLD: 1.1 K/UL (ref 0–1.3)
MONOCYTES NFR BLD: 7 %
NEUTROPHILS # BLD: 14.9 K/UL (ref 1.7–7.7)
NEUTROPHILS NFR BLD: 89 %
NEUTS BAND NFR BLD MANUAL: 1 % (ref 0–7)
OVALOCYTES BLD QL SMEAR: ABNORMAL
PATH INTERP BLD-IMP: NO
PERFORMED ON: ABNORMAL
PLATELET # BLD AUTO: 225 K/UL (ref 135–450)
PLATELET BLD QL SMEAR: ADEQUATE
PMV BLD AUTO: 8.2 FL (ref 5–10.5)
POLYCHROMASIA BLD QL SMEAR: ABNORMAL
POTASSIUM SERPL-SCNC: 3.6 MMOL/L (ref 3.5–5.1)
RBC # BLD AUTO: 2.69 M/UL (ref 4–5.2)
SLIDE REVIEW: ABNORMAL
SODIUM SERPL-SCNC: 140 MMOL/L (ref 136–145)
WBC # BLD AUTO: 16.4 K/UL (ref 4–11)

## 2025-01-12 PROCEDURE — 99232 SBSQ HOSP IP/OBS MODERATE 35: CPT | Performed by: INTERNAL MEDICINE

## 2025-01-12 PROCEDURE — 80048 BASIC METABOLIC PNL TOTAL CA: CPT

## 2025-01-12 PROCEDURE — 92526 ORAL FUNCTION THERAPY: CPT

## 2025-01-12 PROCEDURE — 6370000000 HC RX 637 (ALT 250 FOR IP): Performed by: INTERNAL MEDICINE

## 2025-01-12 PROCEDURE — 2000000000 HC ICU R&B

## 2025-01-12 PROCEDURE — 6370000000 HC RX 637 (ALT 250 FOR IP): Performed by: STUDENT IN AN ORGANIZED HEALTH CARE EDUCATION/TRAINING PROGRAM

## 2025-01-12 PROCEDURE — 6360000002 HC RX W HCPCS: Performed by: INTERNAL MEDICINE

## 2025-01-12 PROCEDURE — 2500000003 HC RX 250 WO HCPCS: Performed by: HOSPITALIST

## 2025-01-12 PROCEDURE — 2500000003 HC RX 250 WO HCPCS: Performed by: INTERNAL MEDICINE

## 2025-01-12 PROCEDURE — 6360000002 HC RX W HCPCS: Performed by: STUDENT IN AN ORGANIZED HEALTH CARE EDUCATION/TRAINING PROGRAM

## 2025-01-12 PROCEDURE — 83735 ASSAY OF MAGNESIUM: CPT

## 2025-01-12 PROCEDURE — 70450 CT HEAD/BRAIN W/O DYE: CPT

## 2025-01-12 PROCEDURE — 94760 N-INVAS EAR/PLS OXIMETRY 1: CPT

## 2025-01-12 RX ORDER — FUROSEMIDE 10 MG/ML
20 INJECTION INTRAMUSCULAR; INTRAVENOUS ONCE
Status: COMPLETED | OUTPATIENT
Start: 2025-01-12 | End: 2025-01-12

## 2025-01-12 RX ORDER — LOPERAMIDE HYDROCHLORIDE 2 MG/1
2 CAPSULE ORAL 4 TIMES DAILY PRN
Status: DISCONTINUED | OUTPATIENT
Start: 2025-01-12 | End: 2025-01-20 | Stop reason: HOSPADM

## 2025-01-12 RX ADMIN — LOPERAMIDE HYDROCHLORIDE 2 MG: 2 CAPSULE ORAL at 13:53

## 2025-01-12 RX ADMIN — PANTOPRAZOLE SODIUM 40 MG: 40 INJECTION, POWDER, FOR SOLUTION INTRAVENOUS at 20:27

## 2025-01-12 RX ADMIN — SODIUM CHLORIDE, PRESERVATIVE FREE 10 ML: 5 INJECTION INTRAVENOUS at 22:27

## 2025-01-12 RX ADMIN — ACETAMINOPHEN 650 MG: 325 TABLET ORAL at 20:36

## 2025-01-12 RX ADMIN — SODIUM CHLORIDE, PRESERVATIVE FREE 10 ML: 5 INJECTION INTRAVENOUS at 10:12

## 2025-01-12 RX ADMIN — AMIODARONE HYDROCHLORIDE 400 MG: 200 TABLET ORAL at 20:27

## 2025-01-12 RX ADMIN — CITALOPRAM HYDROBROMIDE 40 MG: 20 TABLET ORAL at 10:10

## 2025-01-12 RX ADMIN — WATER 2000 MG: 1 INJECTION INTRAMUSCULAR; INTRAVENOUS; SUBCUTANEOUS at 13:48

## 2025-01-12 RX ADMIN — AMIODARONE HYDROCHLORIDE 400 MG: 200 TABLET ORAL at 10:10

## 2025-01-12 RX ADMIN — CLOPIDOGREL BISULFATE 75 MG: 75 TABLET ORAL at 10:11

## 2025-01-12 RX ADMIN — PANTOPRAZOLE SODIUM 40 MG: 40 INJECTION, POWDER, FOR SOLUTION INTRAVENOUS at 10:12

## 2025-01-12 RX ADMIN — SODIUM CHLORIDE, PRESERVATIVE FREE 10 ML: 5 INJECTION INTRAVENOUS at 10:11

## 2025-01-12 RX ADMIN — ASPIRIN 81 MG: 81 TABLET, CHEWABLE ORAL at 10:11

## 2025-01-12 RX ADMIN — FUROSEMIDE 20 MG: 10 INJECTION, SOLUTION INTRAMUSCULAR; INTRAVENOUS at 13:50

## 2025-01-12 ASSESSMENT — PAIN SCALES - GENERAL
PAINLEVEL_OUTOF10: 0
PAINLEVEL_OUTOF10: 0

## 2025-01-13 LAB
ALBUMIN SERPL-MCNC: 2.7 G/DL (ref 3.4–5)
ALP SERPL-CCNC: 111 U/L (ref 40–129)
ALT SERPL-CCNC: 18 U/L (ref 10–40)
ANION GAP SERPL CALCULATED.3IONS-SCNC: 14 MMOL/L (ref 3–16)
AST SERPL-CCNC: 23 U/L (ref 15–37)
BACTERIA BLD CULT ORG #2: NORMAL
BACTERIA BLD CULT: NORMAL
BASOPHILS # BLD: 0.1 K/UL (ref 0–0.2)
BASOPHILS NFR BLD: 0.2 %
BILIRUB DIRECT SERPL-MCNC: 0.3 MG/DL (ref 0–0.3)
BILIRUB INDIRECT SERPL-MCNC: 0.2 MG/DL (ref 0–1)
BILIRUB SERPL-MCNC: 0.5 MG/DL (ref 0–1)
BUN SERPL-MCNC: 65 MG/DL (ref 7–20)
CALCIUM SERPL-MCNC: 8.3 MG/DL (ref 8.3–10.6)
CHLORIDE SERPL-SCNC: 106 MMOL/L (ref 99–110)
CO2 SERPL-SCNC: 22 MMOL/L (ref 21–32)
CREAT SERPL-MCNC: 2.6 MG/DL (ref 0.6–1.2)
DEPRECATED RDW RBC AUTO: 17.4 % (ref 12.4–15.4)
EOSINOPHIL # BLD: 0.1 K/UL (ref 0–0.6)
EOSINOPHIL NFR BLD: 0.3 %
GFR SERPLBLD CREATININE-BSD FMLA CKD-EPI: 20 ML/MIN/{1.73_M2}
GLUCOSE BLD-MCNC: 118 MG/DL (ref 70–99)
GLUCOSE BLD-MCNC: 118 MG/DL (ref 70–99)
GLUCOSE BLD-MCNC: 121 MG/DL (ref 70–99)
GLUCOSE BLD-MCNC: 127 MG/DL (ref 70–99)
GLUCOSE BLD-MCNC: 133 MG/DL (ref 70–99)
GLUCOSE BLD-MCNC: 154 MG/DL (ref 70–99)
GLUCOSE SERPL-MCNC: 130 MG/DL (ref 70–99)
HCT VFR BLD AUTO: 26.5 % (ref 36–48)
HGB BLD-MCNC: 8.7 G/DL (ref 12–16)
LYMPHOCYTES # BLD: 0.5 K/UL (ref 1–5.1)
LYMPHOCYTES NFR BLD: 2.1 %
MAGNESIUM SERPL-MCNC: 2.18 MG/DL (ref 1.8–2.4)
MCH RBC QN AUTO: 29.1 PG (ref 26–34)
MCHC RBC AUTO-ENTMCNC: 32.9 G/DL (ref 31–36)
MCV RBC AUTO: 88.4 FL (ref 80–100)
MONOCYTES # BLD: 1.7 K/UL (ref 0–1.3)
MONOCYTES NFR BLD: 7.2 %
NEUTROPHILS # BLD: 21 K/UL (ref 1.7–7.7)
NEUTROPHILS NFR BLD: 90.2 %
PERFORMED ON: ABNORMAL
PHOSPHATE SERPL-MCNC: 3.2 MG/DL (ref 2.5–4.9)
PLATELET # BLD AUTO: 352 K/UL (ref 135–450)
PMV BLD AUTO: 7.8 FL (ref 5–10.5)
POTASSIUM SERPL-SCNC: 2.9 MMOL/L (ref 3.5–5.1)
PROT SERPL-MCNC: 5.6 G/DL (ref 6.4–8.2)
RBC # BLD AUTO: 2.99 M/UL (ref 4–5.2)
SODIUM SERPL-SCNC: 142 MMOL/L (ref 136–145)
TRIGL SERPL-MCNC: 189 MG/DL (ref 0–150)
WBC # BLD AUTO: 23.3 K/UL (ref 4–11)

## 2025-01-13 PROCEDURE — 83735 ASSAY OF MAGNESIUM: CPT

## 2025-01-13 PROCEDURE — 99232 SBSQ HOSP IP/OBS MODERATE 35: CPT | Performed by: STUDENT IN AN ORGANIZED HEALTH CARE EDUCATION/TRAINING PROGRAM

## 2025-01-13 PROCEDURE — 97530 THERAPEUTIC ACTIVITIES: CPT

## 2025-01-13 PROCEDURE — 97162 PT EVAL MOD COMPLEX 30 MIN: CPT

## 2025-01-13 PROCEDURE — 6360000002 HC RX W HCPCS: Performed by: INTERNAL MEDICINE

## 2025-01-13 PROCEDURE — 2500000003 HC RX 250 WO HCPCS: Performed by: HOSPITALIST

## 2025-01-13 PROCEDURE — 6370000000 HC RX 637 (ALT 250 FOR IP): Performed by: STUDENT IN AN ORGANIZED HEALTH CARE EDUCATION/TRAINING PROGRAM

## 2025-01-13 PROCEDURE — 84478 ASSAY OF TRIGLYCERIDES: CPT

## 2025-01-13 PROCEDURE — 6360000002 HC RX W HCPCS: Performed by: STUDENT IN AN ORGANIZED HEALTH CARE EDUCATION/TRAINING PROGRAM

## 2025-01-13 PROCEDURE — 97167 OT EVAL HIGH COMPLEX 60 MIN: CPT

## 2025-01-13 PROCEDURE — 6370000000 HC RX 637 (ALT 250 FOR IP): Performed by: INTERNAL MEDICINE

## 2025-01-13 PROCEDURE — 80048 BASIC METABOLIC PNL TOTAL CA: CPT

## 2025-01-13 PROCEDURE — 85025 COMPLETE CBC W/AUTO DIFF WBC: CPT

## 2025-01-13 PROCEDURE — 2000000000 HC ICU R&B

## 2025-01-13 PROCEDURE — 97112 NEUROMUSCULAR REEDUCATION: CPT

## 2025-01-13 PROCEDURE — 2500000003 HC RX 250 WO HCPCS: Performed by: INTERNAL MEDICINE

## 2025-01-13 PROCEDURE — 92526 ORAL FUNCTION THERAPY: CPT

## 2025-01-13 PROCEDURE — 80076 HEPATIC FUNCTION PANEL: CPT

## 2025-01-13 PROCEDURE — 84100 ASSAY OF PHOSPHORUS: CPT

## 2025-01-13 RX ORDER — POTASSIUM CHLORIDE 7.45 MG/ML
10 INJECTION INTRAVENOUS ONCE
Status: DISCONTINUED | OUTPATIENT
Start: 2025-01-13 | End: 2025-01-13

## 2025-01-13 RX ORDER — GAUZE BANDAGE 2" X 2"
100 BANDAGE TOPICAL DAILY
Status: DISCONTINUED | OUTPATIENT
Start: 2025-01-13 | End: 2025-01-20 | Stop reason: HOSPADM

## 2025-01-13 RX ORDER — FUROSEMIDE 10 MG/ML
20 INJECTION INTRAMUSCULAR; INTRAVENOUS
Status: DISCONTINUED | OUTPATIENT
Start: 2025-01-13 | End: 2025-01-14

## 2025-01-13 RX ORDER — FUROSEMIDE 10 MG/ML
20 INJECTION INTRAMUSCULAR; INTRAVENOUS
Status: DISCONTINUED | OUTPATIENT
Start: 2025-01-14 | End: 2025-01-13

## 2025-01-13 RX ORDER — POTASSIUM CHLORIDE 7.45 MG/ML
10 INJECTION INTRAVENOUS
Status: COMPLETED | OUTPATIENT
Start: 2025-01-13 | End: 2025-01-13

## 2025-01-13 RX ORDER — CARVEDILOL 3.12 MG/1
3.12 TABLET ORAL 2 TIMES DAILY WITH MEALS
Status: DISCONTINUED | OUTPATIENT
Start: 2025-01-13 | End: 2025-01-14

## 2025-01-13 RX ADMIN — PANTOPRAZOLE SODIUM 40 MG: 40 INJECTION, POWDER, FOR SOLUTION INTRAVENOUS at 08:23

## 2025-01-13 RX ADMIN — ASPIRIN 81 MG: 81 TABLET, CHEWABLE ORAL at 08:31

## 2025-01-13 RX ADMIN — AMIODARONE HYDROCHLORIDE 400 MG: 200 TABLET ORAL at 19:42

## 2025-01-13 RX ADMIN — POTASSIUM CHLORIDE 10 MEQ: 7.45 INJECTION INTRAVENOUS at 07:05

## 2025-01-13 RX ADMIN — SODIUM CHLORIDE, PRESERVATIVE FREE 10 ML: 5 INJECTION INTRAVENOUS at 19:52

## 2025-01-13 RX ADMIN — CLOPIDOGREL BISULFATE 75 MG: 75 TABLET ORAL at 08:30

## 2025-01-13 RX ADMIN — FUROSEMIDE 20 MG: 10 INJECTION, SOLUTION INTRAMUSCULAR; INTRAVENOUS at 15:39

## 2025-01-13 RX ADMIN — PANTOPRAZOLE SODIUM 40 MG: 40 INJECTION, POWDER, FOR SOLUTION INTRAVENOUS at 19:42

## 2025-01-13 RX ADMIN — POTASSIUM CHLORIDE 10 MEQ: 7.45 INJECTION INTRAVENOUS at 08:30

## 2025-01-13 RX ADMIN — Medication 100 MG: at 14:15

## 2025-01-13 RX ADMIN — SODIUM CHLORIDE, PRESERVATIVE FREE 10 ML: 5 INJECTION INTRAVENOUS at 19:42

## 2025-01-13 RX ADMIN — AMIODARONE HYDROCHLORIDE 400 MG: 200 TABLET ORAL at 08:31

## 2025-01-13 RX ADMIN — SODIUM CHLORIDE, PRESERVATIVE FREE 10 ML: 5 INJECTION INTRAVENOUS at 08:24

## 2025-01-13 RX ADMIN — CARVEDILOL 3.12 MG: 3.12 TABLET, FILM COATED ORAL at 17:09

## 2025-01-13 RX ADMIN — CITALOPRAM HYDROBROMIDE 40 MG: 20 TABLET ORAL at 08:31

## 2025-01-13 ASSESSMENT — PAIN SCALES - GENERAL: PAINLEVEL_OUTOF10: 0

## 2025-01-14 ENCOUNTER — APPOINTMENT (OUTPATIENT)
Dept: GENERAL RADIOLOGY | Age: 65
DRG: 215 | End: 2025-01-14
Payer: COMMERCIAL

## 2025-01-14 LAB
ANION GAP SERPL CALCULATED.3IONS-SCNC: 13 MMOL/L (ref 3–16)
BASOPHILS # BLD: 0 K/UL (ref 0–0.2)
BASOPHILS NFR BLD: 0 %
BUN SERPL-MCNC: 77 MG/DL (ref 7–20)
CALCIUM SERPL-MCNC: 8.2 MG/DL (ref 8.3–10.6)
CHLORIDE SERPL-SCNC: 106 MMOL/L (ref 99–110)
CO2 SERPL-SCNC: 24 MMOL/L (ref 21–32)
CREAT SERPL-MCNC: 2.2 MG/DL (ref 0.6–1.2)
DEPRECATED RDW RBC AUTO: 17.9 % (ref 12.4–15.4)
EKG ATRIAL RATE: 256 BPM
EKG DIAGNOSIS: NORMAL
EKG P AXIS: 258 DEGREES
EKG Q-T INTERVAL: 272 MS
EKG QRS DURATION: 102 MS
EKG QTC CALCULATION (BAZETT): 397 MS
EKG R AXIS: 41 DEGREES
EKG T AXIS: 148 DEGREES
EKG VENTRICULAR RATE: 128 BPM
EOSINOPHIL # BLD: 0 K/UL (ref 0–0.6)
EOSINOPHIL NFR BLD: 0 %
GFR SERPLBLD CREATININE-BSD FMLA CKD-EPI: 24 ML/MIN/{1.73_M2}
GLUCOSE BLD-MCNC: 109 MG/DL (ref 70–99)
GLUCOSE BLD-MCNC: 121 MG/DL (ref 70–99)
GLUCOSE BLD-MCNC: 130 MG/DL (ref 70–99)
GLUCOSE BLD-MCNC: 134 MG/DL (ref 70–99)
GLUCOSE SERPL-MCNC: 120 MG/DL (ref 70–99)
HCT VFR BLD AUTO: 27.6 % (ref 36–48)
HGB BLD-MCNC: 9.2 G/DL (ref 12–16)
HYPOCHROMIA BLD QL SMEAR: ABNORMAL
LYMPHOCYTES # BLD: 1.6 K/UL (ref 1–5.1)
LYMPHOCYTES NFR BLD: 7 %
MACROCYTES BLD QL SMEAR: ABNORMAL
MCH RBC QN AUTO: 29.6 PG (ref 26–34)
MCHC RBC AUTO-ENTMCNC: 33.4 G/DL (ref 31–36)
MCV RBC AUTO: 88.5 FL (ref 80–100)
MONOCYTES # BLD: 2.1 K/UL (ref 0–1.3)
MONOCYTES NFR BLD: 9 %
NEUTROPHILS # BLD: 19.2 K/UL (ref 1.7–7.7)
NEUTROPHILS NFR BLD: 79 %
NEUTS BAND NFR BLD MANUAL: 5 % (ref 0–7)
OVALOCYTES BLD QL SMEAR: ABNORMAL
PERFORMED ON: ABNORMAL
PLATELET # BLD AUTO: 427 K/UL (ref 135–450)
PLATELET BLD QL SMEAR: ADEQUATE
PMV BLD AUTO: 7.9 FL (ref 5–10.5)
POLYCHROMASIA BLD QL SMEAR: ABNORMAL
POTASSIUM SERPL-SCNC: 2.8 MMOL/L (ref 3.5–5.1)
RBC # BLD AUTO: 3.12 M/UL (ref 4–5.2)
SLIDE REVIEW: ABNORMAL
SODIUM SERPL-SCNC: 143 MMOL/L (ref 136–145)
TRIGL SERPL-MCNC: 188 MG/DL (ref 0–150)
WBC # BLD AUTO: 22.9 K/UL (ref 4–11)

## 2025-01-14 PROCEDURE — 74230 X-RAY XM SWLNG FUNCJ C+: CPT

## 2025-01-14 PROCEDURE — 92507 TX SP LANG VOICE COMM INDIV: CPT

## 2025-01-14 PROCEDURE — 2000000000 HC ICU R&B

## 2025-01-14 PROCEDURE — 6360000002 HC RX W HCPCS: Performed by: STUDENT IN AN ORGANIZED HEALTH CARE EDUCATION/TRAINING PROGRAM

## 2025-01-14 PROCEDURE — 97110 THERAPEUTIC EXERCISES: CPT

## 2025-01-14 PROCEDURE — 93010 ELECTROCARDIOGRAM REPORT: CPT | Performed by: INTERNAL MEDICINE

## 2025-01-14 PROCEDURE — 6370000000 HC RX 637 (ALT 250 FOR IP): Performed by: STUDENT IN AN ORGANIZED HEALTH CARE EDUCATION/TRAINING PROGRAM

## 2025-01-14 PROCEDURE — 99232 SBSQ HOSP IP/OBS MODERATE 35: CPT | Performed by: STUDENT IN AN ORGANIZED HEALTH CARE EDUCATION/TRAINING PROGRAM

## 2025-01-14 PROCEDURE — 84478 ASSAY OF TRIGLYCERIDES: CPT

## 2025-01-14 PROCEDURE — 93005 ELECTROCARDIOGRAM TRACING: CPT | Performed by: INTERNAL MEDICINE

## 2025-01-14 PROCEDURE — 2500000003 HC RX 250 WO HCPCS: Performed by: INTERNAL MEDICINE

## 2025-01-14 PROCEDURE — 80048 BASIC METABOLIC PNL TOTAL CA: CPT

## 2025-01-14 PROCEDURE — 92611 MOTION FLUOROSCOPY/SWALLOW: CPT

## 2025-01-14 PROCEDURE — 6370000000 HC RX 637 (ALT 250 FOR IP): Performed by: INTERNAL MEDICINE

## 2025-01-14 PROCEDURE — 97530 THERAPEUTIC ACTIVITIES: CPT

## 2025-01-14 PROCEDURE — 6370000000 HC RX 637 (ALT 250 FOR IP): Performed by: NURSE PRACTITIONER

## 2025-01-14 PROCEDURE — 92526 ORAL FUNCTION THERAPY: CPT

## 2025-01-14 PROCEDURE — 6360000002 HC RX W HCPCS: Performed by: INTERNAL MEDICINE

## 2025-01-14 PROCEDURE — 2500000003 HC RX 250 WO HCPCS: Performed by: HOSPITALIST

## 2025-01-14 PROCEDURE — 85025 COMPLETE CBC W/AUTO DIFF WBC: CPT

## 2025-01-14 PROCEDURE — 94761 N-INVAS EAR/PLS OXIMETRY MLT: CPT

## 2025-01-14 RX ORDER — CARVEDILOL 3.12 MG/1
3.12 TABLET ORAL 2 TIMES DAILY WITH MEALS
Status: DISCONTINUED | OUTPATIENT
Start: 2025-01-14 | End: 2025-01-14

## 2025-01-14 RX ORDER — CARVEDILOL 3.12 MG/1
3.12 TABLET ORAL ONCE
Status: COMPLETED | OUTPATIENT
Start: 2025-01-14 | End: 2025-01-14

## 2025-01-14 RX ORDER — FUROSEMIDE 10 MG/ML
20 INJECTION INTRAMUSCULAR; INTRAVENOUS DAILY
Status: DISCONTINUED | OUTPATIENT
Start: 2025-01-14 | End: 2025-01-18

## 2025-01-14 RX ORDER — CARVEDILOL 6.25 MG/1
6.25 TABLET ORAL 2 TIMES DAILY WITH MEALS
Status: DISCONTINUED | OUTPATIENT
Start: 2025-01-14 | End: 2025-01-15

## 2025-01-14 RX ORDER — CARVEDILOL 3.12 MG/1
3.12 TABLET ORAL ONCE
Status: DISCONTINUED | OUTPATIENT
Start: 2025-01-14 | End: 2025-01-14

## 2025-01-14 RX ORDER — POTASSIUM CHLORIDE 7.45 MG/ML
10 INJECTION INTRAVENOUS
Status: COMPLETED | OUTPATIENT
Start: 2025-01-14 | End: 2025-01-14

## 2025-01-14 RX ADMIN — ASPIRIN 81 MG: 81 TABLET, CHEWABLE ORAL at 10:09

## 2025-01-14 RX ADMIN — AMIODARONE HYDROCHLORIDE 400 MG: 200 TABLET ORAL at 10:08

## 2025-01-14 RX ADMIN — MICONAZOLE NITRATE: 2 POWDER TOPICAL at 10:21

## 2025-01-14 RX ADMIN — SODIUM CHLORIDE, PRESERVATIVE FREE 10 ML: 5 INJECTION INTRAVENOUS at 10:21

## 2025-01-14 RX ADMIN — CARVEDILOL 3.12 MG: 3.12 TABLET, FILM COATED ORAL at 05:30

## 2025-01-14 RX ADMIN — AMIODARONE HYDROCHLORIDE 400 MG: 200 TABLET ORAL at 20:31

## 2025-01-14 RX ADMIN — Medication 100 MG: at 10:08

## 2025-01-14 RX ADMIN — CARVEDILOL 6.25 MG: 6.25 TABLET, FILM COATED ORAL at 17:10

## 2025-01-14 RX ADMIN — PANTOPRAZOLE SODIUM 40 MG: 40 INJECTION, POWDER, FOR SOLUTION INTRAVENOUS at 20:31

## 2025-01-14 RX ADMIN — POTASSIUM CHLORIDE 10 MEQ: 7.45 INJECTION INTRAVENOUS at 06:51

## 2025-01-14 RX ADMIN — PANTOPRAZOLE SODIUM 40 MG: 40 INJECTION, POWDER, FOR SOLUTION INTRAVENOUS at 10:03

## 2025-01-14 RX ADMIN — SODIUM CHLORIDE, PRESERVATIVE FREE 10 ML: 5 INJECTION INTRAVENOUS at 20:32

## 2025-01-14 RX ADMIN — CITALOPRAM HYDROBROMIDE 40 MG: 20 TABLET ORAL at 10:08

## 2025-01-14 RX ADMIN — FUROSEMIDE 20 MG: 10 INJECTION, SOLUTION INTRAMUSCULAR; INTRAVENOUS at 13:19

## 2025-01-14 RX ADMIN — SODIUM CHLORIDE, PRESERVATIVE FREE 10 ML: 5 INJECTION INTRAVENOUS at 10:20

## 2025-01-14 RX ADMIN — CLOPIDOGREL BISULFATE 75 MG: 75 TABLET ORAL at 10:08

## 2025-01-14 RX ADMIN — POTASSIUM CHLORIDE 10 MEQ: 7.45 INJECTION INTRAVENOUS at 10:02

## 2025-01-14 RX ADMIN — MICONAZOLE NITRATE: 2 POWDER TOPICAL at 21:15

## 2025-01-14 RX ADMIN — CARVEDILOL 3.12 MG: 3.12 TABLET, FILM COATED ORAL at 10:09

## 2025-01-14 RX ADMIN — POTASSIUM CHLORIDE 10 MEQ: 7.45 INJECTION INTRAVENOUS at 13:24

## 2025-01-14 RX ADMIN — POTASSIUM CHLORIDE 10 MEQ: 7.45 INJECTION INTRAVENOUS at 14:30

## 2025-01-14 NOTE — PROCEDURES
Facility/Department: [facility]  MODIFIED BARIUM SWALLOW EVALUATION    Patient: Shhaida Vásquez   : 1960   MRN: [id number]      Evaluation Date: 2025   Admitting Diagnosis: STEMI (ST elevation myocardial infarction) (HCC) [I21.3]  Acute ST elevation myocardial infarction (STEMI) involving other coronary artery of inferior wall (HCC) [I21.19]  Treatment Diagnosis: Dysphagia   Pain:  Denies                           Ordering MD: Dr. Alanis   Radiologist: Dr. Rojo   Date of Evaluation: 2025  Type of Study: Modified Barium Swallowing Study (MBS)  Diet Prior to Study:  NPO with nutrition/hydration via alternative means        Impression:  Modified Barium Swallow evaluation completed on 2025.Patient presents with moderate oropharyngeal dysphagia secondary to decreased labial seal, impaired/decreased oral manipulation, impaired/prolonged A-P bolus transit and mastication, premature bolus loss, pharyngeal pooling, inconsistent swallow timing, minimally reduced tongue base retraction, decreased epiglottic inversion, decreased laryngeal elevation, decreased laryngeal vestibule closure, decreased pharyngoesophageal segment opening, reduced laryngeal sensation complicated by prolonged intubation, deconditioning, prolonged NPO, poor dentition resulting in observed  anterior bolus loss with liquids, prolonged oral phase with oral residue, episodes of laryngeal penetration and aspiration with thin liquids and pharyngeal residue. Laryngeal penetration and aspiration of thin liquids appeared to be due to premature bolus loss with pooling to the underside of the epiglottis vs pyriforms, delayed/decreased epiglottic inversion and reduced airway closure. Airway invasion (penetration or aspiration) was viewed in 8 out of 11 thin liquid trials (tsp, cup and straw). Pt with decreased laryngeal sensation for timely reflexive cough in the presence of aspiration resulting in episodes of silent aspiration. Pt did have one

## 2025-01-15 LAB
ALBUMIN SERPL-MCNC: 2.7 G/DL (ref 3.4–5)
ALP SERPL-CCNC: 102 U/L (ref 40–129)
ALT SERPL-CCNC: 24 U/L (ref 10–40)
ANION GAP SERPL CALCULATED.3IONS-SCNC: 12 MMOL/L (ref 3–16)
ANTI-XA UNFRAC HEPARIN: <0.1 IU/ML (ref 0.3–0.7)
APTT BLD: 21.4 SEC (ref 22.1–36.4)
AST SERPL-CCNC: 26 U/L (ref 15–37)
BASOPHILS # BLD: 0 K/UL (ref 0–0.2)
BASOPHILS NFR BLD: 0.1 %
BILIRUB DIRECT SERPL-MCNC: 0.3 MG/DL (ref 0–0.3)
BILIRUB INDIRECT SERPL-MCNC: 0.3 MG/DL (ref 0–1)
BILIRUB SERPL-MCNC: 0.6 MG/DL (ref 0–1)
BUN SERPL-MCNC: 84 MG/DL (ref 7–20)
CALCIUM SERPL-MCNC: 8.2 MG/DL (ref 8.3–10.6)
CHLORIDE SERPL-SCNC: 106 MMOL/L (ref 99–110)
CO2 SERPL-SCNC: 26 MMOL/L (ref 21–32)
CREAT SERPL-MCNC: 1.9 MG/DL (ref 0.6–1.2)
DEPRECATED RDW RBC AUTO: 18.5 % (ref 12.4–15.4)
EOSINOPHIL # BLD: 0.1 K/UL (ref 0–0.6)
EOSINOPHIL NFR BLD: 0.7 %
GFR SERPLBLD CREATININE-BSD FMLA CKD-EPI: 29 ML/MIN/{1.73_M2}
GLUCOSE BLD-MCNC: 100 MG/DL (ref 70–99)
GLUCOSE BLD-MCNC: 102 MG/DL (ref 70–99)
GLUCOSE BLD-MCNC: 104 MG/DL (ref 70–99)
GLUCOSE BLD-MCNC: 113 MG/DL (ref 70–99)
GLUCOSE BLD-MCNC: 114 MG/DL (ref 70–99)
GLUCOSE BLD-MCNC: 128 MG/DL (ref 70–99)
GLUCOSE SERPL-MCNC: 114 MG/DL (ref 70–99)
HCT VFR BLD AUTO: 25.5 % (ref 36–48)
HGB BLD-MCNC: 8.6 G/DL (ref 12–16)
INR PPP: 1.02 (ref 0.85–1.15)
LYMPHOCYTES # BLD: 0.4 K/UL (ref 1–5.1)
LYMPHOCYTES NFR BLD: 2.3 %
MCH RBC QN AUTO: 29.6 PG (ref 26–34)
MCHC RBC AUTO-ENTMCNC: 33.5 G/DL (ref 31–36)
MCV RBC AUTO: 88.3 FL (ref 80–100)
MONOCYTES # BLD: 1.5 K/UL (ref 0–1.3)
MONOCYTES NFR BLD: 8.5 %
NEUTROPHILS # BLD: 15.7 K/UL (ref 1.7–7.7)
NEUTROPHILS NFR BLD: 88.4 %
PERFORMED ON: ABNORMAL
PLATELET # BLD AUTO: 488 K/UL (ref 135–450)
PMV BLD AUTO: 7.7 FL (ref 5–10.5)
POTASSIUM SERPL-SCNC: 3 MMOL/L (ref 3.5–5.1)
PROT SERPL-MCNC: 5.4 G/DL (ref 6.4–8.2)
PROTHROMBIN TIME: 13.6 SEC (ref 11.9–14.9)
RBC # BLD AUTO: 2.89 M/UL (ref 4–5.2)
SODIUM SERPL-SCNC: 144 MMOL/L (ref 136–145)
TRIGL SERPL-MCNC: 160 MG/DL (ref 0–150)
WBC # BLD AUTO: 17.7 K/UL (ref 4–11)

## 2025-01-15 PROCEDURE — 6370000000 HC RX 637 (ALT 250 FOR IP): Performed by: INTERNAL MEDICINE

## 2025-01-15 PROCEDURE — 80076 HEPATIC FUNCTION PANEL: CPT

## 2025-01-15 PROCEDURE — 6360000002 HC RX W HCPCS: Performed by: PHYSICIAN ASSISTANT

## 2025-01-15 PROCEDURE — 85520 HEPARIN ASSAY: CPT

## 2025-01-15 PROCEDURE — 92526 ORAL FUNCTION THERAPY: CPT

## 2025-01-15 PROCEDURE — 92507 TX SP LANG VOICE COMM INDIV: CPT

## 2025-01-15 PROCEDURE — 85730 THROMBOPLASTIN TIME PARTIAL: CPT

## 2025-01-15 PROCEDURE — 80048 BASIC METABOLIC PNL TOTAL CA: CPT

## 2025-01-15 PROCEDURE — 6360000002 HC RX W HCPCS: Performed by: INTERNAL MEDICINE

## 2025-01-15 PROCEDURE — 1200000000 HC SEMI PRIVATE

## 2025-01-15 PROCEDURE — 6370000000 HC RX 637 (ALT 250 FOR IP): Performed by: STUDENT IN AN ORGANIZED HEALTH CARE EDUCATION/TRAINING PROGRAM

## 2025-01-15 PROCEDURE — 6360000002 HC RX W HCPCS: Performed by: STUDENT IN AN ORGANIZED HEALTH CARE EDUCATION/TRAINING PROGRAM

## 2025-01-15 PROCEDURE — 2500000003 HC RX 250 WO HCPCS: Performed by: INTERNAL MEDICINE

## 2025-01-15 PROCEDURE — 85610 PROTHROMBIN TIME: CPT

## 2025-01-15 PROCEDURE — 99291 CRITICAL CARE FIRST HOUR: CPT | Performed by: INTERNAL MEDICINE

## 2025-01-15 PROCEDURE — 85025 COMPLETE CBC W/AUTO DIFF WBC: CPT

## 2025-01-15 PROCEDURE — 84478 ASSAY OF TRIGLYCERIDES: CPT

## 2025-01-15 RX ORDER — HEPARIN SODIUM 10000 [USP'U]/100ML
5-30 INJECTION, SOLUTION INTRAVENOUS CONTINUOUS
Status: DISCONTINUED | OUTPATIENT
Start: 2025-01-15 | End: 2025-01-16

## 2025-01-15 RX ORDER — POTASSIUM CHLORIDE 7.45 MG/ML
10 INJECTION INTRAVENOUS
Status: COMPLETED | OUTPATIENT
Start: 2025-01-15 | End: 2025-01-15

## 2025-01-15 RX ORDER — METOPROLOL SUCCINATE 50 MG/1
100 TABLET, EXTENDED RELEASE ORAL DAILY
Status: DISCONTINUED | OUTPATIENT
Start: 2025-01-15 | End: 2025-01-15

## 2025-01-15 RX ORDER — METOPROLOL TARTRATE 50 MG
50 TABLET ORAL 2 TIMES DAILY
Status: DISCONTINUED | OUTPATIENT
Start: 2025-01-15 | End: 2025-01-16

## 2025-01-15 RX ORDER — HEPARIN SODIUM 1000 [USP'U]/ML
2000 INJECTION, SOLUTION INTRAVENOUS; SUBCUTANEOUS PRN
Status: DISCONTINUED | OUTPATIENT
Start: 2025-01-15 | End: 2025-01-16

## 2025-01-15 RX ORDER — HEPARIN SODIUM 1000 [USP'U]/ML
4000 INJECTION, SOLUTION INTRAVENOUS; SUBCUTANEOUS PRN
Status: DISCONTINUED | OUTPATIENT
Start: 2025-01-15 | End: 2025-01-15 | Stop reason: SDUPTHER

## 2025-01-15 RX ORDER — HEPARIN SODIUM 1000 [USP'U]/ML
4000 INJECTION, SOLUTION INTRAVENOUS; SUBCUTANEOUS ONCE
Status: COMPLETED | OUTPATIENT
Start: 2025-01-15 | End: 2025-01-15

## 2025-01-15 RX ORDER — HEPARIN SODIUM 1000 [USP'U]/ML
4000 INJECTION, SOLUTION INTRAVENOUS; SUBCUTANEOUS PRN
Status: DISCONTINUED | OUTPATIENT
Start: 2025-01-15 | End: 2025-01-16

## 2025-01-15 RX ORDER — HEPARIN SODIUM 1000 [USP'U]/ML
2000 INJECTION, SOLUTION INTRAVENOUS; SUBCUTANEOUS PRN
Status: DISCONTINUED | OUTPATIENT
Start: 2025-01-15 | End: 2025-01-15 | Stop reason: SDUPTHER

## 2025-01-15 RX ADMIN — SODIUM CHLORIDE, PRESERVATIVE FREE 10 ML: 5 INJECTION INTRAVENOUS at 20:40

## 2025-01-15 RX ADMIN — MICONAZOLE NITRATE: 2 POWDER TOPICAL at 08:34

## 2025-01-15 RX ADMIN — HEPARIN SODIUM 12 UNITS/KG/HR: 10000 INJECTION, SOLUTION INTRAVENOUS at 11:41

## 2025-01-15 RX ADMIN — POTASSIUM CHLORIDE 10 MEQ: 7.45 INJECTION INTRAVENOUS at 07:05

## 2025-01-15 RX ADMIN — CARVEDILOL 6.25 MG: 6.25 TABLET, FILM COATED ORAL at 08:32

## 2025-01-15 RX ADMIN — POTASSIUM CHLORIDE 10 MEQ: 7.45 INJECTION INTRAVENOUS at 09:21

## 2025-01-15 RX ADMIN — POTASSIUM CHLORIDE 10 MEQ: 7.45 INJECTION INTRAVENOUS at 05:55

## 2025-01-15 RX ADMIN — POTASSIUM CHLORIDE 10 MEQ: 7.45 INJECTION INTRAVENOUS at 10:22

## 2025-01-15 RX ADMIN — PANTOPRAZOLE SODIUM 40 MG: 40 INJECTION, POWDER, FOR SOLUTION INTRAVENOUS at 08:33

## 2025-01-15 RX ADMIN — MICONAZOLE NITRATE: 2 POWDER TOPICAL at 20:40

## 2025-01-15 RX ADMIN — FUROSEMIDE 20 MG: 10 INJECTION, SOLUTION INTRAMUSCULAR; INTRAVENOUS at 08:32

## 2025-01-15 RX ADMIN — PANTOPRAZOLE SODIUM 40 MG: 40 INJECTION, POWDER, FOR SOLUTION INTRAVENOUS at 20:40

## 2025-01-15 RX ADMIN — METOPROLOL TARTRATE 50 MG: 50 TABLET, FILM COATED ORAL at 20:40

## 2025-01-15 RX ADMIN — HEPARIN SODIUM 4000 UNITS: 1000 INJECTION INTRAVENOUS; SUBCUTANEOUS at 19:10

## 2025-01-15 RX ADMIN — AMIODARONE HYDROCHLORIDE 400 MG: 200 TABLET ORAL at 08:32

## 2025-01-15 RX ADMIN — ASPIRIN 81 MG: 81 TABLET, CHEWABLE ORAL at 08:33

## 2025-01-15 RX ADMIN — CITALOPRAM HYDROBROMIDE 40 MG: 20 TABLET ORAL at 08:32

## 2025-01-15 RX ADMIN — Medication 100 MG: at 08:33

## 2025-01-15 RX ADMIN — METOPROLOL TARTRATE 50 MG: 50 TABLET, FILM COATED ORAL at 11:39

## 2025-01-15 RX ADMIN — HEPARIN SODIUM 4000 UNITS: 1000 INJECTION INTRAVENOUS; SUBCUTANEOUS at 11:39

## 2025-01-15 RX ADMIN — CLOPIDOGREL BISULFATE 75 MG: 75 TABLET ORAL at 08:32

## 2025-01-15 ASSESSMENT — PAIN SCALES - GENERAL: PAINLEVEL_OUTOF10: 0

## 2025-01-16 PROBLEM — R49.0 DYSPHONIA: Status: ACTIVE | Noted: 2025-01-16

## 2025-01-16 PROBLEM — J38.7 LARYNGEAL DISORDER: Status: ACTIVE | Noted: 2025-01-16

## 2025-01-16 PROBLEM — R13.12 OROPHARYNGEAL DYSPHAGIA: Status: ACTIVE | Noted: 2025-01-16

## 2025-01-16 LAB
ANION GAP SERPL CALCULATED.3IONS-SCNC: 12 MMOL/L (ref 3–16)
ANTI-XA UNFRAC HEPARIN: <0.1 IU/ML (ref 0.3–0.7)
ANTI-XA UNFRAC HEPARIN: <0.1 IU/ML (ref 0.3–0.7)
BASOPHILS # BLD: 0 K/UL (ref 0–0.2)
BASOPHILS NFR BLD: 0.3 %
BUN SERPL-MCNC: 87 MG/DL (ref 7–20)
CALCIUM SERPL-MCNC: 7.9 MG/DL (ref 8.3–10.6)
CHLORIDE SERPL-SCNC: 108 MMOL/L (ref 99–110)
CO2 SERPL-SCNC: 26 MMOL/L (ref 21–32)
CREAT SERPL-MCNC: 1.6 MG/DL (ref 0.6–1.2)
DEPRECATED RDW RBC AUTO: 19.1 % (ref 12.4–15.4)
DEPRECATED RDW RBC AUTO: 20.1 % (ref 12.4–15.4)
EOSINOPHIL # BLD: 0.2 K/UL (ref 0–0.6)
EOSINOPHIL NFR BLD: 1 %
GFR SERPLBLD CREATININE-BSD FMLA CKD-EPI: 36 ML/MIN/{1.73_M2}
GLUCOSE BLD-MCNC: 103 MG/DL (ref 70–99)
GLUCOSE BLD-MCNC: 110 MG/DL (ref 70–99)
GLUCOSE BLD-MCNC: 122 MG/DL (ref 70–99)
GLUCOSE BLD-MCNC: 124 MG/DL (ref 70–99)
GLUCOSE BLD-MCNC: 126 MG/DL (ref 70–99)
GLUCOSE BLD-MCNC: 99 MG/DL (ref 70–99)
GLUCOSE SERPL-MCNC: 113 MG/DL (ref 70–99)
HCT VFR BLD AUTO: 24.4 % (ref 36–48)
HCT VFR BLD AUTO: 26.8 % (ref 36–48)
HGB BLD-MCNC: 7.9 G/DL (ref 12–16)
HGB BLD-MCNC: 8.3 G/DL (ref 12–16)
LYMPHOCYTES # BLD: 0.7 K/UL (ref 1–5.1)
LYMPHOCYTES NFR BLD: 4.4 %
MCH RBC QN AUTO: 28.8 PG (ref 26–34)
MCH RBC QN AUTO: 29 PG (ref 26–34)
MCHC RBC AUTO-ENTMCNC: 31 G/DL (ref 31–36)
MCHC RBC AUTO-ENTMCNC: 32.2 G/DL (ref 31–36)
MCV RBC AUTO: 90 FL (ref 80–100)
MCV RBC AUTO: 92.7 FL (ref 80–100)
MONOCYTES # BLD: 1.5 K/UL (ref 0–1.3)
MONOCYTES NFR BLD: 9.1 %
NEUTROPHILS # BLD: 14.2 K/UL (ref 1.7–7.7)
NEUTROPHILS NFR BLD: 85.2 %
PERFORMED ON: ABNORMAL
PERFORMED ON: NORMAL
PLATELET # BLD AUTO: 559 K/UL (ref 135–450)
PLATELET # BLD AUTO: 593 K/UL (ref 135–450)
PMV BLD AUTO: 7.4 FL (ref 5–10.5)
PMV BLD AUTO: 7.6 FL (ref 5–10.5)
POTASSIUM SERPL-SCNC: 3.2 MMOL/L (ref 3.5–5.1)
RBC # BLD AUTO: 2.71 M/UL (ref 4–5.2)
RBC # BLD AUTO: 2.89 M/UL (ref 4–5.2)
SODIUM SERPL-SCNC: 146 MMOL/L (ref 136–145)
WBC # BLD AUTO: 16.6 K/UL (ref 4–11)
WBC # BLD AUTO: 18.2 K/UL (ref 4–11)

## 2025-01-16 PROCEDURE — 6360000002 HC RX W HCPCS: Performed by: INTERNAL MEDICINE

## 2025-01-16 PROCEDURE — 6370000000 HC RX 637 (ALT 250 FOR IP): Performed by: INTERNAL MEDICINE

## 2025-01-16 PROCEDURE — 6360000002 HC RX W HCPCS: Performed by: STUDENT IN AN ORGANIZED HEALTH CARE EDUCATION/TRAINING PROGRAM

## 2025-01-16 PROCEDURE — 92526 ORAL FUNCTION THERAPY: CPT

## 2025-01-16 PROCEDURE — 6370000000 HC RX 637 (ALT 250 FOR IP): Performed by: STUDENT IN AN ORGANIZED HEALTH CARE EDUCATION/TRAINING PROGRAM

## 2025-01-16 PROCEDURE — 80048 BASIC METABOLIC PNL TOTAL CA: CPT

## 2025-01-16 PROCEDURE — 97129 THER IVNTJ 1ST 15 MIN: CPT

## 2025-01-16 PROCEDURE — 85027 COMPLETE CBC AUTOMATED: CPT

## 2025-01-16 PROCEDURE — 2500000003 HC RX 250 WO HCPCS: Performed by: HOSPITALIST

## 2025-01-16 PROCEDURE — 1200000000 HC SEMI PRIVATE

## 2025-01-16 PROCEDURE — 85025 COMPLETE CBC W/AUTO DIFF WBC: CPT

## 2025-01-16 PROCEDURE — 2500000003 HC RX 250 WO HCPCS: Performed by: INTERNAL MEDICINE

## 2025-01-16 PROCEDURE — 99222 1ST HOSP IP/OBS MODERATE 55: CPT | Performed by: STUDENT IN AN ORGANIZED HEALTH CARE EDUCATION/TRAINING PROGRAM

## 2025-01-16 PROCEDURE — 92507 TX SP LANG VOICE COMM INDIV: CPT

## 2025-01-16 PROCEDURE — 97530 THERAPEUTIC ACTIVITIES: CPT

## 2025-01-16 PROCEDURE — 90935 HEMODIALYSIS ONE EVALUATION: CPT

## 2025-01-16 PROCEDURE — 85520 HEPARIN ASSAY: CPT

## 2025-01-16 PROCEDURE — 99291 CRITICAL CARE FIRST HOUR: CPT | Performed by: INTERNAL MEDICINE

## 2025-01-16 PROCEDURE — 97535 SELF CARE MNGMENT TRAINING: CPT

## 2025-01-16 PROCEDURE — 97110 THERAPEUTIC EXERCISES: CPT

## 2025-01-16 PROCEDURE — 6370000000 HC RX 637 (ALT 250 FOR IP)

## 2025-01-16 RX ORDER — POTASSIUM CHLORIDE 1500 MG/1
40 TABLET, EXTENDED RELEASE ORAL ONCE
Status: COMPLETED | OUTPATIENT
Start: 2025-01-16 | End: 2025-01-16

## 2025-01-16 RX ORDER — METOPROLOL TARTRATE 50 MG
TABLET ORAL
Status: COMPLETED
Start: 2025-01-16 | End: 2025-01-16

## 2025-01-16 RX ADMIN — HEPARIN SODIUM 20 UNITS/KG/HR: 10000 INJECTION, SOLUTION INTRAVENOUS at 08:24

## 2025-01-16 RX ADMIN — METOPROLOL TARTRATE 50 MG: 50 TABLET, FILM COATED ORAL at 09:14

## 2025-01-16 RX ADMIN — MICONAZOLE NITRATE: 2 POWDER TOPICAL at 09:12

## 2025-01-16 RX ADMIN — APIXABAN 10 MG: 5 TABLET, FILM COATED ORAL at 21:37

## 2025-01-16 RX ADMIN — EPOETIN ALFA-EPBX 5000 UNITS: 10000 INJECTION, SOLUTION INTRAVENOUS; SUBCUTANEOUS at 17:13

## 2025-01-16 RX ADMIN — HEPARIN SODIUM 4000 UNITS: 1000 INJECTION INTRAVENOUS; SUBCUTANEOUS at 01:17

## 2025-01-16 RX ADMIN — CITALOPRAM HYDROBROMIDE 40 MG: 20 TABLET ORAL at 09:09

## 2025-01-16 RX ADMIN — HEPARIN SODIUM 4000 UNITS: 1000 INJECTION INTRAVENOUS; SUBCUTANEOUS at 08:22

## 2025-01-16 RX ADMIN — PANTOPRAZOLE SODIUM 40 MG: 40 INJECTION, POWDER, FOR SOLUTION INTRAVENOUS at 21:37

## 2025-01-16 RX ADMIN — CLOPIDOGREL BISULFATE 75 MG: 75 TABLET ORAL at 09:09

## 2025-01-16 RX ADMIN — POTASSIUM CHLORIDE 40 MEQ: 1500 TABLET, EXTENDED RELEASE ORAL at 11:26

## 2025-01-16 RX ADMIN — MICONAZOLE NITRATE: 2 POWDER TOPICAL at 21:38

## 2025-01-16 RX ADMIN — METOPROLOL TARTRATE 75 MG: 50 TABLET, FILM COATED ORAL at 21:37

## 2025-01-16 RX ADMIN — Medication 100 MG: at 09:10

## 2025-01-16 RX ADMIN — SODIUM CHLORIDE, PRESERVATIVE FREE 10 ML: 5 INJECTION INTRAVENOUS at 21:37

## 2025-01-16 RX ADMIN — APIXABAN 10 MG: 5 TABLET, FILM COATED ORAL at 15:07

## 2025-01-16 RX ADMIN — SODIUM CHLORIDE, PRESERVATIVE FREE 10 ML: 5 INJECTION INTRAVENOUS at 21:44

## 2025-01-16 RX ADMIN — PANTOPRAZOLE SODIUM 40 MG: 40 INJECTION, POWDER, FOR SOLUTION INTRAVENOUS at 09:09

## 2025-01-16 RX ADMIN — ASPIRIN 81 MG: 81 TABLET, CHEWABLE ORAL at 09:09

## 2025-01-16 ASSESSMENT — PAIN SCALES - GENERAL
PAINLEVEL_OUTOF10: 0

## 2025-01-16 NOTE — CONSULTS
PULMONARY AND CRITICAL CARE MEDICINE CONSULT NOTE      Name: Shahida Vásquez  Sex: female  : 1960  MRN: [id number]  Admission Date: 2024  Admission Diagnosis: STEMI (ST elevation myocardial infarction) ([facility]) [I21.3]  Acute ST elevation myocardial infarction (STEMI) involving other coronary artery of inferior wall ([facility]) [I21.19]      HPI: Patient is a 64 y.o. female with past medical history significant for hypertension who presented to hospital with complaints of chest pain going on for the last few days.  Upon arrival she was noted to have inferior STEMI and was taken emergently to Cath Lab.  Patient was intubated in the Cath Lab due to shortness of breath.  It was noted that patient has 100% occlusion of RCA with significant RV dysfunction and RV compromise with slight LV dysfunction.  Patient needed mechanical support and she got RP Impella and CP Impella in place at revascularization could not be performed due to significant hemodynamic instability.  Currently sedated with high doses of Versed, propofol and fentanyl due to high sedation tolerance.  Patient takes opioids at home.  On very low-dose of Levophed at 2 mcg/min.  She has ANALY and this morning his creatinine has down creatinine to 2 with urine output up to 25 to 30 cc/h.  ABG suggestive of metabolic acidosis with a bicarb of 16.  Normal lactic acid.  Troponin of more than 10,000.    14 point ROS could not be obtained due to patient factors.       Past Medical History:   Diagnosis Date    Anxiety     Depression     Hypertension     Insomnia     Lumbar disc herniation     Lumbar spinal stenosis      Past Surgical History:   Procedure Laterality Date    ABDOMEN SURGERY      BRONCHOSCOPY N/A 10/10/2019    BRONCHOSCOPY WITH BRONCHOALVEOLAR LAVAGE performed by Jeovany Pablo DO at [facility] ENDOSCOPY    BRONCHOSCOPY  10/10/2019    BRONCHOSCOPY DIAGNOSTIC OR CELL WASH ONLY performed by Jeovany Pablo DO at [facility] ENDOSCOPY 
    PULMONARY AND CRITICAL CARE MEDICINE PROGRESS NOTE    Subjective: Patient had bleeding after CP Impella was removed in Cath Lab.  She requires 5 units PRBC transfusion.  Also had episodes of V. tach.  Balloon pump was placed.  She is also on vasopressors, Jacky-Synephrine up to 70 mcg/min.  Dobutamine is at 5 mcg/kg/min.  On amiodarone.  Remains on RP Impella, P7.  She is now on minimal sedation, propofol at 10 mcg/kg/min.  Not following any commands.  Worsening creatinine with decreased urine output.  Platelets have dropped significantly.  Increasing LDH.    ROS could not be obtained due to patient factors.     MEDICATIONS:     Scheduled Meds:   sodium chloride flush  5-40 mL IntraVENous 2 times per day    ascorbic acid  500 mg Oral Daily    sodium chloride flush  5-40 mL IntraVENous 2 times per day    pantoprazole  40 mg IntraVENous Daily    citalopram  40 mg Oral Daily    [Held by provider] gabapentin  300 mg Oral TID    [Held by provider] hydroCHLOROthiazide  25 mg Oral Daily    [Held by provider] propranolol  80 mg Oral Daily    aspirin  81 mg Oral Daily    clopidogrel  75 mg Oral Daily    sodium chloride flush  5-40 mL IntraVENous 2 times per day       Current Infusions:    prismaSATE BGK 4/2.5      prismaSATE BGK 4/2.5      prismaSATE BGK 4/2.5      [Held by provider] sodium bicarbonate 25 mEq in dextrose 5 % 1,000 mL infusion (FOR IMPELLA PURGE)      heparin (porcine) 25,000 Units in dextrose 5 % 1,000 mL infusion (FOR IMPELLA PURGE) 12.8 mL/hr at 01/02/25 1947    sodium chloride      sodium chloride      sodium chloride      sodium chloride      sodium chloride      phenylephrine (JACKY-SYNEPHRINE) 50 mg in sodium chloride 0.9 % 250 mL infusion 60 mcg/min (01/03/25 1100)    DOBUTamine 5 mcg/kg/min (01/03/25 1100)    sodium bicarbonate 150 mEq in dextrose 5 % 1,000 mL infusion 80 mL/hr at 01/03/25 1132    amiodarone 0.5 mg/min (01/03/25 1100)    sodium chloride      [Held by provider] heparin (porcine) 
Brief Nutrition Note    RECOMMENDATIONS  PO Diet: NPO  Nutrition Support:   Recommend order \"Diet: Adult Tube Feed\". Initiate Osmolite 1.2 (standard without fiber formula) at 25 mL/hr and as tolerated, increase by 10 mL/hr q 4 hours until goal of 55 mL/hr.  Recommend water flush of 30 mL q 4 hours with IV fluid.         ASSESSMENT   Consult order for TF management and order received. Pt remains intubated and sedated. Pt was evaluated by the dietitian yesterday. Today, tube feeding recommendations were recalculated based on the current propofol infusion rate and ventilation status. Monitoring will continue to assess the pt's tolerance to the tube feeding regimen.       NUTRITION RELATED FINDINGS  Objective: K+ 4.3, Mg 2.14, Phos 3.4.  Wounds: None    CURRENT NUTRITION THERAPIES  Diet NPO       PO Intake: NPO   PO Supplement Intake:NPO    Current Tube Feeding (TF) Orders:  Feeding Route: Orogastric  Formula: Standard without Fiber  Schedule: Continuous  Feeding Regimen: 55ml/hr with propofol  Additives/Modulars: None  Water Flushes: 80ml H2O Q4 hours meets estimated fluid needs, once IV fluid is discontinued  Current TF Provides: 1320ml TV, 1584kcal, 73g protein and 1082ml free water  Propofol at 2.0 ml/hr to provide 53 kcal additional calories per day.       COMPARATIVE STANDARDS  Total Energy Requirements (kcals/day): 1685     Protein (g):  55-91       Fluid (mL/day):  1685    EDUCATION  Education/Counseling not indicated     The patient will be monitored per nutrition standards of care. Consult dietitian if additional nutrition interventions are needed prior to RD reassessment.     Suraj Rojas RD    Contact: [phone]        
[facility]  CVU Consultation Note    Reason for Consult: Hyponatremia, ANALY, hypokalemia, low serum bicarbonate  Requesting Physician:  Dr. KRISTEN Martinez    CHIEF COMPLAINT: Fatigue    History obtained from records and patient.    HISTORY OF PRESENT ILLNESS:                Shahida Vásquez  is 64 y.o. y.o. female with significant past medical history of hypertension, anxiety, depression, lumbar spine stenosis who presents with chest pain and fatigue since Thursday.  Admitted under STEMI alert.  Emergently taken to the Cath Lab and bilateral Impella has been placed.  Left femoral HD catheter has been placed.  I am asked to see the patient since her creatinine is up to 2.2 with serum bicarbonate of 15, anion gap of 15 and sodium of 133.  Currently on Levophed at 2 mics per minute.  On vent with an FiO2 of 40%.  CVP around 9-10.  Today her sodium is 136, potassium of 3.4 and serum bicarbonate of 16.  Magnesium normal at 1.85.  Urine output recorded at around 800 mL the past 24 hours.  Planning for possible cardiac cath and RCA intervention on Tuesday.    Past Medical History:     has a past medical history of Anxiety, Depression, Hypertension, Insomnia, Lumbar disc herniation, and Lumbar spinal stenosis.   Past Surgical History:     has a past surgical history that includes Hysterectomy; Gastric bypass surgery; Cholecystectomy; fracture surgery; lumbar nerve block (N/A, 6/17/2019); bronchoscopy (N/A, 10/10/2019); bronchoscopy (10/10/2019); Pain management procedure (Left, 1/20/2020); Forearm surgery (Left, 12/14/2020); Dilatation, esophagus; Abdomen surgery; Cardiac procedure (N/A, 12/30/2024); Cardiac procedure (N/A, 12/30/2024); Cardiac procedure (N/A, 12/30/2024); and Cardiac procedure (N/A, 12/30/2024).   Current Medications:    Current Facility-Administered Medications: pantoprazole (PROTONIX) injection 40 mg, 40 mg, IntraVENous, Daily  citalopram (CELEXA) tablet 40 mg, 40 mg, Oral, 
Shahida Vásquez  1/15/2025  [id number]    Chief Complaint: ST elevation myocardial infarction involving right coronary artery (HCC)    Subjective   HPI: Shahida Vásquez is a 64 y.o. female with PMHx notable for HTN, obesity s/p Iliana-en-Y gastric bypass, anxiety/depression , chronic lumbar back pain who presented on 12/30 with chest pain and fatigue.  She was found to have STEMI, and went emergently to Cath Lab.  She was found to have RCA occlusion, and severe right ventricular systolic dysfunction.  Impella were placed in LV and RV.  She remained intubated for airway protection.  She underwent cardiac catheterization on 1/2, had PCI to RCA which was complicated by bleeding and ventricular tachycardia.  Impella and IABP removed on 1/8.  Extubated on 1/9. SLP following for dysphagia, currently recommending puréed with mildly thickened liquids after MBS on 1/14. CRRT stopped on 1/10, transitioned to iHD on 1/11, and now holding HD since 1/14. Hospital course complicated by: cardiogenic shock, acute hypoxic respiratory failure, ANALY, anemia (required 5 units pRBCs during cath, 7 units total this admission).     ROS limited due to severe hypophonia.  Patient denies any acute pain complaints.  She acknowledges significant difficulties with her speech and swallowing.  She has been able to take and only minimal amounts by mouth.  She reports generalized weakness.  Denies any numbness or paresthesia.    Past Medical History:   Diagnosis Date    Anxiety     Depression     Hypertension     Insomnia     Lumbar disc herniation     Lumbar spinal stenosis        Past Surgical History:   Procedure Laterality Date    ABDOMEN SURGERY      BRONCHOSCOPY N/A 10/10/2019    BRONCHOSCOPY WITH BRONCHOALVEOLAR LAVAGE performed by Jeovany Pablo DO at [facility] ENDOSCOPY    BRONCHOSCOPY  10/10/2019    BRONCHOSCOPY DIAGNOSTIC OR CELL WASH ONLY performed by Jeovany Pablo DO at [facility] ENDOSCOPY    CARDIAC PROCEDURE N/A 12/30/2024    
DO Demetrice   zolpidem (AMBIEN) 10 MG tablet TAKE ONE TABLET BY MOUTH EVERY NIGHT AT BEDTIME AS NEEDED  Demetrice Saldaña DO   cyproheptadine (PERIACTIN) 4 MG tablet Take 1 tablet by mouth 3 times daily as needed (itch)  Demetrice Saldaña DO   hydroCHLOROthiazide (HYDRODIURIL) 25 MG tablet TAKE ONE TABLET BY MOUTH EVERY MORNING  Demetrice Saldaña DO   citalopram (CELEXA) 40 MG tablet TAKE ONE TABLET BY MOUTH DAILY  Demetrice Saldaña DO   NIFEdipine (PROCARDIA XL) 60 MG extended release tablet Take 1 tablet by mouth daily  Demetrice Saldaña DO   pantoprazole (PROTONIX) 40 MG tablet Take 1 tablet by mouth every morning (before breakfast)  Lety Gates MD   diclofenac sodium (VOLTAREN) 1 % GEL Apply 4 g topically 4 times daily  Patient not taking: Reported on 12/4/2024  Demetrice Saldaña DO        Allergies   Allergen Reactions    Penicillins Rash       Past Medical History:   Diagnosis Date    Anxiety     Depression     Hypertension     Insomnia     Lumbar disc herniation     Lumbar spinal stenosis        Past Surgical History:   Procedure Laterality Date    ABDOMEN SURGERY      BRONCHOSCOPY N/A 10/10/2019    BRONCHOSCOPY WITH BRONCHOALVEOLAR LAVAGE performed by Jeovany Pablo DO at [facility] ENDOSCOPY    BRONCHOSCOPY  10/10/2019    BRONCHOSCOPY DIAGNOSTIC OR CELL WASH ONLY performed by Jeovany Pablo DO at [facility] ENDOSCOPY    CHOLECYSTECTOMY      DILATATION, ESOPHAGUS      FOREARM SURGERY Left 12/14/2020    OPEN REDUCTION INTERNAL FIXATION LEFT DISTAL RADIUS - SYNTHES performed by Timothy Dejesus MD at [facility] ASC OR    FRACTURE SURGERY      r wrist plate    GASTRIC BYPASS SURGERY      HYSTERECTOMY (CERVIX STATUS UNKNOWN)      LUMBAR NERVE BLOCK N/A 6/17/2019    MIDLINE L5-S1  INTERLAMINAR EPIDURAL STEROID INJECTION WITH FLUOROSCOPY performed by Nicole López MD at [facility] SIC    PAIN MANAGEMENT PROCEDURE Left 1/20/2020    LEFT L4 AND L5 TRANSFORAMINAL EPIDURAL STEROID INJECTION WITH FLUOROSCOPY ([id number], 
01/06/25  0525 01/06/25  1800 01/07/25  0510   * 134* 132*   K 4.4 4.6 4.9    100 100   CO2 24 24 24   PHOS 2.7 2.5 2.6   BUN 26* 25* 27*   CREATININE 2.0* 1.9* 1.7*     Recent Labs     01/06/25  0525   AST 27   ALT 16   BILIDIR 0.2   BILITOT 0.3   ALKPHOS 106     No results for input(s): \"LIPASE\", \"AMYLASE\" in the last 72 hours.  No results for input(s): \"PROTIME\", \"INR\" in the last 72 hours.  Invalid input(s): \"PTT\"  No results for input(s): \"OCCULTBLD\" in the last 72 hours.                   Assessment:     Principal Problem:    ST elevation myocardial infarction involving right coronary artery (HCC)  Active Problems:    HTN (hypertension)    ANALY (acute kidney injury) (HCC)    Cardiogenic shock    Acute respiratory failure    ABLA (acute blood loss anemia)    Chronic prescription opiate use    Mood disorder (HCC)    Acute coronary syndrome (HCC)  Resolved Problems:    * No resolved hospital problems. *    Melena/acute blood loss anemia -she may be having an upper GI hemorrhage.  She is on pressors.  She received 2 units of packed red blood cells as part of her resuscitation today and as a result her pressors have been weaned somewhat.  The stool does appear melenic.  She has other sources of blood loss including bilateral inguinal areas.  She is at high risk for endoscopic evaluation due to her underlying cardiac condition.  Specifically ventricular ectopy can be stimulated by upper endoscopy in the setting.  For now would follow serial hemoglobin hematocrit and her stool output and transfuse packed red blood cells as needed to keep hemoglobin at acceptable level (greater than 8).  She is receiving parenteral Protonix every 12 hours.  We can proceed with endoscopy therapeutically should a significant GI hemorrhage occur/continue.    Recommendations:   Serial hemoglobin and hematocrit as you are doing  Transfuse packed red blood cells to keep hemoglobin around 8  Pantoprazole 40 mg IV every 12 hours as 
        Dr. Tenzin Galaviz, DO [facility]  Department of Otolaryngology/Head and Neck Surgery      Medical Decision Making:  The following items were considered in medical decision making:  Independent review of images  Review / order clinical lab tests  Review / order radiology tests  Decision to obtain old records      This note was generated completely or in part utilizing Dragon dictation speech recognition software.  Occasionally, words are mistranscribed and despite editing, the text may contain inaccuracies due to incorrect word recognition.  If further clarification is needed please contact the office at [phone].

## 2025-01-17 PROBLEM — R57.0 CARDIOGENIC SHOCK: Status: RESOLVED | Noted: 2024-12-31 | Resolved: 2025-01-17

## 2025-01-17 LAB
GLUCOSE BLD-MCNC: 103 MG/DL (ref 70–99)
GLUCOSE BLD-MCNC: 110 MG/DL (ref 70–99)
GLUCOSE BLD-MCNC: 118 MG/DL (ref 70–99)
GLUCOSE BLD-MCNC: 125 MG/DL (ref 70–99)
GLUCOSE BLD-MCNC: 125 MG/DL (ref 70–99)
GLUCOSE BLD-MCNC: 98 MG/DL (ref 70–99)
PERFORMED ON: ABNORMAL
PERFORMED ON: NORMAL

## 2025-01-17 PROCEDURE — 6370000000 HC RX 637 (ALT 250 FOR IP): Performed by: INTERNAL MEDICINE

## 2025-01-17 PROCEDURE — 2500000003 HC RX 250 WO HCPCS: Performed by: HOSPITALIST

## 2025-01-17 PROCEDURE — 2500000003 HC RX 250 WO HCPCS: Performed by: INTERNAL MEDICINE

## 2025-01-17 PROCEDURE — 97530 THERAPEUTIC ACTIVITIES: CPT

## 2025-01-17 PROCEDURE — 97129 THER IVNTJ 1ST 15 MIN: CPT

## 2025-01-17 PROCEDURE — 1200000000 HC SEMI PRIVATE

## 2025-01-17 PROCEDURE — 6370000000 HC RX 637 (ALT 250 FOR IP): Performed by: STUDENT IN AN ORGANIZED HEALTH CARE EDUCATION/TRAINING PROGRAM

## 2025-01-17 PROCEDURE — 92526 ORAL FUNCTION THERAPY: CPT

## 2025-01-17 PROCEDURE — 6360000002 HC RX W HCPCS: Performed by: INTERNAL MEDICINE

## 2025-01-17 PROCEDURE — 92507 TX SP LANG VOICE COMM INDIV: CPT

## 2025-01-17 RX ORDER — METOPROLOL TARTRATE 75 MG/1
75 TABLET ORAL 2 TIMES DAILY
Qty: 60 TABLET | Refills: 3 | Status: ON HOLD | OUTPATIENT
Start: 2025-01-17

## 2025-01-17 RX ORDER — MIRTAZAPINE 15 MG/1
15 TABLET, FILM COATED ORAL DAILY
Status: DISCONTINUED | OUTPATIENT
Start: 2025-01-17 | End: 2025-01-20 | Stop reason: HOSPADM

## 2025-01-17 RX ORDER — LORAZEPAM 1 MG/1
1 TABLET ORAL EVERY 8 HOURS PRN
Qty: 15 TABLET | Refills: 0 | Status: ON HOLD | OUTPATIENT
Start: 2025-01-17 | End: 2025-01-22

## 2025-01-17 RX ORDER — CLOPIDOGREL BISULFATE 75 MG/1
75 TABLET ORAL DAILY
Qty: 30 TABLET | Refills: 3 | Status: ON HOLD | OUTPATIENT
Start: 2025-01-17

## 2025-01-17 RX ORDER — INSULIN LISPRO 100 [IU]/ML
0-4 INJECTION, SOLUTION INTRAVENOUS; SUBCUTANEOUS EVERY 4 HOURS
Status: DISCONTINUED | OUTPATIENT
Start: 2025-01-17 | End: 2025-01-18

## 2025-01-17 RX ORDER — THIAMINE MONONITRATE (VIT B1) 100 MG
100 TABLET ORAL DAILY
Qty: 90 TABLET | Refills: 2 | Status: ON HOLD | OUTPATIENT
Start: 2025-01-17

## 2025-01-17 RX ORDER — MIRTAZAPINE 15 MG/1
15 TABLET, FILM COATED ORAL NIGHTLY
Status: DISCONTINUED | OUTPATIENT
Start: 2025-01-17 | End: 2025-01-17

## 2025-01-17 RX ADMIN — PANTOPRAZOLE SODIUM 40 MG: 40 INJECTION, POWDER, FOR SOLUTION INTRAVENOUS at 10:03

## 2025-01-17 RX ADMIN — METOPROLOL TARTRATE 75 MG: 50 TABLET, FILM COATED ORAL at 10:02

## 2025-01-17 RX ADMIN — CLOPIDOGREL BISULFATE 75 MG: 75 TABLET ORAL at 10:03

## 2025-01-17 RX ADMIN — MIRTAZAPINE 15 MG: 15 TABLET, FILM COATED ORAL at 16:00

## 2025-01-17 RX ADMIN — SODIUM CHLORIDE, PRESERVATIVE FREE 10 ML: 5 INJECTION INTRAVENOUS at 21:12

## 2025-01-17 RX ADMIN — METOPROLOL TARTRATE 75 MG: 50 TABLET, FILM COATED ORAL at 21:11

## 2025-01-17 RX ADMIN — SODIUM CHLORIDE, PRESERVATIVE FREE 10 ML: 5 INJECTION INTRAVENOUS at 10:03

## 2025-01-17 RX ADMIN — APIXABAN 10 MG: 5 TABLET, FILM COATED ORAL at 10:03

## 2025-01-17 RX ADMIN — PANTOPRAZOLE SODIUM 40 MG: 40 INJECTION, POWDER, FOR SOLUTION INTRAVENOUS at 21:11

## 2025-01-17 RX ADMIN — APIXABAN 10 MG: 5 TABLET, FILM COATED ORAL at 21:11

## 2025-01-17 RX ADMIN — Medication 100 MG: at 10:02

## 2025-01-17 RX ADMIN — MICONAZOLE NITRATE: 2 POWDER TOPICAL at 21:12

## 2025-01-17 RX ADMIN — MICONAZOLE NITRATE: 2 POWDER TOPICAL at 12:24

## 2025-01-17 RX ADMIN — CITALOPRAM HYDROBROMIDE 40 MG: 20 TABLET ORAL at 10:03

## 2025-01-17 ASSESSMENT — PAIN SCALES - GENERAL
PAINLEVEL_OUTOF10: 0

## 2025-01-17 NOTE — DISCHARGE INSTR - COC
Continuity of Care Form    Patient Name: Shahida Vásquez   :  1960  MRN:  [id number]    Admit date:  2024  Discharge date:  ***    Code Status Order: Full Code   Advance Directives:   Advance Care Flowsheet Documentation             Admitting Physician:  Ang Truong MD  PCP: Demetrice Saldaña DO    Discharging Nurse: ***  Discharging Hospital Unit/Room#: I7F-8065/5910-01  Discharging Unit Phone Number: ***    Emergency Contact:   Extended Emergency Contact Information  Primary Emergency Contact: FahadClaudia   [facility]  Home Phone: [phone]  Mobile Phone: [phone]  Relation: Child  Secondary Emergency Contact: Carlos A Vásquez           [place], [place] [id number] [place] [address]  Home Phone: [phone]  Mobile Phone: [phone]  Relation: Child    Past Surgical History:  Past Surgical History:   Procedure Laterality Date    ABDOMEN SURGERY      BRONCHOSCOPY N/A 10/10/2019    BRONCHOSCOPY WITH BRONCHOALVEOLAR LAVAGE performed by Jeovany Pablo DO at [facility] ENDOSCOPY    BRONCHOSCOPY  10/10/2019    BRONCHOSCOPY DIAGNOSTIC OR CELL WASH ONLY performed by Jeovany Pablo DO at [facility] ENDOSCOPY    CARDIAC PROCEDURE N/A 2024    Left heart cath / coronary angiography performed by Ang Enriquez MD at [facility] CARDIAC CATH LAB    CARDIAC PROCEDURE N/A 2024    Right heart cath performed by Ang Enriquez MD at [facility] CARDIAC CATH LAB    CARDIAC PROCEDURE N/A 2024    Ventricular assist device (VAD) insertion performed by Ang Enriquez MD at [facility] CARDIAC CATH LAB    CARDIAC PROCEDURE N/A 2024    Ventricular assist device (VAD) insertion performed by Jeovany Martinez MD at [facility] CARDIAC CATH LAB    CARDIAC PROCEDURE N/A 2025    Insert stent dax coronary performed by Ang Enriquez MD at [facility] CARDIAC CATH LAB    CARDIAC PROCEDURE N/A 2025    Ventricular assist device (VAD) removal performed by Ang Enriquez MD at [facility] CARDIAC

## 2025-01-18 LAB
ANION GAP SERPL CALCULATED.3IONS-SCNC: 10 MMOL/L (ref 3–16)
BASOPHILS # BLD: 0.1 K/UL (ref 0–0.2)
BASOPHILS NFR BLD: 0.8 %
BUN SERPL-MCNC: 63 MG/DL (ref 7–20)
CALCIUM SERPL-MCNC: 8.4 MG/DL (ref 8.3–10.6)
CHLORIDE SERPL-SCNC: 109 MMOL/L (ref 99–110)
CO2 SERPL-SCNC: 24 MMOL/L (ref 21–32)
CREAT SERPL-MCNC: 1.1 MG/DL (ref 0.6–1.2)
DEPRECATED RDW RBC AUTO: 19.3 % (ref 12.4–15.4)
EOSINOPHIL # BLD: 0.2 K/UL (ref 0–0.6)
EOSINOPHIL NFR BLD: 1.3 %
GFR SERPLBLD CREATININE-BSD FMLA CKD-EPI: 56 ML/MIN/{1.73_M2}
GLUCOSE BLD-MCNC: 101 MG/DL (ref 70–99)
GLUCOSE BLD-MCNC: 104 MG/DL (ref 70–99)
GLUCOSE BLD-MCNC: 109 MG/DL (ref 70–99)
GLUCOSE BLD-MCNC: 114 MG/DL (ref 70–99)
GLUCOSE BLD-MCNC: 139 MG/DL (ref 70–99)
GLUCOSE BLD-MCNC: 209 MG/DL (ref 70–99)
GLUCOSE BLD-MCNC: 95 MG/DL (ref 70–99)
GLUCOSE SERPL-MCNC: 102 MG/DL (ref 70–99)
HCT VFR BLD AUTO: 25.8 % (ref 36–48)
HGB BLD-MCNC: 8.4 G/DL (ref 12–16)
LYMPHOCYTES # BLD: 1.2 K/UL (ref 1–5.1)
LYMPHOCYTES NFR BLD: 8.1 %
MCH RBC QN AUTO: 29.4 PG (ref 26–34)
MCHC RBC AUTO-ENTMCNC: 32.6 G/DL (ref 31–36)
MCV RBC AUTO: 90 FL (ref 80–100)
MONOCYTES # BLD: 1.4 K/UL (ref 0–1.3)
MONOCYTES NFR BLD: 9.7 %
NEUTROPHILS # BLD: 11.9 K/UL (ref 1.7–7.7)
NEUTROPHILS NFR BLD: 80.1 %
PERFORMED ON: ABNORMAL
PERFORMED ON: NORMAL
PLATELET # BLD AUTO: 612 K/UL (ref 135–450)
PMV BLD AUTO: 7.6 FL (ref 5–10.5)
POTASSIUM SERPL-SCNC: 4.1 MMOL/L (ref 3.5–5.1)
RBC # BLD AUTO: 2.87 M/UL (ref 4–5.2)
SODIUM SERPL-SCNC: 143 MMOL/L (ref 136–145)
WBC # BLD AUTO: 14.9 K/UL (ref 4–11)

## 2025-01-18 PROCEDURE — 80048 BASIC METABOLIC PNL TOTAL CA: CPT

## 2025-01-18 PROCEDURE — 2500000003 HC RX 250 WO HCPCS: Performed by: INTERNAL MEDICINE

## 2025-01-18 PROCEDURE — 92526 ORAL FUNCTION THERAPY: CPT

## 2025-01-18 PROCEDURE — 36415 COLL VENOUS BLD VENIPUNCTURE: CPT

## 2025-01-18 PROCEDURE — 6370000000 HC RX 637 (ALT 250 FOR IP): Performed by: INTERNAL MEDICINE

## 2025-01-18 PROCEDURE — 6370000000 HC RX 637 (ALT 250 FOR IP): Performed by: STUDENT IN AN ORGANIZED HEALTH CARE EDUCATION/TRAINING PROGRAM

## 2025-01-18 PROCEDURE — 97530 THERAPEUTIC ACTIVITIES: CPT

## 2025-01-18 PROCEDURE — 2500000003 HC RX 250 WO HCPCS: Performed by: HOSPITALIST

## 2025-01-18 PROCEDURE — 92507 TX SP LANG VOICE COMM INDIV: CPT

## 2025-01-18 PROCEDURE — 97110 THERAPEUTIC EXERCISES: CPT

## 2025-01-18 PROCEDURE — 6360000002 HC RX W HCPCS: Performed by: INTERNAL MEDICINE

## 2025-01-18 PROCEDURE — 85025 COMPLETE CBC W/AUTO DIFF WBC: CPT

## 2025-01-18 PROCEDURE — 1200000000 HC SEMI PRIVATE

## 2025-01-18 RX ORDER — FUROSEMIDE 40 MG/1
40 TABLET ORAL DAILY
Status: DISCONTINUED | OUTPATIENT
Start: 2025-01-19 | End: 2025-01-20 | Stop reason: HOSPADM

## 2025-01-18 RX ORDER — INSULIN LISPRO 100 [IU]/ML
0-4 INJECTION, SOLUTION INTRAVENOUS; SUBCUTANEOUS
Status: DISCONTINUED | OUTPATIENT
Start: 2025-01-18 | End: 2025-01-20 | Stop reason: HOSPADM

## 2025-01-18 RX ORDER — INSULIN LISPRO 100 [IU]/ML
0-4 INJECTION, SOLUTION INTRAVENOUS; SUBCUTANEOUS
Status: DISCONTINUED | OUTPATIENT
Start: 2025-01-18 | End: 2025-01-18

## 2025-01-18 RX ADMIN — METOPROLOL TARTRATE 75 MG: 50 TABLET, FILM COATED ORAL at 22:56

## 2025-01-18 RX ADMIN — APIXABAN 10 MG: 5 TABLET, FILM COATED ORAL at 22:57

## 2025-01-18 RX ADMIN — Medication 100 MG: at 09:55

## 2025-01-18 RX ADMIN — CITALOPRAM HYDROBROMIDE 40 MG: 20 TABLET ORAL at 09:55

## 2025-01-18 RX ADMIN — SODIUM CHLORIDE, PRESERVATIVE FREE 10 ML: 5 INJECTION INTRAVENOUS at 09:56

## 2025-01-18 RX ADMIN — SODIUM CHLORIDE, PRESERVATIVE FREE 10 ML: 5 INJECTION INTRAVENOUS at 22:58

## 2025-01-18 RX ADMIN — PANTOPRAZOLE SODIUM 40 MG: 40 INJECTION, POWDER, FOR SOLUTION INTRAVENOUS at 22:57

## 2025-01-18 RX ADMIN — APIXABAN 10 MG: 5 TABLET, FILM COATED ORAL at 09:55

## 2025-01-18 RX ADMIN — CLOPIDOGREL BISULFATE 75 MG: 75 TABLET ORAL at 09:56

## 2025-01-18 RX ADMIN — METOPROLOL TARTRATE 75 MG: 50 TABLET, FILM COATED ORAL at 09:56

## 2025-01-18 RX ADMIN — INSULIN LISPRO 1 UNITS: 100 INJECTION, SOLUTION INTRAVENOUS; SUBCUTANEOUS at 16:48

## 2025-01-18 RX ADMIN — PANTOPRAZOLE SODIUM 40 MG: 40 INJECTION, POWDER, FOR SOLUTION INTRAVENOUS at 09:56

## 2025-01-18 RX ADMIN — MIRTAZAPINE 15 MG: 15 TABLET, FILM COATED ORAL at 09:55

## 2025-01-18 RX ADMIN — MICONAZOLE NITRATE: 2 POWDER TOPICAL at 09:59

## 2025-01-18 ASSESSMENT — PAIN SCALES - GENERAL
PAINLEVEL_OUTOF10: 0

## 2025-01-19 LAB
ANION GAP SERPL CALCULATED.3IONS-SCNC: 11 MMOL/L (ref 3–16)
ANISOCYTOSIS BLD QL SMEAR: ABNORMAL
BASOPHILS # BLD: 0 K/UL (ref 0–0.2)
BASOPHILS NFR BLD: 0 %
BUN SERPL-MCNC: 57 MG/DL (ref 7–20)
CALCIUM SERPL-MCNC: 8.3 MG/DL (ref 8.3–10.6)
CHLORIDE SERPL-SCNC: 110 MMOL/L (ref 99–110)
CO2 SERPL-SCNC: 24 MMOL/L (ref 21–32)
CREAT SERPL-MCNC: 1.1 MG/DL (ref 0.6–1.2)
DACRYOCYTES BLD QL SMEAR: ABNORMAL
DEPRECATED RDW RBC AUTO: 19.4 % (ref 12.4–15.4)
EOSINOPHIL # BLD: 0 K/UL (ref 0–0.6)
EOSINOPHIL NFR BLD: 0 %
GFR SERPLBLD CREATININE-BSD FMLA CKD-EPI: 56 ML/MIN/{1.73_M2}
GLUCOSE BLD-MCNC: 105 MG/DL (ref 70–99)
GLUCOSE BLD-MCNC: 107 MG/DL (ref 70–99)
GLUCOSE BLD-MCNC: 110 MG/DL (ref 70–99)
GLUCOSE BLD-MCNC: 169 MG/DL (ref 70–99)
GLUCOSE SERPL-MCNC: 108 MG/DL (ref 70–99)
HCT VFR BLD AUTO: 25.4 % (ref 36–48)
HGB BLD-MCNC: 8.2 G/DL (ref 12–16)
HYPOCHROMIA BLD QL SMEAR: ABNORMAL
LYMPHOCYTES # BLD: 0.6 K/UL (ref 1–5.1)
LYMPHOCYTES NFR BLD: 4 %
MACROCYTES BLD QL SMEAR: ABNORMAL
MCH RBC QN AUTO: 29.7 PG (ref 26–34)
MCHC RBC AUTO-ENTMCNC: 32.1 G/DL (ref 31–36)
MCV RBC AUTO: 92.8 FL (ref 80–100)
MICROCYTES BLD QL SMEAR: ABNORMAL
MONOCYTES # BLD: 1.3 K/UL (ref 0–1.3)
MONOCYTES NFR BLD: 9 %
NEUTROPHILS # BLD: 12.2 K/UL (ref 1.7–7.7)
NEUTROPHILS NFR BLD: 87 %
OVALOCYTES BLD QL SMEAR: ABNORMAL
PERFORMED ON: ABNORMAL
PLATELET # BLD AUTO: 610 K/UL (ref 135–450)
PLATELET BLD QL SMEAR: ABNORMAL
PMV BLD AUTO: 7.7 FL (ref 5–10.5)
POLYCHROMASIA BLD QL SMEAR: ABNORMAL
POTASSIUM SERPL-SCNC: 4 MMOL/L (ref 3.5–5.1)
RBC # BLD AUTO: 2.74 M/UL (ref 4–5.2)
SLIDE REVIEW: ABNORMAL
SODIUM SERPL-SCNC: 145 MMOL/L (ref 136–145)
TOXIC GRANULES BLD QL SMEAR: PRESENT
WBC # BLD AUTO: 14 K/UL (ref 4–11)

## 2025-01-19 PROCEDURE — 6370000000 HC RX 637 (ALT 250 FOR IP): Performed by: STUDENT IN AN ORGANIZED HEALTH CARE EDUCATION/TRAINING PROGRAM

## 2025-01-19 PROCEDURE — 85025 COMPLETE CBC W/AUTO DIFF WBC: CPT

## 2025-01-19 PROCEDURE — 6370000000 HC RX 637 (ALT 250 FOR IP): Performed by: INTERNAL MEDICINE

## 2025-01-19 PROCEDURE — 6360000002 HC RX W HCPCS: Performed by: INTERNAL MEDICINE

## 2025-01-19 PROCEDURE — 1200000000 HC SEMI PRIVATE

## 2025-01-19 PROCEDURE — 80048 BASIC METABOLIC PNL TOTAL CA: CPT

## 2025-01-19 PROCEDURE — 36415 COLL VENOUS BLD VENIPUNCTURE: CPT

## 2025-01-19 PROCEDURE — 6360000002 HC RX W HCPCS: Performed by: HOSPITALIST

## 2025-01-19 PROCEDURE — 2500000003 HC RX 250 WO HCPCS: Performed by: INTERNAL MEDICINE

## 2025-01-19 RX ORDER — HYDROCODONE BITARTRATE AND ACETAMINOPHEN 5; 325 MG/1; MG/1
1 TABLET ORAL EVERY 6 HOURS PRN
Status: DISCONTINUED | OUTPATIENT
Start: 2025-01-19 | End: 2025-01-20 | Stop reason: HOSPADM

## 2025-01-19 RX ADMIN — MICONAZOLE NITRATE: 2 POWDER TOPICAL at 22:03

## 2025-01-19 RX ADMIN — PANTOPRAZOLE SODIUM 40 MG: 40 INJECTION, POWDER, FOR SOLUTION INTRAVENOUS at 11:11

## 2025-01-19 RX ADMIN — METOPROLOL TARTRATE 75 MG: 50 TABLET, FILM COATED ORAL at 22:02

## 2025-01-19 RX ADMIN — MICONAZOLE NITRATE: 2 POWDER TOPICAL at 08:35

## 2025-01-19 RX ADMIN — Medication 100 MG: at 11:10

## 2025-01-19 RX ADMIN — FUROSEMIDE 40 MG: 40 TABLET ORAL at 11:11

## 2025-01-19 RX ADMIN — APIXABAN 10 MG: 5 TABLET, FILM COATED ORAL at 22:02

## 2025-01-19 RX ADMIN — HYDROCODONE BITARTRATE AND ACETAMINOPHEN 1 TABLET: 5; 325 TABLET ORAL at 11:10

## 2025-01-19 RX ADMIN — PANTOPRAZOLE SODIUM 40 MG: 40 INJECTION, POWDER, FOR SOLUTION INTRAVENOUS at 22:03

## 2025-01-19 RX ADMIN — METOPROLOL TARTRATE 75 MG: 50 TABLET, FILM COATED ORAL at 11:10

## 2025-01-19 RX ADMIN — CLOPIDOGREL BISULFATE 75 MG: 75 TABLET ORAL at 11:10

## 2025-01-19 RX ADMIN — CITALOPRAM HYDROBROMIDE 40 MG: 20 TABLET ORAL at 11:10

## 2025-01-19 RX ADMIN — APIXABAN 10 MG: 5 TABLET, FILM COATED ORAL at 11:11

## 2025-01-19 RX ADMIN — MIRTAZAPINE 15 MG: 15 TABLET, FILM COATED ORAL at 11:10

## 2025-01-19 RX ADMIN — ONDANSETRON 4 MG: 2 INJECTION, SOLUTION INTRAMUSCULAR; INTRAVENOUS at 12:43

## 2025-01-19 RX ADMIN — SODIUM CHLORIDE, PRESERVATIVE FREE 10 ML: 5 INJECTION INTRAVENOUS at 11:13

## 2025-01-19 ASSESSMENT — PAIN SCALES - GENERAL
PAINLEVEL_OUTOF10: 0
PAINLEVEL_OUTOF10: 0
PAINLEVEL_OUTOF10: 9
PAINLEVEL_OUTOF10: 0
PAINLEVEL_OUTOF10: 0

## 2025-01-19 ASSESSMENT — PAIN DESCRIPTION - DESCRIPTORS: DESCRIPTORS: ACHING

## 2025-01-19 ASSESSMENT — PAIN DESCRIPTION - ORIENTATION: ORIENTATION: MID

## 2025-01-19 ASSESSMENT — PAIN DESCRIPTION - LOCATION: LOCATION: BACK

## 2025-01-20 ENCOUNTER — HOSPITAL ENCOUNTER (INPATIENT)
Age: 65
LOS: 12 days | Discharge: HOME HEALTH | DRG: 092 | End: 2025-02-01
Attending: STUDENT IN AN ORGANIZED HEALTH CARE EDUCATION/TRAINING PROGRAM | Admitting: STUDENT IN AN ORGANIZED HEALTH CARE EDUCATION/TRAINING PROGRAM
Payer: COMMERCIAL

## 2025-01-20 VITALS
TEMPERATURE: 98.7 F | WEIGHT: 175.38 LBS | SYSTOLIC BLOOD PRESSURE: 129 MMHG | RESPIRATION RATE: 22 BRPM | HEART RATE: 70 BPM | BODY MASS INDEX: 33.11 KG/M2 | HEIGHT: 61 IN | DIASTOLIC BLOOD PRESSURE: 72 MMHG | OXYGEN SATURATION: 100 %

## 2025-01-20 DIAGNOSIS — S31.104D NON-HEALING OPEN WOUND OF LEFT GROIN, SUBSEQUENT ENCOUNTER: Primary | ICD-10-CM

## 2025-01-20 DIAGNOSIS — M17.11 PRIMARY OSTEOARTHRITIS OF RIGHT KNEE: ICD-10-CM

## 2025-01-20 PROBLEM — G72.81 CRITICAL ILLNESS MYOPATHY: Status: ACTIVE | Noted: 2025-01-20

## 2025-01-20 LAB
ALBUMIN SERPL-MCNC: 2.7 G/DL (ref 3.4–5)
ALP SERPL-CCNC: 79 U/L (ref 40–129)
ALT SERPL-CCNC: 15 U/L (ref 10–40)
ANION GAP SERPL CALCULATED.3IONS-SCNC: 12 MMOL/L (ref 3–16)
AST SERPL-CCNC: 14 U/L (ref 15–37)
BASOPHILS # BLD: 0 K/UL (ref 0–0.2)
BASOPHILS NFR BLD: 0.4 %
BILIRUB DIRECT SERPL-MCNC: 0.3 MG/DL (ref 0–0.3)
BILIRUB INDIRECT SERPL-MCNC: 0.3 MG/DL (ref 0–1)
BILIRUB SERPL-MCNC: 0.6 MG/DL (ref 0–1)
BUN SERPL-MCNC: 54 MG/DL (ref 7–20)
CALCIUM SERPL-MCNC: 8.3 MG/DL (ref 8.3–10.6)
CHLORIDE SERPL-SCNC: 108 MMOL/L (ref 99–110)
CO2 SERPL-SCNC: 24 MMOL/L (ref 21–32)
CREAT SERPL-MCNC: 1.1 MG/DL (ref 0.6–1.2)
DEPRECATED RDW RBC AUTO: 19.6 % (ref 12.4–15.4)
EOSINOPHIL # BLD: 0.2 K/UL (ref 0–0.6)
EOSINOPHIL NFR BLD: 1.3 %
GFR SERPLBLD CREATININE-BSD FMLA CKD-EPI: 56 ML/MIN/{1.73_M2}
GLUCOSE BLD-MCNC: 144 MG/DL (ref 70–99)
GLUCOSE BLD-MCNC: 75 MG/DL (ref 70–99)
GLUCOSE BLD-MCNC: 93 MG/DL (ref 70–99)
GLUCOSE SERPL-MCNC: 95 MG/DL (ref 70–99)
HCT VFR BLD AUTO: 23.4 % (ref 36–48)
HGB BLD-MCNC: 7.7 G/DL (ref 12–16)
LYMPHOCYTES # BLD: 1 K/UL (ref 1–5.1)
LYMPHOCYTES NFR BLD: 8.4 %
MCH RBC QN AUTO: 30.7 PG (ref 26–34)
MCHC RBC AUTO-ENTMCNC: 32.8 G/DL (ref 31–36)
MCV RBC AUTO: 93.7 FL (ref 80–100)
MONOCYTES # BLD: 0.9 K/UL (ref 0–1.3)
MONOCYTES NFR BLD: 7.3 %
NEUTROPHILS # BLD: 10.2 K/UL (ref 1.7–7.7)
NEUTROPHILS NFR BLD: 82.6 %
PERFORMED ON: ABNORMAL
PERFORMED ON: NORMAL
PERFORMED ON: NORMAL
PLATELET # BLD AUTO: 587 K/UL (ref 135–450)
PMV BLD AUTO: 7.8 FL (ref 5–10.5)
POTASSIUM SERPL-SCNC: 4 MMOL/L (ref 3.5–5.1)
PROT SERPL-MCNC: 5.6 G/DL (ref 6.4–8.2)
RBC # BLD AUTO: 2.5 M/UL (ref 4–5.2)
SODIUM SERPL-SCNC: 144 MMOL/L (ref 136–145)
TRIGL SERPL-MCNC: 134 MG/DL (ref 0–150)
WBC # BLD AUTO: 12.4 K/UL (ref 4–11)

## 2025-01-20 PROCEDURE — 6360000002 HC RX W HCPCS: Performed by: INTERNAL MEDICINE

## 2025-01-20 PROCEDURE — 6370000000 HC RX 637 (ALT 250 FOR IP): Performed by: STUDENT IN AN ORGANIZED HEALTH CARE EDUCATION/TRAINING PROGRAM

## 2025-01-20 PROCEDURE — 80048 BASIC METABOLIC PNL TOTAL CA: CPT

## 2025-01-20 PROCEDURE — 84478 ASSAY OF TRIGLYCERIDES: CPT

## 2025-01-20 PROCEDURE — 85025 COMPLETE CBC W/AUTO DIFF WBC: CPT

## 2025-01-20 PROCEDURE — 6370000000 HC RX 637 (ALT 250 FOR IP): Performed by: INTERNAL MEDICINE

## 2025-01-20 PROCEDURE — 1280000000 HC REHAB R&B

## 2025-01-20 PROCEDURE — 36415 COLL VENOUS BLD VENIPUNCTURE: CPT

## 2025-01-20 PROCEDURE — 2500000003 HC RX 250 WO HCPCS: Performed by: INTERNAL MEDICINE

## 2025-01-20 PROCEDURE — 92526 ORAL FUNCTION THERAPY: CPT

## 2025-01-20 PROCEDURE — 80076 HEPATIC FUNCTION PANEL: CPT

## 2025-01-20 PROCEDURE — 92507 TX SP LANG VOICE COMM INDIV: CPT

## 2025-01-20 RX ORDER — LORAZEPAM 0.5 MG/1
1 TABLET ORAL EVERY 8 HOURS PRN
Status: DISCONTINUED | OUTPATIENT
Start: 2025-01-20 | End: 2025-01-24

## 2025-01-20 RX ORDER — GAUZE BANDAGE 2" X 2"
100 BANDAGE TOPICAL DAILY
Status: DISCONTINUED | OUTPATIENT
Start: 2025-01-21 | End: 2025-02-01 | Stop reason: HOSPADM

## 2025-01-20 RX ORDER — CLOPIDOGREL BISULFATE 75 MG/1
75 TABLET ORAL DAILY
Status: DISCONTINUED | OUTPATIENT
Start: 2025-01-21 | End: 2025-02-01 | Stop reason: HOSPADM

## 2025-01-20 RX ORDER — ERGOCALCIFEROL 1.25 MG/1
50000 CAPSULE, LIQUID FILLED ORAL WEEKLY
Status: DISCONTINUED | OUTPATIENT
Start: 2025-01-20 | End: 2025-02-01 | Stop reason: HOSPADM

## 2025-01-20 RX ORDER — PANTOPRAZOLE SODIUM 40 MG/1
40 TABLET, DELAYED RELEASE ORAL
Status: CANCELLED | OUTPATIENT
Start: 2025-01-21

## 2025-01-20 RX ORDER — ONDANSETRON 4 MG/1
4 TABLET, ORALLY DISINTEGRATING ORAL EVERY 8 HOURS PRN
Status: DISCONTINUED | OUTPATIENT
Start: 2025-01-20 | End: 2025-02-01 | Stop reason: HOSPADM

## 2025-01-20 RX ORDER — ACETAMINOPHEN 325 MG/1
650 TABLET ORAL EVERY 4 HOURS PRN
Status: CANCELLED | OUTPATIENT
Start: 2025-01-20

## 2025-01-20 RX ORDER — MIRTAZAPINE 15 MG/1
15 TABLET, FILM COATED ORAL DAILY
Status: CANCELLED | OUTPATIENT
Start: 2025-01-21

## 2025-01-20 RX ORDER — POLYETHYLENE GLYCOL 3350 17 G/17G
17 POWDER, FOR SOLUTION ORAL DAILY PRN
Status: DISCONTINUED | OUTPATIENT
Start: 2025-01-20 | End: 2025-02-01 | Stop reason: HOSPADM

## 2025-01-20 RX ORDER — CLOPIDOGREL BISULFATE 75 MG/1
75 TABLET ORAL DAILY
Status: CANCELLED | OUTPATIENT
Start: 2025-01-21

## 2025-01-20 RX ORDER — SENNOSIDES A AND B 8.6 MG/1
2 TABLET, FILM COATED ORAL NIGHTLY PRN
Status: DISCONTINUED | OUTPATIENT
Start: 2025-01-20 | End: 2025-02-01 | Stop reason: HOSPADM

## 2025-01-20 RX ORDER — SENNOSIDES A AND B 8.6 MG/1
2 TABLET, FILM COATED ORAL NIGHTLY PRN
Status: CANCELLED | OUTPATIENT
Start: 2025-01-20

## 2025-01-20 RX ORDER — HYDROXYZINE HYDROCHLORIDE 25 MG/1
25 TABLET, FILM COATED ORAL 3 TIMES DAILY PRN
Status: DISCONTINUED | OUTPATIENT
Start: 2025-01-20 | End: 2025-01-28

## 2025-01-20 RX ORDER — ERGOCALCIFEROL 1.25 MG/1
50000 CAPSULE, LIQUID FILLED ORAL WEEKLY
Status: CANCELLED | OUTPATIENT
Start: 2025-01-20

## 2025-01-20 RX ORDER — GAUZE BANDAGE 2" X 2"
100 BANDAGE TOPICAL DAILY
Status: CANCELLED | OUTPATIENT
Start: 2025-01-21

## 2025-01-20 RX ORDER — ACETAMINOPHEN 325 MG/1
650 TABLET ORAL EVERY 4 HOURS PRN
Status: DISCONTINUED | OUTPATIENT
Start: 2025-01-20 | End: 2025-02-01 | Stop reason: HOSPADM

## 2025-01-20 RX ORDER — HYDROXYZINE HYDROCHLORIDE 25 MG/1
25 TABLET, FILM COATED ORAL 3 TIMES DAILY PRN
Status: CANCELLED | OUTPATIENT
Start: 2025-01-20

## 2025-01-20 RX ORDER — FUROSEMIDE 40 MG/1
40 TABLET ORAL DAILY
Status: CANCELLED | OUTPATIENT
Start: 2025-01-21

## 2025-01-20 RX ORDER — CITALOPRAM HYDROBROMIDE 20 MG/1
40 TABLET ORAL DAILY
Status: CANCELLED | OUTPATIENT
Start: 2025-01-21

## 2025-01-20 RX ORDER — POLYETHYLENE GLYCOL 3350 17 G/17G
17 POWDER, FOR SOLUTION ORAL DAILY PRN
Status: CANCELLED | OUTPATIENT
Start: 2025-01-20

## 2025-01-20 RX ORDER — CITALOPRAM HYDROBROMIDE 20 MG/1
40 TABLET ORAL DAILY
Status: DISCONTINUED | OUTPATIENT
Start: 2025-01-21 | End: 2025-02-01 | Stop reason: HOSPADM

## 2025-01-20 RX ORDER — FUROSEMIDE 40 MG/1
40 TABLET ORAL DAILY
Status: DISCONTINUED | OUTPATIENT
Start: 2025-01-21 | End: 2025-01-23

## 2025-01-20 RX ORDER — MIRTAZAPINE 15 MG/1
15 TABLET, FILM COATED ORAL DAILY
Status: DISCONTINUED | OUTPATIENT
Start: 2025-01-21 | End: 2025-01-23

## 2025-01-20 RX ORDER — FUROSEMIDE 40 MG/1
40 TABLET ORAL DAILY
Qty: 60 TABLET | Refills: 3 | Status: ON HOLD | OUTPATIENT
Start: 2025-01-20

## 2025-01-20 RX ORDER — PANTOPRAZOLE SODIUM 40 MG/1
40 TABLET, DELAYED RELEASE ORAL
Status: DISCONTINUED | OUTPATIENT
Start: 2025-01-21 | End: 2025-02-01 | Stop reason: HOSPADM

## 2025-01-20 RX ORDER — ONDANSETRON 4 MG/1
4 TABLET, ORALLY DISINTEGRATING ORAL EVERY 8 HOURS PRN
Status: CANCELLED | OUTPATIENT
Start: 2025-01-20

## 2025-01-20 RX ORDER — LORAZEPAM 1 MG/1
1 TABLET ORAL EVERY 8 HOURS PRN
Status: CANCELLED | OUTPATIENT
Start: 2025-01-20 | End: 2025-01-25

## 2025-01-20 RX ADMIN — CLOPIDOGREL BISULFATE 75 MG: 75 TABLET ORAL at 09:45

## 2025-01-20 RX ADMIN — MIRTAZAPINE 15 MG: 15 TABLET, FILM COATED ORAL at 09:44

## 2025-01-20 RX ADMIN — METOPROLOL TARTRATE 75 MG: 50 TABLET, FILM COATED ORAL at 09:45

## 2025-01-20 RX ADMIN — SODIUM CHLORIDE, PRESERVATIVE FREE 10 ML: 5 INJECTION INTRAVENOUS at 09:45

## 2025-01-20 RX ADMIN — CITALOPRAM HYDROBROMIDE 40 MG: 20 TABLET ORAL at 09:45

## 2025-01-20 RX ADMIN — PANTOPRAZOLE SODIUM 40 MG: 40 INJECTION, POWDER, FOR SOLUTION INTRAVENOUS at 09:45

## 2025-01-20 RX ADMIN — MICONAZOLE NITRATE: 2 POWDER TOPICAL at 09:55

## 2025-01-20 RX ADMIN — METOPROLOL TARTRATE 75 MG: 50 TABLET, FILM COATED ORAL at 22:28

## 2025-01-20 RX ADMIN — FUROSEMIDE 40 MG: 40 TABLET ORAL at 09:45

## 2025-01-20 RX ADMIN — Medication 100 MG: at 09:44

## 2025-01-20 RX ADMIN — ERGOCALCIFEROL 50000 UNITS: 1.25 CAPSULE ORAL at 22:29

## 2025-01-20 RX ADMIN — APIXABAN 10 MG: 5 TABLET, FILM COATED ORAL at 09:44

## 2025-01-20 RX ADMIN — APIXABAN 10 MG: 5 TABLET, FILM COATED ORAL at 22:28

## 2025-01-20 ASSESSMENT — PAIN SCALES - GENERAL
PAINLEVEL_OUTOF10: 0
PAINLEVEL_OUTOF10: 0

## 2025-01-20 NOTE — PLAN OF CARE
Problem: Pain  Goal: Verbalizes/displays adequate comfort level or baseline comfort level  1/17/2025 2204 by Monae Slater RN  Outcome: Progressing  1/17/2025 2021 by Macarena Ulloa RN  Outcome: Progressing     Problem: Safety - Adult  Goal: Free from fall injury  1/17/2025 2204 by Monae Slater RN  Outcome: Progressing  1/17/2025 2021 by Macarena Ulloa RN  Outcome: Progressing     Problem: Nutrition Deficit:  Goal: Optimize nutritional status  1/17/2025 2204 by Monae Slater RN  Outcome: Progressing  1/17/2025 2021 by Macarena Ulloa RN  Outcome: Progressing     Problem: Skin/Tissue Integrity  Goal: Absence of new skin breakdown  Description: 1.  Monitor for areas of redness and/or skin breakdown  2.  Assess vascular access sites hourly  3.  Every 4-6 hours minimum:  Change oxygen saturation probe site  4.  Every 4-6 hours:  If on nasal continuous positive airway pressure, respiratory therapy assess nares and determine need for appliance change or resting period.  1/17/2025 2204 by Monae Slater RN  Outcome: Progressing  1/17/2025 2021 by Macarena Ulloa RN  Outcome: Progressing     Problem: Discharge Planning  Goal: Discharge to home or other facility with appropriate resources  1/17/2025 2204 by Monae Slater RN  Outcome: Progressing  1/17/2025 2021 by Macarena Ulloa RN  Outcome: Progressing  Flowsheets (Taken 1/17/2025 2000 by Monae Slater RN)  Discharge to home or other facility with appropriate resources:   Identify barriers to discharge with patient and caregiver   Arrange for needed discharge resources and transportation as appropriate     Problem: Skin/Tissue Integrity - Adult  Goal: Skin integrity remains intact  1/17/2025 2204 by Monae Slater RN  Outcome: Progressing  1/17/2025 2021 by Macarena Ulloa RN  Outcome: Progressing  Flowsheets (Taken 1/17/2025 2000 by Bowling, Monae A, RN)  Skin Integrity Remains Intact:   Monitor for areas 
  Problem: Pain  Goal: Verbalizes/displays adequate comfort level or baseline comfort level  1/18/2025 2342 by Monae Slater RN  Outcome: Progressing  1/18/2025 2342 by Monae Slater RN  Outcome: Progressing  Flowsheets (Taken 1/18/2025 2123)  Verbalizes/displays adequate comfort level or baseline comfort level:   Encourage patient to monitor pain and request assistance   Assess pain using appropriate pain scale  1/18/2025 1334 by Mary Barbosa RN  Outcome: Progressing     Problem: Safety - Adult  Goal: Free from fall injury  1/18/2025 2342 by Monae Slater RN  Outcome: Progressing  1/18/2025 2342 by Monae Slater RN  Outcome: Progressing  1/18/2025 1334 by Mary Barbosa RN  Outcome: Progressing     Problem: Nutrition Deficit:  Goal: Optimize nutritional status  1/18/2025 2342 by Monae Slater RN  Outcome: Progressing  1/18/2025 2342 by Monae Slater RN  Outcome: Progressing  1/18/2025 1334 by Mary Barbosa RN  Outcome: Progressing     Problem: Skin/Tissue Integrity  Goal: Absence of new skin breakdown  Description: 1.  Monitor for areas of redness and/or skin breakdown  2.  Assess vascular access sites hourly  3.  Every 4-6 hours minimum:  Change oxygen saturation probe site  4.  Every 4-6 hours:  If on nasal continuous positive airway pressure, respiratory therapy assess nares and determine need for appliance change or resting period.  1/18/2025 2342 by Monae Slater RN  Outcome: Progressing  1/18/2025 2342 by Monae Slater RN  Outcome: Progressing  1/18/2025 1334 by Mary Barbosa RN  Outcome: Progressing     Problem: Discharge Planning  Goal: Discharge to home or other facility with appropriate resources  1/18/2025 2342 by Monae Slater RN  Outcome: Progressing  1/18/2025 2342 by Monae Slater RN  Outcome: Progressing  Flowsheets (Taken 1/18/2025 2000)  Discharge to home or other facility with appropriate resources:   Identify 
  Problem: Pain  Goal: Verbalizes/displays adequate comfort level or baseline comfort level  1/20/2025 0531 by Demetra Casiano RN  Outcome: Progressing  Flowsheets (Taken 1/20/2025 0531)  Verbalizes/displays adequate comfort level or baseline comfort level:   Encourage patient to monitor pain and request assistance   Assess pain using appropriate pain scale     Problem: Safety - Adult  Goal: Free from fall injury  1/20/2025 0531 by Demetra Casiano, RN  Outcome: Progressing  Flowsheets (Taken 1/20/2025 0531)  Free From Fall Injury: Instruct family/caregiver on patient safety     Problem: Hematologic - Adult  Goal: Maintains hematologic stability  1/20/2025 0531 by Demetra Casiano, RN  Outcome: Progressing  Flowsheets (Taken 1/20/2025 0531)  Maintains hematologic stability: Assess for signs and symptoms of bleeding or hemorrhage     
  Problem: Pain  Goal: Verbalizes/displays adequate comfort level or baseline comfort level  Outcome: Progressing     Problem: Safety - Adult  Goal: Free from fall injury  Outcome: Progressing     Problem: Nutrition Deficit:  Goal: Optimize nutritional status  Outcome: Progressing     Problem: Skin/Tissue Integrity  Goal: Absence of new skin breakdown  Description: 1.  Monitor for areas of redness and/or skin breakdown  2.  Assess vascular access sites hourly  3.  Every 4-6 hours minimum:  Change oxygen saturation probe site  4.  Every 4-6 hours:  If on nasal continuous positive airway pressure, respiratory therapy assess nares and determine need for appliance change or resting period.  Outcome: Progressing     
  Problem: Pain  Goal: Verbalizes/displays adequate comfort level or baseline comfort level  Outcome: Progressing     Problem: Safety - Adult  Goal: Free from fall injury  Outcome: Progressing     Problem: Nutrition Deficit:  Goal: Optimize nutritional status  Outcome: Progressing     Problem: Skin/Tissue Integrity  Goal: Absence of new skin breakdown  Description: 1.  Monitor for areas of redness and/or skin breakdown  2.  Assess vascular access sites hourly  3.  Every 4-6 hours minimum:  Change oxygen saturation probe site  4.  Every 4-6 hours:  If on nasal continuous positive airway pressure, respiratory therapy assess nares and determine need for appliance change or resting period.  Outcome: Progressing     Problem: Discharge Planning  Goal: Discharge to home or other facility with appropriate resources  Outcome: Progressing     Problem: Skin/Tissue Integrity - Adult  Goal: Skin integrity remains intact  Outcome: Progressing     Problem: Musculoskeletal - Adult  Goal: Return mobility to safest level of function  Outcome: Progressing     Problem: Gastrointestinal - Adult  Goal: Maintains or returns to baseline bowel function  Outcome: Progressing     Problem: Genitourinary - Adult  Goal: Absence of urinary retention  Outcome: Progressing     Problem: Infection - Adult  Goal: Absence of infection at discharge  Outcome: Progressing     Problem: Metabolic/Fluid and Electrolytes - Adult  Goal: Electrolytes maintained within normal limits  Outcome: Progressing  Goal: Hemodynamic stability and optimal renal function maintained  Outcome: Progressing  Goal: Glucose maintained within prescribed range  Outcome: Progressing     Problem: Hematologic - Adult  Goal: Maintains hematologic stability  Outcome: Progressing     
  Problem: Pain  Goal: Verbalizes/displays adequate comfort level or baseline comfort level  Outcome: Progressing     Problem: Safety - Adult  Goal: Free from fall injury  Outcome: Progressing     Problem: Nutrition Deficit:  Goal: Optimize nutritional status  Outcome: Progressing     Problem: Skin/Tissue Integrity  Goal: Absence of new skin breakdown  Description: 1.  Monitor for areas of redness and/or skin breakdown  2.  Assess vascular access sites hourly  3.  Every 4-6 hours minimum:  Change oxygen saturation probe site  4.  Every 4-6 hours:  If on nasal continuous positive airway pressure, respiratory therapy assess nares and determine need for appliance change or resting period.  Outcome: Progressing     Problem: Discharge Planning  Goal: Discharge to home or other facility with appropriate resources  Outcome: Progressing     Problem: Skin/Tissue Integrity - Adult  Goal: Skin integrity remains intact  Outcome: Progressing     Problem: Musculoskeletal - Adult  Goal: Return mobility to safest level of function  Outcome: Progressing     Problem: Gastrointestinal - Adult  Goal: Maintains or returns to baseline bowel function  Outcome: Progressing     Problem: Genitourinary - Adult  Goal: Absence of urinary retention  Outcome: Progressing     Problem: Infection - Adult  Goal: Absence of infection at discharge  Outcome: Progressing     Problem: Metabolic/Fluid and Electrolytes - Adult  Goal: Electrolytes maintained within normal limits  Outcome: Progressing  Goal: Hemodynamic stability and optimal renal function maintained  Outcome: Progressing  Goal: Glucose maintained within prescribed range  Outcome: Progressing     Problem: Hematologic - Adult  Goal: Maintains hematologic stability  Outcome: Progressing     
  Problem: Pain  Goal: Verbalizes/displays adequate comfort level or baseline comfort level  Outcome: Progressing     Problem: Safety - Adult  Goal: Free from fall injury  Outcome: Progressing     Problem: Nutrition Deficit:  Goal: Optimize nutritional status  Outcome: Progressing  Flowsheets (Taken 1/13/2025 1306 by Lori Bassett, MS, RD, LD)  Nutrient intake appropriate for improving, restoring, or maintaining nutritional needs:   Recommend, monitor, and adjust tube feedings and TPN/PPN based on assessed needs   Monitor oral intake, labs, and treatment plans     Problem: Skin/Tissue Integrity  Goal: Absence of new skin breakdown  Description: 1.  Monitor for areas of redness and/or skin breakdown  2.  Assess vascular access sites hourly  3.  Every 4-6 hours minimum:  Change oxygen saturation probe site  4.  Every 4-6 hours:  If on nasal continuous positive airway pressure, respiratory therapy assess nares and determine need for appliance change or resting period.  Outcome: Progressing     
  Problem: Pain  Goal: Verbalizes/displays adequate comfort level or baseline comfort level  Outcome: Progressing     Problem: Safety - Adult  Goal: Free from fall injury  Outcome: Progressing     Problem: Skin/Tissue Integrity  Goal: Absence of new skin breakdown  Description: 1.  Monitor for areas of redness and/or skin breakdown  2.  Assess vascular access sites hourly  3.  Every 4-6 hours minimum:  Change oxygen saturation probe site  4.  Every 4-6 hours:  If on nasal continuous positive airway pressure, respiratory therapy assess nares and determine need for appliance change or resting period.  Outcome: Progressing     Problem: Safety - Medical Restraint  Goal: Remains free of injury from restraints (Restraint for Interference with Medical Device)  Description: INTERVENTIONS:  1. Determine that other, less restrictive measures have been tried or would not be effective before applying the restraint  2. Evaluate the patient's condition at the time of restraint application  3. Inform patient/family regarding the reason for restraint  4. Q2H: Monitor safety, psychosocial status, comfort, nutrition and hydration  Outcome: Completed     Problem: Nutrition Deficit:  Goal: Optimize nutritional status  Outcome: Not Progressing     
  Problem: Pain  Goal: Verbalizes/displays adequate comfort level or baseline comfort level  Outcome: Progressing  Flowsheets (Taken 1/11/2025 2055)  Verbalizes/displays adequate comfort level or baseline comfort level:   Encourage patient to monitor pain and request assistance   Assess pain using appropriate pain scale   Administer analgesics based on type and severity of pain and evaluate response     Problem: Safety - Adult  Goal: Free from fall injury  Outcome: Progressing  Flowsheets (Taken 1/11/2025 2055)  Free From Fall Injury:   Instruct family/caregiver on patient safety   Based on caregiver fall risk screen, instruct family/caregiver to ask for assistance with transferring infant if caregiver noted to have fall risk factors     
  Problem: Pain  Goal: Verbalizes/displays adequate comfort level or baseline comfort level  Outcome: Progressing  Flowsheets (Taken 1/16/2025 2031)  Verbalizes/displays adequate comfort level or baseline comfort level:   Encourage patient to monitor pain and request assistance   Assess pain using appropriate pain scale     Problem: Safety - Adult  Goal: Free from fall injury  Outcome: Progressing     Problem: Nutrition Deficit:  Goal: Optimize nutritional status  Outcome: Progressing  Flowsheets (Taken 1/16/2025 0940 by Lori Bassett, MS, RD, LD)  Nutrient intake appropriate for improving, restoring, or maintaining nutritional needs:   Recommend, monitor, and adjust tube feedings and TPN/PPN based on assessed needs   Recommend appropriate diets, oral nutritional supplements, and vitamin/mineral supplements   Monitor oral intake, labs, and treatment plans   Assess nutritional status and recommend course of action     Problem: Skin/Tissue Integrity  Goal: Absence of new skin breakdown  Description: 1.  Monitor for areas of redness and/or skin breakdown  2.  Assess vascular access sites hourly  3.  Every 4-6 hours minimum:  Change oxygen saturation probe site  4.  Every 4-6 hours:  If on nasal continuous positive airway pressure, respiratory therapy assess nares and determine need for appliance change or resting period.  Outcome: Progressing     Problem: Discharge Planning  Goal: Discharge to home or other facility with appropriate resources  Outcome: Progressing  Flowsheets (Taken 1/16/2025 2000)  Discharge to home or other facility with appropriate resources:   Arrange for needed discharge resources and transportation as appropriate   Identify barriers to discharge with patient and caregiver     
  Problem: Pain  Goal: Verbalizes/displays adequate comfort level or baseline comfort level  Outcome: Progressing  Flowsheets (Taken 1/19/2025 0346 by Monae Slater RN)  Verbalizes/displays adequate comfort level or baseline comfort level:   Encourage patient to monitor pain and request assistance   Assess pain using appropriate pain scale     Problem: Safety - Adult  Goal: Free from fall injury  Outcome: Progressing     Problem: Nutrition Deficit:  Goal: Optimize nutritional status  Outcome: Progressing     Problem: Skin/Tissue Integrity  Goal: Absence of new skin breakdown  Description: 1.  Monitor for areas of redness and/or skin breakdown  2.  Assess vascular access sites hourly  3.  Every 4-6 hours minimum:  Change oxygen saturation probe site  4.  Every 4-6 hours:  If on nasal continuous positive airway pressure, respiratory therapy assess nares and determine need for appliance change or resting period.  Outcome: Progressing     Problem: Discharge Planning  Goal: Discharge to home or other facility with appropriate resources  Outcome: Progressing     Problem: Skin/Tissue Integrity - Adult  Goal: Skin integrity remains intact  Outcome: Progressing     Problem: Musculoskeletal - Adult  Goal: Return mobility to safest level of function  Outcome: Progressing     Problem: Gastrointestinal - Adult  Goal: Maintains or returns to baseline bowel function  Outcome: Progressing     Problem: Genitourinary - Adult  Goal: Absence of urinary retention  Outcome: Progressing     Problem: Infection - Adult  Goal: Absence of infection at discharge  Outcome: Progressing     Problem: Metabolic/Fluid and Electrolytes - Adult  Goal: Electrolytes maintained within normal limits  Outcome: Progressing  Goal: Hemodynamic stability and optimal renal function maintained  Outcome: Progressing  Goal: Glucose maintained within prescribed range  Outcome: Progressing     Problem: Hematologic - Adult  Goal: Maintains hematologic 
function  Outcome: Progressing     Problem: Genitourinary - Adult  Goal: Absence of urinary retention  Outcome: Progressing     Problem: Infection - Adult  Goal: Absence of infection at discharge  Outcome: Progressing     Problem: Metabolic/Fluid and Electrolytes - Adult  Goal: Electrolytes maintained within normal limits  Outcome: Progressing  Goal: Hemodynamic stability and optimal renal function maintained  Outcome: Progressing  Goal: Glucose maintained within prescribed range  Outcome: Progressing     Problem: Hematologic - Adult  Goal: Maintains hematologic stability  1/20/2025 1527 by Mary Barbosa, RN  Outcome: Progressing  1/20/2025 0531 by Demetra Casiano, RN  Outcome: Progressing  Flowsheets (Taken 1/20/2025 0531)  Maintains hematologic stability: Assess for signs and symptoms of bleeding or hemorrhage

## 2025-01-20 NOTE — PROGRESS NOTES
[facility] Renal CVU Note    Patient Active Problem List   Diagnosis    HTN (hypertension)    Lumbar disc herniation    DDD (degenerative disc disease), lumbar    Lumbar stenosis    Lumbar spinal stenosis    Primary osteoarthritis of right knee    ANALY (acute kidney injury) ([facility])    Hypotension    Multifocal pneumonia    Abnormal CT of the chest    Shortness of breath    Absolute anemia    Community acquired pneumonia    Closed fracture of distal end of left radius, initial encounter    ARF (acute renal failure) ([facility])    ST elevation myocardial infarction involving right coronary artery ([facility])    Cardiogenic shock    Acute respiratory failure    ABLA (acute blood loss anemia)    Chronic prescription opiate use    Mood disorder ([facility])    Acute coronary syndrome ([facility])       Past Medical History:   has a past medical history of Anxiety, Depression, Hypertension, Insomnia, Lumbar disc herniation, and Lumbar spinal stenosis.    Past Social History:   reports that she quit smoking about 43 years ago. Her smoking use included cigarettes. She started smoking about 58 years ago. She has a 3.8 pack-year smoking history. She has never used smokeless tobacco. She reports that she does not currently use alcohol. She reports that she does not currently use drugs after having used the following drugs: Marijuana (Weed).    Subjective:    On vent  S/P RCA stent with left impella device removal yesterday, kept bleeding after impella removal.  Needed about 5 units prbc.    Urine output decreased.     Review of Systems  Not able to obtain    Objective:      24-hour urine output 770mL  +8.9L since admission  Weight at 84.6kg from 79.4kg from 74.2 kg from 78.5 kg    BP (!) 122/53   Pulse 82   Temp 98.4 °F (36.9 °C) (Core)   Resp 24   Ht 1.549 m (5' 1\")   Wt 84.6 kg (186 lb 8.2 oz)   SpO2 100%   BMI 35.24 kg/m²     Wt Readings from Last 3 Encounters:   01/03/25 84.6 kg (186 lb 8.2 oz)   12/04/24 79.4 kg (175 lb) 
                         [facility] Renal CVU Note    Patient Active Problem List   Diagnosis    HTN (hypertension)    Lumbar disc herniation    DDD (degenerative disc disease), lumbar    Lumbar stenosis    Lumbar spinal stenosis    Primary osteoarthritis of right knee    ANALY (acute kidney injury) ([facility])    Hypotension    Multifocal pneumonia    Abnormal CT of the chest    Shortness of breath    Absolute anemia    Community acquired pneumonia    Closed fracture of distal end of left radius, initial encounter    ARF (acute renal failure) (HCC)    Acute myocardial infarction (HCC)    Cardiogenic shock    Acute respiratory failure    ABLA (acute blood loss anemia)    Chronic prescription opiate use    Mood disorder (HCC)    Acute coronary syndrome (HCC)       Past Medical History:   has a past medical history of Anxiety, Depression, Hypertension, Insomnia, Lumbar disc herniation, and Lumbar spinal stenosis.    Past Social History:   reports that she quit smoking about 43 years ago. Her smoking use included cigarettes. She started smoking about 58 years ago. She has a 3.8 pack-year smoking history. She has never used smokeless tobacco. She reports that she does not currently use alcohol. She reports that she does not currently use drugs after having used the following drugs: Marijuana (Weed).    Subjective:    On vent  Now planning for cath and RCA intervention    Review of Systems  Not able to obtain    Objective:    FiO2-40%  24-hour urine output 1400mL  +3.4 L since admission  Weight at 79.4kg from 74.2 kg from 78.5 kg    BP (!) 92/55   Pulse 69   Temp 99.5 °F (37.5 °C)   Resp 22   Ht 1.549 m (5' 1\")   Wt 79.4 kg (175 lb)   SpO2 100%   BMI 33.07 kg/m²     Wt Readings from Last 3 Encounters:   01/02/25 79.4 kg (175 lb)   12/04/24 79.4 kg (175 lb)   10/03/24 83 kg (183 lb)       BP Readings from Last 3 Encounters:   01/02/25 (!) 92/55   10/03/24 132/78   06/19/24 138/78       General-on vent.  Has bilateral 
             Gastroenterology Progress Note            Shahida Vásquez is a 64 y.o. female patient.  Principal Problem:    ST elevation myocardial infarction involving right coronary artery (HCC)  Active Problems:    HTN (hypertension)    ANALY (acute kidney injury) (HCC)    Cardiogenic shock    Acute respiratory failure    ABLA (acute blood loss anemia)    Chronic prescription opiate use    Mood disorder (HCC)    Acute coronary syndrome (HCC)  Resolved Problems:    * No resolved hospital problems. *      SUBJECTIVE:  Liquid stool in Flexiseal brown liquid.  Hb drop overnight.      ROS:    Gastrointestinal ROS: positive for - blood in stools and melena  negative for - abdominal pain, constipation, hematemesis, or nausea/vomiting.  Cardiovascular ROS: positive for irregular heart beat and hypotension requiring pressors.  Respiratory ROS: positive for mechanical ventilation, negative for hemoptysis, pneumonia, and wheezing    Physical    VITALS:  BP (!) 120/57   Pulse 69   Temp 98 °F (36.7 °C)   Resp 20   Ht 1.549 m (5' 1\")   Wt 82.4 kg (181 lb 10.5 oz)   SpO2 98%   BMI 34.32 kg/m²   TEMPERATURE:  Current - Temp: 98 °F (36.7 °C); Max - Temp  Av.8 °F (36.6 °C)  Min: 97 °F (36.1 °C)  Max: 99 °F (37.2 °C)    NAD  RRR, Nl s1s2  Lungs CTA Bilaterally, normal effort  Abdomen soft, ND, NT, no HSM, Bowel sounds normal.    Data    Data Review:    Recent Labs     25  1815 25  2056 25  2308 25  0353 25  0515 25  0755   WBC 25.7*  --  24.8*  --  28.3*  --    HGB 8.8*   < > 8.2* 8.6* 7.8* 7.5*   HCT 26.2*  --  24.6*  --  23.5*  --    MCV 85.9  --  84.6  --  85.8  --      --  185  --  208  --     < > = values in this interval not displayed.     Recent Labs     25  1800 25  0510 25  2359   * 132* 131*   K 4.6 4.9 5.1    100 99   CO2 24 24 23   PHOS 2.5 2.6 2.7   BUN 25* 27* 31*   CREATININE 1.9* 1.7* 1.6*     Recent Labs     25  0525 25  0515 
             Gastroenterology Progress Note            Shahida Vásquez is a 64 y.o. female patient.  Principal Problem:    ST elevation myocardial infarction involving right coronary artery (HCC)  Active Problems:    HTN (hypertension)    ANALY (acute kidney injury) (HCC)    Cardiogenic shock    Acute respiratory failure    ABLA (acute blood loss anemia)    Chronic prescription opiate use    Mood disorder (HCC)    Acute coronary syndrome (HCC)  Resolved Problems:    * No resolved hospital problems. *      SUBJECTIVE:  More alert - sedation off.  Still ETT intubated on Vent in ICU.  OG with sanguinous drainage -- 400 mL since yesterday.    ROS:    Gastrointestinal ROS: positive for - hematemesis  negative for - abdominal pain, constipation, or nausea/vomiting.  Cardiovascular ROS: positive for irregular heart beat and lower extremity edema  Respiratory ROS: positive for pneumonia and intubated on Vent.    Physical    VITALS:  BP (!) 91/57   Pulse 76   Temp 99.1 °F (37.3 °C) (Esophageal)   Resp 20   Ht 1.549 m (5' 1\")   Wt 84 kg (185 lb 3 oz)   SpO2 99%   BMI 34.99 kg/m²   TEMPERATURE:  Current - Temp: 99.1 °F (37.3 °C); Max - Temp  Av.2 °F (36.8 °C)  Min: 96.9 °F (36.1 °C)  Max: 99.1 °F (37.3 °C)    Alert, but intubated on vent.  OG with dark red drainage now.    RRR, Nl s1s2  Lungs CTA Bilaterally, normal effort  Abdomen soft, ND, NT, no HSM, Bowel sounds normal.    Data    Data Review:    Recent Labs     25  1345 25  1738 25  0025 25  0500   WBC 29.5* 24.9*  --  23.8*   HGB 9.8* 9.9* 9.3* 8.7*   HCT 29.0* 29.4* 27.6* 26.1*   MCV 83.8 84.3  --  84.9    180  --  183     Recent Labs     25  2359 25  1345 25  0025   * 132* 133*   K 5.1 5.5* 4.9   CL 99 99 100   CO2 23 22 23   PHOS 2.7 3.5 2.7   BUN 31* 36* 34*   CREATININE 1.6* 1.6* 1.5*     Recent Labs     25  0515 25  1116   AST 21 23   ALT 11 11   BILIDIR 0.2 0.2   BILITOT 0.3 0.5   ALKPHOS 86 
            Gastroenterology Progress Note      Shahida Vásquez is a 64 y.o. female patient.  1. Acute ST elevation myocardial infarction (STEMI) involving other coronary artery of inferior wall (HCC)    2. STEMI (ST elevation myocardial infarction) (HCC)    3. ANALY (acute kidney injury) (HCC)    4. ST elevation myocardial infarction involving right coronary artery (HCC)    5. Acute coronary syndrome (HCC)    6. Cardiomyopathy, unspecified type (HCC)    7. Chest pain, unspecified type    8. Shortness of breath    9. Chest pain    10. ST elevation myocardial infarction (STEMI) involving other coronary artery of inferior wall (HCC)    11. Cardiogenic shock    12. ABLA (acute blood loss anemia)    13. Hematoma    14. Acute respiratory failure, unspecified whether with hypoxia or hypercapnia        SUBJECTIVE:  Pt previously seen by GI for melena and red blood per OG tube in setting of cardiogenic shock, Impella, heparin drip. She failed her bedside SLP eval. SLP rec MBS but patient refused. GI consulted for PEG. Pt states wants to hold off PEG until she is at home. She is not oriented.         Physical    VITALS:  BP (!) 148/85   Pulse 91   Temp 98.5 °F (36.9 °C) (Temporal)   Resp 18   Ht 1.549 m (5' 1\")   Wt 75.7 kg (166 lb 14.2 oz)   SpO2 95%   BMI 31.53 kg/m²   TEMPERATURE:  Current - Temp: 98.5 °F (36.9 °C); Max - Temp  Av.1 °F (36.7 °C)  Min: 97.9 °F (36.6 °C)  Max: 98.5 °F (36.9 °C)    NAD  RRR   Abdomen soft, ND, NT, no HSM, Bowel sounds normal  Alert but not able to answer orientation questions.   Anicteric, no jaundice     Data    Data Review:    Recent Labs     25  0444 25  0500   WBC 16.4* 23.3*   HGB 7.9* 8.7*   HCT 23.5* 26.5*   MCV 87.3 88.4    352     Recent Labs     25  0443 25  0345 25  0500    140 142   K 4.0 3.6 2.9*    104 106   CO2 22 23 22   BUN 42* 48* 65*   CREATININE 2.2* 2.2* 2.6*     Recent Labs     25  0500   AST 23   ALT 18 
      [facility]   Progress Note  Cardiology    CC:  STEMI, Renal failure    HPI:  Up in a chair. No complaints.       Medications/Labs all Reviewed    Lab Results   Component Value Date    WBC 16.4 (H) 01/11/2025    HGB 7.9 (L) 01/11/2025    HCT 23.5 (L) 01/11/2025    MCV 87.3 01/11/2025     01/11/2025     Lab Results   Component Value Date    CREATININE 2.2 (H) 01/11/2025    BUN 42 (H) 01/11/2025     01/11/2025    K 4.0 01/11/2025     01/11/2025    CO2 22 01/11/2025     Lab Results   Component Value Date    INR 1.02 01/04/2025    PROTIME 13.6 01/04/2025        PHYSICAL EXAM   /61   Pulse 84   Temp 97.4 °F (36.3 °C) (Temporal)   Resp 24   Ht 1.549 m (5' 1\")   Wt 84.2 kg (185 lb 10 oz)   SpO2 96%   BMI 35.07 kg/m²    Chronically ill female in NAD  Respiratory:  Resp Assessment: Normal respiratory effort  Resp Auscultation: Clear to auscultation bilaterally   Cardiovascular:  Auscultation: regular rate and rhythm, normal S1S2, no murmur, rub or gallop  Palpation:  Nl PMI  JVP:  normal  Extremities: No Edema  Abdomen:  Soft, non-tender  Normal bowel sounds  Extremities:   No Cyanosis or Clubbing  Neurological/Psychiatric:  Oriented to time, place, and person  Non-anxious  Skin:   Warm and dry      ASSESSMENT    STEMI:  Late presenting inferior MI with occluded RCA  PCI of the RCA  Continue dual antiplatelet therapy with aspirin and Plavix    Cardiogenic shock  Secondary to RV infarction  Impella balloon pump removed 1/8/2025  Also off of dobutamine  Improving    Anemia  Status post multiple units of packed red blood cells  Trying to keep hemoglobin greater than 8  7.9 today will continue to follow and transfuse if drops further    Junctional rhythm  Remains in sinus  Follow telemetry    Acute hypoxic respiratory failure  Improved  Now on room air  Follow    Acute renal insufficiency/ATN  CRRT stopped due to recurrent clotting  Nephrology following and will decide on need for 
      Hospitalist Progress Note    Name:  Shahida Vásquez    /Age/Sex: 1960  (64 y.o. female)  MRN & CSN:  [id number] & [id number]    PCP: Demetrice Saldaña DO    Date of Admission: 2024    Patient Status:  Inpatient     Chief Complaint:   Chief Complaint   Patient presents with    Fatigue     Pt arrives from home by herself. Pt C/O CP and fatigue since Thursday. Pt states that the CP started to radiate down her bilateral arms. Pt rates her pain a 6 out of 10.        Hospital Course:     Patient admitted for chest pain and fatigue.  Found to have STEMI on admission.  Cardiology consulted.      Went to cardiac cath on .  Cath showed significant RCA lesion and profound RV systolic dysfunction.  Impella CP placed left CFA.  Impella RP placed right CFV on .      Patient intubated after cardiac procedure on  for airway protection.  CCM consulted.     Patient found to have ANALY on admission.  Nephrology consulted.     S/p RCA stent with left impella device removed on . Received 5 U PRBC during cath on  as she kept bleeding after impella removal.     VAD and IABP removal on .    Extubated on .    CRRT stopped on 1/10.    Subjective:  Today is:  Hospital Day: 20.  Patient seen and examined in [id number].     In bed. No pain today. Eating, but not much. NGT still in place.      Medications:  Reviewed    Infusion Medications    dextrose      sodium chloride 10 mL/hr at 25 4929    sodium chloride Stopped (01/10/25 5295)     Scheduled Medications    mirtazapine  15 mg Oral Daily    insulin lispro  0-4 Units SubCUTAneous Q4H    metoprolol tartrate  75 mg Oral BID    apixaban  10 mg Oral BID    Followed by    [START ON 2025] apixaban  5 mg Oral BID    [Held by provider] furosemide  20 mg IntraVENous Daily    thiamine mononitrate  100 mg Oral Daily    miconazole   Topical BID    pantoprazole  40 mg IntraVENous BID    sodium chloride flush  5-40 mL IntraVENous 2 times per day    
      Hospitalist Progress Note    Name:  Shahida Vásquez    /Age/Sex: 1960  (64 y.o. female)  MRN & CSN:  [id number] & [id number]    PCP: Demetrice Saldaña DO    Date of Admission: 2024    Patient Status:  Inpatient     Chief Complaint:   Chief Complaint   Patient presents with    Fatigue     Pt arrives from home by herself. Pt C/O CP and fatigue since Thursday. Pt states that the CP started to radiate down her bilateral arms. Pt rates her pain a 6 out of 10.        Hospital Course:   Patient admitted for chest pain and fatigue.  Found to have STEMI on admission.  Cardiology consulted.     Went to cardiac cath on .  Cath showed significant RCA lesion and profound RV systolic dysfunction.  Impella CP placed left CFA.  Impella RP placed right CFV on .     Patient intubated after cardiac procedure on  for airway protection.  CCM consulted.    Patient found to have ANALY on admission.  Nephrology consulted.    S/p RCA stent with left impella device removed on . Received 5 U PRBC during cath on  as she kept bleeding after impella removal.    Subjective:  Today is:  Hospital Day: 15.      Patient seen and examined in CVU-2907/2907-.   Improved.  Remains on CRRT  She was able to be extubated this afternoon, after I saw her on rounds this am    1/10/2025  Stopped CRRT    2025  Likes apple sauce    2024  Still not taking a lot.  CT head was negative.  Considering MRI  Could also be just has sore throat from being intubated.  She is refusing MBS and refusing to work with speech          Medications:  Reviewed    Infusion Medications    dextrose      sodium chloride 10 mL/hr at 25 0539    sodium chloride Stopped (01/10/25 4453)     Scheduled Medications    carvedilol  3.125 mg Oral BID WC    thiamine mononitrate  100 mg Oral Daily    furosemide  20 mg IntraVENous Q MWF    pantoprazole  40 mg IntraVENous BID    amiodarone  400 mg Oral BID    sodium chloride flush  5-40 mL 
      Hospitalist Progress Note    Name:  Shahida Vásquez    /Age/Sex: 1960  (64 y.o. female)  MRN & CSN:  [id number] & [id number]    PCP: Demetrice Saldaña DO    Date of Admission: 2024    Patient Status:  Inpatient     Chief Complaint:   Chief Complaint   Patient presents with    Fatigue     Pt arrives from home by herself. Pt C/O CP and fatigue since Thursday. Pt states that the CP started to radiate down her bilateral arms. Pt rates her pain a 6 out of 10.        Hospital Course:   Patient admitted for chest pain and fatigue.  Found to have STEMI on admission.  Cardiology consulted.     Went to cardiac cath on .  Cath showed significant RCA lesion and profound RV systolic dysfunction.  Impella CP placed left CFA.  Impella RP placed right CFV on .     Patient intubated after cardiac procedure on  for airway protection.  CCM consulted.    Patient found to have ANALY on admission.  Nephrology consulted.    S/p RCA stent with left impella device removed on . Received 5 U PRBC during cath on  as she kept bleeding after impella removal.    Subjective:  Today is:  Hospital Day: 7.  Patient seen and examined in CVU-2907/2907.     Intubated.  Sedated.  Not responsive.  Backed off of propfol but now having to go back up as she is moving her head and it's messing with CRRT.       Medications:  Reviewed    Infusion Medications    amiodarone 1 mg/min (25 1400)    Followed by    amiodarone      prismaSATE BGK 4/2.5 250 mL/hr at 25 1259    prismaSATE BGK 4/2.5 500 mL/hr at 25 1259    prismaSATE BGK 4/2.5 100 mL/hr at 25 1539    heparin (porcine) 25,000 Units in dextrose 5 % 1,000 mL infusion (FOR IMPELLA PURGE) 19.5 mL/hr at 25 1544    sodium chloride 10 mL/hr at 25 1400    phenylephrine (JACKY-SYNEPHRINE) 50 mg in sodium chloride 0.9 % 250 mL infusion Stopped (25 0949)    DOBUTamine 5 mcg/kg/min (25 1400)    propofol 5 mcg/kg/min (25)    
      Hospitalist Progress Note    Name:  Shahida Vásquez    /Age/Sex: 1960  (64 y.o. female)  MRN & CSN:  [id number] & [id number]    PCP: Demetrice Saldaña DO    Date of Admission: 2024    Patient Status:  Inpatient     Chief Complaint:   Chief Complaint   Patient presents with    Fatigue     Pt arrives from home by herself. Pt C/O CP and fatigue since Thursday. Pt states that the CP started to radiate down her bilateral arms. Pt rates her pain a 6 out of 10.        Hospital Course:   Patient admitted for chest pain and fatigue.  Found to have STEMI on admission.  Cardiology consulted.     Went to cardiac cath on .  Cath showed significant RCA lesion and profound RV systolic dysfunction.  Impella CP placed left CFA.  Impella RP placed right CFV on .     Patient intubated after cardiac procedure on  for airway protection.  CCM consulted.    Patient found to have ANALY on admission.  Nephrology consulted.    S/p RCA stent with left impella device removed on . Received 5 U PRBC during cath on  as she kept bleeding after impella removal.    Subjective:  Today is:  Hospital Day: 8.      Patient seen and examined in CVU-2907/2907-.   Intubated.  Sedated. Opens eyes spontaneously.     Medications:  Reviewed    Infusion Medications    sodium chloride      amiodarone 0.5 mg/min (25)    prismaSATE BGK 4/2.5 250 mL/hr at 25 2348    prismaSATE BGK 4/2.5 500 mL/hr at 25 0649    prismaSATE BGK 4/2.5 100 mL/hr at 25 1539    heparin (porcine) 25,000 Units in dextrose 5 % 1,000 mL infusion (FOR IMPELLA PURGE) 19.5 mL/hr at 25 1544    sodium chloride Stopped (25 0732)    phenylephrine (JACKY-SYNEPHRINE) 50 mg in sodium chloride 0.9 % 250 mL infusion Stopped (25 09)    DOBUTamine 5 mcg/kg/min (25)    propofol 20 mcg/kg/min (25)    fentaNYL 75 mcg/hr (25)    sodium chloride 20 mL/hr at 25    midazolam Stopped 
      Hospitalist Progress Note    Name:  Shahida Vásquez    /Age/Sex: 1960  (64 y.o. female)  MRN & CSN:  [id number] & [id number]    PCP: Demetrice Saldaña DO    Date of Admission: 2024    Patient Status:  Inpatient     Chief Complaint:   Chief Complaint   Patient presents with    Fatigue     Pt arrives from home by herself. Pt C/O CP and fatigue since Thursday. Pt states that the CP started to radiate down her bilateral arms. Pt rates her pain a 6 out of 10.        Hospital Course:   Patient admitted for chest pain and fatigue.  Found to have STEMI on admission.  Cardiology consulted.     Went to cardiac cath on .  Cath showed significant RCA lesion and profound RV systolic dysfunction.  Impella CP placed left CFA.  Impella RP placed right CFV on .     Patient intubated after cardiac procedure on  for airway protection.  CCM consulted.    Patient found to have ANALY on admission.  Nephrology consulted.    S/p RCA stent with left impella device removed on . Received 5 U PRBC during cath on  as she kept bleeding after impella removal.    Subjective:  Today is:  Hospital Day: 9.      Patient seen and examined in CVU-2907/2907-.   Improved.  Remains on CRRT    Medications:  Reviewed    Infusion Medications    prismaSol BGK 2/3.5      prismaSol BGK 2/3.5      prismaSol BGK 2/3.5      sodium chloride      amiodarone 0.5 mg/min (25 0904)    heparin (porcine) 25,000 Units in dextrose 5 % 1,000 mL infusion (FOR IMPELLA PURGE) 5.1 mL/hr at 25 1419    sodium chloride Stopped (25 0757)    phenylephrine (JACKY-SYNEPHRINE) 50 mg in sodium chloride 0.9 % 250 mL infusion 60 mcg/min (25 0812)    DOBUTamine 2.5 mcg/kg/min (25)    propofol 25 mcg/kg/min (25)    fentaNYL 75 mcg/hr (25 08)    sodium chloride 20 mL/hr at 25 0807    midazolam Stopped (25 1836)    heparin (PORCINE) Infusion 1,700 Units/hr (25 0807)    norepinephrine 
      Hospitalist Progress Note    Name:  Shahida Vásquez    /Age/Sex: 1960  (64 y.o. female)  MRN & CSN:  [id number] & [id number]    PCP: Demetrice Saldaña DO    Date of Admission: 2024    Patient Status:  Inpatient     Chief Complaint:   Chief Complaint   Patient presents with    Fatigue     Pt arrives from home by herself. Pt C/O CP and fatigue since Thursday. Pt states that the CP started to radiate down her bilateral arms. Pt rates her pain a 6 out of 10.        Hospital Course:   Patient admitted for chest pain and fatigue.  Found to have STEMI on admission.  Cardiology consulted.     Went to cardiac cath on .  Cath showed significant RCA lesion and profound RV systolic dysfunction.  Impella CP placed left CFA.  Impella RP placed right CFV on .     Patient intubated after cardiac procedure on  for airway protection.  CCM consulted.    Patient found to have ANALY on admission.  Nephrology consulted.    S/p RCA stent with left impella device removed on . Received 5 U PRBC during cath on  as she kept bleeding after impella removal.    Subjective:  Today is:  Hospital Day: 10.      Patient seen and examined in CVU-2907/2907-.   Improved.  Remains on CRRT    Medications:  Reviewed    Infusion Medications    sodium chloride      sodium chloride      sodium chloride      pantoprazole (PROTONIX) 80 mg in sodium chloride 0.9 % 100 mL infusion 8 mg/hr (25)    sodium chloride      prismaSATE BGK 4/2.5      prismaSATE BGK 4/2.5      prismaSATE BGK 4/2.5      prismaSol BGK 2/3.5 500 mL/hr at 25 1457    prismaSol BGK 2/3.5 1,000 mL/hr at 25 1457    prismaSol BGK 2/3.5 100 mL/hr at 25 1456    VASOpressin 20 Units in sodium chloride 0.9 % 100 mL infusion 0.03 Units/min (25)    sodium chloride      dextrose      sodium chloride      amiodarone 0.5 mg/min (25)    heparin (porcine) 25,000 Units in dextrose 5 % 1,000 mL infusion (FOR IMPELLA PURGE) 
      Hospitalist Progress Note    Name:  Shahida Vásquez    /Age/Sex: 1960  (64 y.o. female)  MRN & CSN:  [id number] & [id number]    PCP: Demetrice Saldaña DO    Date of Admission: 2024    Patient Status:  Inpatient     Chief Complaint:   Chief Complaint   Patient presents with    Fatigue     Pt arrives from home by herself. Pt C/O CP and fatigue since Thursday. Pt states that the CP started to radiate down her bilateral arms. Pt rates her pain a 6 out of 10.        Hospital Course:     Patient admitted for chest pain and fatigue.  Found to have STEMI on admission.  Cardiology consulted.      Went to cardiac cath on .  Cath showed significant RCA lesion and profound RV systolic dysfunction.  Impella CP placed left CFA.  Impella RP placed right CFV on .      Patient intubated after cardiac procedure on  for airway protection.  CCM consulted.     Patient found to have ANALY on admission.  Nephrology consulted.     S/p RCA stent with left impella device removed on . Received 5 U PRBC during cath on  as she kept bleeding after impella removal.     VAD and IABP removal on .    Extubated on .    CRRT stopped on 1/10.    Subjective:  Today is:  Hospital Day: 17.  Patient seen and examined in CVU-2907/2907-.     Sitting up in bed.  Still weak voice.  NGT present.  Does have a diet, but not eating much.      Medications:  Reviewed    Infusion Medications    heparin (PORCINE) Infusion 12 Units/kg/hr (01/15/25 1141)    dextrose      sodium chloride 10 mL/hr at 25 0429    sodium chloride Stopped (01/10/25 2156)     Scheduled Medications    metoprolol tartrate  50 mg Oral BID    [Held by provider] furosemide  20 mg IntraVENous Daily    thiamine mononitrate  100 mg Oral Daily    miconazole   Topical BID    pantoprazole  40 mg IntraVENous BID    sodium chloride flush  5-40 mL IntraVENous 2 times per day    insulin lispro  0-4 Units SubCUTAneous Q4H    citalopram  40 mg Oral Daily    
      Hospitalist Progress Note    Name:  Shahida Vásquez    /Age/Sex: 1960  (64 y.o. female)  MRN & CSN:  [id number] & [id number]    PCP: Demetrice Saldaña DO    Date of Admission: 2024    Patient Status:  Inpatient     Chief Complaint:   Chief Complaint   Patient presents with    Fatigue     Pt arrives from home by herself. Pt C/O CP and fatigue since Thursday. Pt states that the CP started to radiate down her bilateral arms. Pt rates her pain a 6 out of 10.        Hospital Course:   Patient admitted for chest pain and fatigue.  Found to have STEMI on admission.  Cardiology consulted.     Went to cardiac cath on .  Cath showed significant RCA lesion and profound RV systolic dysfunction.  Impella CP placed left CFA.  Impella RP placed right CFV on .     Patient intubated after cardiac procedure on  for airway protection.  CCM consulted.    Patient found to have ANALY on admission.  Nephrology consulted.    S/p RCA stent with left impella device removed on . Received 5 U PRBC during cath on  as she kept bleeding after impella removal.    Subjective:  Today is:  Hospital Day: 13.      Patient seen and examined in CVU-2907/2907-.   Improved.  Remains on CRRT  She was able to be extubated this afternoon, after I saw her on rounds this am    1/10/2025  Stopped CRRT    2025  Likes apple sauce          Medications:  Reviewed    Infusion Medications    prismaSATE BGK 4/2.5 500 mL/hr at 01/10/25 0818    prismaSATE BGK 4/2.5 1,000 mL/hr at 01/10/25 0818    prismaSATE BGK 4/2.5 100 mL/hr at 25 2100    dextrose      sodium chloride 10 mL/hr at 25 0429    sodium chloride Stopped (01/10/25 0006)     Scheduled Medications    sterile water        pantoprazole  40 mg IntraVENous BID    amiodarone  400 mg Oral BID    sodium chloride flush  5-40 mL IntraVENous 2 times per day    lidocaine  100 mg IntraDERmal Once    insulin lispro  0-4 Units SubCUTAneous Q4H    cefTRIAXone (ROCEPHIN) IV 
      Hospitalist Progress Note    Name:  Shahida Vásquez    /Age/Sex: 1960  (64 y.o. female)  MRN & CSN:  [id number] & [id number]    PCP: Demetrice Saldaña DO    Date of Admission: 2024    Patient Status:  Inpatient     Chief Complaint:   Chief Complaint   Patient presents with    Fatigue     Pt arrives from home by herself. Pt C/O CP and fatigue since Thursday. Pt states that the CP started to radiate down her bilateral arms. Pt rates her pain a 6 out of 10.        Hospital Course:   Patient admitted for chest pain and fatigue.  Found to have STEMI on admission.  Cardiology consulted.     Went to cardiac cath on .  Cath showed significant RCA lesion and profound RV systolic dysfunction.  Impella CP placed left CFA.  Impella RP placed right CFV on .     Patient intubated after cardiac procedure on  for airway protection.  CCM consulted.    Patient found to have ANALY on admission.  Nephrology consulted.    S/p RCA stent with left impella device removed on . Received 5 U PRBC during cath on  as she kept bleeding after impella removal.    Subjective:  Today is:  Hospital Day: 11.      Patient seen and examined in CVU-2907/2907-.   Improved.  Remains on CRRT  She was able to be extubated this afternoon, after I saw her on rounds this am    Medications:  Reviewed    Infusion Medications    sodium chloride      pantoprazole (PROTONIX) 80 mg in sodium chloride 0.9 % 100 mL infusion 8 mg/hr (25 1000)    sodium chloride      prismaSATE BGK 4/2.5      prismaSATE BGK 4/2.5 1,000 mL/hr at 25 0745    prismaSATE BGK 4/2.5      VASOpressin 20 Units in sodium chloride 0.9 % 100 mL infusion 0.01 Units/min (25 1000)    sodium chloride      dextrose      sodium chloride      amiodarone 0.5 mg/min (25 1000)    sodium chloride 10 mL/hr at 25 1000    phenylephrine (JACKY-SYNEPHRINE) 50 mg in sodium chloride 0.9 % 250 mL infusion 30 mcg/min (25 0014)    DOBUTamine 2.5 
      Hospitalist Progress Note    Name:  Shahida Vásquez    /Age/Sex: 1960  (64 y.o. female)  MRN & CSN:  [id number] & [id number]    PCP: Demetrice Saldaña DO    Date of Admission: 2024    Patient Status:  Inpatient     Chief Complaint:   Chief Complaint   Patient presents with    Fatigue     Pt arrives from home by herself. Pt C/O CP and fatigue since Thursday. Pt states that the CP started to radiate down her bilateral arms. Pt rates her pain a 6 out of 10.        Hospital Course:     Patient admitted for chest pain and fatigue.  Found to have STEMI on admission.  Cardiology consulted.      Went to cardiac cath on .  Cath showed significant RCA lesion and profound RV systolic dysfunction.  Impella CP placed left CFA.  Impella RP placed right CFV on .      Patient intubated after cardiac procedure on  for airway protection.  CCM consulted.     Patient found to have ANALY on admission.  Nephrology consulted.     S/p RCA stent with left impella device removed on . Received 5 U PRBC during cath on  as she kept bleeding after impella removal.     VAD and IABP removal on .    Extubated on .    CRRT stopped on 1/10.    Subjective:  Today is:  Hospital Day: 16.  Patient seen and examined in CVU-2907/2907-.     Sitting up in chair.  Still weak with NGT present.  Planning MBS today.      Medications:  Reviewed    Infusion Medications    dextrose      sodium chloride 10 mL/hr at 25 6679    sodium chloride Stopped (01/10/25 8736)     Scheduled Medications    carvedilol  6.25 mg Oral BID WC    furosemide  20 mg IntraVENous Daily    thiamine mononitrate  100 mg Oral Daily    miconazole   Topical BID    pantoprazole  40 mg IntraVENous BID    amiodarone  400 mg Oral BID    sodium chloride flush  5-40 mL IntraVENous 2 times per day    insulin lispro  0-4 Units SubCUTAneous Q4H    citalopram  40 mg Oral Daily    [Held by provider] gabapentin  300 mg Oral TID    aspirin  81 mg Oral Daily 
      Hospitalist Progress Note    Name:  Shahida Vásquez    /Age/Sex: 1960  (64 y.o. female)  MRN & CSN:  [id number] & [id number]    PCP: Demetrice Saldaña DO    Date of Admission: 2024    Patient Status:  Inpatient     Chief Complaint:   Chief Complaint   Patient presents with    Fatigue     Pt arrives from home by herself. Pt C/O CP and fatigue since Thursday. Pt states that the CP started to radiate down her bilateral arms. Pt rates her pain a 6 out of 10.        Hospital Course:   Patient admitted for chest pain and fatigue.  Found to have STEMI on admission.  Cardiology consulted.     Went to cardiac cath on .  Cath showed significant RCA lesion and profound RV systolic dysfunction.  Impella CP placed left CFA.  Impella RP placed right CFV on .     Patient intubated after cardiac procedure on  for airway protection.  CCM consulted.    Patient found to have ANALY on admission.  Nephrology consulted.    S/p RCA stent with left impella device removed on . Received 5 U PRBC during cath on  as she kept bleeding after impella removal.    Subjective:  Today is:  Hospital Day: 14.      Patient seen and examined in CVU-2907/2907-.   Improved.  Remains on CRRT  She was able to be extubated this afternoon, after I saw her on rounds this am    1/10/2025  Stopped CRRT    2025  Likes apple sauce          Medications:  Reviewed    Infusion Medications    dextrose      sodium chloride 10 mL/hr at 25 0349    sodium chloride Stopped (01/10/25 6572)     Scheduled Medications    pantoprazole  40 mg IntraVENous BID    amiodarone  400 mg Oral BID    sodium chloride flush  5-40 mL IntraVENous 2 times per day    lidocaine  100 mg IntraDERmal Once    insulin lispro  0-4 Units SubCUTAneous Q4H    sodium chloride flush  5-40 mL IntraVENous 2 times per day    citalopram  40 mg Oral Daily    [Held by provider] gabapentin  300 mg Oral TID    [Held by provider] propranolol  80 mg Oral Daily    
      Hospitalist Progress Note    Name:  Shahida Vásquez    /Age/Sex: 1960  (64 y.o. female)  MRN & CSN:  [id number] & [id number]    PCP: Demetrice Saldaña DO    Date of Admission: 2024    Patient Status:  Inpatient     Chief Complaint:   Chief Complaint   Patient presents with    Fatigue     Pt arrives from home by herself. Pt C/O CP and fatigue since Thursday. Pt states that the CP started to radiate down her bilateral arms. Pt rates her pain a 6 out of 10.        Hospital Course:   Patient admitted for chest pain and fatigue.  Found to have STEMI on admission.  Cardiology consulted.     Went to cardiac cath on .  Cath showed significant RCA lesion and profound RV systolic dysfunction.  Impella CP placed left CFA.  Impella RP placed right CFV on .     Patient intubated after cardiac procedure on  for airway protection.  CCM consulted.    Patient found to have ANALY on admission.  Nephrology consulted.    Subjective:  Today is:  Hospital Day: 4.  Patient seen and examined in CVU-/2917.     Intubated.  Sedated.  Minimally responsive.      Medications:  Reviewed    Infusion Medications    sodium chloride 70 mL/hr at 25 1215    [Held by provider] sodium bicarbonate 25 mEq in dextrose 5 % 1,000 mL infusion (FOR IMPELLA PURGE)      heparin (porcine) 25,000 Units in dextrose 5 % 1,000 mL infusion (FOR IMPELLA PURGE)      sodium chloride      [Held by provider] heparin (porcine) 12,500 Units in dextrose 5 % 500 mL infusion (FOR IMPELLA PURGE)      propofol 30 mcg/kg/min (25 1215)    fentaNYL 150 mcg/hr (25 1215)    sodium chloride 20 mL/hr at 25 1215    midazolam 5 mg/hr (25 1215)    sodium bicarbonate 12.5 mEq in dextrose 5 % 500 mL infusion (FOR IMPELLA PURGE)      heparin (PORCINE) Infusion 1,450 Units/hr (25 1215)    norepinephrine Stopped (25 1036)     Scheduled Medications    sodium chloride flush  5-40 mL IntraVENous 2 times per day    
      Hospitalist Progress Note    Name:  Shahida Vásquez    /Age/Sex: 1960  (64 y.o. female)  MRN & CSN:  [id number] & [id number]    PCP: Demetrice Saldaña DO    Date of Admission: 2024    Patient Status:  Inpatient     Chief Complaint:   Chief Complaint   Patient presents with    Fatigue     Pt arrives from home by herself. Pt C/O CP and fatigue since Thursday. Pt states that the CP started to radiate down her bilateral arms. Pt rates her pain a 6 out of 10.        Hospital Course:   Patient admitted for chest pain and fatigue.  Found to have STEMI on admission.  Cardiology consulted.     Went to cardiac cath on .  Cath showed significant RCA lesion and profound RV systolic dysfunction.  Impella CP placed left CFA.  Impella RP placed right CFV on .     Patient intubated after cardiac procedure on  for airway protection.  CCM consulted.    Patient found to have ANALY on admission.  Nephrology consulted.    S/p RCA stent with left impella device removed on . Received 5 U PRBC during cath on  as she kept bleeding after impella removal.    Subjective:  Today is:  Hospital Day: 6.  Patient seen and examined in CVU-2907/2907-.     Intubated.  Sedated.  Not responsive.      Medications:  Reviewed    Infusion Medications    sodium chloride      prismaSATE BGK 4/2.5 500 mL/hr at 25 1025    prismaSATE BGK 4/2.5 1,000 mL/hr at 25 0733    prismaSATE BGK 4/2.5 100 mL/hr at 25 1539    heparin (porcine) 25,000 Units in dextrose 5 % 1,000 mL infusion (FOR IMPELLA PURGE) 12.8 mL/hr at 25 1947    sodium chloride      phenylephrine (JACKY-SYNEPHRINE) 50 mg in sodium chloride 0.9 % 250 mL infusion Stopped (25 0949)    DOBUTamine 5 mcg/kg/min (25 1107)    propofol 5 mcg/kg/min (25 1200)    fentaNYL 25 mcg/hr (25 1200)    sodium chloride 20 mL/hr at 25 1200    midazolam Stopped (25 1836)    heparin (PORCINE) Infusion 1,100 Units/hr (25 1200) 
      Hospitalist Progress Note    Name:  Shahida Vásquez    /Age/Sex: 1960  (64 y.o. female)  MRN & CSN:  [id number] & [id number]    PCP: Demetrice Saldaña DO    Date of Admission: 2024    Patient Status:  Inpatient     Chief Complaint:   Chief Complaint   Patient presents with    Fatigue     Pt arrives from home by herself. Pt C/O CP and fatigue since Thursday. Pt states that the CP started to radiate down her bilateral arms. Pt rates her pain a 6 out of 10.        Hospital Course:     Patient admitted for chest pain and fatigue.  Found to have STEMI on admission.  Cardiology consulted.      Went to cardiac cath on .  Cath showed significant RCA lesion and profound RV systolic dysfunction.  Impella CP placed left CFA.  Impella RP placed right CFV on .      Patient intubated after cardiac procedure on  for airway protection.  CCM consulted.     Patient found to have ANALY on admission.  Nephrology consulted.     S/p RCA stent with left impella device removed on . Received 5 U PRBC during cath on  as she kept bleeding after impella removal.     VAD and IABP removal on .    Extubated on .    CRRT stopped on 1/10.    Was accepted to [facility] , but [facility] wanted NGT out prior to acceptance.    Subjective:  Today is:  Hospital Day: 21.  Patient seen and examined in C9V-0272/5910-01.     In bed. Eating breakfast. Looks much better today. Talking. No pain.      Medications:  Reviewed    Infusion Medications    dextrose      sodium chloride 10 mL/hr at 25 0429    sodium chloride Stopped (01/10/25 8646)     Scheduled Medications    furosemide  40 mg Oral Daily    insulin lispro  0-4 Units SubCUTAneous 4x Daily AC & HS    mirtazapine  15 mg Oral Daily    metoprolol tartrate  75 mg Oral BID    apixaban  10 mg Oral BID    Followed by    [START ON 2025] apixaban  5 mg Oral BID    thiamine mononitrate  100 mg Oral Daily    miconazole   Topical BID    pantoprazole  40 mg IntraVENous 
      Hospitalist Progress Note    Name:  Shahida Vásquez    /Age/Sex: 1960  (64 y.o. female)  MRN & CSN:  [id number] & [id number]    PCP: Demetrice Saldaña DO    Date of Admission: 2024    Patient Status:  Inpatient     Chief Complaint:   Chief Complaint   Patient presents with    Fatigue     Pt arrives from home by herself. Pt C/O CP and fatigue since Thursday. Pt states that the CP started to radiate down her bilateral arms. Pt rates her pain a 6 out of 10.        Hospital Course:   Patient admitted for chest pain and fatigue.  Found to have STEMI on admission.  Cardiology consulted.     Went to cardiac cath on .  Cath showed significant RCA lesion and profound RV systolic dysfunction.  Impella CP placed left CFA.  Impella RP placed right CFV on .     Patient intubated after cardiac procedure on  for airway protection.  CCM consulted.    Patient found to have ANALY on admission.  Nephrology consulted.    S/p RCA stent with left impella device removed on . Received 5 U PRBC during cath on  as she kept bleeding after impella removal.    Subjective:  Today is:  Hospital Day: 12.      Patient seen and examined in CVU-2907/2907-.   Improved.  Remains on CRRT  She was able to be extubated this afternoon, after I saw her on rounds this am    1/10/2025  Stopped CRRT    Medications:  Reviewed    Infusion Medications    prismaSATE BGK 4/2.5 500 mL/hr at 01/10/25 0818    prismaSATE BGK 4/2.5 1,000 mL/hr at 01/10/25 0818    prismaSATE BGK 4/2.5 100 mL/hr at 25 2100    dextrose      sodium chloride 10 mL/hr at 01/10/25 1957    sodium chloride 20 mL/hr at 01/10/25 1957     Scheduled Medications    pantoprazole  40 mg IntraVENous BID    furosemide  40 mg IntraVENous TID    amiodarone  200 mg Oral BID    sodium chloride flush  5-40 mL IntraVENous 2 times per day    lidocaine  100 mg IntraDERmal Once    insulin lispro  0-4 Units SubCUTAneous Q4H    cefTRIAXone (ROCEPHIN) IV  2,000 mg 
      Hospitalist Progress Note    Name:  Shahida Vásquez    /Age/Sex: 1960  (64 y.o. female)  MRN & CSN:  [id number] & [id number]    PCP: Demetrice Saldaña DO    Date of Admission: 2024    Patient Status:  Inpatient     Chief Complaint:   Chief Complaint   Patient presents with    Fatigue     Pt arrives from home by herself. Pt C/O CP and fatigue since Thursday. Pt states that the CP started to radiate down her bilateral arms. Pt rates her pain a 6 out of 10.        Hospital Course:   Patient admitted for chest pain and fatigue.  Found to have STEMI on admission.  Cardiology consulted.     Went to cardiac cath on .  Cath showed significant RCA lesion and profound RV systolic dysfunction.  Impella CP placed left CFA.  Impella RP placed right CFV on .     Patient intubated after cardiac procedure on  for airway protection.  CCM consulted.    Patient found to have ANALY on admission.  Nephrology consulted.    S/p RCA stent with left impella device removed on . Received 5 U PRBC during cath on  as she kept bleeding after impella removal.    Subjective:  Today is:  Hospital Day: 5.  Patient seen and examined in CVU-/2917.     Intubated.  Sedated.  Not responsive.      Medications:  Reviewed    Infusion Medications    prismaSATE BGK 4/2.5      prismaSATE BGK 4/2.5      prismaSATE BGK 4/2.5      [Held by provider] sodium bicarbonate 25 mEq in dextrose 5 % 1,000 mL infusion (FOR IMPELLA PURGE)      heparin (porcine) 25,000 Units in dextrose 5 % 1,000 mL infusion (FOR IMPELLA PURGE) 12.8 mL/hr at 25 1947    sodium chloride      sodium chloride      sodium chloride      sodium chloride      sodium chloride      phenylephrine (JACKY-SYNEPHRINE) 50 mg in sodium chloride 0.9 % 250 mL infusion 60 mcg/min (25 1100)    DOBUTamine 5 mcg/kg/min (25 1100)    sodium bicarbonate 150 mEq in dextrose 5 % 1,000 mL infusion 80 mL/hr at 25 1132    amiodarone 0.5 mg/min (25 
      Hospitalist Progress Note    Name:  Shahida Vásquez    /Age/Sex: 1960  (64 y.o. female)  MRN & CSN:  [id number] & [id number]    PCP: Demetrice Saldaña DO    Date of Admission: 2024    Patient Status:  Inpatient     Chief Complaint:   Chief Complaint   Patient presents with    Fatigue     Pt arrives from home by herself. Pt C/O CP and fatigue since Thursday. Pt states that the CP started to radiate down her bilateral arms. Pt rates her pain a 6 out of 10.        Hospital Course:     Patient admitted for chest pain and fatigue.  Found to have STEMI on admission.  Cardiology consulted.      Went to cardiac cath on .  Cath showed significant RCA lesion and profound RV systolic dysfunction.  Impella CP placed left CFA.  Impella RP placed right CFV on .      Patient intubated after cardiac procedure on  for airway protection.  CCM consulted.     Patient found to have ANALY on admission.  Nephrology consulted.     S/p RCA stent with left impella device removed on . Received 5 U PRBC during cath on  as she kept bleeding after impella removal.     VAD and IABP removal on .    Extubated on .    CRRT stopped on 1/10.    Subjective:  Today is:  Hospital Day: 18.  Patient seen and examined in CVU-2907/2907-.     In bed. Working with therapy. Still fatigued. Voice is still very soft.      Medications:  Reviewed    Infusion Medications    dextrose      sodium chloride 10 mL/hr at 25 0949    sodium chloride Stopped (01/10/25 2156)     Scheduled Medications    metoprolol tartrate  75 mg Oral BID    apixaban  10 mg Oral BID    Followed by    [START ON 2025] apixaban  5 mg Oral BID    [Held by provider] furosemide  20 mg IntraVENous Daily    thiamine mononitrate  100 mg Oral Daily    miconazole   Topical BID    pantoprazole  40 mg IntraVENous BID    sodium chloride flush  5-40 mL IntraVENous 2 times per day    insulin lispro  0-4 Units SubCUTAneous Q4H    citalopram  40 mg Oral 
      Hospitalist Progress Note    Name:  Shahida Vásquez    /Age/Sex: 1960  (64 y.o. female)  MRN & CSN:  [id number] & [id number]    PCP: Demetrice Saldaña DO    Date of Admission: 2024    Patient Status:  Inpatient     Chief Complaint:   Chief Complaint   Patient presents with    Fatigue     Pt arrives from home by herself. Pt C/O CP and fatigue since Thursday. Pt states that the CP started to radiate down her bilateral arms. Pt rates her pain a 6 out of 10.        Hospital Course:   Patient admitted for chest pain and fatigue.  Found to have STEMI on admission.  Cardiology consulted.     Went to cardiac cath on .  Cath showed significant RCA lesion and profound RV systolic dysfunction.  Impella CP placed left CFA.  Impella RP placed right CFV on .     Patient intubated after cardiac procedure on  for airway protection.  CCM consulted.    Patient found to have ANALY on admission.  Nephrology consulted.    Subjective:  Today is:  Hospital Day: 2.  Patient seen and examined in CVU-/2917.     Intubated.  Sedated.  Minimally responsive.      Medications:  Reviewed    Infusion Medications    sodium chloride      sodium chloride      propofol 50 mcg/kg/min (24 1029)    fentaNYL 200 mcg/hr (24 1029)    sodium chloride      sodium chloride 10 mL/hr at 24 1029    midazolam 5 mg/hr (24 1029)    sodium bicarbonate 12.5 mEq in dextrose 5 % 500 mL infusion (FOR IMPELLA PURGE)      sodium bicarbonate 12.5 mEq in dextrose 5 % 500 mL infusion (FOR IMPELLA PURGE)      heparin (PORCINE) Infusion 1,150 Units/hr (24 1029)    norepinephrine 2 mcg/min (24 1029)     Scheduled Medications    pantoprazole  40 mg IntraVENous Daily    citalopram  40 mg Oral Daily    [Held by provider] gabapentin  300 mg Oral TID    [Held by provider] hydroCHLOROthiazide  25 mg Oral Daily    [Held by provider] propranolol  80 mg Oral Daily    sodium chloride flush  5-40 mL IntraVENous 2 times 
      [facility]   Progress Note  Cardiology    CC:  STEMI, Renal failure    HPI:  Sleeping comfortably. Dialysis planned tomorrow. Remains aphasic. Plan for CT head that could not be done when impella was in      Medications/Labs all Reviewed    Lab Results   Component Value Date    WBC 16.4 (H) 01/11/2025    HGB 7.9 (L) 01/11/2025    HCT 23.5 (L) 01/11/2025    MCV 87.3 01/11/2025     01/11/2025     Lab Results   Component Value Date    CREATININE 2.2 (H) 01/12/2025    BUN 48 (H) 01/12/2025     01/12/2025    K 3.6 01/12/2025     01/12/2025    CO2 23 01/12/2025     Lab Results   Component Value Date    INR 1.02 01/04/2025    PROTIME 13.6 01/04/2025        PHYSICAL EXAM   /61   Pulse 81   Temp 97.8 °F (36.6 °C) (Temporal)   Resp 18   Ht 1.549 m (5' 1\")   Wt 80.6 kg (177 lb 11.1 oz)   SpO2 96%   BMI 33.57 kg/m²    Chronically ill female in NAD  Respiratory:  Resp Assessment: Normal respiratory effort  Resp Auscultation: Clear to auscultation bilaterally   Cardiovascular:  Auscultation: regular rate and rhythm, normal S1S2, no murmur, rub or gallop  Palpation:  Nl PMI  JVP:  normal  Extremities: No Edema  Abdomen:  Soft, non-tender  Normal bowel sounds  Extremities:   No Cyanosis or Clubbing  Neurological/Psychiatric:  Oriented to time, place, and person  Non-anxious  Skin:   Warm and dry      ASSESSMENT    STEMI:  Late presenting inferior MI with occluded RCA  PCI of the RCA  Continue dual antiplatelet therapy with aspirin and Plavix    Cardiogenic shock  Secondary to RV infarction  Impella balloon pump removed 1/8/2025  Also off of dobutamine  Improving  Stable hemodynamics    Anemia  Status post multiple units of packed red blood cells  Trying to keep hemoglobin greater than 8  7.9 yesterdaywill continue to follow and transfuse if drops further    Junctional rhythm  Remains in sinus  Follow telemetry    Acute hypoxic respiratory failure  Improved  Now on room 
    PULMONARY AND CRITICAL CARE MEDICINE PROGRESS NOTE      SUBJECTIVE: Patient continues to make slow progress.  Had removal of IABP and Impella yesterday.  Tolerated well.  Overnight remained on low-dose dobutamine and vasopressin support.  This morning much more awake and alert.  Tolerating spontaneous breathing trial.  CRRT continues without ultrafiltration.  .  Pneumonia panel positive for haemophilus, streptococcus and coronavirus OC 43.  Still has both intra-aortic balloon pump as well as Impella support.    REVIEW OF SYSTEMS: Could not be assessed    MEDICATIONS:      sodium chloride flush  5-40 mL IntraVENous 2 times per day    lidocaine  100 mg IntraDERmal Once    furosemide  80 mg IntraVENous Once    insulin lispro  0-4 Units SubCUTAneous Q4H    cefTRIAXone (ROCEPHIN) IV  2,000 mg IntraVENous Q24H    sodium chloride flush  5-40 mL IntraVENous 2 times per day    citalopram  40 mg Oral Daily    [Held by provider] gabapentin  300 mg Oral TID    [Held by provider] propranolol  80 mg Oral Daily    aspirin  81 mg Oral Daily    clopidogrel  75 mg Oral Daily    sodium chloride flush  5-40 mL IntraVENous 2 times per day      sodium chloride      pantoprazole (PROTONIX) 80 mg in sodium chloride 0.9 % 100 mL infusion 8 mg/hr (01/09/25 1000)    sodium chloride      prismaSATE BGK 4/2.5      prismaSATE BGK 4/2.5 1,000 mL/hr at 01/09/25 0745    prismaSATE BGK 4/2.5      VASOpressin 20 Units in sodium chloride 0.9 % 100 mL infusion 0.01 Units/min (01/09/25 1000)    sodium chloride      dextrose      sodium chloride      amiodarone 0.5 mg/min (01/09/25 1000)    sodium chloride 10 mL/hr at 01/09/25 1000    phenylephrine (JACKY-SYNEPHRINE) 50 mg in sodium chloride 0.9 % 250 mL infusion 30 mcg/min (01/08/25 0014)    DOBUTamine 2.5 mcg/kg/min (01/09/25 1000)    propofol Stopped (01/09/25 0659)    fentaNYL Stopped (01/09/25 0659)    sodium chloride 20 mL/hr at 01/09/25 1000    midazolam Stopped (01/02/25 1836)    norepinephrine 
    PULMONARY AND CRITICAL CARE MEDICINE PROGRESS NOTE      SUBJECTIVE: Patient remains critically ill.  Remains on full mechanical ventilatory support.  On minimal sedation does open eyes to follow commands.   Remains on dobutamine at 5 mcg/kg/min.  Overnight had to be started on Jacky-Synephrine.  Attempted additional fluid removal with CRRT but patient did not tolerated.  Now keeping even.  Pneumonia panel positive for haemophilus, streptococcus and coronavirus OC 43.  Still has both intra-aortic balloon pump as well as Impella support.    REVIEW OF SYSTEMS: Could not be assessed    MEDICATIONS:      cefTRIAXone (ROCEPHIN) IV  2,000 mg IntraVENous Q24H    sodium chloride flush  5-40 mL IntraVENous 2 times per day    pantoprazole  40 mg IntraVENous Daily    citalopram  40 mg Oral Daily    [Held by provider] gabapentin  300 mg Oral TID    [Held by provider] propranolol  80 mg Oral Daily    aspirin  81 mg Oral Daily    clopidogrel  75 mg Oral Daily    sodium chloride flush  5-40 mL IntraVENous 2 times per day      prismaSol BGK 2/3.5      prismaSol BGK 2/3.5      prismaSol BGK 2/3.5      sodium chloride      amiodarone 0.5 mg/min (01/07/25 0904)    heparin (porcine) 25,000 Units in dextrose 5 % 1,000 mL infusion (FOR IMPELLA PURGE) 5.1 mL/hr at 01/06/25 1419    sodium chloride Stopped (01/07/25 0757)    phenylephrine (JACKY-SYNEPHRINE) 50 mg in sodium chloride 0.9 % 250 mL infusion 60 mcg/min (01/07/25 0812)    DOBUTamine 2.5 mcg/kg/min (01/07/25 0807)    propofol 25 mcg/kg/min (01/07/25 0807)    fentaNYL 75 mcg/hr (01/07/25 0807)    sodium chloride 20 mL/hr at 01/07/25 0807    midazolam Stopped (01/02/25 1836)    heparin (PORCINE) Infusion 1,700 Units/hr (01/07/25 0807)    norepinephrine Stopped (01/02/25 1736)     sodium chloride, heparin (porcine) 5,000 Units in sodium chloride 0.9 % 1,000 mL infusion, calcium gluconate **OR** calcium gluconate **OR** calcium gluconate **OR** calcium gluconate, sodium phosphate 6 
    PULMONARY AND CRITICAL CARE MEDICINE PROGRESS NOTE    Subjective: Patient remains intubated and sedated.  Sedated with high-dose of propofol, fentanyl and Versed.  CP Impella P6, RP Impella P7.  Plan for Cath Lab today for likely explantation of CP Impella and possible PCI.    ROS could not be obtained due to patient factors.     MEDICATIONS:     Scheduled Meds:   sodium chloride flush  5-40 mL IntraVENous 2 times per day    ascorbic acid  500 mg Oral Daily    pantoprazole  40 mg IntraVENous Daily    citalopram  40 mg Oral Daily    [Held by provider] gabapentin  300 mg Oral TID    [Held by provider] hydroCHLOROthiazide  25 mg Oral Daily    [Held by provider] propranolol  80 mg Oral Daily    aspirin  81 mg Oral Daily    clopidogrel  75 mg Oral Daily    sodium chloride flush  5-40 mL IntraVENous 2 times per day       Current Infusions:    sodium chloride 70 mL/hr at 01/02/25 1215    [Held by provider] sodium bicarbonate 25 mEq in dextrose 5 % 1,000 mL infusion (FOR IMPELLA PURGE)      sodium chloride      heparin (porcine) 12,500 Units in dextrose 5 % 500 mL infusion (FOR IMPELLA PURGE)      propofol 30 mcg/kg/min (01/02/25 1215)    fentaNYL 150 mcg/hr (01/02/25 1215)    sodium chloride 20 mL/hr at 01/02/25 1215    midazolam 5 mg/hr (01/02/25 1215)    sodium bicarbonate 12.5 mEq in dextrose 5 % 500 mL infusion (FOR IMPELLA PURGE)      heparin (PORCINE) Infusion 1,450 Units/hr (01/02/25 1215)    norepinephrine Stopped (01/01/25 1036)       PRN meds:  sodium chloride flush, hydrOXYzine HCl, LORazepam, tiZANidine, heparin (porcine), heparin (porcine), sodium chloride, heparin (porcine), acetaminophen, sodium chloride flush, sodium chloride, ondansetron **OR** ondansetron, polyethylene glycol, [DISCONTINUED] acetaminophen **OR** acetaminophen    PHYSICAL EXAM:  BP (!) 89/61   Pulse 65   Temp 99.2 °F (37.3 °C) (Esophageal)   Resp 22   Ht 1.549 m (5' 1\")   Wt 79.4 kg (175 lb)   SpO2 100%   BMI 33.07 kg/m²  I/O 
    PULMONARY AND CRITICAL CARE MEDICINE PROGRESS NOTE    Subjective: Patient remains intubated and sedated.  Sedated with propofol and fentanyl at high doses.  Does not follow commands.  Echo with EF 25 to 30% with moderately reduced RV function.  CP Impella P6 and RP Impella P7    ROS could not be obtained due to patient factors.     MEDICATIONS:     Scheduled Meds:   pantoprazole  40 mg IntraVENous Daily    citalopram  40 mg Oral Daily    [Held by provider] gabapentin  300 mg Oral TID    [Held by provider] hydroCHLOROthiazide  25 mg Oral Daily    [Held by provider] propranolol  80 mg Oral Daily    sodium chloride flush  5-40 mL IntraVENous 2 times per day    sodium chloride flush  5-40 mL IntraVENous 2 times per day    aspirin  81 mg Oral Daily    clopidogrel  75 mg Oral Daily    sodium chloride flush  5-40 mL IntraVENous 2 times per day       Current Infusions:    sodium chloride      heparin (porcine) 12,500 Units in dextrose 5 % 500 mL infusion (FOR IMPELLA PURGE)      sodium chloride      propofol 40 mcg/kg/min (01/01/25 1035)    fentaNYL 150 mcg/hr (01/01/25 1125)    sodium chloride      sodium chloride 10 mL/hr at 01/01/25 1035    midazolam 5 mg/hr (01/01/25 1035)    sodium bicarbonate 12.5 mEq in dextrose 5 % 500 mL infusion (FOR IMPELLA PURGE)      heparin (PORCINE) Infusion 1,300 Units/hr (01/01/25 1035)    norepinephrine 1 mcg/min (01/01/25 1035)       PRN meds:  hydrOXYzine HCl, LORazepam, tiZANidine, heparin (porcine), heparin (porcine), sodium chloride, heparin (porcine), sodium chloride flush, sodium chloride, sodium chloride flush, sodium chloride, acetaminophen, sodium chloride flush, sodium chloride, ondansetron **OR** ondansetron, polyethylene glycol, [DISCONTINUED] acetaminophen **OR** acetaminophen    PHYSICAL EXAM:  /66   Pulse 55   Temp 99.5 °F (37.5 °C) (Esophageal)   Resp 22   Ht 1.524 m (5')   Wt 74.2 kg (163 lb 9.3 oz)   SpO2 100%   BMI 31.95 kg/m²  I/O last 3 completed 
    PULMONARY AND CRITICAL CARE MEDICINE PROGRESS NOTE    Subjective: Patient remains intubated.  Sedated with low-dose propofol and low-dose fentanyl.  Able to open her eyes when her name is being called.  Remains on RV Impella, P6.  Balloon pump in place.  On CRRT.  Creatinine is improving.  Worsening leukocytosis.  Hemoglobin stable.  Thrombocytopenia.    ROS could not be obtained due to patient factors.       MEDICATIONS:     Scheduled Meds:   cefTRIAXone (ROCEPHIN) IV  2,000 mg IntraVENous Q24H    ascorbic acid  500 mg Oral Daily    sodium chloride flush  5-40 mL IntraVENous 2 times per day    pantoprazole  40 mg IntraVENous Daily    citalopram  40 mg Oral Daily    [Held by provider] gabapentin  300 mg Oral TID    [Held by provider] propranolol  80 mg Oral Daily    aspirin  81 mg Oral Daily    clopidogrel  75 mg Oral Daily    sodium chloride flush  5-40 mL IntraVENous 2 times per day       Current Infusions:    sodium chloride      prismaSATE BGK 4/2.5 500 mL/hr at 01/04/25 1025    prismaSATE BGK 4/2.5 1,000 mL/hr at 01/04/25 0733    prismaSATE BGK 4/2.5 100 mL/hr at 01/03/25 1539    heparin (porcine) 25,000 Units in dextrose 5 % 1,000 mL infusion (FOR IMPELLA PURGE) 12.8 mL/hr at 01/02/25 1947    sodium chloride      phenylephrine (JACKY-SYNEPHRINE) 50 mg in sodium chloride 0.9 % 250 mL infusion Stopped (01/04/25 0949)    DOBUTamine 5 mcg/kg/min (01/04/25 1107)    propofol 5 mcg/kg/min (01/04/25 1200)    fentaNYL 25 mcg/hr (01/04/25 1200)    sodium chloride 20 mL/hr at 01/04/25 1200    midazolam Stopped (01/02/25 1836)    heparin (PORCINE) Infusion 1,100 Units/hr (01/04/25 1200)    norepinephrine Stopped (01/02/25 1736)       PRN meds:  sodium chloride, calcium gluconate **OR** calcium gluconate **OR** calcium gluconate **OR** calcium gluconate, sodium phosphate 6 mmol in sodium chloride 0.9 % 250 mL IVPB **OR** sodium phosphate 12 mmol in sodium chloride 0.9 % 250 mL IVPB **OR** sodium phosphate 18 mmol in 
    Pharmacy to check patient copay for  Eliquis or Xarleto    Copay for patient will be: $94.03/month for Eliquis   Copay for patient will be: $72.36/month for Xarelto    Pharmacy will continue to follow the decision on therapy and  the patient if appropriate.       Harini Paz, EugeneD   [id number]    
   01/01/25 2010   Vent Patient Data (Readings)   Static Compliance (L/cm H2O) 47   Dynamic Compliance (L/cm H2O) 39 L/cm H2O       
   01/02/25 0808   Vent Patient Data (Readings)   Mean Airway Pressure (cmH2O) 11 cmH20   Plateau Pressure (cm H2O) 16 cm H2O   Driving Pressure 11   I:E Ratio 1:2.3   Flow Sensitivity 3 L/min   PEEP Intrinsic (cm H2O) 0.8 cm H2O   Static Compliance (L/cm H2O) 42   Dynamic Compliance (L/cm H2O) 21.85 L/cm H2O       
   01/04/25 2026   Vent Patient Data (Readings)   Static Compliance (L/cm H2O) 41   Dynamic Compliance (L/cm H2O) 84 L/cm H2O       
   01/06/25 0914   Vent Patient Data (Readings)   Mean Airway Pressure (cmH2O) 10 cmH20   Plateau Pressure (cm H2O) 14 cm H2O   Flow Sensitivity 3 L/min   PEEP Intrinsic (cm H2O) 0.8 cm H2O   Static Compliance (L/cm H2O) 52   Dynamic Compliance (L/cm H2O) 31 L/cm H2O       
   01/06/25 2005   Vent Patient Data (Readings)   Static Compliance (L/cm H2O) 52   Dynamic Compliance (L/cm H2O) 60 L/cm H2O       
   01/07/25 0752   Vent Patient Data (Readings)   Mean Airway Pressure (cmH2O) 9.4 cmH20   Plateau Pressure (cm H2O) 13 cm H2O   Flow Sensitivity 3 L/min   PEEP Intrinsic (cm H2O) 0.6 cm H2O   Static Compliance (L/cm H2O) 58   Dynamic Compliance (L/cm H2O) 35.61 L/cm H2O       
   01/07/25 1958   Patient Observation   Pulse 82   Respirations 21   SpO2 100 %   Breath Sounds   Respiratory Pattern Regular   Breath Sounds Bilateral Diminished   Vent Information   Ventilator Day(s) 9   Ventilator ID [id number]   Vent Mode AC/PRVC   Ventilator Settings   FiO2  30 %   Insp Time (sec) 1 sec   Vt (Set, mL) 450 mL   Resp Rate (Set) 20 bpm   PEEP/CPAP (cmH2O) 5   Pressure Support (cm H2O) 0 cm H2O   Vent Patient Data (Readings)   Vt (Measured) 530 mL   Peak Inspiratory Pressure (cmH2O) 18 cmH2O   Rate Measured 22 br/min   Minute Volume (L/min) 10.5 Liters   Mean Airway Pressure (cmH2O) 10 cmH20   Plateau Pressure (cm H2O) 13 cm H2O   Driving Pressure 8   I:E Ratio 1:1.70   PEEP Intrinsic (cm H2O) 5.3 cm H2O   Static Compliance (L/cm H2O) 57   Dynamic Compliance (L/cm H2O) 89 L/cm H2O   Backup Apnea On   Backup Rate 20 Breaths Per Minute   Backup Vt 450   Vent Alarm Settings   High Pressure (cmH2O) 40 cmH2O   Low Minute Volume (lpm) 2 L/min   High Minute Volume (lpm) 20 L/min   Low Exhaled Vt (ml) 200 mL   High Exhaled Vt (ml) 1000 mL   RR High (bpm) 40 br/min   Additional Respiratoray Assessments   Humidification Source HME   Circuit Condensation Not drained   Ambu Bag With Mask At Bedside Yes   Airway Clearance   Suction ET Tube   Subglottic Suction Done Yes   Suction Device Tena;Inline suction catheter   Sputum Method Obtained Endotracheal   Sputum Amount Scant   Sputum Color/Odor White;Clear   Sputum Consistency Thin   ETT    Placement Date/Time: 12/30/24 1800   Present on Admission/Arrival: No  Placed By: Licensed provider  Placement Verified By: Auscultation;Colorimetric ETCO2 device;Direct visualization  Preoxygenation: Yes  Airway Type: Cuffed  Airway Tube Size: 7.5 mm...   Secured At 21 cm   Measured From Lips   ETT Placement Right   Secured By Commercial tube bullard   Site Assessment Dry   Cuff Pressure 30 cm H2O   Ventilator Associated Pneumonia Bundle   Oral Suctioning Yes   ETT/Trach 
   12/31/24 1108   Vent Patient Data (Readings)   Mean Airway Pressure (cmH2O) 12 cmH20   Plateau Pressure (cm H2O) 15 cm H2O   Inspiratory Time 0.9 sec   Flow Sensitivity 3 L/min   PEEP Intrinsic (cm H2O) 0.9 cm H2O   Static Compliance (L/cm H2O) 49   Dynamic Compliance (L/cm H2O) 28.6 L/cm H2O       
  [facility] - Inpatient Rehabilitation Department   Phone: [phone]    Occupational Therapy    [x] Initial Evaluation            [] Daily Treatment Note         [] Discharge Summary      Patient: Shahida Vásquez   : 1960   MRN: [id number]   Date of Service:  2025    Admitting Diagnosis:  ST elevation myocardial infarction involving right coronary artery (HCC)  Current Admission Summary:  The pt was admitted with chest pain and diarrhea.  Found to have a STEMI.  Intubated from  - .  Stent placed as well as VAD.  Had a L groin bleed and received multiple units of blood.  Received CRRT and now will start HD. Pt has been aphasic since being extubated, MRI recommended but unable to be completed at this time.    Past Medical History:  has a past medical history of Anxiety, Depression, Hypertension, Insomnia, Lumbar disc herniation, and Lumbar spinal stenosis.  Past Surgical History:  has a past surgical history that includes Hysterectomy; Gastric bypass surgery; Cholecystectomy; fracture surgery; lumbar nerve block (N/A, 2019); bronchoscopy (N/A, 10/10/2019); bronchoscopy (10/10/2019); Pain management procedure (Left, 2020); Forearm surgery (Left, 2020); Dilatation, esophagus; Abdomen surgery; Cardiac procedure (N/A, 2024); Cardiac procedure (N/A, 2024); Cardiac procedure (N/A, 2024); Cardiac procedure (N/A, 2024); Cardiac procedure (N/A, 2025); Cardiac procedure (N/A, 2025); Cardiac procedure (N/A, 2025); invasive vascular (N/A, 2025); Cardiac procedure (N/A, 2025); Cardiac procedure (N/A, 2025); Cardiac procedure (N/A, 2025); invasive vascular (N/A, 2025); Cardiac procedure (N/A, 2025); invasive vascular (N/A, 1/3/2025); and Cardiac procedure (N/A, 2025).    Discharge Recommendations: Shahida Vásquez scored a  on the AM-PAC ADL Inpatient form. Current research shows that an AM-PAC score of 17 or less is 
  [facility] [place] Daily Progress Note      Admit Date:  12/30/2024    Chief Complaint:  fatigue, STEMI    Subjective:  Ms. Vásquez remains intubated and sedated. Uneventful removal of IABP and Impella RP today. Groin site soft. BP and PA sat stable    Objective:   BP (!) 144/64   Pulse 69   Temp 96.9 °F (36.1 °C) (Esophageal)   Resp 20   Ht 1.549 m (5' 1\")   Wt 82.4 kg (181 lb 10.5 oz)   SpO2 98%   BMI 34.32 kg/m²     Intake/Output Summary (Last 24 hours) at 1/8/2025 1557  Last data filed at 1/8/2025 1549  Gross per 24 hour   Intake 3964.99 ml   Output 3632 ml   Net 332.99 ml       Physical Exam:  General:  Intubated, sedated  Skin:  Warm and dry  Neck:  JVD not visualized  Chest:  Comfortable, minimal vent support  Cardiovascular:  RRR S1S2, no S3, No murmur  Abdomen:  Soft, ND, NT, No HSM  Extremities:  Palpable DP BLE; Large left groin ecchymosis/hematoma but soft  - RIJ Parrott  - Vascath LIJ    Medications:    insulin lispro  0-4 Units SubCUTAneous Q4H    cefTRIAXone (ROCEPHIN) IV  2,000 mg IntraVENous Q24H    sodium chloride flush  5-40 mL IntraVENous 2 times per day    citalopram  40 mg Oral Daily    [Held by provider] gabapentin  300 mg Oral TID    [Held by provider] propranolol  80 mg Oral Daily    aspirin  81 mg Oral Daily    clopidogrel  75 mg Oral Daily    sodium chloride flush  5-40 mL IntraVENous 2 times per day      sodium chloride      sodium chloride      sodium chloride      sodium chloride      sodium chloride      pantoprazole (PROTONIX) 80 mg in sodium chloride 0.9 % 100 mL infusion 8 mg/hr (01/08/25 1129)    sodium chloride      prismaSATE BGK 4/2.5      prismaSATE BGK 4/2.5      prismaSATE BGK 4/2.5      prismaSol BGK 2/3.5 500 mL/hr at 01/08/25 1457    prismaSol BGK 2/3.5 1,000 mL/hr at 01/08/25 1457    prismaSol BGK 2/3.5 100 mL/hr at 01/08/25 1456    VASOpressin 20 Units in sodium chloride 0.9 % 100 mL infusion 0.03 Units/min (01/08/25 6967)    sodium chloride      dextrose      
  [facility] Daily Progress Note      Admit Date:  2024    Chief Complaint:  fatigue, STEMI    Subjective:  Ms. Vásquez was extubated this AM. Groin sites tender to palpation. Painful to talk so only really nodding to questioning but appropriate and following commands. BP stable on low dose  + vaso.     Objective:   /70   Pulse 90   Temp 98.8 °F (37.1 °C) (Esophageal)   Resp 19   Ht 1.549 m (5' 1\")   Wt 84 kg (185 lb 3 oz)   SpO2 97%   BMI 34.99 kg/m²     Intake/Output Summary (Last 24 hours) at 2025 1017  Last data filed at 2025 0900  Gross per 24 hour   Intake 3152.7 ml   Output 1960 ml   Net 1192.7 ml       Physical Exam:  General:  Alert, interactive  Skin:  Warm and dry  Neck:  Lines RIJ LIJ  Chest:  Comfortable, no wheeze  Cardiovascular:  RRR S1S2, no S3, 2/6  murmur  Abdomen:  Soft, ND, NT, No HSM  Extremities:  Palpable DP BLE; Large left groin ecchymosis/hematoma but soft  - RIJ Higden  - Vascath LIJ    Medications:    sodium chloride flush  5-40 mL IntraVENous 2 times per day    lidocaine  100 mg IntraDERmal Once    insulin lispro  0-4 Units SubCUTAneous Q4H    cefTRIAXone (ROCEPHIN) IV  2,000 mg IntraVENous Q24H    sodium chloride flush  5-40 mL IntraVENous 2 times per day    citalopram  40 mg Oral Daily    [Held by provider] gabapentin  300 mg Oral TID    [Held by provider] propranolol  80 mg Oral Daily    aspirin  81 mg Oral Daily    clopidogrel  75 mg Oral Daily    sodium chloride flush  5-40 mL IntraVENous 2 times per day      sodium chloride      pantoprazole (PROTONIX) 80 mg in sodium chloride 0.9 % 100 mL infusion 8 mg/hr (25 0900)    sodium chloride      prismaSATE BGK 4/2.5      prismaSATE BGK 4/2.5 1,000 mL/hr at 25 0745    prismaSATE BGK 4/2.5      prismaSol BGK 2/3.5 500 mL/hr at 25 0219    prismaSol BGK 2/3.5 1,000 mL/hr at 25 1945    prismaSol BGK 2/3.5 100 mL/hr at 25 1456    VASOpressin 20 Units in sodium chloride 0.9 % 
  [facility] Daily Progress Note      Admit Date:  12/30/2024    Chief Complaint:  fatigue, STEMI    Subjective:  Ms. Vásquez remains intubated and sedated. Was hopeful to remove IABP today, however, mixed venous dropped to low 40s after 2 hours of 1:2. CVP now 8-10 and stopped pulling on CRRT. Responsive to painful stimuli.     Objective:   BP (!) 90/59   Pulse 85   Temp 98 °F (36.7 °C) (Esophageal)   Resp 20   Ht 1.549 m (5' 1\")   Wt 88.9 kg (195 lb 15.8 oz)   SpO2 100%   BMI 37.03 kg/m²     Intake/Output Summary (Last 24 hours) at 1/7/2025 1340  Last data filed at 1/7/2025 1307  Gross per 24 hour   Intake 3049.24 ml   Output 4687.8 ml   Net -1638.56 ml       Physical Exam:  General:  Intubated, sedated  Skin:  Warm and dry  Neck:  JVD not visualized  Chest:  Comfortable, minimal vent support  Cardiovascular:  RRR S1S2, no S3, No murmur  Abdomen:  Soft, ND, NT, No HSM  Extremities:  Palpable DP BLE; Large left groin ecchymosis/hematoma but soft  - Impella CP placed L CFA - removed 1/2  - Impella RP placed R CFV  - Antegrade perfusion sheath L SFA - removed 1/2  - Retrograde feeding sheath for external bypass in R CFA --> PCI/IABP exchanged at this site 1/2  - Vascath inserted in L CFV -- removed 1/2  - TD swan exchanged from 8 Fr CCO VIP swan  - Vascath [facility]    Medications:    cefTRIAXone (ROCEPHIN) IV  2,000 mg IntraVENous Q24H    sodium chloride flush  5-40 mL IntraVENous 2 times per day    pantoprazole  40 mg IntraVENous Daily    citalopram  40 mg Oral Daily    [Held by provider] gabapentin  300 mg Oral TID    [Held by provider] propranolol  80 mg Oral Daily    aspirin  81 mg Oral Daily    clopidogrel  75 mg Oral Daily    sodium chloride flush  5-40 mL IntraVENous 2 times per day      prismaSol BGK 2/3.5      prismaSol BGK 2/3.5      prismaSol BGK 2/3.5      VASOpressin 20 Units in sodium chloride 0.9 % 100 mL infusion      sodium chloride      sodium chloride      amiodarone 0.5 mg/min (01/07/25 
  [facility] - Inpatient Rehabilitation Department   Phone: [phone]    Physical Therapy    [] Initial Evaluation            [x] Daily Treatment Note         [] Discharge Summary      Patient: Shahida Vásquez   : 1960   MRN: [id number]   Date of Service:  2025  Admitting Diagnosis: ST elevation myocardial infarction involving right coronary artery (HCC)  Current Admission Summary: Shahida Vásquez is a 64 y.o. female with PMHx notable for HTN, obesity s/p Iliana-en-Y gastric bypass, anxiety/depression , chronic lumbar back pain who presented on  with chest pain and fatigue.  She was found to have STEMI, and went emergently to Cath Lab.  She was found to have RCA occlusion, and severe right ventricular systolic dysfunction.  Impella were placed in LV and RV.  She remained intubated for airway protection.  She underwent cardiac catheterization on , had PCI to RCA which was complicated by bleeding and ventricular tachycardia.  Impella and IABP removed on .  Extubated on . SLP following for dysphagia, currently recommending puréed with mildly thickened liquids after MBS on . CRRT stopped on 1/10, transitioned to iHD on , and now holding HD since . Hospital course complicated by: cardiogenic shock, acute hypoxic respiratory failure, ANALY, anemia (required 5 units pRBCs during cath, 7 units total this admission).   Past Medical History:  has a past medical history of Anxiety, Depression, Hypertension, Insomnia, Lumbar disc herniation, and Lumbar spinal stenosis.  Past Surgical History:  has a past surgical history that includes Hysterectomy; Gastric bypass surgery; Cholecystectomy; fracture surgery; lumbar nerve block (N/A, 2019); bronchoscopy (N/A, 10/10/2019); bronchoscopy (10/10/2019); Pain management procedure (Left, 2020); Forearm surgery (Left, 2020); Dilatation, esophagus; Abdomen surgery; Cardiac procedure (N/A, 2024); Cardiac procedure (N/A, 
  [facility] [place] Daily Progress Note      Admit Date:  12/30/2024    Chief Complaint:  fatigue, STEMI    Subjective:  Ms. Vásquez remains intubated and sedated. Prolonged hypotension in cath lab yesterday with explant of Impella and antegrade sheath. IABP placed back. 5U PRBCs given. Very minimal UOP since. Cr increased, likely ATN today. Initially minimal response. Sedation stopped and return of pupil function and gag reflex. Off pressors. Groin site hematoma present but unchanged overnight. Discussed with daughter. Plan for HD line to be placed L IJ - was removed due to ongoing hematoma in cath lab of [facility]. VT x1 in Cath lab at uneventful portion of procedure after hypotension had resolved. Amio drip in place 24h      Objective:   BP (!) 122/53   Pulse 82   Temp 98.4 °F (36.9 °C) (Core)   Resp 24   Ht 1.549 m (5' 1\")   Wt 84.6 kg (186 lb 8.2 oz)   SpO2 99%   BMI 35.24 kg/m²     Intake/Output Summary (Last 24 hours) at 1/3/2025 1314  Last data filed at 1/3/2025 1100  Gross per 24 hour   Intake 5695.73 ml   Output 335 ml   Net 5360.73 ml       Physical Exam:  General:  Intubated, sedated  Skin:  Warm and dry  Neck:  JVD not visualized  Chest:  Comfortable, minimal vent support  Cardiovascular:  RRR S1S2, no S3, No murmur  Abdomen:  Soft, ND, NT, No HSM  Extremities:  Palpable DP BLE; Large left groin ecchymosis/hematoma but soft  - Impella CP placed L CFA - removed 1/2  - Impella RP placed R CFV  - Antegrade perfusion sheath L SFA - removed 1/2  - Retrograde feeding sheath for external bypass in R CFA --> PCI/IABP exchanged at this site 1/2  - Vascath inserted in L CFV -- removed 1/2  - TD swan exchanged from 8 Fr CCO VIP swan    Medications:    cefTRIAXone (ROCEPHIN) IV  2,000 mg IntraVENous Q24H    sodium chloride flush  5-40 mL IntraVENous 2 times per day    ascorbic acid  500 mg Oral Daily    sodium chloride flush  5-40 mL IntraVENous 2 times per day    pantoprazole  40 mg IntraVENous Daily    
  [facility] - Inpatient Rehabilitation Department   Phone: [phone]    Occupational Therapy    [] Initial Evaluation            [x] Daily Treatment Note         [] Discharge Summary      Patient: Shahida Vásquez   : 1960   MRN: [id number]   Date of Service:  2025    Admitting Diagnosis:  ST elevation myocardial infarction involving right coronary artery (HCC)  Current Admission Summary:  The pt was admitted with chest pain and diarrhea.  Found to have a STEMI.  Intubated from  - .  Stent placed as well as VAD.  Had a L groin bleed and received multiple units of blood.  Received CRRT and now will start HD. Pt has been aphasic since being extubated, MRI recommended but unable to be completed at this time.    Past Medical History:  has a past medical history of Anxiety, Depression, Hypertension, Insomnia, Lumbar disc herniation, and Lumbar spinal stenosis.  Past Surgical History:  has a past surgical history that includes Hysterectomy; Gastric bypass surgery; Cholecystectomy; fracture surgery; lumbar nerve block (N/A, 2019); bronchoscopy (N/A, 10/10/2019); bronchoscopy (10/10/2019); Pain management procedure (Left, 2020); Forearm surgery (Left, 2020); Dilatation, esophagus; Abdomen surgery; Cardiac procedure (N/A, 2024); Cardiac procedure (N/A, 2024); Cardiac procedure (N/A, 2024); Cardiac procedure (N/A, 2024); Cardiac procedure (N/A, 2025); Cardiac procedure (N/A, 2025); Cardiac procedure (N/A, 2025); invasive vascular (N/A, 2025); Cardiac procedure (N/A, 2025); Cardiac procedure (N/A, 2025); Cardiac procedure (N/A, 2025); invasive vascular (N/A, 2025); Cardiac procedure (N/A, 2025); invasive vascular (N/A, 1/3/2025); and Cardiac procedure (N/A, 2025).    Discharge Recommendations: Shahida Vásquez scored a  on the AM-PAC ADL Inpatient form. Current research shows that an AM-PAC score of 17 or less is 
  MD Paul Frias MD Aldo Estella,                 Office: [phone]                Fax: [phone]            [employer]                    NEPHROLOGY CVU PROGRESS  NOTE:     PATIENT NAME: Shahida Vásquez  : 1960  MRN: [id number]     Patient Active Problem List   Diagnosis    HTN (hypertension)    Lumbar disc herniation    DDD (degenerative disc disease), lumbar    Lumbar stenosis    Lumbar spinal stenosis    Primary osteoarthritis of right knee    ANALY (acute kidney injury) (HCC)    Hypotension    Multifocal pneumonia    Abnormal CT of the chest    Shortness of breath    Absolute anemia    Community acquired pneumonia    Closed fracture of distal end of left radius, initial encounter    ARF (acute renal failure) (HCC)    ST elevation myocardial infarction involving right coronary artery (HCC)    Cardiogenic shock    Acute respiratory failure    ABLA (acute blood loss anemia)    Chronic prescription opiate use    Mood disorder (HCC)    Acute coronary syndrome (HCC)    Acute ST elevation myocardial infarction (STEMI) involving right coronary artery (HCC)       Past Medical History:   has a past medical history of Anxiety, Depression, Hypertension, Insomnia, Lumbar disc herniation, and Lumbar spinal stenosis.    Past Social History:   reports that she quit smoking about 43 years ago. Her smoking use included cigarettes. She started smoking about 58 years ago. She has a 3.8 pack-year smoking history. She has never used smokeless tobacco. She reports that she does not currently use alcohol. She reports that she does not currently use drugs after having used the following drugs: Marijuana (Weed).    Subjective / interval history / nephrology update / medical decision making:   Multiple filter clotting with CRRT  Right impella and IABP removed   BP better    Off pressor    H/O --- S/P RCA stent with left impella device removal yesterday, kept bleeding after impella removal.  Needed 
  MD Paul Frias MD Aldo Estella, DO                Office: [phone]                Fax: [phone]            [employer]                    NEPHROLOGY CVU PROGRESS  NOTE:     PATIENT NAME: Shahida Vásquez  : 1960  MRN: [id number]     Patient Active Problem List   Diagnosis    HTN (hypertension)    Lumbar disc herniation    DDD (degenerative disc disease), lumbar    Lumbar stenosis    Lumbar spinal stenosis    Primary osteoarthritis of right knee    ANALY (acute kidney injury) (HCC)    Hypotension    Multifocal pneumonia    Abnormal CT of the chest    Shortness of breath    Absolute anemia    Community acquired pneumonia    Closed fracture of distal end of left radius, initial encounter    ARF (acute renal failure) (HCC)    ST elevation myocardial infarction involving right coronary artery (HCC)    Cardiogenic shock    Acute respiratory failure    ABLA (acute blood loss anemia)    Chronic prescription opiate use    Mood disorder (HCC)    Acute coronary syndrome (HCC)    Acute ST elevation myocardial infarction (STEMI) involving right coronary artery (HCC)       Past Medical History:   has a past medical history of Anxiety, Depression, Hypertension, Insomnia, Lumbar disc herniation, and Lumbar spinal stenosis.    Past Social History:   reports that she quit smoking about 43 years ago. Her smoking use included cigarettes. She started smoking about 58 years ago. She has a 3.8 pack-year smoking history. She has never used smokeless tobacco. She reports that she does not currently use alcohol. She reports that she does not currently use drugs after having used the following drugs: Marijuana (Weed).    Subjective / interval history / nephrology update / medical decision making:     -inad, comfortable. uop adequate. Unable to verbalize answers.    Review of Systems  Not able to obtain    Objective:        Intake/Output Summary (Last 24 hours) at 1/15/2025 1045  Last data filed at 
  MD Paul Frias MD Aldo Estella, DO                Office: [phone]                Fax: [phone]            [facility]                    NEPHROLOGY PROGRESS NOTE:     PATIENT NAME: Shahida Váqsuez  : 1960  MRN: [id number]     Patient Active Problem List   Diagnosis    HTN (hypertension)    Lumbar disc herniation    DDD (degenerative disc disease), lumbar    Lumbar stenosis    Lumbar spinal stenosis    Primary osteoarthritis of right knee    ANALY (acute kidney injury) (HCC)    Hypotension    Multifocal pneumonia    Abnormal CT of the chest    Shortness of breath    Absolute anemia    Community acquired pneumonia    Closed fracture of distal end of left radius, initial encounter    ARF (acute renal failure) (HCC)    ST elevation myocardial infarction involving right coronary artery (HCC)    Acute respiratory failure    ABLA (acute blood loss anemia)    Chronic prescription opiate use    Mood disorder (HCC)    Acute coronary syndrome (HCC)    Acute ST elevation myocardial infarction (STEMI) involving right coronary artery (HCC)    Laryngeal disorder    Oropharyngeal dysphagia    Dysphonia       Past Medical History:   has a past medical history of Anxiety, Depression, Hypertension, Insomnia, Lumbar disc herniation, and Lumbar spinal stenosis.    Past Social History:   reports that she quit smoking about 43 years ago. Her smoking use included cigarettes. She started smoking about 58 years ago. She has a 3.8 pack-year smoking history. She has never used smokeless tobacco. She reports that she does not currently use alcohol. She reports that she does not currently use drugs after having used the following drugs: Marijuana (Weed).    Subjective / interval history / nephrology update / medical decision making:   More awake today, feels tired.  No other complaints.     Review of Systems  Not able to obtain    Objective:    24-hour urine output 900mL  -5.4L since 
  MD Paul Frias MD Aldo Estella, DO                Office: [phone]                Fax: [phone]            [facility]                    NEPHROLOGY CVU PROGRESS  NOTE:     PATIENT NAME: Shahida Vásquez  : 1960  MRN: [id number]     Patient Active Problem List   Diagnosis    HTN (hypertension)    Lumbar disc herniation    DDD (degenerative disc disease), lumbar    Lumbar stenosis    Lumbar spinal stenosis    Primary osteoarthritis of right knee    ANALY (acute kidney injury) (HCC)    Hypotension    Multifocal pneumonia    Abnormal CT of the chest    Shortness of breath    Absolute anemia    Community acquired pneumonia    Closed fracture of distal end of left radius, initial encounter    ARF (acute renal failure) (HCC)    ST elevation myocardial infarction involving right coronary artery (HCC)    Cardiogenic shock    Acute respiratory failure    ABLA (acute blood loss anemia)    Chronic prescription opiate use    Mood disorder (HCC)    Acute coronary syndrome (HCC)       Past Medical History:   has a past medical history of Anxiety, Depression, Hypertension, Insomnia, Lumbar disc herniation, and Lumbar spinal stenosis.    Past Social History:   reports that she quit smoking about 43 years ago. Her smoking use included cigarettes. She started smoking about 58 years ago. She has a 3.8 pack-year smoking history. She has never used smokeless tobacco. She reports that she does not currently use alcohol. She reports that she does not currently use drugs after having used the following drugs: Marijuana (Weed).    Subjective / interval history / nephrology update / medical decision making:     Patient remains on CRRT  Seen on CRRT      Overall still remains in that about 3.4L positive  Only removing up to 50ml/hr thru CRRT due to hypotension    Patient remains on ventilator 40%       On Phenylephrine and Dobutamine    H/O --- S/P RCA stent with left impella device removal yesterday, 
  MD Paul Frias MD Aldo Estella, DO                Office: [phone]                Fax: [phone]            [employer]                    NEPHROLOGY CVU PROGRESS  NOTE:     PATIENT NAME: Shahida Vásquez  : 1960  MRN: [id number]     Patient Active Problem List   Diagnosis    HTN (hypertension)    Lumbar disc herniation    DDD (degenerative disc disease), lumbar    Lumbar stenosis    Lumbar spinal stenosis    Primary osteoarthritis of right knee    ANALY (acute kidney injury) (HCC)    Hypotension    Multifocal pneumonia    Abnormal CT of the chest    Shortness of breath    Absolute anemia    Community acquired pneumonia    Closed fracture of distal end of left radius, initial encounter    ARF (acute renal failure) (HCC)    ST elevation myocardial infarction involving right coronary artery (HCC)    Cardiogenic shock    Acute respiratory failure    ABLA (acute blood loss anemia)    Chronic prescription opiate use    Mood disorder (HCC)    Acute coronary syndrome (HCC)    Acute ST elevation myocardial infarction (STEMI) involving right coronary artery (HCC)       Past Medical History:   has a past medical history of Anxiety, Depression, Hypertension, Insomnia, Lumbar disc herniation, and Lumbar spinal stenosis.    Past Social History:   reports that she quit smoking about 43 years ago. Her smoking use included cigarettes. She started smoking about 58 years ago. She has a 3.8 pack-year smoking history. She has never used smokeless tobacco. She reports that she does not currently use alcohol. She reports that she does not currently use drugs after having used the following drugs: Marijuana (Weed).    Subjective / interval history / nephrology update / medical decision making:     -tolerated HD poorly this afternoon.    Review of Systems  Not able to obtain    Objective:    24H urine output 610ml    Intake/Output Summary (Last 24 hours) at 2025 1208  Last data filed at 2025 
  MD Paul Frias MD Aldo Estella, DO                Office: [phone]                Fax: [phone]            [employer]                    NEPHROLOGY CVU PROGRESS  NOTE:     PATIENT NAME: Shahida Vásquez  : 1960  MRN: [id number]     Patient Active Problem List   Diagnosis    HTN (hypertension)    Lumbar disc herniation    DDD (degenerative disc disease), lumbar    Lumbar stenosis    Lumbar spinal stenosis    Primary osteoarthritis of right knee    ANALY (acute kidney injury) (HCC)    Hypotension    Multifocal pneumonia    Abnormal CT of the chest    Shortness of breath    Absolute anemia    Community acquired pneumonia    Closed fracture of distal end of left radius, initial encounter    ARF (acute renal failure) (HCC)    ST elevation myocardial infarction involving right coronary artery (HCC)    Cardiogenic shock    Acute respiratory failure    ABLA (acute blood loss anemia)    Chronic prescription opiate use    Mood disorder (HCC)    Acute coronary syndrome (HCC)    Acute ST elevation myocardial infarction (STEMI) involving right coronary artery (HCC)       Past Medical History:   has a past medical history of Anxiety, Depression, Hypertension, Insomnia, Lumbar disc herniation, and Lumbar spinal stenosis.    Past Social History:   reports that she quit smoking about 43 years ago. Her smoking use included cigarettes. She started smoking about 58 years ago. She has a 3.8 pack-year smoking history. She has never used smokeless tobacco. She reports that she does not currently use alcohol. She reports that she does not currently use drugs after having used the following drugs: Marijuana (Weed).    Subjective / interval history / nephrology update / medical decision making:     -inad, comfortable. uop adequate. Unable to verbalize answers.    Review of Systems  Not able to obtain    Objective:        Intake/Output Summary (Last 24 hours) at 2025 1042  Last data filed at 
  MD Paul Frias MD Aldo Estella, DO                Office: [phone]                Fax: [phone]            [employer]                    NEPHROLOGY CVU PROGRESS  NOTE:     PATIENT NAME: Shahida Vásquez  : 1960  MRN: [id number]     Patient Active Problem List   Diagnosis    HTN (hypertension)    Lumbar disc herniation    DDD (degenerative disc disease), lumbar    Lumbar stenosis    Lumbar spinal stenosis    Primary osteoarthritis of right knee    ANALY (acute kidney injury) (HCC)    Hypotension    Multifocal pneumonia    Abnormal CT of the chest    Shortness of breath    Absolute anemia    Community acquired pneumonia    Closed fracture of distal end of left radius, initial encounter    ARF (acute renal failure) (HCC)    ST elevation myocardial infarction involving right coronary artery (HCC)    Cardiogenic shock    Acute respiratory failure    ABLA (acute blood loss anemia)    Chronic prescription opiate use    Mood disorder (HCC)    Acute coronary syndrome (HCC)       Past Medical History:   has a past medical history of Anxiety, Depression, Hypertension, Insomnia, Lumbar disc herniation, and Lumbar spinal stenosis.    Past Social History:   reports that she quit smoking about 43 years ago. Her smoking use included cigarettes. She started smoking about 58 years ago. She has a 3.8 pack-year smoking history. She has never used smokeless tobacco. She reports that she does not currently use alcohol. She reports that she does not currently use drugs after having used the following drugs: Marijuana (Weed).    Subjective / interval history / nephrology update / medical decision making:     Patient remains on CRRT  Seen on CRRT  Right impella and IABP removed yesterday  BP better      Overall still remains in that about 4.3L positive  Keeping even thru CRRT due to hypotension    On Vasopressin and Dobutamine(lower dose)    H/O --- S/P RCA stent with left impella device removal 
  MD Paul Frias MD Aldo Estella, DO                Office: [phone]                Fax: [phone]            [facility]                          NEPHROLOGY INPATIENT PROGRESS  NOTE:     PATIENT NAME: Shahida Vásquez  : 1960  MRN: [id number]     Patient Active Problem List   Diagnosis    HTN (hypertension)    Lumbar disc herniation    DDD (degenerative disc disease), lumbar    Lumbar stenosis    Lumbar spinal stenosis    Primary osteoarthritis of right knee    ANALY (acute kidney injury) (HCC)    Hypotension    Multifocal pneumonia    Abnormal CT of the chest    Shortness of breath    Absolute anemia    Community acquired pneumonia    Closed fracture of distal end of left radius, initial encounter    ARF (acute renal failure) (HCC)    Acute myocardial infarction (HCC)    Cardiogenic shock    Acute respiratory failure    ABLA (acute blood loss anemia)    Chronic prescription opiate use    Mood disorder (HCC)    Acute coronary syndrome (HCC)       Past Medical History:   has a past medical history of Anxiety, Depression, Hypertension, Insomnia, Lumbar disc herniation, and Lumbar spinal stenosis.    Past Social History:   reports that she quit smoking about 43 years ago. Her smoking use included cigarettes. She started smoking about 58 years ago. She has a 3.8 pack-year smoking history. She has never used smokeless tobacco. She reports that she does not currently use alcohol. She reports that she does not currently use drugs after having used the following drugs: Marijuana (Weed).    Subjective / interval history / nephrology update / medical decision making:   Refer to assessment and plan for more details.   Renal labs noted.    Patient remains on CRRT  Seen on CRRT    Central venous pressure readings reviewed    Overall still remains in that about 8 L positive  In past 24 hours around 1.6 L net negative with CRRT fluid removal    Patient remains on ventilator 40%       Amiodarone 
  MD Paul Frias MD Aldo Estella, DO                Office: [phone]                Fax: [phone]            [employer]                    NEPHROLOGY CVU PROGRESS  NOTE:     PATIENT NAME: Shahida Vásquez  : 1960  MRN: [id number]     Patient Active Problem List   Diagnosis    HTN (hypertension)    Lumbar disc herniation    DDD (degenerative disc disease), lumbar    Lumbar stenosis    Lumbar spinal stenosis    Primary osteoarthritis of right knee    ANALY (acute kidney injury) (HCC)    Hypotension    Multifocal pneumonia    Abnormal CT of the chest    Shortness of breath    Absolute anemia    Community acquired pneumonia    Closed fracture of distal end of left radius, initial encounter    ARF (acute renal failure) (HCC)    ST elevation myocardial infarction involving right coronary artery (HCC)    Acute respiratory failure    ABLA (acute blood loss anemia)    Chronic prescription opiate use    Mood disorder (HCC)    Acute coronary syndrome (HCC)    Acute ST elevation myocardial infarction (STEMI) involving right coronary artery (HCC)    Laryngeal disorder    Oropharyngeal dysphagia    Dysphonia       Past Medical History:   has a past medical history of Anxiety, Depression, Hypertension, Insomnia, Lumbar disc herniation, and Lumbar spinal stenosis.    Past Social History:   reports that she quit smoking about 43 years ago. Her smoking use included cigarettes. She started smoking about 58 years ago. She has a 3.8 pack-year smoking history. She has never used smokeless tobacco. She reports that she does not currently use alcohol. She reports that she does not currently use drugs after having used the following drugs: Marijuana (Weed).    Subjective / interval history / nephrology update / medical decision making:     -inad, comfortable. Unable to verbalize answers.    Review of Systems  Not able to obtain    Objective:        Intake/Output Summary (Last 24 hours) at 2025 
  MD Paul Frias MD Aldo Estella, DO                Office: [phone]                Fax: [phone]            [employer]                    NEPHROLOGY PROGRESS NOTE:     PATIENT NAME: Shahida Vásquez  : 1960  MRN: [id number]     Patient Active Problem List   Diagnosis    HTN (hypertension)    Lumbar disc herniation    DDD (degenerative disc disease), lumbar    Lumbar stenosis    Lumbar spinal stenosis    Primary osteoarthritis of right knee    ANALY (acute kidney injury) (HCC)    Hypotension    Multifocal pneumonia    Abnormal CT of the chest    Shortness of breath    Absolute anemia    Community acquired pneumonia    Closed fracture of distal end of left radius, initial encounter    ARF (acute renal failure) (HCC)    ST elevation myocardial infarction involving right coronary artery (HCC)    Acute respiratory failure    ABLA (acute blood loss anemia)    Chronic prescription opiate use    Mood disorder (HCC)    Acute coronary syndrome (HCC)    Acute ST elevation myocardial infarction (STEMI) involving right coronary artery (HCC)    Laryngeal disorder    Oropharyngeal dysphagia    Dysphonia       Past Medical History:   has a past medical history of Anxiety, Depression, Hypertension, Insomnia, Lumbar disc herniation, and Lumbar spinal stenosis.    Past Social History:   reports that she quit smoking about 43 years ago. Her smoking use included cigarettes. She started smoking about 58 years ago. She has a 3.8 pack-year smoking history. She has never used smokeless tobacco. She reports that she does not currently use alcohol. She reports that she does not currently use drugs after having used the following drugs: Marijuana (Weed).    Subjective / interval history / nephrology update / medical decision making:     Unable to verbalize answers.    Review of Systems  Not able to obtain    Objective:    24-hour urine output 675 mL  -6.8 L since admission    Intake/Output Summary (Last 24 
  MD Paul Frias MD Aldo Estella, DO                Office: [phone]                Fax: [phone]            [employer]                    NEPHROLOGY CVU PROGRESS  NOTE:     PATIENT NAME: Shahida Vásquez  : 1960  MRN: [id number]     Patient Active Problem List   Diagnosis    HTN (hypertension)    Lumbar disc herniation    DDD (degenerative disc disease), lumbar    Lumbar stenosis    Lumbar spinal stenosis    Primary osteoarthritis of right knee    ANALY (acute kidney injury) (HCC)    Hypotension    Multifocal pneumonia    Abnormal CT of the chest    Shortness of breath    Absolute anemia    Community acquired pneumonia    Closed fracture of distal end of left radius, initial encounter    ARF (acute renal failure) (HCC)    ST elevation myocardial infarction involving right coronary artery (HCC)    Cardiogenic shock    Acute respiratory failure    ABLA (acute blood loss anemia)    Chronic prescription opiate use    Mood disorder (HCC)    Acute coronary syndrome (HCC)    Acute ST elevation myocardial infarction (STEMI) involving right coronary artery (HCC)       Past Medical History:   has a past medical history of Anxiety, Depression, Hypertension, Insomnia, Lumbar disc herniation, and Lumbar spinal stenosis.    Past Social History:   reports that she quit smoking about 43 years ago. Her smoking use included cigarettes. She started smoking about 58 years ago. She has a 3.8 pack-year smoking history. She has never used smokeless tobacco. She reports that she does not currently use alcohol. She reports that she does not currently use drugs after having used the following drugs: Marijuana (Weed).    Subjective / interval history / nephrology update / medical decision making:     -tolerated HD poorly this afternoon.    Review of Systems  Not able to obtain    Objective:    24H urine output 610ml    Intake/Output Summary (Last 24 hours) at 2025 0031  Last data filed at 2025 
  MD Paul Frias MD Aldo Estella, DO                Office: [phone]                Fax: [phone]            [employer]                    NEPHROLOGY CVU PROGRESS  NOTE:     PATIENT NAME: Shahida Vásquez  : 1960  MRN: [id number]     Patient Active Problem List   Diagnosis    HTN (hypertension)    Lumbar disc herniation    DDD (degenerative disc disease), lumbar    Lumbar stenosis    Lumbar spinal stenosis    Primary osteoarthritis of right knee    ANALY (acute kidney injury) (HCC)    Hypotension    Multifocal pneumonia    Abnormal CT of the chest    Shortness of breath    Absolute anemia    Community acquired pneumonia    Closed fracture of distal end of left radius, initial encounter    ARF (acute renal failure) ([facility])    ST elevation myocardial infarction involving right coronary artery (HCC)    Cardiogenic shock    Acute respiratory failure    ABLA (acute blood loss anemia)    Chronic prescription opiate use    Mood disorder (HCC)    Acute coronary syndrome (HCC)    Acute ST elevation myocardial infarction (STEMI) involving right coronary artery ([facility])       Past Medical History:   has a past medical history of Anxiety, Depression, Hypertension, Insomnia, Lumbar disc herniation, and Lumbar spinal stenosis.    Past Social History:   reports that she quit smoking about 43 years ago. Her smoking use included cigarettes. She started smoking about 58 years ago. She has a 3.8 pack-year smoking history. She has never used smokeless tobacco. She reports that she does not currently use alcohol. She reports that she does not currently use drugs after having used the following drugs: Marijuana (Weed).    Subjective / interval history / nephrology update / medical decision making:     -great uop response to diuretics again. Unable to verbalize answers.    Review of Systems  Not able to obtain    Objective:        Intake/Output Summary (Last 24 hours) at 2025 4324  Last data filed at 
  MD Paul Frias MD Aldo Estella, DO                Office: [phone]                Fax: [phone]            [employer]                    NEPHROLOGY CVU PROGRESS  NOTE:     PATIENT NAME: Shahida Vásquez  : 1960  MRN: [id number]     Patient Active Problem List   Diagnosis    HTN (hypertension)    Lumbar disc herniation    DDD (degenerative disc disease), lumbar    Lumbar stenosis    Lumbar spinal stenosis    Primary osteoarthritis of right knee    ANALY (acute kidney injury) (HCC)    Hypotension    Multifocal pneumonia    Abnormal CT of the chest    Shortness of breath    Absolute anemia    Community acquired pneumonia    Closed fracture of distal end of left radius, initial encounter    ARF (acute renal failure) ([facility])    ST elevation myocardial infarction involving right coronary artery (HCC)    Cardiogenic shock    Acute respiratory failure    ABLA (acute blood loss anemia)    Chronic prescription opiate use    Mood disorder (HCC)    Acute coronary syndrome (HCC)       Past Medical History:   has a past medical history of Anxiety, Depression, Hypertension, Insomnia, Lumbar disc herniation, and Lumbar spinal stenosis.    Past Social History:   reports that she quit smoking about 43 years ago. Her smoking use included cigarettes. She started smoking about 58 years ago. She has a 3.8 pack-year smoking history. She has never used smokeless tobacco. She reports that she does not currently use alcohol. She reports that she does not currently use drugs after having used the following drugs: Marijuana (Weed).    Subjective / interval history / nephrology update / medical decision making:     Patient remains on CRRT  Seen on CRRT      Overall still remains in that about 3.1L positive  Keeping even thru CRRT due to hypotension    Patient remains on ventilator 40%       On Vasopressin and Dobutamine    H/O --- S/P RCA stent with left impella device removal yesterday, kept bleeding after 
  [facility] Daily Progress Note      Admit Date:  12/30/2024    Chief Complaint:  fatigue, STEMI    Subjective:  Ms. Vásquez remains intubated and sedated. Will open eyes to stimulation    Objective:   BP (!) 100/55   Pulse 82   Temp 98 °F (36.7 °C) (Esophageal)   Resp 18   Ht 1.549 m (5' 1\")   Wt 74.4 kg (164 lb)   SpO2 100%   BMI 30.99 kg/m²     Intake/Output Summary (Last 24 hours) at 1/6/2025 1413  Last data filed at 1/6/2025 1400  Gross per 24 hour   Intake 3091.57 ml   Output 5781 ml   Net -2689.43 ml       Physical Exam:  General:  Intubated, sedated  Skin:  Warm and dry  Neck:  JVD not visualized  Chest:  Comfortable, minimal vent support  Cardiovascular:  RRR S1S2, no S3, No murmur  Abdomen:  Soft, ND, NT, No HSM  Extremities:  Palpable DP BLE; Large left groin ecchymosis/hematoma but soft  - Impella CP placed L CFA - removed 1/2  - Impella RP placed R CFV  - Antegrade perfusion sheath L SFA - removed 1/2  - Retrograde feeding sheath for external bypass in R CFA --> PCI/IABP exchanged at this site 1/2  - Vascath inserted in L CFV -- removed 1/2  - TD swan exchanged from 8 Fr CCO VIP swan    Medications:    cefTRIAXone (ROCEPHIN) IV  2,000 mg IntraVENous Q24H    sodium chloride flush  5-40 mL IntraVENous 2 times per day    pantoprazole  40 mg IntraVENous Daily    citalopram  40 mg Oral Daily    [Held by provider] gabapentin  300 mg Oral TID    [Held by provider] propranolol  80 mg Oral Daily    aspirin  81 mg Oral Daily    clopidogrel  75 mg Oral Daily    sodium chloride flush  5-40 mL IntraVENous 2 times per day      sodium chloride      amiodarone 0.5 mg/min (01/06/25 1400)    prismaSATE BGK 4/2.5 500 mL/hr at 01/06/25 1026    prismaSATE BGK 4/2.5 1,000 mL/hr at 01/06/25 1329    prismaSATE BGK 4/2.5 100 mL/hr at 01/03/25 1539    heparin (porcine) 25,000 Units in dextrose 5 % 1,000 mL infusion (FOR IMPELLA PURGE) 19.5 mL/hr at 01/04/25 1544    sodium chloride Stopped (01/06/25 0732)    
  [facility] - Inpatient Rehabilitation Department   Phone: [phone]    Occupational Therapy    [] Initial Evaluation            [x] Daily Treatment Note         [] Discharge Summary      Patient: Shahida Vásquez   : 1960   MRN: [id number]   Date of Service:  2025    Admitting Diagnosis:  ST elevation myocardial infarction involving right coronary artery (HCC)  Current Admission Summary:  The pt was admitted with chest pain and diarrhea.  Found to have a STEMI.  Intubated from  - .  Stent placed as well as VAD.  Had a L groin bleed and received multiple units of blood.  Received CRRT and now will start HD. Pt has been aphasic since being extubated, MRI recommended but unable to be completed at this time.    Past Medical History:  has a past medical history of Anxiety, Depression, Hypertension, Insomnia, Lumbar disc herniation, and Lumbar spinal stenosis.  Past Surgical History:  has a past surgical history that includes Hysterectomy; Gastric bypass surgery; Cholecystectomy; fracture surgery; lumbar nerve block (N/A, 2019); bronchoscopy (N/A, 10/10/2019); bronchoscopy (10/10/2019); Pain management procedure (Left, 2020); Forearm surgery (Left, 2020); Dilatation, esophagus; Abdomen surgery; Cardiac procedure (N/A, 2024); Cardiac procedure (N/A, 2024); Cardiac procedure (N/A, 2024); Cardiac procedure (N/A, 2024); Cardiac procedure (N/A, 2025); Cardiac procedure (N/A, 2025); Cardiac procedure (N/A, 2025); invasive vascular (N/A, 2025); Cardiac procedure (N/A, 2025); Cardiac procedure (N/A, 2025); Cardiac procedure (N/A, 2025); invasive vascular (N/A, 2025); Cardiac procedure (N/A, 2025); invasive vascular (N/A, 1/3/2025); and Cardiac procedure (N/A, 2025).    Discharge Recommendations: Shahida Vásquez scored a  on the AM-PAC ADL Inpatient form. Current research shows that an AM-PAC score of 17 or less is 
  [facility] Daily Progress Note      Admit Date:  12/30/2024    Chief Complaint:  fatigue, STEMI    Subjective:  Ms. Vásquez is awake, requesting applesauce. Still raspy voice but better than yesterday. Per nursing staff, aware of hospital and somewhat to situation. Endorses leg pain, no CP.     Objective:   /61   Pulse 83   Temp 98.4 °F (36.9 °C) (Temporal)   Resp 16   Ht 1.549 m (5' 1\")   Wt 83.1 kg (183 lb 3.2 oz)   SpO2 99%   BMI 34.62 kg/m²     Intake/Output Summary (Last 24 hours) at 1/10/2025 1212  Last data filed at 1/10/2025 0900  Gross per 24 hour   Intake 2610.51 ml   Output 3111 ml   Net -500.49 ml       Physical Exam:  General:  Alert, interactive  Skin:  Warm and dry  Neck:  Lines RIJ LIJ  Chest:  Comfortable, no wheeze  Cardiovascular:  RRR S1S2, no S3, 2/6  murmur  Abdomen:  Soft, ND, NT, No HSM  Extremities:  Palpable DP BLE; Large left groin ecchymosis/hematoma but soft  - RIJ [place]  - Vascath LIJ    Medications:    pantoprazole  40 mg IntraVENous BID    sodium phosphate IVPB (PERIPHERAL line)  20 mmol IntraVENous Once    furosemide  40 mg IntraVENous TID    sodium chloride flush  5-40 mL IntraVENous 2 times per day    lidocaine  100 mg IntraDERmal Once    insulin lispro  0-4 Units SubCUTAneous Q4H    cefTRIAXone (ROCEPHIN) IV  2,000 mg IntraVENous Q24H    sodium chloride flush  5-40 mL IntraVENous 2 times per day    citalopram  40 mg Oral Daily    [Held by provider] gabapentin  300 mg Oral TID    [Held by provider] propranolol  80 mg Oral Daily    aspirin  81 mg Oral Daily    clopidogrel  75 mg Oral Daily    sodium chloride flush  5-40 mL IntraVENous 2 times per day      sodium chloride      prismaSATE BGK 4/2.5 500 mL/hr at 01/10/25 0818    prismaSATE BGK 4/2.5 1,000 mL/hr at 01/10/25 0818    prismaSATE BGK 4/2.5 100 mL/hr at 01/09/25 2100    VASOpressin 20 Units in sodium chloride 0.9 % 100 mL infusion Stopped (01/10/25 0303)    dextrose      amiodarone 0.5 mg/min (01/10/25 
  [facility] Daily Progress Note      Admit Date:  12/30/2024    Chief Complaint:  fatigue, STEMI    Subjective:  Ms. Vásquez is intubated and sedated. She is tolerating sedation. Heparin has replaced purge solution in RP for persistently low ACT despite heparin at 1300/hr. Access sites stable. Overnight frequent rhythm changes with Sinus (+/- Mobitz 1), A fib/flutter. UOP remains around 100/hr this AM without Lasix. Cr continues to improve. Hgb stable s/p transfusion. Febrile by esophageal probe, forehead has been normal. Normal WBC. No other signs of infection.    CP @ P7 --> P6 this am  RP @ P7      Objective:   BP (!) 88/62   Pulse 54   Temp (!) 100.5 °F (38.1 °C) (Esophageal)   Resp 22   Ht 1.524 m (5')   Wt 74.2 kg (163 lb 9.3 oz)   SpO2 94%   BMI 31.95 kg/m²     Intake/Output Summary (Last 24 hours) at 1/1/2025 0947  Last data filed at 1/1/2025 0900  Gross per 24 hour   Intake 3492.94 ml   Output 1575 ml   Net 1917.94 ml       Physical Exam:  General:  Intubated, sedated  Skin:  Warm and dry  Neck:  JVD not visualized  Chest:  Comfortable, minimal vent support  Cardiovascular:  RRR S1S2, no S3, No murmur  Abdomen:  Soft, ND, NT, No HSM  Extremities:  No edema. Palpable DP BLE  - Impella CP placed L CFA  - Impella RP placed R CFV  - Antegrade perfusion sheath L SFA  - Retrograde feeding sheath for external bypass in R CFA  - Vascath inserted in L CFV  - TD swan exchanged from 8 Fr CCO VIP swan    Medications:    pantoprazole  40 mg IntraVENous Daily    citalopram  40 mg Oral Daily    [Held by provider] gabapentin  300 mg Oral TID    [Held by provider] hydroCHLOROthiazide  25 mg Oral Daily    [Held by provider] propranolol  80 mg Oral Daily    sodium chloride flush  5-40 mL IntraVENous 2 times per day    sodium chloride flush  5-40 mL IntraVENous 2 times per day    aspirin  81 mg Oral Daily    clopidogrel  75 mg Oral Daily    sodium chloride flush  5-40 mL IntraVENous 2 times per day      sodium 
  [facility] - Inpatient Rehabilitation Department   Phone: [phone]    Occupational Therapy    [] Initial Evaluation            [x] Daily Treatment Note         [] Discharge Summary      Patient: Shahida Vásquez   : 1960   MRN: [id number]   Date of Service:  2025    Admitting Diagnosis:  ST elevation myocardial infarction involving right coronary artery (HCC)  Current Admission Summary:  The pt was admitted with chest pain and diarrhea.  Found to have a STEMI.  Intubated from  - .  Stent placed as well as VAD.  Had a L groin bleed and received multiple units of blood.  Received CRRT and now will start HD. Pt has been aphasic since being extubated, MRI recommended but unable to be completed at this time.    Past Medical History:  has a past medical history of Anxiety, Depression, Hypertension, Insomnia, Lumbar disc herniation, and Lumbar spinal stenosis.  Past Surgical History:  has a past surgical history that includes Hysterectomy; Gastric bypass surgery; Cholecystectomy; fracture surgery; lumbar nerve block (N/A, 2019); bronchoscopy (N/A, 10/10/2019); bronchoscopy (10/10/2019); Pain management procedure (Left, 2020); Forearm surgery (Left, 2020); Dilatation, esophagus; Abdomen surgery; Cardiac procedure (N/A, 2024); Cardiac procedure (N/A, 2024); Cardiac procedure (N/A, 2024); Cardiac procedure (N/A, 2024); Cardiac procedure (N/A, 2025); Cardiac procedure (N/A, 2025); Cardiac procedure (N/A, 2025); invasive vascular (N/A, 2025); Cardiac procedure (N/A, 2025); Cardiac procedure (N/A, 2025); Cardiac procedure (N/A, 2025); invasive vascular (N/A, 2025); Cardiac procedure (N/A, 2025); invasive vascular (N/A, 1/3/2025); and Cardiac procedure (N/A, 2025).    Discharge Recommendations: Shahida Vásquez scored a  on the AM-PAC ADL Inpatient form. Current research shows that an AM-PAC score of 17 or less is 
  Speech Language Pathology  Attempt  Shahida Vásquez  1960    SLP attempted to complete dysphagia and speech/language cognitive treatment. Pt's chart reviewed and consult completed with pt's RN, cleared for entry. Pt is currently working with PT/OT. Will re-attempt as schedule allows and pt appropriate.     Maru Man M.S. CCC-SLP #[id number]  Speech-Language Pathologist    
  Speech Language Pathology  [facility] - Inpatient Rehabilitation Services  [phone]  SLP Dysphagia and Speech Language Cognitive Treatment       Patient: Shahida Vásquez   : 1960   MRN: [id number]      Evaluation Date: 1/15/2025      Admitting Dx: STEMI (ST elevation myocardial infarction) ([facility]) [I21.3]  Acute ST elevation myocardial infarction (STEMI) involving other coronary artery of inferior wall (HCC) [I21.19]  Treatment Diagnosis: Dysphonia , Cognitive-Linguistic Deficits , Speech Language Deficits , Oropharyngeal Dysphagia   Pain: Did not state                                  Recommendations      Recommended Diet and Intervention 1/15/2025:  Diet Solids Recommendation:  Dysphagia I Puree  Liquid Consistency Recommendation:  Mildly (nectar) thick liquids  Recommended form of Meds: Meds crushed as able in puree         Compensatory strategies: Aspiration Precautions , To be determined   Frequent oral care    Discharge Recommendations:  Recommend ongoing SLP for speech and dysphagia therapy upon discharge from hospital     History/Course of Treatment     H&P: \"Patient admitted for chest pain and fatigue.  Found to have STEMI on admission.  Cardiology consulted.   Went to cardiac cath on .  Cath showed significant RCA lesion and profound RV systolic dysfunction.  Impella CP placed left CFA.  Impella RP placed right CFV on .   Patient intubated after cardiac procedure on  for airway protection.  CCM consulted.  Patient found to have ANALY on admission.  Nephrology consulted.  S/p RCA stent with left impella device removed on . Received 5 U PRBC during cath on  as she kept bleeding after impella removal.\"     Imaging:  Chest X-ray:   IMPRESSION:  No acute pulmonary process.    Modified Barium Swallow Study:   Modified Barium Swallow evaluation completed on 2025.Patient presents with moderate oropharyngeal dysphagia secondary to decreased labial seal, impaired/decreased 
  Speech Language Pathology  [facility] - Inpatient Rehabilitation Services  [phone]  SLP Dysphagia and Speech Language Cognitive Treatment       Patient: Shahida Vásquez   : 1960   MRN: [id number]      Evaluation Date: 2025      Admitting Dx: STEMI (ST elevation myocardial infarction) ([facility]) [I21.3]  Acute ST elevation myocardial infarction (STEMI) involving other coronary artery of inferior wall (HCC) [I21.19]  Treatment Diagnosis: Dysphonia , Cognitive-Linguistic Deficits , Speech Language Deficits , Oropharyngeal Dysphagia   Pain: Did not state                                  Recommendations      Recommended Diet and Intervention 2025:  Diet Solids Recommendation:  NPO  Liquid Consistency Recommendation:  NPO Recommended form of Meds: Meds crushed as able in puree    Allow ice chips for comfort and frequent oral care  Recommend completion of modified barium swallow study to assess pharyngeal phase of swallow, pt agreeable to this date. Discussed with RN, plan to complete this AM      Compensatory strategies: Aspiration Precautions , To be determined   Frequent oral care    Discharge Recommendations:  Recommend ongoing SLP for speech and dysphagia therapy upon discharge from hospital     History/Course of Treatment     H&P: \"Patient admitted for chest pain and fatigue.  Found to have STEMI on admission.  Cardiology consulted.   Went to cardiac cath on .  Cath showed significant RCA lesion and profound RV systolic dysfunction.  Impella CP placed left CFA.  Impella RP placed right CFV on .   Patient intubated after cardiac procedure on  for airway protection.  CCM consulted.  Patient found to have ANALY on admission.  Nephrology consulted.  S/p RCA stent with left impella device removed on . Received 5 U PRBC during cath on  as she kept bleeding after impella removal.\"     Imaging:  Chest X-ray:   IMPRESSION:  No acute pulmonary process.    History/Prior Level of 
  Speech Language Pathology  [facility] - Inpatient Rehabilitation Services  [phone]  SLP Dysphagia and Speech Language Cognitive Treatment       Patient: Shahida Vásquez   : 1960   MRN: [id number]      Evaluation Date: 2025      Admitting Dx: STEMI (ST elevation myocardial infarction) ([facility]) [I21.3]  Acute ST elevation myocardial infarction (STEMI) involving other coronary artery of inferior wall (HCC) [I21.19]  Treatment Diagnosis: Dysphonia , Cognitive-Linguistic Deficits , Speech Language Deficits , Oropharyngeal Dysphagia   Pain: Did not state                                  Recommendations      Recommended Diet and Intervention 2025:  Diet Solids Recommendation:  Dysphagia I Puree  Liquid Consistency Recommendation:  Mildly (nectar) thick liquids  Recommended form of Meds: Meds crushed as able in puree         Compensatory strategies: Aspiration Precautions , To be determined   Frequent oral care    Discharge Recommendations:  Recommend ongoing SLP for speech and dysphagia therapy upon discharge from hospital     History/Course of Treatment     H&P: \"Patient admitted for chest pain and fatigue.  Found to have STEMI on admission.  Cardiology consulted.   Went to cardiac cath on .  Cath showed significant RCA lesion and profound RV systolic dysfunction.  Impella CP placed left CFA.  Impella RP placed right CFV on .   Patient intubated after cardiac procedure on  for airway protection.  CCM consulted.  Patient found to have ANALY on admission.  Nephrology consulted.  S/p RCA stent with left impella device removed on . Received 5 U PRBC during cath on  as she kept bleeding after impella removal.\"     Imaging:  Chest X-ray:   IMPRESSION:  No acute pulmonary process.    Modified Barium Swallow Study:   Modified Barium Swallow evaluation completed on 2025.Patient presents with moderate oropharyngeal dysphagia secondary to decreased labial seal, impaired/decreased 
  Speech Language Pathology  [facility] - Inpatient Rehabilitation Services  [phone]  SLP Clinical Swallow Evaluation and Speech Language Cognitive Assessment       Patient: Shahida Vásquez   : 1960   MRN: [id number]      Evaluation Date: 1/10/2025      Admitting Dx: STEMI (ST elevation myocardial infarction) (HCC) [I21.3]  Acute ST elevation myocardial infarction (STEMI) involving other coronary artery of inferior wall (HCC) [I21.19]  Treatment Diagnosis: Dysphonia , Cognitive-Linguistic Deficits , Speech Language Deficits , Oropharyngeal Dysphagia   Pain: Did not state                                  Recommendations      Recommended Diet and Intervention 1/10/2025:  Diet Solids Recommendation:  NPO  Liquid Consistency Recommendation:  NPO Recommended form of Meds: Meds in puree  or Meds crushed as able in puree     At this time recommend allowance of necessary PO meds with puree, ice chips for comfort and frequent oral care. Assessment to be ongoing as pt is able to participate. Pt may eventually benefit from completion of instrumental assessment of swallow function to assess pharyngeal phase of swallow, assess for aspiration and to assist with diet recommendations.      Compensatory strategies: Aspiration Precautions , To be determined     Discharge Recommendations:  Recommend ongoing SLP for speech and dysphagia therapy upon discharge from hospital     History/Course of Treatment     H&P: \"Patient admitted for chest pain and fatigue.  Found to have STEMI on admission.  Cardiology consulted.   Went to cardiac cath on .  Cath showed significant RCA lesion and profound RV systolic dysfunction.  Impella CP placed left CFA.  Impella RP placed right CFV on .   Patient intubated after cardiac procedure on  for airway protection.  CCM consulted.  Patient found to have ANALY on admission.  Nephrology consulted.  S/p RCA stent with left impella device removed on . Received 5 U PRBC 
  Speech Language Pathology  [facility] - Inpatient Rehabilitation Services  [phone]  SLP Dysphagia and Speech Language Cognitive Treatment       Patient: Shahida Vásquez   : 1960   MRN: [id number]      Evaluation Date: 2025      Admitting Dx: STEMI (ST elevation myocardial infarction) ([facility]) [I21.3]  Acute ST elevation myocardial infarction (STEMI) involving other coronary artery of inferior wall (HCC) [I21.19]  Treatment Diagnosis: Dysphonia , Cognitive-Linguistic Deficits , Speech Language Deficits , Oropharyngeal Dysphagia   Pain: Did not state                                  Recommendations      Recommended Diet and Intervention 2025:  Diet Solids Recommendation:  Dysphagia II Minced and Moist  Liquid Consistency Recommendation:  Mildly (nectar) thick liquids  Recommended form of Meds: Meds crushed as able in puree         Compensatory strategies: Alternate solids/liquids , Upright as possible with all PO intake , Assist Feed , Small bites/sips , Eat/feed slowly, Total Feed , Aspiration Precautions   Frequent oral care    Discharge Recommendations:  Recommend ongoing SLP for speech and dysphagia therapy upon discharge from hospital     History/Course of Treatment     H&P: \"Patient admitted for chest pain and fatigue.  Found to have STEMI on admission.  Cardiology consulted.   Went to cardiac cath on .  Cath showed significant RCA lesion and profound RV systolic dysfunction.  Impella CP placed left CFA.  Impella RP placed right CFV on .   Patient intubated after cardiac procedure on  for airway protection.  CCM consulted.  Patient found to have ANALY on admission.  Nephrology consulted.  S/p RCA stent with left impella device removed on . Received 5 U PRBC during cath on  as she kept bleeding after impella removal.\"     Imaging:  Chest X-ray:   IMPRESSION:  No acute pulmonary process.    Modified Barium Swallow Study:   Modified Barium Swallow evaluation completed 
  Speech Language Pathology  [facility] - Inpatient Rehabilitation Services  [phone]  SLP Dysphagia and Speech Language Cognitive Treatment       Patient: Shahida Vásquez   : 1960   MRN: [id number]      Evaluation Date: 2025      Admitting Dx: STEMI (ST elevation myocardial infarction) (HCC) [I21.3]  Acute ST elevation myocardial infarction (STEMI) involving other coronary artery of inferior wall (HCC) [I21.19]  Treatment Diagnosis: Dysphonia , Cognitive-Linguistic Deficits , Speech Language Deficits , Oropharyngeal Dysphagia   Pain: Did not state                                  Recommendations      Recommended Diet and Intervention 2025:  Diet Solids Recommendation:  Dysphagia II Minced and Moist  Liquid Consistency Recommendation:  Mildly (nectar) thick liquids  Recommended form of Meds: Meds crushed as able in puree    Pt demonstrating ongoing improvements however, concerns for ability to maintain nutrition/hydration by mouth remain from MD. Recommend advance to Dysphagia II Minced and Moist this date. Discussed additional oral supplements with dietitian to hopefully improve PO nutrition. Also discussed potential for appetite stimulant with RN as pt verbalizes decreased motivation to eat PO (RN to message MD). Extensive education provided to pt re; importance of oral intake, ongoing need for dysphagia tx, swallow strategies and above recommendations.      Compensatory strategies: Alternate solids/liquids , Upright as possible with all PO intake , Assist Feed , Small bites/sips , Eat/feed slowly, Total Feed , Aspiration Precautions   Frequent oral care    Discharge Recommendations:  Recommend ongoing SLP for speech and dysphagia therapy upon discharge from hospital     History/Course of Treatment     H&P: \"Patient admitted for chest pain and fatigue.  Found to have STEMI on admission.  Cardiology consulted.   Went to cardiac cath on .  Cath showed significant RCA lesion and 
  [facility] [place] Daily Progress Note      Admit Date:  12/30/2024    Chief Complaint:  fatigue, STEMI    Subjective:  Ms. Vásquez is intubated and sedated. Doing well today from hemodynamic standpoint. Cr continues to improve. Hgb stable, platelets continue to drop    CP @ P6  RP @ P7      Objective:   /78   Pulse 70   Temp 99.4 °F (37.4 °C) (Esophageal)   Resp 22   Ht 1.524 m (5')   Wt 79.4 kg (175 lb 0.7 oz)   SpO2 100%   BMI 34.19 kg/m²     Intake/Output Summary (Last 24 hours) at 1/2/2025 0726  Last data filed at 1/2/2025 0652  Gross per 24 hour   Intake 3949.38 ml   Output 1385 ml   Net 2564.38 ml       Physical Exam:  General:  Intubated, sedated  Skin:  Warm and dry  Neck:  JVD not visualized  Chest:  Comfortable, minimal vent support  Cardiovascular:  RRR S1S2, no S3, No murmur  Abdomen:  Soft, ND, NT, No HSM  Extremities:  No edema. Palpable DP BLE  - Impella CP placed L CFA  - Impella RP placed R CFV  - Antegrade perfusion sheath L SFA  - Retrograde feeding sheath for external bypass in R CFA  - Vascath inserted in L CFV  - TD swan exchanged from 8 Fr CCO VIP swan    Medications:    pantoprazole  40 mg IntraVENous Daily    citalopram  40 mg Oral Daily    [Held by provider] gabapentin  300 mg Oral TID    [Held by provider] hydroCHLOROthiazide  25 mg Oral Daily    [Held by provider] propranolol  80 mg Oral Daily    sodium chloride flush  5-40 mL IntraVENous 2 times per day    sodium chloride flush  5-40 mL IntraVENous 2 times per day    aspirin  81 mg Oral Daily    clopidogrel  75 mg Oral Daily    sodium chloride flush  5-40 mL IntraVENous 2 times per day      sodium chloride      heparin (porcine) 12,500 Units in dextrose 5 % 500 mL infusion (FOR IMPELLA PURGE)      sodium chloride      propofol 40 mcg/kg/min (01/02/25 0652)    fentaNYL 150 mcg/hr (01/02/25 0652)    sodium chloride      sodium chloride 10 mL/hr at 01/02/25 0652    midazolam 5 mg/hr (01/02/25 0652)    sodium bicarbonate 
1000 - CRRT machine clotted off    Attempted to restart CRRT, filter immediately clotted off. Notified Dr. Jean Carlos galvan to stop CRRT and try to start HD tomorrow.   
3.6 L/min  Cardiac output    1.9 L/min/m²  Cardiac index    46 mL/beat  Stroke volume  
4.5 L/min  Cardiac output  2.4 L/min/m²  Cardiac index  61 mL/beat  Stroke volume     This is with impella at p-5, iABP at 1:3, vasopressin off and one unit of blood transfused, second unit transfusing. Will decrease impella to p-4 at this time.     
4.6 L/min  Cardiac output  2.3 L/min/m²  Cardiac index  53 mL/beat  Electronically signed by Nabila Laws RN on 1/5/2025 at 6:57 PM    
4.8 L/min  Cardiac output    2.4 L/min/m²  Cardiac index    50 mL/beat  Stroke volume  
4.8 L/min  Cardiac output  2.4 L/min/m²  Cardiac index  56 mL/beat  Electronically signed by Nabila Laws RN on 1/5/2025 at 10:08 AM    
5.2 L/min  Cardiac output    2.6 L/min/m²  Cardiac index    53 mL/beat  Stroke volume  
Access pressure alarms frequently alarming on CRRT, pt moving around in bed and appears to be fighting vent. Unable to titrate sedation up due to BP dropping. Maxed on Vaso already. On-call cardiology was called. Verbal order given to check CBC and OK to turn David back on if BP remains below goal. CBC results given to on-call provider once resulted.  
Arterial waveform became flat and arterial line no longer drawing blood. No improvement with repositioning. Arterial line discontinued. Manual pressure held for 10 minutes and tegaderm on site. No hematoma, no oozing noted.    
Bedside echo shows impella to deep in LV. Dr. Enriquez pulled back impella to 81cm under echo guidance. Waveforms and flows appropriate.  
Brief Nutrition Note    RECOMMENDATIONS  PO Diet: Diet per SLP  Nutrition Support:   Initiate Nepro (renal formula) at goal of 70 mL/hr x 14 hours (6PM to 8AM).  Recommend water flush of 175 mL q 4 hours (provide orally (thickened) or as bolus once disconnected from TF pump).      Pt was started on dysphagia pureed diet with mildly thick liquids yesterday per SLP after MBSS. NG tube remains in place with continuous TF of Nepro at goal of 40 mL/hr. Plan to change to cyclic nocturnal TF to promote po intake during the day and increase water flush as Na+ has been trending up (144 today).      Current Tube Feeding (TF) Orders:  Feeding Route: Nasogastric  Formula: Renal Formula  Schedule: Cyclic  Feeding Regimen: 70 mL/hr x 14 hours  Additives/Modulars: None  Water Flushes: 175 mL H20 q 4 hours meets estimated fluid needs (total of 1050 mL free water daily).  Current TF Provides: 980 mL total volume, 1764 calories, 79 grams of protein, and 712 mL free water.    COMPARATIVE STANDARDS  Total Energy Requirements (kcals/day): 3432-6495     Protein (g):  58-96       Fluid (mL/day):  1743    Lori Bassett, MS, RD, LD    Contact: [phone]  
Brief Nutrition Note    RECOMMENDATIONS  PO Diet: NPO  Nutrition Support:   If pt remains intubated, recommend order \"Diet: Adult Tube Feed\". Initiate Vital AF 1.2 (peptide based formula) at 20 mL/hr and as tolerated, increase by 15 mL/hr q 4 hours until current goal of 35 mL/hr.  Recommend water flush of 30 mL q 4 hours.    Recommend reverse Trendelenburg position to ensure HOB >30 degrees.      Pt remains intubated and sedated. Propofol at 17.9 mL/hr. NPO x 3 days. Recommend initiation of EN if pt remains intubated today.     Current Tube Feeding (TF) Orders:  Feeding Route: Orogastric  Formula: Peptide Based  Schedule: Continuous  Feeding Regimen: 35 mL/hr (rate currently limited d/t calories from propofol)  Additives/Modulars: None  Water Flushes: 30 mL H20 q 4 hours  Current TF Provides: 840 mL total volume, 1008 calories, 63 grams of protein, and 681 mL free water.  Additional Calorie Sources:  Propofol at 17.9 mL/hr provides 473 calories per day from lipids.    COMPARATIVE STANDARDS  Total Energy Requirements (kcals/day): 1543     Protein (g):  55-91       Fluid (mL/day):  1543    Electronically signed by Lori Bassett MS, RD, LD on 1/2/2025 at 11:29 AM  Contact: [phone]  
Brief Nutrition Note    RECOMMENDATIONS  PO Diet: NPO per SLP evaluation today (but allowed necessary PO meds)  Nutrition Support:   If pt remains NPO upon further SLP evaluations, recommend dobhoff placement.  Recommend order \"Diet: Adult Tube Feed\". Initiate Nepro (renal formula) at 20 mL/hr and as tolerated, increase by 20 mL/hr q 4 hours until goal of 40 mL/hr.  Recommend water flush of 30 mL q 4 hours while hyponatremic.      Current Tube Feeding (TF) Recs:  Feeding Route: Nasogastric  Formula: Renal Formula  Schedule: Continuous  Feeding Regimen: 40 mL/hr  Additives/Modulars: None  Water Flushes: 30 mL H20 q 4 hours while hyponatremic  TF Provides: 960 mL total volume, 1728 calories, 78 grams of protein, and 698 mL free water.    COMPARATIVE STANDARDS  Total Energy Requirements (kcals/day): 0528-6917     Protein (g):  58-96       Fluid (mL/day):  1782    Lori Bassett MS, RD, LD    Contact: [phone]  
CRRT alarm for high chamber pressure. No line kinks, unable to resolve alarm. Unloaded set. Unable to return blood. CRRT paused while set is being changed.  
CRRT restarted  
CRRT stopped and disconnected for transport to cath lab. Plan to remove impella and balloon pump.   
Contacted MD about latest ABG. Increased rate to 22. Will continue to provide care.   
Contacted MD about potassium. See Mar for orders. Will continue to provide care.   
Discussed issues of placing a PICC in this patient with Monik MCKEON.  High WBC  Space in the SVC  Pulling SWAN after PICC placement could dislodge PICC  Cannot have fem line due to recently used bilateral fem sites  Multiple meds requiring CVC    With all issues considered, MDs feel a PICC is the best option at this time to bridge patient past this time of needing to pull SWAN. And are aware that PICC may need to be pulled due to infection indicated by high WBC.    Will attempt placing a PICC, before completing totally will have Monik MCKEON pull SWAN and then check placement again to check PICC remains in place.  
Dontae Calculated  4.6 L/min: CO  2.5 L/min/m²: CI  86 mL/beat: SV    
Dontae Calculation:     4.9 L/min  Cardiac output     2.5 L/min/m²  Cardiac index     59 mL/beat  Stroke volume  
Dontae Calculation:     5.3 L/min  Cardiac output     2.7 L/min/m²  Cardiac index     64 mL/beat  Stroke volume  
Dontae Calculation:    5.2 L/min  Cardiac output    2.6 L/min/m²  Cardiac index    59 mL/beat  Stroke volume  
Dontae calculated    4.7 L/min: CO  2.6 L/min/m²: CI  84 mL/beat: SV    
Dontae calculated at this time   CO: 7.7  CI 4.2  SV: 139     Latest Reference Range & Units 12/31/24 04:02   PCO2, Mixed Not Established mm Hg 41.7   PO2, Mixed Not Established mm Hg 44   HCO3, Mixed Not Established mmol/L 19.7   Base Exc, Mixed Not Established  -7   O2 Sat, Mixed Not Established % 74   tCO2, Mixed Not Established mmol/L 21   PH MIXED 7.350 - 7.450  7.281 (L)   Sample Type  MIXED   POC Potassium 3.5 - 5.1 mmol/L 3.3 (L)   POC Sodium 136 - 145 mmol/L 139   POC Hematocrit 36.0 - 48.0 % 25.0 (L)     
Dontae calculated:  4.8 L/min: CO  2.7 L/min/m²: CI  91 mL/beat: SV    
Dontae calculated:  CO:  4.3 L/min  CI: 2.4 L/min/m  SV:  61 mL/beat  
Dontae calculated:  CO:  6.1 L/min  CI: 3.3 L/min/m  SV:  88 mL/beat  
Dontae calculation as follows;   4.0 L/min  Cardiac output  2.1 L/min/m²  Cardiac index  59 mL/beat  Stroke volume  
Dontae calculation as follows;   CO-3.5  CI-1.9  SV-48  Hemoglobin 7.8, 2 units RBC ordered  
Dontae calculation as follows;  CO-4.1  CI-2.1  SV-50  This result is after one hour of iABP on 1:2  
Dontae calculation as follows;  CO-4.7  CI-2.4  SV-57  
Dontae calculation:    Cardiac output: 4.9  Cardiac index: 2.6  Stroke volume: 53  
Dontae calculation:  Cardiac output: 4.3  Cardiac index: 2.3  Stroke volume: 45  
Dontae calculation:  Cardiac output: 4.7  Cardiac index: 2.5  Stroke volume: 48  
Dontae calculations:    6.3 L/min  Cardiac output  3.3 L/min/m²  Cardiac index  71 mL/beat  Stroke volume  
Dr. Enriquez updated pt's daughter Claudia on POC for today. Consent obtained.  
[facility]  Cardiology Note  [phone]      Chief Complaint   Patient presents with    Fatigue     Pt arrives from home by herself. Pt C/O CP and fatigue since Thursday. Pt states that the CP started to radiate down her bilateral arms. Pt rates her pain a 6 out of 10.         History of Present Illness:  Shahida Vásquez is a 64 y.o. patient who presented with missed RCA MI c/b cardiogenic shock s/p RP and CP impella, CP impella removal with SFA bleed and resultant hypotension. S/p PCI to RCA    Interval events: Hd line placed, minimal UOP. Remains intubated and sedated    Past Medical History:   has a past medical history of Anxiety, Depression, Hypertension, Insomnia, Lumbar disc herniation, and Lumbar spinal stenosis.    Surgical History:   has a past surgical history that includes Hysterectomy; Gastric bypass surgery; Cholecystectomy; fracture surgery; lumbar nerve block (N/A, 6/17/2019); bronchoscopy (N/A, 10/10/2019); bronchoscopy (10/10/2019); Pain management procedure (Left, 1/20/2020); Forearm surgery (Left, 12/14/2020); Dilatation, esophagus; Abdomen surgery; Cardiac procedure (N/A, 12/30/2024); Cardiac procedure (N/A, 12/30/2024); Cardiac procedure (N/A, 12/30/2024); and Cardiac procedure (N/A, 12/30/2024).     Social History:   reports that she quit smoking about 43 years ago. Her smoking use included cigarettes. She started smoking about 58 years ago. She has a 3.8 pack-year smoking history. She has never used smokeless tobacco. She reports that she does not currently use alcohol. She reports that she does not currently use drugs after having used the following drugs: Marijuana (Weed).     Family History:  Family History   Problem Relation Age of Onset    High Blood Pressure Mother        Home Medications:  Were reviewed and are listed in nursing record. and/or listed below  Prior to Admission medications    Medication Sig Start Date End Date Taking? Authorizing Provider   medical marijuana 
Getting an ACT for the patient when the machine . Shows a falsely low 118 ACT that was retaken shortly after.   Latest Reference Range & Units 25 06:10   POC ACT LR Not Establised sec 118     
INTERVENTIONAL CARDIOLOGY NOTE    Patient s/p PCI of the RCA with removal of Impella CP and replacement with IABP and maintenance of RP Impella.  Significant blood loss from arteriotomy site in her L SFA - bleeding was controlled with balloon tamponade and external pressure, however, she did require volume resuscitation in the cath lab.  ECHO performed in the cath lab with moderate LV and RV reduced function without evidence of pericardial effusion.     During her procedure post PCI she would having conduction changes where her ST segments look considerably more pronounced which she continues to go in and out of.      Currently down to 30mcg el infusion and on dobutamine parked @ 5  CXR performed with Deep PA catheter will be pulled back in AM by physician, currently plan to follow surrogate RA sats from CVP port.     PH 7.1 - case discussed with nephro and agree with bicarb infusion and calcium repletion. pH on recheck up to 7.3 and base excess improved from -15 to -6.  Creat maintaining at 1.4 and making urine output.    Plan:   - Maintain impella RP flows > 1.5, currently at p6  - ACT goal 180-200, period groin checks  - Maintain IABP 1:1  - Agree with bicarb and calcium repletion    Jeovany Martinez MD        
INTERVENTIONAL UPDATE NOTE    Briefly this is a 64-year-old female who presented with a delayed presentation RCA STEMI.  Due to significant RV dysfunction as well as mild to moderate LV dysfunction right heart catheterization performed with a mixed venous that was frankly incompatible with life.  Given endorgan dysfunction with uptrending creatinine and poor urine output as well as need for alleviation of cardiogenic shock mechanical support was placed with biventricular Impella's.    Hemodynamics reviewed overnight and this morning.  On presentation to the CVU post cath the mixed venous increased from mid 30s up to mid 70s.  In an effort to avoid hemolysis given my concern about her tenuous renal status I dropped her CP Impella to P7 and maintained her RP at P7.  This morning she has excellent urine output and creatinine is actually downtrending she does have evidence of metabolic acidosis presumably from an elevated lactate on her presentation.  She has a left femoral dialysis catheter in place as well should she need temporary CRRT but I am hoping with her urine output we are able to stave that off.    Hopefully will be able to continue to wean devices down towards an explantation in 48 to 72 hours.  Would maintain her on intubation with deep sedation I did discuss with her family for a long time last night and apparently she does have a high tolerance to pain medication and potentially an alcohol history as well to which fits with the sedation doses that were required to keep her comfortable in the Cath Lab.      Will discuss with Dr. Enriquez as well who will be seeing the patient today regarding the risk benefit of attempting RCA revasc.  There could be some myocardial salvage potential - but that has to be weighed against the contrast risk.     Recommendations:  -Continue PA cath numbers every 6 hours  -Do not reduce RP Impella below P6, Maintain -200  -Will likely plan to reduce CP Impella to P6 this 
Labs reviewed with Dr. Enriquez at bedside. Verbal order received to transfuse 1 unit platelets. Blood consent in pt's chart.     Ruffin pulled back by Dr. Enriquez from 53 to 47.   
[facility]  Cardiology Note  [phone]      Chief Complaint   Patient presents with    Fatigue     Pt arrives from home by herself. Pt C/O CP and fatigue since Thursday. Pt states that the CP started to radiate down her bilateral arms. Pt rates her pain a 6 out of 10.         History of Present Illness:  Shahida Vásquez is a 64 y.o. patient who presented to the hospital with complaints of RCA STEMI. I have been asked to provide consultation regarding further management and testing.    Overnight went back into AF with RVR. K remains low at 3 this AM. UOP of approx 1.5L yesterday.     Past Medical History:   has a past medical history of Anxiety, Depression, Hypertension, Insomnia, Lumbar disc herniation, and Lumbar spinal stenosis.    Surgical History:   has a past surgical history that includes Hysterectomy; Gastric bypass surgery; Cholecystectomy; fracture surgery; lumbar nerve block (N/A, 6/17/2019); bronchoscopy (N/A, 10/10/2019); bronchoscopy (10/10/2019); Pain management procedure (Left, 1/20/2020); Forearm surgery (Left, 12/14/2020); Dilatation, esophagus; Abdomen surgery; Cardiac procedure (N/A, 12/30/2024); Cardiac procedure (N/A, 12/30/2024); Cardiac procedure (N/A, 12/30/2024); Cardiac procedure (N/A, 12/30/2024); Cardiac procedure (N/A, 1/2/2025); Cardiac procedure (N/A, 1/2/2025); Cardiac procedure (N/A, 1/2/2025); invasive vascular (N/A, 1/2/2025); Cardiac procedure (N/A, 1/2/2025); Cardiac procedure (N/A, 1/2/2025); Cardiac procedure (N/A, 1/2/2025); invasive vascular (N/A, 1/2/2025); Cardiac procedure (N/A, 1/2/2025); invasive vascular (N/A, 1/3/2025); and Cardiac procedure (N/A, 1/8/2025).     Social History:   reports that she quit smoking about 43 years ago. Her smoking use included cigarettes. She started smoking about 58 years ago. She has a 3.8 pack-year smoking history. She has never used smokeless tobacco. She reports that she does not currently use alcohol. She reports that she 
Most recent labs reviewed by Dr. Enriquez - verbal order received to transfuse 1 unit PRBC.   
Nephrology consult received - full consult note to follow.   D/W CVU nurse  Thank you for allowing us to participate in this patient’s care. Please do not hesitate to contact, if any questions or concerns. We will follow along with you.     Paul Logan MD  [facility]   [phone] or [address]. 
[facility] [place] ([facility]) contacted. Referral taken by CB.   
Nutrition Note    RECOMMENDATIONS  PO Diet: Diet per SLP  ONS: Ordered Magic Cup BID for trial to promote adequate po intake  Nutrition Support:   Continue Nepro (renal formula) at goal of 70 mL/hr x 14 hours (6PM to 8AM).  Water flush of 175 mL q 4 hours (provide orally (thickened) or as bolus once disconnected from TF pump).     Defer to MD for further water flush management.     ASSESSMENT   Pt triggered for follow-up. Started on dysphagia pureed diet with mildly thick liquids 1/14 per SLP. Documented meal intakes of <50% at breakfast and lunch yesterday but % at dinner and RN reported pt consumed all of breakfast this morning but did throw up after when working with PT/OT. NG tube remains in place, changed to cylic nocturnal TF yesterday of Nepro at goal of 70 mL/hr x 14 hours which meets 100% of estimated energy needs; tolerated per RN. Recommend continue TF as is until pt is consistently consuming >50% of meals then consider reducing TF volume or removing NG. ARU following for discharge. RD will continue to monitor.     Malnutrition Status: At risk for malnutrition  Acute Illness    NUTRITION DIAGNOSIS   Inadequate oral intake related to swallowing difficulty, inadequate protein-energy intake as evidenced by nutrition support - enteral nutrition, variable po intake    Goals: PO intake 75% or greater, by next RD assessment     NUTRITION RELATED FINDINGS  Objective: Na+ 146, K+ 3.2. LBM 1/15. Edema: non-pitting generalized, +1 BLE. Mentation improving.  Wounds: Surgical Incision, Pressure Injury, Stage I    CURRENT NUTRITION THERAPIES  ADULT TUBE FEEDING; Nasogastric; Renal Formula; Cyclic; 70; 6:00 PM; 8:00 AM; 175; Other (specify); Q 4 hours (provide orally (thickened) or as bolus once disconnected from TF pump) for a total of 1050 mL free water daily.  ADULT DIET; Dysphagia - Pureed; Mildly Thick (Nectar)  ADULT ORAL NUTRITION SUPPLEMENT; Lunch, Dinner; Frozen Oral Supplement       PO Intake: Unable to 
Nutrition Note    RECOMMENDATIONS  PO Diet: Dysphagia minced & moist, mildly thick liquids.  ONS: Gelatein BID, Magic cup BID  Nutrition Support: d/c'd     NUTRITION ASSESSMENT   Nutrition intervention for f/u.  NG tube for TF removed yesterday.  Recieves a dysphagia minced & moist diet with mildly thick liquids.  Pt is very motivated to eat & drink to avoid NG tube re-insertion.  Likes Magic cups & Gelatein supplements, and reports is taking well.  Encouraged at least 50% of meal intake along with ONS to promote strength & healing upon admission to ARU (likely later today).  Wt is close to baseline admission wt.  Will monitor for adequate po & supplement intake.     Nutrition Related Findings: Labs reviewed; temporary HD cath removed 1/19.  LBM 1/19; +1 pitting edema BLE  Wounds: Pressure Injury, Stage I, Surgical Incision, Skin Tears  Nutrition Education:  Education/Counseling not indicated   Nutrition Goals: PO intake 75% or greater, by next RD assessment     MALNUTRITION ASSESSMENT   Acute Illness  Malnutrition Status: At risk for malnutrition    NUTRITION DIAGNOSIS   Inadequate oral intake related to inadequate protein-energy intake, swallowing difficulty as evidenced by variable po intake, swallow study results      CURRENT NUTRITION THERAPIES  ADULT DIET; Dysphagia - Minced and Moist; Mildly Thick (Nectar)  ADULT ORAL NUTRITION SUPPLEMENT; Breakfast, Dinner; Fortified Gelatin Oral Supplement  ADULT ORAL NUTRITION SUPPLEMENT; Lunch, Dinner; Frozen Oral Supplement     PO Intake: 26-50%   PO Supplement Intake:%    ANTHROPOMETRICS  Current Height: 154.9 cm (5' 1\")  Current Weight - Scale: 79.5 kg (175 lb 6 oz)    Admission weight: 78.5 kg (173 lb)  Ideal Body Weight (IBW): 105 lbs  (48 kg)        BMI: 33.1      COMPARATIVE STANDARDS  Total Energy Requirements (kcals/day): 3339-4753     Protein (g):  58-96       Fluid (mL/day):  1782    The patient will be monitored per nutrition standards of care. Consult 
Nutrition Note    RECOMMENDATIONS  PO Diet: Keep NPO for now per Dr. Morales. Start of po diet as appropriate per MD.   Nutrition Support: If po diet is unable to be started, recommend NG tube placement and restart EN. RD remains available to order/manage TF.      ASSESSMENT   Pt triggered for follow-up. TF of Osmolite 1.2 at goal of 40 mL/hr off since yesterday morning for IABP removal and impella decannulation. Extubated this morning and OG tube removed. RD will monitor for ability to start po diet vs place NG tube and restart EN.     Malnutrition Status: No malnutrition  Acute Illness    NUTRITION DIAGNOSIS   Inadequate oral intake related to acute injury/trauma as evidenced by NPO or clear liquid status due to medical condition    Goals: Initiation of nutrition, by next RD assessment     NUTRITION RELATED FINDINGS  Objective: CRRT. Vasopressin at 0.03 units/min (VDE = 7.5). Na+ 133. Rectal tube with output. +1 non-pitting generalized edema. Wt up 12 lb since admission, +4.3L per I/Os.  Wounds: [facility]    CURRENT NUTRITION THERAPIES  Diet NPO       PO Intake: NPO   PO Supplement Intake:NPO    ANTHROPOMETRICS  Current Height: 154.9 cm (5' 1\")  Current Weight - Scale: 84 kg (185 lb 3 oz)    Admission weight: 78.5 kg (173 lb 1 oz)  Ideal Body Weight (IBW): 105 lbs  (48 kg)        BMI: 35    COMPARATIVE STANDARDS  Total Energy Requirements (kcals/day): 4231-7499     Protein (g):  58-96       Fluid (mL/day):  1782    EDUCATION  Education/Counseling not indicated     The patient will be monitored per nutrition standards of care. Consult dietitian if additional nutrition interventions are needed prior to RD reassessment.     Lori Bassett MS, RD, LD    Contact: [phone]   
Nutrition Note    RECOMMENDATIONS  PO Diet: NPO  Nutrition Support:   Continue Osmolite 1.2 (standard without fiber formula).  Advance to goal rate of 40 mL/hr. Note rate is limited d/t propofol calories.  Recommend water flush of 30 mL q 4 hours.    Recommend 2 ProSource TF20 pouch daily. Flush with 30 mL H20 before and after administration.        ASSESSMENT   Pt remains intubated and sedated.  Continues on CRRT, has balloon pump and Impella.  Tube feed started on 1/4/25, okay per pulmonary to advance toward goal.       Malnutrition Status: No malnutrition  Acute Illness      NUTRITION DIAGNOSIS   Inadequate protein-energy intake related to impaired respiratory function as evidenced by nutrition support - enteral nutrition (EN not yet at goal)    Goals: Tolerate nutrition support at goal rate, by next RD assessment     NUTRITION RELATED FINDINGS  Objective: Na+ 134, Mg 2.58; diarrhea; +2 nonpitting generalized edema; I/O's: +5848 mL  Wounds: None    CURRENT NUTRITION THERAPIES  Diet NPO  ADULT TUBE FEEDING; Orogastric; Standard without Fiber; Continuous; 25; No; 30; Q 4 hours; Protein; 1 Dose; Daily       PO Intake: NPO   PO Supplement Intake:NPO    Current Tube Feeding (TF) Orders:  Feeding Route: Orogastric  Formula: Standard without Fiber  Schedule: Continuous  Goal with propofol = 40 mL/hr with 2 ProSource to provide 1110 mL total volume, 1360 kcal, 105 g  protein & 787 mL free water  Water Flushes: 30 mL q4 hr  Goal without propopfol = 55 mL/hr to provides 1320 total volume, 1584 kcal, 73 g protein & 1082 mL free water    ANTHROPOMETRICS  Current Height: 154.9 cm (5' 1\")  Current Weight - Scale: 74.4 kg (164 lb)    Admission weight: 78.5 kg (173 lb 1 oz)  Ideal Body Weight (IBW): 105 lbs  (48 kg)        BMI: 37.8      COMPARATIVE STANDARDS  Based on admission weight  Total Energy Requirements (kcals/day): 1556     Protein (g):  55 - 91       Fluid (mL/day):  1556    EDUCATION  Education/Counseling not 
Nutrition Note    RECOMMENDATIONS  PO Diet: NPO  Nutrition Support:   Recommend order \"Diet: Adult Tube Feed\". Initiate Vital AF 1.2 (peptide based formula) at 20 mL/hr and as tolerated, increase by 15 mL/hr q 4 hours until current goal of 35 mL/hr.  Recommend water flush of 30 mL q 4 hours.    Recommend 1 ProSource TF20 pouch daily. Flush with 30 mL H20 before and after administration.      Recommend reverse Trendelenburg position.     ASSESSMENT   Pt triggered d/t vent status. Intubated and sedated. Propofol at 22.3 mL/hr. Levophed at 2 mcg/min. Impella x2. No significant wt changes noted in wt hx in EMR. Recommend initiation of EN at this time. RD will place TF recommendations above and continue to monitor.     Malnutrition Status: No malnutrition  Acute Illness    NUTRITION DIAGNOSIS   Inadequate oral intake related to impaired respiratory function as evidenced by NPO or clear liquid status due to medical condition, intubation    Goals: by next RD assessment, Initiate nutrition support     NUTRITION RELATED FINDINGS  Objective: K+ 3.4. No BM documented.  Wounds: None    CURRENT NUTRITION THERAPIES  Diet NPO       PO Intake: NPO   PO Supplement Intake:NPO    Current Tube Feeding (TF) Recs:  Feeding Route: Orogastric  Formula: Peptide Based  Schedule: Continuous  Feeding Regimen: 30 mL/hr (rate currently limited d/t calories from propofol)  Additives/Modulars: Protein (1 ProSource TF20 daily provides 80 calories, 20 grams of protein)  Water Flushes: 30 mL H20 q 4 hours  Current TF Provides: 720 mL total volume, 864 calories, 54 grams of protein, and 584 mL free water.  Additional Calorie Sources:  Propofol at 22.3 mL/hr provides 589 calories per day from lipids.    ANTHROPOMETRICS  Current Height: 152.4 cm (5')  Current Weight - Scale: 78.5 kg (173 lb)    Admission weight: 78.5 kg (173 lb 1 oz)  Ideal Body Weight (IBW): 100 lbs  (45 kg)        BMI: 33.8    COMPARATIVE STANDARDS  Total Energy Requirements 
Nutrition Note    RECOMMENDATIONS  PO Diet: NPO  Nutrition Support: Hold off on initiation of EN today per Dr. Garcia.   When EN is appropriate to initiate per MD, recommend order \"Diet: Adult Tube Feed\". Initiate Osmolite 1.2 (standard without fiber formula) at trickle rate of 20 mL/hr.   Recommend water flush of 30 mL q 4 hours.        ASSESSMENT   Pt triggered for follow-up. Remains intubated and sedated. Propofol at 4.5 mL/hr paused this morning. Phenylephrine at 60 mcg/min. S/p PCI of the RCA, removal of Impella CP, replacement with IABP and maintenance of RP Impella yesterday. NPO x 4 days, continue to hold off on initiation of TF today per Dr. Garcia. RD will continue to monitor for ability to start EN vs goals of care.     Malnutrition Status: No malnutrition  Acute Illness    NUTRITION DIAGNOSIS   Inadequate protein-energy intake related to impaired respiratory function as evidenced by NPO or clear liquid status due to medical condition, intubation    Goals: Initiate nutrition support, by next RD assessment     NUTRITION RELATED FINDINGS  Objective: Na+ 131. +7.7L per I/Os. Rectal tube with output. +1 generalized edema.  Wounds: None    CURRENT NUTRITION THERAPIES  Diet NPO       PO Intake: NPO   PO Supplement Intake:NPO    Overall Tube Feeding (TF) Goal:  Feeding Route: Orogastric  Formula: Standard without Fiber  Schedule: Continuous  Feeding Regimen: 60 mL/hr (without propofol)  Additives/Modulars: None  Water Flushes: 90 mL H20 q 4 hours meets estimated fluid needs.  Current TF Provides: 1440 mL total volume, 1728 calories, 80 grams of protein, and 1181 mL free water.  Additional Calorie Sources:  Propofol at 4.5 mL/hr provides 119 calories per day from lipids (now paused)    ANTHROPOMETRICS  Current Height: 154.9 cm (5' 1\")  Current Weight - Scale: 84.6 kg (186 lb 8.2 oz)    Admission weight: 78.5 kg (173 lb 1 oz)  Ideal Body Weight (IBW): 105 lbs  (48 kg)        BMI: 35.3    COMPARATIVE STANDARDS  Total 
Nutrition Note    RECOMMENDATIONS  PO Diet: NPO per SLP  Nutrition Support:   Concern for refeeding syndrome given drop in K+ and inadequate nutritional intake throughout admission   Recommend administer 100 mg thiamine and continue daily for at least 5-7 days, discussed with pharm.   Pt's receiving EN should receive a complete oral vitamin once daily for at least 10 days.    Continue Nepro (renal formula) at goal of 40 mL/hr.  Recommend water flush of 170 mL q 4 hours to meet estimated fluid needs now that Na+ is WNL.      ASSESSMENT   Pt triggered for follow-up. Inadequate nutritional intake throughout admission (see below for additional information). NG tube placed and TF restarted per Dr. Truong's orders 1/11 - now tolerating Nepro at goal of 40 mL/hr. Pt remains NPO per SLP and declined MBSS or FEES this morning. Concern for refeeding syndrome given significant drop in K+ noted since TF restarted 1/11 in which K+ was 4.0; 2.9 today (replaced). Recommend continue TF as is today and RD will continue to monitor.   ---  12/30 to 1/4: NPO x 5 days.  1/4: Trickle TF of Osmolite 1.2 started at 25 mL/hr.  1/6: TF advanced to goal of 40 mL/hr (rate limited d/t calories from propofol).  1/8: TF stopped.   1/11: NG placed, TF restarted (Nepro with goal of 40 mL/hr).     Malnutrition Status: At risk for malnutrition  Acute Illness    NUTRITION DIAGNOSIS   Inadequate oral intake related to swallowing difficulty as evidenced by NPO or clear liquid status due to medical condition, nutrition support - enteral nutrition    Goals: Tolerate nutrition support at goal rate, by next RD assessment     NUTRITION RELATED FINDINGS  Objective: LBM 1/12. Edema: +1 non-pitting generalized; +2 BLE.  Wounds: Surgical Incision    CURRENT NUTRITION THERAPIES  Diet NPO  ADULT TUBE FEEDING; Nasogastric; Renal Formula; Continuous; 20; Yes; 20; Q 4 hours; 40; 30; Q 4 hours       PO Intake: NPO   PO Supplement Intake:NPO    Current Tube Feeding 
Occupational Therapy/Physical Therapy  Orders received for occupational therapy/physical therapy evaluation.  Patient currently on hold due to off floor at CT scan.   Will re-attempt as schedule and patient's medical status permits.     Thanks,  Von Dickson OTR/L [id number]  Marlys Maciel, PT, DPT [id number]      
Order placed for NG tube and vas cath removal. ICU charge RN at bedside to assist with removal of vas cath. Vas cath removed and pressure held at site for 10 minutes, transparent occlusive dressing applied. NG tube pulled by 5C charge RN with assistance from ICU charge RN. Pt tolerated well, no complications.   
Order placed for dceker catheter removal. Pt made aware of order to remove and is agreeable. This RN removed per order, no complications.   
Patient extubated without incident per Dr. Morales verbal order. Patient placed on 2L nasal cannula for immediate post extubation period. Will wean as tolerated and continue to monitor closely.   
Patient to cath lab at this time  
Patient transferred to room 5910, Patient AxO x4, VSS, bedside report given to Carmelita      . All opportunities for questions answered, Patient resting in bed, Call light within reach, Personal belongings transferred with patient, and family.   
Patient with blood tinged stool leaking around rectal tube, h&H sent- hbg 6.6, two units RBC ordered, GI consulted.   Per Dr. Enriquez start vasopressin and wean off el-synephrine.   Plan to try again tomorrow to go to cath lab to remove balloon pump.   
Patients son and daughter at bedside, spoke with Dr. Enriquez, updated on patient condition and plan of care, all questions answered.   
Per Dr. Alanis, Blood labs ordered for today do to need to be collected.  
Per hospitalist, if patient is tolerating meals PO, tube feeding can be stopped.   
Phlebotomist at bedside to draw blood cultures, unable to obtain at this time. Will try again later this evening.   Patient had blood cultures sent on 1/1/25  
Pt admitted from ccl  
Pt admitted to CVU 17. RP Impella and CP Impella in place. Pt intubated and sedated on arrival. RT attached patient to ventilator. Cath lab RNs, Rajwinder and CVU RNs all at bedside for report. Noted bleeding from groins, plan discussed. Orders released.     Electronically signed by Barbara Ibarra RN on 12/30/2024 at 8:54 PM    
Pt back in AFIB with 's-120's, drop in blood pressure with rhythm change- 98/56. Call placed to cardiology, case reviewed with Dr. Hernandez. Orders to administer 250mcg digoxin now and again in 4 hours, to give an additional 200mg PO amiodarone now and increase her dose to 400mg BID starting in the morning.       Pt is alert, significant aphasia, whispers very minimal fragmented speech, able to nod head appropriately to yes/no questions and follows all commands. Endorses pain to left groin site as well as nausea- medicated per PRN orders.   
Pt converted back to SR, HR 80's. Normotensive   
Pt coughing over vent, able to suction a moderate amount of yellowish tan sputum.  Rhonchi and crackles heard to upper/mid lobes.  TF stopped for possible aspiration.  CXR already ordered for this am.  Will continue to monitor   
Pt in and out of aflutter and sinus rhythm throughout the night. Around 0315 pt's pulse elevated to 120s-130s and stayed in that range. EKG performed; atrial flutter identified.  Spoke with on-call cardiology to address rhythm change, 1x dose of carvedilol 3.125 mg ordered.     Electronically signed by Katrin Eric RN on 1/14/2025 at 5:20 AM      
Pt potassium this AM was 2.8. Nephrology notified @0532- awaiting response. Hospitalist notified @4214, IV replacement ordered.     Electronically signed by Katrin Eric RN on 1/14/2025 at 7:05 AM    
Pt progressively tolerating more PO food, per hospitalist can hold off on tube feeds if eating more PO. Tube feeds held at this time  
Pt returned to CVU 2907. Vas cath placed to left IJ. Pt required some sedation in cath lab d/t pt starting to wake up/gag.   
Pt to CCL  
Pt to cath lab with cath lab staff to get vas cath inserted.     Verbal telephone consent obtained from Claudia Dexter pt's daughter. Witnessed by MD and 2 Rns.   
Pt's potassium 2.9 this AM. Notified nephrology, orders placed to give IV dose 20 mEq peripherally.      Latest Reference Range & Units 01/13/25 05:00   Potassium 3.5 - 5.1 mmol/L 2.9 (LL)   (LL): Data is critically low    Electronically signed by Katrin Eric RN on 1/13/2025 at 7:28 AM    
Received call from [facility] Triglyceride level is 160- previously resulted was 11- care ongoing   
Received okay for PICC in right arm from nephrology.   
Report called to MIKE Taylor on ARU. Pt to transport to [facility].   
Scheduled 0000 ion Ca sent to lab @7253.  When resulted was put in chart as 0343.  Called lab and was told it would be corrected on the results page of chart.  
Sedation off at 0700, patient following commands by 0730, switched to spontaneous and ABG drawn within 30 minutes WDL.   
Shahida Vásquez  1/17/2025  [id number]    Chief Complaint: ST elevation myocardial infarction involving right coronary artery (HCC)    Subjective   Since last seen, medical updates:  - Nephrology following, had iHD on 1/16 for solute clearance, no UF.   - ENT consulted, recommended ongoing speech therapy and consideration of PEG.     Patient reports that she is feeling fair today. Denies any acute pain complaints. Reports diminished appetite. She is unsure if she would want a PEG tube to be placed, says her mother went through a similar experience which has given Shahida a negative opinion on this.          Objective   Objective:  Patient Vitals for the past 24 hrs:   BP Temp Temp src Pulse Resp SpO2 Weight   01/17/25 1555 117/63 -- Temporal 72 24 95 % --   01/17/25 1224 126/72 98.3 °F (36.8 °C) Temporal 68 22 98 % --   01/17/25 0826 119/73 98.9 °F (37.2 °C) Temporal 79 18 99 % --   01/17/25 0501 115/66 98.7 °F (37.1 °C) Temporal 80 25 99 % 81.6 kg (180 lb)   01/16/25 2338 (!) 106/55 98.7 °F (37.1 °C) Temporal 78 (!) 34 99 % --   01/16/25 2137 (!) 106/59 -- -- 87 29 -- --   01/16/25 2031 (!) 106/59 98.7 °F (37.1 °C) Temporal 86 21 97 % --   01/16/25 1814 117/70 97.9 °F (36.6 °C) Temporal 81 20 97 % --   01/16/25 1715 -- -- -- -- -- -- 82.3 kg (181 lb 7 oz)     Gen: No distress.   HEENT: Normocephalic, atraumatic.  Bridled small bore NG tube in place.  CV: Extremities warm, perfused.  Resp: No respiratory distress. No increased WOB  Abd: Soft, nontender nondistended  Ext: No edema.   Neuro: Alert.  Voice is a little stronger today, speech is more intelligible.   Psych: Calm, pleasant.     Laboratory data: Available via EMR.     Therapy progress:    PT    Rolling: Level of difficulty - A Lot   Sit to Stand from a Chair: Level of difficulty - Total  Supine to Sit: Level of difficulty - Total     Bed to Chair: Physical Assistance Required - Total  Ambulate Across Room: Physical Assistance Required - Total  Ascend 3-5 Steps 
Shahida Vásquez  1/20/2025  [id number]    Chief Complaint: ST elevation myocardial infarction involving right coronary artery (HCC)    Subjective   Since last seen, medical updates:  - NG tube removed over the weekend.     Patient reports that she is doing well today. She is feeling stronger. She says it feels good to have NG tube removed. She denies any fevers/chills, chest pain, dyspnea, abdominal pain.          Objective   Objective:  Patient Vitals for the past 24 hrs:   BP Temp Temp src Pulse Resp SpO2 Height Weight   01/20/25 1235 129/72 98.5 °F (36.9 °C) Temporal 67 18 96 % -- --   01/20/25 1120 -- -- -- -- -- -- 1.549 m (5' 1\") --   01/20/25 0944 127/74 98.8 °F (37.1 °C) Temporal 75 19 97 % -- --   01/20/25 0405 121/78 98.9 °F (37.2 °C) Oral 65 15 98 % -- 79.5 kg (175 lb 6 oz)   01/20/25 0016 -- -- -- 64 -- -- -- --   01/20/25 0014 111/68 98.7 °F (37.1 °C) Oral 65 18 97 % -- --   01/19/25 2159 -- 98.7 °F (37.1 °C) Oral -- -- -- -- --   01/19/25 1639 (!) 124/56 97.6 °F (36.4 °C) Temporal 73 19 97 % -- --     Gen: No distress.   HEENT: Normocephalic, atraumatic.    CV: Extremities warm, perfused.  Resp: No respiratory distress. No increased WOB  Abd: Soft, nontender nondistended  Ext: No edema.   Neuro: Alert.  Voice considerably stronger today. 4/5 bilateral upper extremities. 2/5 proximally to 4/5 distally bilateral lower extremities.   Psych: Calm, pleasant.     Laboratory data: Available via EMR.     Therapy progress:    PT    Rolling: Level of difficulty - A Little   Sit to Stand from a Chair: Level of difficulty - A Little  Supine to Sit: Level of difficulty - A Little     Bed to Chair: Physical Assistance Required - A Little  Ambulate Across Room: Physical Assistance Required - A Little  Ascend 3-5 Steps With HR: Physical Assistance Required - A Little    OT    Eating: Physical Assistance Required - A Lot  Grooming: Physical Assistance Required - A Lot  LB Dressing: Physical Assistance Required - A Lot  UB 
Spoke with lab regarding type and screen. Antibodies detected, further testing to be completed. Transfusion on hold until results complete.  
TENISHA Calculation     CO 3.6  CI 1.8  SV 48  
TENISHA Calculation    CO 3.3  CI 1.7  SV 42  
Treatment time: 3 hrs    Net UF: 0 ml     Pre weight: 82.3 kg  Post weight: 82.3 kg     Access used: LIJ  Access function:  Reversd, High Arterial Alarm, both sides,  -350ml/min     Medications or blood products given: Heparin dwells, retacrit 10kiu     Regular outpatient schedule: TBD     Summary of response to treatment: Pt tolerated well. Pt remained stable throughout entire treatment and upon exiting the hemodialysis suite.      Copy of dialysis treatment record placed in chart, to be scanned into EMR.      
  ETT/Trach Suctioning Yes       
1/13/2025 1128  Gross per 24 hour   Intake 769 ml   Output 965 ml   Net -196 ml        BP (!) 148/85   Pulse 91   Temp 98.5 °F (36.9 °C) (Temporal)   Resp 18   Ht 1.549 m (5' 1\")   Wt 75.7 kg (166 lb 14.2 oz)   SpO2 95%   BMI 31.53 kg/m²     Wt Readings from Last 3 Encounters:   01/13/25 75.7 kg (166 lb 14.2 oz)   12/04/24 79.4 kg (175 lb)   10/03/24 83 kg (183 lb)       BP Readings from Last 3 Encounters:   01/13/25 (!) 148/85   10/03/24 132/78   06/19/24 138/78       General-extubated  Chest-coarse bilaterally  Heart- s1 s2+  Abd-soft  Ext-2+ edema    Labs  Hemoglobin   Date Value Ref Range Status   01/13/2025 8.7 (L) 12.0 - 16.0 g/dL Final     Hematocrit   Date Value Ref Range Status   01/13/2025 26.5 (L) 36.0 - 48.0 % Final     WBC   Date Value Ref Range Status   01/13/2025 23.3 (H) 4.0 - 11.0 K/uL Final     Platelets   Date Value Ref Range Status   01/13/2025 352 135 - 450 K/uL Final     Lab Results   Component Value Date    CREATININE 2.6 (H) 01/13/2025    BUN 65 (H) 01/13/2025     01/13/2025    K 2.9 (LL) 01/13/2025     01/13/2025    CO2 22 01/13/2025       Assessment/Plan:  1.  ANALY on CKD 3a-baseline creatinine 1.3.   anuric.  Probable ATN.   Crea better with higher delivered dialysis.    -transitioned to iHD 1/11/25   -UOP improving, clearance still poor but no acute indication for RRT today.   -continue IV lasix 20mg on non-hd days. Likely hold off on HD in am. Monitor for renal recovery.      Hypermagnesemia-better      2.  Hypokalemia-in the setting of anion gap metabolic acidosis and ANALY.  Better  3.  Non-anion gap metabolic acidosis-in the setting of ANALY and right heart failure.  CRRT.  Better.   4.  Hypotension-in the setting of right ventricular dysfunction. Keep MAP 65 and above.  Off pressors.   5.  Anemia-follow Hgb closely.  Transfuse PRN.   6.  Right ventricular dysfunction and RCA   occlusion-s/p stent placement.   7.  Hyponatremia-follow for now.  Keep MAP 65 and above  8.  
12 mmol in sodium chloride 0.9 % 250 mL IVPB **OR** sodium phosphate 18 mmol in sodium chloride 0.9 % 500 mL IVPB **OR** sodium phosphate 24 mmol in sodium chloride 0.9 % 500 mL IVPB, magnesium sulfate, potassium chloride, [DISCONTINUED] sodium chloride flush **AND** sodium chloride flush, sodium chloride flush, sodium chloride, acetaminophen, hydrOXYzine HCl, LORazepam, tiZANidine, heparin (porcine), heparin (porcine), heparin (porcine), acetaminophen, sodium chloride flush, sodium chloride, ondansetron **OR** ondansetron, polyethylene glycol, [DISCONTINUED] acetaminophen **OR** acetaminophen     ALLERGIES:   Allergies as of 12/30/2024 - Reviewed 12/30/2024   Allergen Reaction Noted    Penicillins Rash 06/28/2011        OBJECTIVE:   height is 1.549 m (5' 1\") and weight is 74.4 kg (164 lb). Her esophageal temperature is 98.2 °F (36.8 °C). Her blood pressure is 100/55 (abnormal) and her pulse is 87. Her respiration is 18 and oxygen saturation is 100%.   I/O this shift:  In: 862 [I.V.:487; Blood:350; NG/GT:25]  Out: 1745 [Urine:5; Stool:100]     PHYSICAL EXAM:  CONSTITUTIONAL: She is a 64 y.o.-year-old who appears her stated age. She is in no acute distress.   CARDIOVASCULAR: S1 S2 RRR. Without murmer  RESPIRATORY & CHEST: Bilateral air entry is equal.  No wheezing or crackles heard.  GASTROINTESTINAL & ABDOMEN: Soft, nontender, positive bowel sounds in all quadrants, non-distended, without hepatosplenomegaly.   GENITOURINARY: Deferred.   MUSCULOSKELETAL: No tenderness to palpation of the axial skeleton. There is no clubbing. No cyanosis. No edema of the lower extremities.   SKIN OF BODY: No rash or jaundice.   PSYCHIATRIC EVALUATION: Could not be assessed  HEMATOLOGIC/LYMPHATIC/ IMMUNOLOGIC: No palpable lymphadenopathy.  NEUROLOGIC: Sedated, open eyes but does not follow commands Groslly non-focal.      LABS:     LABS:  Recent Labs     01/04/25  1045 01/04/25  1840 01/05/25  0510 01/05/25  0957 01/05/25  1815 
\"LIPASE\", \"AMYLASE\" in the last 72 hours.      ASSESSMENT :     Melena/acute blood loss anemia -she may be having an upper GI hemorrhage.  She is on pressors.  LGI/proximal colonic ischemia also possible cause given her hemodynamic compromise.  She received 2 units of packed red blood cells as part of her resuscitation 1/7 and now getting another 2 units PRBC's this AM for Hb drop from 9 to 7.5 this AM 1/8/2025.  The stool does appear less melenic today.  She has other sources of blood loss including bilateral inguinal areas and likely hemolysis from IABP/Impella -- now removed and Heparin d/c'ed 1/8/2025.    She is at high risk for endoscopic evaluation due to her underlying cardiac condition.  Specifically ventricular ectopy can be stimulated by upper endoscopy in the setting.  For now would follow twice daily hemoglobin hematocrit and her stool output and transfuse packed red blood cells as needed to keep hemoglobin at acceptable level (greater than 8).    Now on Protonix infusion.  OG tube removed with extubation.  She is alert, minimally conversant.  Discussed EGD in 2 - 4 weeks and she shook her head.  Really could wait 6 - 8 weeks for maximum safety post MI.  Unless she re-bleeds in house, would defer EGD for 2 months.  Would treat her with PPI in meantime.        PLAN  :  Serial hemoglobin and hematocrit change to q 12 hrs?  Change pantoprazole IV infusion to IV bolus BID  When taking po, can change pantoprazole to 40 mg po daily  Monitor stool/rectal tube stool output for ongoing bleeding  Consider proceeding with elective EGD in 2 months.  Would reconsider mor urgent EGD should the bleeding recur.    GI will follow from a distance.  Please call with questions.    Og Vazquez MD  [facility]  1/10/2025   
admission    Intake/Output Summary (Last 24 hours) at 1/20/2025 1215  Last data filed at 1/20/2025 0405  Gross per 24 hour   Intake --   Output 500 ml   Net -500 ml        /74   Pulse 75   Temp 98.8 °F (37.1 °C) (Temporal)   Resp 19   Ht 1.549 m (5' 1\")   Wt 79.5 kg (175 lb 6 oz)   SpO2 97%   BMI 33.14 kg/m²     Wt Readings from Last 3 Encounters:   01/20/25 79.5 kg (175 lb 6 oz)   12/04/24 79.4 kg (175 lb)   10/03/24 83 kg (183 lb)       BP Readings from Last 3 Encounters:   01/20/25 127/74   10/03/24 132/78   06/19/24 138/78       General-extubated  Chest-rhonchi bilaterally bilaterally  Heart- s1 s2+  Abd-soft  Ext-1+ edema    Labs  Hemoglobin   Date Value Ref Range Status   01/20/2025 7.7 (L) 12.0 - 16.0 g/dL Final     Hematocrit   Date Value Ref Range Status   01/20/2025 23.4 (L) 36.0 - 48.0 % Final     WBC   Date Value Ref Range Status   01/20/2025 12.4 (H) 4.0 - 11.0 K/uL Final     Platelets   Date Value Ref Range Status   01/20/2025 587 (H) 135 - 450 K/uL Final     Lab Results   Component Value Date    CREATININE 1.1 01/20/2025    BUN 54 (H) 01/20/2025     01/20/2025    K 4.0 01/20/2025     01/20/2025    CO2 24 01/20/2025       Assessment/Plan:  1.  ANALY on CKD 3a-baseline creatinine 1.3.   anuric.  Probable ATN.   Crea better with higher delivered dialysis.  Previously needed CRRT    -transitioned to iHD 1/11/25. Last iHD 1/16 for solute clearance, no UF.  No need for further HD for now.   Removed temporary HD catheter  -continue Lasix 40 mg p.o. daily    Hypermagnesemia-better      2.  Hypokalemia-in the setting of anion gap metabolic acidosis and ANALY.  Likely due to IV lasix response and/or post-atn diuresis.  Now resolved.  3.  Non-anion gap metabolic acidosis-in the setting of ANALY and right heart failure.  Better.   4.  Hypotension-in the setting of right ventricular dysfunction. Improved.  5.  Anemia-follow Hgb closely.  Transfuse PRN.   6.  Right ventricular dysfunction and RCA 
bilaterally  Heart-regular  Abd-soft  Ext-1-2+ edema    Labs  Hemoglobin   Date Value Ref Range Status   01/01/2025 7.1 (L) 12.0 - 16.0 gm/dL Final     Hematocrit   Date Value Ref Range Status   01/01/2025 23.0 (L) 36.0 - 48.0 % Final     WBC   Date Value Ref Range Status   01/01/2025 7.8 4.0 - 11.0 K/uL Final     Platelets   Date Value Ref Range Status   01/01/2025 134 (L) 135 - 450 K/uL Final     Lab Results   Component Value Date    CREATININE 1.4 (H) 01/01/2025    BUN 23 (H) 01/01/2025     (L) 01/01/2025    K 3.8 01/01/2025     01/01/2025    CO2 20 (L) 01/01/2025       Assessment/Plan:  1.  ANALY on CKD 3a-baseline creatinine 1.3.  Nonoliguric.  Urine output better.  No need for acute RRT today.  2.  Hypokalemia-in the setting of anion gap metabolic acidosis and ANALY.  Better.    3.  Anion gap metabolic acidosis-in the setting of ANALY and right heart failure.  Continue Levophed.  Needed bicarb infusion yesterday.  Better.  Follow with improved renal function.  Follow repeat BMP at 4 PM.    4.  Hypotension-in the setting of right ventricular dysfunction.  On Levophed.  Keep MAP 65 and above.  5.  Anemia-hemoglobin decreasing.  Consider packed red blood cell transfusion.  Per primary team.  6.  Right ventricular dysfunction and RCA   occlusion-discussed with cardiology today.  Not planning for intervention.  7.  Hyponatremia-follow for now.  Keep MAP 65 and above  8.  Hypoalbuminemia  9.  Follow ionized calcium    High risk.  Total time spent greater than 35 minutes    Eliceo Evangelista MD    
impella removal.  Needed about 5 units prbc.       Review of Systems  Not able to obtain    Objective:      Intake/Output Summary (Last 24 hours) at 1/4/2025 1007  Last data filed at 1/4/2025 0900  Gross per 24 hour   Intake 5190.11 ml   Output 3210.2 ml   Net 1979.91 ml        BP (!) 102/49   Pulse 98   Temp 98.8 °F (37.1 °C) (Core)   Resp 24   Ht 1.549 m (5' 1\")   Wt 90.6 kg (199 lb 11.8 oz)   SpO2 99%   BMI 37.74 kg/m²     Wt Readings from Last 3 Encounters:   01/04/25 90.6 kg (199 lb 11.8 oz)   12/04/24 79.4 kg (175 lb)   10/03/24 83 kg (183 lb)       BP Readings from Last 3 Encounters:   01/04/25 (!) 102/49   10/03/24 132/78   06/19/24 138/78       General-on vent.  Has bilateral Impella  Chest-coarse bilaterally  Heart- s1 s2+  Abd-soft  Ext-2+ edema    Labs  Hemoglobin   Date Value Ref Range Status   01/04/2025 10.1 (L) 12.0 - 16.0 g/dL Final     Hematocrit   Date Value Ref Range Status   01/04/2025 29.4 (L) 36.0 - 48.0 % Final     WBC   Date Value Ref Range Status   01/04/2025 24.2 (H) 4.0 - 11.0 K/uL Final     Platelets   Date Value Ref Range Status   01/04/2025 92 (L) 135 - 450 K/uL Final     Lab Results   Component Value Date    CREATININE 1.8 (H) 01/04/2025    BUN 24 (H) 01/04/2025     (L) 01/04/2025    K 4.3 01/04/2025     01/04/2025    CO2 23 01/04/2025       Assessment/Plan:  1.  ANALY on CKD 3a-baseline creatinine 1.3.  Now anuric.  Probable ATN.      By dr hayward - Consent for CRRT obtained from Claudia, daughter  Line placement by Dr. Alanis, hospitalist - appreciated help.     Needing CRRT for volume and solute control  Continue CRRT  Attempt to get more fluid removal as able to  Goal UF 50 to 100 cc per hour  NET NEGATIVE  as tolerated  Stop bicarb infusion  Stop hydrochlorothiazide    Decrease dialysate rate from 1000 cc/h to 500 cc/h with improving solute control  Lab frequency 2 times a day,     Refer to the orders for CRRT and labs monitoring  Okay to use as needed as needed 
on 12/30.   Patient intubated after cardiac procedure on 12/30 for airway protection.  CCM consulted.  Patient found to have ANALY on admission.  Nephrology consulted.  S/p RCA stent with left impella device removed on 1/2. Received 5 U PRBC during cath on 1/2 as she kept bleeding after impella removal.\"     Imaging:  Chest X-ray:   IMPRESSION:  No acute pulmonary process.    History/Prior Level of Function:   Living Status: home  Prior Dysphagia/speech History: None noted in chart review   Reason for referral: SLP evaluation orders received due to recent intubation  and altered mental status. Prolonged intubation 12/30-1/9.       Evaluation/Treatment       Subjective:  Pt was upright in chair, alert and participative. Oriented x4.    Dysphagia Treatment:     Assessment of Texture Tolerance:  Diet level prior to treatment: NPO     Tolerance of Current Diet Level:N/A     -Impressions: Pt was positioned Upright in chair, awake and alert. Currently on room air. NG tube in place. Voice quality remains aphonic/weak with intermittent episodes of voicing. Oral care completed prior to PO trials; oral cavity was dry with dried secretions along teeth and labial surface. Trials of ice chips, thin liquids and puree were provided for ongoing assessment of oropharyngeal swallow function. Pt accepted PO trials with limited oral seal requiring verbal cues to close mouth and manipulate bolus. A very brief oral phase with minimal oral manipulation and suspected reduced bolus control/premature spillage to pharynx was noted across trials. Swallow initiation appeared delayed, subjectively. Pt utilized a double swallow with trials of thin liquids and an audible swallow was noted however no overt clinical s/s of aspiration were assessed across trials. Pt would benefit from completion of an instrumental assessment of swallow function (FEES or MBS) however is declining participation. Education provided however question pt understanding of 
sodium bicarbonate 12.5 mEq in dextrose 5 % 500 mL infusion (FOR IMPELLA PURGE)      sodium bicarbonate 12.5 mEq in dextrose 5 % 500 mL infusion (FOR IMPELLA PURGE)      heparin (PORCINE) Infusion 1,250 Units/hr (12/31/24 1252)    norepinephrine 2 mcg/min (12/31/24 1252)     hydrOXYzine HCl, LORazepam, tiZANidine, heparin (porcine), heparin (porcine), sodium chloride, heparin (porcine), sodium chloride flush, sodium chloride, sodium chloride flush, sodium chloride, acetaminophen, sodium chloride flush, sodium chloride, ondansetron **OR** ondansetron, polyethylene glycol, [DISCONTINUED] acetaminophen **OR** acetaminophen    TELEMETRY (Personally reviewed by me): Sinus     Lab Data:  CBC:   Recent Labs     12/30/24  1600 12/30/24  2109 12/30/24  2110 12/31/24  0402 12/31/24  0437 12/31/24  0507 12/31/24  0701 12/31/24  0709 12/31/24  1051   WBC 13.2*  --  10.2  --  10.4  --   --   --   --    HGB 10.3*   < > 8.5*   < > 8.6*   < > 7.8* 8.0* 7.3*   HCT 32.1*  --  27.3*  --  26.1*  --   --  23.9*  --    MCV 84.8  --  89.4  --  84.1  --   --   --   --      --  223  --  250  --   --   --   --     < > = values in this interval not displayed.     BMP:   Recent Labs     12/30/24 2110 12/31/24  0437 12/31/24  0559 12/31/24  1250   * 136  --  136   K 3.6 3.4*  3.4*  --  3.9    104  --  106   CO2 15* 16*  --  17*   PHOS  --   --  4.6  --    BUN 39* 37*  --  33*   CREATININE 2.2* 2.0*  --  1.7*     LIVER PROFILE:   Recent Labs     12/31/24  0559   *   ALT 23   BILIDIR 0.3   BILITOT 0.5   ALKPHOS 131*     PT/INR:   Recent Labs     12/30/24  1600 12/31/24  0437   PROTIME 14.3 15.3*   INR 1.09 1.19*     APTT:   Recent Labs     12/30/24  1600 12/31/24  0437   APTT 26.3 99.6*     BNP:  No results for input(s): \"BNP\" in the last 72 hours.    Imaging/Procedures:     C/C 12/30/2024 (Zoe Martinez)  CORONARY FINDINGS   Artery Findings   LM Normal   LAD 30% mid ; faint L-->R collaterals   Cx 30% proximal 
01/09/25  1830 01/10/25  0045 01/10/25  0459 01/10/25  1155   WBC 28.3*  --   --    < >  --  19.1*  --  16.9* 17.2*   HGB 7.8*   < >  --    < >  --  7.5*  --  8.3* 8.3*   HCT 23.5*  --   --    < >  --  22.8*  --  24.6* 25.2*     --   --    < >  --  175  --  143 158   TRIG 147  --   --   --   --   --   --   --   --    ALT 11  --  11  --   --   --   --  8*  --    AST 21  --  23  --   --   --   --  20  --    NA  --   --   --    < > 133*  --  135*  --  134*   K  --   --   --    < > 4.6  --  4.0  --  4.2   CL  --   --   --    < > 100  --  103  --  101   CREATININE  --   --   --    < > 1.7*  --  1.7*  --  1.7*   BUN  --   --   --    < > 36*  --  34*  --  32*   CO2  --   --   --    < > 24  --  22  --  22    < > = values in this interval not displayed.       Recent Labs     01/09/25  1230 01/10/25  0045 01/10/25  1155   GLUCOSE 106* 89 103*   CALCIUM 8.4 8.0* 7.9*   * 135* 134*   K 4.6 4.0 4.2   CO2 24 22 22    103 101   BUN 36* 34* 32*   CREATININE 1.7* 1.7* 1.7*       Recent Labs     01/08/25  0353 01/08/25  0513 01/09/25  0834   PHART 7.381 7.390 7.392   CKM3OTZ 45.8* 44.5 36.9   PO2ART 154.7* 147.7* 103.8   FGG6KFQ 27.2 27.0 22.4   B3TQWGWR 99 99 98   BEART 2 2 -3       Lab Results   Component Value Date    INR 1.02 01/04/2025    INR 1.19 (H) 12/31/2024    INR 1.09 12/30/2024    PROTIME 13.6 01/04/2025    PROTIME 15.3 (H) 12/31/2024    PROTIME 14.3 12/30/2024     No results found for: \"AMYLASE\"   Lab Results   Component Value Date    LABA1C 5.6 06/19/2024     Lab Results   Component Value Date    .0 06/19/2024     Lab Results   Component Value Date    TSH 0.85 06/19/2024     Lab Results   Component Value Date    CKTOTAL 49 09/25/2021    TROPONINI <0.01 09/24/2021      No results found for: \"CRP\"   No results found for: \"BNP\"   No results found for: \"DDIMER\"   Lab Results   Component Value Date    FERRITIN 12.2 (L) 07/31/2019      Lab Results   Component Value Date    LACTA 1.1 10/07/2019 
Fatigues easily.     Recommend: continue npo, frequent oral care, occasional ice chips or sip of tsp H20. Oropharyngeal dysphagia with aspiration concerns which appears to increase as pt fatigues. As documented in the pt ; a pt with prolonged intubation therefore demonstrates high risk for silent aspiration therefore aspiration can not be reliably detected at the bedside. Current dysphagia impairments and fatigue further exacerbate risks.  Pt is a total feed and assist in achieving/maintaining most optimal sitting position.  Pt would benefit from completion of instrumental assessment of swallow function to more objectively assess pharyngeal phase of swallow, aspiration/risks; assist with tx poc and to assist with diet recommendations.     Eating Assistance:   Dependent    Speech Language/Cognitive Treatment:  Impressions: Not fully targeted this date with exception of needs during dysphagia tx. Pt with mild assist via verbal/tactile cues for following one step om commands. As pt fatigued , reduced. Limited verbal responses overall but dysarthria and voice impairments impact ease of assessment due to poor intelligibility. Recommend continued therapy targeting motor speech/voice and  recommend continued skilled speech intervention targeting cognitive-linguistic skills to promote return to PLOF    Assessment: Pt progressing toward goals ; but concern for endurance which can impact consistency of performance        Goals     Goals:   Dysphagia Goals: Pt will functionally tolerate ongoing assessment of swallow function with diet to be determined as indicated   Pt will demonstrate understanding of aspiration risk and precautions via education/demonstration with occasional prompting  If s/s of pharyngeal phase dysphagia continue to be noted pt will participate in instrumental assessment of swallow function  to further assess pharyngeal phase of swallow, assist with diet recommendations and to further direct plan of care 
tomorrow.   If still remains anuric    Discussed with CVU nurse and ICU MD.       2.  Hypokalemia-in the setting of anion gap metabolic acidosis and ANALY.  Better.    3.  Non-anion gap metabolic acidosis-in the setting of ANALY and right heart failure.  CRRT.  Better.   4.  Hypotension-in the setting of right ventricular dysfunction. Keep MAP 65 and above.  On Dobutamine only.   5.  Anemia-follow Hgb closely.  Transfuse PRN.   6.  Right ventricular dysfunction and RCA   occlusion-s/p stent placement.   7.  Hyponatremia-follow for now.  Keep MAP 65 and above  8.  Hypoalbuminemia  9.  Hypocalcemia-replace, monitor ionized calcium,     High risk.  Total time>35mins.        Eliceo Evangelista MD  [facility]   Phone: [phone] or Via [email or web address]  Fax: [phone]      
bilaterally for kala care, requires A to position B LE, pt able to reach for bed rail, requires increased assist to R d/t R groin hematoma/pain  Transfers:  No transfers completed on this date secondary to pt declines.  Comments:  Functional Mobility  No functional mobility completed on this date secondary to Pt unable. .  Balance:  Pt unable to tolerate sitting/standing position during for rating of balance component.   Comments:    Other Therapeutic Interventions:      Functional Outcomes  AM-PAC Inpatient Daily Activity Raw Score: 9                                    Cognition  Overall Cognitive Status: Impaired  Arousal/Alertness: delayed responses to stimuli, inconsistent responses to stimuli  Following Commands: follows one step commands with repetition, follows one step commands with increased time  Attention Span: appears intact  Memory: decreased recall of biographical information, decreased recall of recent events, decreased short term memory  Safety Judgement: decreased awareness of need for assistance, decreased awareness of need for safety  Problem Solving: assistance required to implement solutions, assistance required to correct errors made  Insights: decreased awareness of deficits  Initiation: requires cues for all  Sequencing: requires cues for all  Comment: Difficult to assess due to difficulty speaking. Pt responds to yes/no questions, but when she attempts to explain something, difficult to understand her speech.  Orientation:    oriented to person, oriented to place, oriented to time, and disoriented to situation  Command Following:   accurately follows one step commands     Education  Barriers To Learning: cognition  Patient Education: patient educated on goals, OT role and benefits, plan of care, ADL adaptive strategies, IADL safety, proper use of assistive device/equipment, adaptive device training, energy conservation, discharge recommendations  Learning Assessment:  patient verbalizes 
decreased activity tolerance.  Distance:   Gait Mechanics:   Comments:    Stair Mobility:  Stair mobility not completed on this date.  Comments:  Wheelchair Mobility:  No w/c mobility completed on this date.  Comments:  Balance:  Static Sitting Balance: fair (-): maintains balance at CGA with use of UE support  Comments:    Other Therapeutic Interventions  See OT note for assistance with ADLs    Seated in chair: quad sets, LAQ, heel raises, ankle pumps, glute sets x5 each Benjamin      Functional Outcomes  AM-PAC Inpatient Mobility Raw Score : 14              Cognition  Overall Cognitive Status: Impaired  Following Commands: follows one step commands with repetition, follows one step commands with increased time, inconsistently follows commands  Problem Solving: decreased awareness of errors  Initiation: requires cues for some  Sequencing: requires cues for some  Orientation:    oriented to person, oriented to time, disoriented to place, and disoriented to situation, able to orient to time with options provided  Command Following:   impaired    Education  Barriers To Learning: cognition and language  Patient Education: patient educated on PT role and benefits, plan of care, orientation, transfer training, discharge recommendations  Learning Assessment:  patient will require reinforcement due to cognitive deficits    Assessment  Activity Tolerance: Poor; limited by generalized weakness/fatigue and cognitive/speech deficits   Impairments Requiring Therapeutic Intervention: decreased functional mobility, decreased strength, decreased cognition, decreased endurance, decreased balance  Prognosis: good  Clinical Assessment: Patient is a 64 year old female admitted for a STEMI. Prior to admission, patient reports independence without AD for functional mobility. Pt with improved progress this session as she was able to perform stand step transfer from bed to chair with Alvin x1. Patient has difficulty phonating words throughout 
difficulty with current diet level  Poor oral intake reported per chart     -Impressions: Pt was positioned Upright in chair, awake and alert. Currently on room air with NG tube in place. Vocal quality remains weak and dysphonic. Pt continues to verbalize disliking her current dietary recommendations. She had a cup of ice water on her bedside table. Pt's MBSS was reviewed and education completed with the pt to her recommendations, risks of silent aspiration and need for a repeat MBSS before her liquid recommendations could be changed. Pt reports being told that the thickened liquids \"were not necessary\" and continued to insist following education. Consult completed with pt's RN to recommendations. Trials of Mildly Thick (Nectar) Liquids and minced/moist solids from the pt's meal tray were provided to assess swallow function with cues for effortful swallow. Pt fed herself large bites and presented with prolonged but functional mastication/manipulation of the bolus. She was able to achieve full oral clearance and demonstrated a delayed soft throat clear x2 following sips of her mildly (nectar) thick liquids. Continued education completed with the pt to concerns for her reduced oral intake with encouragement to eat her meals, her dietary recommendations and safe swallow strategies. Pt is at increased risk for aspiration secondary to prolonged intubation, cognitive status, co morbidities, complex medical status and dysphagia as viewed on an instrumental study. Recommend continuing the Dysphagia II Minced and Moist with with Mildly thick liquids, meds crushed in puree with close monitoring of oral intake, encouragment for supplements and consideration of appetite stimulant if appropriate and ongoing dysphagia intervention.    Eating Assistance:   Dependent    Speech Language/Cognitive Treatment:  Impressions: Pt was upright in recliner, awake and alert. Pt continues with weak/dysphonic vocal quality with decreased breath 
removal of CP Impella and PCI of RCA.  Patient had complications in the Cath Lab due to bleeding and V. tach.  Balloon pump was placed.  RV Impella remains in place.    Remains on dobutamine at 5 mcg/kg/min.  Off David-Synephrine.  On aspirin and Plavix.     Acute hypoxic respiratory failure.  Currently intubated with AC/VC mode.  ABG with adequate gas exchange.  Ventilator settings adjusted.  PEEP of 5 with 40% FiO2 with adequate saturation.     ANALY, with worsening creatinine and decreased urine output.  Likely due to cardiogenic shock and poor perfusion.  Started on CRRT on 1/3.  Tolerated fluid removal.  Plan to continue fluid removal as tolerated.     Hemoglobin stable today.  Received multiple units of PRBC transfusion yesterday after bleeding that followed removal of CP Impella.    Thrombocytopenia due to Impella.     Chest x-ray showed right airspace disease likely due to aspiration.  Pneumonia panel positive for Streptococcus and haemophilus.  Patient is adequately covered with Rocephin.  Improving leukocytosis.  Blood cultures x 1 from 1/1/25 showed staph epi which is likely contaminant.       Tolerating trickle tube feedings.     Protonix for stress ulcer prophylaxis.  Heparin drip per impella protocol      Critically ill        Diana Garcia MD  Pulmonary Critical Care and Sleep Medicine  1/5/2025, 11:10 AM    This note was completed using dragon medical speech recognition software. Grammatical errors, random word insertions, pronoun errors and incomplete sentences are occasional consequences of this technology due to software limitations. If there are questions or concerns about the content of this note of information contained within the body of this dictation they should be addressed with the provider for clarification.   
secondary to weakness.  Distance:   Gait Mechanics:   Comments:    Stair Mobility:  Stair mobility not completed on this date.  Comments:  Wheelchair Mobility:  No w/c mobility completed on this date.  Comments:  Balance:  Static Sitting Balance: fair (-): maintains balance at CGA with use of UE support  Dynamic Sitting Balance: poor (+): requires min (A) to maintain balance  Static Standing Balance: poor (-): requires max (A) to maintain balance  Dynamic Standing Balance: poor (-): requires max (A) to maintain balance  Comments:    Other Therapeutic Interventions    Gastrocsoleus stretch 3x30 seconds BLE    Second session: PT/OT returned to assist the pt back to bed.  Dependent maxi issa transfer from chair to bed.  Pt rolled once to the R and once to the L for pericare and positioning, total A to roll.  Pt was left on L side with pillows propping pt, call light in reach.  Pt was attempting to speak but was difficult to understand.  RN aware.     Functional Outcomes  AM-PAC Inpatient Mobility Raw Score : 8              Cognition  Overall Cognitive Status: Impaired  Comments: The pt would intermittently \"mouth\" words but did not vocalize them.  She was difficult to understand.  She correctly completed yes/no <50% of the time.  Orientation:    oriented to person  Command Following:   impaired    Education  Barriers To Learning: cognition and physical  Patient Education: patient educated on PT role and benefits, plan of care, orientation, transfer training, discharge recommendations  Learning Assessment:  patient will require reinforcement due to cognitive deficits    Assessment  Activity Tolerance: Standing tolerance is around 15 seconds; decreased endurance, strength and cognition  Impairments Requiring Therapeutic Intervention: decreased functional mobility, decreased strength, decreased cognition, decreased endurance, decreased balance  Prognosis: good  Clinical Assessment: The pt presents with decreased strength, 
tasks   Pt will improve verbal expression for functional expression via graded tasks to 80%  Pt will participate in ongoing cognitive assessment with goals to be established as indicated     Above goals reviewed on 1/11/2025.  All goals are ongoing at this time unless indicated above.       POC/Education     Dysphagia Therapeutic Intervention:  Oral Care, Patient/Family Education , Therapeutic Trials with SLP     Plan of care: 3-5 times per week during acute care stay.      Education:  Provided education regarding role of SLP, results of assessment, recommendations and general speech pathology plan of care:  Pt requires ongoing learning     If patient discharges prior to next visit, this note will serve as discharge.     Treatment time:  Timed Code Treatment Minutes: 0   Total Treatment Time Minutes: 25    Electronically signed by:    Rhys Saucedo M.S. CF-SLP [id number]  Speech-Language Pathologist   1/11/2025      
Assessment: The pt presents with decreased strength, endurance, balance and cognition.  She is requiring assist of 2 to stand and transfer to a chair.  Pt would benefit from trial of standing in STEDY next session.  She was aphasic this morning and is working with speech therapy.  She was independent and lived alone prior to admission.  She will benefit from intense skilled PT.    Safety Interventions: patient left in chair, chair alarm in place, call light within reach, nurse notified, and maxisky recommended for transfers     Plan  Frequency: 5-7 x/week  Current Treatment Recommendations: strengthening, balance training, functional mobility training, transfer training, gait training, endurance training, neuromuscular re-education, and safety education    Goals  Patient Goals: LINDSEY this date   Short Term Goals:  Time Frame: To be met prior to DC   Patient will complete bed mobility at minimal assistance   Patient will complete transfers at moderate assistance   Patient will ambulate 20 ft with use of rolling walker at moderate assistance  Patient will stand for 2 minutes with RW and min A     Above goals reviewed on 1/14/2025.  All goals are ongoing at this time unless indicated above.      Therapy Session Time      Individual Group Co-treatment   Time In     0845   Time Out     0924   Minutes     39       Timed Code Treatment Minutes:  39 minutes    Total Treatment Minutes:  39 minutes      Electronically Signed By: FADIA BRITO, PT [id number]    
CFV  - TD swan exchanged from 8 Fr CCO VIP swan        Assessment/Plan:  Late presenting Inferior STEMI d/t 100% Mid RCA  DAPT x 1 year s/p RCA PCI  GDMT, add BB. Avoiding RAAS inhibition/[place]/Jardiance until renal recovery  Cardiogenic shock, likely 2/2 RV infarction; resolved  IABP out   Impella RP out    stopped   Acute blood loss anemia  S/P multiple units PRBCs  Now off heparin, GI following  Junctional rhythm - now SR w/ BBB. Hemodynamics much better when sinus  Acute hypoxic respiratory failure - extubated   ANALY/ATN -   Nephro following  Urine output increased, approximately 30-40cc/hour  Fever, minimal, by esophageal probe; WBC improving  Blood cultures x1 from 2025 showed staph epi   Coronavirus +  Abx per primary teams    From mental status standpoint its a difficult evaluation. She is following commands and moving all extremities without difficulty. She was speaking clearly though limited in what she would say when I saw her Friday. Today she is speaking with hoarse voice but attempting to say more. Unclear if esophageal/vocal cord injury from prolonged intubation vs very localized CVA. At this point she would be outside of any window for treatment. She has no other signs to suggest CVA. GI to see - may benefit from EGD. BB added. Speech/PT/OT.     Ang Enriquez MD, MD 2025 12:28 PM    Scribe's Attestation: This note was scribed in the presence of Dr. Ang Enriquez MD by Demetra Sibley, RN    
admission, patient reports independence without AD for functional mobility. Patient currently requires Max A + Min A for bed mobility and was dependent with use of MaxiSky for tranfers. Transfers and ambulation were not attempted this date due to reported nausea and lightheadedness when sitting EOB for roughly 3 minutes. Patient has difficulty phonating words throughout session and is additionally limited by cognitive deficits. Patient would benefit from additional intensive skilled PT upon discharge to promote independence and safety with functional mobility.   Safety Interventions: patient left in chair, chair alarm in place, call light within reach, nurse notified, and maxisky recommended for transfers     Plan  Frequency: 5-7 x/week  Current Treatment Recommendations: strengthening, balance training, functional mobility training, transfer training, gait training, endurance training, neuromuscular re-education, and safety education    Goals  Patient Goals: LINDSEY this date   Short Term Goals:  Time Frame: To be met prior to DC   Patient will complete bed mobility at minimal assistance   Patient will complete transfers at moderate assistance   Patient will ambulate 20 ft with use of rolling walker at moderate assistance  Patient will stand for 2 minutes with RW and min A     Above goals reviewed on 1/16/2025.  All goals are ongoing at this time unless indicated above.      Therapy Session Time      Individual Group Co-treatment   Time In     0856   Time Out     1009   Minutes     73       Timed Code Treatment Minutes:  73 Minutes   Total Treatment Minutes:  73 Minutes       Electronically Signed By: Marylu Pena, NARESH       PT providing direct supervision during session and assisting in making skilled judgements throughout session.   Sarita Santos, PT, DPT [id number]      
hemodynamics and was taken to Cath Lab on 1/2 and underwent removal of CP Impella and PCI of RCA.  Patient had complications in the Cath Lab due to bleeding and V. tach.  Balloon pump was placed.  RV Impella remains in place.    -Impella supported been decreased to P5 with flow of 2.1 L/min.  Patient tolerating it well.  IABP support has been decreased to once to 3.  -Remains on dobutamine at 2.5 mcg/kg/min.  Had to be restarted back on David-Synephrine last night.  -Most likely intravascular depleted.  CVP is close to 8.  -Titrate vasopressors to maintain mean arterial pressure of 65 mmHg and above.  -Continues on aspirin and Plavix.  -Cardiology planning to possibly remove intra-aortic balloon pump and/or Impella today.  Left groin hematoma stable.  -Will follow the recommendations.   - Acute hypoxic respiratory failure.  Currently intubated with AC/VC mode.   PEEP of 5 with 40% FiO2 with adequate saturation.  -Chest x-ray showed right airspace disease likely due to aspiration.  Pneumonia panel positive for Streptococcus and haemophilus.  Patient is adequately covered with Rocephin.  Improving leukocytosis.  Blood cultures x 1 from 1/1/25 showed staph epi which is likely contaminant.    -Sputum culture also growing coronavirus OC 43.  Currently supportive management.  -ABG shows adequate gas exchange.  No changes made to the ventilator settings today.  -Currently will hold off on weaning the ventilatory support any further till cardiac mechanical circulatory support is weaned off.    -If cardiac mechanical circulatory support is removed today, will plan to start weaning overnight and potentially extubate the patient by tomorrow.  -ANALY, with worsening creatinine and decreased urine output.  Likely due to cardiogenic shock and poor perfusion.  Started on CRRT on 1/3.  Could not tolerate additional fluid removal yesterday.  Now keeping her even.  -Will follow nephrology recommendations.  -Hemoglobin stable today.  
infarction (STEMI) involving right coronary artery (HCC) [I21.11]     Acute respiratory failure [J96.00]     ABLA (acute blood loss anemia) [D62]     Chronic prescription opiate use [Z79.891]     Mood disorder (HCC) [F39]     Acute coronary syndrome (HCC) [I24.9]     ST elevation myocardial infarction involving right coronary artery (HCC) [I21.11]     ANALY (acute kidney injury) ([facility]) [N17.9]     HTN (hypertension) [I10]          Hospital Day: 20    This is a 64 y.o. female who presented to [facility] on 12/30/[facility]24 and is being treated for:    STEMI  Cardiogenic shock - improved  -STEMI noted on EKG in ED  -s/p cath on 12/30 - significant RCA lesion and profound RV systolic dysfunction   -Impella CP placed left CFA on 12/30; removed on 1/2; received 5 U PRBC on 1/2 during impella removal  -Impella RP placed right CFV on 12/30  -s/p RCA stent placed on 1/2  -Right Impella and IABP removed 1/8  -On eliquis  -Amiodarone per cardiology; plan to stop amio on DC  -Daily electrolyte panel and CBC ordered and monitoring; replace electrolytes as needed  -Cardiology consulted  -ARU consulted - patient has been accepted, but they would like the NGT out before transferring to ARU     Acute respiratory failure - improved  -Extubated 1/9  -She has completed antibiotic therapy  -Keep O2 sat above 90%     ANALY  -Creatinine 2.5 on admission; baseline approximately 1.0-1.3  -Avoid nephrotoxins  -Continue HD per nephro  -Daily labs; trend creatinine  -Nephrology following and will decide on need for dialysis     ABLA - stable  -Hgb 7.3 on 12/31  -Has received, in total, 7 units PRBC since admission  -Trend H/H  -Transfuse to keep hemoglobin >7  -GI consulted    Dysphagia  -NGT present with tube feeds; encourage PO intake  -MBS on 1/14  -Advance diet as tolerated  -ENT consulted for epiglottis issue; signed off     Mood disorder  -Continue home Celexa     Chronic pain  -Hold home gabapentin until patient can swallow   
life support systems, review of data including imaging and labs, discussions with other team members and physicians, but excludes procedures.      Peña Alanis DO  1/1/2025  11:47 AM    
presentation forming collaterals from the left system detailed above  - RHC performed by Dr. Enriquez with evidence of profound RV systolic dysfunction detailed above  - Impella CP placed L CFA  - Impella RP placed R CFV  - Antegrade perfusion sheath L SFA  - Retrograde feeding sheath for external bypass in R CFA  - Vascath inserted in L CFV  - TD swan exchanged from 8 Fr CCO VIP swan        Assessment/Plan:  Late presenting Inferior STEMI d/t 100% Mid RCA  DAPT x 1 year s/p RCA PCI  Coreg  Avoiding RAAS inhibition/[place]/Jardiance until renal recovery  Cardiogenic shock, likely 2/2 RV infarction; resolved  IABP out   Impella RP out    stopped   Acute blood loss anemia  S/P multiple units PRBCs  Acute hypoxic respiratory failure - extubated   ANALY/ATN -   Nephro following  Urine output increased, approximately 30-40cc/hour  Aflutter  Rates elevated at 120s-130s  BP tolerating  Likely secondary to hypokalemia - replace potassium  Hypokalemia  Dysphagia - speech therapy and GI following. GI recommend MBS     From neuro standpoint much more alert and interactive. She appropriately interacts but seems to have significant pain with swallowing and talking. Failed barium swallow earlier today. May benefit from ENT eval    From A fib standpoint will eventually need transition to Eliquis + Plavix when CBC seems to have truly stabilized. May need to resume IV Amio but needs K repleted first.     ANALY/ATN is hopefully turning the corner, making good UOP over last 24h. Appreciate Nephro involvement.    Aggressive ST/OT/PT    Ang Enriquez MD, MD 2025 2:34 PM    Scribe's Attestation: This note was scribed in the presence of Dr. Ang Enriquez MD by Demetra Sibley, RN    
infarction involving right coronary artery (HCC)    Cardiogenic shock    Acute respiratory failure    ABLA (acute blood loss anemia)    Chronic prescription opiate use    Mood disorder (HCC)    Acute coronary syndrome (HCC)    Acute ST elevation myocardial infarction (STEMI) involving right coronary artery (HCC)           I had the opportunity to review the clinical symptoms and presentation of Shahida Vásquez.     RCA STEMI s/p PCI  Cardiogenic shock s/p RP impella, CP impella, balloon pump, complicated by acute blood loss anemia  Acute renal failure on CKD due to ATN  A-flutter  ICM with EF 45-50%  - ASA, plavix  - CBC stable on heparin gtt from AM to noon.   - stop heparin, start eliquis, stop ASA  - renal failure management per nephrology  - replete electrolytes  - lopressor 75mg BID, convert to toprol prior to D/C when tolerating oral meds  - renal function and BP prohibits ACE/ARB/ARNI/AGLT-2/aldactone at this time    Cardiology will sign off. Follow up with Dr. Enriquez 2 weeks after D/C.    Due to clinically significant life threating conditions a total critical care time of >35 minutes was used. This includes but is not limited to data, imaging, records review and direct patient care excluding time spent performing procedures.     Old notes reviewed  Telemetry reviewd  EKG personally reviewed  CXR personally reviewed  Echo, cath, and medications and labs reviewed  High complexity/medical decision making due to extensive data review, extensive history review, independent review of data  High risk due to acute illness, evaluation of drug-drug interactions, medication management and diagnostic interventions    All questions and concerns were addressed to the patient/family. Alternatives to my treatment were discussed. The note was completed using EMR. Every effort was made to ensure accuracy; however, inadvertent computerized transcription errors may be present.  Mau Mace MD, MD 1/16/2025 7:37 AM    
  Artery Findings   LM Normal   LAD 30% mid ; faint L-->R collaterals   Cx 30% proximal   Ramus 50% proximal   % mid   Dominance RCA Dominant   LVEDP 12 mmHg Normal 3-12mmHg   LVG EF Abnormal with EF <55% and estimated at 40% Normal >/= 55%   LVG WM Inferior akinesis   AVG Normal      Access site for the RHC was obtained via the right internal jugular vein. [place] catheter was used.   High PA Sat = 39 %.   PA pressure = 24/18 mmHg. PA mean = 21 mmHg. Low PA Sat = 38.6 %.   Mid RA mean = 7 mmHg. RV pressure = 20/11 mmHg. RV mean = 12 mmHg. Wedge pressure = 10 mmHg. AO Sat = 95 %.   Dontae CO = 2.97 L/min.   PANCHO = 0.9    MRI/EP/Other:   none    Assessment  Patient Active Problem List   Diagnosis    HTN (hypertension)    Lumbar disc herniation    DDD (degenerative disc disease), lumbar    Lumbar stenosis    Lumbar spinal stenosis    Primary osteoarthritis of right knee    ANALY (acute kidney injury) ([facility])    Hypotension    Multifocal pneumonia    Abnormal CT of the chest    Shortness of breath    Absolute anemia    Community acquired pneumonia    Closed fracture of distal end of left radius, initial encounter    ARF (acute renal failure) ([facility])    Acute myocardial infarction (HCC)    Cardiogenic shock    Acute respiratory failure    ABLA (acute blood loss anemia)    Chronic prescription opiate use    Mood disorder (HCC)    Acute coronary syndrome (HCC)           I had the opportunity to review the clinical symptoms and presentation of Shahida Vásquez.     Missed STEMI  Cardiogenic shock  RV failure  Acute renal failure  Respiratory failure  SFA bleed, L thigh hematoma, acute blood loss anemia  CAD s/p PCI to RCA  - RP impella at P6  - R fem balloon pump  - dobutamine 5  - off el  - attempt net negative 2L on CRRT as BP tolerates if MVO2 continues to improve may wean dobutamine in near future  - daily CXR  - swan and MVO2 Q8H  - hemolysis labs Q12H  - ASA, plavix    Due to clinically significant life threating

## 2025-01-20 NOTE — DISCHARGE SUMMARY
Hospital Medicine Discharge Summary    Name:  Shahida Vásquez  Gender: female  : 1960  64 y.o.  MRN: [id number]    PCP: Demetrice Saldaña DO     Date of Admission:  2024  3:42 PM  Discharge Date: 2025    Admitting Physician: Ang Truong MD  Discharge Physician: Peña Alanis DO    Discharge Diagnoses:       Active Hospital Problems    Diagnosis     Laryngeal disorder [J38.7]     Oropharyngeal dysphagia [R13.12]     Dysphonia [R49.0]     Acute ST elevation myocardial infarction (STEMI) involving right coronary artery (HCC) [I21.11]     Acute respiratory failure [J96.00]     ABLA (acute blood loss anemia) [D62]     Chronic prescription opiate use [Z79.891]     Mood disorder (HCC) [F39]     Acute coronary syndrome (HCC) [I24.9]     ST elevation myocardial infarction involving right coronary artery (HCC) [I21.11]     ANALY (acute kidney injury) (HCC) [N17.9]     HTN (hypertension) [I10]        The patient was seen and examined on day of discharge and this discharge summary is in conjunction with any daily progress note from day of discharge.    Hospital Course:  Shahida Vásquez is a 64 y.o. year old female who presented to [facility] on 2024  3:42 PM.      Patient admitted for chest pain and fatigue.  Found to have STEMI on admission.  Cardiology consulted.      Went to cardiac cath on .  Cath showed significant RCA lesion and profound RV systolic dysfunction.  Impella CP placed left CFA.  Impella RP placed right CFV on .      Patient intubated after cardiac procedure on  for airway protection.  CCM consulted.     Patient found to have ANALY on admission.  Nephrology consulted.     S/p RCA stent with left impella device removed on . Received 5 U PRBC during cath on  as she kept bleeding after impella removal.      VAD and IABP removal on .     Extubated on .     CRRT stopped on 1/10.     Was accepted to ARU as of , but ARU wanted NGT out prior to

## 2025-01-20 NOTE — CARE COORDINATION
Wound Follow-up Progress Note       NAME:  Shahida Vásquez  MEDICAL RECORD NUMBER:  [id number]  AGE:  64 y.o.   GENDER:  female  :  1960  TODAY'S DATE:  2025    Subjective    Wound Identification:  Wound Type: non-healing surgical  Contributing Factors:  patient  cardiac surgery, had impella, hematoma developed.    Objective        Patient Goal of Care:  Wound Healing     /72   Pulse 67   Temp 98.5 °F (36.9 °C) (Temporal)   Resp 18   Ht 1.549 m (5' 1\")   Wt 79.5 kg (175 lb 6 oz)   SpO2 96%   BMI 33.14 kg/m²   Donnie Risk Score: Donnie Scale Score: 14    Assessment    Measurements:  Puncture 25 Femoral (Active)   Wound Assessment Dry 25 0014   Jayshree-wound Assessment Intact 25 0014   Closure Open to air 25 1639   Drainage Amount None 25 0014   Odor None 25 1639   Dressing/Treatment Open to air 25 1639   Dressing Changed Changed/New 25 1800   Dressing Status Clean, dry & intact 25 1639   Dressing/Treatment Dry dressing 25 1806   Number of days: 11         Patient seen for follow-up regarding hematoma to left groin, left thigh, left pannus.  Bruising is diminshed, still has induration to left thigh. Hematoma at left pannus now opened, old blood noted.  Wound dimensions smaller.  Will continue to keep wound cleansed, please add maxorb ag+ dressing; apply to wound bed; will absorb drainage. Apply Triad to skin around wound. Cover with abd pad. Change every other day and as needed for drainage.       Response to treatment:  Well tolerated by patient.     Pain Assessment:  Severity:  0 / 10  Quality of pain: N/A  Wound Pain Timing/Severity: none  Premedicated: No    Plan    Plan of Care:   please add maxorb ag+ dressing; apply to wound bed; will absorb drainage. Apply Triad to skin around wound. Cover with abd pad. Change every other day and as needed for drainage.    Specialty Bed Required : No      
   12/31/24 1321   Service Assessment   Patient Orientation Unable to Assess;Other (see comment)  (intubated and sedated)       
CM reviewed chart for discharge planning.    Pt now extubated, impella and IABP removed.    Still receiving CRRT. Still on Tube feed via NG.     Will continue to follow pt progress for discharge plan.    Ritika Jones RN, BSN  [phone]   
Case Management Assessment  Initial Evaluation    Date/Time of Evaluation: 1/16/2025 4:19 PM  Assessment Completed by: AVELINO HORNE RN    If patient is discharged prior to next notation, then this note serves as note for discharge by case management.    Patient Name: Shahida Vásquez                   YOB: 1960  Diagnosis: STEMI (ST elevation myocardial infarction) (HCC) [I21.3]  Acute ST elevation myocardial infarction (STEMI) involving other coronary artery of inferior wall (HCC) [I21.19]                   Date / Time: 12/30/2024  3:42 PM    Patient Admission Status: Inpatient   Readmission Risk (Low < 19, Mod (19-27), High > 27): Readmission Risk Score: 19.1    Current PCP: Demetrice Saldaña, DO  PCP verified by CM? Yes    Chart Reviewed: Yes      History Provided by: Patient, Medical Record, Other (see comment) (sister in law at bedside)  Patient Orientation: Other (see comment) (pt whispers and hard to hear)    Patient Cognition: Alert    Hospitalization in the last 30 days (Readmission):  No    If yes, Readmission Assessment in  Navigator will be completed.    Advance Directives:      Code Status: Full Code   Patient's Primary Decision Maker is: Legal Next of Kin    Primary Decision Maker: Claudia Vásquez  Child - [phone]    Secondary Decision Maker: Carlos A Vásquez  Child - [phone]    Discharge Planning:    Patient lives with: Alone Type of Home: House  Primary Care Giver:    Patient Support Systems include: Children   Current Financial resources: Other (Comment)  Current community resources: None  Current services prior to admission: Durable Medical Equipment            Current DME: Cane, Walker, Other (Comment) (rolling walker)            Type of Home Care services:  None    ADLS  Prior functional level: Independent in ADLs/IADLs  Current functional level: Other (see comment), Mobility, Assistance with the following: (ARU recommended)    PT AM-PAC: 8 /24  OT AM-PAC: 8 /24    Family can 
Discharge Planning  Patient has been approved for ARU FF placement when medically stable.    
Discharge Planning;  Patient will be discharging to ARU FF today.  
MAXX received call from Ludwig KENDRICK liaison [phone] and he is inquiring if pt can admit to [facility] today.    MAXX called pt's RN and on 3 way call and RN states pt per MD will go to ARU tomorrow as she still has NG tube that will be pulled, HD line that will be pulled.     Ritika Jones RN, BSN  [phone]   
Pt remains critical. On vent, gtts and CRRT, Covid +.      Tete with [facility] [phone] is following in case services needed.    CM will monitor for pt progress and right time to speak to family about LTAC. LTAC does not do CRRT.     Ritika Jones RN, BSN  [phone]   
Pt remains critical. Starting CRRT.     CM left vm for Tete with [facility] [phone] to start following.    Ritika Jones RN, BSN  [phone]   
      SOCIAL HISTORY    Social History     Tobacco Use    Smoking status: Former     Current packs/day: 0.00     Average packs/day: 0.3 packs/day for 15.0 years (3.8 ttl pk-yrs)     Types: Cigarettes     Start date: 1966     Quit date: 1981     Years since quittin.3    Smokeless tobacco: Never    Tobacco comments:      quit smoking    Vaping Use    Vaping status: Former   Substance Use Topics    Alcohol use: Not Currently     Alcohol/week: 0.0 standard drinks of alcohol    Drug use: Not Currently     Types: Marijuana (Weed)       ALLERGIES    Allergies   Allergen Reactions    Penicillins Rash       MEDICATIONS    No current facility-administered medications on file prior to encounter.     Current Outpatient Medications on File Prior to Encounter   Medication Sig Dispense Refill    medical marijuana Take 1 each by mouth as needed.      tiZANidine (ZANAFLEX) 4 MG tablet One tid prn muscle spasm 90 tablet 0    propranolol (INDERAL LA) 80 MG extended release capsule TAKE ONE CAPSULE BY MOUTH DAILY 90 capsule 2    vitamin D (ERGOCALCIFEROL) 1.25 MG (47810 UT) CAPS capsule Take 1 capsule by mouth once a week 12 capsule 1    hydrOXYzine HCl (ATARAX) 25 MG tablet TAKE TWO TABLETS BY MOUTH EVERY NIGHT AT BEDTIME AS NEEDED FOR ITCHING 60 tablet 1    gabapentin (NEURONTIN) 300 MG capsule One tid (Patient taking differently: Take 1 capsule by mouth 3 times daily. One tid) 90 capsule 3    cyproheptadine (PERIACTIN) 4 MG tablet Take 1 tablet by mouth 3 times daily as needed (itch) 30 tablet 1    hydroCHLOROthiazide (HYDRODIURIL) 25 MG tablet TAKE ONE TABLET BY MOUTH EVERY MORNING 90 tablet 2    citalopram (CELEXA) 40 MG tablet TAKE ONE TABLET BY MOUTH DAILY 90 tablet 2    NIFEdipine (PROCARDIA XL) 60 MG extended release tablet Take 1 tablet by mouth daily 90 tablet 1    pantoprazole (PROTONIX) 40 MG tablet Take 1 tablet by mouth every morning (before breakfast) 30 tablet 0       Objective    /66   Pulse (!)

## 2025-01-20 NOTE — PROGRESS NOTES
Patient was admitted to room 4914 at 1730.  Patient was oriented to the Call Light, Phone, TV, Thermostat, Bed Controls, Bathroom and Emergency Cord.  Patient verbalized and demonstrated understanding of all.  Patient was also given an overview of Unit Routines for Acute Rehab, including the patient's rights and responsibilities as well as the CMS IRF YNDIA Privacy Act Statement providing notice of data collection.  Patient states that their normal bowel regime is daily. Meal times were explained, including how to order food.  The white board, (which is posted on the wall by the door is used for communication) has the Therapy Scheduled that is posted each day along with the name of your doctor, nurse, and therapist for your convenience.  We recommend any family that will be care givers or any care givers the patient has, take part in therapy.  We have no set visiting hours, we suggest non-caregiver friends and family visitors come after therapy (at 4 PM or later) to allow patient to rest in between sessions.      In conjunction with the patient and patient’s family, this nurse worked to establish a tailored Fall TIPS plan to ensure patient safety and compliance:    Falls TIPS Completion    Patient identified as increased risk for harm if fall:  [x] Yes     Fall Risks  History of Falls:    [] Yes   Medication Side Effects:   [] Yes   Walking Aid:    [x] Yes   IV Pole or Equipment:   [] Yes   Unsteady Walk:     [x] Yes   May Forget or Choose Not to Call: [] Yes     Fall Interventions   Communicate Recent Fall and/or Risk of Harm: [] Yes   Walking Aids:  Crutches: [] Yes   Cane: [] Yes   Walker: [x] Yes   IV Assistance When Walking: [] Yes   Toileting Schedule: Every 1-2 Hours  Bedpan:   [x] Yes   Assist to Commode: [] Yes   Assist to Bathroom: [x] Yes   Bed Alarm On: [x] Yes   Assistance Out of Bed:  Bedrest: [] Yes   1 Person: [] Yes   2 People: [x] Yes

## 2025-01-21 PROCEDURE — 97116 GAIT TRAINING THERAPY: CPT

## 2025-01-21 PROCEDURE — 97162 PT EVAL MOD COMPLEX 30 MIN: CPT

## 2025-01-21 PROCEDURE — 6370000000 HC RX 637 (ALT 250 FOR IP): Performed by: STUDENT IN AN ORGANIZED HEALTH CARE EDUCATION/TRAINING PROGRAM

## 2025-01-21 PROCEDURE — 92610 EVALUATE SWALLOWING FUNCTION: CPT

## 2025-01-21 PROCEDURE — 97165 OT EVAL LOW COMPLEX 30 MIN: CPT

## 2025-01-21 PROCEDURE — 92526 ORAL FUNCTION THERAPY: CPT

## 2025-01-21 PROCEDURE — 97535 SELF CARE MNGMENT TRAINING: CPT

## 2025-01-21 PROCEDURE — 1280000000 HC REHAB R&B

## 2025-01-21 PROCEDURE — 92523 SPEECH SOUND LANG COMPREHEN: CPT

## 2025-01-21 PROCEDURE — 97530 THERAPEUTIC ACTIVITIES: CPT

## 2025-01-21 RX ORDER — TRAZODONE HYDROCHLORIDE 50 MG/1
50 TABLET, FILM COATED ORAL NIGHTLY PRN
Status: DISCONTINUED | OUTPATIENT
Start: 2025-01-21 | End: 2025-01-28

## 2025-01-21 RX ADMIN — CITALOPRAM HYDROBROMIDE 40 MG: 20 TABLET ORAL at 10:15

## 2025-01-21 RX ADMIN — APIXABAN 10 MG: 5 TABLET, FILM COATED ORAL at 10:15

## 2025-01-21 RX ADMIN — METOPROLOL TARTRATE 75 MG: 50 TABLET, FILM COATED ORAL at 10:14

## 2025-01-21 RX ADMIN — MIRTAZAPINE 15 MG: 15 TABLET, FILM COATED ORAL at 10:15

## 2025-01-21 RX ADMIN — Medication 100 MG: at 10:15

## 2025-01-21 RX ADMIN — APIXABAN 10 MG: 5 TABLET, FILM COATED ORAL at 22:11

## 2025-01-21 RX ADMIN — CLOPIDOGREL BISULFATE 75 MG: 75 TABLET ORAL at 10:15

## 2025-01-21 RX ADMIN — ACETAMINOPHEN 650 MG: 325 TABLET ORAL at 21:55

## 2025-01-21 RX ADMIN — FUROSEMIDE 40 MG: 40 TABLET ORAL at 10:15

## 2025-01-21 RX ADMIN — TRAZODONE HYDROCHLORIDE 50 MG: 50 TABLET ORAL at 22:11

## 2025-01-21 RX ADMIN — METOPROLOL TARTRATE 75 MG: 50 TABLET, FILM COATED ORAL at 21:55

## 2025-01-21 RX ADMIN — PANTOPRAZOLE SODIUM 40 MG: 40 TABLET, DELAYED RELEASE ORAL at 05:13

## 2025-01-21 ASSESSMENT — PAIN DESCRIPTION - DIRECTION: RADIATING_TOWARDS: ALL OVER

## 2025-01-21 ASSESSMENT — PAIN DESCRIPTION - DESCRIPTORS: DESCRIPTORS: SORE

## 2025-01-21 ASSESSMENT — PAIN DESCRIPTION - ONSET: ONSET: PROGRESSIVE

## 2025-01-21 ASSESSMENT — PAIN DESCRIPTION - FREQUENCY: FREQUENCY: INTERMITTENT

## 2025-01-21 ASSESSMENT — PAIN DESCRIPTION - PAIN TYPE: TYPE: ACUTE PAIN

## 2025-01-21 ASSESSMENT — PAIN SCALES - GENERAL
PAINLEVEL_OUTOF10: 3
PAINLEVEL_OUTOF10: 2

## 2025-01-21 ASSESSMENT — PAIN DESCRIPTION - LOCATION: LOCATION: GENERALIZED

## 2025-01-21 ASSESSMENT — PAIN - FUNCTIONAL ASSESSMENT: PAIN_FUNCTIONAL_ASSESSMENT: ACTIVITIES ARE NOT PREVENTED

## 2025-01-21 NOTE — PLAN OF CARE
ARU PATIENT TREATMENT PLAN  [facility]  [address] [place] [address]  [phone]      Shahida Vásquez    : 1960  Acct #: [id number]  MRN: [id number]  PHYSICIAN:  Brendan Young MD  Primary Problem    Patient Active Problem List   Diagnosis    HTN (hypertension)    Lumbar disc herniation    DDD (degenerative disc disease), lumbar    Lumbar stenosis    Lumbar spinal stenosis    Primary osteoarthritis of right knee    ANALY (acute kidney injury) (Former[facility])    Hypotension    Multifocal pneumonia    Abnormal CT of the chest    Shortness of breath    Absolute anemia    Community acquired pneumonia    Closed fracture of distal end of left radius, initial encounter    ARF (acute renal failure) (Formerly [facility])    ST elevation myocardial infarction involving right coronary artery (Formerly [facility])    Acute respiratory failure    ABLA (acute blood loss anemia)    Chronic prescription opiate use    Mood disorder (Formerly [facility])    Acute coronary syndrome (Formerly [facility])    Acute ST elevation myocardial infarction (STEMI) involving right coronary artery (Formerly [facility])    Laryngeal disorder    Oropharyngeal dysphagia    Dysphonia    Critical illness myopathy       Rehabilitation Diagnosis:      #. Critical illness myopathy  - Secondary to prolonged ICU course with multiorgan failure (cardiogenic shock, acute respiratory failure, acute renal failure) requiring vasopressors/inotropes, Impella, multiple transfusions of blood products, CRRT/iHD.  - PT/OT   - SLP for dysphagia, hypophonia     #. STEMI s/p PCI to RCA  #. Cardiogenic shock, resolved  #. ICM w/ EF 45-50%  - Continue Plavix, metoprolol, Lasix.  Aspirin stopped upon starting anticoagulation with Eliquis.     #. A-flutter  - Continue metoprolol, Eliquis     #.  Acute hypoxic respiratory failure, resolved  - Extubated   - Continue IS     #. Acute renal failure, resolved  - Last iHD on   - Continue to monitor renal function MWF       #. Anemia  - requiring transfusion of 7 units pRBCs  treatment plan per PT/OT/SLP).    [] In this rare instance due to the nature of this patient's medical involvement, this patient will be seen 15 hours per week (900 minutes within a 7 day period).    In addition, dietician/nutritionist may monitor calorie count as well as intake and collaboratively work with SLP on dietary upgrades.  Neuropsychology/Psychology may evaluate and provide necessary support.    Medical issues being managed closely and that require 24 hour availability of a physician:  [x] Swallowing Precautions  [x] Bowel/Bladder Fx  [] Weight bearing precautions  [x] Wound Care    [x] Pain Mgmt   [x] Infection Protection  [x] DVT Prophylaxis   [x] Fall Precautions  [x] Fluid/Electrolyte/Nutrition Balance  [x] Voice Protection   [x] Respiratory  [] Other:    Medical Prognosis: [] Good  [x] Fair    [] Guarded   Total expected IRF days: 21  Anticipated discharge destination: Home  [] Home Independently   [x] Home with supervision - for stairs    []SNF     [] Other                                           Physician anticipated functional outcomes:  By discharge, the patient will progress to being independent with all mobility and activities except requiring supervision for stairs.   IPOC brief synthesis: Shahida Vásquez is a 64 y.o. female with PMHx notable for HTN, obesity s/p Iliana-en-Y gastric bypass, anxiety/depression , chronic lumbar back pain who presented on 12/30 with chest pain and fatigue.  She was found to have STEMI, and went emergently to Cath Lab.  She was found to have RCA occlusion, and severe right ventricular systolic dysfunction.  Impella were placed in LV and RV.  She remained intubated for airway protection.  She underwent cardiac catheterization on 1/2, had PCI to RCA which was complicated by bleeding and ventricular tachycardia.  Impella and IABP removed on 1/8.  Extubated on 1/9. CRRT stopped on 1/10, transitioned to iHD on 1/11, and now holding HD since 1/16. SLP following for

## 2025-01-21 NOTE — PLAN OF CARE
Problem: Discharge Planning  Goal: Discharge to home or other facility with appropriate resources  Outcome: Progressing  Flowsheets (Taken 1/20/2025 2000)  Discharge to home or other facility with appropriate resources: Identify barriers to discharge with patient and caregiver     Problem: Safety - Medical Restraint  Goal: Remains free of injury from restraints (Restraint for Interference with Medical Device)  Description: INTERVENTIONS:  1. Determine that other, less restrictive measures have been tried or would not be effective before applying the restraint  2. Evaluate the patient's condition at the time of restraint application  3. Inform patient/family regarding the reason for restraint  4. Q2H: Monitor safety, psychosocial status, comfort, nutrition and hydration  Outcome: Progressing     Problem: Safety - Adult  Goal: Free from fall injury  Outcome: Progressing     Problem: Skin/Tissue Integrity  Goal: Absence of new skin breakdown  Description: 1.  Monitor for areas of redness and/or skin breakdown  2.  Assess vascular access sites hourly  3.  Every 4-6 hours minimum:  Change oxygen saturation probe site  4.  Every 4-6 hours:  If on nasal continuous positive airway pressure, respiratory therapy assess nares and determine need for appliance change or resting period.  Outcome: Progressing

## 2025-01-21 NOTE — PROGRESS NOTES
[facility] - Inpatient Rehabilitation Department   Phone: [phone]    Physical Therapy    [x] Initial Evaluation            [] Daily Treatment Note         [] Discharge Summary      Patient: Shahida Vásquez   : 1960   MRN: [id number]   Date of Service:  2025  Admitting Diagnosis: Critical illness myopathy  Current Admission Summary: Shahida Vásquez is a 64 y.o. female with PMHx notable for HTN, obesity s/p Iliana-en-Y gastric bypass, anxiety/depression , chronic lumbar back pain who presented on  with chest pain and fatigue.  She was found to have STEMI, and went emergently to Cath Lab.  She was found to have RCA occlusion, and severe right ventricular systolic dysfunction.  Impella were placed in LV and RV.  She remained intubated for airway protection.  She underwent cardiac catheterization on , had PCI to RCA which was complicated by bleeding and ventricular tachycardia.  Impella and IABP removed on .  Extubated on . CRRT stopped on 1/10, transitioned to iHD on , and now holding HD since . SLP following for dysphagia, cleared for diet after MBS on . NG tube removed on . Hospital course complicated by: cardiogenic shock, acute hypoxic respiratory failure, ANALY, anemia (required 5 units pRBCs during cath, 7 units total this admission).      Patient reports that she is doing well today overall.  She is feeling tired after working with therapy this morning.  She is having some bleeding from her left groin wound which is concerning her.  She denies any fevers/chills, chest pain, palpitations, dyspnea.  Her appetite continues to improve. Last BM (including prior to admit): 25   Past Medical History:  has a past medical history of Anxiety, Depression, Hypertension, Insomnia, Lumbar disc herniation, and Lumbar spinal stenosis.  Past Surgical History:  has a past surgical history that includes Hysterectomy; Gastric bypass surgery; Cholecystectomy; fracture surgery;  car transfer at [facility]     Therapy Session Time      Individual Group Co-treatment   Time In 1335       Time Out 1435       Minutes 60         Timed Code Treatment Minutes:  45 Minutes  Total Treatment Minutes:  60       Electronically Signed By: JACKELIN HOLLINS, PT, [id number]

## 2025-01-21 NOTE — PROGRESS NOTES
Patient requested to use the bedpan to have a BM. After rolling patient to place bedpan, blood started oozing from left groin wound. Bleeding stopped, replaced abd pad. Patient had medium yellow soft/loose BM and voided on bedpan.

## 2025-01-21 NOTE — PROGRESS NOTES
Nutrition Note    RECOMMENDATIONS  PO Diet: consistency per SLP  ONS: Gelatein & magic cups     NUTRITION ASSESSMENT   Nutrition intervention triggered for new ARU admission d/t critical illness myopathy.  Prior to ARU, pt with feeding tube d/t acute dysphagia.  NG tube has been d/c'd, as diet advanced to dysphagia minced & moist diet with mildly thick liquids.  Pt also receives magic cups & Gelatein.   Pt expresses motivation to eat & drink to avoid re-insertion of NGT.  Current intake 0-75% of meals, & % ONS.  Encouraged at least 50% of meals & ONS to maintain adequate nutrition intake.  Will monitor progress.     Nutrition Related Findings: Temporary HD cath removed 1/19; LBM 1/21; labs reviewed; edema: nonpitting generalized & BLE  Wounds: Pressure Injury, Stage I, Surgical Incision  Nutrition Education:  Education/Counseling not indicated   Nutrition Goals: PO intake 75% or greater, by next RD assessment     MALNUTRITION ASSESSMENT   Acute Illness  Malnutrition Status: At risk for malnutrition    NUTRITION DIAGNOSIS   Inadequate oral intake related to inadequate protein-energy intake, swallowing difficulty as evidenced by variable po intake, swallow study results      CURRENT NUTRITION THERAPIES  ADULT ORAL NUTRITION SUPPLEMENT; Breakfast, Dinner; Fortified Gelatin Oral Supplement  ADULT ORAL NUTRITION SUPPLEMENT; Lunch, Dinner; Frozen Oral Supplement  ADULT DIET; Dysphagia - Soft and Bite Sized; Mildly Thick ([place])     PO Intake: 0%, 26-50%   PO Supplement Intake:%    ANTHROPOMETRICS  Current Height: 142.2 cm (4' 8\")  Current Weight - Scale: 76.9 kg (169 lb 8.5 oz)    Admission weight: 78.5 kg (173 lb)  Ideal Body Weight (IBW): 80 lbs  (36 kg)      BMI: 37.9      COMPARATIVE STANDARDS  Total Energy Requirements (kcals/day): 1193- 1670     Protein (g):  58-96g       Fluid (mL/day):  1670    The patient will be monitored per nutrition standards of care. Consult dietitian if additional nutrition

## 2025-01-21 NOTE — PROGRESS NOTES
Admission Period/Goal QM Codes for Shahida Vásquez.    QM Admit Code Goal Code   Eating 5 6   Oral Hygiene 5 6   Toileting Hygiene 1 6   Shower/Bathing 2 6   UB Dressing 3 6   LB Dressing 2 6   Putting on/off Footwear 1 6   Rolling Left and Right 3 6   Sit To Lying 2 6   Lying to Sitting on Bedside 2 6   Sit to Stand 3 6   Chair/Bed to Chair Transfer 3 6   Toilet Transfers 3 6   Car Transfers 88 -   Walk 10 Feet 88 6   Walk 50 Feet with Two Turns 88 6   Walk 150 Feet 88 6   Walk 10 Feet on Uneven Surfaces 88 6   1 Step (Curb) 88 4   4 Steps 88 4   12 Steps 88 4   Picking up Object from Floor 88 6   Wheel 50 Feet with 2 Turns 9 -   Type - -   Wheel 150 Feet 9 -   Type - -       The above codes were determined by the treatment team to be the patient's accurate admission assessment codes based on assessment performed soon after the patient's admission and prior to the benefit of services provided by staff, or if appropriate, the patient's usual performance at admission.    OT:  Laly Blair, OTR/L, CNS ([id number]) 1/23/2025 1:08 PM    PT:  Saritha Garcia, PT [id number]  1/24/25, 1126     RN:  Janina Ramsey RN 1/23/25 1224     ST:  Keshia Dowling M.A. CCC-SLP #[id number] 1/23/25, 2704     :  Karlos Soto PT, DPT [id number]  1/24/25  12:33 PM

## 2025-01-21 NOTE — PROGRESS NOTES
[facility] - Inpatient Rehabilitation Department   Phone: [phone]    Speech Therapy    [x] Initial Evaluation            [] Daily Treatment Note         [] Discharge Summary      Patient: Shahida Vásquez   : 1960   MRN: [id number]   Date of Service:  2025  Admitting Diagnosis: Critical illness myopathy  Current Admission Summary: See MD report   Past Medical History:  has a past medical history of Anxiety, Depression, Hypertension, Insomnia, Lumbar disc herniation, and Lumbar spinal stenosis.  Past Surgical History:  has a past surgical history that includes Hysterectomy; Gastric bypass surgery; Cholecystectomy; fracture surgery; lumbar nerve block (N/A, 2019); bronchoscopy (N/A, 10/10/2019); bronchoscopy (10/10/2019); Pain management procedure (Left, 2020); Forearm surgery (Left, 2020); Dilatation, esophagus; Abdomen surgery; Cardiac procedure (N/A, 2024); Cardiac procedure (N/A, 2024); Cardiac procedure (N/A, 2024); Cardiac procedure (N/A, 2024); Cardiac procedure (N/A, 2025); Cardiac procedure (N/A, 2025); Cardiac procedure (N/A, 2025); invasive vascular (N/A, 2025); Cardiac procedure (N/A, 2025); Cardiac procedure (N/A, 2025); Cardiac procedure (N/A, 2025); invasive vascular (N/A, 2025); Cardiac procedure (N/A, 2025); invasive vascular (N/A, 1/3/2025); and Cardiac procedure (N/A, 2025).  Recent Head CT: 2025  IMPRESSION:  1. No acute intracranial abnormality.  2. Mild global parenchymal volume loss with chronic microvascular ischemic  changes.  3. Scattered atherosclerosis of the intracranial vasculature.    Recent Chest xray: 2025  IMPRESSION:  No acute cardiopulmonary disease.  Instrumental Swallow Study:   Modified Barium Swallow evaluation completed on 2025.Patient presents with moderate oropharyngeal dysphagia secondary to decreased labial seal, impaired/decreased oral manipulation,  baseline. Will continue to monitor voice quality and provide intervention as appropriate. Pt was independent prior to admission; therefore, deficits are indicative of a significant decline. Recommend SLP intervention for safe return to prior level of independence.     Diet Solids Recommendation:   Liquid Consistency Recommendation:   Recommended Form of Meds:   Dysphagia III Soft and bite sized     Mildly (nectar) thick liquids     Meds in puree        Recommended Compensatory Swallowing Strategies: Upright as possible with all PO intake , No straws , Small bites/sips , Eat/feed slowly, Aspiration Precautions       Plan  Frequency: 60 minutes/day; 5 days per week, as tolerated, until goals met, or discharged from ARU.  Therapeutic Interventions: Diet Tolerance Monitoring , Patient/Family Education , Therapeutic Trials with SLP , Instrumental assessment of swallow function (Modified Barium Swallow Study)  Cognitive-Linguistic intervention  and Compensatory Cognitive intervention     Discharge  Barriers to discharge: May need additional assistance to manage medications and/or finances (assistance/supervision)  Cognitive deficits with reduced insight negatively impacting safety/independence  Discharge Recommendations: Home with assistance and TBD   Continued SLP at Discharge: Yes     Goals  Patient Goals: Return home.    Time Frame: 2-3 weeks     Pt will tolerate advanced consistency trials with no overt s/s of aspiration, noticeable fatigue or significant pocketing.    Pt will participate in instrumental swallow assessment    Patient will demonstrate insight into limitations and impact on daily functioning with min cues.  Patient will complete functional problem-solving tasks for daily situations with 80% accuracy or given min cues.  Pt will improve attention to detail for graded complex information to 80%, for improved recall and retention of newly learned information     Above goals reviewed on 1/21/2025. All goals

## 2025-01-21 NOTE — PLAN OF CARE
Problem: Discharge Planning  Goal: Discharge to home or other facility with appropriate resources  1/21/2025 1152 by Jeri Javed RN  Outcome: Progressing  1/21/2025 0258 by Carley Xie RN  Outcome: Progressing  Flowsheets (Taken 1/20/2025 2000)  Discharge to home or other facility with appropriate resources: Identify barriers to discharge with patient and caregiver     Problem: Safety - Medical Restraint  Goal: Remains free of injury from restraints (Restraint for Interference with Medical Device)  Description: INTERVENTIONS:  1. Determine that other, less restrictive measures have been tried or would not be effective before applying the restraint  2. Evaluate the patient's condition at the time of restraint application  3. Inform patient/family regarding the reason for restraint  4. Q2H: Monitor safety, psychosocial status, comfort, nutrition and hydration  1/21/2025 1152 by Jeri Javed RN  Outcome: Progressing  1/21/2025 0258 by Carley Xie RN  Outcome: Progressing     Problem: Safety - Adult  Goal: Free from fall injury  1/21/2025 1152 by Jeri Javed RN  Outcome: Progressing  1/21/2025 0258 by Carley Xie RN  Outcome: Progressing     Problem: Skin/Tissue Integrity  Goal: Absence of new skin breakdown  Description: 1.  Monitor for areas of redness and/or skin breakdown  2.  Assess vascular access sites hourly  3.  Every 4-6 hours minimum:  Change oxygen saturation probe site  4.  Every 4-6 hours:  If on nasal continuous positive airway pressure, respiratory therapy assess nares and determine need for appliance change or resting period.  1/21/2025 1152 by Jeri Javed RN  Outcome: Progressing  1/21/2025 0258 by Carley Xie RN  Outcome: Progressing

## 2025-01-21 NOTE — PROGRESS NOTES
ARU Admission Assessment    Ethnicity  \"Are you of , /a, or Tunisian origin?\"  Check all that apply:  [x] A.  No, not of , /a, or [place]isian Origin  [] B.  Yes, Yemeni, Yemeni American, Chicano/a  [] C.  Yes, Sao T[place]an  [] D.  Yes, Jamir  [] E.  Yes, another , , or Tunisian origin  [] X.  Patient unable to respond  [] Y.  Patient declines to respond    Race  \"What is your race?\"  Check all that apply:  [x] A.  White  [] B.  Black or   [] C.   or   [] D.   Trinidadian  [] E.  Chinese  [] F.  Romanian  [] G. English  [] H.  Upper sorbian  [] I.  Luxembourgish  [] J.  Other   [] K.    [] L.  Vincentian or Sangita  [] M.  C[place] Rican  [] N.  Other   [] X.  Patient unable to respond  [] Y.  Patient declines to respond  [] Z.  None of the above    Language  A.  \"What is your preferred language?\"   English    B.  \"Do you need or want an  to communicate with a doctor or health care staff?\"  Check only one:  [x] 0.  No  [] 1.  Yes  [] 9.  Unable to determine    Transportation  \"Has lack of transportation kept you from medical appointments, meetings, work, or from getting things needed for daily living?\"Check all that apply:  [] A.  Yes, it has kept me from medical appointments or from getting my medications  [] B.  Yes, it has kept me from non-medical meetings, appointments, work, or from getting things that I need  [x] C.  No  [] X.  Patient unable to respond  [] Y.  Patient declines to respond    Hearing  Ability to hear (with hearing aid or hearing appliances if normally used)  [x]  0.  Adequate - no difficulty in normal conversation, social interaction, listening to TV  []  1.  Minimal difficulty - difficulty in some environments (e.g. when person speaks softly or setting is noisy)  []  2.  Moderate difficulty - speaker has to increase volume and speak distinctly   []  3.  Highly impaired - absence of

## 2025-01-21 NOTE — PROGRESS NOTES
[facility] - Inpatient Rehabilitation Department   Phone: [phone]    Occupational Therapy    [x] Initial Evaluation            [] Daily Treatment Note         [] Discharge Summary      Patient: Shahida Vásquez   : 1960   MRN: [id number]   Date of Service:  2025    Admitting Diagnosis:  Critical illness myopathy  Current Admission Summary: Shahida Vásquez is a 64 y.o. female with PMHx notable for HTN, obesity s/p Iliana-en-Y gastric bypass, anxiety/depression , chronic lumbar back pain who presented on  with chest pain and fatigue.  She was found to have STEMI, and went emergently to Cath Lab.  She was found to have RCA occlusion, and severe right ventricular systolic dysfunction.  Impella were placed in LV and RV.  She remained intubated for airway protection.  She underwent cardiac catheterization on , had PCI to RCA which was complicated by bleeding and ventricular tachycardia.  Impella and IABP removed on .  Extubated on . CRRT stopped on 1/10, transitioned to iHD on , and now holding HD since . SLP following for dysphagia, cleared for diet after MBS on . NG tube removed. Hospital course complicated by: cardiogenic shock, acute hypoxic respiratory failure, ANALY, anemia (required 5 units pRBCs during cath, 7 units total this admission)   Past Medical History:  has a past medical history of Anxiety, Depression, Hypertension, Insomnia, Lumbar disc herniation, and Lumbar spinal stenosis.  Past Surgical History:  has a past surgical history that includes Hysterectomy; Gastric bypass surgery; Cholecystectomy; fracture surgery; lumbar nerve block (N/A, 2019); bronchoscopy (N/A, 10/10/2019); bronchoscopy (10/10/2019); Pain management procedure (Left, 2020); Forearm surgery (Left, 2020); Dilatation, esophagus; Abdomen surgery; Cardiac procedure (N/A, 2024); Cardiac procedure (N/A, 2024); Cardiac procedure (N/A, 2024); Cardiac procedure (N/A,                  Cognition  Overall Cognitive Status: Impaired  Arousal/Alertness: appropriate responses to stimuli  Following Commands: follows multi step commands with repetition, follows multi step commands with increased time  Attention Span: attends with cues to redirect  Memory: decreased recall of recent events, decreased short term memory  Insights: decreased awareness of deficits  Initiation: requires cues for some  Sequencing: requires cues for some  Comments: impaired thought organization noted, intermittent difficulty w/ word finding; questionable historian    Orientation:    alert and oriented x 4  Command Following:   accurately follows one step commands     Education  Barriers To Learning: cognition  Patient Education: patient educated on goals, OT role and benefits, plan of care, transfer training, discharge recommendations  Learning Assessment:  patient verbalizes understanding, would benefit from continued reinforcement    Assessment  Activity Tolerance: Fair, easily fatigues.  Impairments Requiring Therapeutic Intervention: decreased functional mobility, decreased ADL status, decreased strength, decreased cognition, decreased endurance, decreased balance, decreased IADL  Prognosis: fair  Clinical Assessment: Pt is a 64 year old female who presents with above deficits associated with CIM. Pt is below her IND baseline level of function, now requiring extensive assist to complete functional mobility using AD and extensive assist for BADLs. Skilled OT services are required in order to maximize her IND/safety with all occupational pursuits.   Safety Interventions: patient left in chair, chair alarm in place, call light within reach, and patient at risk for falls    Plan  Frequency: 5 x/week, 60 min/day  Current Treatment Recommendations: strengthening, balance training, functional mobility training, transfer training, endurance training, patient/caregiver education, ADL/self-care training, IADL

## 2025-01-21 NOTE — PROGRESS NOTES
4 Eyes Skin Assessment     NAME:  Shahida Vásquez  YOB: 1960  MEDICAL RECORD NUMBER:  [id number]    The patient is being assessed for  Admission    I agree that at least one RN has performed a thorough Head to Toe Skin Assessment on the patient. ALL assessment sites listed below have been assessed.      Areas assessed by both nurses:    Head, Face, Ears, Shoulders, Back, Chest, Arms, Elbows, Hands, Sacrum. Buttock, Coccyx, Ischium, Legs. Feet and Heels, and Under Medical Devices         Does the Patient have a Wound? Yes wound(s) were present on assessment. LDA wound assessment was Initiated and completed by RN Stage 1 to coccyx, hematoma to L thigh, Open area to L abdominal fold.        Donnie Prevention initiated by RN: Yes  Wound Care Orders initiated by RN: No    Pressure Injury (Stage 3,4, Unstageable, DTI, NWPT, and Complex wounds) if present, place Wound referral order by RN under : No    New Ostomies, if present place, Ostomy referral order under : No     Nurse 1 eSignature: Electronically signed by Jeri Javed RN on 1/21/25 at 4:51 PM EST    **SHARE this note so that the co-signing nurse can place an eSignature**    Nurse 2 eSignature: Electronically signed by Carley Xie RN on 1/21/25 at 7:02 PM EST

## 2025-01-21 NOTE — H&P
Patient: Shahida Vásquez  [id number]  Date: 1/21/2025    Chief Complaint: Critical illness myopathy    History of Present Illness/Hospital Course:  Shahida Vásquez is a 64 y.o. female with PMHx notable for HTN, obesity s/p Iliana-en-Y gastric bypass, anxiety/depression , chronic lumbar back pain who presented on 12/30 with chest pain and fatigue.  She was found to have STEMI, and went emergently to Cath Lab.  She was found to have RCA occlusion, and severe right ventricular systolic dysfunction.  Impella were placed in LV and RV.  She remained intubated for airway protection.  She underwent cardiac catheterization on 1/2, had PCI to RCA which was complicated by bleeding and ventricular tachycardia.  Impella and IABP removed on 1/8.  Extubated on 1/9. CRRT stopped on 1/10, transitioned to iHD on 1/11, and now holding HD since 1/16. SLP following for dysphagia, cleared for diet after MBS on 1/14. NG tube removed on 1/19. Hospital course complicated by: cardiogenic shock, acute hypoxic respiratory failure, ANALY, anemia (required 5 units pRBCs during cath, 7 units total this admission).     Patient reports that she is doing well today overall.  She is feeling tired after working with therapy this morning.  She is having some bleeding from her left groin wound which is concerning her.  She denies any fevers/chills, chest pain, palpitations, dyspnea.  Her appetite continues to improve. Last BM (including prior to admit): 01/21/25 .    Prior Level of Function:  Independent to modified independent (uses walker when at work) for mobility, independent with ADLs and IADLs.  Lives alone in 1 level house with 2 steps to enter without railing.    Current Level of Function:  PT 1/18  Bed Mobility:  Rolling Left: minimal assistance  Rolling Right: moderate assistance  Comments:  increased time to complete, decreased cues required compared to last session  Transfers:  Sit to stand transfer: 2 person assistance with Alvin x2 , progressing to

## 2025-01-22 LAB
ANION GAP SERPL CALCULATED.3IONS-SCNC: 8 MMOL/L (ref 3–16)
BASOPHILS # BLD: 0.1 K/UL (ref 0–0.2)
BASOPHILS NFR BLD: 1 %
BUN SERPL-MCNC: 36 MG/DL (ref 7–20)
CALCIUM SERPL-MCNC: 8.1 MG/DL (ref 8.3–10.6)
CHLORIDE SERPL-SCNC: 104 MMOL/L (ref 99–110)
CO2 SERPL-SCNC: 25 MMOL/L (ref 21–32)
CREAT SERPL-MCNC: 1 MG/DL (ref 0.6–1.2)
DEPRECATED RDW RBC AUTO: 20.7 % (ref 12.4–15.4)
EOSINOPHIL # BLD: 0.2 K/UL (ref 0–0.6)
EOSINOPHIL NFR BLD: 2.2 %
GFR SERPLBLD CREATININE-BSD FMLA CKD-EPI: 63 ML/MIN/{1.73_M2}
GLUCOSE SERPL-MCNC: 97 MG/DL (ref 70–99)
HCT VFR BLD AUTO: 25.9 % (ref 36–48)
HGB BLD-MCNC: 8.6 G/DL (ref 12–16)
LYMPHOCYTES # BLD: 0.8 K/UL (ref 1–5.1)
LYMPHOCYTES NFR BLD: 8 %
MCH RBC QN AUTO: 30.6 PG (ref 26–34)
MCHC RBC AUTO-ENTMCNC: 33.1 G/DL (ref 31–36)
MCV RBC AUTO: 92.3 FL (ref 80–100)
MONOCYTES # BLD: 0.7 K/UL (ref 0–1.3)
MONOCYTES NFR BLD: 6.7 %
NEUTROPHILS # BLD: 8.2 K/UL (ref 1.7–7.7)
NEUTROPHILS NFR BLD: 82.1 %
PLATELET # BLD AUTO: 504 K/UL (ref 135–450)
PMV BLD AUTO: 7.4 FL (ref 5–10.5)
POTASSIUM SERPL-SCNC: 3.6 MMOL/L (ref 3.5–5.1)
RBC # BLD AUTO: 2.8 M/UL (ref 4–5.2)
SODIUM SERPL-SCNC: 137 MMOL/L (ref 136–145)
WBC # BLD AUTO: 10 K/UL (ref 4–11)

## 2025-01-22 PROCEDURE — 36415 COLL VENOUS BLD VENIPUNCTURE: CPT

## 2025-01-22 PROCEDURE — 6370000000 HC RX 637 (ALT 250 FOR IP): Performed by: STUDENT IN AN ORGANIZED HEALTH CARE EDUCATION/TRAINING PROGRAM

## 2025-01-22 PROCEDURE — 2500000003 HC RX 250 WO HCPCS

## 2025-01-22 PROCEDURE — 92526 ORAL FUNCTION THERAPY: CPT

## 2025-01-22 PROCEDURE — 97129 THER IVNTJ 1ST 15 MIN: CPT

## 2025-01-22 PROCEDURE — 97530 THERAPEUTIC ACTIVITIES: CPT

## 2025-01-22 PROCEDURE — 85025 COMPLETE CBC W/AUTO DIFF WBC: CPT

## 2025-01-22 PROCEDURE — 97535 SELF CARE MNGMENT TRAINING: CPT

## 2025-01-22 PROCEDURE — 80048 BASIC METABOLIC PNL TOTAL CA: CPT

## 2025-01-22 PROCEDURE — 1280000000 HC REHAB R&B

## 2025-01-22 RX ORDER — LOPERAMIDE HYDROCHLORIDE 2 MG/1
2 CAPSULE ORAL 4 TIMES DAILY PRN
Status: DISCONTINUED | OUTPATIENT
Start: 2025-01-22 | End: 2025-02-01 | Stop reason: HOSPADM

## 2025-01-22 RX ORDER — METOPROLOL TARTRATE 50 MG
50 TABLET ORAL 2 TIMES DAILY
Status: DISCONTINUED | OUTPATIENT
Start: 2025-01-22 | End: 2025-01-23

## 2025-01-22 RX ORDER — SODIUM CHLORIDE 0.9 % (FLUSH) 0.9 %
5-40 SYRINGE (ML) INJECTION 2 TIMES DAILY
Status: DISCONTINUED | OUTPATIENT
Start: 2025-01-22 | End: 2025-01-29

## 2025-01-22 RX ADMIN — TRAZODONE HYDROCHLORIDE 50 MG: 50 TABLET ORAL at 21:16

## 2025-01-22 RX ADMIN — APIXABAN 10 MG: 5 TABLET, FILM COATED ORAL at 21:17

## 2025-01-22 RX ADMIN — TIZANIDINE 4 MG: 4 TABLET ORAL at 09:50

## 2025-01-22 RX ADMIN — MIRTAZAPINE 15 MG: 15 TABLET, FILM COATED ORAL at 09:50

## 2025-01-22 RX ADMIN — LOPERAMIDE HYDROCHLORIDE 2 MG: 2 CAPSULE ORAL at 22:42

## 2025-01-22 RX ADMIN — CLOPIDOGREL BISULFATE 75 MG: 75 TABLET ORAL at 09:52

## 2025-01-22 RX ADMIN — Medication 100 MG: at 09:51

## 2025-01-22 RX ADMIN — SODIUM CHLORIDE, PRESERVATIVE FREE 10 ML: 5 INJECTION INTRAVENOUS at 21:18

## 2025-01-22 RX ADMIN — ACETAMINOPHEN 650 MG: 325 TABLET ORAL at 09:50

## 2025-01-22 RX ADMIN — CITALOPRAM HYDROBROMIDE 40 MG: 20 TABLET ORAL at 09:50

## 2025-01-22 RX ADMIN — TIZANIDINE 4 MG: 4 TABLET ORAL at 21:16

## 2025-01-22 RX ADMIN — METOPROLOL TARTRATE 50 MG: 50 TABLET, FILM COATED ORAL at 21:16

## 2025-01-22 RX ADMIN — PANTOPRAZOLE SODIUM 40 MG: 40 TABLET, DELAYED RELEASE ORAL at 05:56

## 2025-01-22 RX ADMIN — APIXABAN 10 MG: 5 TABLET, FILM COATED ORAL at 09:50

## 2025-01-22 ASSESSMENT — PAIN DESCRIPTION - PAIN TYPE: TYPE: ACUTE PAIN

## 2025-01-22 ASSESSMENT — PAIN SCALES - WONG BAKER: WONGBAKER_NUMERICALRESPONSE: NO HURT

## 2025-01-22 ASSESSMENT — PAIN DESCRIPTION - LOCATION
LOCATION: BACK
LOCATION: BACK

## 2025-01-22 ASSESSMENT — PAIN SCALES - GENERAL
PAINLEVEL_OUTOF10: 6
PAINLEVEL_OUTOF10: 9

## 2025-01-22 ASSESSMENT — PAIN DESCRIPTION - DESCRIPTORS
DESCRIPTORS: ACHING;THROBBING
DESCRIPTORS: ACHING

## 2025-01-22 ASSESSMENT — PAIN DESCRIPTION - ORIENTATION: ORIENTATION: MID

## 2025-01-22 NOTE — PROGRESS NOTES
[facility] - Inpatient Rehabilitation Department   Phone: [phone]    Speech Therapy    [] Initial Evaluation            [x] Daily Treatment Note         [] Discharge Summary      Patient: Shahida Vásquez   : 1960   MRN: [id number]   Date of Service:  2025  Admitting Diagnosis: Critical illness myopathy  Current Admission Summary: See MD report   Past Medical History:  has a past medical history of Anxiety, Depression, Hypertension, Insomnia, Lumbar disc herniation, and Lumbar spinal stenosis.  Past Surgical History:  has a past surgical history that includes Hysterectomy; Gastric bypass surgery; Cholecystectomy; fracture surgery; lumbar nerve block (N/A, 2019); bronchoscopy (N/A, 10/10/2019); bronchoscopy (10/10/2019); Pain management procedure (Left, 2020); Forearm surgery (Left, 2020); Dilatation, esophagus; Abdomen surgery; Cardiac procedure (N/A, 2024); Cardiac procedure (N/A, 2024); Cardiac procedure (N/A, 2024); Cardiac procedure (N/A, 2024); Cardiac procedure (N/A, 2025); Cardiac procedure (N/A, 2025); Cardiac procedure (N/A, 2025); invasive vascular (N/A, 2025); Cardiac procedure (N/A, 2025); Cardiac procedure (N/A, 2025); Cardiac procedure (N/A, 2025); invasive vascular (N/A, 2025); Cardiac procedure (N/A, 2025); invasive vascular (N/A, 1/3/2025); and Cardiac procedure (N/A, 2025).  Recent Head CT: 2025  IMPRESSION:  1. No acute intracranial abnormality.  2. Mild global parenchymal volume loss with chronic microvascular ischemic  changes.  3. Scattered atherosclerosis of the intracranial vasculature.    Recent Chest xray: 2025  IMPRESSION:  No acute cardiopulmonary disease.  Instrumental Swallow Study:   Modified Barium Swallow evaluation completed on 2025.Patient presents with moderate oropharyngeal dysphagia secondary to decreased labial seal, impaired/decreased oral manipulation,  Eat/feed slowly, Aspiration Precautions       Plan  Frequency: 60 minutes/day; 5 days per week, as tolerated, until goals met, or discharged from ARU.  Therapeutic Interventions: Diet Tolerance Monitoring , Patient/Family Education , Therapeutic Trials with SLP , Instrumental assessment of swallow function (Modified Barium Swallow Study)  Cognitive-Linguistic intervention  and Compensatory Cognitive intervention     Discharge  Barriers to discharge: May need additional assistance to manage medications and/or finances (assistance/supervision)  Cognitive deficits with reduced insight negatively impacting safety/independence  Discharge Recommendations: Home with assistance and TBD   Continued SLP at Discharge: Yes     Goals  Patient Goals: Return home.    Time Frame: 2-3 weeks     Pt will tolerate advanced consistency trials with no overt s/s of aspiration, noticeable fatigue or significant pocketing.    Pt will participate in instrumental swallow assessment    Patient will demonstrate insight into limitations and impact on daily functioning with min cues.  Patient will complete functional problem-solving tasks for daily situations with 80% accuracy or given min cues.  Pt will improve attention to detail for graded complex information to 80%, for improved recall and retention of newly learned information     Above goals reviewed on 1/22/2025. All goals are ongoing at this time unless indicated above.       Therapy Session Time      Session 1 Session 2   Time In 0935 1220   Time Out 1005 1240   Time Code Minutes 15 15   Individual Minutes 30 30     Timed Code Treatment Minutes:  30  Total Treatment Minutes:  60    Electronically Signed By:   Cindi Friend M.A. [facility] [id number]  Speech-Language Pathologist   1/22/2025 10:15 AM

## 2025-01-22 NOTE — PROGRESS NOTES
pt requesting \"something to knock me out\" for sleep. Refusing prn ativan and hydroxyzine stating \"I've never taken them before.\" States used to take ambien prn at home but not listed on home med list. Dr Young notified via perfect serve, see N.O. prn trazodone.

## 2025-01-22 NOTE — PROGRESS NOTES
[facility] - Inpatient Rehabilitation Department   Phone: [phone]    Physical Therapy    [] Initial Evaluation            [x] Daily Treatment Note         [] Discharge Summary      Patient: Shahida Vásquez   : 1960   MRN: [id number]   Date of Service:  2025  Admitting Diagnosis: Critical illness myopathy  Current Admission Summary: Shahida Vásquez is a 64 y.o. female with PMHx notable for HTN, obesity s/p Iliana-en-Y gastric bypass, anxiety/depression , chronic lumbar back pain who presented on  with chest pain and fatigue.  She was found to have STEMI, and went emergently to Cath Lab.  She was found to have RCA occlusion, and severe right ventricular systolic dysfunction.  Impella were placed in LV and RV.  She remained intubated for airway protection.  She underwent cardiac catheterization on , had PCI to RCA which was complicated by bleeding and ventricular tachycardia.  Impella and IABP removed on .  Extubated on . CRRT stopped on 1/10, transitioned to iHD on , and now holding HD since . SLP following for dysphagia, cleared for diet after MBS on . NG tube removed on . Hospital course complicated by: cardiogenic shock, acute hypoxic respiratory failure, ANALY, anemia (required 5 units pRBCs during cath, 7 units total this admission).      Patient reports that she is doing well today overall.  She is feeling tired after working with therapy this morning.  She is having some bleeding from her left groin wound which is concerning her.  She denies any fevers/chills, chest pain, palpitations, dyspnea.  Her appetite continues to improve. Last BM (including prior to admit): 25   Past Medical History:  has a past medical history of Anxiety, Depression, Hypertension, Insomnia, Lumbar disc herniation, and Lumbar spinal stenosis.  Past Surgical History:  has a past surgical history that includes Hysterectomy; Gastric bypass surgery; Cholecystectomy; fracture surgery;

## 2025-01-22 NOTE — PLAN OF CARE
Problem: Discharge Planning  Goal: Discharge to home or other facility with appropriate resources  1/22/2025 0057 by Carley Xie RN  Outcome: Progressing  Flowsheets (Taken 1/21/2025 2000)  Discharge to home or other facility with appropriate resources: Identify barriers to discharge with patient and caregiver  1/21/2025 1152 by Jeri Javed RN  Outcome: Progressing     Problem: Safety - Medical Restraint  Goal: Remains free of injury from restraints (Restraint for Interference with Medical Device)  Description: INTERVENTIONS:  1. Determine that other, less restrictive measures have been tried or would not be effective before applying the restraint  2. Evaluate the patient's condition at the time of restraint application  3. Inform patient/family regarding the reason for restraint  4. Q2H: Monitor safety, psychosocial status, comfort, nutrition and hydration  1/22/2025 0057 by Carley Xie RN  Outcome: Progressing  1/21/2025 1152 by Jeri Javed RN  Outcome: Progressing     Problem: Safety - Adult  Goal: Free from fall injury  1/22/2025 0057 by Carley Xie RN  Outcome: Progressing  1/21/2025 1152 by Jeri Javed RN  Outcome: Progressing     Problem: Skin/Tissue Integrity  Goal: Absence of new skin breakdown  Description: 1.  Monitor for areas of redness and/or skin breakdown  2.  Assess vascular access sites hourly  3.  Every 4-6 hours minimum:  Change oxygen saturation probe site  4.  Every 4-6 hours:  If on nasal continuous positive airway pressure, respiratory therapy assess nares and determine need for appliance change or resting period.  1/22/2025 0057 by Carley Xie RN  Outcome: Progressing  1/21/2025 1152 by Jeri Javed RN  Outcome: Progressing     Problem: Nutrition Deficit:  Goal: Optimize nutritional status  Outcome: Progressing  Flowsheets (Taken 1/21/2025 1229 by Molly Dan, PAULETTE, LD)  Nutrient intake appropriate for improving, restoring, or maintaining nutritional needs:

## 2025-01-22 NOTE — PLAN OF CARE
Problem: Discharge Planning  Goal: Discharge to home or other facility with appropriate resources  Outcome: Progressing     Problem: Safety - Medical Restraint  Goal: Remains free of injury from restraints (Restraint for Interference with Medical Device)  Description: INTERVENTIONS:  1. Determine that other, less restrictive measures have been tried or would not be effective before applying the restraint  2. Evaluate the patient's condition at the time of restraint application  3. Inform patient/family regarding the reason for restraint  4. Q2H: Monitor safety, psychosocial status, comfort, nutrition and hydration  Outcome: Progressing     Problem: Safety - Adult  Goal: Free from fall injury  Outcome: Progressing     Problem: Pain  Goal: Verbalizes/displays adequate comfort level or baseline comfort level  Outcome: Progressing

## 2025-01-22 NOTE — PROGRESS NOTES
Shahida Vásquez  1/22/2025  [id number]    Chief Complaint: Critical illness myopathy    Subjective    Complaints of insomnia last night, requesting sleep aid medication.     Patient reports that she is doing well. Received PRN trazodone at 22:11.  She says that she takes Ambien at home (and though not listed on home medication list, this was confirmed through [facility] report). Discussed polypharmacy concerns, and she is agreeable to trazodone PRN for now.  She is also having some lightheadedness this morning.  She is also having some diarrhea, most notable in the mornings, which has been going on since she resumed an oral diet.    Last Bowel Movement:  01/21/25        Objective    Patient Vitals for the past 24 hrs:   BP Temp Temp src Pulse Resp SpO2 Height Weight   01/22/25 0545 -- -- -- -- -- -- -- 75.8 kg (167 lb 1.6 oz)   01/21/25 2145 121/82 98.1 °F (36.7 °C) Oral 69 16 95 % -- --   01/21/25 1229 -- -- -- -- -- -- 1.422 m (4' 8\") --   01/21/25 0945 109/71 98.1 °F (36.7 °C) Oral 61 17 96 % -- --     Gen: No distress, pleasant.   HEENT: Normocephalic, atraumatic.   CV: Regular rate and rhythm. Extremities warm, well perfused.   Resp: No respiratory distress. CTAB.  Abd: Soft, nontender.  Ext: No edema.  Neuro: Alert, oriented, appropriately interactive.     Laboratory data: Available via EMR.     Therapy progress:       PT    Supine to Sit: Substantial/maximal assistance  Sit to Supine: Substantial/maximal assistance   Sit to Stand: Partial/moderate assistance  Chair/Bed to Chair Transfer: Partial/moderate assistance  Car Transfer:    Ambulation 10 ft:    Ambulation 50 ft:    Ambulation 150 ft:    Stairs - 1 Step:    Stairs - 4 Step:    Stairs - 12 Step:      OT    Eating: Setup or clean-up assistance  Oral Hygiene: Setup or clean-up assistance  Bathing: Substantial/maximal assistance  Upper Body Dressing: Partial/moderate assistance  Lower Body Dressing: Substantial/maximal assistance  Toilet Transfer:

## 2025-01-22 NOTE — PROGRESS NOTES
[facility] - Inpatient Rehabilitation Department   Phone: [phone]    Occupational Therapy    [] Initial Evaluation            [x] Daily Treatment Note         [] Discharge Summary      Patient: Shahida Vásquez   : 1960   MRN: [id number]   Date of Service:  2025    Admitting Diagnosis:  Critical illness myopathy  Current Admission Summary: Shahida Vásquez is a 64 y.o. female with PMHx notable for HTN, obesity s/p Iliana-en-Y gastric bypass, anxiety/depression , chronic lumbar back pain who presented on  with chest pain and fatigue.  She was found to have STEMI, and went emergently to Cath Lab.  She was found to have RCA occlusion, and severe right ventricular systolic dysfunction.  Impella were placed in LV and RV.  She remained intubated for airway protection.  She underwent cardiac catheterization on , had PCI to RCA which was complicated by bleeding and ventricular tachycardia.  Impella and IABP removed on .  Extubated on . CRRT stopped on 1/10, transitioned to iHD on , and now holding HD since . SLP following for dysphagia, cleared for diet after MBS on . NG tube removed. Hospital course complicated by: cardiogenic shock, acute hypoxic respiratory failure, ANALY, anemia (required 5 units pRBCs during cath, 7 units total this admission)   Past Medical History:  has a past medical history of Anxiety, Depression, Hypertension, Insomnia, Lumbar disc herniation, and Lumbar spinal stenosis.  Past Surgical History:  has a past surgical history that includes Hysterectomy; Gastric bypass surgery; Cholecystectomy; fracture surgery; lumbar nerve block (N/A, 2019); bronchoscopy (N/A, 10/10/2019); bronchoscopy (10/10/2019); Pain management procedure (Left, 2020); Forearm surgery (Left, 2020); Dilatation, esophagus; Abdomen surgery; Cardiac procedure (N/A, 2024); Cardiac procedure (N/A, 2024); Cardiac procedure (N/A, 2024); Cardiac procedure (N/A,

## 2025-01-23 ENCOUNTER — APPOINTMENT (OUTPATIENT)
Dept: GENERAL RADIOLOGY | Age: 65
DRG: 092 | End: 2025-01-23
Attending: STUDENT IN AN ORGANIZED HEALTH CARE EDUCATION/TRAINING PROGRAM
Payer: COMMERCIAL

## 2025-01-23 PROBLEM — S31.104A NON-HEALING LEFT GROIN OPEN WOUND, INITIAL ENCOUNTER: Status: ACTIVE | Noted: 2025-01-23

## 2025-01-23 PROCEDURE — 92611 MOTION FLUOROSCOPY/SWALLOW: CPT

## 2025-01-23 PROCEDURE — 6370000000 HC RX 637 (ALT 250 FOR IP): Performed by: STUDENT IN AN ORGANIZED HEALTH CARE EDUCATION/TRAINING PROGRAM

## 2025-01-23 PROCEDURE — 74230 X-RAY XM SWLNG FUNCJ C+: CPT

## 2025-01-23 PROCEDURE — 1280000000 HC REHAB R&B

## 2025-01-23 PROCEDURE — APPSS60 APP SPLIT SHARED TIME 46-60 MINUTES: Performed by: NURSE PRACTITIONER

## 2025-01-23 PROCEDURE — 2500000003 HC RX 250 WO HCPCS

## 2025-01-23 PROCEDURE — APPNB30 APP NON BILLABLE TIME 0-30 MINS: Performed by: NURSE PRACTITIONER

## 2025-01-23 PROCEDURE — 97116 GAIT TRAINING THERAPY: CPT

## 2025-01-23 PROCEDURE — 97535 SELF CARE MNGMENT TRAINING: CPT

## 2025-01-23 PROCEDURE — 97530 THERAPEUTIC ACTIVITIES: CPT

## 2025-01-23 PROCEDURE — 92526 ORAL FUNCTION THERAPY: CPT

## 2025-01-23 PROCEDURE — 97129 THER IVNTJ 1ST 15 MIN: CPT

## 2025-01-23 PROCEDURE — 97110 THERAPEUTIC EXERCISES: CPT

## 2025-01-23 RX ORDER — MIRTAZAPINE 15 MG/1
15 TABLET, FILM COATED ORAL NIGHTLY
Status: DISCONTINUED | OUTPATIENT
Start: 2025-01-24 | End: 2025-02-01 | Stop reason: HOSPADM

## 2025-01-23 RX ORDER — METOPROLOL TARTRATE 25 MG/1
25 TABLET, FILM COATED ORAL 2 TIMES DAILY
Status: DISCONTINUED | OUTPATIENT
Start: 2025-01-23 | End: 2025-01-23

## 2025-01-23 RX ORDER — METOPROLOL TARTRATE 25 MG/1
25 TABLET, FILM COATED ORAL 2 TIMES DAILY
Status: DISCONTINUED | OUTPATIENT
Start: 2025-01-23 | End: 2025-01-28

## 2025-01-23 RX ORDER — FUROSEMIDE 20 MG/1
20 TABLET ORAL DAILY
Status: DISCONTINUED | OUTPATIENT
Start: 2025-01-24 | End: 2025-01-23

## 2025-01-23 RX ORDER — FUROSEMIDE 20 MG/1
20 TABLET ORAL DAILY
Status: DISCONTINUED | OUTPATIENT
Start: 2025-01-23 | End: 2025-01-28

## 2025-01-23 RX ADMIN — APIXABAN 5 MG: 5 TABLET, FILM COATED ORAL at 20:48

## 2025-01-23 RX ADMIN — METOPROLOL TARTRATE 25 MG: 50 TABLET, FILM COATED ORAL at 20:48

## 2025-01-23 RX ADMIN — TRAZODONE HYDROCHLORIDE 50 MG: 50 TABLET ORAL at 20:48

## 2025-01-23 RX ADMIN — ACETAMINOPHEN 650 MG: 325 TABLET ORAL at 15:54

## 2025-01-23 RX ADMIN — ACETAMINOPHEN 650 MG: 325 TABLET ORAL at 08:59

## 2025-01-23 RX ADMIN — SODIUM CHLORIDE, PRESERVATIVE FREE 10 ML: 5 INJECTION INTRAVENOUS at 09:00

## 2025-01-23 RX ADMIN — Medication 100 MG: at 08:59

## 2025-01-23 RX ADMIN — CITALOPRAM HYDROBROMIDE 40 MG: 20 TABLET ORAL at 08:59

## 2025-01-23 RX ADMIN — CLOPIDOGREL BISULFATE 75 MG: 75 TABLET ORAL at 08:59

## 2025-01-23 RX ADMIN — SODIUM CHLORIDE, PRESERVATIVE FREE 10 ML: 5 INJECTION INTRAVENOUS at 20:48

## 2025-01-23 RX ADMIN — METOPROLOL TARTRATE 25 MG: 50 TABLET, FILM COATED ORAL at 09:05

## 2025-01-23 RX ADMIN — FUROSEMIDE 20 MG: 40 TABLET ORAL at 09:05

## 2025-01-23 RX ADMIN — MIRTAZAPINE 15 MG: 15 TABLET, FILM COATED ORAL at 08:59

## 2025-01-23 RX ADMIN — LOPERAMIDE HYDROCHLORIDE 2 MG: 2 CAPSULE ORAL at 20:48

## 2025-01-23 RX ADMIN — APIXABAN 5 MG: 5 TABLET, FILM COATED ORAL at 08:59

## 2025-01-23 RX ADMIN — PANTOPRAZOLE SODIUM 40 MG: 40 TABLET, DELAYED RELEASE ORAL at 06:54

## 2025-01-23 ASSESSMENT — PAIN DESCRIPTION - ORIENTATION
ORIENTATION: LEFT

## 2025-01-23 ASSESSMENT — PAIN DESCRIPTION - DESCRIPTORS
DESCRIPTORS: ACHING

## 2025-01-23 ASSESSMENT — PAIN SCALES - GENERAL
PAINLEVEL_OUTOF10: 0
PAINLEVEL_OUTOF10: 9

## 2025-01-23 ASSESSMENT — PAIN DESCRIPTION - LOCATION
LOCATION: LEG
LOCATION: ABDOMEN
LOCATION: LEG

## 2025-01-23 ASSESSMENT — PAIN SCALES - WONG BAKER
WONGBAKER_NUMERICALRESPONSE: NO HURT
WONGBAKER_NUMERICALRESPONSE: NO HURT

## 2025-01-23 NOTE — PROGRESS NOTES
[facility] - Inpatient Rehabilitation Department   Phone: [phone]    Speech Therapy    [] Initial Evaluation            [x] Daily Treatment Note         [] Discharge Summary      Patient: Shahida Vásquez   : 1960   MRN: [id number]   Date of Service:  2025  Admitting Diagnosis: Critical illness myopathy  Current Admission Summary: See MD report   Past Medical History:  has a past medical history of Anxiety, Depression, Hypertension, Insomnia, Lumbar disc herniation, and Lumbar spinal stenosis.  Past Surgical History:  has a past surgical history that includes Hysterectomy; Gastric bypass surgery; Cholecystectomy; fracture surgery; lumbar nerve block (N/A, 2019); bronchoscopy (N/A, 10/10/2019); bronchoscopy (10/10/2019); Pain management procedure (Left, 2020); Forearm surgery (Left, 2020); Dilatation, esophagus; Abdomen surgery; Cardiac procedure (N/A, 2024); Cardiac procedure (N/A, 2024); Cardiac procedure (N/A, 2024); Cardiac procedure (N/A, 2024); Cardiac procedure (N/A, 2025); Cardiac procedure (N/A, 2025); Cardiac procedure (N/A, 2025); invasive vascular (N/A, 2025); Cardiac procedure (N/A, 2025); Cardiac procedure (N/A, 2025); Cardiac procedure (N/A, 2025); invasive vascular (N/A, 2025); Cardiac procedure (N/A, 2025); invasive vascular (N/A, 1/3/2025); and Cardiac procedure (N/A, 2025).  Recent Head CT: 2025  IMPRESSION:  1. No acute intracranial abnormality.  2. Mild global parenchymal volume loss with chronic microvascular ischemic  changes.  3. Scattered atherosclerosis of the intracranial vasculature.    Recent Chest xray: 2025  IMPRESSION:  No acute cardiopulmonary disease.  Instrumental Swallow Study:   Re-peat Modified Barium Swallow evaluation completed on 2025.Patient presents with improved swallow function from previous MBS, tolerating thins without observed aspiration during the  procedure via tsp, cup and straw. Pt with mild oropharyngeal dysphagia characterized by prolonged mastication of regular textures, suspect lack of dentition is partially contributing but also suspect reduced endurance is contributing with pt needing to utilize a liquid wash to further soften regular texture to aid in oral clearance. Pt's swallow function timely with mostly adequate airway protection. Pt with slightly reduced base of tongue retraction contributing to piecemeal swallows. Pt tolerated thins via straw in isolation and when provided in combination of an advance texture, however, on second piecemeal swallow pooling noted to the level of the pyriforms prior to the initiation of a swallow. Pt showed flash penetration with thins via straw when utilized with a barium tablet with secondary piecemeal swallow showing slightly deeper penetration that was cleared Independently. Recommend advancement to thins. Patient is at an increased risk of penetration with thins via straw given with meds. Monitor to ensure tolerance.   Modified Barium Swallow evaluation completed on 1/14/2025.Patient presents with moderate oropharyngeal dysphagia secondary to decreased labial seal, impaired/decreased oral manipulation, impaired/prolonged A-P bolus transit and mastication, premature bolus loss, pharyngeal pooling, inconsistent swallow timing, minimally reduced tongue base retraction, decreased epiglottic inversion, decreased laryngeal elevation, decreased laryngeal vestibule closure, decreased pharyngoesophageal segment opening, reduced laryngeal sensation complicated by prolonged intubation, deconditioning, prolonged NPO, poor dentition resulting in observed  anterior bolus loss with liquids, prolonged oral phase with oral residue, episodes of laryngeal penetration and aspiration with thin liquids and pharyngeal residue. Laryngeal penetration and aspiration of thin liquids appeared to be due to premature bolus loss with

## 2025-01-23 NOTE — PLAN OF CARE
[facility]        Assessment & Plan of Care    History of Present Illness: Ms. Vásquez is a 64 y.o. female who was originally admitted for chest pain/STEMI and underwent PCI. She also had an Impella in place in the left groin which was removed on 1/8/2025. She also developed a hematoma at this site which has spontaneously opened. She is currently in ARU. Wound care RN has been following and requested general surgery consult for possible debridement. The patient denies significant pain but does have some discomfort along the medical aspect of her left upper thigh which is indurated. She is currently anticoagulated on Eliquis.    Physical Exam:  CONSTITUTIONAL:  alert, no apparent distress and moderately obese  NEUROLOGIC:  Mental Status Exam:  Level of Alertness:   awake  Orientation:   person, place, time  EYES:  sclera clear  ENT:  normocepalic, without obvious abnormality  NECK:  supple, symmetrical, trachea midline  LUNGS:  clear to auscultation  CARDIOVASCULAR:  regular rate and rhythm and no murmur noted  ABDOMEN: soft, non-distended, non-tender  Extremities: no edema  SKIN: left upper thigh with induration extending to the medical aspect of the thigh, open left groin wound, visible clot, small fibrinous debris, no significant drainage--NS wet-to-dry dressing placed, see image below:      Assessment:  Left groin wound    Plan:  1. Local wound care with NS wet-to-dry dressings BID, possible bedside debridement  2. Regular diet with supplements as tolerated  3. Activity as tolerated  4. PRN analgesics and antiemetics--minimizing narcotics as tolerated  5. Continue Eliquis  6. Management of medical comorbid etiologies per primary team and consulting services    EDUCATION:  Educated patient on plan of care and disease process--all questions answered.    Plans discussed with patient and nursing.  Reviewed and discussed with Dr. Feldman--full consult to

## 2025-01-23 NOTE — CARE COORDINATION
Team conference/Weekly Summary         Team conference held today.  Patient's discharge date is 02/01/2025.  Patient is from home where she lives alone.  She is A&Ox4.  Current recommendation for patient's discharge is SNF.  Patient expressed that she would rather discharge home.  SW, doctor and therapist informed patient about possible safety concerns discharging home.  Patient reported she would like to try to get stronger and see where she is at next week before discussing this further.  SW will follow up with patient and therapy early next week to see if their recommendations have changed.    Electronically signed by FRIDA Mitchell, DYLAN on 1/23/2025 at 2:35 PM

## 2025-01-23 NOTE — PROCEDURES
Facility/Department: [facility] ACUTE REHAB UNIT  MODIFIED BARIUM SWALLOW EVALUATION    Patient: Shahida Vásquez   : 1960   MRN: [id number]      Evaluation Date: 2025   Admitting Diagnosis: Critical illness myopathy [G72.81]  Treatment Diagnosis: Dysphagia   Pain:  Denies                           Ordering MD: Dr. Brendan Young   Radiologist: Dr. Peña Freitas   Date of Evaluation: 2025  Type of Study: Modified Barium Swallowing Study (MBS)  Diet Prior to Study:  Dysphagia III Soft and bite sized  Mildly (nectar) thick liquids    Reason for referral/HPI: The patient presents for instrumental swallow study to re-evaluate oropharyngeal swallow function, assess aspiration risk, and determine least restrictive diet/plan of care. Pt with initial MBS completed 25 (see report for details).     Impression:  Re-peat Modified Barium Swallow evaluation completed on 2025.Patient presents with improved swallow function from previous MBS, tolerating thins without observed aspiration during the procedure via tsp, cup and straw. Pt with mild oropharyngeal dysphagia characterized by prolonged mastication of regular textures, suspect lack of dentition is partially contributing but also suspect reduced endurance is contributing with pt needing to utilize a liquid wash to further soften regular texture to aid in oral clearance. Pt's swallow function timely with mostly adequate airway protection. Pt with slightly reduced base of tongue retraction contributing to piecemeal swallows. Pt tolerated thins via straw in isolation and when provided in combination of an advance texture, however, on second piecemeal swallow pooling noted to the level of the pyriforms prior to the initiation of a swallow. Pt showed flash penetration with thins via straw when utilized with a barium tablet with secondary piecemeal swallow showing slightly deeper penetration that was cleared Independently. Recommend advancement to thins.

## 2025-01-23 NOTE — CARE COORDINATION
Case Management Assessment  Initial Evaluation    Date/Time of Evaluation: 1/23/2025 2:30 PM  Assessment Completed by: FRIDA Mitchell, CATHYW    If patient is discharged prior to next notation, then this note serves as note for discharge by case management.    Patient Name: Shahida Vásquez                   YOB: 1960  Diagnosis: Critical illness myopathy [G72.81]                   Date / Time: 1/20/2025  5:49 PM    Patient Admission Status: REHAB IP   Readmission Risk (Low < 19, Mod (19-27), High > 27): Readmission Risk Score: 22    Current PCP: Demetrice Saldaña, DO  PCP verified by CM? Yes    Chart Reviewed: Yes      History Provided by: Patient  Patient Orientation: Alert and Oriented    Patient Cognition: Alert    Hospitalization in the last 30 days (Readmission):  No    If yes, Readmission Assessment in CM Navigator will be completed.    Advance Directives:      Code Status: DNR-CCA   Patient's Primary Decision Maker is:      Primary Decision Maker: Claudia Vásquez  Child - [phone]    Secondary Decision Maker: Carlos A Vásquez  Child - [phone]    Discharge Planning:    Patient lives with: Alone Type of Home: House (1 level - 2 steps)  Primary Care Giver: Self  Patient Support Systems include: Children   Current Financial resources: None  Current community resources: None  Current services prior to admission: Durable Medical Equipment            Current DME: Other (Comment) (4WW, cane, grab bars, shower chair)            Type of Home Care services:  None    ADLS  Prior functional level: Independent in ADLs/IADLs  Current functional level: Mobility    PT AM-PAC:   /24  OT AM-PAC:   /24    Family can provide assistance at DC: Other (comment) (unsure at this time)  Would you like Case Management to discuss the discharge plan with any other family members/significant others, and if so, who? Yes (w/emergency contacts)  Plans to Return to Present Housing: Unknown at present  Other Identified

## 2025-01-23 NOTE — PATIENT CARE CONFERENCE
[facility] Inpatient Rehabilitation Department  Weekly Team Conference Note    Patient Name: Shahida Vásquez      MRN: [id number]  : 1960  (64 y.o.) Gender: female     The team conference for this patient was held on 25 at 1100 am and was led by:  Brendan Young MD     CASE MANAGEMENT:  Assessment: Patient's discharge date is 2025.  Patient is from home where she lives alone.  She is A&Ox4.  Current recommendation for patient's discharge is SNF.  Patient expressed that she would rather discharge home.  SW, doctor and therapist informed patient about possible safety concerns discharging home.  Patient reported she would like to try to get stronger and see where she is at next week before discussing this further.  SW will follow up with patient and therapy early next week to see if their recommendations have changed.    PHYSICAL THERAPY    Current Status:  Bed Mobility:  Supine to Sit: maximum assistance  Sit to Supine: maximum assistance  Rolling Left: minimal assistance  Rolling Right: minimal assistance  Scooting: contact guard assistance  Comments: HOB slightly elevated, mild initiation to EOB, requires assistance with rolling and upright positioning to sit EOB. Assistance required at B LE to return to supine   Transfers:  Sit to stand transfer: minimal assistance, moderate assistance  Stand to sit transfer: moderate assistance  Stand step transfer: moderate assistance  Comments: Patient completes STS from EOB and recliner chair with RW placed in front. Patient reports lightheadedness upon standing with all transfers   Ambulation:  Surface:level surface  Assistive Device: rolling walker  Assistance: minimal assistance, moderate assistance  Distance: 5 ft + 5 ft  Gait Mechanics: Very shaky, decreased step height and length, increased med/lat sway, needs to sit quickly  Comments:  Max encouragement, significantly decreased endurance. Recommend w/c follow for attempts at  typically needs to write things down. Pt's speech and vocal quality were functional despite recent/significant dysphonia. Pt reported dysphonia improved significantly when her NG was removed. She feels her voice is now back to baseline. Will continue to monitor voice quality and provide intervention as appropriate. She has progressed to a soft and bite sized diet which has some limitations due to her lack of dentition. She is demonstrating improved tolerance at bedside of thin liquids but has known silent aspiration therefore warrants repeat MBSS (to be completed today 1/23).  Factors facilitating achievement of goals:  family support   Barriers to achievement of goals:  Blood pressure and chronic low back pain, generalized weakness/debility, cog deficits, modified diet     Interventions to address Barriers:  ADL/IADL training with AE as needed, transfer and mobility training, activities to address strength/endurance and balance, cognitive intervention/ compensatory cognitive intervention, dysphagia intervention   Goals still appropriate:  Yes  Modifications to goals: None  Continue Current Plan of Care: No  Modifications to Plan of Care: change discharge disposition to SNF    Rehab Team Members in attendance for Team Conference:  Fab Vazquez, MSW, LSW    Molly Dan, RD, LD    Laly Blair, OTR/VERÓNICA Luna, PT    Keshia Dowling M.A., CCC-SLP    MERE FlemingN, RN, CRRN (Charge RN)    Janina Ramsey, MIKE Soto PT, DPT,     Scribe:  Karlos Soto I, the Rehab Physician, led the above team conference and agree with all decisions made, and approve the established interdisciplinary plan of care as documented within the medical record of Shahida Vásquez.    Brendan Young MD

## 2025-01-23 NOTE — PROGRESS NOTES
[facility] - Inpatient Rehabilitation Department   Phone: [phone]    Occupational Therapy    [] Initial Evaluation            [x] Daily Treatment Note         [] Discharge Summary      Patient: Shahida Vásquez   : 1960   MRN: [id number]   Date of Service:  2025    Admitting Diagnosis:  Critical illness myopathy  Current Admission Summary: Shahida Vásquez is a 64 y.o. female with PMHx notable for HTN, obesity s/p Iliana-en-Y gastric bypass, anxiety/depression , chronic lumbar back pain who presented on  with chest pain and fatigue.  She was found to have STEMI, and went emergently to Cath Lab.  She was found to have RCA occlusion, and severe right ventricular systolic dysfunction.  Impella were placed in LV and RV.  She remained intubated for airway protection.  She underwent cardiac catheterization on , had PCI to RCA which was complicated by bleeding and ventricular tachycardia.  Impella and IABP removed on .  Extubated on . CRRT stopped on 1/10, transitioned to iHD on , and now holding HD since . SLP following for dysphagia, cleared for diet after MBS on . NG tube removed. Hospital course complicated by: cardiogenic shock, acute hypoxic respiratory failure, ANALY, anemia (required 5 units pRBCs during cath, 7 units total this admission)   Past Medical History:  has a past medical history of Anxiety, Depression, Hypertension, Insomnia, Lumbar disc herniation, and Lumbar spinal stenosis.  Past Surgical History:  has a past surgical history that includes Hysterectomy; Gastric bypass surgery; Cholecystectomy; fracture surgery; lumbar nerve block (N/A, 2019); bronchoscopy (N/A, 10/10/2019); bronchoscopy (10/10/2019); Pain management procedure (Left, 2020); Forearm surgery (Left, 2020); Dilatation, esophagus; Abdomen surgery; Cardiac procedure (N/A, 2024); Cardiac procedure (N/A, 2024); Cardiac procedure (N/A, 2024); Cardiac procedure (N/A,

## 2025-01-23 NOTE — PROGRESS NOTES
[facility] - Inpatient Rehabilitation Department   Phone: [phone]    Physical Therapy    [] Initial Evaluation            [x] Daily Treatment Note         [] Discharge Summary      Patient: Shahida Vásquez   : 1960   MRN: [id number]   Date of Service:  2025  Admitting Diagnosis: Critical illness myopathy  Current Admission Summary: Shahida Vásquez is a 64 y.o. female with PMHx notable for HTN, obesity s/p Iliana-en-Y gastric bypass, anxiety/depression , chronic lumbar back pain who presented on  with chest pain and fatigue.  She was found to have STEMI, and went emergently to Cath Lab.  She was found to have RCA occlusion, and severe right ventricular systolic dysfunction.  Impella were placed in LV and RV.  She remained intubated for airway protection.  She underwent cardiac catheterization on , had PCI to RCA which was complicated by bleeding and ventricular tachycardia.  Impella and IABP removed on .  Extubated on . CRRT stopped on 1/10, transitioned to iHD on , and now holding HD since . SLP following for dysphagia, cleared for diet after MBS on . NG tube removed on . Hospital course complicated by: cardiogenic shock, acute hypoxic respiratory failure, ANALY, anemia (required 5 units pRBCs during cath, 7 units total this admission).      Patient reports that she is doing well today overall.  She is feeling tired after working with therapy this morning.  She is having some bleeding from her left groin wound which is concerning her.  She denies any fevers/chills, chest pain, palpitations, dyspnea.  Her appetite continues to improve. Last BM (including prior to admit): 25   Past Medical History:  has a past medical history of Anxiety, Depression, Hypertension, Insomnia, Lumbar disc herniation, and Lumbar spinal stenosis.  Past Surgical History:  has a past surgical history that includes Hysterectomy; Gastric bypass surgery; Cholecystectomy; fracture surgery;

## 2025-01-23 NOTE — PROGRESS NOTES
Shahida Vásquez  1/23/2025  [id number]    Chief Complaint: Critical illness myopathy    Subjective    No acute events overnight.     Patient reports that she is doing well. Complains of tremor, concerned she isn't on propranolol. Noting dizziness while up with therapy today. She reports her mood is stable.     Last Bowel Movement:  01/22/25, used Imodium as needed x 1 last evening.        Objective    Patient Vitals for the past 24 hrs:   BP Temp Temp src Pulse Resp SpO2   01/23/25 0845 (!) 102/59 97 °F (36.1 °C) Oral 72 18 95 %   01/22/25 2100 120/72 98.3 °F (36.8 °C) Oral 74 16 95 %     Gen: No distress, pleasant.   HEENT: Normocephalic, atraumatic.   CV: Regular rate and rhythm. Extremities warm, well perfused.   Resp: No respiratory distress. CTAB.  Abd: Soft, nontender.  Ext: No edema.  Neuro: Alert, oriented, appropriately interactive.     Laboratory data: Available via EMR.     Therapy progress:       PT    Supine to Sit: Substantial/maximal assistance  Sit to Supine: Substantial/maximal assistance   Sit to Stand: Partial/moderate assistance  Chair/Bed to Chair Transfer: Partial/moderate assistance  Car Transfer:    Ambulation 10 ft:    Ambulation 50 ft:    Ambulation 150 ft:    Stairs - 1 Step:    Stairs - 4 Step:    Stairs - 12 Step:      OT    Eating: Setup or clean-up assistance  Oral Hygiene: Setup or clean-up assistance  Bathing: Substantial/maximal assistance  Upper Body Dressing: Partial/moderate assistance  Lower Body Dressing: Substantial/maximal assistance  Toilet Transfer: Partial/moderate assistance  Toilet Hygiene: Substantial/maximal assistance    Speech Therapy    Pt presents with mild-moderate cognitive linguistic deficits. Pt with reduced attention and reasoning. She demonstrated improved insight into her situation this date. Pt also presents with reduced memory but reported she has trouble with recall at baseline, typically needs to write things down. Pt's speech and vocal quality were

## 2025-01-24 ENCOUNTER — APPOINTMENT (OUTPATIENT)
Dept: CT IMAGING | Age: 65
DRG: 092 | End: 2025-01-24
Attending: STUDENT IN AN ORGANIZED HEALTH CARE EDUCATION/TRAINING PROGRAM
Payer: COMMERCIAL

## 2025-01-24 LAB
ANION GAP SERPL CALCULATED.3IONS-SCNC: 9 MMOL/L (ref 3–16)
BASOPHILS # BLD: 0.1 K/UL (ref 0–0.2)
BASOPHILS NFR BLD: 0.6 %
BUN SERPL-MCNC: 25 MG/DL (ref 7–20)
CALCIUM SERPL-MCNC: 7.9 MG/DL (ref 8.3–10.6)
CHLORIDE SERPL-SCNC: 106 MMOL/L (ref 99–110)
CO2 SERPL-SCNC: 23 MMOL/L (ref 21–32)
CREAT SERPL-MCNC: 1 MG/DL (ref 0.6–1.2)
DEPRECATED RDW RBC AUTO: 22.6 % (ref 12.4–15.4)
EOSINOPHIL # BLD: 0.1 K/UL (ref 0–0.6)
EOSINOPHIL NFR BLD: 1.6 %
FERRITIN SERPL IA-MCNC: 283 NG/ML (ref 15–150)
GFR SERPLBLD CREATININE-BSD FMLA CKD-EPI: 63 ML/MIN/{1.73_M2}
GLUCOSE SERPL-MCNC: 96 MG/DL (ref 70–99)
HCT VFR BLD AUTO: 24.9 % (ref 36–48)
HGB BLD-MCNC: 8 G/DL (ref 12–16)
IRON SATN MFR SERPL: 13 % (ref 15–50)
IRON SERPL-MCNC: 29 UG/DL (ref 37–145)
LYMPHOCYTES # BLD: 0.9 K/UL (ref 1–5.1)
LYMPHOCYTES NFR BLD: 10.4 %
MCH RBC QN AUTO: 30.2 PG (ref 26–34)
MCHC RBC AUTO-ENTMCNC: 32.3 G/DL (ref 31–36)
MCV RBC AUTO: 93.5 FL (ref 80–100)
MONOCYTES # BLD: 0.8 K/UL (ref 0–1.3)
MONOCYTES NFR BLD: 9.3 %
NEUTROPHILS # BLD: 6.5 K/UL (ref 1.7–7.7)
NEUTROPHILS NFR BLD: 78.1 %
PLATELET # BLD AUTO: 430 K/UL (ref 135–450)
PMV BLD AUTO: 7.4 FL (ref 5–10.5)
POTASSIUM SERPL-SCNC: 3.7 MMOL/L (ref 3.5–5.1)
RBC # BLD AUTO: 2.66 M/UL (ref 4–5.2)
SODIUM SERPL-SCNC: 138 MMOL/L (ref 136–145)
TIBC SERPL-MCNC: 230 UG/DL (ref 260–445)
WBC # BLD AUTO: 8.4 K/UL (ref 4–11)

## 2025-01-24 PROCEDURE — 83540 ASSAY OF IRON: CPT

## 2025-01-24 PROCEDURE — 2500000003 HC RX 250 WO HCPCS

## 2025-01-24 PROCEDURE — 11042 DBRDMT SUBQ TIS 1ST 20SQCM/<: CPT | Performed by: SURGERY

## 2025-01-24 PROCEDURE — 87184 SC STD DISK METHOD PER PLATE: CPT

## 2025-01-24 PROCEDURE — 83550 IRON BINDING TEST: CPT

## 2025-01-24 PROCEDURE — 97530 THERAPEUTIC ACTIVITIES: CPT

## 2025-01-24 PROCEDURE — 85025 COMPLETE CBC W/AUTO DIFF WBC: CPT

## 2025-01-24 PROCEDURE — 0JB83ZZ EXCISION OF ABDOMEN SUBCUTANEOUS TISSUE AND FASCIA, PERCUTANEOUS APPROACH: ICD-10-PCS | Performed by: SURGERY

## 2025-01-24 PROCEDURE — 97130 THER IVNTJ EA ADDL 15 MIN: CPT

## 2025-01-24 PROCEDURE — 6370000000 HC RX 637 (ALT 250 FOR IP): Performed by: STUDENT IN AN ORGANIZED HEALTH CARE EDUCATION/TRAINING PROGRAM

## 2025-01-24 PROCEDURE — 97110 THERAPEUTIC EXERCISES: CPT

## 2025-01-24 PROCEDURE — 87070 CULTURE OTHR SPECIMN AEROBIC: CPT

## 2025-01-24 PROCEDURE — APPNB30 APP NON BILLABLE TIME 0-30 MINS: Performed by: NURSE PRACTITIONER

## 2025-01-24 PROCEDURE — 0JC83ZZ EXTIRPATION OF MATTER FROM ABDOMEN SUBCUTANEOUS TISSUE AND FASCIA, PERCUTANEOUS APPROACH: ICD-10-PCS | Performed by: SURGERY

## 2025-01-24 PROCEDURE — 87077 CULTURE AEROBIC IDENTIFY: CPT

## 2025-01-24 PROCEDURE — 6370000000 HC RX 637 (ALT 250 FOR IP): Performed by: SURGERY

## 2025-01-24 PROCEDURE — 82728 ASSAY OF FERRITIN: CPT

## 2025-01-24 PROCEDURE — 1280000000 HC REHAB R&B

## 2025-01-24 PROCEDURE — 92526 ORAL FUNCTION THERAPY: CPT

## 2025-01-24 PROCEDURE — 94760 N-INVAS EAR/PLS OXIMETRY 1: CPT

## 2025-01-24 PROCEDURE — 87186 SC STD MICRODIL/AGAR DIL: CPT

## 2025-01-24 PROCEDURE — 72192 CT PELVIS W/O DYE: CPT

## 2025-01-24 PROCEDURE — 73700 CT LOWER EXTREMITY W/O DYE: CPT

## 2025-01-24 PROCEDURE — 87205 SMEAR GRAM STAIN: CPT

## 2025-01-24 PROCEDURE — 97535 SELF CARE MNGMENT TRAINING: CPT

## 2025-01-24 PROCEDURE — 36415 COLL VENOUS BLD VENIPUNCTURE: CPT

## 2025-01-24 PROCEDURE — 97129 THER IVNTJ 1ST 15 MIN: CPT

## 2025-01-24 PROCEDURE — 11045 DBRDMT SUBQ TISS EACH ADDL: CPT | Performed by: SURGERY

## 2025-01-24 PROCEDURE — 80048 BASIC METABOLIC PNL TOTAL CA: CPT

## 2025-01-24 PROCEDURE — 97116 GAIT TRAINING THERAPY: CPT

## 2025-01-24 RX ORDER — HYDROCODONE BITARTRATE AND ACETAMINOPHEN 5; 325 MG/1; MG/1
1 TABLET ORAL EVERY 6 HOURS PRN
Status: DISCONTINUED | OUTPATIENT
Start: 2025-01-24 | End: 2025-02-01 | Stop reason: HOSPADM

## 2025-01-24 RX ADMIN — LOPERAMIDE HYDROCHLORIDE 2 MG: 2 CAPSULE ORAL at 21:39

## 2025-01-24 RX ADMIN — ACETAMINOPHEN 650 MG: 325 TABLET ORAL at 21:39

## 2025-01-24 RX ADMIN — METOPROLOL TARTRATE 25 MG: 50 TABLET, FILM COATED ORAL at 21:39

## 2025-01-24 RX ADMIN — MIRTAZAPINE 15 MG: 15 TABLET, FILM COATED ORAL at 21:39

## 2025-01-24 RX ADMIN — HYDROCODONE BITARTRATE AND ACETAMINOPHEN 1 TABLET: 5; 325 TABLET ORAL at 16:37

## 2025-01-24 RX ADMIN — Medication 100 MG: at 10:23

## 2025-01-24 RX ADMIN — SODIUM CHLORIDE, PRESERVATIVE FREE 10 ML: 5 INJECTION INTRAVENOUS at 12:15

## 2025-01-24 RX ADMIN — CLOPIDOGREL BISULFATE 75 MG: 75 TABLET ORAL at 10:23

## 2025-01-24 RX ADMIN — FUROSEMIDE 20 MG: 40 TABLET ORAL at 10:23

## 2025-01-24 RX ADMIN — SODIUM CHLORIDE, PRESERVATIVE FREE 10 ML: 5 INJECTION INTRAVENOUS at 21:40

## 2025-01-24 RX ADMIN — APIXABAN 5 MG: 5 TABLET, FILM COATED ORAL at 21:39

## 2025-01-24 RX ADMIN — PANTOPRAZOLE SODIUM 40 MG: 40 TABLET, DELAYED RELEASE ORAL at 05:01

## 2025-01-24 RX ADMIN — TRAZODONE HYDROCHLORIDE 50 MG: 50 TABLET ORAL at 21:39

## 2025-01-24 RX ADMIN — ACETAMINOPHEN 650 MG: 325 TABLET ORAL at 14:41

## 2025-01-24 RX ADMIN — SODIUM HYPOCHLORITE: 2.5 SOLUTION TOPICAL at 17:45

## 2025-01-24 RX ADMIN — APIXABAN 5 MG: 5 TABLET, FILM COATED ORAL at 10:23

## 2025-01-24 RX ADMIN — CITALOPRAM HYDROBROMIDE 40 MG: 20 TABLET ORAL at 10:23

## 2025-01-24 ASSESSMENT — PAIN DESCRIPTION - LOCATION
LOCATION: KNEE

## 2025-01-24 ASSESSMENT — PAIN DESCRIPTION - ORIENTATION
ORIENTATION: RIGHT

## 2025-01-24 ASSESSMENT — PAIN SCALES - GENERAL
PAINLEVEL_OUTOF10: 0
PAINLEVEL_OUTOF10: 5
PAINLEVEL_OUTOF10: 0
PAINLEVEL_OUTOF10: 10
PAINLEVEL_OUTOF10: 10
PAINLEVEL_OUTOF10: 7

## 2025-01-24 ASSESSMENT — PAIN DESCRIPTION - DESCRIPTORS: DESCRIPTORS: THROBBING

## 2025-01-24 NOTE — PLAN OF CARE
Problem: Discharge Planning  Goal: Discharge to home or other facility with appropriate resources  1/24/2025 0126 by Lea Houser RN  Outcome: Progressing     Problem: Safety - Medical Restraint  Goal: Remains free of injury from restraints (Restraint for Interference with Medical Device)  Description: INTERVENTIONS:  1. Determine that other, less restrictive measures have been tried or would not be effective before applying the restraint  2. Evaluate the patient's condition at the time of restraint application  3. Inform patient/family regarding the reason for restraint  4. Q2H: Monitor safety, psychosocial status, comfort, nutrition and hydration  1/24/2025 0126 by Lea Houser, RN  Outcome: Progressing     Problem: Safety - Adult  Goal: Free from fall injury  1/24/2025 0126 by Lea Houser RN  Outcome: Progressing     Problem: Skin/Tissue Integrity  Goal: Absence of new skin breakdown  Description: 1.  Monitor for areas of redness and/or skin breakdown  2.  Assess vascular access sites hourly  3.  Every 4-6 hours minimum:  Change oxygen saturation probe site  4.  Every 4-6 hours:  If on nasal continuous positive airway pressure, respiratory therapy assess nares and determine need for appliance change or resting period.  Outcome: Progressing     Problem: Nutrition Deficit:  Goal: Optimize nutritional status  Outcome: Progressing     Problem: Pain  Goal: Verbalizes/displays adequate comfort level or baseline comfort level  1/24/2025 0126 by Lea Houser, RN  Outcome: Progressing     Problem: ABCDS Injury Assessment  Goal: Absence of physical injury  Outcome: Progressing

## 2025-01-24 NOTE — PLAN OF CARE

## 2025-01-24 NOTE — PROGRESS NOTES
[facility] - Inpatient Rehabilitation Department   Phone: [phone]    Speech Therapy    [] Initial Evaluation            [x] Daily Treatment Note         [x] Discharge Summary      Patient: Shahida Vásquez   : 1960   MRN: [id number]   Date of Service:  2025  Admitting Diagnosis: Critical illness myopathy  Current Admission Summary: See MD report   Past Medical History:  has a past medical history of Anxiety, Depression, Hypertension, Insomnia, Lumbar disc herniation, and Lumbar spinal stenosis.  Past Surgical History:  has a past surgical history that includes Hysterectomy; Gastric bypass surgery; Cholecystectomy; fracture surgery; lumbar nerve block (N/A, 2019); bronchoscopy (N/A, 10/10/2019); bronchoscopy (10/10/2019); Pain management procedure (Left, 2020); Forearm surgery (Left, 2020); Dilatation, esophagus; Abdomen surgery; Cardiac procedure (N/A, 2024); Cardiac procedure (N/A, 2024); Cardiac procedure (N/A, 2024); Cardiac procedure (N/A, 2024); Cardiac procedure (N/A, 2025); Cardiac procedure (N/A, 2025); Cardiac procedure (N/A, 2025); invasive vascular (N/A, 2025); Cardiac procedure (N/A, 2025); Cardiac procedure (N/A, 2025); Cardiac procedure (N/A, 2025); invasive vascular (N/A, 2025); Cardiac procedure (N/A, 2025); invasive vascular (N/A, 1/3/2025); and Cardiac procedure (N/A, 2025).  Recent Head CT: 2025  IMPRESSION:  1. No acute intracranial abnormality.  2. Mild global parenchymal volume loss with chronic microvascular ischemic  changes.  3. Scattered atherosclerosis of the intracranial vasculature.    Recent Chest xray: 2025  IMPRESSION:  No acute cardiopulmonary disease.  Instrumental Swallow Study:   Re-peat Modified Barium Swallow evaluation completed on 2025.Patient presents with improved swallow function from previous MBS, tolerating thins without observed aspiration during the  repetition. Required occ-min cues to expand lists beyond higher frequency items/names   - Pt alternated attention to recall correlation of card suit and category representation (with use of key per her baseline memory deficit)     Attention and processing   - Matched various shapes/objects to alternating up to 25 items   - Required min cues for comprehension of task   - Completed with appropriate processing speed   - Attended to the task appropriately with good alternating attention     Functional recall   - Appropriately recalled recent events and medical recommendations   - Appropriately recalled details of her current situation, temporal and location concepts   - Pt able to recall names of 2 of her current medications and reasoning for up to 7 daily medications     Insight  - Pt insightful to baseline memory difficulties in comparison to tasks completed   - Pt insightful to modifications she plans to make upon discharge to compensate for physical limitations (I.e. driving, grocery deliveries, disability from work)   - Reported she has discussed with her two children who agree that she has returned to her cognitive baseline. She does not feel she needs any further SLP intervention     Additional Interventions:        Education  Barriers To Learning: cognition  Patient Education: Provided education regarding role of SLP, results of assessment, recommendations and general speech pathology plan of care.   Learning Assessment: Pt verbalized understanding   Pt requires ongoing learning     Assessment  Impairments Requiring Therapeutic Intervention: Cognitive-Linguistic Deficits  and Oropharyngeal Dysphagia   Prognosis: good    Clinical Assessment:  Pt has met dysphagia and cognitive linguistic goals. She reported her cognition has returned to baseline and no longer wants to participate in SLP intervention. She has progressed to her baseline diet of soft foods after completion of MBS. She has demonstrated adequate

## 2025-01-24 NOTE — CARE COORDINATION
Wound Care Consult:  Received consult for hematoma and skin breakdown to thigh. Noted general surgery is now following and has placed orders for wound care. Will defer wound care to their service and DC wound care consult.  Yuli Hickman RN, CWON-AP

## 2025-01-24 NOTE — PROGRESS NOTES
[facility] - Inpatient Rehabilitation Department   Phone: [phone]    Physical Therapy    [] Initial Evaluation            [x] Daily Treatment Note         [] Discharge Summary      Patient: Shahida Vásquez   : 1960   MRN: [id number]   Date of Service:  2025  Admitting Diagnosis: Critical illness myopathy  Current Admission Summary: Shahida Vásquez is a 64 y.o. female with PMHx notable for HTN, obesity s/p Iliana-en-Y gastric bypass, anxiety/depression , chronic lumbar back pain who presented on  with chest pain and fatigue.  She was found to have STEMI, and went emergently to Cath Lab.  She was found to have RCA occlusion, and severe right ventricular systolic dysfunction.  Impella were placed in LV and RV.  She remained intubated for airway protection.  She underwent cardiac catheterization on , had PCI to RCA which was complicated by bleeding and ventricular tachycardia.  Impella and IABP removed on .  Extubated on . CRRT stopped on 1/10, transitioned to iHD on , and now holding HD since . SLP following for dysphagia, cleared for diet after MBS on . NG tube removed on . Hospital course complicated by: cardiogenic shock, acute hypoxic respiratory failure, ANALY, anemia (required 5 units pRBCs during cath, 7 units total this admission).      Patient reports that she is doing well today overall.  She is feeling tired after working with therapy this morning.  She is having some bleeding from her left groin wound which is concerning her.  She denies any fevers/chills, chest pain, palpitations, dyspnea.  Her appetite continues to improve. Last BM (including prior to admit): 25   Past Medical History:  has a past medical history of Anxiety, Depression, Hypertension, Insomnia, Lumbar disc herniation, and Lumbar spinal stenosis.  Past Surgical History:  has a past surgical history that includes Hysterectomy; Gastric bypass surgery; Cholecystectomy; fracture surgery;

## 2025-01-24 NOTE — PROGRESS NOTES
[facility], [place] - Inpatient Rehabilitation Department   Phone: [phone]    Occupational Therapy    [] Initial Evaluation            [x] Daily Treatment Note         [] Discharge Summary      Patient: Shahida Vásquez   : 1960   MRN: [id number]   Date of Service:  2025    Admitting Diagnosis:  Critical illness myopathy  Current Admission Summary: Shahida Vásquez is a 64 y.o. female with PMHx notable for HTN, obesity s/p Iliana-en-Y gastric bypass, anxiety/depression , chronic lumbar back pain who presented on  with chest pain and fatigue.  She was found to have STEMI, and went emergently to Cath Lab.  She was found to have RCA occlusion, and severe right ventricular systolic dysfunction.  Impella were placed in LV and RV.  She remained intubated for airway protection.  She underwent cardiac catheterization on , had PCI to RCA which was complicated by bleeding and ventricular tachycardia.  Impella and IABP removed on .  Extubated on . CRRT stopped on 1/10, transitioned to iHD on , and now holding HD since . SLP following for dysphagia, cleared for diet after MBS on . NG tube removed. Hospital course complicated by: cardiogenic shock, acute hypoxic respiratory failure, ANALY, anemia (required 5 units pRBCs during cath, 7 units total this admission)   Past Medical History:  has a past medical history of Anxiety, Depression, Hypertension, Insomnia, Lumbar disc herniation, and Lumbar spinal stenosis.  Past Surgical History:  has a past surgical history that includes Hysterectomy; Gastric bypass surgery; Cholecystectomy; fracture surgery; lumbar nerve block (N/A, 2019); bronchoscopy (N/A, 10/10/2019); bronchoscopy (10/10/2019); Pain management procedure (Left, 2020); Forearm surgery (Left, 2020); Dilatation, esophagus; Abdomen surgery; Cardiac procedure (N/A, 2024); Cardiac procedure (N/A, 2024); Cardiac procedure (N/A, 2024); Cardiac procedure (N/A,

## 2025-01-24 NOTE — PROGRESS NOTES
Shahida Vásquez  1/24/2025  [id number]    Chief Complaint: Critical illness myopathy    Subjective    No acute events overnight.     Patient reports that she is doing well today. She is having less lightheadedness as compared to yesterday. Denies any fevers/chills, chest pain, dyspnea. She says she is just feeling \"cold\", and that she always feels cold normally, and this tells her she is getting better.     Last Bowel Movement:  01/23/25.        Objective    Patient Vitals for the past 24 hrs:   BP Temp Temp src Pulse Resp SpO2   01/23/25 2045 116/71 99.1 °F (37.3 °C) Oral 78 16 95 %   01/23/25 1500 94/64 -- -- -- -- --     Gen: No distress, pleasant.   HEENT: Normocephalic, atraumatic.   CV: Regular rate and rhythm. Extremities warm, well perfused.   Resp: No respiratory distress. CTAB.  Abd: Soft, nontender.  Ext: No edema.  Neuro: Alert, oriented, appropriately interactive.     Laboratory data:   Lab Results   Component Value Date/Time     01/24/2025 06:07 AM    K 3.7 01/24/2025 06:07 AM     01/24/2025 06:07 AM    CO2 23 01/24/2025 06:07 AM    BUN 25 01/24/2025 06:07 AM    CREATININE 1.0 01/24/2025 06:07 AM    GLUCOSE 96 01/24/2025 06:07 AM    CALCIUM 7.9 01/24/2025 06:07 AM    LABGLOM 63 01/24/2025 06:07 AM     Recent Labs     01/24/25  0607 01/22/25  0641 01/20/25  0434   WBC 8.4 10.0 12.4*   HGB 8.0* 8.6* 7.7*   HCT 24.9* 25.9* 23.4*   MCV 93.5 92.3 93.7    504* 587*        Therapy progress:       PT    Supine to Sit: Substantial/maximal assistance  Sit to Supine: Substantial/maximal assistance   Sit to Stand: Partial/moderate assistance  Chair/Bed to Chair Transfer: Partial/moderate assistance  Car Transfer:    Ambulation 10 ft:    Ambulation 50 ft:    Ambulation 150 ft:    Stairs - 1 Step:    Stairs - 4 Step:    Stairs - 12 Step:      OT    Eating: Setup or clean-up assistance  Oral Hygiene: Setup or clean-up assistance  Bathing: Substantial/maximal assistance  Upper Body Dressing:

## 2025-01-24 NOTE — PROGRESS NOTES
Assessments completed. Alert, oriented x4. Has been calm and cooperative throughout the shift. Calls appropriately with needs. Stedy x1 to the restroom. Able to complete most of the task with minimal assistance but needs extra help standing from toilet seat due to low height. Wound to left lower ABD/groin dressing changed as ordered. Patient tolerated well. Resting with eyes closed most of the night. In bed, alarm on, bed in lowest position, call light and table within reach. No further needs expressed at this time.

## 2025-01-25 LAB
ANION GAP SERPL CALCULATED.3IONS-SCNC: 10 MMOL/L (ref 3–16)
BASOPHILS # BLD: 0 K/UL (ref 0–0.2)
BASOPHILS NFR BLD: 0.4 %
BUN SERPL-MCNC: 25 MG/DL (ref 7–20)
CALCIUM SERPL-MCNC: 7.9 MG/DL (ref 8.3–10.6)
CHLORIDE SERPL-SCNC: 101 MMOL/L (ref 99–110)
CO2 SERPL-SCNC: 22 MMOL/L (ref 21–32)
CREAT SERPL-MCNC: 1 MG/DL (ref 0.6–1.2)
DEPRECATED RDW RBC AUTO: 23.4 % (ref 12.4–15.4)
EOSINOPHIL # BLD: 0.1 K/UL (ref 0–0.6)
EOSINOPHIL NFR BLD: 1.1 %
GFR SERPLBLD CREATININE-BSD FMLA CKD-EPI: 63 ML/MIN/{1.73_M2}
GLUCOSE SERPL-MCNC: 129 MG/DL (ref 70–99)
HCT VFR BLD AUTO: 26.4 % (ref 36–48)
HGB BLD-MCNC: 8.7 G/DL (ref 12–16)
LYMPHOCYTES # BLD: 0.7 K/UL (ref 1–5.1)
LYMPHOCYTES NFR BLD: 7.5 %
MCH RBC QN AUTO: 30.9 PG (ref 26–34)
MCHC RBC AUTO-ENTMCNC: 32.8 G/DL (ref 31–36)
MCV RBC AUTO: 94.1 FL (ref 80–100)
MONOCYTES # BLD: 0.8 K/UL (ref 0–1.3)
MONOCYTES NFR BLD: 9.4 %
NEUTROPHILS # BLD: 7.2 K/UL (ref 1.7–7.7)
NEUTROPHILS NFR BLD: 81.6 %
PLATELET # BLD AUTO: 425 K/UL (ref 135–450)
PMV BLD AUTO: 7.2 FL (ref 5–10.5)
POTASSIUM SERPL-SCNC: 4.1 MMOL/L (ref 3.5–5.1)
RBC # BLD AUTO: 2.8 M/UL (ref 4–5.2)
SODIUM SERPL-SCNC: 133 MMOL/L (ref 136–145)
WBC # BLD AUTO: 8.8 K/UL (ref 4–11)

## 2025-01-25 PROCEDURE — 85025 COMPLETE CBC W/AUTO DIFF WBC: CPT

## 2025-01-25 PROCEDURE — 97530 THERAPEUTIC ACTIVITIES: CPT

## 2025-01-25 PROCEDURE — 36415 COLL VENOUS BLD VENIPUNCTURE: CPT

## 2025-01-25 PROCEDURE — 97110 THERAPEUTIC EXERCISES: CPT

## 2025-01-25 PROCEDURE — 97116 GAIT TRAINING THERAPY: CPT

## 2025-01-25 PROCEDURE — 80048 BASIC METABOLIC PNL TOTAL CA: CPT

## 2025-01-25 PROCEDURE — 2500000003 HC RX 250 WO HCPCS

## 2025-01-25 PROCEDURE — 1280000000 HC REHAB R&B

## 2025-01-25 PROCEDURE — 6370000000 HC RX 637 (ALT 250 FOR IP): Performed by: STUDENT IN AN ORGANIZED HEALTH CARE EDUCATION/TRAINING PROGRAM

## 2025-01-25 PROCEDURE — 87040 BLOOD CULTURE FOR BACTERIA: CPT

## 2025-01-25 PROCEDURE — 6370000000 HC RX 637 (ALT 250 FOR IP): Performed by: NURSE PRACTITIONER

## 2025-01-25 RX ORDER — DOXYCYCLINE HYCLATE 100 MG
100 TABLET ORAL EVERY 12 HOURS SCHEDULED
Status: DISCONTINUED | OUTPATIENT
Start: 2025-01-25 | End: 2025-01-28

## 2025-01-25 RX ADMIN — CITALOPRAM HYDROBROMIDE 40 MG: 20 TABLET ORAL at 08:50

## 2025-01-25 RX ADMIN — DOXYCYCLINE HYCLATE 100 MG: 100 TABLET, COATED ORAL at 11:49

## 2025-01-25 RX ADMIN — SODIUM CHLORIDE, PRESERVATIVE FREE 10 ML: 5 INJECTION INTRAVENOUS at 21:36

## 2025-01-25 RX ADMIN — APIXABAN 5 MG: 5 TABLET, FILM COATED ORAL at 08:50

## 2025-01-25 RX ADMIN — MIRTAZAPINE 15 MG: 15 TABLET, FILM COATED ORAL at 21:28

## 2025-01-25 RX ADMIN — HYDROCODONE BITARTRATE AND ACETAMINOPHEN 1 TABLET: 5; 325 TABLET ORAL at 08:50

## 2025-01-25 RX ADMIN — TRAZODONE HYDROCHLORIDE 50 MG: 50 TABLET ORAL at 21:28

## 2025-01-25 RX ADMIN — SODIUM CHLORIDE, PRESERVATIVE FREE 10 ML: 5 INJECTION INTRAVENOUS at 08:49

## 2025-01-25 RX ADMIN — METOPROLOL TARTRATE 25 MG: 50 TABLET, FILM COATED ORAL at 21:28

## 2025-01-25 RX ADMIN — HYDROCODONE BITARTRATE AND ACETAMINOPHEN 1 TABLET: 5; 325 TABLET ORAL at 21:28

## 2025-01-25 RX ADMIN — DOXYCYCLINE HYCLATE 100 MG: 100 TABLET, COATED ORAL at 21:28

## 2025-01-25 RX ADMIN — METOPROLOL TARTRATE 25 MG: 50 TABLET, FILM COATED ORAL at 08:51

## 2025-01-25 RX ADMIN — CLOPIDOGREL BISULFATE 75 MG: 75 TABLET ORAL at 08:51

## 2025-01-25 RX ADMIN — HYDROCODONE BITARTRATE AND ACETAMINOPHEN 1 TABLET: 5; 325 TABLET ORAL at 03:45

## 2025-01-25 RX ADMIN — Medication 100 MG: at 08:50

## 2025-01-25 RX ADMIN — APIXABAN 5 MG: 5 TABLET, FILM COATED ORAL at 21:28

## 2025-01-25 RX ADMIN — FUROSEMIDE 20 MG: 40 TABLET ORAL at 08:51

## 2025-01-25 RX ADMIN — SODIUM HYPOCHLORITE: 2.5 SOLUTION TOPICAL at 22:07

## 2025-01-25 RX ADMIN — PANTOPRAZOLE SODIUM 40 MG: 40 TABLET, DELAYED RELEASE ORAL at 05:24

## 2025-01-25 ASSESSMENT — PAIN DESCRIPTION - ORIENTATION
ORIENTATION: RIGHT

## 2025-01-25 ASSESSMENT — PAIN DESCRIPTION - LOCATION
LOCATION: KNEE

## 2025-01-25 ASSESSMENT — PAIN DESCRIPTION - DESCRIPTORS
DESCRIPTORS: ACHING
DESCRIPTORS: ACHING;STABBING
DESCRIPTORS: ACHING

## 2025-01-25 ASSESSMENT — PAIN DESCRIPTION - FREQUENCY: FREQUENCY: INTERMITTENT

## 2025-01-25 ASSESSMENT — PAIN SCALES - GENERAL
PAINLEVEL_OUTOF10: 8
PAINLEVEL_OUTOF10: 8
PAINLEVEL_OUTOF10: 5
PAINLEVEL_OUTOF10: 6

## 2025-01-25 ASSESSMENT — PAIN SCALES - WONG BAKER: WONGBAKER_NUMERICALRESPONSE: NO HURT

## 2025-01-25 ASSESSMENT — PAIN DESCRIPTION - PAIN TYPE: TYPE: ACUTE PAIN

## 2025-01-25 NOTE — PROGRESS NOTES
0046- Assessments completed. Alert, oriented x4, calm and cooperative with care and treatments. Patient able to get up to the restroom with stedy x1 and minimal assistance. Continent of bowel and bladder after calling appropriately for bathroom. Dressing to left groin/lower ABD is CDI. Patient with complaints of pain to knee, requesting medication. Tylenol given. In bed, alarm on, bed in lowest position, call light and table within reach. No further needs expressed at this time.    0557- Patient somewhat tearful this morning reporting that she is afraid she'll go to a rehab facility and \"end up not leaving\". Patient expressed that she wants to go home and do her best to be independent. Sat with patient and had discussion about her home setting and different options that may be available to her at discharge including home health, SNF stay, and family help available. Patient reported that she was feeling anxious about all of the things going on with her and her health struggles. Encouragement provided to set short term goals and celebrate each win. Patient reporting that she felt better after talk. Assisted to position for comfort, heat pad under back as requested, propped with pillows. All needs met, call light in reach. Bed in low position with alarm active.

## 2025-01-25 NOTE — PROGRESS NOTES
1/16  - Continue to monitor renal function MWF       #. Anemia  - Requiring transfusion of 7 units pRBCs in total since admission   - Iron studies show AOCD. Continue to monitor CBC MWF     #. Left groin wound, at prior Impella site  - Ultrasound checked for pseudoaneurysm on 1/7, which was negative  - Wound Care consulted, signed off.  - General Surgery Consulted, performed bedside wound debridement on 1/24.  Continue quarter strength Dakin's wet-to-dry dressing changed daily.  CT Left Femur/Pelvis: multilobulated hematoma (12.4 x 12.6 cm) in the left thigh, inferior to soft tissue defect. Wound cultures (prelim): Gm + rods, Gm - rods, Gm + cocci.   - Will repeat CBC, and add blood cultures this morning. Add PO doxy x5 days. Low threshold to initiate broad spectrum antibiotic coverage if any recurrent fever, or other signs/symptoms of systemic infection.    - Monitor CBC     #. Anxiety/depression  #. Insomnia  - Continue citalopram  - Continue mirtazapine for mood, appetite stimulation  - Atarax PRN (1st line), Ativan PRN (2nd line) for anxiety.   - Trazodone PRN nightly    #. Diarrhea   - Immodium PRN     Chronic Conditions:  - Obesity, hx of Iliana-en-Y gastric bypass  - GERD: Continue PPI  - Chronic back pain: Continue tizanidine PRN    CODE: DNR-CCA  Diet: ADULT ORAL NUTRITION SUPPLEMENT; Breakfast, Dinner; Fortified Gelatin Oral Supplement  ADULT ORAL NUTRITION SUPPLEMENT; Lunch, Dinner; Frozen Oral Supplement  ADULT DIET; Dysphagia - Soft and Bite Sized  Bowels: Miralax daily PRN, Senna 2 tabs nightly PRN  Bladder: Per protocol   Pain: Tylenol PRN  DVT PPx: Fully anticoagulated  Dispo: Home  Services: TBD  DME: TBD  Follow-ups: Cardiology, PCP    Future Appointments         Provider Specialty Dept Phone    2/20/2025 9:00 AM Ang Enriquez MD Cardiology [phone]            Brendan Young MD 1/25/2025, 10:40 AM    * This document was created using dictation software.  While all precautions were taken to

## 2025-01-25 NOTE — PLAN OF CARE
Problem: Safety - Adult  Goal: Free from fall injury  1/25/2025 1133 by Leatha Toro RN  Outcome: Progressing  Note: Pt remains free from falls.  Safety precautions in place.  Bed in lowest position, bed/chair wheels locked, call light with in reach, bedside table in reach, bed/chair alarm on, fall risk wrist band on.

## 2025-01-25 NOTE — PLAN OF CARE
Problem: Discharge Planning  Goal: Discharge to home or other facility with appropriate resources  1/25/2025 0044 by Lea Houser RN  Outcome: Progressing     Problem: Safety - Medical Restraint  Goal: Remains free of injury from restraints (Restraint for Interference with Medical Device)  Description: INTERVENTIONS:  1. Determine that other, less restrictive measures have been tried or would not be effective before applying the restraint  2. Evaluate the patient's condition at the time of restraint application  3. Inform patient/family regarding the reason for restraint  4. Q2H: Monitor safety, psychosocial status, comfort, nutrition and hydration  1/25/2025 0044 by Lea Houser, RN  Outcome: Progressing     Problem: Safety - Adult  Goal: Free from fall injury  1/25/2025 0044 by Lea Houser RN  Outcome: Progressing     Problem: Skin/Tissue Integrity  Goal: Absence of new skin breakdown  Description: 1.  Monitor for areas of redness and/or skin breakdown  2.  Assess vascular access sites hourly  3.  Every 4-6 hours minimum:  Change oxygen saturation probe site  4.  Every 4-6 hours:  If on nasal continuous positive airway pressure, respiratory therapy assess nares and determine need for appliance change or resting period.  1/25/2025 0044 by Lea Houser RN  Outcome: Progressing     Problem: Nutrition Deficit:  Goal: Optimize nutritional status  1/25/2025 0044 by Lea Houser RN  Outcome: Progressing     Problem: Pain  Goal: Verbalizes/displays adequate comfort level or baseline comfort level  1/25/2025 0044 by Lea Houser, RN  Outcome: Progressing     Problem: ABCDS Injury Assessment  Goal: Absence of physical injury  1/25/2025 0044 by Lea Houser RN  Outcome: Progressing  Flowsheets (Taken 1/25/2025 0042)  Absence of Physical Injury: Implement safety measures based on patient assessment

## 2025-01-25 NOTE — PROGRESS NOTES
[facility] - Inpatient Rehabilitation Department   Phone: [phone]    Occupational Therapy    [] Initial Evaluation            [x] Daily Treatment Note         [] Discharge Summary      Patient: Shahida Vásuqez   : 1960   MRN: [id number]   Date of Service:  2025    Admitting Diagnosis:  Critical illness myopathy  Current Admission Summary: Shahida Vásquez is a 64 y.o. female with PMHx notable for HTN, obesity s/p Iliana-en-Y gastric bypass, anxiety/depression , chronic lumbar back pain who presented on  with chest pain and fatigue.  She was found to have STEMI, and went emergently to Cath Lab.  She was found to have RCA occlusion, and severe right ventricular systolic dysfunction.  Impella were placed in LV and RV.  She remained intubated for airway protection.  She underwent cardiac catheterization on , had PCI to RCA which was complicated by bleeding and ventricular tachycardia.  Impella and IABP removed on .  Extubated on . CRRT stopped on 1/10, transitioned to iHD on , and now holding HD since . SLP following for dysphagia, cleared for diet after MBS on . NG tube removed. Hospital course complicated by: cardiogenic shock, acute hypoxic respiratory failure, ANALY, anemia (required 5 units pRBCs during cath, 7 units total this admission)   Past Medical History:  has a past medical history of Anxiety, Depression, Hypertension, Insomnia, Lumbar disc herniation, and Lumbar spinal stenosis.  Past Surgical History:  has a past surgical history that includes Hysterectomy; Gastric bypass surgery; Cholecystectomy; fracture surgery; lumbar nerve block (N/A, 2019); bronchoscopy (N/A, 10/10/2019); bronchoscopy (10/10/2019); Pain management procedure (Left, 2020); Forearm surgery (Left, 2020); Dilatation, esophagus; Abdomen surgery; Cardiac procedure (N/A, 2024); Cardiac procedure (N/A, 2024); Cardiac procedure (N/A, 2024); Cardiac procedure (N/A,

## 2025-01-25 NOTE — PROGRESS NOTES
[facility] - Inpatient Rehabilitation Department   Phone: [phone]    Physical Therapy    [] Initial Evaluation            [x] Daily Treatment Note         [] Discharge Summary      Patient: Shahida Vásquez   : 1960   MRN: [id number]   Date of Service:  2025  Admitting Diagnosis: Critical illness myopathy  Current Admission Summary: Shahida Vásquez is a 64 y.o. female with PMHx notable for HTN, obesity s/p Iliana-en-Y gastric bypass, anxiety/depression , chronic lumbar back pain who presented on  with chest pain and fatigue.  She was found to have STEMI, and went emergently to Cath Lab.  She was found to have RCA occlusion, and severe right ventricular systolic dysfunction.  Impella were placed in LV and RV.  She remained intubated for airway protection.  She underwent cardiac catheterization on , had PCI to RCA which was complicated by bleeding and ventricular tachycardia.  Impella and IABP removed on .  Extubated on . CRRT stopped on 1/10, transitioned to iHD on , and now holding HD since . SLP following for dysphagia, cleared for diet after MBS on . NG tube removed on . Hospital course complicated by: cardiogenic shock, acute hypoxic respiratory failure, ANALY, anemia (required 5 units pRBCs during cath, 7 units total this admission).      Patient reports that she is doing well today overall.  She is feeling tired after working with therapy this morning.  She is having some bleeding from her left groin wound which is concerning her.  She denies any fevers/chills, chest pain, palpitations, dyspnea.  Her appetite continues to improve. Last BM (including prior to admit): 25     Past Medical History:  has a past medical history of Anxiety, Depression, Hypertension, Insomnia, Lumbar disc herniation, and Lumbar spinal stenosis.  Past Surgical History:  has a past surgical history that includes Hysterectomy; Gastric bypass surgery; Cholecystectomy; fracture  Following:   impaired    Education  Barriers To Learning: cognition and physical  Patient Education: patient educated on goals, PT role and benefits, plan of care, precautions, general safety, functional mobility training, proper use of assistive device/equipment, adaptive device training, disease specific education, transfer training, discharge recommendations  Learning Assessment:  patient verbalizes understanding, would benefit from continued reinforcement    Assessment  Activity Tolerance: Fair; frequent fatigue; /76 when sitting, O2sat 96% on RA  Impairments Requiring Therapeutic Intervention: decreased functional mobility, decreased strength, decreased safety awareness, decreased cognition, decreased endurance, decreased balance  Prognosis: good  Clinical Assessment: Patient is 63 y/o female presenting to [facility] secondary to critical illness myopathy. Patient's PLOF includes being independent for transfers and household distances, mod I with rollator for community distances at work, where she was full time. Pt continues to demonstrate decreased endurance and self-limiting behaviors when performing functional tasks and exercises. Pt requires increased encouragement and education to advance her mobility from previous sessions.   Safety Interventions: patient left in chair, chair alarm in place, call light within reach, gait belt, and patient at risk for falls    Plan  Frequency: 5 x/week, 60 min/day  Current Treatment Recommendations: strengthening, balance training, functional mobility training, transfer training, gait training, stair training, endurance training, neuromuscular re-education, patient/caregiver education, home exercise program, safety education, and equipment evaluation/education    Goals  Patient Goals: Improve strength   Short Term Goals:  Time Frame: 1 weeks  Patient will complete bed mobility at minimal assistance   Patient will complete transfers at contact guard assistance   Patient

## 2025-01-26 VITALS
HEIGHT: 56 IN | TEMPERATURE: 98.4 F | RESPIRATION RATE: 17 BRPM | SYSTOLIC BLOOD PRESSURE: 137 MMHG | HEART RATE: 91 BPM | WEIGHT: 167.1 LBS | OXYGEN SATURATION: 96 % | DIASTOLIC BLOOD PRESSURE: 73 MMHG | BODY MASS INDEX: 37.59 KG/M2

## 2025-01-26 LAB
BACTERIA BLD CULT ORG #2: NORMAL
BACTERIA BLD CULT: NORMAL
BACTERIA SPEC AEROBE CULT: ABNORMAL
GRAM STN SPEC: ABNORMAL
ORGANISM: ABNORMAL

## 2025-01-26 PROCEDURE — 6370000000 HC RX 637 (ALT 250 FOR IP): Performed by: STUDENT IN AN ORGANIZED HEALTH CARE EDUCATION/TRAINING PROGRAM

## 2025-01-26 PROCEDURE — 6370000000 HC RX 637 (ALT 250 FOR IP): Performed by: NURSE PRACTITIONER

## 2025-01-26 PROCEDURE — 1280000000 HC REHAB R&B

## 2025-01-26 PROCEDURE — 2500000003 HC RX 250 WO HCPCS

## 2025-01-26 RX ADMIN — APIXABAN 5 MG: 5 TABLET, FILM COATED ORAL at 20:08

## 2025-01-26 RX ADMIN — SODIUM CHLORIDE, PRESERVATIVE FREE 10 ML: 5 INJECTION INTRAVENOUS at 08:51

## 2025-01-26 RX ADMIN — HYDROCODONE BITARTRATE AND ACETAMINOPHEN 1 TABLET: 5; 325 TABLET ORAL at 12:12

## 2025-01-26 RX ADMIN — FUROSEMIDE 20 MG: 40 TABLET ORAL at 08:52

## 2025-01-26 RX ADMIN — DOXYCYCLINE HYCLATE 100 MG: 100 TABLET, COATED ORAL at 08:52

## 2025-01-26 RX ADMIN — SODIUM HYPOCHLORITE: 2.5 SOLUTION TOPICAL at 23:13

## 2025-01-26 RX ADMIN — CITALOPRAM HYDROBROMIDE 40 MG: 20 TABLET ORAL at 08:52

## 2025-01-26 RX ADMIN — METOPROLOL TARTRATE 25 MG: 50 TABLET, FILM COATED ORAL at 08:52

## 2025-01-26 RX ADMIN — MIRTAZAPINE 15 MG: 15 TABLET, FILM COATED ORAL at 20:08

## 2025-01-26 RX ADMIN — HYDROCODONE BITARTRATE AND ACETAMINOPHEN 1 TABLET: 5; 325 TABLET ORAL at 20:08

## 2025-01-26 RX ADMIN — Medication 100 MG: at 08:52

## 2025-01-26 RX ADMIN — PANTOPRAZOLE SODIUM 40 MG: 40 TABLET, DELAYED RELEASE ORAL at 06:41

## 2025-01-26 RX ADMIN — METOPROLOL TARTRATE 25 MG: 50 TABLET, FILM COATED ORAL at 20:08

## 2025-01-26 RX ADMIN — SODIUM CHLORIDE, PRESERVATIVE FREE 10 ML: 5 INJECTION INTRAVENOUS at 20:14

## 2025-01-26 RX ADMIN — TRAZODONE HYDROCHLORIDE 50 MG: 50 TABLET ORAL at 20:08

## 2025-01-26 RX ADMIN — APIXABAN 5 MG: 5 TABLET, FILM COATED ORAL at 08:52

## 2025-01-26 RX ADMIN — DOXYCYCLINE HYCLATE 100 MG: 100 TABLET, COATED ORAL at 20:07

## 2025-01-26 RX ADMIN — LOPERAMIDE HYDROCHLORIDE 2 MG: 2 CAPSULE ORAL at 09:58

## 2025-01-26 RX ADMIN — CLOPIDOGREL BISULFATE 75 MG: 75 TABLET ORAL at 08:52

## 2025-01-26 RX ADMIN — ACETAMINOPHEN 650 MG: 325 TABLET ORAL at 20:16

## 2025-01-26 ASSESSMENT — PAIN DESCRIPTION - LOCATION
LOCATION: ABDOMEN
LOCATION: HEAD

## 2025-01-26 ASSESSMENT — PAIN DESCRIPTION - DESCRIPTORS
DESCRIPTORS: THROBBING
DESCRIPTORS: ACHING

## 2025-01-26 ASSESSMENT — PAIN SCALES - WONG BAKER
WONGBAKER_NUMERICALRESPONSE: NO HURT
WONGBAKER_NUMERICALRESPONSE: NO HURT

## 2025-01-26 ASSESSMENT — PAIN DESCRIPTION - ORIENTATION
ORIENTATION: POSTERIOR
ORIENTATION: LEFT

## 2025-01-26 ASSESSMENT — PAIN SCALES - GENERAL
PAINLEVEL_OUTOF10: 5
PAINLEVEL_OUTOF10: 9

## 2025-01-26 ASSESSMENT — PAIN DESCRIPTION - PAIN TYPE: TYPE: ACUTE PAIN

## 2025-01-26 NOTE — PLAN OF CARE
Problem: Safety - Medical Restraint  Goal: Remains free of injury from restraints (Restraint for Interference with Medical Device)  Description: INTERVENTIONS:  1. Determine that other, less restrictive measures have been tried or would not be effective before applying the restraint  2. Evaluate the patient's condition at the time of restraint application  3. Inform patient/family regarding the reason for restraint  4. Q2H: Monitor safety, psychosocial status, comfort, nutrition and hydration  Outcome: Progressing     Problem: Safety - Adult  Goal: Free from fall injury  1/26/2025 0123 by Kortney Hughes, RN  Outcome: Progressing  1/25/2025 1133 by Leatha Toro, RN  Outcome: Progressing  Note: Pt remains free from falls.  Safety precautions in place.  Bed in lowest position, bed/chair wheels locked, call light with in reach, bedside table in reach, bed/chair alarm on, fall risk wrist band on.      Problem: Skin/Tissue Integrity  Goal: Absence of new skin breakdown  Description: 1.  Monitor for areas of redness and/or skin breakdown  2.  Assess vascular access sites hourly  3.  Every 4-6 hours minimum:  Change oxygen saturation probe site  4.  Every 4-6 hours:  If on nasal continuous positive airway pressure, respiratory therapy assess nares and determine need for appliance change or resting period.  Outcome: Progressing     Problem: Pain  Goal: Verbalizes/displays adequate comfort level or baseline comfort level  Outcome: Progressing     Problem: ABCDS Injury Assessment  Goal: Absence of physical injury  Outcome: Progressing

## 2025-01-26 NOTE — PLAN OF CARE
Problem: Safety - Adult  Goal: Free from fall injury  1/26/2025 1020 by Leatha Toro RN  Outcome: Progressing  Note: Pt remains free from falls.  Safety precautions in place.  Bed in lowest position, bed/chair wheels locked, call light with in reach, bedside table in reach, bed/chair alarm on, fall risk wrist band on.

## 2025-01-27 LAB
ANION GAP SERPL CALCULATED.3IONS-SCNC: 10 MMOL/L (ref 3–16)
BACTERIA SPEC AEROBE CULT: ABNORMAL
BASOPHILS # BLD: 0 K/UL (ref 0–0.2)
BASOPHILS NFR BLD: 0.6 %
BUN SERPL-MCNC: 21 MG/DL (ref 7–20)
CALCIUM SERPL-MCNC: 8.2 MG/DL (ref 8.3–10.6)
CHLORIDE SERPL-SCNC: 106 MMOL/L (ref 99–110)
CO2 SERPL-SCNC: 23 MMOL/L (ref 21–32)
CREAT SERPL-MCNC: 1.1 MG/DL (ref 0.6–1.2)
DEPRECATED RDW RBC AUTO: 22.6 % (ref 12.4–15.4)
EOSINOPHIL # BLD: 0.1 K/UL (ref 0–0.6)
EOSINOPHIL NFR BLD: 1.4 %
GFR SERPLBLD CREATININE-BSD FMLA CKD-EPI: 56 ML/MIN/{1.73_M2}
GLUCOSE SERPL-MCNC: 100 MG/DL (ref 70–99)
GRAM STN SPEC: ABNORMAL
HCT VFR BLD AUTO: 24.7 % (ref 36–48)
HGB BLD-MCNC: 8.2 G/DL (ref 12–16)
LYMPHOCYTES # BLD: 1.2 K/UL (ref 1–5.1)
LYMPHOCYTES NFR BLD: 17.6 %
MCH RBC QN AUTO: 30.9 PG (ref 26–34)
MCHC RBC AUTO-ENTMCNC: 33.2 G/DL (ref 31–36)
MCV RBC AUTO: 93.1 FL (ref 80–100)
MONOCYTES # BLD: 0.9 K/UL (ref 0–1.3)
MONOCYTES NFR BLD: 12.7 %
NEUTROPHILS # BLD: 4.6 K/UL (ref 1.7–7.7)
NEUTROPHILS NFR BLD: 67.7 %
ORGANISM: ABNORMAL
PLATELET # BLD AUTO: 408 K/UL (ref 135–450)
PMV BLD AUTO: 7.2 FL (ref 5–10.5)
POTASSIUM SERPL-SCNC: 4 MMOL/L (ref 3.5–5.1)
RBC # BLD AUTO: 2.66 M/UL (ref 4–5.2)
SODIUM SERPL-SCNC: 139 MMOL/L (ref 136–145)
WBC # BLD AUTO: 6.8 K/UL (ref 4–11)

## 2025-01-27 PROCEDURE — 6370000000 HC RX 637 (ALT 250 FOR IP): Performed by: STUDENT IN AN ORGANIZED HEALTH CARE EDUCATION/TRAINING PROGRAM

## 2025-01-27 PROCEDURE — 97110 THERAPEUTIC EXERCISES: CPT

## 2025-01-27 PROCEDURE — 6370000000 HC RX 637 (ALT 250 FOR IP): Performed by: NURSE PRACTITIONER

## 2025-01-27 PROCEDURE — 85025 COMPLETE CBC W/AUTO DIFF WBC: CPT

## 2025-01-27 PROCEDURE — 2500000003 HC RX 250 WO HCPCS

## 2025-01-27 PROCEDURE — 80048 BASIC METABOLIC PNL TOTAL CA: CPT

## 2025-01-27 PROCEDURE — 97530 THERAPEUTIC ACTIVITIES: CPT

## 2025-01-27 PROCEDURE — 99024 POSTOP FOLLOW-UP VISIT: CPT | Performed by: SURGERY

## 2025-01-27 PROCEDURE — 97535 SELF CARE MNGMENT TRAINING: CPT

## 2025-01-27 PROCEDURE — 36415 COLL VENOUS BLD VENIPUNCTURE: CPT

## 2025-01-27 PROCEDURE — APPNB30 APP NON BILLABLE TIME 0-30 MINS: Performed by: NURSE PRACTITIONER

## 2025-01-27 PROCEDURE — 1280000000 HC REHAB R&B

## 2025-01-27 PROCEDURE — 94760 N-INVAS EAR/PLS OXIMETRY 1: CPT

## 2025-01-27 RX ADMIN — HYDROCODONE BITARTRATE AND ACETAMINOPHEN 1 TABLET: 5; 325 TABLET ORAL at 10:00

## 2025-01-27 RX ADMIN — ACETAMINOPHEN 650 MG: 325 TABLET ORAL at 20:07

## 2025-01-27 RX ADMIN — HYOSCYAMINE SULFATE: 16 SOLUTION at 22:31

## 2025-01-27 RX ADMIN — TRAZODONE HYDROCHLORIDE 50 MG: 50 TABLET ORAL at 20:07

## 2025-01-27 RX ADMIN — Medication 100 MG: at 09:48

## 2025-01-27 RX ADMIN — APIXABAN 5 MG: 5 TABLET, FILM COATED ORAL at 20:07

## 2025-01-27 RX ADMIN — APIXABAN 5 MG: 5 TABLET, FILM COATED ORAL at 09:48

## 2025-01-27 RX ADMIN — DOXYCYCLINE HYCLATE 100 MG: 100 TABLET, COATED ORAL at 20:07

## 2025-01-27 RX ADMIN — FUROSEMIDE 20 MG: 40 TABLET ORAL at 09:49

## 2025-01-27 RX ADMIN — SODIUM CHLORIDE, PRESERVATIVE FREE 10 ML: 5 INJECTION INTRAVENOUS at 09:49

## 2025-01-27 RX ADMIN — TIZANIDINE 4 MG: 4 TABLET ORAL at 15:11

## 2025-01-27 RX ADMIN — SODIUM CHLORIDE, PRESERVATIVE FREE 10 ML: 5 INJECTION INTRAVENOUS at 20:08

## 2025-01-27 RX ADMIN — DOXYCYCLINE HYCLATE 100 MG: 100 TABLET, COATED ORAL at 09:49

## 2025-01-27 RX ADMIN — HYDROCODONE BITARTRATE AND ACETAMINOPHEN 1 TABLET: 5; 325 TABLET ORAL at 19:19

## 2025-01-27 RX ADMIN — PANTOPRAZOLE SODIUM 40 MG: 40 TABLET, DELAYED RELEASE ORAL at 05:42

## 2025-01-27 RX ADMIN — CLOPIDOGREL BISULFATE 75 MG: 75 TABLET ORAL at 09:49

## 2025-01-27 RX ADMIN — METOPROLOL TARTRATE 25 MG: 50 TABLET, FILM COATED ORAL at 09:49

## 2025-01-27 RX ADMIN — MIRTAZAPINE 15 MG: 15 TABLET, FILM COATED ORAL at 20:08

## 2025-01-27 RX ADMIN — ACETAMINOPHEN 650 MG: 325 TABLET ORAL at 11:53

## 2025-01-27 RX ADMIN — CITALOPRAM HYDROBROMIDE 40 MG: 20 TABLET ORAL at 09:48

## 2025-01-27 RX ADMIN — ERGOCALCIFEROL 50000 UNITS: 1.25 CAPSULE ORAL at 09:49

## 2025-01-27 ASSESSMENT — PAIN SCALES - WONG BAKER: WONGBAKER_NUMERICALRESPONSE: HURTS EVEN MORE

## 2025-01-27 ASSESSMENT — PAIN DESCRIPTION - DESCRIPTORS
DESCRIPTORS: THROBBING
DESCRIPTORS: THROBBING

## 2025-01-27 ASSESSMENT — PAIN SCALES - GENERAL
PAINLEVEL_OUTOF10: 4
PAINLEVEL_OUTOF10: 7
PAINLEVEL_OUTOF10: 5
PAINLEVEL_OUTOF10: 8
PAINLEVEL_OUTOF10: 8
PAINLEVEL_OUTOF10: 4
PAINLEVEL_OUTOF10: 10

## 2025-01-27 ASSESSMENT — PAIN - FUNCTIONAL ASSESSMENT: PAIN_FUNCTIONAL_ASSESSMENT: ACTIVITIES ARE NOT PREVENTED

## 2025-01-27 ASSESSMENT — PAIN DESCRIPTION - ORIENTATION
ORIENTATION: POSTERIOR
ORIENTATION: LEFT

## 2025-01-27 ASSESSMENT — PAIN DESCRIPTION - LOCATION
LOCATION: HEAD
LOCATION: HEAD
LOCATION: GROIN

## 2025-01-27 ASSESSMENT — PAIN DESCRIPTION - PAIN TYPE: TYPE: ACUTE PAIN

## 2025-01-27 NOTE — PROGRESS NOTES
Left groin wound dressing changed  Wound much improved  Small bridge of skin excised.  Bleeding controlled with Vicryl sutures  Fluid from medial thigh appears to be draining through wound  Overall, progressing well  Increase Dakin's wet-to-dry dressing changes to twice daily  Continue antibiotics  Will continue to follow intermittently

## 2025-01-27 NOTE — PROGRESS NOTES
Speech Language Pathology  Communication Note     Shahida CHARLTON Fahad  1960    Per RN pt had questions about current diet level to see if she could get advanced to a regular texture diet. Speech therapy signed off last week due to pt declining services and declining to be placed on a regular texture diet or an easy to chew diet. Pt reports she had pizza brought in from outside of the hospital and did fine with it cut up. SLP advanced pt to a regular texture diet per pt's request with encouragement for pt to select softer foods she knows she can tolerate due to dentition. Pt prefers to be put on a regular texture diet at this time vs an easy to chew diet. Changed on pt's board and in chart.    Thanks,  Keshia Dowling M.A. CCC-SLP #[id number] 1/27/2025 10:06 AM  Speech-Language Pathologist

## 2025-01-27 NOTE — PROGRESS NOTES
Shahida Vásquez  1/26/2025  [id number]    Chief Complaint: Critical illness myopathy    Subjective    Patient states that she is feeling well and denies any new onset complaints.     Last Bowel Movement:  01/25/25.        Objective    Patient Vitals for the past 24 hrs:   BP Temp Temp src Pulse Resp SpO2   01/26/25 2016 137/73 99.9 °F (37.7 °C) Oral 91 17 96 %   01/26/25 2008 -- -- -- -- 16 --   01/26/25 0845 116/69 99.4 °F (37.4 °C) Oral 83 16 96 %     Gen: No distress, pleasant.   HEENT: Normocephalic, atraumatic.   CV: Regular rate and rhythm. Extremities warm, well perfused.   Resp: No respiratory distress. CTAB.  Abd: Soft, nontender.  Ext: No edema.  Neuro: Alert, oriented, appropriately interactive.     Laboratory data:   Lab Results   Component Value Date/Time     01/25/2025 09:48 AM    K 4.1 01/25/2025 09:48 AM     01/25/2025 09:48 AM    CO2 22 01/25/2025 09:48 AM    BUN 25 01/25/2025 09:48 AM    CREATININE 1.0 01/25/2025 09:48 AM    GLUCOSE 129 01/25/2025 09:48 AM    CALCIUM 7.9 01/25/2025 09:48 AM    LABGLOM 63 01/25/2025 09:48 AM     Recent Labs     01/25/25  0948 01/24/25  0607 01/22/25  0641   WBC 8.8 8.4 10.0   HGB 8.7* 8.0* 8.6*   HCT 26.4* 24.9* 25.9*   MCV 94.1 93.5 92.3    430 504*        Therapy progress:       PT    Supine to Sit: Supervision or touching assistance  Sit to Supine: Substantial/maximal assistance   Sit to Stand: Partial/moderate assistance  Chair/Bed to Chair Transfer: Supervision or touching assistance  Car Transfer:    Ambulation 10 ft: Supervision or touching assistance  Ambulation 50 ft:    Ambulation 150 ft:    Stairs - 1 Step:    Stairs - 4 Step:    Stairs - 12 Step:      OT    Eating: Setup or clean-up assistance  Oral Hygiene: Setup or clean-up assistance  Bathing: Substantial/maximal assistance  Upper Body Dressing: Partial/moderate assistance  Lower Body Dressing: Substantial/maximal assistance  Toilet Transfer: Supervision or touching assistance  Toilet

## 2025-01-27 NOTE — PROGRESS NOTES
[facility] - Inpatient Rehabilitation Department   Phone: [phone]    Physical Therapy    [] Initial Evaluation            [x] Daily Treatment Note         [] Discharge Summary      Patient: Shahida Vásquez   : 1960   MRN: [id number]   Date of Service:  2025  Admitting Diagnosis: Critical illness myopathy  Current Admission Summary: Shahida Vásquez is a 64 y.o. female with PMHx notable for HTN, obesity s/p Iliana-en-Y gastric bypass, anxiety/depression , chronic lumbar back pain who presented on  with chest pain and fatigue.  She was found to have STEMI, and went emergently to Cath Lab.  She was found to have RCA occlusion, and severe right ventricular systolic dysfunction.  Impella were placed in LV and RV.  She remained intubated for airway protection.  She underwent cardiac catheterization on , had PCI to RCA which was complicated by bleeding and ventricular tachycardia.  Impella and IABP removed on .  Extubated on . CRRT stopped on 1/10, transitioned to iHD on , and now holding HD since . SLP following for dysphagia, cleared for diet after MBS on . NG tube removed on . Hospital course complicated by: cardiogenic shock, acute hypoxic respiratory failure, ANALY, anemia (required 5 units pRBCs during cath, 7 units total this admission).      Patient reports that she is doing well today overall.  She is feeling tired after working with therapy this morning.  She is having some bleeding from her left groin wound which is concerning her.  She denies any fevers/chills, chest pain, palpitations, dyspnea.  Her appetite continues to improve. Last BM (including prior to admit): 25     Past Medical History:  has a past medical history of Anxiety, Depression, Hypertension, Insomnia, Lumbar disc herniation, and Lumbar spinal stenosis.  Past Surgical History:  has a past surgical history that includes Hysterectomy; Gastric bypass surgery; Cholecystectomy; fracture

## 2025-01-27 NOTE — PLAN OF CARE
Problem: Discharge Planning  Goal: Discharge to home or other facility with appropriate resources  Outcome: Progressing     Problem: Safety - Medical Restraint  Goal: Remains free of injury from restraints (Restraint for Interference with Medical Device)  Description: INTERVENTIONS:  1. Determine that other, less restrictive measures have been tried or would not be effective before applying the restraint  2. Evaluate the patient's condition at the time of restraint application  3. Inform patient/family regarding the reason for restraint  4. Q2H: Monitor safety, psychosocial status, comfort, nutrition and hydration  1/27/2025 1336 by Cait Navarrete RN  Outcome: Progressing     Problem: Safety - Adult  Goal: Free from fall injury  1/27/2025 1336 by Cait Navarrete RN  Outcome: Progressing     Problem: Skin/Tissue Integrity  Goal: Absence of new skin breakdown  Description: 1.  Monitor for areas of redness and/or skin breakdown  2.  Assess vascular access sites hourly  3.  Every 4-6 hours minimum:  Change oxygen saturation probe site  4.  Every 4-6 hours:  If on nasal continuous positive airway pressure, respiratory therapy assess nares and determine need for appliance change or resting period.  1/27/2025 1336 by Cait Navarrete, RN  Outcome: Progressing     Problem: Nutrition Deficit:  Goal: Optimize nutritional status  1/27/2025 1336 by Cait Navarrete RN  Outcome: Progressing     Problem: Pain  Goal: Verbalizes/displays adequate comfort level or baseline comfort level  1/27/2025 1336 by Cait Navarrete, RN  Outcome: Progressing

## 2025-01-27 NOTE — PLAN OF CARE
Assessment complete see flowsheets. A&Ox4 short term memory loss. Meds given per eMAR. PRN trazodone and Vilma given per patient request. The care plan and education has been reviewed and mutually agreed upon with the patient. In bed, fall precautions in place, hourly rounding, call light and belongings in reach, bed in lowest position, wheels locked in place, side rails up x 2, walkways free of clutter. No further needs expressed at this time.     2240 wound care completed to [facility] wet to dry dressing with Dakins half strength.  Covered with 4x4, and medipore tape. Patient tolerated well.  No further needs expressed at this time.    Problem: Safety - Medical Restraint  Goal: Remains free of injury from restraints (Restraint for Interference with Medical Device)  Description: INTERVENTIONS:  1. Determine that other, less restrictive measures have been tried or would not be effective before applying the restraint  2. Evaluate the patient's condition at the time of restraint application  3. Inform patient/family regarding the reason for restraint  4. Q2H: Monitor safety, psychosocial status, comfort, nutrition and hydration  Outcome: Progressing     Problem: Safety - Adult  Goal: Free from fall injury  Outcome: Progressing     Problem: Skin/Tissue Integrity  Goal: Absence of new skin breakdown  Description: 1.  Monitor for areas of redness and/or skin breakdown  2.  Assess vascular access sites hourly  3.  Every 4-6 hours minimum:  Change oxygen saturation probe site  4.  Every 4-6 hours:  If on nasal continuous positive airway pressure, respiratory therapy assess nares and determine need for appliance change or resting period.  Outcome: Progressing     Problem: Nutrition Deficit:  Goal: Optimize nutritional status  Outcome: Progressing     Problem: Pain  Goal: Verbalizes/displays adequate comfort level or baseline comfort level  Outcome: Progressing     Problem: ABCDS Injury Assessment  Goal: Absence of physical

## 2025-01-27 NOTE — PROGRESS NOTES
[facility] - Inpatient Rehabilitation Department   Phone: [phone]    Occupational Therapy    [] Initial Evaluation            [x] Daily Treatment Note         [] Discharge Summary      Patient: Shahida Vásquez   : 1960   MRN: [id number]   Date of Service:  2025    Admitting Diagnosis:  Critical illness myopathy  Current Admission Summary: Shahida Vásquez is a 64 y.o. female with PMHx notable for HTN, obesity s/p Iliana-en-Y gastric bypass, anxiety/depression , chronic lumbar back pain who presented on  with chest pain and fatigue.  She was found to have STEMI, and went emergently to Cath Lab.  She was found to have RCA occlusion, and severe right ventricular systolic dysfunction.  Impella were placed in LV and RV.  She remained intubated for airway protection.  She underwent cardiac catheterization on , had PCI to RCA which was complicated by bleeding and ventricular tachycardia.  Impella and IABP removed on .  Extubated on . CRRT stopped on 1/10, transitioned to iHD on , and now holding HD since . SLP following for dysphagia, cleared for diet after MBS on . NG tube removed. Hospital course complicated by: cardiogenic shock, acute hypoxic respiratory failure, ANALY, anemia (required 5 units pRBCs during cath, 7 units total this admission)   Past Medical History:  has a past medical history of Anxiety, Depression, Hypertension, Insomnia, Lumbar disc herniation, and Lumbar spinal stenosis.  Past Surgical History:  has a past surgical history that includes Hysterectomy; Gastric bypass surgery; Cholecystectomy; fracture surgery; lumbar nerve block (N/A, 2019); bronchoscopy (N/A, 10/10/2019); bronchoscopy (10/10/2019); Pain management procedure (Left, 2020); Forearm surgery (Left, 2020); Dilatation, esophagus; Abdomen surgery; Cardiac procedure (N/A, 2024); Cardiac procedure (N/A, 2024); Cardiac procedure (N/A, 2024); Cardiac procedure (N/A,

## 2025-01-27 NOTE — PROGRESS NOTES
Shahida Vásquez  1/27/2025  [id number]    Chief Complaint: Critical illness myopathy    Subjective    No acute events overnight.     Patient reports that she is doing well today.  She complains of occipital headache, notes some associated bilateral posterior neck pain.  Finding some relief with ice pack and Biofreeze to the neck.  Denies any lightheadedness or dizziness.  Denies any fever/chills, chest pain, dyspnea.     Last Bowel Movement:  01/25/25.        Objective    Patient Vitals for the past 24 hrs:   BP Temp Temp src Pulse Resp SpO2   01/26/25 2240 -- 98.4 °F (36.9 °C) Oral -- -- --   01/26/25 2038 -- -- -- -- 16 --   01/26/25 2016 137/73 99.9 °F (37.7 °C) Oral 91 17 96 %   01/26/25 2008 -- -- -- -- 16 --     Gen: No distress, pleasant.   HEENT: Normocephalic, atraumatic.   CV: Regular rate and rhythm. Extremities warm, well perfused.   Resp: No respiratory distress. CTAB.  Abd: Soft, nontender.  Ext: Left groin wound with clean, dry dressing in place.  Neuro: Alert, oriented, appropriately interactive.     Laboratory data:   Lab Results   Component Value Date/Time     01/27/2025 05:41 AM    K 4.0 01/27/2025 05:41 AM     01/27/2025 05:41 AM    CO2 23 01/27/2025 05:41 AM    BUN 21 01/27/2025 05:41 AM    CREATININE 1.1 01/27/2025 05:41 AM    GLUCOSE 100 01/27/2025 05:41 AM    CALCIUM 8.2 01/27/2025 05:41 AM    LABGLOM 56 01/27/2025 05:41 AM     Recent Labs     01/27/25  0541 01/25/25  0948 01/24/25  0607   WBC 6.8 8.8 8.4   HGB 8.2* 8.7* 8.0*   HCT 24.7* 26.4* 24.9*   MCV 93.1 94.1 93.5    425 430        Therapy progress:       PT    Supine to Sit: Supervision or touching assistance  Sit to Supine: Substantial/maximal assistance   Sit to Stand: Partial/moderate assistance  Chair/Bed to Chair Transfer: Supervision or touching assistance  Car Transfer:    Ambulation 10 ft: Supervision or touching assistance  Ambulation 50 ft:    Ambulation 150 ft:    Stairs - 1 Step:    Stairs - 4 Step:

## 2025-01-28 PROBLEM — E44.1 MILD MALNUTRITION (HCC): Status: ACTIVE | Noted: 2025-01-28

## 2025-01-28 PROBLEM — E44.1 MILD MALNUTRITION (HCC): Chronic | Status: ACTIVE | Noted: 2025-01-28

## 2025-01-28 PROCEDURE — 97535 SELF CARE MNGMENT TRAINING: CPT

## 2025-01-28 PROCEDURE — 97110 THERAPEUTIC EXERCISES: CPT

## 2025-01-28 PROCEDURE — 97530 THERAPEUTIC ACTIVITIES: CPT

## 2025-01-28 PROCEDURE — 2500000003 HC RX 250 WO HCPCS

## 2025-01-28 PROCEDURE — 1280000000 HC REHAB R&B

## 2025-01-28 PROCEDURE — 6370000000 HC RX 637 (ALT 250 FOR IP): Performed by: NURSE PRACTITIONER

## 2025-01-28 PROCEDURE — 6370000000 HC RX 637 (ALT 250 FOR IP): Performed by: STUDENT IN AN ORGANIZED HEALTH CARE EDUCATION/TRAINING PROGRAM

## 2025-01-28 RX ORDER — METOPROLOL TARTRATE 25 MG/1
12.5 TABLET, FILM COATED ORAL 2 TIMES DAILY
Status: DISCONTINUED | OUTPATIENT
Start: 2025-01-28 | End: 2025-02-01 | Stop reason: HOSPADM

## 2025-01-28 RX ORDER — SULFAMETHOXAZOLE AND TRIMETHOPRIM 800; 160 MG/1; MG/1
2 TABLET ORAL EVERY 12 HOURS SCHEDULED
Status: DISCONTINUED | OUTPATIENT
Start: 2025-01-28 | End: 2025-01-29

## 2025-01-28 RX ORDER — FUROSEMIDE 20 MG/1
20 TABLET ORAL EVERY OTHER DAY
Status: DISCONTINUED | OUTPATIENT
Start: 2025-01-30 | End: 2025-01-31

## 2025-01-28 RX ORDER — HYDROXYZINE HYDROCHLORIDE 25 MG/1
25 TABLET, FILM COATED ORAL 3 TIMES DAILY PRN
Status: DISCONTINUED | OUTPATIENT
Start: 2025-01-28 | End: 2025-02-01 | Stop reason: HOSPADM

## 2025-01-28 RX ORDER — LACTOBACILLUS RHAMNOSUS GG 10B CELL
1 CAPSULE ORAL
Status: DISCONTINUED | OUTPATIENT
Start: 2025-01-28 | End: 2025-02-01 | Stop reason: HOSPADM

## 2025-01-28 RX ADMIN — SULFAMETHOXAZOLE AND TRIMETHOPRIM 2 TABLET: 800; 160 TABLET ORAL at 21:17

## 2025-01-28 RX ADMIN — SODIUM CHLORIDE, PRESERVATIVE FREE 5 ML: 5 INJECTION INTRAVENOUS at 21:17

## 2025-01-28 RX ADMIN — DOXYCYCLINE HYCLATE 100 MG: 100 TABLET, COATED ORAL at 08:49

## 2025-01-28 RX ADMIN — HYOSCYAMINE SULFATE: 16 SOLUTION at 08:53

## 2025-01-28 RX ADMIN — CITALOPRAM HYDROBROMIDE 40 MG: 20 TABLET ORAL at 08:49

## 2025-01-28 RX ADMIN — CLOPIDOGREL BISULFATE 75 MG: 75 TABLET ORAL at 08:49

## 2025-01-28 RX ADMIN — APIXABAN 5 MG: 5 TABLET, FILM COATED ORAL at 21:17

## 2025-01-28 RX ADMIN — HYDROCODONE BITARTRATE AND ACETAMINOPHEN 1 TABLET: 5; 325 TABLET ORAL at 15:01

## 2025-01-28 RX ADMIN — PANTOPRAZOLE SODIUM 40 MG: 40 TABLET, DELAYED RELEASE ORAL at 05:38

## 2025-01-28 RX ADMIN — HYDROCODONE BITARTRATE AND ACETAMINOPHEN 1 TABLET: 5; 325 TABLET ORAL at 08:49

## 2025-01-28 RX ADMIN — HYDROCODONE BITARTRATE AND ACETAMINOPHEN 1 TABLET: 5; 325 TABLET ORAL at 21:18

## 2025-01-28 RX ADMIN — SODIUM CHLORIDE, PRESERVATIVE FREE 10 ML: 5 INJECTION INTRAVENOUS at 08:53

## 2025-01-28 RX ADMIN — Medication 100 MG: at 08:50

## 2025-01-28 RX ADMIN — ACETAMINOPHEN 650 MG: 325 TABLET ORAL at 04:02

## 2025-01-28 RX ADMIN — HYOSCYAMINE SULFATE: 16 SOLUTION at 23:45

## 2025-01-28 RX ADMIN — MIRTAZAPINE 15 MG: 15 TABLET, FILM COATED ORAL at 21:17

## 2025-01-28 RX ADMIN — Medication 1 CAPSULE: at 12:04

## 2025-01-28 RX ADMIN — FUROSEMIDE 20 MG: 40 TABLET ORAL at 08:49

## 2025-01-28 RX ADMIN — METOPROLOL TARTRATE 12.5 MG: 25 TABLET, FILM COATED ORAL at 21:18

## 2025-01-28 RX ADMIN — APIXABAN 5 MG: 5 TABLET, FILM COATED ORAL at 08:49

## 2025-01-28 RX ADMIN — SULFAMETHOXAZOLE AND TRIMETHOPRIM 2 TABLET: 800; 160 TABLET ORAL at 12:04

## 2025-01-28 RX ADMIN — ACETAMINOPHEN 650 MG: 325 TABLET ORAL at 12:04

## 2025-01-28 ASSESSMENT — PAIN SCALES - GENERAL
PAINLEVEL_OUTOF10: 10
PAINLEVEL_OUTOF10: 10
PAINLEVEL_OUTOF10: 5
PAINLEVEL_OUTOF10: 4
PAINLEVEL_OUTOF10: 6
PAINLEVEL_OUTOF10: 10
PAINLEVEL_OUTOF10: 0
PAINLEVEL_OUTOF10: 5

## 2025-01-28 ASSESSMENT — PAIN DESCRIPTION - DESCRIPTORS
DESCRIPTORS: ACHING
DESCRIPTORS: OTHER (COMMENT)
DESCRIPTORS: ACHING

## 2025-01-28 ASSESSMENT — PAIN DESCRIPTION - LOCATION
LOCATION: KNEE
LOCATION: HEAD
LOCATION: KNEE
LOCATION: HEAD
LOCATION: HEAD

## 2025-01-28 ASSESSMENT — PAIN DESCRIPTION - ORIENTATION
ORIENTATION: RIGHT
ORIENTATION: RIGHT
ORIENTATION: LEFT

## 2025-01-28 ASSESSMENT — PAIN DESCRIPTION - FREQUENCY: FREQUENCY: CONTINUOUS

## 2025-01-28 ASSESSMENT — PAIN DESCRIPTION - ONSET: ONSET: ON-GOING

## 2025-01-28 ASSESSMENT — PAIN SCALES - WONG BAKER: WONGBAKER_NUMERICALRESPONSE: NO HURT

## 2025-01-28 ASSESSMENT — PAIN DESCRIPTION - PAIN TYPE: TYPE: ACUTE PAIN

## 2025-01-28 NOTE — PROGRESS NOTES
Shahida Vásquez  1/28/2025  [id number]    Chief Complaint: Critical illness myopathy    Subjective    Low-grade temp (100.4 F) overnight.     Patient feeling well today overall. She says she had some bad dreams the past 2 nights, wondering if trazodone can cause this. Pain is well controlled. Denies any other new concerns.     Last Bowel Movement:  01/26/25.        Objective    Patient Vitals for the past 24 hrs:   BP Temp Temp src Pulse Resp SpO2   01/28/25 0849 (!) 92/55 99.3 °F (37.4 °C) Oral 80 16 95 %   01/28/25 0540 -- 98.5 °F (36.9 °C) Oral -- -- --   01/28/25 0402 -- 100.4 °F (38 °C) Oral -- -- --   01/27/25 1949 -- -- -- -- 17 --   01/27/25 1946 (!) 100/56 100.3 °F (37.9 °C) Oral 74 18 94 %   01/27/25 1919 -- -- -- -- 16 --   01/27/25 1351 -- -- -- 84 18 96 %   01/27/25 1030 -- -- -- -- 16 --   01/27/25 1000 -- -- -- -- 18 --   01/27/25 0945 (!) 105/58 99.2 °F (37.3 °C) Oral 84 18 --     Gen: No distress, pleasant.   HEENT: Normocephalic, atraumatic.   CV: Regular rate and rhythm. Extremities warm, well perfused.   Resp: No respiratory distress. CTAB.  Abd: Soft, nontender.  Ext: Left groin wound with clean, dry dressing in place.  Neuro: Alert, oriented, appropriately interactive.     Laboratory data:   Lab Results   Component Value Date/Time     01/27/2025 05:41 AM    K 4.0 01/27/2025 05:41 AM     01/27/2025 05:41 AM    CO2 23 01/27/2025 05:41 AM    BUN 21 01/27/2025 05:41 AM    CREATININE 1.1 01/27/2025 05:41 AM    GLUCOSE 100 01/27/2025 05:41 AM    CALCIUM 8.2 01/27/2025 05:41 AM    LABGLOM 56 01/27/2025 05:41 AM     Recent Labs     01/27/25  0541 01/25/25  0948 01/24/25  0607   WBC 6.8 8.8 8.4   HGB 8.2* 8.7* 8.0*   HCT 24.7* 26.4* 24.9*   MCV 93.1 94.1 93.5    425 430        Therapy progress:       PT    Supine to Sit: Supervision or touching assistance  Sit to Supine: Substantial/maximal assistance   Sit to Stand: Partial/moderate assistance  Chair/Bed to Chair Transfer: Supervision

## 2025-01-28 NOTE — PROGRESS NOTES
cones + trunk rotation to stack cones x6B. Small SB raises OH + tap on rollator x5. Stepper level 1.2 BUE and BLE for 6.5 minutes for cardiovascular endurance. Ambulated 95 ft back to room. Patient left in chair with alarm on and all needs met.    Third Session: Patient in bed upon arrival, agreeable to therapy session. Ambulated to gym with rollator 106'+77'+37' and sba. Fwd step over small items (ruler + shoe horn) to increase hip/knee flexion 4x12'. Performed toe taps on 4\" step x8B with B HR. Attempted 4\" step up but complains of R knee pain. Able to complete step ups on 2\" curb step with rollator support. Step ups + rollator placement on 2\" curb step x2 to simulate entrance from back porch. Rebounder with light ball without UE support 2x10. LAQs with 2# AW x10B. Patient left in bed with alarm on and all needs met.    Functional Outcomes                 Cognition  Overall Cognitive Status: Impaired  Arousal/Alertness: appropriate responses to stimuli  Following Commands: follows one step commands with repetition, follows one step commands with increased time  Attention Span: difficulty dividing attention  Memory: decreased short term memory  Safety Judgement: decreased awareness of need for assistance  Problem Solving: assistance required to identify errors made, assistance required to correct errors made  Insights: decreased awareness of deficits  Initiation: requires cues for some  Sequencing: requires cues for some  Orientation:    alert and oriented x 4  Command Following:   impaired    Education  Barriers To Learning: cognition and physical  Patient Education: patient educated on goals, PT role and benefits, plan of care, precautions, general safety, functional mobility training, proper use of assistive device/equipment, adaptive device training, disease specific education, transfer training, discharge recommendations  Learning Assessment:  patient verbalizes understanding, would benefit from continued

## 2025-01-28 NOTE — PLAN OF CARE
Problem: Discharge Planning  Goal: Discharge to home or other facility with appropriate resources  Outcome: Progressing     Problem: Safety - Medical Restraint  Goal: Remains free of injury from restraints (Restraint for Interference with Medical Device)  Description: INTERVENTIONS:  1. Determine that other, less restrictive measures have been tried or would not be effective before applying the restraint  2. Evaluate the patient's condition at the time of restraint application  3. Inform patient/family regarding the reason for restraint  4. Q2H: Monitor safety, psychosocial status, comfort, nutrition and hydration  Outcome: Progressing     Problem: Safety - Adult  Goal: Free from fall injury  Outcome: Progressing     Problem: Skin/Tissue Integrity  Goal: Absence of new skin breakdown  Description: 1.  Monitor for areas of redness and/or skin breakdown  2.  Assess vascular access sites hourly  3.  Every 4-6 hours minimum:  Change oxygen saturation probe site  4.  Every 4-6 hours:  If on nasal continuous positive airway pressure, respiratory therapy assess nares and determine need for appliance change or resting period.  Outcome: Progressing     Problem: Nutrition Deficit:  Goal: Optimize nutritional status  Outcome: Progressing  Flowsheets (Taken 1/28/2025 1146 by Molly Dan, RD, LD)  Nutrient intake appropriate for improving, restoring, or maintaining nutritional needs:   Monitor oral intake, labs, and treatment plans   Recommend appropriate diets, oral nutritional supplements, and vitamin/mineral supplements     Problem: Pain  Goal: Verbalizes/displays adequate comfort level or baseline comfort level  Outcome: Progressing     Problem: ABCDS Injury Assessment  Goal: Absence of physical injury  Outcome: Progressing

## 2025-01-28 NOTE — PROGRESS NOTES
Nutrition Note    RECOMMENDATIONS  PO Diet: Regular  ONS: Gelatein BID     ASSESSMENT   Nutrition intervention triggered for f/u.  Pt's diet advanced to regular consistency to allow for more choices from menu, however pt continues to complain about the hospital food often being inedible.  Pt agrees to con't fruit punch Gelatein, but does not like any other supplements.  Complains that family brings in food, but cannot keep in refrigerator d/t isolation precautions.  Pt would like family to bring in frozen meals.  Encouraged family to leave items with nursing as they are single serving meals that can be kept in refrigerator/freezer until ready to heat & eat.  Pt declines further nutrition concerns @ this time.  Will con't to monitor for improved po intake.       Malnutrition Status: Mild malnutrition  Acute Illness  Findings of the 6 clinical characteristics of malnutrition:  Energy Intake:  75% or less of estimated energy requirements for 7 or more days  Weight Loss:  No weight loss     Body Fat Loss:  No body fat loss     Muscle Mass Loss:  No muscle mass loss    Fluid Accumulation:  No fluid accumulation Extremities, Generalized   Strength:  Not Performed      NUTRITION DIAGNOSIS   Inadequate oral intake related to inadequate protein-energy intake as evidenced by patient reported barriers, variable po intake (dislike for hospital food)    Goals: PO intake 75% or greater, by next RD assessment     NUTRITION RELATED FINDINGS  Objective: LBM 1/26; edema: nonpitting generalized & BLE; gluc 100  Wounds: Surgical Incision    CURRENT NUTRITION THERAPIES  ADULT DIET; Regular  ADULT ORAL NUTRITION SUPPLEMENT; Breakfast, Dinner; Fortified Gelatin Oral Supplement       PO Intake: 1-25% (only one recent meal intake recorded per EMR)   PO Supplement Intake:1-25% (PER PT)    ANTHROPOMETRICS  Current Height: 142.2 cm (4' 8\")  Current Weight - Scale: 75.8 kg (167 lb 1.6 oz)    Admission weight: 78.5 kg (173 lb)    COMPARATIVE

## 2025-01-28 NOTE — PLAN OF CARE
Assessment complete see flowsheets. A&Ox4. Meds given per eMAR. The care plan and education has been reviewed and mutually agreed upon with the patient. In bed, fall precautions in place, hourly rounding, call light and belongings in reach, bed in lowest position, wheels locked in place, side rails up x 2, walkways free of clutter. No further needs expressed at this time.    2007 Tylenol given temperature 100.3 and posterior headache.  No further needs expressed at this time.    2245 Wound care completed wet to dry with Dakins half strength.  Moderate bleeding from the wound.  Patient tolerated well. No further needs at this time.       Problem: Discharge Planning  Goal: Discharge to home or other facility with appropriate resources  1/27/2025 2010 by Kortney Hughes RN  Outcome: Progressing  1/27/2025 1336 by Cait Navarrete RN  Outcome: Progressing     Problem: Safety - Medical Restraint  Goal: Remains free of injury from restraints (Restraint for Interference with Medical Device)  Description: INTERVENTIONS:  1. Determine that other, less restrictive measures have been tried or would not be effective before applying the restraint  2. Evaluate the patient's condition at the time of restraint application  3. Inform patient/family regarding the reason for restraint  4. Q2H: Monitor safety, psychosocial status, comfort, nutrition and hydration  1/27/2025 2010 by Kortney Hughes RN  Outcome: Progressing  1/27/2025 1336 by Cait Navarrete RN  Outcome: Progressing     Problem: Safety - Adult  Goal: Free from fall injury  1/27/2025 2010 by Kortney Hughes RN  Outcome: Progressing  1/27/2025 1336 by Cait Navarrete RN  Outcome: Progressing     Problem: Skin/Tissue Integrity  Goal: Absence of new skin breakdown  Description: 1.  Monitor for areas of redness and/or skin breakdown  2.  Assess vascular access sites hourly  3.  Every 4-6 hours minimum:  Change oxygen saturation probe site  4.  Every 4-6 hours:  If on

## 2025-01-28 NOTE — PROGRESS NOTES
multiple (4-5) seated rest break required. During ambulation pt reports \"heart racing\" - SpO2 on RA was 95% and HR of 84, symptom subsides with rest break  Balance:  Static Sitting Balance: good: independent with functional balance in unsupported position  Dynamic Sitting Balance: fair (+): maintains balance at SBA/supervision without use of UE support  Static Standing Balance: fair: maintains balance at CGA without use of UE support  Dynamic Standing Balance: fair (-): maintains balance at CGA to SBA with use of UE support  Comments:    Other Therapeutic Interventions          Second Session    Pt in bed at entry, agreeable to session, endorsing fatigue. Reports extensive drainage from wound- soaked through pants to trip pad on bed. Pt declined all BADL needs. Reports \"aching\" at groin.     Pt agreeable to EOB activity for strengthening and endurance to support ADL engagement- 10 reps x2 w/ light flex bar of pronation, supination, wrist flex/ext, and unilateral row. Holding 2lb ball pt completes chest press + alternating knee extensions and B shoulder press + alternating marches. Rest as needed throughout.     Supine<>sit at SUP, use of leg  for L LE as needed.     Bed level- RN added additional dressing to wound and pants changed. Rolling R/L Sreekanth and partial bridging completed.     Pt left in bed, bed alarm on, call light and needs in reach.       Cognition  Arousal/Alertness: appropriate responses to stimuli  Following Commands: follows multi step commands with repetition, follows multi step commands with increased time  Attention Span: attends with cues to redirect  Memory: decreased recall of recent events  Insights: decreased awareness of deficits  Initiation: requires cues for some  Sequencing: requires cues for some  Comments: variable self-efficacy, re-education on therapy roles/purposes required within sessions- difficulty for pt to relate therapy activities to functional benefit and IND at times.

## 2025-01-29 LAB
ANION GAP SERPL CALCULATED.3IONS-SCNC: 10 MMOL/L (ref 3–16)
BACTERIA BLD CULT ORG #2: NORMAL
BACTERIA BLD CULT: NORMAL
BASOPHILS # BLD: 0 K/UL (ref 0–0.2)
BASOPHILS NFR BLD: 0.7 %
BUN SERPL-MCNC: 20 MG/DL (ref 7–20)
CALCIUM SERPL-MCNC: 8.3 MG/DL (ref 8.3–10.6)
CHLORIDE SERPL-SCNC: 103 MMOL/L (ref 99–110)
CO2 SERPL-SCNC: 23 MMOL/L (ref 21–32)
CREAT SERPL-MCNC: 1.3 MG/DL (ref 0.6–1.2)
DEPRECATED RDW RBC AUTO: 21.8 % (ref 12.4–15.4)
EOSINOPHIL # BLD: 0.1 K/UL (ref 0–0.6)
EOSINOPHIL NFR BLD: 2 %
GFR SERPLBLD CREATININE-BSD FMLA CKD-EPI: 46 ML/MIN/{1.73_M2}
GLUCOSE SERPL-MCNC: 84 MG/DL (ref 70–99)
HCT VFR BLD AUTO: 24.2 % (ref 36–48)
HGB BLD-MCNC: 7.8 G/DL (ref 12–16)
LYMPHOCYTES # BLD: 1.9 K/UL (ref 1–5.1)
LYMPHOCYTES NFR BLD: 32.2 %
MCH RBC QN AUTO: 29.8 PG (ref 26–34)
MCHC RBC AUTO-ENTMCNC: 32.3 G/DL (ref 31–36)
MCV RBC AUTO: 92.3 FL (ref 80–100)
MONOCYTES # BLD: 0.8 K/UL (ref 0–1.3)
MONOCYTES NFR BLD: 12.9 %
NEUTROPHILS # BLD: 3.1 K/UL (ref 1.7–7.7)
NEUTROPHILS NFR BLD: 52.2 %
PLATELET # BLD AUTO: 417 K/UL (ref 135–450)
PMV BLD AUTO: 7.2 FL (ref 5–10.5)
POTASSIUM SERPL-SCNC: 4.4 MMOL/L (ref 3.5–5.1)
RBC # BLD AUTO: 2.62 M/UL (ref 4–5.2)
SODIUM SERPL-SCNC: 136 MMOL/L (ref 136–145)
WBC # BLD AUTO: 5.9 K/UL (ref 4–11)

## 2025-01-29 PROCEDURE — 97535 SELF CARE MNGMENT TRAINING: CPT

## 2025-01-29 PROCEDURE — 6370000000 HC RX 637 (ALT 250 FOR IP): Performed by: STUDENT IN AN ORGANIZED HEALTH CARE EDUCATION/TRAINING PROGRAM

## 2025-01-29 PROCEDURE — 97110 THERAPEUTIC EXERCISES: CPT

## 2025-01-29 PROCEDURE — 36415 COLL VENOUS BLD VENIPUNCTURE: CPT

## 2025-01-29 PROCEDURE — APPNB30 APP NON BILLABLE TIME 0-30 MINS: Performed by: NURSE PRACTITIONER

## 2025-01-29 PROCEDURE — 80048 BASIC METABOLIC PNL TOTAL CA: CPT

## 2025-01-29 PROCEDURE — 85025 COMPLETE CBC W/AUTO DIFF WBC: CPT

## 2025-01-29 PROCEDURE — 6370000000 HC RX 637 (ALT 250 FOR IP): Performed by: NURSE PRACTITIONER

## 2025-01-29 PROCEDURE — 97530 THERAPEUTIC ACTIVITIES: CPT

## 2025-01-29 PROCEDURE — 99024 POSTOP FOLLOW-UP VISIT: CPT | Performed by: SURGERY

## 2025-01-29 PROCEDURE — APPSS15 APP SPLIT SHARED TIME 0-15 MINUTES: Performed by: NURSE PRACTITIONER

## 2025-01-29 PROCEDURE — 1280000000 HC REHAB R&B

## 2025-01-29 RX ORDER — SULFAMETHOXAZOLE AND TRIMETHOPRIM 800; 160 MG/1; MG/1
1 TABLET ORAL EVERY 12 HOURS SCHEDULED
Status: DISCONTINUED | OUTPATIENT
Start: 2025-01-29 | End: 2025-01-30

## 2025-01-29 RX ORDER — SODIUM CHLORIDE 0.9 % (FLUSH) 0.9 %
5-40 SYRINGE (ML) INJECTION PRN
Status: DISCONTINUED | OUTPATIENT
Start: 2025-01-29 | End: 2025-02-01 | Stop reason: HOSPADM

## 2025-01-29 RX ADMIN — Medication 100 MG: at 09:00

## 2025-01-29 RX ADMIN — HYDROXYZINE HYDROCHLORIDE 25 MG: 25 TABLET, FILM COATED ORAL at 00:13

## 2025-01-29 RX ADMIN — APIXABAN 5 MG: 5 TABLET, FILM COATED ORAL at 10:08

## 2025-01-29 RX ADMIN — SULFAMETHOXAZOLE AND TRIMETHOPRIM 1 TABLET: 800; 160 TABLET ORAL at 10:08

## 2025-01-29 RX ADMIN — ACETAMINOPHEN 650 MG: 325 TABLET ORAL at 13:22

## 2025-01-29 RX ADMIN — CITALOPRAM HYDROBROMIDE 40 MG: 20 TABLET ORAL at 08:00

## 2025-01-29 RX ADMIN — TIZANIDINE 4 MG: 4 TABLET ORAL at 17:01

## 2025-01-29 RX ADMIN — PANTOPRAZOLE SODIUM 40 MG: 40 TABLET, DELAYED RELEASE ORAL at 06:24

## 2025-01-29 RX ADMIN — HYDROCODONE BITARTRATE AND ACETAMINOPHEN 1 TABLET: 5; 325 TABLET ORAL at 08:03

## 2025-01-29 RX ADMIN — HYOSCYAMINE SULFATE: 16 SOLUTION at 22:30

## 2025-01-29 RX ADMIN — Medication 1 CAPSULE: at 10:08

## 2025-01-29 RX ADMIN — SULFAMETHOXAZOLE AND TRIMETHOPRIM 1 TABLET: 800; 160 TABLET ORAL at 20:57

## 2025-01-29 RX ADMIN — HYDROCODONE BITARTRATE AND ACETAMINOPHEN 1 TABLET: 5; 325 TABLET ORAL at 16:57

## 2025-01-29 RX ADMIN — METOPROLOL TARTRATE 12.5 MG: 25 TABLET, FILM COATED ORAL at 10:08

## 2025-01-29 RX ADMIN — HYDROXYZINE HYDROCHLORIDE 25 MG: 25 TABLET, FILM COATED ORAL at 23:16

## 2025-01-29 RX ADMIN — CLOPIDOGREL BISULFATE 75 MG: 75 TABLET ORAL at 10:08

## 2025-01-29 RX ADMIN — MIRTAZAPINE 15 MG: 15 TABLET, FILM COATED ORAL at 20:57

## 2025-01-29 RX ADMIN — HYOSCYAMINE SULFATE: 16 SOLUTION at 10:00

## 2025-01-29 RX ADMIN — APIXABAN 5 MG: 5 TABLET, FILM COATED ORAL at 20:57

## 2025-01-29 ASSESSMENT — PAIN DESCRIPTION - PAIN TYPE: TYPE: ACUTE PAIN

## 2025-01-29 ASSESSMENT — PAIN DESCRIPTION - DESCRIPTORS
DESCRIPTORS: ACHING
DESCRIPTORS: THROBBING
DESCRIPTORS: ACHING
DESCRIPTORS: ACHING
DESCRIPTORS: THROBBING

## 2025-01-29 ASSESSMENT — PAIN SCALES - GENERAL
PAINLEVEL_OUTOF10: 5
PAINLEVEL_OUTOF10: 0
PAINLEVEL_OUTOF10: 7
PAINLEVEL_OUTOF10: 10
PAINLEVEL_OUTOF10: 10
PAINLEVEL_OUTOF10: 8
PAINLEVEL_OUTOF10: 8
PAINLEVEL_OUTOF10: 6
PAINLEVEL_OUTOF10: 4
PAINLEVEL_OUTOF10: 6

## 2025-01-29 ASSESSMENT — PAIN DESCRIPTION - ORIENTATION
ORIENTATION: RIGHT
ORIENTATION: LEFT
ORIENTATION: RIGHT;LEFT
ORIENTATION: ANTERIOR;POSTERIOR;LOWER
ORIENTATION: LEFT;LOWER

## 2025-01-29 ASSESSMENT — PAIN - FUNCTIONAL ASSESSMENT: PAIN_FUNCTIONAL_ASSESSMENT: ACTIVITIES ARE NOT PREVENTED

## 2025-01-29 ASSESSMENT — PAIN DESCRIPTION - LOCATION
LOCATION: KNEE
LOCATION: HEAD

## 2025-01-29 NOTE — PLAN OF CARE
Problem: Discharge Planning  Goal: Discharge to home or other facility with appropriate resources  Outcome: Progressing  Flowsheets (Taken 1/28/2025 2137 by Varsha Urias, RN)  Discharge to home or other facility with appropriate resources: Identify discharge learning needs (meds, wound care, etc)     Problem: Safety - Medical Restraint  Goal: Remains free of injury from restraints (Restraint for Interference with Medical Device)  Description: INTERVENTIONS:  1. Determine that other, less restrictive measures have been tried or would not be effective before applying the restraint  2. Evaluate the patient's condition at the time of restraint application  3. Inform patient/family regarding the reason for restraint  4. Q2H: Monitor safety, psychosocial status, comfort, nutrition and hydration  Outcome: Progressing     Problem: Safety - Adult  Goal: Free from fall injury  1/29/2025 1019 by Amy Smith RN  Outcome: Progressing     Problem: Skin/Tissue Integrity  Goal: Absence of new skin breakdown  Description: 1.  Monitor for areas of redness and/or skin breakdown  2.  Assess vascular access sites hourly  3.  Every 4-6 hours minimum:  Change oxygen saturation probe site  4.  Every 4-6 hours:  If on nasal continuous positive airway pressure, respiratory therapy assess nares and determine need for appliance change or resting period.  1/29/2025 1019 by Amy Smith RN  Outcome: Progressing     Problem: Nutrition Deficit:  Goal: Optimize nutritional status  Outcome: Progressing     Problem: Pain  Goal: Verbalizes/displays adequate comfort level or baseline comfort level  1/29/2025 1019 by Amy Smith RN  Outcome: Progressing     Problem: ABCDS Injury Assessment  Goal: Absence of physical injury  1/29/2025 1019 by Amy Smith RN  Outcome: Progressing

## 2025-01-29 NOTE — PROGRESS NOTES
[facility]        Progress Note    Patient Name: Shahida Vásquez  MRN: [id number]  YOB: 1960  Date of Evaluation: 2025    Chief Complaint: Wound    Subjective:  No acute events overnight  Pain controlled  No new issues with wound, continues to drain  Resting in bed at this time      Vital Signs:  Patient Vitals for the past 24 hrs:   BP Temp Temp src Pulse Resp SpO2   25 1000 104/67 98.7 °F (37.1 °C) -- 70 16 97 %   25 0016 -- 98.5 °F (36.9 °C) Oral -- -- --   25 2200 -- -- -- -- 14 --   25 2111 120/73 99.7 °F (37.6 °C) Oral 88 18 96 %   25 1531 -- -- -- -- 16 --   25 1530 (!) 105/58 -- -- 83 -- --   25 1501 -- -- -- -- 16 --      TEMPERATURE HISTORY 24H: Temp (24hrs), Av °F (37.2 °C), Min:98.5 °F (36.9 °C), Max:99.7 °F (37.6 °C)    BLOOD PRESSURE HISTORY: Systolic (36hrs), Av , Min:92 , Max:120    Diastolic (36hrs), Av, Min:55, Max:73      Intake/Output:  I/O last 3 completed shifts:  In: 485 [P.O.:480; I.V.:5]  Out: -   No intake/output data recorded.  Drain/tube Output:       Physical Exam:  General: awake, alert, oriented to person, place, time  Lungs: unlabored respirations  Skin/Wound: open left groin wound, thin/old bloody drainage, improved odor, visualized wound bed is generally clean--dressing changed     Labs:  CBC:    Recent Labs     25  0541 25  0557   WBC 6.8 5.9   HGB 8.2* 7.8*   HCT 24.7* 24.2*    417     BMP:    Recent Labs     25  0541 25  0557    136   K 4.0 4.4    103   CO2 23 23   BUN 21* 20   CREATININE 1.1 1.3*   GLUCOSE 100* 84     Hepatic:  No results for input(s): \"AST\", \"ALT\", \"BILITOT\", \"ALKPHOS\" in the last 72 hours.    Invalid input(s): \"ALB\"  Amylase:  No results found for: \"AMYLASE\"  Lipase:  No results found for: \"LIPASE\"   Mag:    Lab Results   Component Value Date/Time    MG 2.18 2025 05:00 AM    MG 2.00 2025

## 2025-01-29 NOTE — PROGRESS NOTES
The pt c/o 1-9/10  headache all day.  The pt pointed her left side of forehead and left lower back of her head.  Stated it had been trobbing without relief.  tylenol and Norco did not help.  The pt also had sensitivity to light when curtains were closed and covered her eyes 100%.  Vital signs were with in normal range.  Administered TiZanidine at 1701 per Dr. Young's advice.  The pt stated her headache is bad but not severe like before.  The pt ate 50% of dinner and uncovered her eyes about 50%.

## 2025-01-29 NOTE — PROGRESS NOTES
Shahida Vásquez  1/29/2025  [id number]    Chief Complaint: Critical illness myopathy    Subjective    No acute events overnight.     Patient doing well today overall. Denies fevers/chills, lightheadedness, chest pain, dyspnea. Continues to have frontal headache, with associated neck pain. Denies any nausea/vomiting. Slept better last night, no nightmares/vivid dreams.     Last Bowel Movement:  01/27/25.        Objective    Patient Vitals for the past 24 hrs:   BP Temp Temp src Pulse Resp SpO2 Height   01/29/25 0016 -- 98.5 °F (36.9 °C) Oral -- -- -- --   01/28/25 2200 -- -- -- -- 14 -- --   01/28/25 2111 120/73 99.7 °F (37.6 °C) Oral 88 18 96 % --   01/28/25 1531 -- -- -- -- 16 -- --   01/28/25 1530 (!) 105/58 -- -- 83 -- -- --   01/28/25 1501 -- -- -- -- 16 -- --   01/28/25 1146 -- -- -- -- -- -- 1.422 m (4' 8\")     Gen: No distress, pleasant.   HEENT: Normocephalic, atraumatic.   CV: Regular rate and rhythm. Extremities warm, well perfused.   Resp: No respiratory distress. CTAB.  Abd: Soft, nontender.  Ext: Left groin wound with clean, dry dressing in place.  Neuro: Alert, oriented, appropriately interactive.     Laboratory data:   Lab Results   Component Value Date/Time     01/29/2025 05:57 AM    K 4.4 01/29/2025 05:57 AM     01/29/2025 05:57 AM    CO2 23 01/29/2025 05:57 AM    BUN 20 01/29/2025 05:57 AM    CREATININE 1.3 01/29/2025 05:57 AM    GLUCOSE 84 01/29/2025 05:57 AM    CALCIUM 8.3 01/29/2025 05:57 AM    LABGLOM 46 01/29/2025 05:57 AM     Recent Labs     01/29/25  0557 01/27/25  0541 01/25/25  0948   WBC 5.9 6.8 8.8   HGB 7.8* 8.2* 8.7*   HCT 24.2* 24.7* 26.4*   MCV 92.3 93.1 94.1    408 425        Therapy progress:       PT    Supine to Sit: Supervision or touching assistance  Sit to Supine: Substantial/maximal assistance   Sit to Stand: Partial/moderate assistance  Chair/Bed to Chair Transfer: Supervision or touching assistance  Car Transfer:    Ambulation 10 ft: Supervision or touching

## 2025-01-29 NOTE — PROGRESS NOTES
[facility] - Inpatient Rehabilitation Department   Phone: [phone]    Physical Therapy    [] Initial Evaluation            [x] Daily Treatment Note         [] Discharge Summary      Patient: Shahida Vásquez   : 1960   MRN: [id number]   Date of Service:  2025  Admitting Diagnosis: Critical illness myopathy  Current Admission Summary: Shahida Vásquez is a 64 y.o. female with PMHx notable for HTN, obesity s/p Iliana-en-Y gastric bypass, anxiety/depression , chronic lumbar back pain who presented on  with chest pain and fatigue.  She was found to have STEMI, and went emergently to Cath Lab.  She was found to have RCA occlusion, and severe right ventricular systolic dysfunction.  Impella were placed in LV and RV.  She remained intubated for airway protection.  She underwent cardiac catheterization on , had PCI to RCA which was complicated by bleeding and ventricular tachycardia.  Impella and IABP removed on .  Extubated on . CRRT stopped on 1/10, transitioned to iHD on , and now holding HD since . SLP following for dysphagia, cleared for diet after MBS on . NG tube removed on . Hospital course complicated by: cardiogenic shock, acute hypoxic respiratory failure, ANALY, anemia (required 5 units pRBCs during cath, 7 units total this admission).      Patient reports that she is doing well today overall.  She is feeling tired after working with therapy this morning.  She is having some bleeding from her left groin wound which is concerning her.  She denies any fevers/chills, chest pain, palpitations, dyspnea.  Her appetite continues to improve. Last BM (including prior to admit): 25     Past Medical History:  has a past medical history of Anxiety, Depression, Hypertension, Insomnia, Lumbar disc herniation, and Lumbar spinal stenosis.  Past Surgical History:  has a past surgical history that includes Hysterectomy; Gastric bypass surgery; Cholecystectomy; fracture

## 2025-01-29 NOTE — PLAN OF CARE
Problem: Safety - Adult  Goal: Free from fall injury  Outcome: Progressing     Problem: Skin/Tissue Integrity  Goal: Absence of new skin breakdown  Description: 1.  Monitor for areas of redness and/or skin breakdown  2.  Assess vascular access sites hourly  3.  Every 4-6 hours minimum:  Change oxygen saturation probe site  4.  Every 4-6 hours:  If on nasal continuous positive airway pressure, respiratory therapy assess nares and determine need for appliance change or resting period.  Outcome: Progressing     Problem: Pain  Goal: Verbalizes/displays adequate comfort level or baseline comfort level  Outcome: Progressing  Flowsheets (Taken 1/28/2025 2111)  Verbalizes/displays adequate comfort level or baseline comfort level: Encourage patient to monitor pain and request assistance     Problem: ABCDS Injury Assessment  Goal: Absence of physical injury  Outcome: Progressing  Flowsheets (Taken 1/29/2025 8880)  Absence of Physical Injury: Implement safety measures based on patient assessment

## 2025-01-29 NOTE — PROGRESS NOTES
[facility] - Inpatient Rehabilitation Department   Phone: [phone]    Occupational Therapy    [] Initial Evaluation            [x] Daily Treatment Note         [] Discharge Summary      Patient: Shahida Vásquez   : 1960   MRN: [id number]   Date of Service:  2025    Admitting Diagnosis:  Critical illness myopathy  Current Admission Summary: Shahida Vásquez is a 64 y.o. female with PMHx notable for HTN, obesity s/p Iliana-en-Y gastric bypass, anxiety/depression , chronic lumbar back pain who presented on  with chest pain and fatigue.  She was found to have STEMI, and went emergently to Cath Lab.  She was found to have RCA occlusion, and severe right ventricular systolic dysfunction.  Impella were placed in LV and RV.  She remained intubated for airway protection.  She underwent cardiac catheterization on , had PCI to RCA which was complicated by bleeding and ventricular tachycardia.  Impella and IABP removed on .  Extubated on . CRRT stopped on 1/10, transitioned to iHD on , and now holding HD since . SLP following for dysphagia, cleared for diet after MBS on . NG tube removed. Hospital course complicated by: cardiogenic shock, acute hypoxic respiratory failure, ANALY, anemia (required 5 units pRBCs during cath, 7 units total this admission)   Past Medical History:  has a past medical history of Anxiety, Depression, Hypertension, Insomnia, Lumbar disc herniation, and Lumbar spinal stenosis.  Past Surgical History:  has a past surgical history that includes Hysterectomy; Gastric bypass surgery; Cholecystectomy; fracture surgery; lumbar nerve block (N/A, 2019); bronchoscopy (N/A, 10/10/2019); bronchoscopy (10/10/2019); Pain management procedure (Left, 2020); Forearm surgery (Left, 2020); Dilatation, esophagus; Abdomen surgery; Cardiac procedure (N/A, 2024); Cardiac procedure (N/A, 2024); Cardiac procedure (N/A, 2024); Cardiac procedure (N/A,

## 2025-01-30 LAB
ANION GAP SERPL CALCULATED.3IONS-SCNC: 12 MMOL/L (ref 3–16)
BUN SERPL-MCNC: 23 MG/DL (ref 7–20)
CALCIUM SERPL-MCNC: 8.5 MG/DL (ref 8.3–10.6)
CHLORIDE SERPL-SCNC: 102 MMOL/L (ref 99–110)
CO2 SERPL-SCNC: 21 MMOL/L (ref 21–32)
CREAT SERPL-MCNC: 1.5 MG/DL (ref 0.6–1.2)
GFR SERPLBLD CREATININE-BSD FMLA CKD-EPI: 39 ML/MIN/{1.73_M2}
GLUCOSE SERPL-MCNC: 96 MG/DL (ref 70–99)
POTASSIUM SERPL-SCNC: 4.7 MMOL/L (ref 3.5–5.1)
SODIUM SERPL-SCNC: 135 MMOL/L (ref 136–145)

## 2025-01-30 PROCEDURE — 6370000000 HC RX 637 (ALT 250 FOR IP): Performed by: STUDENT IN AN ORGANIZED HEALTH CARE EDUCATION/TRAINING PROGRAM

## 2025-01-30 PROCEDURE — 36415 COLL VENOUS BLD VENIPUNCTURE: CPT

## 2025-01-30 PROCEDURE — 97530 THERAPEUTIC ACTIVITIES: CPT

## 2025-01-30 PROCEDURE — 6370000000 HC RX 637 (ALT 250 FOR IP): Performed by: NURSE PRACTITIONER

## 2025-01-30 PROCEDURE — 80048 BASIC METABOLIC PNL TOTAL CA: CPT

## 2025-01-30 PROCEDURE — 97112 NEUROMUSCULAR REEDUCATION: CPT

## 2025-01-30 PROCEDURE — 97535 SELF CARE MNGMENT TRAINING: CPT

## 2025-01-30 PROCEDURE — 97110 THERAPEUTIC EXERCISES: CPT

## 2025-01-30 PROCEDURE — 1280000000 HC REHAB R&B

## 2025-01-30 RX ADMIN — PANTOPRAZOLE SODIUM 40 MG: 40 TABLET, DELAYED RELEASE ORAL at 05:33

## 2025-01-30 RX ADMIN — HYDROXYZINE HYDROCHLORIDE 25 MG: 25 TABLET, FILM COATED ORAL at 21:16

## 2025-01-30 RX ADMIN — ACETAMINOPHEN 650 MG: 325 TABLET ORAL at 21:16

## 2025-01-30 RX ADMIN — CITALOPRAM HYDROBROMIDE 40 MG: 20 TABLET ORAL at 09:15

## 2025-01-30 RX ADMIN — ACETAMINOPHEN 650 MG: 325 TABLET ORAL at 06:39

## 2025-01-30 RX ADMIN — CLOPIDOGREL BISULFATE 75 MG: 75 TABLET ORAL at 09:14

## 2025-01-30 RX ADMIN — MIRTAZAPINE 15 MG: 15 TABLET, FILM COATED ORAL at 21:16

## 2025-01-30 RX ADMIN — HYDROCODONE BITARTRATE AND ACETAMINOPHEN 1 TABLET: 5; 325 TABLET ORAL at 05:33

## 2025-01-30 RX ADMIN — TIZANIDINE 2 MG: 4 TABLET ORAL at 18:42

## 2025-01-30 RX ADMIN — Medication 100 MG: at 09:14

## 2025-01-30 RX ADMIN — APIXABAN 5 MG: 5 TABLET, FILM COATED ORAL at 09:15

## 2025-01-30 RX ADMIN — HYOSCYAMINE SULFATE: 16 SOLUTION at 21:20

## 2025-01-30 RX ADMIN — APIXABAN 5 MG: 5 TABLET, FILM COATED ORAL at 21:16

## 2025-01-30 RX ADMIN — FUROSEMIDE 20 MG: 20 TABLET ORAL at 09:14

## 2025-01-30 RX ADMIN — HYDROXYZINE HYDROCHLORIDE 25 MG: 25 TABLET, FILM COATED ORAL at 11:56

## 2025-01-30 RX ADMIN — HYDROCODONE BITARTRATE AND ACETAMINOPHEN 1 TABLET: 5; 325 TABLET ORAL at 18:41

## 2025-01-30 RX ADMIN — HYDROCODONE BITARTRATE AND ACETAMINOPHEN 1 TABLET: 5; 325 TABLET ORAL at 11:53

## 2025-01-30 RX ADMIN — Medication 1 CAPSULE: at 09:14

## 2025-01-30 ASSESSMENT — PAIN SCALES - GENERAL
PAINLEVEL_OUTOF10: 6
PAINLEVEL_OUTOF10: 7
PAINLEVEL_OUTOF10: 5
PAINLEVEL_OUTOF10: 7
PAINLEVEL_OUTOF10: 4
PAINLEVEL_OUTOF10: 7
PAINLEVEL_OUTOF10: 5
PAINLEVEL_OUTOF10: 6
PAINLEVEL_OUTOF10: 4
PAINLEVEL_OUTOF10: 6
PAINLEVEL_OUTOF10: 6

## 2025-01-30 ASSESSMENT — PAIN DESCRIPTION - DESCRIPTORS
DESCRIPTORS: PRESSURE
DESCRIPTORS: ACHING
DESCRIPTORS: THROBBING
DESCRIPTORS: THROBBING
DESCRIPTORS: ACHING

## 2025-01-30 ASSESSMENT — PAIN DESCRIPTION - PAIN TYPE
TYPE: ACUTE PAIN
TYPE: ACUTE PAIN

## 2025-01-30 ASSESSMENT — PAIN DESCRIPTION - ORIENTATION
ORIENTATION: POSTERIOR
ORIENTATION: RIGHT
ORIENTATION: LEFT
ORIENTATION: RIGHT
ORIENTATION: RIGHT;LEFT
ORIENTATION: LEFT
ORIENTATION: POSTERIOR
ORIENTATION: LEFT

## 2025-01-30 ASSESSMENT — PAIN DESCRIPTION - LOCATION
LOCATION: HEAD
LOCATION: HEAD
LOCATION: KNEE
LOCATION: KNEE;LEG
LOCATION: LEG;OTHER (COMMENT)
LOCATION: HEAD
LOCATION: KNEE;NECK
LOCATION: HEAD

## 2025-01-30 ASSESSMENT — PAIN DESCRIPTION - FREQUENCY: FREQUENCY: INTERMITTENT

## 2025-01-30 ASSESSMENT — PAIN DESCRIPTION - ONSET: ONSET: ON-GOING

## 2025-01-30 NOTE — PROGRESS NOTES
Shahida Vásquez  1/30/2025  [id number]    Chief Complaint: Critical illness myopathy    Subjective    No acute events overnight.     Patient in poor spirits today, wanting to go home. Headache doing better, less photophobia. Denies fevers/chills, chest pain, dyspnea. Does not think she can manage wound dressing changes, nor does she have assistance available at home.     Last Bowel Movement:  01/27/25.        Objective    Patient Vitals for the past 24 hrs:   BP Temp Temp src Pulse Resp SpO2   01/30/25 0900 (!) 92/48 98.9 °F (37.2 °C) -- 73 16 --   01/30/25 0632 (!) 101/56 -- -- 79 -- --   01/30/25 0603 -- -- -- -- 16 --   01/29/25 2233 (!) 92/57 -- -- -- -- --   01/29/25 2045 99/66 98.3 °F (36.8 °C) Oral 60 16 99 %   01/29/25 1727 -- -- -- -- 16 --   01/29/25 1630 109/70 98.3 °F (36.8 °C) -- 67 16 98 %   01/29/25 1000 104/67 98.7 °F (37.1 °C) -- 70 16 97 %     Gen: No distress, pleasant.   HEENT: Normocephalic, atraumatic.   CV: Regular rate and rhythm. Extremities warm, well perfused.   Resp: No respiratory distress. CTAB.  Abd: Soft, nontender.  Ext: Left groin wound with clean, dry dressing in place.  Neuro: Alert, oriented, appropriately interactive.     Laboratory data:   Lab Results   Component Value Date/Time     01/30/2025 05:46 AM    K 4.7 01/30/2025 05:46 AM     01/30/2025 05:46 AM    CO2 21 01/30/2025 05:46 AM    BUN 23 01/30/2025 05:46 AM    CREATININE 1.5 01/30/2025 05:46 AM    GLUCOSE 96 01/30/2025 05:46 AM    CALCIUM 8.5 01/30/2025 05:46 AM    LABGLOM 39 01/30/2025 05:46 AM     Recent Labs     01/29/25  0557 01/27/25  0541 01/25/25  0948   WBC 5.9 6.8 8.8   HGB 7.8* 8.2* 8.7*   HCT 24.2* 24.7* 26.4*   MCV 92.3 93.1 94.1    408 425        Therapy progress:       PT    Supine to Sit: Supervision or touching assistance  Sit to Supine: Substantial/maximal assistance   Sit to Stand: Partial/moderate assistance  Chair/Bed to Chair Transfer: Supervision or touching assistance  Car Transfer:

## 2025-01-30 NOTE — PROGRESS NOTES
[facility] - Inpatient Rehabilitation Department   Phone: [phone]    Occupational Therapy    [] Initial Evaluation            [x] Daily Treatment Note         [] Discharge Summary      Patient: Shahida Vásquez   : 1960   MRN: [id number]   Date of Service:  2025    Admitting Diagnosis:  Critical illness myopathy  Current Admission Summary: Shahida Vásquez is a 64 y.o. female with PMHx notable for HTN, obesity s/p Iliana-en-Y gastric bypass, anxiety/depression , chronic lumbar back pain who presented on  with chest pain and fatigue.  She was found to have STEMI, and went emergently to Cath Lab.  She was found to have RCA occlusion, and severe right ventricular systolic dysfunction.  Impella were placed in LV and RV.  She remained intubated for airway protection.  She underwent cardiac catheterization on , had PCI to RCA which was complicated by bleeding and ventricular tachycardia.  Impella and IABP removed on .  Extubated on . CRRT stopped on 1/10, transitioned to iHD on , and now holding HD since . SLP following for dysphagia, cleared for diet after MBS on . NG tube removed. Hospital course complicated by: cardiogenic shock, acute hypoxic respiratory failure, ANALY, anemia (required 5 units pRBCs during cath, 7 units total this admission)   Past Medical History:  has a past medical history of Anxiety, Depression, Hypertension, Insomnia, Lumbar disc herniation, and Lumbar spinal stenosis.  Past Surgical History:  has a past surgical history that includes Hysterectomy; Gastric bypass surgery; Cholecystectomy; fracture surgery; lumbar nerve block (N/A, 2019); bronchoscopy (N/A, 10/10/2019); bronchoscopy (10/10/2019); Pain management procedure (Left, 2020); Forearm surgery (Left, 2020); Dilatation, esophagus; Abdomen surgery; Cardiac procedure (N/A, 2024); Cardiac procedure (N/A, 2024); Cardiac procedure (N/A, 2024); Cardiac procedure (N/A,

## 2025-01-30 NOTE — PROGRESS NOTES
[facility] - Inpatient Rehabilitation Department   Phone: [phone]    Physical Therapy    [] Initial Evaluation            [x] Daily Treatment Note         [] Discharge Summary      Patient: Shahida Vásquez   : 1960   MRN: [id number]   Date of Service:  2025  Admitting Diagnosis: Critical illness myopathy  Current Admission Summary: Shahida Vásquez is a 64 y.o. female with PMHx notable for HTN, obesity s/p Iliana-en-Y gastric bypass, anxiety/depression , chronic lumbar back pain who presented on  with chest pain and fatigue.  She was found to have STEMI, and went emergently to Cath Lab.  She was found to have RCA occlusion, and severe right ventricular systolic dysfunction.  Impella were placed in LV and RV.  She remained intubated for airway protection.  She underwent cardiac catheterization on , had PCI to RCA which was complicated by bleeding and ventricular tachycardia.  Impella and IABP removed on .  Extubated on . CRRT stopped on 1/10, transitioned to iHD on , and now holding HD since . SLP following for dysphagia, cleared for diet after MBS on . NG tube removed on . Hospital course complicated by: cardiogenic shock, acute hypoxic respiratory failure, ANALY, anemia (required 5 units pRBCs during cath, 7 units total this admission).      Patient reports that she is doing well today overall.  She is feeling tired after working with therapy this morning.  She is having some bleeding from her left groin wound which is concerning her.  She denies any fevers/chills, chest pain, palpitations, dyspnea.  Her appetite continues to improve. Last BM (including prior to admit): 25     Past Medical History:  has a past medical history of Anxiety, Depression, Hypertension, Insomnia, Lumbar disc herniation, and Lumbar spinal stenosis.  Past Surgical History:  has a past surgical history that includes Hysterectomy; Gastric bypass surgery; Cholecystectomy; fracture

## 2025-01-30 NOTE — CARE COORDINATION
Team conference/Weekly Summary         Team conference held today.  Patient's discharge date is 02/01/2025.  Plan is still for patient to discharge home.  Main concern is pt's need and ability to care for her wound that surgery has been following.  Patient is currently getting 2x/day changes here at [facility].  Pt reports she cannot to the changes herself d/t location.  Pt also reports that she has no one to assist.  Dr. Young will be conversing with the surgeon to see if 3x/week would be acceptable since Medicare can only assist with 3x/week visits as max payout for any agency.  Patient could possibly need a wound vac if she is not able to assist with the wound care.  Pt verbalized understanding.        Discussed HHC with pt and she was agreeable.  List was provided and pt did not have a preference in agencies.  Called Latoya mccormick/[facility] who accepted the patient.      Electronically signed by FRIDA Mitchell, DYLAN on 1/30/2025 at 2:30 PM

## 2025-01-30 NOTE — PATIENT CARE CONFERENCE
[facility] Inpatient Rehabilitation Department  Weekly Team Conference Note    Patient Name: Shahida Vásquez      MRN: [id number]  : 1960  (64 y.o.) Gender: female     The team conference for this patient was held on 25 at 1100 am and was led by:  Brendan Young MD     CASE MANAGEMENT:  Assessment:  Patient's discharge date is 2025.  Plan is still for patient to discharge home.  Main concern is pt's need and ability to care for her wound that surgery has been following.  Patient is currently getting 2x/day changes here at [email or web address]  Pt reports she cannot to the changes herself d/t location.  Pt also reports that she has no one to assist.  Dr. Young will be conversing with the surgeon to see if 3x/week would be acceptable since Medicare can only assist with 3x/week visits as max payout for any agency.  Patient could possibly need a wound vac if she is not able to assist with the wound care.  Pt verbalized understanding.        Discussed C with pt and she was agreeable.  List was provided and pt did not have a preference in agencies.  Called Latoya mccormick/[facility] who accepted the patient.      PHYSICAL THERAPY    Current Status:  Bed Mobility:  Supine to Sit: stand by assistance  Rolling Right: stand by assistance  Scooting: stand by assistance  Comments: flat HOB, use of rails and EOB for bed mobility  Transfers:  Sit to stand transfer: stand by assistance  Stand to sit transfer: stand by assistance  Stand step transfer: stand by assistance  Toilet transfer: stand by assistance  Comments:   Ambulation:  Surface:level surface  Assistive Device: rollator (4WRW)  Assistance: stand by assistance  Distance: 35'+78'+133'  Gait Mechanics: decreased step height and length B, narrow JENNIFER, B knee valgus, slow julia   Goals:                  Short Term Goals:  Time Frame: 1 weeks  Patient will complete bed mobility at minimal assistance MET 25  Patient will complete transfers at contact guard

## 2025-01-30 NOTE — PLAN OF CARE
Assessment complete see flowsheets. A&Ox4. Meds given per eMAR. The care plan and education has been reviewed and mutually agreed upon with the patient. In bed, fall precautions in place, hourly rounding, call light and belongings in reach, bed in lowest position, wheels locked in place, side rails up x 2, walkways free of clutter. No further needs expressed at this time.      2045 Patient's blood pressure was 99/66. No signs or symptoms of hypotension.  Held the scheduled metoprolol.  Assisted patient to the restroom using a walker and gait belt with no complications.      2233 Rechecked patient's blood pressure and is now reading at 92/57. Notified Dr. Young.  No signs or symptoms of hypotension.  No new orders at this time.  Wound care completed.  PRN Atarax given per patient request.  No further needs expressed from the patient at this time.    0632 Rechecked patient's blood pressure and is now reading at 101/56.  No further needs expressed at this time. Call light and belongings in reach, bed in lowest position, wheels locked in place, side rails up x 2, walkways free of clutter.     Problem: Discharge Planning  Goal: Discharge to home or other facility with appropriate resources  1/29/2025 1019 by Amy Smith, RN  Outcome: Progressing  Flowsheets (Taken 1/28/2025 2137 by Varsha Urias, RN)  Discharge to home or other facility with appropriate resources: Identify discharge learning needs (meds, wound care, etc)     Problem: Safety - Medical Restraint  Goal: Remains free of injury from restraints (Restraint for Interference with Medical Device)  Description: INTERVENTIONS:  1. Determine that other, less restrictive measures have been tried or would not be effective before applying the restraint  2. Evaluate the patient's condition at the time of restraint application  3. Inform patient/family regarding the reason for restraint  4. Q2H: Monitor safety, psychosocial status, comfort, nutrition and

## 2025-01-31 LAB
ANION GAP SERPL CALCULATED.3IONS-SCNC: 11 MMOL/L (ref 3–16)
BACTERIA URNS QL MICRO: ABNORMAL /HPF
BASOPHILS # BLD: 0 K/UL (ref 0–0.2)
BASOPHILS NFR BLD: 0.6 %
BILIRUB UR QL STRIP.AUTO: NEGATIVE
BUN SERPL-MCNC: 29 MG/DL (ref 7–20)
CALCIUM SERPL-MCNC: 8.4 MG/DL (ref 8.3–10.6)
CHLORIDE SERPL-SCNC: 104 MMOL/L (ref 99–110)
CHLORIDE UR-SCNC: <20 MMOL/L
CLARITY UR: CLEAR
CO2 SERPL-SCNC: 23 MMOL/L (ref 21–32)
COLOR UR: YELLOW
CREAT SERPL-MCNC: 1.6 MG/DL (ref 0.6–1.2)
CREAT UR-MCNC: 47.9 MG/DL (ref 28–259)
DEPRECATED RDW RBC AUTO: 22 % (ref 12.4–15.4)
EOSINOPHIL # BLD: 0.2 K/UL (ref 0–0.6)
EOSINOPHIL NFR BLD: 3.3 %
EPI CELLS #/AREA URNS AUTO: 2 /HPF (ref 0–5)
GFR SERPLBLD CREATININE-BSD FMLA CKD-EPI: 36 ML/MIN/{1.73_M2}
GLUCOSE SERPL-MCNC: 82 MG/DL (ref 70–99)
GLUCOSE UR STRIP.AUTO-MCNC: NEGATIVE MG/DL
HCT VFR BLD AUTO: 26 % (ref 36–48)
HGB BLD-MCNC: 8.5 G/DL (ref 12–16)
HGB UR QL STRIP.AUTO: NEGATIVE
HYALINE CASTS #/AREA URNS AUTO: 0 /LPF (ref 0–8)
KETONES UR STRIP.AUTO-MCNC: NEGATIVE MG/DL
LEUKOCYTE ESTERASE UR QL STRIP.AUTO: ABNORMAL
LYMPHOCYTES # BLD: 2.2 K/UL (ref 1–5.1)
LYMPHOCYTES NFR BLD: 36 %
MCH RBC QN AUTO: 30.4 PG (ref 26–34)
MCHC RBC AUTO-ENTMCNC: 32.8 G/DL (ref 31–36)
MCV RBC AUTO: 92.9 FL (ref 80–100)
MONOCYTES # BLD: 0.7 K/UL (ref 0–1.3)
MONOCYTES NFR BLD: 12 %
NEUTROPHILS # BLD: 2.9 K/UL (ref 1.7–7.7)
NEUTROPHILS NFR BLD: 48.1 %
NITRITE UR QL STRIP.AUTO: NEGATIVE
PH UR STRIP.AUTO: 6.5 [PH] (ref 5–8)
PLATELET # BLD AUTO: 448 K/UL (ref 135–450)
PMV BLD AUTO: 7.3 FL (ref 5–10.5)
POTASSIUM SERPL-SCNC: 4.2 MMOL/L (ref 3.5–5.1)
POTASSIUM UR-SCNC: 11.8 MMOL/L
PROT UR STRIP.AUTO-MCNC: NEGATIVE MG/DL
PROT UR-MCNC: 0.01 G/DL
PROT UR-MCNC: 6.71 MG/DL
RBC # BLD AUTO: 2.8 M/UL (ref 4–5.2)
RBC CLUMPS #/AREA URNS AUTO: 0 /HPF (ref 0–4)
RPT COMMENT: NORMAL
SODIUM SERPL-SCNC: 138 MMOL/L (ref 136–145)
SODIUM UR-SCNC: 34 MMOL/L
SP GR UR STRIP.AUTO: 1.01 (ref 1–1.03)
UA DIPSTICK W REFLEX MICRO PNL UR: YES
URATE SERPL-MCNC: 7.7 MG/DL (ref 2.6–6)
URN SPEC COLLECT METH UR: ABNORMAL
UROBILINOGEN UR STRIP-ACNC: 1 E.U./DL
UUN UR-MCNC: 304 MG/DL (ref 800–1666)
WBC # BLD AUTO: 6.1 K/UL (ref 4–11)
WBC #/AREA URNS AUTO: 1 /HPF (ref 0–5)

## 2025-01-31 PROCEDURE — 84550 ASSAY OF BLOOD/URIC ACID: CPT

## 2025-01-31 PROCEDURE — 97535 SELF CARE MNGMENT TRAINING: CPT

## 2025-01-31 PROCEDURE — 1280000000 HC REHAB R&B

## 2025-01-31 PROCEDURE — 97530 THERAPEUTIC ACTIVITIES: CPT

## 2025-01-31 PROCEDURE — 84166 PROTEIN E-PHORESIS/URINE/CSF: CPT

## 2025-01-31 PROCEDURE — 84156 ASSAY OF PROTEIN URINE: CPT

## 2025-01-31 PROCEDURE — 82436 ASSAY OF URINE CHLORIDE: CPT

## 2025-01-31 PROCEDURE — 86335 IMMUNFIX E-PHORSIS/URINE/CSF: CPT

## 2025-01-31 PROCEDURE — 80048 BASIC METABOLIC PNL TOTAL CA: CPT

## 2025-01-31 PROCEDURE — 84300 ASSAY OF URINE SODIUM: CPT

## 2025-01-31 PROCEDURE — 36415 COLL VENOUS BLD VENIPUNCTURE: CPT

## 2025-01-31 PROCEDURE — 84133 ASSAY OF URINE POTASSIUM: CPT

## 2025-01-31 PROCEDURE — 97608 NEG PRS WND THER NDME>50SQCM: CPT

## 2025-01-31 PROCEDURE — 84540 ASSAY OF URINE/UREA-N: CPT

## 2025-01-31 PROCEDURE — 6370000000 HC RX 637 (ALT 250 FOR IP): Performed by: INTERNAL MEDICINE

## 2025-01-31 PROCEDURE — 81001 URINALYSIS AUTO W/SCOPE: CPT

## 2025-01-31 PROCEDURE — 6370000000 HC RX 637 (ALT 250 FOR IP): Performed by: STUDENT IN AN ORGANIZED HEALTH CARE EDUCATION/TRAINING PROGRAM

## 2025-01-31 PROCEDURE — 82570 ASSAY OF URINE CREATININE: CPT

## 2025-01-31 PROCEDURE — 97606 NEG PRS WND THER DME>50 SQCM: CPT

## 2025-01-31 PROCEDURE — 85025 COMPLETE CBC W/AUTO DIFF WBC: CPT

## 2025-01-31 RX ORDER — HYDROCODONE BITARTRATE AND ACETAMINOPHEN 5; 325 MG/1; MG/1
1 TABLET ORAL DAILY PRN
Qty: 7 TABLET | Refills: 0 | Status: SHIPPED | OUTPATIENT
Start: 2025-01-31 | End: 2025-02-07

## 2025-01-31 RX ORDER — MIDODRINE HYDROCHLORIDE 5 MG/1
5 TABLET ORAL
Status: DISCONTINUED | OUTPATIENT
Start: 2025-01-31 | End: 2025-02-01 | Stop reason: HOSPADM

## 2025-01-31 RX ORDER — TIZANIDINE 2 MG/1
2 TABLET ORAL EVERY 8 HOURS PRN
Qty: 30 TABLET | Refills: 0 | Status: SHIPPED | OUTPATIENT
Start: 2025-01-31

## 2025-01-31 RX ORDER — MIDODRINE HYDROCHLORIDE 5 MG/1
5 TABLET ORAL
Qty: 90 TABLET | Refills: 0 | Status: SHIPPED | OUTPATIENT
Start: 2025-01-31

## 2025-01-31 RX ORDER — METOPROLOL TARTRATE 25 MG/1
12.5 TABLET, FILM COATED ORAL 2 TIMES DAILY
Qty: 30 TABLET | Refills: 0 | Status: SHIPPED | OUTPATIENT
Start: 2025-01-31 | End: 2025-03-02

## 2025-01-31 RX ORDER — CLOPIDOGREL BISULFATE 75 MG/1
75 TABLET ORAL DAILY
Qty: 30 TABLET | Refills: 0 | Status: SHIPPED | OUTPATIENT
Start: 2025-01-31

## 2025-01-31 RX ORDER — MIDODRINE HYDROCHLORIDE 5 MG/1
5 TABLET ORAL 3 TIMES DAILY PRN
Status: DISCONTINUED | OUTPATIENT
Start: 2025-01-31 | End: 2025-02-01 | Stop reason: HOSPADM

## 2025-01-31 RX ADMIN — MIDODRINE HYDROCHLORIDE 5 MG: 5 TABLET ORAL at 18:53

## 2025-01-31 RX ADMIN — CITALOPRAM HYDROBROMIDE 40 MG: 20 TABLET ORAL at 09:46

## 2025-01-31 RX ADMIN — ACETAMINOPHEN 650 MG: 325 TABLET ORAL at 20:03

## 2025-01-31 RX ADMIN — TIZANIDINE 2 MG: 4 TABLET ORAL at 20:02

## 2025-01-31 RX ADMIN — METOPROLOL TARTRATE 12.5 MG: 25 TABLET, FILM COATED ORAL at 09:47

## 2025-01-31 RX ADMIN — MIRTAZAPINE 15 MG: 15 TABLET, FILM COATED ORAL at 20:04

## 2025-01-31 RX ADMIN — HYDROCODONE BITARTRATE AND ACETAMINOPHEN 1 TABLET: 5; 325 TABLET ORAL at 05:36

## 2025-01-31 RX ADMIN — PANTOPRAZOLE SODIUM 40 MG: 40 TABLET, DELAYED RELEASE ORAL at 05:37

## 2025-01-31 RX ADMIN — HYDROCODONE BITARTRATE AND ACETAMINOPHEN 1 TABLET: 5; 325 TABLET ORAL at 12:28

## 2025-01-31 RX ADMIN — HYDROXYZINE HYDROCHLORIDE 25 MG: 25 TABLET, FILM COATED ORAL at 20:04

## 2025-01-31 RX ADMIN — APIXABAN 5 MG: 5 TABLET, FILM COATED ORAL at 09:46

## 2025-01-31 RX ADMIN — MIDODRINE HYDROCHLORIDE 5 MG: 5 TABLET ORAL at 09:54

## 2025-01-31 RX ADMIN — Medication 100 MG: at 09:46

## 2025-01-31 RX ADMIN — HYDROXYZINE HYDROCHLORIDE 25 MG: 25 TABLET, FILM COATED ORAL at 12:30

## 2025-01-31 RX ADMIN — HYDROCODONE BITARTRATE AND ACETAMINOPHEN 1 TABLET: 5; 325 TABLET ORAL at 21:57

## 2025-01-31 RX ADMIN — CLOPIDOGREL BISULFATE 75 MG: 75 TABLET ORAL at 09:47

## 2025-01-31 RX ADMIN — Medication 1 CAPSULE: at 09:46

## 2025-01-31 RX ADMIN — APIXABAN 5 MG: 5 TABLET, FILM COATED ORAL at 20:03

## 2025-01-31 RX ADMIN — METOPROLOL TARTRATE 12.5 MG: 25 TABLET, FILM COATED ORAL at 20:03

## 2025-01-31 RX ADMIN — HYDROXYZINE HYDROCHLORIDE 25 MG: 25 TABLET, FILM COATED ORAL at 09:47

## 2025-01-31 ASSESSMENT — PAIN SCALES - GENERAL
PAINLEVEL_OUTOF10: 3
PAINLEVEL_OUTOF10: 4
PAINLEVEL_OUTOF10: 5
PAINLEVEL_OUTOF10: 4
PAINLEVEL_OUTOF10: 7
PAINLEVEL_OUTOF10: 8
PAINLEVEL_OUTOF10: 7
PAINLEVEL_OUTOF10: 4
PAINLEVEL_OUTOF10: 7

## 2025-01-31 ASSESSMENT — PAIN - FUNCTIONAL ASSESSMENT
PAIN_FUNCTIONAL_ASSESSMENT: ACTIVITIES ARE NOT PREVENTED

## 2025-01-31 ASSESSMENT — PAIN DESCRIPTION - ONSET
ONSET: ON-GOING
ONSET: ON-GOING

## 2025-01-31 ASSESSMENT — PAIN DESCRIPTION - LOCATION
LOCATION: LEG;KNEE
LOCATION: LEG

## 2025-01-31 ASSESSMENT — PAIN DESCRIPTION - DESCRIPTORS
DESCRIPTORS: ACHING

## 2025-01-31 ASSESSMENT — PAIN DESCRIPTION - ORIENTATION
ORIENTATION: RIGHT;LEFT
ORIENTATION: LEFT

## 2025-01-31 ASSESSMENT — PAIN DESCRIPTION - PAIN TYPE
TYPE: ACUTE PAIN
TYPE: ACUTE PAIN

## 2025-01-31 ASSESSMENT — PAIN DESCRIPTION - FREQUENCY
FREQUENCY: INTERMITTENT
FREQUENCY: INTERMITTENT

## 2025-01-31 NOTE — PROGRESS NOTES
[facility] - Inpatient Rehabilitation Department   Phone: [phone]    Physical Therapy    [] Initial Evaluation            [x] Daily Treatment Note         [x] Discharge Summary      Patient: Shahida Vásquez   : 1960   MRN: [id number]   Date of Service:  2025  Admitting Diagnosis: Critical illness myopathy  Current Admission Summary: Shahida Vásquez is a 64 y.o. female with PMHx notable for HTN, obesity s/p Iliana-en-Y gastric bypass, anxiety/depression , chronic lumbar back pain who presented on  with chest pain and fatigue.  She was found to have STEMI, and went emergently to Cath Lab.  She was found to have RCA occlusion, and severe right ventricular systolic dysfunction.  Impella were placed in LV and RV.  She remained intubated for airway protection.  She underwent cardiac catheterization on , had PCI to RCA which was complicated by bleeding and ventricular tachycardia.  Impella and IABP removed on .  Extubated on . CRRT stopped on 1/10, transitioned to iHD on , and now holding HD since . SLP following for dysphagia, cleared for diet after MBS on . NG tube removed on . Hospital course complicated by: cardiogenic shock, acute hypoxic respiratory failure, ANALY, anemia (required 5 units pRBCs during cath, 7 units total this admission).      Patient reports that she is doing well today overall.  She is feeling tired after working with therapy this morning.  She is having some bleeding from her left groin wound which is concerning her.  She denies any fevers/chills, chest pain, palpitations, dyspnea.  Her appetite continues to improve. Last BM (including prior to admit): 25     Past Medical History:  has a past medical history of Anxiety, Depression, Hypertension, Insomnia, Lumbar disc herniation, and Lumbar spinal stenosis.  Past Surgical History:  has a past surgical history that includes Hysterectomy; Gastric bypass surgery; Cholecystectomy; fracture

## 2025-01-31 NOTE — PROGRESS NOTES
[facility] - Inpatient Rehabilitation Department   Phone: [phone]    Occupational Therapy    [] Initial Evaluation            [x] Daily Treatment Note         [x] Discharge Summary      Patient: Shahida Vásquez   : 1960   MRN: [id number]   Date of Service:  2025    Admitting Diagnosis:  Critical illness myopathy  Current Admission Summary: Shahida Vásquez is a 64 y.o. female with PMHx notable for HTN, obesity s/p Iliana-en-Y gastric bypass, anxiety/depression , chronic lumbar back pain who presented on  with chest pain and fatigue.  She was found to have STEMI, and went emergently to Cath Lab.  She was found to have RCA occlusion, and severe right ventricular systolic dysfunction.  Impella were placed in LV and RV.  She remained intubated for airway protection.  She underwent cardiac catheterization on , had PCI to RCA which was complicated by bleeding and ventricular tachycardia.  Impella and IABP removed on .  Extubated on . CRRT stopped on 1/10, transitioned to iHD on , and now holding HD since . SLP following for dysphagia, cleared for diet after MBS on . NG tube removed. Hospital course complicated by: cardiogenic shock, acute hypoxic respiratory failure, ANALY, anemia (required 5 units pRBCs during cath, 7 units total this admission)   Past Medical History:  has a past medical history of Anxiety, Depression, Hypertension, Insomnia, Lumbar disc herniation, and Lumbar spinal stenosis.  Past Surgical History:  has a past surgical history that includes Hysterectomy; Gastric bypass surgery; Cholecystectomy; fracture surgery; lumbar nerve block (N/A, 2019); bronchoscopy (N/A, 10/10/2019); bronchoscopy (10/10/2019); Pain management procedure (Left, 2020); Forearm surgery (Left, 2020); Dilatation, esophagus; Abdomen surgery; Cardiac procedure (N/A, 2024); Cardiac procedure (N/A, 2024); Cardiac procedure (N/A, 2024); Cardiac procedure

## 2025-01-31 NOTE — PLAN OF CARE
Problem: Discharge Planning  Goal: Discharge to home or other facility with appropriate resources  Outcome: Progressing     Problem: Safety - Medical Restraint  Goal: Remains free of injury from restraints (Restraint for Interference with Medical Device)  Description: INTERVENTIONS:  1. Determine that other, less restrictive measures have been tried or would not be effective before applying the restraint  2. Evaluate the patient's condition at the time of restraint application  3. Inform patient/family regarding the reason for restraint  4. Q2H: Monitor safety, psychosocial status, comfort, nutrition and hydration  1/31/2025 1055 by Amy Smith RN  Outcome: Progressing     Problem: Safety - Adult  Goal: Free from fall injury  1/31/2025 1055 by Amy Smith RN  Outcome: Progressing     Problem: Skin/Tissue Integrity  Goal: Absence of new skin breakdown  Description: 1.  Monitor for areas of redness and/or skin breakdown  2.  Assess vascular access sites hourly  3.  Every 4-6 hours minimum:  Change oxygen saturation probe site  4.  Every 4-6 hours:  If on nasal continuous positive airway pressure, respiratory therapy assess nares and determine need for appliance change or resting period.  1/31/2025 1055 by Amy Smith RN  Outcome: Progressing     Problem: Nutrition Deficit:  Goal: Optimize nutritional status  Outcome: Progressing     Problem: Pain  Goal: Verbalizes/displays adequate comfort level or baseline comfort level  1/31/2025 1055 by Amy Smith RN  Outcome: Progressing     Problem: ABCDS Injury Assessment  Goal: Absence of physical injury  Outcome: Progressing

## 2025-01-31 NOTE — CONSULTS
MD Paul Frias MD Aldo Estella,               Office: [phone]                      Fax: [phone]               [place]                     Nephrology consult received. Full consult report will follow.   Thank you for allowing us to participate in this patient's care. Please do not hesitate to contact us anytime. We will follow along with you.       Paul Logan MD  [facility]   [phone] or [address].   
Shahida Vásquez  AGE: 64 y.o.   GENDER: female  : 1960  TODAY'S DATE:  2025    No chief complaint on file.       HISTORY of PRESENT ILLNESS HPI     Shahida Vásquez is a 64 y.o. female who presents today for wound evaluation.   History of Wound: Left groin wound  Wound Pain:  mild  Severity:  2 / 10   Wound Type:  non-healing surgical  Modifying Factors:  decreased mobility and obesity  Associated Signs/Symptoms:  odor    Procedure Note    Performed by: Alfa Feldman MD    Consent obtained: Yes    Time out taken:  Yes    Pain Control:       Debridement:Excisional Debridement    Using #15 blade scalpel, scissors, and forceps the wound was sharply debrided    down through and including the removal of subcutaneous tissue.        Devitalized Tissue Debrided:  necrotic/eschar    Pre Debridement Measurements:  Are located in the Wound Documentation Flow Sheet    Wound #: 1     Post  Debridement Measurements:  Puncture 25 Femoral (Active)   Wound Assessment Dry 25 1629   Jayshree-wound Assessment Intact 25 0241   Closure Open to air 25 2045   Drainage Amount None 25 0241   Odor None 25 0241   Dressing/Treatment Open to air 25 0241   Dressing Changed Changed/New 25 1800   Dressing Status Other (comment) 25 0241   Dressing/Treatment Open to air 25 0241   Number of days: 15       Wound 25 Coccyx Posterior;Medial (Active)   Wound Etiology Pressure Stage 1 25 0241   Dressing Status Clean;Dry;Intact 25 0241   Wound Cleansed Cleansed with saline 25 0241   Dressing/Treatment Open to air;Zinc paste 25 0115   Wound Assessment Pink/red 25 0115   Drainage Amount None (dry) 25 0115   Odor None 25 0115   Jayshree-wound Assessment Intact 25 0115   Number of days: 12     Predebridement measurement-6 x 3 x 2.5 cm  Post debridement measurement-9.5 x 5.8 x 3 cm      Total Surface Area Debrided: 55.1 sq cm     Bleeding:  
the left femur CT.  Defect measures 5.4 cm in greatest craniocaudal extent on image 31, series 608 of the left femur CT.  Moderate edema in the subcutaneous fat about the left thigh.  No discrete organized fluid collection. The soft tissue defect and lobulated hematoma are best seen on image 41, series 612 on the left femur CT. Moderate contrast and stool within the visualized colonic segments.  Mild distention of the urinary bladder. Joint: Mild left hip osteoarthrosis.  Mild tricompartmental osteoarthrosis at the left knee.  No suprapatellar joint effusion at the left knee.  Bilateral SI joints appear patent.  No SI joint fusion, effusion or erosive change.     1. Multilobulated hematoma along the anterior left thigh inferior to the soft tissue defect measuring 12.4 x 12.6 cm in greatest transverse and craniocaudal dimensions. 2. Soft tissue defect of the anterior superior left hip measuring 8.8 x 2.3 x 5.4 cm. 3. Mild left hip osteoarthrosis. 4. Mild left knee tricompartmental osteoarthrosis. 5. Atherosclerotic calcification of the vasculature. 6. Mild left renal atrophy. 7. Moderate degenerative changes in the lower lumbar spine.  4 mm anterolisthesis of L4 on L5. 8. No acute displaced fracture or dislocation.  No aggressive osseous destruction.     CT FEMUR LEFT WO CONTRAST    Result Date: 1/24/2025  EXAMINATION: CT OF THE LEFT FEMUR WITHOUT CONTRAST; CT OF THE PELVIS WITHOUT CONTRAST 1/24/2025 11:15 am TECHNIQUE: CT of the left femur was performed without the administration of intravenous contrast.  Multiplanar reformatted images are provided for review. Automated exposure control, iterative reconstruction, and/or weight based adjustment of the mA/kV was utilized to reduce the radiation dose to as low as reasonably achievable.; CT of the pelvis was performed without the administration of intravenous contrast.  Multiplanar reformatted images are provided for review.  Adjustment of mA and/or kV according to

## 2025-01-31 NOTE — FLOWSHEET NOTE
Wound vac placed per order.  MERE HERNANDEZN, RN, CWOCN  Inpatient  Wound/Ostomy Care  [phone]          01/31/25 1830   Negative Pressure Wound Therapy Leg Left;Upper;Anterior   Placement Date/Time: 01/31/25 1832   Present on Admission/Arrival: No  Location: Leg  Wound Location Orientation: Left;Upper;Anterior   Dressing Status Intact w/seal   Canister changed? Yes  (new set up)   Output (ml) 0 ml   Unit Type kci vac ulta   Mode Continuous   Target Pressure (mmHg) 125   $$ Dressing Changed & Charged $ Standard NPWT >50 sq cm PER TX   Number of pieces placed 1   Dressing Type Black foam   Dressing Change Due 02/03/25   Incision 01/04/25 Femoral Left;Anterior   Date First Assessed/Time First Assessed: 01/04/25 0800   Present on Original Admission: No  Location: Femoral  Incision Location Orientation: Left;Anterior  Incision Description (Comments): post CP impella removal and antegrade sheath bleed   Wound Image    Dressing Status New dressing applied   Dressing Change Due 02/03/25   Incision Cleansed Vashe   Dressing/Treatment Negative pressure wound therapy   Incision Length (cm) 6.2   Incision Width (cm) 8 cm   Incision Depth (cm) 1 cm   Incision Volume (cm^3) 49.6 cm^3   Margins Other (Comment)  (defined, attached edges)   Incision Assessment Other (Comment);Bleeding  (granulation tissue)   Jayshree-incision Assessment Intact

## 2025-01-31 NOTE — PLAN OF CARE
Problem: Safety - Medical Restraint  Goal: Remains free of injury from restraints (Restraint for Interference with Medical Device)  Description: INTERVENTIONS:  1. Determine that other, less restrictive measures have been tried or would not be effective before applying the restraint  2. Evaluate the patient's condition at the time of restraint application  3. Inform patient/family regarding the reason for restraint  4. Q2H: Monitor safety, psychosocial status, comfort, nutrition and hydration  Outcome: Progressing     Problem: Safety - Adult  Goal: Free from fall injury  Outcome: Progressing     Problem: Skin/Tissue Integrity  Goal: Absence of new skin breakdown  Description: 1.  Monitor for areas of redness and/or skin breakdown  2.  Assess vascular access sites hourly  3.  Every 4-6 hours minimum:  Change oxygen saturation probe site  4.  Every 4-6 hours:  If on nasal continuous positive airway pressure, respiratory therapy assess nares and determine need for appliance change or resting period.  Outcome: Progressing     Problem: Pain  Goal: Verbalizes/displays adequate comfort level or baseline comfort level  Outcome: Progressing

## 2025-01-31 NOTE — CARE COORDINATION
BRUNA has confirmed that patient will need a wound vac at discharge.  [facility]  BRUNA emailed this form along with pt information to [facility], [phone], who received the referral.  She is waiting for the wound care RN to put in her notes and measurements.  Confirmed with ARU RN that wound care RN will be coming later this afternoon to complete this.  Susan is unsure if this will be approved today, but will keep SW informed.    BRUNA was also informed that patient does not want any PT or OT services with her [facility].  Called Latoya mccormick/[facility] and informed this information along with the fact that patient will be discharging home with a wound vac.  Patient will need RN visits only 3x/week to assist with wound vac changes.     Electronically signed by FRIDA Mitchell, DYLAN on 1/31/2025 at 12:52 PM

## 2025-01-31 NOTE — DISCHARGE INSTR - COC
Continuity of Care Form    Patient Name: Shahida Vásquez   :  1960  MRN:  [id number]    Admit date:  2025  Discharge date:  25    Code Status Order: DNR-CCA   Advance Directives:   Advance Care Flowsheet Documentation             Admitting Physician:  Brendan Young MD  PCP: Demetrice Saldaña DO    Discharging Nurse: Leatha  Discharging Hospital Unit/Room#: ARU-4914/4914-01  Discharging Unit Phone Number: [phone]    Emergency Contact:   Extended Emergency Contact Information  Primary Emergency Contact: FahadClaudia   [facility]  Home Phone: [phone]  Mobile Phone: [phone]  Relation: Child  Secondary Emergency Contact: FahadCarlos A           [place], [place] [address] [place] [address]  Home Phone: [phone]  Mobile Phone: [phone]  Relation: Child    Past Surgical History:  Past Surgical History:   Procedure Laterality Date    ABDOMEN SURGERY      BRONCHOSCOPY N/A 10/10/2019    BRONCHOSCOPY WITH BRONCHOALVEOLAR LAVAGE performed by Jeovany Pablo DO at [facility] ENDOSCOPY    BRONCHOSCOPY  10/10/2019    BRONCHOSCOPY DIAGNOSTIC OR CELL WASH ONLY performed by Jeovany Pablo DO at [facility] ENDOSCOPY    CARDIAC PROCEDURE N/A 2024    Left heart cath / coronary angiography performed by Ang Enriquez MD at [facility] CARDIAC CATH LAB    CARDIAC PROCEDURE N/A 2024    Right heart cath performed by Ang Enriquez MD at [facility] CARDIAC CATH LAB    CARDIAC PROCEDURE N/A 2024    Ventricular assist device (VAD) insertion performed by nAg Enriquez MD at [facility] CARDIAC CATH LAB    CARDIAC PROCEDURE N/A 2024    Ventricular assist device (VAD) insertion performed by Jeovany Martinez MD at [facility] CARDIAC CATH LAB    CARDIAC PROCEDURE N/A 2025    Insert stent dax coronary performed by Ang Enriquez MD at [facility] CARDIAC CATH LAB    CARDIAC PROCEDURE N/A 2025    Ventricular assist device (VAD) removal performed by Ang Enriquez MD at [facility]

## 2025-01-31 NOTE — DISCHARGE INSTRUCTIONS
We hope your stay on rehab has exceeded your expectations. Once again the entire Acute Rehab Staff at [facility] wish to thank you for allowing us the privilege to care for you.         A few days after you are discharged from Rehab, you will receive a survey (Bhavana  Ganjose roberto) in the mail or through email.  This is a nationally distributed survey sent to thousands of rehab patients throughout the nation.              It is very important to everyone on the rehab unit that we receive feedback based on your experience.          Thank you, we wish you good health always,         Acute Rehab Team      Hospital Preference:     __[facility]        Medical Diagnosis/Conditions    _______________________ (free text)    Emergency Contact:    ________________________________________Phone#________________________      Advanced Directives:    Code Status: ?  []  Full Code  ?  []  DNR  ? []  DNRCC  ? []  DNRCC - Arrest    (as of date of discharge:  _________)      Medical POA: ?  []   Yes ______________________________ ?  []   No                                       (Name and phone number)                     Living Will:   ?   []   Yes    ?  []   No        Insurance Information:    _______________________ (free text)      Individual Responsible  for the coordination of the discharge/follow up:    ______________________________________________________    Functional Status:    VISUAL DEFICITS:    Yes []  No  []       If yes, assisted device:   Wears Glasses Yes []  No  []  Wears Contacts  Yes []  No  []  Legally Blind Yes []  No  []    HEARING DEFICITS:    Yes []  No  []       If yes, assisted device:   Wears Hearing Aids Yes []  No  []  Pocket Talker  Yes []  No  []       Physical Therapist & Contact #: Saritha Garcia, [phone]  OccupationalTherapist & Contact #: Henry Cai, OTR/L [phone]  Speech Therapist & Contact #:       Activities of Daily Living:     ADL's - Adaptive Equipment

## 2025-01-31 NOTE — PROGRESS NOTES
Shahida Vásquez  1/31/2025  [id number]    Chief Complaint: Critical illness myopathy    Subjective    No acute events overnight.     Patient reports that she is doing well today. Headaches improved. Denies any chest pain, dyspnea, abdominal pain. Worried about pain with wound vac/dressing changes at home. She is feeling ready for discharge home tomorrow. .     Last Bowel Movement:  01/30/25.        Objective    Patient Vitals for the past 24 hrs:   BP Temp Temp src Pulse Resp SpO2   01/31/25 0945 (!) 110/52 98.7 °F (37.1 °C) -- 72 16 95 %   01/31/25 0606 -- -- -- -- 16 --   01/30/25 2100 (!) 95/56 98.7 °F (37.1 °C) Oral 76 16 99 %   01/30/25 1911 -- -- -- -- 16 --     Gen: No distress, pleasant.   HEENT: Normocephalic, atraumatic.   CV: Regular rate and rhythm. Extremities warm, well perfused.   Resp: No respiratory distress. CTAB.  Abd: Soft, nontender.  Ext: Left groin wound with clean, dry dressing in place.  Neuro: Alert, oriented, appropriately interactive.     Laboratory data:   Lab Results   Component Value Date/Time     01/31/2025 05:47 AM    K 4.2 01/31/2025 05:47 AM     01/31/2025 05:47 AM    CO2 23 01/31/2025 05:47 AM    BUN 29 01/31/2025 05:47 AM    CREATININE 1.6 01/31/2025 05:47 AM    GLUCOSE 82 01/31/2025 05:47 AM    CALCIUM 8.4 01/31/2025 05:47 AM    LABGLOM 36 01/31/2025 05:47 AM     Recent Labs     01/31/25  0547 01/29/25  0557 01/27/25  0541   WBC 6.1 5.9 6.8   HGB 8.5* 7.8* 8.2*   HCT 26.0* 24.2* 24.7*   MCV 92.9 92.3 93.1    417 408        Therapy progress:       PT    Supine to Sit: Independent  Sit to Supine: Independent   Sit to Stand: Independent  Chair/Bed to Chair Transfer: Independent  Car Transfer: Independent  Ambulation 10 ft: Independent  Ambulation 50 ft: Independent  Ambulation 150 ft: Independent  Stairs - 1 Step: Supervision or touching assistance  Stairs - 4 Step: Supervision or touching assistance  Stairs - 12 Step:      OT    Eating: Independent  Oral Hygiene:

## 2025-02-01 VITALS
BODY MASS INDEX: 37.59 KG/M2 | RESPIRATION RATE: 16 BRPM | OXYGEN SATURATION: 95 % | WEIGHT: 167.1 LBS | SYSTOLIC BLOOD PRESSURE: 115 MMHG | DIASTOLIC BLOOD PRESSURE: 73 MMHG | TEMPERATURE: 98.6 F | HEART RATE: 70 BPM | HEIGHT: 56 IN

## 2025-02-01 PROBLEM — I21.11 ST ELEVATION MYOCARDIAL INFARCTION INVOLVING RIGHT CORONARY ARTERY (HCC): Status: RESOLVED | Noted: 2024-12-30 | Resolved: 2025-02-01

## 2025-02-01 PROBLEM — I25.2 HISTORY OF ACUTE MYOCARDIAL INFARCTION: Status: ACTIVE | Noted: 2025-01-09

## 2025-02-01 LAB
ALBUMIN SERPL-MCNC: 2.6 G/DL (ref 3.4–5)
ANION GAP SERPL CALCULATED.3IONS-SCNC: 9 MMOL/L (ref 3–16)
BUN SERPL-MCNC: 25 MG/DL (ref 7–20)
CALCIUM SERPL-MCNC: 8.4 MG/DL (ref 8.3–10.6)
CHLORIDE SERPL-SCNC: 106 MMOL/L (ref 99–110)
CO2 SERPL-SCNC: 24 MMOL/L (ref 21–32)
CREAT SERPL-MCNC: 1.5 MG/DL (ref 0.6–1.2)
GFR SERPLBLD CREATININE-BSD FMLA CKD-EPI: 39 ML/MIN/{1.73_M2}
GLUCOSE SERPL-MCNC: 99 MG/DL (ref 70–99)
PHOSPHATE SERPL-MCNC: 4.3 MG/DL (ref 2.5–4.9)
POTASSIUM SERPL-SCNC: 4.9 MMOL/L (ref 3.5–5.1)
PROT SERPL-MCNC: 4.9 G/DL (ref 6.4–8.2)
SODIUM SERPL-SCNC: 139 MMOL/L (ref 136–145)

## 2025-02-01 PROCEDURE — 80069 RENAL FUNCTION PANEL: CPT

## 2025-02-01 PROCEDURE — 6370000000 HC RX 637 (ALT 250 FOR IP): Performed by: STUDENT IN AN ORGANIZED HEALTH CARE EDUCATION/TRAINING PROGRAM

## 2025-02-01 PROCEDURE — 36415 COLL VENOUS BLD VENIPUNCTURE: CPT

## 2025-02-01 PROCEDURE — 84165 PROTEIN E-PHORESIS SERUM: CPT

## 2025-02-01 PROCEDURE — 83521 IG LIGHT CHAINS FREE EACH: CPT

## 2025-02-01 PROCEDURE — 6370000000 HC RX 637 (ALT 250 FOR IP): Performed by: INTERNAL MEDICINE

## 2025-02-01 PROCEDURE — 84155 ASSAY OF PROTEIN SERUM: CPT

## 2025-02-01 RX ADMIN — HYDROXYZINE HYDROCHLORIDE 25 MG: 25 TABLET, FILM COATED ORAL at 08:42

## 2025-02-01 RX ADMIN — HYDROCODONE BITARTRATE AND ACETAMINOPHEN 1 TABLET: 5; 325 TABLET ORAL at 05:42

## 2025-02-01 RX ADMIN — MIDODRINE HYDROCHLORIDE 5 MG: 5 TABLET ORAL at 08:42

## 2025-02-01 RX ADMIN — HYDROCODONE BITARTRATE AND ACETAMINOPHEN 1 TABLET: 5; 325 TABLET ORAL at 11:15

## 2025-02-01 RX ADMIN — Medication 1 CAPSULE: at 08:41

## 2025-02-01 RX ADMIN — APIXABAN 5 MG: 5 TABLET, FILM COATED ORAL at 08:42

## 2025-02-01 RX ADMIN — METOPROLOL TARTRATE 12.5 MG: 25 TABLET, FILM COATED ORAL at 08:42

## 2025-02-01 RX ADMIN — CITALOPRAM HYDROBROMIDE 40 MG: 20 TABLET ORAL at 08:41

## 2025-02-01 RX ADMIN — CLOPIDOGREL BISULFATE 75 MG: 75 TABLET ORAL at 08:42

## 2025-02-01 RX ADMIN — PANTOPRAZOLE SODIUM 40 MG: 40 TABLET, DELAYED RELEASE ORAL at 05:42

## 2025-02-01 RX ADMIN — Medication 100 MG: at 08:41

## 2025-02-01 ASSESSMENT — PAIN DESCRIPTION - DESCRIPTORS
DESCRIPTORS: TENDER
DESCRIPTORS: ACHING

## 2025-02-01 ASSESSMENT — PAIN DESCRIPTION - ORIENTATION
ORIENTATION: LEFT
ORIENTATION: LEFT

## 2025-02-01 ASSESSMENT — PAIN SCALES - GENERAL
PAINLEVEL_OUTOF10: 5
PAINLEVEL_OUTOF10: 8
PAINLEVEL_OUTOF10: 7

## 2025-02-01 ASSESSMENT — PAIN DESCRIPTION - LOCATION
LOCATION: LEG
LOCATION: ABDOMEN

## 2025-02-01 NOTE — DISCHARGE SUMMARY
and cognitive linguistic goals. She reported her cognition has returned to baseline and no longer wants to participate in SLP intervention. She has progressed to her baseline diet of soft foods after completion of MBS. She has demonstrated adequate tolerance and implementation of aspiration precautions. Pt also has consistent report that her voice has returned to baseline s/p removal of NG tube. No further SLP intervention will be provided at this time.    History of Present Illness/Acute Hospital Course:  Shahida Vásquez is a 64 y.o. female with PMHx notable for HTN, obesity s/p Iliana-en-Y gastric bypass, anxiety/depression , chronic lumbar back pain who presented on 12/30 with chest pain and fatigue.  She was found to have STEMI, and went emergently to Cath Lab.  She was found to have RCA occlusion, and severe right ventricular systolic dysfunction.  Impella were placed in LV and RV.  She remained intubated for airway protection.  She underwent cardiac catheterization on 1/2, had PCI to RCA which was complicated by bleeding and ventricular tachycardia.  Impella and IABP removed on 1/8.  Extubated on 1/9. CRRT stopped on 1/10, transitioned to iHD on 1/11, and now holding HD since 1/16. SLP following for dysphagia, cleared for diet after MBS on 1/14. NG tube removed on 1/19. Hospital course complicated by: cardiogenic shock, acute hypoxic respiratory failure, ANALY, anemia (required 5 units pRBCs during cath, 7 units total this admission).     Inpatient Rehabilitation Course:   Shahida Vásquez is a 64 y.o. female admitted to inpatient rehabilitation on 1/20/2025 s/p Critical illness myopathy.  The patient participated in an aggressive multidisciplinary inpatient rehabilitation program involving 3 hours of therapy per day, at least 5 days per week.     Impairments: Generalized weakness, dysphagia, hypophonia limiting independence with functional mobility and ADLs.     Medical Management:  #. Critical illness

## 2025-02-01 NOTE — CARE COORDINATION
Case Management -  Discharge Note      Patient Name: Shahida Vásquez                   YOB: 1960  Room: [place] [id number]            Readmission Risk (Low < 19, Mod (19-27), High > 27): Readmission Risk Score: 21.8    Current PCP: Demetrice Saldaña DO      (IMM) Important Message from Medicare:    Has pt received appropriate compliance notices before being discharged if required: N/A  Compliance doc:  [] 2nd IMM; [] Code 44 [] Goyal  Date Given: N/A Given By: N/A    PT AM-PAC:   /24  OT AM-PAC:   /24    Patient/patient representative has been educated on the benefits of Home Health Care as well as the possible risks of declining recommended services. Patient/patient representative has acknowledged the information provided and decided on the following discharge plan. Patient/ patient representative has been provided freedom of choice regarding service provider, supported by basic dialogue that supports the patient's individualized plan of care/goals.    Home Care Information:   Is patient resuming current home health care services: Yes     Home Care Agency:   [facility]  [address] [id number]  Phone: [phone]  Fax: [phone]            Services: PT/OT/RN  Home Health Order Obtained: CM spoke with Dr. Hagen who informed CM he will place [facility] orders today.     Home health agency notified of discharge.      Financial    Payor: [facility] / Plan: [facility] / Product Type: *No Product type* /     Pharmacy:  Potential assistance Purchasing Medications: No  Meds-to-Beds request:        [facility] [id number] - [place], [place] - [address] [phone] - F [phone]  [facility] [address]  Phone: [phone] Fax: [phone]    [facility] [id number] - [facility] [address] [facility] [address] [phone] - F [phone]  [address] [place] [address]  Phone: [phone] Fax: [phone]    [facility] #[id number]

## 2025-02-01 NOTE — PLAN OF CARE
Problem: Safety - Adult  Goal: Free from fall injury  2/1/2025 0042 by Giulia Christine RN  Outcome: Progressing  1/31/2025 1055 by Amy Smith RN  Outcome: Progressing     Problem: Safety - Adult  Goal: Free from fall injury  2/1/2025 0042 by Giulia Christine RN  Outcome: Progressing     Problem: Nutrition Deficit:  Goal: Optimize nutritional status  2/1/2025 0042 by Giulia Christine RN  Outcome: Progressing     Problem: Pain  Goal: Verbalizes/displays adequate comfort level or baseline comfort level  2/1/2025 0042 by Giulia Christine RN  Outcome: Progressing     Problem: ABCDS Injury Assessment  Goal: Absence of physical injury  2/1/2025 0042 by Giulia Christine RN  Outcome: Progressing

## 2025-02-01 NOTE — PROGRESS NOTES
ARU Discharge Assessment    Transportation  \"Has lack of transportation kept you from medical appointments, meetings, work, or from getting things needed for daily living?\"Check all that apply:  [] A.  Yes, it has kept me from medical appointments or from getting my medications  [] B.  Yes, it has kept me from non-medical meetings, appointments, work, or from getting things that I need  [x] C.  No  [] X.  Patient unable to respond  [] Y.  Patient declines to respond    Provision of Current Reconciled Medication List to Subsequent Provider at Discharge  [] No, current reconciled medication list not provided to the subsequent provider.  [x] Yes, current reconciled medication list provided to the subsequent provider. (**Select route of transmission below**)   [x] Via Electronic Health Record   [] Via Health Information Exchange Organization  [x] Verbal (e.g. in person, telephone, video conferencing)  [x] Paper-based (e.g. fax, copies, printouts)   [] Other Methods (e.g. texting, email, CDs)    Provision of Current Reconciled Medication List to Patient at Discharge  [] No, current reconciled medication list not provided to the patient, family and/or caregiver.   [x] Yes, current reconciled medication list provided to the patient, family and/or caregiver.  (**Select route of transmission below**)   [] Via Electronic Health Record (e.g., electronic access to patient portal)   [x] Via Health Information Exchange Organization  [] Verbal (e.g. in person, telephone, video conferencing)  [] Paper-based (e.g. fax, copies, printouts)   [] Other Methods (e.g. texting, email, CDs)    Health Literacy  \"How often do you need to have someone help you when you read instructions, pamphlets, or other written material from your doctor or pharmacy?\"  [x]  0.  Never  []  1.  Rarely  []  2.  Sometimes  []  3.  Often  []  4.  Always  []  7.  Patient declines to respond  []  8.  Patient unable to respond    BIMS - **Must be completed in the

## 2025-02-01 NOTE — PROGRESS NOTES
Patient discharged. Discharge paperwork reviewed with patient. Patient verbalized understanding. Patient left floor with staff. Patient left with all belongings.

## 2025-02-03 ENCOUNTER — CARE COORDINATION (OUTPATIENT)
Dept: CASE MANAGEMENT | Age: 65
End: 2025-02-03

## 2025-02-03 ENCOUNTER — TELEPHONE (OUTPATIENT)
Dept: FAMILY MEDICINE CLINIC | Age: 65
End: 2025-02-03

## 2025-02-03 LAB
ALBUMIN SERPL ELPH-MCNC: 2 G/DL (ref 3.1–4.9)
ALPHA1 GLOB SERPL ELPH-MCNC: 0.4 G/DL (ref 0.2–0.4)
ALPHA2 GLOB SERPL ELPH-MCNC: 0.8 G/DL (ref 0.4–1.1)
B-GLOBULIN SERPL ELPH-MCNC: 0.8 G/DL (ref 0.9–1.6)
GAMMA GLOB SERPL ELPH-MCNC: 0.9 G/DL (ref 0.6–1.8)
PROT SERPL-MCNC: 4.9 G/DL (ref 6.4–8.2)
SPE/IFE INTERPRETATION: NORMAL

## 2025-02-03 NOTE — CARE COORDINATION
Care Transitions Note    Initial Call - Call within 2 business days of discharge: Yes    Attempted to reach patient for transitions of care follow up. Unable to reach patient.    Outreach Attempts:   HIPAA compliant voicemail left for patient.     Call placed to cruz Taylor - HIPAA verified. She states she will be going to see her mother after work today.    Call placed to [facility] Spoke with Quang liaison for [facility] - she assisted writer and informed me that they do have the patient referral.    Patient: Shahida CHARLTON Young    Patient : 1960   MRN: [id number]    Reason for Admission: Chest Pain  Discharge Date: 25  RURS: Readmission Risk Score: 21.9    Last Discharge Facility       Date Complaint Diagnosis Description Type Department Provider    25  Non-healing open wound of left groin, subsequent encounter ... Admission (Discharged) Brendan Malhotra MD            Was this an external facility discharge? Yes. Discharge Date: 2025. Facility [facility]: [facility]    Follow Up Appointment:   Patient does  have a follow up appointment scheduled at time of call. Shahida has a Virtual visit HFU appt scheduled with the PCP on 2025.  Future Appointments         Provider Specialty Dept Phone    2025 10:45 AM Demetrice Saldaña DO Family Medicine [phone]    2025 4:15 PM Servando Marsh PA Nephrology [phone]    2025 9:00 AM Ang Enriquez MD Cardiology [phone]            Plan for follow-up on next business day.      Hope Vanegas RN BSN  Care Transition Nurse  [phone]

## 2025-02-03 NOTE — TELEPHONE ENCOUNTER
Patient called stating she was discharged from the hospital on 2/1 and needs to schedule a hospital follow up

## 2025-02-04 ENCOUNTER — CARE COORDINATION (OUTPATIENT)
Dept: CASE MANAGEMENT | Age: 65
End: 2025-02-04

## 2025-02-04 ENCOUNTER — TELEPHONE (OUTPATIENT)
Dept: FAMILY MEDICINE CLINIC | Age: 65
End: 2025-02-04

## 2025-02-04 LAB
KAPPA LC FREE SER-MCNC: 83.2 MG/L (ref 2.37–20.73)
KAPPA LC FREE/LAMBDA FREE SER: 0.98 {RATIO} (ref 0.22–1.74)
LAMBDA LC FREE SERPL-MCNC: 84.7 MG/L (ref 4.23–27.69)
RPT COMMENT: ABNORMAL

## 2025-02-04 NOTE — CARE COORDINATION
Care Transitions Note    Initial Call - Call within 2 business days of discharge: Yes    Attempted to reach patient for transitions of care follow up. Unable to reach patient.    Outreach Attempts:   Multiple attempts to contact patient at phone numbers on file.   HIPAA compliant voicemail left for patient.   [facility] [employer] message sent.     Patient: Shahida CHARLTON Young    Patient : 1960   MRN: [id number]    Reason for Admission: Chest pain  Discharge Date: 25  RURS: Readmission Risk Score: 21.9    Last Discharge Facility       Date Complaint Diagnosis Description Type Department Provider    25  Non-healing open wound of left groin, subsequent encounter ... Admission (Discharged) Brednan Malhotra MD            Was this an external facility discharge? No    Follow Up Appointment:   Patient does have HFU appt on 2025  Future Appointments         Provider Specialty Dept Phone    2025 10:45 AM Demetrice Saldaña DO Family Medicine [phone]    2025 4:15 PM Servando Marsh PA Nephrology [phone]    2025 9:00 AM Ang Enriquez MD Cardiology [phone]            No further follow-up call indicated     Hope Vanegas RN BSN  Care Transition Nurse  [phone]

## 2025-02-04 NOTE — TELEPHONE ENCOUNTER
Glenn the nurse with [facility] called to get a verbal for skilled nursing. She declined PT, OT, DT, Aide and . Please give Glenn [phone]

## 2025-02-05 ENCOUNTER — TELEMEDICINE (OUTPATIENT)
Dept: FAMILY MEDICINE CLINIC | Age: 65
End: 2025-02-05

## 2025-02-05 DIAGNOSIS — M48.061 SPINAL STENOSIS OF LUMBAR REGION WITHOUT NEUROGENIC CLAUDICATION: ICD-10-CM

## 2025-02-05 DIAGNOSIS — I42.9 CARDIOMYOPATHY, UNSPECIFIED TYPE (HCC): Primary | ICD-10-CM

## 2025-02-05 DIAGNOSIS — Z09 HOSPITAL DISCHARGE FOLLOW-UP: ICD-10-CM

## 2025-02-05 RX ORDER — HYDROCODONE BITARTRATE AND ACETAMINOPHEN 7.5; 325 MG/1; MG/1
1 TABLET ORAL EVERY 6 HOURS PRN
Qty: 120 TABLET | Refills: 0 | Status: SHIPPED | OUTPATIENT
Start: 2025-02-05 | End: 2025-03-07

## 2025-02-05 ASSESSMENT — ENCOUNTER SYMPTOMS
GASTROINTESTINAL NEGATIVE: 1
RESPIRATORY NEGATIVE: 1

## 2025-02-05 NOTE — PROGRESS NOTES
daily     citalopram 40 MG tablet  Commonly known as: CELEXA  TAKE ONE TABLET BY MOUTH DAILY     clopidogrel 75 MG tablet  Commonly known as: PLAVIX  Take 1 tablet by mouth daily     cyproheptadine 4 MG tablet  Commonly known as: PERIACTIN  Take 1 tablet by mouth 3 times daily as needed (itch)     hydrOXYzine HCl 25 MG tablet  Commonly known as: ATARAX  TAKE TWO TABLETS BY MOUTH EVERY NIGHT AT BEDTIME AS NEEDED FOR ITCHING     medical marijuana     metoprolol tartrate 25 MG tablet  Commonly known as: LOPRESSOR  Take 0.5 tablets by mouth 2 times daily     midodrine 5 MG tablet  Commonly known as: PROAMATINE  Take 1 tablet by mouth 3 times daily (with meals)     pantoprazole 40 MG tablet  Commonly known as: PROTONIX  Take 1 tablet by mouth every morning (before breakfast)     tiZANidine 2 MG tablet  Commonly known as: ZANAFLEX  Take 1 tablet by mouth every 8 hours as needed (headache)     vitamin B-1 100 MG tablet  Commonly known as: THIAMINE  Take 1 tablet by mouth daily     vitamin D 1.25 MG (55806 UT) Caps capsule  Commonly known as: ERGOCALCIFEROL  Take 1 capsule by mouth once a week               Where to Get Your Medications        These medications were sent to [facility] [id number] - [place] [address] [address] [phone] - F [phone]  [address] [facility] [address]      Phone: [phone]   HYDROcodone-acetaminophen 7.5-325 MG per tablet          Medications marked \"taking\" at this time  Outpatient Medications Marked as Taking for the 2/5/25 encounter (Telemedicine) with Demetrice Saldaña, DO   Medication Sig Dispense Refill    HYDROcodone-acetaminophen (NORCO) 7.5-325 MG per tablet Take 1 tablet by mouth every 6 hours as needed for Pain for up to 30 days. Intended supply: 30 days 120 tablet 0        Medications patient taking as of now reconciled against medications ordered at time of hospital discharge: Yes    Review of Systems   Constitutional:  Positive for activity change and fatigue.

## 2025-02-06 ENCOUNTER — PATIENT MESSAGE (OUTPATIENT)
Dept: FAMILY MEDICINE CLINIC | Age: 65
End: 2025-02-06

## 2025-02-07 RX ORDER — TRAZODONE HYDROCHLORIDE 50 MG/1
50 TABLET, FILM COATED ORAL NIGHTLY
Qty: 30 TABLET | Refills: 2 | Status: SHIPPED | OUTPATIENT
Start: 2025-02-07

## 2025-02-10 ENCOUNTER — TELEPHONE (OUTPATIENT)
Dept: FAMILY MEDICINE CLINIC | Age: 65
End: 2025-02-10

## 2025-02-10 LAB — PROT PATTERN UR ELPH-IMP: NORMAL

## 2025-02-10 NOTE — TELEPHONE ENCOUNTER
ADELINAN called stating during her visit with the patient on 2/8 that the patient informed her that she had a fall on 2/6 without injury. Patient was seated in a chair on wheels, reached for a piece of paper and fell

## 2025-02-14 ENCOUNTER — HOSPITAL ENCOUNTER (OUTPATIENT)
Age: 65
Setting detail: SPECIMEN
Discharge: HOME | End: 2025-02-14
Payer: COMMERCIAL

## 2025-02-14 LAB
ALBUMIN SERPL-MCNC: 3.2 G/DL (ref 3.4–5)
ANION GAP SERPL CALCULATED.3IONS-SCNC: 11 MMOL/L (ref 3–16)
BASOPHILS # BLD: 0 K/UL (ref 0–0.2)
BASOPHILS NFR BLD: 0.6 %
BUN SERPL-MCNC: 23 MG/DL (ref 7–20)
CALCIUM SERPL-MCNC: 9 MG/DL (ref 8.3–10.6)
CHLORIDE SERPL-SCNC: 109 MMOL/L (ref 99–110)
CO2 SERPL-SCNC: 22 MMOL/L (ref 21–32)
CREAT SERPL-MCNC: 1.1 MG/DL (ref 0.6–1.2)
DEPRECATED RDW RBC AUTO: 23.9 % (ref 12.4–15.4)
EOSINOPHIL # BLD: 0.1 K/UL (ref 0–0.6)
EOSINOPHIL NFR BLD: 1.3 %
GFR SERPLBLD CREATININE-BSD FMLA CKD-EPI: 56 ML/MIN/{1.73_M2}
GLUCOSE SERPL-MCNC: 80 MG/DL (ref 70–99)
HCT VFR BLD AUTO: 33.8 % (ref 36–48)
HGB BLD-MCNC: 10.8 G/DL (ref 12–16)
LYMPHOCYTES # BLD: 3.9 K/UL (ref 1–5.1)
LYMPHOCYTES NFR BLD: 49.2 %
MCH RBC QN AUTO: 31.5 PG (ref 26–34)
MCHC RBC AUTO-ENTMCNC: 31.9 G/DL (ref 31–36)
MCV RBC AUTO: 98.7 FL (ref 80–100)
MONOCYTES # BLD: 0.7 K/UL (ref 0–1.3)
MONOCYTES NFR BLD: 8.2 %
NEUTROPHILS # BLD: 3.2 K/UL (ref 1.7–7.7)
NEUTROPHILS NFR BLD: 40.7 %
PHOSPHATE SERPL-MCNC: 4.5 MG/DL (ref 2.5–4.9)
PLATELET # BLD AUTO: 348 K/UL (ref 135–450)
PMV BLD AUTO: 7.8 FL (ref 5–10.5)
POTASSIUM SERPL-SCNC: 5.1 MMOL/L (ref 3.5–5.1)
RBC # BLD AUTO: 3.43 M/UL (ref 4–5.2)
SODIUM SERPL-SCNC: 142 MMOL/L (ref 136–145)
WBC # BLD AUTO: 7.9 K/UL (ref 4–11)

## 2025-02-14 PROCEDURE — 80069 RENAL FUNCTION PANEL: CPT

## 2025-02-14 PROCEDURE — 85025 COMPLETE CBC W/AUTO DIFF WBC: CPT

## 2025-02-14 PROCEDURE — 36415 COLL VENOUS BLD VENIPUNCTURE: CPT

## 2025-02-19 NOTE — PROGRESS NOTES
Yes Ang Enriquez MD   midodrine (PROAMATINE) 5 MG tablet Take 1 tablet by mouth 3 times daily (with meals) Yes Ang Enriquez MD   rosuvastatin (CRESTOR) 20 MG tablet Take 1 tablet by mouth daily Yes Ang Enriquez MD   apixaban (ELIQUIS) 5 MG TABS tablet Take 1 tablet by mouth 2 times daily Yes Ang Enriquez MD   traZODone (DESYREL) 50 MG tablet Take 1 tablet by mouth nightly Yes Demetrice Saldaña DO   HYDROcodone-acetaminophen (NORCO) 7.5-325 MG per tablet Take 1 tablet by mouth every 6 hours as needed for Pain for up to 30 days. Intended supply: 30 days Yes Demetrice Saldaña DO   tiZANidine (ZANAFLEX) 2 MG tablet Take 1 tablet by mouth every 8 hours as needed (headache) Yes Brendan Young MD   thiamine mononitrate (THIAMINE) 100 MG tablet Take 1 tablet by mouth daily Yes Peña Alanis DO   medical marijuana Take 1 each by mouth as needed. Yes Lakisha James MD   vitamin D (ERGOCALCIFEROL) 1.25 MG (49309 UT) CAPS capsule Take 1 capsule by mouth once a week Yes Demetrice Saldaña DO   hydrOXYzine HCl (ATARAX) 25 MG tablet TAKE TWO TABLETS BY MOUTH EVERY NIGHT AT BEDTIME AS NEEDED FOR ITCHING Yes Demetrice Saldaña DO   cyproheptadine (PERIACTIN) 4 MG tablet Take 1 tablet by mouth 3 times daily as needed (itch) Yes Demetrice Saldaña DO   citalopram (CELEXA) 40 MG tablet TAKE ONE TABLET BY MOUTH DAILY Yes Demetrice Saldaña DO   pantoprazole (PROTONIX) 40 MG tablet Take 1 tablet by mouth every morning (before breakfast) Yes Lety Gates MD      Allergies   Allergen Reactions    Penicillins Rash     Past Medical History:   Diagnosis Date    Acute ST elevation myocardial infarction (STEMI) involving right coronary artery (HCC) 01/09/2025    Anxiety     Depression     Hypertension     Insomnia     Lumbar disc herniation     Lumbar spinal stenosis     ST elevation myocardial infarction involving right coronary artery (HCC) 12/30/2024     Past Surgical History:   Procedure Laterality Date    ABDOMEN

## 2025-02-19 NOTE — PATIENT INSTRUCTIONS
Thank you for coming to see me today     Continue to be active     Okay to have steroid injections, but you will have to remain on the blood thinners    A referral was sent for cardiac rehab if you decide you want to start that     Start taking rosuvastatin 20mg daily    Refills provided for eliquis with financial assistance forms

## 2025-02-20 ENCOUNTER — PATIENT MESSAGE (OUTPATIENT)
Dept: FAMILY MEDICINE CLINIC | Age: 65
End: 2025-02-20

## 2025-02-20 ENCOUNTER — OFFICE VISIT (OUTPATIENT)
Dept: CARDIOLOGY CLINIC | Age: 65
End: 2025-02-20
Payer: COMMERCIAL

## 2025-02-20 VITALS
DIASTOLIC BLOOD PRESSURE: 70 MMHG | OXYGEN SATURATION: 98 % | HEART RATE: 58 BPM | WEIGHT: 173 LBS | HEIGHT: 60 IN | BODY MASS INDEX: 33.96 KG/M2 | SYSTOLIC BLOOD PRESSURE: 126 MMHG

## 2025-02-20 DIAGNOSIS — T14.8XXA HEMATOMA: ICD-10-CM

## 2025-02-20 DIAGNOSIS — I21.11 ST ELEVATION MYOCARDIAL INFARCTION INVOLVING RIGHT CORONARY ARTERY (HCC): ICD-10-CM

## 2025-02-20 DIAGNOSIS — I48.91 ATRIAL FIBRILLATION, UNSPECIFIED TYPE (HCC): ICD-10-CM

## 2025-02-20 DIAGNOSIS — D62 ABLA (ACUTE BLOOD LOSS ANEMIA): ICD-10-CM

## 2025-02-20 DIAGNOSIS — I25.83 CORONARY ARTERY DISEASE DUE TO LIPID RICH PLAQUE: Primary | ICD-10-CM

## 2025-02-20 DIAGNOSIS — F51.04 CHRONIC INSOMNIA: Primary | ICD-10-CM

## 2025-02-20 DIAGNOSIS — N17.9 AKI (ACUTE KIDNEY INJURY): ICD-10-CM

## 2025-02-20 DIAGNOSIS — I25.10 CORONARY ARTERY DISEASE DUE TO LIPID RICH PLAQUE: Primary | ICD-10-CM

## 2025-02-20 DIAGNOSIS — Z95.5 S/P CORONARY ARTERY STENT PLACEMENT: ICD-10-CM

## 2025-02-20 DIAGNOSIS — I48.0 PAROXYSMAL ATRIAL FIBRILLATION (HCC): ICD-10-CM

## 2025-02-20 DIAGNOSIS — I95.9 HYPOTENSION, UNSPECIFIED HYPOTENSION TYPE: ICD-10-CM

## 2025-02-20 DIAGNOSIS — I25.5 ISCHEMIC CARDIOMYOPATHY: ICD-10-CM

## 2025-02-20 DIAGNOSIS — E78.2 MIXED HYPERLIPIDEMIA: ICD-10-CM

## 2025-02-20 DIAGNOSIS — R13.12 OROPHARYNGEAL DYSPHAGIA: ICD-10-CM

## 2025-02-20 PROCEDURE — 99214 OFFICE O/P EST MOD 30 MIN: CPT | Performed by: STUDENT IN AN ORGANIZED HEALTH CARE EDUCATION/TRAINING PROGRAM

## 2025-02-20 PROCEDURE — 3074F SYST BP LT 130 MM HG: CPT | Performed by: STUDENT IN AN ORGANIZED HEALTH CARE EDUCATION/TRAINING PROGRAM

## 2025-02-20 PROCEDURE — G2211 COMPLEX E/M VISIT ADD ON: HCPCS | Performed by: STUDENT IN AN ORGANIZED HEALTH CARE EDUCATION/TRAINING PROGRAM

## 2025-02-20 PROCEDURE — 3078F DIAST BP <80 MM HG: CPT | Performed by: STUDENT IN AN ORGANIZED HEALTH CARE EDUCATION/TRAINING PROGRAM

## 2025-02-20 RX ORDER — ZOLPIDEM TARTRATE 5 MG/1
5 TABLET ORAL NIGHTLY PRN
Qty: 30 TABLET | Refills: 0 | Status: SHIPPED | OUTPATIENT
Start: 2025-02-20 | End: 2025-03-22

## 2025-02-20 RX ORDER — CLOPIDOGREL BISULFATE 75 MG/1
75 TABLET ORAL DAILY
Qty: 90 TABLET | Refills: 3 | Status: SHIPPED | OUTPATIENT
Start: 2025-02-20

## 2025-02-20 RX ORDER — METOPROLOL TARTRATE 25 MG/1
12.5 TABLET, FILM COATED ORAL 2 TIMES DAILY
Qty: 90 TABLET | Refills: 3 | Status: SHIPPED | OUTPATIENT
Start: 2025-02-20

## 2025-02-20 RX ORDER — TIZANIDINE 2 MG/1
2 TABLET ORAL EVERY 8 HOURS PRN
Qty: 30 TABLET | Refills: 0 | Status: SHIPPED | OUTPATIENT
Start: 2025-02-20

## 2025-02-20 RX ORDER — ROSUVASTATIN CALCIUM 20 MG/1
20 TABLET, COATED ORAL DAILY
Qty: 90 TABLET | Refills: 3 | Status: SHIPPED | OUTPATIENT
Start: 2025-02-20

## 2025-02-20 RX ORDER — MIDODRINE HYDROCHLORIDE 5 MG/1
5 TABLET ORAL
Qty: 270 TABLET | Refills: 3 | Status: SHIPPED | OUTPATIENT
Start: 2025-02-20

## 2025-02-27 DIAGNOSIS — I21.11 ST ELEVATION MYOCARDIAL INFARCTION INVOLVING RIGHT CORONARY ARTERY (HCC): ICD-10-CM

## 2025-02-27 DIAGNOSIS — I25.83 CORONARY ARTERY DISEASE DUE TO LIPID RICH PLAQUE: ICD-10-CM

## 2025-02-27 DIAGNOSIS — Z95.5 S/P CORONARY ARTERY STENT PLACEMENT: ICD-10-CM

## 2025-02-27 DIAGNOSIS — I95.9 HYPOTENSION, UNSPECIFIED HYPOTENSION TYPE: ICD-10-CM

## 2025-02-27 DIAGNOSIS — I25.10 CORONARY ARTERY DISEASE DUE TO LIPID RICH PLAQUE: ICD-10-CM

## 2025-02-27 RX ORDER — METOPROLOL TARTRATE 25 MG/1
12.5 TABLET, FILM COATED ORAL 2 TIMES DAILY
Qty: 90 TABLET | Refills: 3 | OUTPATIENT
Start: 2025-02-27

## 2025-02-27 RX ORDER — CLOPIDOGREL BISULFATE 75 MG/1
75 TABLET ORAL DAILY
Qty: 90 TABLET | Refills: 3 | OUTPATIENT
Start: 2025-02-27

## 2025-02-27 RX ORDER — MIDODRINE HYDROCHLORIDE 5 MG/1
5 TABLET ORAL
Qty: 270 TABLET | Refills: 3 | OUTPATIENT
Start: 2025-02-27

## 2025-02-27 NOTE — TELEPHONE ENCOUNTER
clopidogrel (PLAVIX) 75 MG tablet 90 tablet 3 2/20/2025 --    Sig - Route: Take 1 tablet by mouth daily - Oral    Sent to pharmacy as: Clopidogrel Bisulfate 75 MG Oral Tablet (PLAVIX)    Cosign for Ordering: Accepted by Ang Enriquez MD on 2/20/2025 10:53 AM    E-Prescribing Status: Receipt confirmed by pharmacy (2/20/2025  9:32 AM EST)      metoprolol tartrate (LOPRESSOR) 25 MG tablet 90 tablet 3 2/20/2025 --    Sig - Route: Take 0.5 tablets by mouth 2 times daily - Oral    Sent to pharmacy as: Metoprolol Tartrate 25 MG Oral Tablet (LOPRESSOR)    Cosign for Ordering: Accepted by Ang Enriquez MD on 2/20/2025 10:53 AM    E-Prescribing Status: Receipt confirmed by pharmacy (2/20/2025  9:32 AM EST)      midodrine (PROAMATINE) 5 MG tablet 270 tablet 3 2/20/2025 --    Sig - Route: Take 1 tablet by mouth 3 times daily (with meals) - Oral    Sent to pharmacy as: Midodrine HCl 5 MG Oral Tablet (PROAMATINE)    Cosign for Ordering: Accepted by Ang Enriquez MD on 2/20/2025 10:53 AM    E-Prescribing Status: Receipt confirmed by pharmacy (2/20/2025  9:32 AM EST)      These all meds were refilled on 2/20/2025 with a year supply at [facility].

## 2025-03-03 ENCOUNTER — TELEPHONE (OUTPATIENT)
Dept: CARDIOLOGY CLINIC | Age: 65
End: 2025-03-03

## 2025-03-03 DIAGNOSIS — M48.061 SPINAL STENOSIS OF LUMBAR REGION WITHOUT NEUROGENIC CLAUDICATION: ICD-10-CM

## 2025-03-03 RX ORDER — HYDROCODONE BITARTRATE AND ACETAMINOPHEN 7.5; 325 MG/1; MG/1
1 TABLET ORAL EVERY 6 HOURS PRN
Qty: 120 TABLET | Refills: 0 | Status: SHIPPED | OUTPATIENT
Start: 2025-03-07 | End: 2025-04-06

## 2025-03-03 NOTE — TELEPHONE ENCOUNTER
Patient was given paperwork to fill out to help make her Eliquis more affordable, she has filled out her portion, but there is a section needing to be filled out by [facility] and she states she has no way to get the paperwork back to us, she is not driving.  Please call to discuss [phone].

## 2025-03-03 NOTE — TELEPHONE ENCOUNTER
Last office visit 2/5/2025       Next office visit scheduled Visit date not found    Requested Prescriptions     Pending Prescriptions Disp Refills    HYDROcodone-acetaminophen (NORCO) 7.5-325 MG per tablet 120 tablet 0     Sig: Take 1 tablet by mouth every 6 hours as needed for Pain for up to 30 days. Intended supply: 30 days

## 2025-03-04 NOTE — TELEPHONE ENCOUNTER
Please have patient mail her copy to the office to have provider section filled out. [address], [address], [place] [address]

## 2025-03-12 ENCOUNTER — HOSPITAL ENCOUNTER (OUTPATIENT)
Age: 65
Setting detail: SPECIMEN
Discharge: HOME | End: 2025-03-12
Payer: COMMERCIAL

## 2025-03-12 LAB
ALBUMIN SERPL-MCNC: 3.2 G/DL (ref 3.4–5)
ANION GAP SERPL CALCULATED.3IONS-SCNC: 10 MMOL/L (ref 3–16)
BACTERIA URNS QL MICRO: ABNORMAL /HPF
BASOPHILS # BLD: 0 K/UL (ref 0–0.2)
BASOPHILS NFR BLD: 0.7 %
BILIRUB UR QL STRIP.AUTO: NEGATIVE
BUN SERPL-MCNC: 17 MG/DL (ref 7–20)
CALCIUM SERPL-MCNC: 8.6 MG/DL (ref 8.3–10.6)
CHLORIDE SERPL-SCNC: 107 MMOL/L (ref 99–110)
CLARITY UR: CLEAR
CO2 SERPL-SCNC: 23 MMOL/L (ref 21–32)
COLOR UR: YELLOW
CREAT SERPL-MCNC: 0.9 MG/DL (ref 0.6–1.2)
DEPRECATED RDW RBC AUTO: 19 % (ref 12.4–15.4)
EOSINOPHIL # BLD: 0.2 K/UL (ref 0–0.6)
EOSINOPHIL NFR BLD: 2.9 %
EPI CELLS #/AREA URNS AUTO: 1 /HPF (ref 0–5)
GFR SERPLBLD CREATININE-BSD FMLA CKD-EPI: 71 ML/MIN/{1.73_M2}
GLUCOSE SERPL-MCNC: 82 MG/DL (ref 70–99)
GLUCOSE UR STRIP.AUTO-MCNC: NEGATIVE MG/DL
HCT VFR BLD AUTO: 34.8 % (ref 36–48)
HGB BLD-MCNC: 11.1 G/DL (ref 12–16)
HGB UR QL STRIP.AUTO: NEGATIVE
HYALINE CASTS #/AREA URNS AUTO: 0 /LPF (ref 0–8)
KETONES UR STRIP.AUTO-MCNC: NEGATIVE MG/DL
LEUKOCYTE ESTERASE UR QL STRIP.AUTO: ABNORMAL
LYMPHOCYTES # BLD: 2.7 K/UL (ref 1–5.1)
LYMPHOCYTES NFR BLD: 44 %
MAGNESIUM SERPL-MCNC: 1.82 MG/DL (ref 1.8–2.4)
MCH RBC QN AUTO: 31.6 PG (ref 26–34)
MCHC RBC AUTO-ENTMCNC: 32 G/DL (ref 31–36)
MCV RBC AUTO: 98.8 FL (ref 80–100)
MONOCYTES # BLD: 0.5 K/UL (ref 0–1.3)
MONOCYTES NFR BLD: 8.7 %
NEUTROPHILS # BLD: 2.7 K/UL (ref 1.7–7.7)
NEUTROPHILS NFR BLD: 43.7 %
NITRITE UR QL STRIP.AUTO: NEGATIVE
PH UR STRIP.AUTO: 6 [PH] (ref 5–8)
PHOSPHATE SERPL-MCNC: 3.9 MG/DL (ref 2.5–4.9)
PLATELET # BLD AUTO: 184 K/UL (ref 135–450)
PMV BLD AUTO: 9.2 FL (ref 5–10.5)
POTASSIUM SERPL-SCNC: 4.5 MMOL/L (ref 3.5–5.1)
PROT UR STRIP.AUTO-MCNC: NEGATIVE MG/DL
PTH-INTACT SERPL-MCNC: 77.2 PG/ML (ref 14–72)
RBC # BLD AUTO: 3.52 M/UL (ref 4–5.2)
RBC CLUMPS #/AREA URNS AUTO: 0 /HPF (ref 0–4)
REASON FOR REJECTION: NORMAL
REJECTED TEST: NORMAL
SODIUM SERPL-SCNC: 140 MMOL/L (ref 136–145)
SP GR UR STRIP.AUTO: 1.01 (ref 1–1.03)
UA DIPSTICK W REFLEX MICRO PNL UR: YES
URN SPEC COLLECT METH UR: ABNORMAL
UROBILINOGEN UR STRIP-ACNC: 0.2 E.U./DL
WBC # BLD AUTO: 6.2 K/UL (ref 4–11)
WBC #/AREA URNS AUTO: 4 /HPF (ref 0–5)

## 2025-03-12 PROCEDURE — 83970 ASSAY OF PARATHORMONE: CPT

## 2025-03-12 PROCEDURE — 80069 RENAL FUNCTION PANEL: CPT

## 2025-03-12 PROCEDURE — 85025 COMPLETE CBC W/AUTO DIFF WBC: CPT

## 2025-03-12 PROCEDURE — 36415 COLL VENOUS BLD VENIPUNCTURE: CPT

## 2025-03-12 PROCEDURE — 81001 URINALYSIS AUTO W/SCOPE: CPT

## 2025-03-12 PROCEDURE — 83735 ASSAY OF MAGNESIUM: CPT

## 2025-03-19 DIAGNOSIS — F51.04 CHRONIC INSOMNIA: ICD-10-CM

## 2025-03-19 RX ORDER — TIZANIDINE 2 MG/1
2 TABLET ORAL EVERY 8 HOURS PRN
Qty: 30 TABLET | Refills: 0 | Status: SHIPPED | OUTPATIENT
Start: 2025-03-19

## 2025-03-19 RX ORDER — ZOLPIDEM TARTRATE 5 MG/1
5 TABLET ORAL NIGHTLY PRN
Qty: 30 TABLET | Refills: 0 | Status: SHIPPED | OUTPATIENT
Start: 2025-03-19 | End: 2025-04-18

## 2025-03-19 NOTE — TELEPHONE ENCOUNTER
Last office visit 2/5/2025     Next office visit scheduled 4/23/2025    Requested Prescriptions     Pending Prescriptions Disp Refills    tiZANidine (ZANAFLEX) 2 MG tablet 30 tablet 0     Sig: Take 1 tablet by mouth every 8 hours as needed (headache)    zolpidem (AMBIEN) 5 MG tablet 30 tablet 0     Sig: Take 1 tablet by mouth nightly as needed for Sleep for up to 30 days. Max Daily Amount: 5 mg

## 2025-03-31 ENCOUNTER — TELEPHONE (OUTPATIENT)
Dept: FAMILY MEDICINE CLINIC | Age: 65
End: 2025-03-31

## 2025-03-31 NOTE — TELEPHONE ENCOUNTER
[facility] [phone]  Calling to make sure that PCP will continue to sign home care orders and to discontinue wound vac therapy and to start collagen patch therapy for patients left inguinal area.  Please call back and leave voicemail confirmation.

## 2025-04-02 DIAGNOSIS — M48.061 SPINAL STENOSIS OF LUMBAR REGION WITHOUT NEUROGENIC CLAUDICATION: ICD-10-CM

## 2025-04-02 RX ORDER — HYDROCODONE BITARTRATE AND ACETAMINOPHEN 7.5; 325 MG/1; MG/1
1 TABLET ORAL EVERY 6 HOURS PRN
Qty: 120 TABLET | Refills: 0 | Status: SHIPPED | OUTPATIENT
Start: 2025-04-06 | End: 2025-05-06

## 2025-04-02 RX ORDER — TRAZODONE HYDROCHLORIDE 50 MG/1
50 TABLET ORAL NIGHTLY
Qty: 30 TABLET | Refills: 2 | Status: SHIPPED | OUTPATIENT
Start: 2025-04-02

## 2025-04-18 DIAGNOSIS — F51.04 CHRONIC INSOMNIA: ICD-10-CM

## 2025-04-18 RX ORDER — ZOLPIDEM TARTRATE 5 MG/1
5 TABLET ORAL NIGHTLY PRN
Qty: 30 TABLET | Refills: 0 | OUTPATIENT
Start: 2025-04-18 | End: 2025-05-18

## 2025-04-18 RX ORDER — ZOLPIDEM TARTRATE 5 MG/1
TABLET ORAL
Qty: 30 TABLET | Refills: 0 | Status: SHIPPED | OUTPATIENT
Start: 2025-04-18 | End: 2025-05-18

## 2025-04-18 NOTE — TELEPHONE ENCOUNTER
Patient called to get a refill of   tiZANidine (ZANAFLEX) tablet 4 mg     (She is no longer taking the 2 mg.)    Last office visit 2/5/2025   Next office visit scheduled 4/23/2025     [address]

## 2025-04-23 ENCOUNTER — OFFICE VISIT (OUTPATIENT)
Dept: FAMILY MEDICINE CLINIC | Age: 65
End: 2025-04-23

## 2025-04-23 VITALS
DIASTOLIC BLOOD PRESSURE: 72 MMHG | HEIGHT: 60 IN | WEIGHT: 162 LBS | BODY MASS INDEX: 31.8 KG/M2 | SYSTOLIC BLOOD PRESSURE: 126 MMHG

## 2025-04-23 DIAGNOSIS — F41.1 GAD (GENERALIZED ANXIETY DISORDER): ICD-10-CM

## 2025-04-23 DIAGNOSIS — I42.9 CARDIOMYOPATHY, UNSPECIFIED TYPE (HCC): ICD-10-CM

## 2025-04-23 DIAGNOSIS — M48.061 SPINAL STENOSIS OF LUMBAR REGION WITHOUT NEUROGENIC CLAUDICATION: Primary | ICD-10-CM

## 2025-04-23 DIAGNOSIS — I10 PRIMARY HYPERTENSION: ICD-10-CM

## 2025-04-23 DIAGNOSIS — M17.11 PRIMARY OSTEOARTHRITIS OF RIGHT KNEE: ICD-10-CM

## 2025-04-23 RX ORDER — ONDANSETRON 4 MG/1
4 TABLET, ORALLY DISINTEGRATING ORAL 3 TIMES DAILY PRN
Qty: 21 TABLET | Refills: 0 | Status: SHIPPED | OUTPATIENT
Start: 2025-04-23

## 2025-04-23 ASSESSMENT — PATIENT HEALTH QUESTIONNAIRE - PHQ9
7. TROUBLE CONCENTRATING ON THINGS, SUCH AS READING THE NEWSPAPER OR WATCHING TELEVISION: NOT AT ALL
9. THOUGHTS THAT YOU WOULD BE BETTER OFF DEAD, OR OF HURTING YOURSELF: NOT AT ALL
SUM OF ALL RESPONSES TO PHQ QUESTIONS 1-9: 0
8. MOVING OR SPEAKING SO SLOWLY THAT OTHER PEOPLE COULD HAVE NOTICED. OR THE OPPOSITE, BEING SO FIGETY OR RESTLESS THAT YOU HAVE BEEN MOVING AROUND A LOT MORE THAN USUAL: NOT AT ALL
4. FEELING TIRED OR HAVING LITTLE ENERGY: NOT AT ALL
SUM OF ALL RESPONSES TO PHQ QUESTIONS 1-9: 0
2. FEELING DOWN, DEPRESSED OR HOPELESS: NOT AT ALL
SUM OF ALL RESPONSES TO PHQ QUESTIONS 1-9: 0
6. FEELING BAD ABOUT YOURSELF - OR THAT YOU ARE A FAILURE OR HAVE LET YOURSELF OR YOUR FAMILY DOWN: NOT AT ALL
SUM OF ALL RESPONSES TO PHQ QUESTIONS 1-9: 0
10. IF YOU CHECKED OFF ANY PROBLEMS, HOW DIFFICULT HAVE THESE PROBLEMS MADE IT FOR YOU TO DO YOUR WORK, TAKE CARE OF THINGS AT HOME, OR GET ALONG WITH OTHER PEOPLE: NOT DIFFICULT AT ALL
5. POOR APPETITE OR OVEREATING: NOT AT ALL
1. LITTLE INTEREST OR PLEASURE IN DOING THINGS: NOT AT ALL
3. TROUBLE FALLING OR STAYING ASLEEP: NOT AT ALL

## 2025-04-23 NOTE — PROGRESS NOTES
YOB: 1960    Date of Visit:  2025    Shahida Vásquez (:  1960) is a 64 y.o. female,Established patient, here for evaluation of the following chief complaint(s):  Check-Up         Assessment & Plan  Spinal stenosis of lumbar region without neurogenic claudication   Has constant pain  taking pain meds prn  Heat  stretching          Cardiomyopathy, unspecified type (HCC)   Monitored by specialist- no acute findings meriting change in the plan         Primary hypertension    Well controlled                   Subjective   Hyperlipidemia  This is a chronic problem. The current episode started more than 1 year ago. The problem is controlled. Recent lipid tests were reviewed and are normal. Current antihyperlipidemic treatment includes diet change, exercise and statins. The current treatment provides moderate improvement of lipids. Risk factors for coronary artery disease include dyslipidemia, family history, hypertension and post-menopausal.     Chronic low back pain  Hurts all the time   Using heat  stretch  Takes hydrocodone prn  The pain meds do help   No side effects    Cad with stent placement  No chest pain   No sob  Has been to see cardiology  Wants to ask questions about the Impella and how it helped her heart  Review of Systems       Objective   Physical Exam  Vitals and nursing note reviewed.   Constitutional:       Appearance: She is well-developed.   HENT:      Head: Normocephalic.   Neck:      Thyroid: No thyromegaly.   Cardiovascular:      Rate and Rhythm: Normal rate and regular rhythm.      Heart sounds: Normal heart sounds.   Pulmonary:      Effort: Pulmonary effort is normal.      Breath sounds: Normal breath sounds.   Lymphadenopathy:      Cervical: No cervical adenopathy.   Neurological:      Mental Status: She is alert and oriented to person, place, and time.   Psychiatric:         Behavior: Behavior normal.         Thought Content: Thought content normal.         Judgment:

## 2025-04-30 DIAGNOSIS — M48.061 SPINAL STENOSIS OF LUMBAR REGION WITHOUT NEUROGENIC CLAUDICATION: ICD-10-CM

## 2025-04-30 RX ORDER — TRAZODONE HYDROCHLORIDE 50 MG/1
50 TABLET ORAL NIGHTLY
Qty: 30 TABLET | Refills: 2 | Status: SHIPPED | OUTPATIENT
Start: 2025-04-30

## 2025-04-30 RX ORDER — HYDROCODONE BITARTRATE AND ACETAMINOPHEN 7.5; 325 MG/1; MG/1
1 TABLET ORAL EVERY 6 HOURS PRN
Qty: 120 TABLET | Refills: 0 | Status: SHIPPED | OUTPATIENT
Start: 2025-05-05 | End: 2025-06-04

## 2025-05-18 DIAGNOSIS — E78.2 MIXED HYPERLIPIDEMIA: ICD-10-CM

## 2025-05-18 DIAGNOSIS — I25.83 CORONARY ARTERY DISEASE DUE TO LIPID RICH PLAQUE: ICD-10-CM

## 2025-05-18 DIAGNOSIS — I21.11 ST ELEVATION MYOCARDIAL INFARCTION INVOLVING RIGHT CORONARY ARTERY (HCC): ICD-10-CM

## 2025-05-18 DIAGNOSIS — I25.10 CORONARY ARTERY DISEASE DUE TO LIPID RICH PLAQUE: ICD-10-CM

## 2025-05-18 DIAGNOSIS — Z95.5 S/P CORONARY ARTERY STENT PLACEMENT: ICD-10-CM

## 2025-05-18 DIAGNOSIS — F51.04 CHRONIC INSOMNIA: ICD-10-CM

## 2025-05-18 RX ORDER — ZOLPIDEM TARTRATE 5 MG/1
5 TABLET ORAL NIGHTLY PRN
Qty: 30 TABLET | Refills: 0 | Status: SHIPPED | OUTPATIENT
Start: 2025-05-23 | End: 2025-06-22

## 2025-05-19 RX ORDER — CLOPIDOGREL BISULFATE 75 MG/1
75 TABLET ORAL DAILY
Qty: 90 TABLET | Refills: 3 | Status: SHIPPED | OUTPATIENT
Start: 2025-05-19

## 2025-05-19 RX ORDER — ROSUVASTATIN CALCIUM 20 MG/1
20 TABLET, COATED ORAL DAILY
Qty: 90 TABLET | Refills: 3 | Status: SHIPPED | OUTPATIENT
Start: 2025-05-19

## 2025-05-19 RX ORDER — METOPROLOL TARTRATE 25 MG/1
12.5 TABLET, FILM COATED ORAL 2 TIMES DAILY
Qty: 90 TABLET | Refills: 3 | Status: SHIPPED | OUTPATIENT
Start: 2025-05-19

## 2025-05-19 NOTE — TELEPHONE ENCOUNTER
Requested Prescriptions     Pending Prescriptions Disp Refills    clopidogrel (PLAVIX) 75 MG tablet 90 tablet 3     Sig: Take 1 tablet by mouth daily    metoprolol tartrate (LOPRESSOR) 25 MG tablet 90 tablet 3     Sig: Take 0.5 tablets by mouth 2 times daily    rosuvastatin (CRESTOR) 20 MG tablet 90 tablet 3     Sig: Take 1 tablet by mouth daily      Last OV:  2/20/2025 [facility]    Next OV: 8/20/2025 [facility]    Last Labs: 01/20/2025 Lipid, 01/14/2025 EKG  Lab Results   Component Value Date    CHOL 127 12/31/2024    TRIG 134 01/20/2025    HDL 27 (L) 12/31/2024    LDL 65 12/31/2024    VLDL 35 12/31/2024         Last Filled: 02/20/2025 [facility]

## 2025-05-24 RX ORDER — ERGOCALCIFEROL 1.25 MG/1
50000 CAPSULE, LIQUID FILLED ORAL WEEKLY
Qty: 12 CAPSULE | Refills: 1 | Status: SHIPPED | OUTPATIENT
Start: 2025-05-24

## 2025-06-02 DIAGNOSIS — M48.061 SPINAL STENOSIS OF LUMBAR REGION WITHOUT NEUROGENIC CLAUDICATION: ICD-10-CM

## 2025-06-03 NOTE — TELEPHONE ENCOUNTER
Last office visit 4/23/2025    Next office visit scheduled 7/23/2025    Requested Prescriptions     Pending Prescriptions Disp Refills    HYDROcodone-acetaminophen (NORCO) 7.5-325 MG per tablet 120 tablet 0     Sig: Take 1 tablet by mouth every 6 hours as needed for Pain for up to 30 days. Intended supply: 30 days

## 2025-06-04 ENCOUNTER — LAB (OUTPATIENT)
Dept: FAMILY MEDICINE CLINIC | Age: 65
End: 2025-06-04

## 2025-06-04 DIAGNOSIS — Z02.83 ENCOUNTER FOR DRUG SCREENING: Primary | ICD-10-CM

## 2025-06-06 RX ORDER — HYDROCODONE BITARTRATE AND ACETAMINOPHEN 7.5; 325 MG/1; MG/1
1 TABLET ORAL EVERY 6 HOURS PRN
Qty: 120 TABLET | Refills: 0 | Status: SHIPPED | OUTPATIENT
Start: 2025-06-06 | End: 2025-07-06

## 2025-06-06 NOTE — TELEPHONE ENCOUNTER
Pt called to check the status of her drug screen so that she can get her refill for her pain meds. Please give pt a call [phone].

## 2025-06-10 LAB

## 2025-06-18 NOTE — TELEPHONE ENCOUNTER
Last office visit 4/23/2025     Next office visit scheduled 7/23/2025    Requested Prescriptions     Pending Prescriptions Disp Refills    tiZANidine (ZANAFLEX) 4 MG tablet 30 tablet 2     Sig: Take 1 tablet by mouth every 8 hours as needed (headache)

## 2025-06-23 DIAGNOSIS — I48.0 PAROXYSMAL ATRIAL FIBRILLATION (HCC): ICD-10-CM

## 2025-06-23 DIAGNOSIS — F51.04 CHRONIC INSOMNIA: ICD-10-CM

## 2025-06-23 RX ORDER — ZOLPIDEM TARTRATE 5 MG/1
TABLET ORAL
Qty: 30 TABLET | Refills: 0 | Status: SHIPPED | OUTPATIENT
Start: 2025-06-23 | End: 2025-07-23

## 2025-06-23 NOTE — TELEPHONE ENCOUNTER
Requested Prescriptions     Pending Prescriptions Disp Refills    apixaban (ELIQUIS) 5 MG TABS tablet 180 tablet 3     Sig: Take 1 tablet by mouth 2 times daily      Last OV:  2/20/2025 [facility]    Next OV: 8/20/2025 [facility]    Last Labs: 03/31/2025 [facility]    Last Filled: 02/20/2025 [facility]

## 2025-07-02 DIAGNOSIS — M48.061 SPINAL STENOSIS OF LUMBAR REGION WITHOUT NEUROGENIC CLAUDICATION: ICD-10-CM

## 2025-07-02 RX ORDER — HYDROCODONE BITARTRATE AND ACETAMINOPHEN 7.5; 325 MG/1; MG/1
1 TABLET ORAL EVERY 6 HOURS PRN
Qty: 120 TABLET | Refills: 0 | Status: SHIPPED | OUTPATIENT
Start: 2025-07-02 | End: 2025-08-01

## 2025-07-23 ENCOUNTER — OFFICE VISIT (OUTPATIENT)
Dept: FAMILY MEDICINE CLINIC | Age: 65
End: 2025-07-23

## 2025-07-23 VITALS
SYSTOLIC BLOOD PRESSURE: 142 MMHG | BODY MASS INDEX: 33.26 KG/M2 | HEIGHT: 60 IN | DIASTOLIC BLOOD PRESSURE: 90 MMHG | WEIGHT: 169.4 LBS

## 2025-07-23 DIAGNOSIS — F51.04 CHRONIC INSOMNIA: ICD-10-CM

## 2025-07-23 DIAGNOSIS — M51.362 DEGENERATION OF INTERVERTEBRAL DISC OF LUMBAR REGION WITH DISCOGENIC BACK PAIN AND LOWER EXTREMITY PAIN: Primary | ICD-10-CM

## 2025-07-23 RX ORDER — ZOLPIDEM TARTRATE 5 MG/1
5 TABLET ORAL NIGHTLY PRN
Qty: 30 TABLET | Refills: 0 | Status: SHIPPED | OUTPATIENT
Start: 2025-07-23 | End: 2025-08-22

## 2025-07-23 ASSESSMENT — ENCOUNTER SYMPTOMS
RESPIRATORY NEGATIVE: 1
GASTROINTESTINAL NEGATIVE: 1
BACK PAIN: 1

## 2025-07-23 NOTE — PROGRESS NOTES
YOB: 1960    Date of Visit:  7/23/2025    Allergies   Allergen Reactions    Penicillins Rash       Outpatient Medications Marked as Taking for the 7/23/25 encounter (Office Visit) with Demetrice Saldaña DO   Medication Sig Dispense Refill    zolpidem (AMBIEN) 5 MG tablet Take 1 tablet by mouth nightly as needed for Sleep for up to 30 days. Max Daily Amount: 5 mg 30 tablet 0    tiZANidine (ZANAFLEX) 4 MG tablet Take 1 tablet by mouth every 8 hours as needed (headache) 30 tablet 2    HYDROcodone-acetaminophen (NORCO) 7.5-325 MG per tablet Take 1 tablet by mouth every 6 hours as needed for Pain for up to 30 days. Intended supply: 30 days 120 tablet 0    apixaban (ELIQUIS) 5 MG TABS tablet Take 1 tablet by mouth 2 times daily 180 tablet 3    furosemide (LASIX) 20 MG tablet TAKE 1 TABLET BY MOUTH DAILY AS NEEDED FOR LEG SWELLING 30 tablet 1    clopidogrel (PLAVIX) 75 MG tablet Take 1 tablet by mouth daily 90 tablet 3    metoprolol tartrate (LOPRESSOR) 25 MG tablet Take 0.5 tablets by mouth 2 times daily 90 tablet 3    rosuvastatin (CRESTOR) 20 MG tablet Take 1 tablet by mouth daily 90 tablet 3    traZODone (DESYREL) 50 MG tablet Take 1 tablet by mouth nightly 30 tablet 2    ondansetron (ZOFRAN-ODT) 4 MG disintegrating tablet Take 1 tablet by mouth 3 times daily as needed for Nausea or Vomiting 21 tablet 0    apixaban (ELIQUIS) 5 MG TABS tablet Take 1 tablet by mouth 2 times daily 28 tablet 0    thiamine mononitrate (THIAMINE) 100 MG tablet Take 1 tablet by mouth daily 90 tablet 2    medical marijuana Take 1 each by mouth as needed.         Vitals:    07/23/25 1448   BP: (!) 142/90   Weight: 76.8 kg (169 lb 6.4 oz)   Height: 1.524 m (5')     Body mass index is 33.08 kg/m².     Wt Readings from Last 3 Encounters:   07/23/25 76.8 kg (169 lb 6.4 oz)   07/14/25 76.2 kg (168 lb)   04/23/25 73.5 kg (162 lb)     BP Readings from Last 3 Encounters:   07/23/25 (!) 142/90   07/14/25 (!) 158/92   04/23/25 126/72

## 2025-08-04 ENCOUNTER — TELEPHONE (OUTPATIENT)
Dept: FAMILY MEDICINE CLINIC | Age: 65
End: 2025-08-04

## 2025-08-04 DIAGNOSIS — M48.061 SPINAL STENOSIS OF LUMBAR REGION WITHOUT NEUROGENIC CLAUDICATION: ICD-10-CM

## 2025-08-04 RX ORDER — HYDROCODONE BITARTRATE AND ACETAMINOPHEN 7.5; 325 MG/1; MG/1
1 TABLET ORAL EVERY 6 HOURS PRN
Qty: 120 TABLET | Refills: 0 | Status: SHIPPED | OUTPATIENT
Start: 2025-08-04 | End: 2025-09-03

## 2025-08-20 ENCOUNTER — OFFICE VISIT (OUTPATIENT)
Dept: CARDIOLOGY CLINIC | Age: 65
End: 2025-08-20
Payer: COMMERCIAL

## 2025-08-20 ENCOUNTER — ANCILLARY PROCEDURE (OUTPATIENT)
Dept: CARDIOLOGY CLINIC | Age: 65
End: 2025-08-20

## 2025-08-20 VITALS
HEIGHT: 60 IN | BODY MASS INDEX: 33.57 KG/M2 | HEART RATE: 56 BPM | SYSTOLIC BLOOD PRESSURE: 128 MMHG | DIASTOLIC BLOOD PRESSURE: 84 MMHG | OXYGEN SATURATION: 97 % | WEIGHT: 171 LBS

## 2025-08-20 DIAGNOSIS — I42.9 CARDIOMYOPATHY, UNSPECIFIED TYPE (HCC): ICD-10-CM

## 2025-08-20 DIAGNOSIS — I48.0 PAROXYSMAL ATRIAL FIBRILLATION (HCC): ICD-10-CM

## 2025-08-20 DIAGNOSIS — N17.9 AKI (ACUTE KIDNEY INJURY): ICD-10-CM

## 2025-08-20 DIAGNOSIS — I25.10 CORONARY ARTERY DISEASE DUE TO LIPID RICH PLAQUE: Primary | ICD-10-CM

## 2025-08-20 DIAGNOSIS — D62 ABLA (ACUTE BLOOD LOSS ANEMIA): ICD-10-CM

## 2025-08-20 DIAGNOSIS — I21.11 ST ELEVATION MYOCARDIAL INFARCTION INVOLVING RIGHT CORONARY ARTERY (HCC): ICD-10-CM

## 2025-08-20 DIAGNOSIS — Z95.5 S/P CORONARY ARTERY STENT PLACEMENT: ICD-10-CM

## 2025-08-20 DIAGNOSIS — T14.8XXA HEMATOMA: ICD-10-CM

## 2025-08-20 DIAGNOSIS — I48.91 ATRIAL FIBRILLATION, UNSPECIFIED TYPE (HCC): ICD-10-CM

## 2025-08-20 DIAGNOSIS — E78.2 MIXED HYPERLIPIDEMIA: ICD-10-CM

## 2025-08-20 DIAGNOSIS — I25.5 ISCHEMIC CARDIOMYOPATHY: ICD-10-CM

## 2025-08-20 DIAGNOSIS — I25.83 CORONARY ARTERY DISEASE DUE TO LIPID RICH PLAQUE: Primary | ICD-10-CM

## 2025-08-20 DIAGNOSIS — R13.12 OROPHARYNGEAL DYSPHAGIA: ICD-10-CM

## 2025-08-20 DIAGNOSIS — I95.9 HYPOTENSION, UNSPECIFIED HYPOTENSION TYPE: ICD-10-CM

## 2025-08-20 LAB — ECHO BSA: 1.81 M2

## 2025-08-20 PROCEDURE — 3074F SYST BP LT 130 MM HG: CPT | Performed by: STUDENT IN AN ORGANIZED HEALTH CARE EDUCATION/TRAINING PROGRAM

## 2025-08-20 PROCEDURE — 99214 OFFICE O/P EST MOD 30 MIN: CPT | Performed by: STUDENT IN AN ORGANIZED HEALTH CARE EDUCATION/TRAINING PROGRAM

## 2025-08-20 PROCEDURE — G2211 COMPLEX E/M VISIT ADD ON: HCPCS | Performed by: STUDENT IN AN ORGANIZED HEALTH CARE EDUCATION/TRAINING PROGRAM

## 2025-08-20 PROCEDURE — 3079F DIAST BP 80-89 MM HG: CPT | Performed by: STUDENT IN AN ORGANIZED HEALTH CARE EDUCATION/TRAINING PROGRAM

## 2025-08-20 PROCEDURE — 93000 ELECTROCARDIOGRAM COMPLETE: CPT | Performed by: STUDENT IN AN ORGANIZED HEALTH CARE EDUCATION/TRAINING PROGRAM

## 2025-08-20 RX ORDER — METOPROLOL SUCCINATE 25 MG/1
25 TABLET, EXTENDED RELEASE ORAL DAILY
Qty: 90 TABLET | Refills: 3 | Status: SHIPPED | OUTPATIENT
Start: 2025-08-20

## 2025-08-21 ENCOUNTER — TELEPHONE (OUTPATIENT)
Dept: CARDIOLOGY CLINIC | Age: 65
End: 2025-08-21

## 2025-08-25 RX ORDER — TRAZODONE HYDROCHLORIDE 50 MG/1
50 TABLET ORAL NIGHTLY
Qty: 30 TABLET | Refills: 0 | Status: SHIPPED | OUTPATIENT
Start: 2025-08-25

## (undated) DEVICE — COTTON UNDERCAST PADDING,CRIMPED FINISH: Brand: WEBRIL

## (undated) DEVICE — GLOVE ORANGE PI 7 1/2   MSG9075

## (undated) DEVICE — KIT AT-X65 ANGIOTOUCH HAND CONTROLLER

## (undated) DEVICE — K WIRE FIX L150MM DIA1.25MM S STL TRCR PNT
Type: IMPLANTABLE DEVICE | Site: RADIUS | Status: NON-FUNCTIONAL
Removed: 2020-12-14

## (undated) DEVICE — MASK, AEROSOL, ELONGATED, ADULT: Brand: MEDLINE

## (undated) DEVICE — MEDIA CONTRAST RX ISOVUE-300 61% 30ML VIALS

## (undated) DEVICE — CATHETER ANGIO JL4 0.045 INX5 FRX100 CM THRULUMEN EXPO

## (undated) DEVICE — SYRINGE MED 3ML CLR PLAS STD N CTRL LUERLOCK TIP DISP

## (undated) DEVICE — INTENDED FOR TISSUE SEPARATION, AND OTHER PROCEDURES THAT REQUIRE A SHARP SURGICAL BLADE TO PUNCTURE OR CUT.: Brand: BARD-PARKER ® STAINLESS STEEL BLADES

## (undated) DEVICE — SINGLE USE BIOPSY VALVE MAJ-210: Brand: SINGLE USE BIOPSY VALVE (STERILE)

## (undated) DEVICE — MEDICINE CUP, GRADUATED, STER: Brand: MEDLINE

## (undated) DEVICE — ANGIO-SEAL VIP VASCULAR CLOSURE DEVICE: Brand: ANGIO-SEAL

## (undated) DEVICE — RADIFOCUS GLIDEWIRE ADVANTAGE GUIDEWIRE: Brand: GLIDEWIRE ADVANTAGE

## (undated) DEVICE — TOWEL,OR,DSP,ST,BLUE,STD,4/PK,20PK/CS: Brand: MEDLINE

## (undated) DEVICE — Device

## (undated) DEVICE — EZE-BAND LF ST 4X5.5 36/CS: Brand: EZE-BAND LF ST 4X5.5 36/CS

## (undated) DEVICE — PINNACLE INTRODUCER SHEATH: Brand: PINNACLE

## (undated) DEVICE — INTRODUCER SHTH 0.018 IN 4 FRX70 CM 21 GAX7 CM KT MIC VSI

## (undated) DEVICE — DISPOSABLE OR TOWEL: Brand: CARDINAL HEALTH

## (undated) DEVICE — BANDAGE COMPR W3INXL5YD NEON E BRTH SELF ADH WRP COBAN

## (undated) DEVICE — 4FR MICROPUNCTURE KIT STIFFEN

## (undated) DEVICE — CHLORAPREP 26ML ORANGE

## (undated) DEVICE — UNIVERSAL BLOCK TRAY: Brand: AVANOS*

## (undated) DEVICE — STERILE POLYISOPRENE POWDER-FREE SURGICAL GLOVES: Brand: PROTEXIS

## (undated) DEVICE — CATH BLLN ANGIO 4X15MM NC EUPHORIA RX

## (undated) DEVICE — CATHETER DIAG AD 5FR L100CM COR GRY HYDRPHLC NYL IM W/O

## (undated) DEVICE — NEEDLE SPNL 22GA L5IN BLK HUB S STL W/ QNCKE PNT W/OUT

## (undated) DEVICE — SET INTRO SHTH 6FR L11CM W/ INTEGR SIDE PRT HEMSTAS VLV 3W

## (undated) DEVICE — DRAPE CARM MINI FOR IMAG SYS INSIGHT FLROSCN

## (undated) DEVICE — STOCKINETTE,IMPERVIOUS,12X48,STERILE: Brand: MEDLINE

## (undated) DEVICE — BANDAGE COMPR W4INXL12FT E DISP ESMARCH EVEN

## (undated) DEVICE — HI-TORQUE WIGGLE GUIDE WIRE .014 STRAIGHT TIP 2.0 CM X 190 CM: Brand: HI-TORQUE WIGGLE

## (undated) DEVICE — GUIDEWIRE VASC L260CM DIA0.035IN RAD 3MM J TIP L7CM PTFE

## (undated) DEVICE — CATHETER CV 3L 7 FRX20 CM KT SAFETY VANTEX

## (undated) DEVICE — Device: Brand: JELCO

## (undated) DEVICE — SWAN-GANZ CCOMBO V THERMODILUTION CATHETER: Brand: SWAN-GANZ CCOMBO V

## (undated) DEVICE — SYRINGE MED 10ML POLYPR LUERSLIP TIP FLAT TOP W/O SFTY DISP

## (undated) DEVICE — Device: Brand: NOMOLINE™ LH ADULT NASAL CO2 CANNULA WITH O2 4M

## (undated) DEVICE — BIT DRL L110MM DIA1.8MM QUIK CPL CALIB W/O STP REUSE

## (undated) DEVICE — KIT ART LN 20GA L12CM FEP RADPQ 0.025X13.75IN SPR GWIRE

## (undated) DEVICE — GLOVE SURG SZ 8 L12IN FNGR THK79MIL GRN LTX FREE

## (undated) DEVICE — CATH BLLN ANGIO 2.50X15MM SC EUPHORA RX

## (undated) DEVICE — NEEDLE EPI L3.5IN OD20GA CLR POLYCARB HUB WNG N DEHP TUOHY

## (undated) DEVICE — CATHETER THRMDIL 7FR L110CM STD PULM ART 4 INFUS LUMN SWN

## (undated) DEVICE — ARMADA 35 PTA CATHETER 7 MM X 40 MM X 135 CM / OVER-THE-WIRE: Brand: ARMADA

## (undated) DEVICE — STANDARD HYPODERMIC NEEDLE,POLYPROPYLENE HUB: Brand: MONOJECT

## (undated) DEVICE — INTRODUCER SHTH 6FR CANN L23CM DIL TIP 35MM GRN TUNGSTEN

## (undated) DEVICE — Device: Brand: ASAHI SION BLUE

## (undated) DEVICE — Device: Brand: ASAHI CORSAIR PRO XS

## (undated) DEVICE — CATHETER IABP 7.5FR 40CC SHTH LAIN DATASCP SYS COMPATIBLE

## (undated) DEVICE — SUTURE VCRL SZ 3-0 L18IN ABSRB UD L26MM SH 1/2 CIR J864D

## (undated) DEVICE — GAUZE,SPONGE,4"X4",8PLY,STRL,LF,10/TRAY: Brand: MEDLINE

## (undated) DEVICE — DRESSING PETRO W3XL3IN OIL EMUL N ADH GZ KNIT IMPREG CELOS

## (undated) DEVICE — CATHETER DIAG AD 5FR L110CM 145DEG COR GRY HYDRPHLC NYL

## (undated) DEVICE — Z DISCONTINUED USE 2158178 CATHETER GUID 6FR L100CM GRN PTFE JR4 TRUELUMEN HYBRID

## (undated) DEVICE — APPLICATOR GRN CHLORAPREP 2% CHG 70

## (undated) DEVICE — DEVICE VASC CLSR 5FR GRY W/ INTEGR SEAL 10ML LOK SYR

## (undated) DEVICE — AIRLIFE™ MISTY MAX 10™ NEBULIZER WITH 7 FOOT (2.1 M) CRUSH RESISTANT OXYGEN TUBING, BAFFLED TEE ADAPTER (22 MM I.D./22 MM O.D.), MOUTHPIECE AND 6 INCH (15 CM) FLEXTUBE: Brand: AIRLIFE™

## (undated) DEVICE — ARMADA 35 PTA CATHETER 6 MM X 60 MM X 135 CM / OVER-THE-WIRE: Brand: ARMADA

## (undated) DEVICE — PAD, DEFIB, ADULT, RADIOTRANS, PHYSIO: Brand: MEDLINE

## (undated) DEVICE — CATH BLLN ANGIO 3.50X20MM NC EUPHORIA RX

## (undated) DEVICE — SET EXTN L7IN PRIMING VOL 0.5ML PRSS RATE STD BOR 1 REM

## (undated) DEVICE — PORT VLV 2 W NDL FREE SMRTSITE

## (undated) DEVICE — K WIRE FIX L150MM DIA1.6MM S STL TRCR PNT
Type: IMPLANTABLE DEVICE | Site: RADIUS | Status: NON-FUNCTIONAL
Removed: 2020-12-14

## (undated) DEVICE — DRAPE,U/ SHT,SPLIT,PLAS,STERIL: Brand: MEDLINE

## (undated) DEVICE — TRAP,MUCUS SPECIMEN, 80CC: Brand: MEDLINE

## (undated) DEVICE — PADDING CAST W4INXL4YD ST COT RAYON MICROPLEATED HIGHLY

## (undated) DEVICE — DRAPE HND W114XL142IN BLU POLYPR W O PCH FEN CRD AND TB HLDR

## (undated) DEVICE — DILATOR: Brand: COOK

## (undated) DEVICE — KIT MFLD ISOLATN NACL CNTRST PRT TBNG SPIK W/ PRSS TRNSDUC

## (undated) DEVICE — ELECTRODE PT RET AD L9FT HI MOIST COND ADH HYDRGEL CORDED

## (undated) DEVICE — GUIDEWIRE VASC IMAG L150CM DIA0.025IN TIP L7CM STR TIP DIAG

## (undated) DEVICE — GUIDEWIRE VASC L150CM DIA0.035IN FLX END L7CM J 3MM PTFE

## (undated) DEVICE — MAJ-1414 SINGLE USE ADPATER BIOPSY VALV: Brand: SINGLE USE ADAPTOR BIOPSY VALVE

## (undated) DEVICE — NEEDLE HYPO 22GA L1 1/2IN PIVOTING SHLD FOR LUERLOCK SYR

## (undated) DEVICE — CATH LAB PACK: Brand: MEDLINE INDUSTRIES, INC.

## (undated) DEVICE — PACK PROCEDURE SURG EXTREMITY MFFOP CUST

## (undated) DEVICE — SOLUTION IV IRRIG 500ML 0.9% SODIUM CHL 2F7123

## (undated) DEVICE — 60 ML SYRINGE REGULAR TIP: Brand: MONOJECT

## (undated) DEVICE — PERCLOSE™ PROSTYLE™ SUTURE-MEDIATED CLOSURE AND REPAIR SYSTEM: Brand: PERCLOSE™ PROSTYLE™

## (undated) DEVICE — SINGLE USE SUCTION VALVE MAJ-209: Brand: SINGLE USE SUCTION VALVE (STERILE)

## (undated) DEVICE — DESTINATION PERIPHERAL GUIDING SHEATH: Brand: DESTINATION

## (undated) DEVICE — SUTURE ETHLN SZ 3-0 L18IN NONABSORBABLE BLK PS-2 L19MM 3/8 1669H

## (undated) DEVICE — AMPLATZ ULTRA STIFF WIRE GUIDE: Brand: AMPLATZ

## (undated) DEVICE — CORONARY IMAGING CATHETER: Brand: OPTICROSS™ 6 HD

## (undated) DEVICE — SYRINGE 60ML BULB IRRIG ST LF

## (undated) DEVICE — CATH BLLN ANGIO 2X15MM SC EUPHORA RX

## (undated) DEVICE — DRAPE,ANGIO,BRACH,STERILE,38X44: Brand: MEDLINE

## (undated) DEVICE — RUNTHROUGH NS EXTRA FLOPPY PTCA GUIDEWIRE: Brand: RUNTHROUGH

## (undated) DEVICE — PEN: MARKING STD 100/CS: Brand: MEDICAL ACTION INDUSTRIES

## (undated) DEVICE — 11.5F X 15CM HEMO-CATH® SILICONE DOUBLE LUMENSILICONE DO CATHETER SET W/STYLETCATHETER SE: Brand: SILICONE HEMO-CATH-NO CUFF/ACUTESILICONE HEMO-CATH-NO CUFF/ACUTE

## (undated) DEVICE — DEVICE CLSR COMPR SYS INTEGR MNOMTR INFL TRNSPAR DOME ADJ

## (undated) DEVICE — Z INACTIVE USE 2711638 MASK SURG WHT STD PREM NONOIL HLTH CARE PARTICULATE RESP